# Patient Record
Sex: MALE | Race: BLACK OR AFRICAN AMERICAN | Employment: OTHER | URBAN - METROPOLITAN AREA
[De-identification: names, ages, dates, MRNs, and addresses within clinical notes are randomized per-mention and may not be internally consistent; named-entity substitution may affect disease eponyms.]

---

## 2017-03-07 ENCOUNTER — OFFICE VISIT (OUTPATIENT)
Dept: INTERNAL MEDICINE CLINIC | Age: 72
End: 2017-03-07

## 2017-03-07 VITALS
BODY MASS INDEX: 23.49 KG/M2 | HEART RATE: 105 BPM | DIASTOLIC BLOOD PRESSURE: 88 MMHG | SYSTOLIC BLOOD PRESSURE: 170 MMHG | HEIGHT: 65 IN | WEIGHT: 141 LBS | OXYGEN SATURATION: 98 % | TEMPERATURE: 97.5 F | RESPIRATION RATE: 16 BRPM

## 2017-03-07 DIAGNOSIS — Z23 ENCOUNTER FOR IMMUNIZATION: ICD-10-CM

## 2017-03-07 DIAGNOSIS — E11.21 CONTROLLED TYPE 2 DIABETES MELLITUS WITH DIABETIC NEPHROPATHY, WITH LONG-TERM CURRENT USE OF INSULIN (HCC): Primary | ICD-10-CM

## 2017-03-07 DIAGNOSIS — Z79.4 CONTROLLED TYPE 2 DIABETES MELLITUS WITH DIABETIC NEPHROPATHY, WITH LONG-TERM CURRENT USE OF INSULIN (HCC): Primary | ICD-10-CM

## 2017-03-07 DIAGNOSIS — I10 ESSENTIAL HYPERTENSION: ICD-10-CM

## 2017-03-07 LAB — HBA1C MFR BLD HPLC: 8.2 %

## 2017-03-07 RX ORDER — ATORVASTATIN CALCIUM 20 MG/1
20 TABLET, FILM COATED ORAL DAILY
Qty: 30 TAB | Refills: 3 | Status: SHIPPED | OUTPATIENT
Start: 2017-03-07 | End: 2017-04-26 | Stop reason: SDUPTHER

## 2017-03-07 RX ORDER — AMLODIPINE BESYLATE 5 MG/1
5 TABLET ORAL DAILY
Qty: 30 TAB | Refills: 3 | Status: SHIPPED | OUTPATIENT
Start: 2017-03-07 | End: 2017-05-01 | Stop reason: SDUPTHER

## 2017-03-07 NOTE — PATIENT INSTRUCTIONS
Follow a no concentrated sweets, low salt diet. Stay physically active. Continue your current medications. Start amlodipine 5 mg in the am to help control your blood pressure. Start atorvastatin 20 mg at bedtime to help reduce your risk of heart attack and stroke. Continue to monitor your blood sugars. Arrange an eye exam.  A shingles shot is recommended.

## 2017-03-07 NOTE — PROGRESS NOTES
Chief Complaint   Patient presents with    Diabetes     Reviewed record in preparation for visit and have obtained necessary documentation. Identified pt with two pt identifiers(name and ). Health Maintenance Due   Topic    COLONOSCOPY     ZOSTER VACCINE AGE 60>     MEDICARE YEARLY EXAM     EYE EXAM RETINAL OR DILATED Q1     GLAUCOMA SCREENING Q2Y          Chief Complaint   Patient presents with    Diabetes        Wt Readings from Last 3 Encounters:   17 141 lb (64 kg)   16 137 lb (62.1 kg)   16 140 lb 14.4 oz (63.9 kg)     Temp Readings from Last 3 Encounters:   17 97.5 °F (36.4 °C) (Oral)   16 97.5 °F (36.4 °C) (Oral)   16 98.2 °F (36.8 °C) (Oral)     BP Readings from Last 3 Encounters:   17 170/88   16 170/60   16 168/80     Pulse Readings from Last 3 Encounters:   17 (!) 105   16 (!) 104   16 98           Learning Assessment:  :     Learning Assessment 2014 11/15/2013   PRIMARY LEARNER Patient Patient   HIGHEST LEVEL OF EDUCATION - PRIMARY LEARNER  GRADUATED HIGH SCHOOL OR GED -   BARRIERS PRIMARY LEARNER NONE -   CO-LEARNER CAREGIVER No -   PRIMARY LANGUAGE ENGLISH ENGLISH    NEED No -   LEARNER PREFERENCE PRIMARY OTHER (COMMENT) PICTURES   LEARNING SPECIAL TOPICS no -   ANSWERED BY patient patient   RELATIONSHIP SELF SELF   ASSESSMENT COMMENT none -       Depression Screening:  :     PHQ 2 / 9, over the last two weeks 2016   Little interest or pleasure in doing things Not at all   Feeling down, depressed or hopeless Not at all   Total Score PHQ 2 0       Fall Risk Assessment:  :     Fall Risk Assessment, last 12 mths 2016   Able to walk? Yes   Fall in past 12 months? -   Fall with injury? Yes   Number of falls in past 12 months 1   Fall Risk Score 2       Abuse Screening:  :     Abuse Screening Questionnaire 2014   Do you ever feel afraid of your partner?  N N   Are you in a relationship with someone who physically or mentally threatens you? N N   Is it safe for you to go home? Y Y       Coordination of Care Questionnaire:  :     1) Have you been to an emergency room, urgent care clinic since your last visit? no   Hospitalized since your last visit? no             2) Have you seen or consulted any other health care providers outside of [address] [place] [address] since your last visit? no  (Include any pap smears or colon screenings in this section.)    3) Do you have an Advance Directive on file? no    4) Are you interested in receiving information on Advance Directives? NO      Patient is accompanied by son I have received verbal consent from Mercy Swanson to discuss any/all medical information while they are present in the room. Reviewed record  In preparation for visit and have obtained necessary documentation.

## 2017-03-07 NOTE — PROGRESS NOTES
Subjective:     Chief Complaint   Patient presents with    Diabetes     He  is a 70y.o. year old male who presents for evaluation. BS have been  over the past month. Has a cold presently. No CP or SOB. Historical Data    Past Medical History:   Diagnosis Date    Diabetes ([address]     Diabetes ([facility] [place] [address] 2002        No past surgical history on file. Outpatient Encounter Prescriptions as of 3/7/2017   Medication Sig Dispense Refill    losartan (COZAAR) 100 mg tablet TAKE 1 TABLET BY MOUTH EVERY DAY 30 Tab 5    metFORMIN (GLUCOPHAGE) 500 mg tablet Take 1 Tab by mouth two (2) times daily (with meals). Indications: TYPE 2 DIABETES MELLITUS 60 Tab 3    LANTUS SOLOSTAR 100 unit/mL (3 mL) pen INJECT 20 UNITS EVERY DAY 5 Each 3    Diabetic Supplies, Miscellan. misc Needles for Lantus pen 100 Each 10    glucose blood VI test strips (ACCU-CHEK SMARTVIEW TEST STRIP) strip Check blood sugar twice a day. Diagnosis code E11.9 100 Strip 11    Lancets misc Check blood sugar twice a day. Diagnosis code E11.9 200 Each 11    Blood-Glucose Meter monitoring kit As per Patients insurance for brand preference 1 Kit 0    latanoprost (XALATAN) 0.005 % ophthalmic solution       timolol (TIMOPTIC) 0.5 % ophthalmic solution       econazole nitrate (SPECTAZOLE) 1 % topical cream Apply to feet bilaterally 15 g 0    Lancets & Blood Glucose Strips Cmpk One touch Ultra test strips. Tid ac meals 3 Package 3    vit B Cmplx 3-FA-Vit C-Biotin (NEPHRO ALEN RX) 1- mg-mg-mcg tablet Take 1 Tab by mouth daily. 30 Tab 3    ROSA PEN NEEDLE 32 x 5/32 \" Ndle USE AS DIRECTED 100 Syringe 1    aspirin (ASPIRIN) 325 mg tablet Take 325 mg by mouth daily.  omega-3 fatty acids-vitamin e (FISH OIL) 1,000 mg Cap Take 1 Cap by mouth. No facility-administered encounter medications on file as of 3/7/2017.          No Known Allergies     Social History     Social History    Marital status:      Spouse name: N/A    Number of children: N/A    Years of education: N/A     Occupational History    Not on file. Social History Main Topics    Smoking status: Never Smoker    Smokeless tobacco: Never Used    Alcohol use No    Drug use: No    Sexual activity: No     Other Topics Concern    Not on file     Social History Narrative    ** Merged History Encounter **             Review of Systems  Pertinent items are noted in HPI. Objective:     Vitals:    03/07/17 0839   BP: 170/88   Pulse: (!) 105   Resp: 16   Temp: 97.5 °F (36.4 °C)   TempSrc: Oral   SpO2: 98%   Weight: 141 lb (64 kg)   Height: 5' 4.57\" (1.64 m)     Pleasant AAM in no acute distress. Neck:  Supple. Cardiac: RRR without murmurs, gallops or rubs. Lungs: Clear to auscultation. Abdomen: Benign. Diabetic foot exam:     Left: Reflexes 1+     Vibratory sensation normal    Proprioception normal   Sharp/dull discrimination normal    Filament test normal sensation with micro filament   Pulse DP: 1+ (weak)   Pulse PT: 1+ (weak)   Deformities: None  Right: Reflexes 1+   Vibratory sensation normal   Proprioception normal   Sharp/dull discrimination normal   Filament test normal sensation with micro filament   Pulse DP: 1+ (weak)   Pulse PT: 1+ (weak)   Deformities: None  Results for orders placed or performed in visit on 03/07/17   AMB POC HEMOGLOBIN A1C   Result Value Ref Range    Hemoglobin A1c (POC) 8.2 %     ASSESSMENT / PLAN:   1. Controlled type 2 diabetes mellitus with diabetic nephropathy, with long-term current use of insulin (HCC)  · No concentrated sweets diet. · Regular aerobic exercise. · Continue current medications / may require adjustment if A1c remains elevated  · On ARB  · Add statin  · Encouraged eye exam.  -  DIABETES FOOT EXAM  - AMB POC HEMOGLOBIN A1C  - atorvastatin (LIPITOR) 20 mg tablet; Take 1 Tab by mouth daily. Dispense: 30 Tab; Refill: 3    2. Essential hypertension  · Not well controlled. · Continue ARB  · Add CCB.   · Stress low salt diet.  - amLODIPine (NORVASC) 5 mg tablet; Take 1 Tab by mouth daily. Dispense: 30 Tab; Refill: 3    3. Encounter for immunization    - varicella zoster vacine live (ZOSTAVAX) 19,400 unit/0.65 mL susr injection; 1 Vial by SubCUTAneous route once for 1 dose. Dispense: 0.65 mL; Refill: 0    4. General  · Return for a wellness exam.      Patient Instructions   Follow a no concentrated sweets, low salt diet. Stay physically active. Continue your current medications. Start amlodipine 5 mg in the am to help control your blood pressure. Start atorvastatin 20 mg at bedtime to help reduce your risk of heart attack and stroke. Continue to monitor your blood sugars. Arrange an eye exam.  A shingles shot is recommended. Follow-up Disposition:  Return in about 6 weeks (around 4/18/2017) for F/U DM, HTN, wellness. Advised him to call back or return to office if symptoms worsen/change/persist.  Discussed expected course/resolution/complications of diagnosis in detail with patient. Medication risks/benefits/costs/interactions/alternatives discussed with patient. He was given an after visit summary which includes diagnoses, current medications, & vitals. He expressed understanding with the diagnosis and plan.

## 2017-03-07 NOTE — MR AVS SNAPSHOT
Visit Information Date & Time Provider Department Dept. Phone Encounter #  
 3/7/2017  8:15 AM Gonzales Cooney MD [place] [address] [facility] [phone] [id number] Follow-up Instructions Return in about 6 weeks (around 4/18/2017) for F/U DM, HTN, wellness. Upcoming Health Maintenance Date Due COLONOSCOPY 5/22/1963 ZOSTER VACCINE AGE 60> 5/22/2005 MEDICARE YEARLY EXAM 5/22/2010 EYE EXAM RETINAL OR DILATED Q1 5/19/2015 GLAUCOMA SCREENING Q2Y 5/19/2016 LIPID PANEL Q1 4/20/2017 HEMOGLOBIN A1C Q6M 5/29/2017 MICROALBUMIN Q1 5/31/2017 FOOT EXAM Q1 11/29/2017 DTaP/Tdap/Td series (2 - Td) 3/18/2026 Allergies as of 3/7/2017  Review Complete On: 3/7/2017 By: Gonzales Cooney MD  
 No Known Allergies Current Immunizations  Reviewed on 11/29/2016 Name Date Influenza High Dose Vaccine PF 11/29/2016 Influenza Vaccine 10/30/2014, 10/14/2013 Influenza Vaccine Whole 11/1/2012 Pneumococcal Vaccine (Unspecified Type) 11/28/2012, 5/29/2012  9:16 AM  
  
 Not reviewed this visit You Were Diagnosed With   
  
 Codes Comments Controlled type 2 diabetes mellitus with diabetic nephropathy, with long-term current use of insulin ([facility]    -  Primary ICD-10-CM: E11.21, Z79.4 ICD-9-CM: 250.40, 583.81, V58.67 Essential hypertension     ICD-10-CM: I10 
ICD-9-CM: 401.9 Encounter for immunization     ICD-10-CM: U40 ICD-9-CM: V03.89 Vitals BP Pulse Temp Resp Height(growth percentile) Weight(growth percentile) 170/88 (BP 1 Location: Right arm, BP Patient Position: Sitting) (!) 105 97.5 °F (36.4 °C) (Oral) 16 5' 4.57\" (1.64 m) 141 lb (64 kg) SpO2 BMI Smoking Status 98% 23.78 kg/m2 Never Smoker BMI and BSA Data Body Mass Index Body Surface Area 23.78 kg/m 2 1.71 m 2 Preferred Pharmacy Pharmacy Name Phone  [id number] [address] [place] [phone] Your Updated Medication List  
  
   
This list is accurate as of: 3/7/17  9:23 AM.  Always use your most recent med list. amLODIPine 5 mg tablet Commonly known as:  Chuck Chafe Take 1 Tab by mouth daily. aspirin 325 mg tablet Commonly known as:  ASPIRIN Take 325 mg by mouth daily. atorvastatin 20 mg tablet Commonly known as:  LIPITOR Take 1 Tab by mouth daily. Blood-Glucose Meter monitoring kit As per Patients insurance for brand preference Diabetic Supplies, [facility] 24. Misc Needles for Lantus pen  
  
 econazole nitrate 1 % topical cream  
Commonly known as:  Den Roland Apply to feet bilaterally FISH OIL 1,000 mg Cap Generic drug:  omega-3 fatty acids-vitamin e Take 1 Cap by mouth. glucose blood VI test strips strip Commonly known as:  [address] Check blood sugar twice a day. Diagnosis code E11.9 Lancets & Blood Glucose Strips Cmpk One touch Ultra test strips. Tid ac meals Lancets Misc Check blood sugar twice a day. Diagnosis code E11.9 LANTUS SOLOSTAR 100 unit/mL (3 mL) pen Generic drug:  insulin glargine INJECT 20 UNITS EVERY DAY  
  
 latanoprost 0.005 % ophthalmic solution Commonly known as:  XALATAN  
  
 losartan 100 mg tablet Commonly known as:  COZAAR  
TAKE 1 TABLET BY MOUTH EVERY DAY  
  
 metFORMIN 500 mg tablet Commonly known as:  GLUCOPHAGE Take 1 Tab by mouth two (2) times daily (with meals). Indications: TYPE 2 DIABETES MELLITUS Nichole Pen Needle 32 gauge x 5/32\" Ndle Generic drug:  Insulin Needles (Disposable) USE AS DIRECTED  
  
 timolol 0.5 % ophthalmic solution Commonly known as:  TIMOPTIC  
  
 varicella zoster vacine live 19,400 unit/0.65 mL Susr injection Commonly known as:  ZOSTAVAX  
1 Vial by SubCUTAneous route once for 1 dose. vit B Cmplx 3-FA-Vit C-Biotin 1- mg-mg-mcg tablet Commonly known as:  NEPHRO ALEN RX Take 1 Tab by mouth daily. Prescriptions Printed Refills  
 varicella zoster vacine live (ZOSTAVAX) 19,400 unit/0.65 mL susr injection 0 Si Vial by SubCUTAneous route once for 1 dose. Class: Print Route: SubCUTAneous Prescriptions Sent to Pharmacy Refills  
 amLODIPine (NORVASC) 5 mg tablet 3 Sig: Take 1 Tab by mouth daily. Class: Normal  
 Pharmacy: [facility]/pharm[facility] #[id number] Ph #: [phone] Route: Oral  
 atorvastatin (LIPITOR) 20 mg tablet 3 Sig: Take 1 Tab by mouth daily. Class: Normal  
 Pharmacy: [facility]/pharmacy #[id number] Ph #: [phone] Route: Oral  
  
We Performed the Following AMB POC HEMOGLOBIN A1C [87524 CPT(R)] HM DIABETES FOOT EXAM [HM7 Custom] Follow-up Instructions Return in about 6 weeks (around 2017) for F/U DM, HTN, wellness. Patient Instructions Follow a no concentrated sweets, low salt diet. Stay physically active. Continue your current medications. Start amlodipine 5 mg in the am to help control your blood pressure. Start atorvastatin 20 mg at bedtime to help reduce your risk of heart attack and stroke. Continue to monitor your blood sugars. Arrange an eye exam. 
A shingles shot is recommended. Introducing [facility] & HEALTH SERVICES! [employer] introduces [employer] patient portal. Now you can access parts of your medical record, email your doctor's office, and request medication refills online. 1. In your internet browser, go to [email or web address] 2. Click on the First Time User? Click Here link in the Sign In box. You will see the New Member Sign Up page. 3. Enter your [employer] Access Code exactly as it appears below. You will not need to use this code after youve completed the sign-up process.  If you do not sign up before the expiration date, you must request a new code. · [email or web address] Access Code: [email or web address] Expires: 6/5/2017  9:23 AM 
 
4. Enter the last four digits of your Social Security Number (xxxx) and Date of Birth (mm/dd/yyyy) as indicated and click Submit. You will be taken to the next sign-up page. 5. Create a [employer] ID. This will be your [employer] login ID and cannot be changed, so think of one that is secure and easy to remember. 6. Create a [employer] password. You can change your password at any time. 7. Enter your Password Reset Question and Answer. This can be used at a later time if you forget your password. 8. Enter your e-mail address. You will receive e-mail notification when new information is available in [address] 9. Click Sign Up. You can now view and download portions of your medical record. 10. Click the Download Summary menu link to download a portable copy of your medical information. If you have questions, please visit the Frequently Asked Questions section of the [email or web address] website. Remember, [employer] is NOT to be used for urgent needs. For medical emergencies, dial 911. Now available from your iPhone and Android! Please provide this summary of care documentation to your next provider. Your primary care clinician is listed as Amadou Mckoy. If you have any questions after today's visit, please call [phone].

## 2017-03-26 DIAGNOSIS — I10 ESSENTIAL HYPERTENSION WITH GOAL BLOOD PRESSURE LESS THAN 140/90: ICD-10-CM

## 2017-03-26 RX ORDER — LOSARTAN POTASSIUM 100 MG/1
TABLET ORAL
Qty: 30 TAB | Refills: 2 | Status: SHIPPED | OUTPATIENT
Start: 2017-03-26 | End: 2017-07-03 | Stop reason: SDUPTHER

## 2017-04-18 ENCOUNTER — OFFICE VISIT (OUTPATIENT)
Dept: INTERNAL MEDICINE CLINIC | Age: 72
End: 2017-04-18

## 2017-04-18 VITALS
OXYGEN SATURATION: 98 % | BODY MASS INDEX: 24.17 KG/M2 | RESPIRATION RATE: 18 BRPM | TEMPERATURE: 97.9 F | HEART RATE: 105 BPM | HEIGHT: 64 IN | SYSTOLIC BLOOD PRESSURE: 156 MMHG | DIASTOLIC BLOOD PRESSURE: 80 MMHG | WEIGHT: 141.6 LBS

## 2017-04-18 DIAGNOSIS — Z79.4 CONTROLLED TYPE 2 DIABETES MELLITUS WITH DIABETIC NEPHROPATHY, WITH LONG-TERM CURRENT USE OF INSULIN (HCC): Primary | ICD-10-CM

## 2017-04-18 DIAGNOSIS — I10 ESSENTIAL HYPERTENSION: ICD-10-CM

## 2017-04-18 DIAGNOSIS — Z13.39 SCREENING FOR ALCOHOLISM: ICD-10-CM

## 2017-04-18 DIAGNOSIS — Z00.00 ROUTINE GENERAL MEDICAL EXAMINATION AT A HEALTH CARE FACILITY: ICD-10-CM

## 2017-04-18 DIAGNOSIS — E11.21 CONTROLLED TYPE 2 DIABETES MELLITUS WITH DIABETIC NEPHROPATHY, WITH LONG-TERM CURRENT USE OF INSULIN (HCC): Primary | ICD-10-CM

## 2017-04-18 NOTE — MR AVS SNAPSHOT
Visit Information Date & Time Provider Department Dept. Phone Encounter #  
 4/18/2017  8:30 AM Ardean Lennox, MD [place] [address] [facility] [phone] [id number] Follow-up Instructions Return in about 4 months (around 8/18/2017) for F/U DM. Upcoming Health Maintenance Date Due  
 EYE EXAM RETINAL OR DILATED Q1 5/19/2015 GLAUCOMA SCREENING Q2Y 5/19/2016 LIPID PANEL Q1 4/20/2017 MICROALBUMIN Q1 5/31/2017 HEMOGLOBIN A1C Q6M 9/7/2017 FOOT EXAM Q1 3/7/2018 MEDICARE YEARLY EXAM 4/19/2018 COLONOSCOPY 2/16/2021 DTaP/Tdap/Td series (2 - Td) 3/18/2026 Allergies as of 4/18/2017  Review Complete On: 4/18/2017 By: Ardean Lennox, MD  
 No Known Allergies Current Immunizations  Reviewed on 11/29/2016 Name Date Influenza High Dose Vaccine PF 11/29/2016 Influenza Vaccine 10/30/2014, 10/14/2013 Influenza Vaccine Whole 11/1/2012 Pneumococcal Vaccine (Unspecified Type) 11/28/2012, 5/29/2012  9:16 AM  
  
 Not reviewed this visit You Were Diagnosed With   
  
 Codes Comments Controlled type 2 diabetes mellitus with diabetic nephropathy, with long-term current use of insulin ([facility]    -  Primary ICD-10-CM: E11.21, Z79.4 ICD-9-CM: 250.40, 583.81, V58.67 Routine general medical examination at a health care facility     ICD-10-CM: Z00.00 ICD-9-CM: V70.0 Screening for alcoholism     ICD-10-CM: Z13.89 ICD-9-CM: V79.1 Essential hypertension     ICD-10-CM: I10 
ICD-9-CM: 401.9 Vitals BP Pulse Temp Resp Height(growth percentile) Weight(growth percentile) 156/80 (BP 1 Location: Left arm, BP Patient Position: Sitting) (!) 105 97.9 °F (36.6 °C) (Oral) 18 5' 4\" (1.626 m) 141 lb 9.6 oz (64.2 kg) SpO2 BMI Smoking Status 98% 24.31 kg/m2 Never Smoker BMI and BSA Data Body Mass Index Body Surface Area  
 24.31 kg/m 2 1.7 m 2 Preferred Pharmacy Pharmacy Name Phone [id number] [place] , [address] [facility] [phone] Your Updated Medication List  
  
   
This list is accurate as of: 4/18/17  8:52 AM.  Always use your most recent med list. amLODIPine 5 mg tablet Commonly known as:  Franklin Andria Take 1 Tab by mouth daily. aspirin 325 mg tablet Commonly known as:  ASPIRIN Take 325 mg by mouth daily. atorvastatin 20 mg tablet Commonly known as:  LIPITOR Take 1 Tab by mouth daily. Blood-Glucose Meter monitoring kit As per Patients insurance for brand preference Diabetic Supplies, [facility] 24. Misc Needles for Lantus pen  
  
 glucose blood VI test strips strip Commonly known as:  [address] Check blood sugar twice a day. Diagnosis code E11.9 Lancets & Blood Glucose Strips Cmpk One touch Ultra test strips. Tid ac meals Lancets Misc Check blood sugar twice a day. Diagnosis code E11.9 LANTUS SOLOSTAR 100 unit/mL (3 mL) pen Generic drug:  insulin glargine INJECT 20 UNITS EVERY DAY  
  
 losartan 100 mg tablet Commonly known as:  COZAAR  
TAKE 1 TABLET BY MOUTH EVERY DAY  
  
 metFORMIN 500 mg tablet Commonly known as:  GLUCOPHAGE Take 1 Tab by mouth two (2) times daily (with meals). Indications: TYPE 2 DIABETES MELLITUS Nichole Pen Needle 32 gauge x 5/32\" Ndle Generic drug:  Insulin Needles (Disposable) USE AS DIRECTED  
  
 vit B Cmplx 3-FA-Vit C-Biotin 1- mg-mg-mcg tablet Commonly known as:  NEPHRO ALEN RX Take 1 Tab by mouth daily. We Performed the Following CBC WITH AUTOMATED DIFF [15155 CPT(R)] HEMOGLOBIN A1C WITH EAG [88557 CPT(R)] LIPID PANEL [71845 CPT(R)] METABOLIC PANEL, COMPREHENSIVE [62851 CPT(R)] PSA W/ REFLX FREE PSA [92967 CPT(R)] TSH REFLEX TO T4 [[id number] Custom] URINALYSIS W/ RFLX MICROSCOPIC [70692 CPT(R)] Follow-up Instructions Return in about 4 months (around 8/18/2017) for F/U DM. Patient Instructions Follow a no concentrated sweets diet. Avoid salt. Take your medications as directed. Have blood work done today. Get a complete eye exam yearly. Check your feet carefully morning and night. Monitor your blood sugars. Introducing [facility] & HEALTH SERVICES! Rob Orte[employer] introduces [employer] patient portal. Now you can access parts of your medical record, email your doctor's office, and request medication refills online. 1. In your internet browser, go to [email or web address] 2. Click on the First Time User? Click Here link in the Sign In box. You will see the New Member Sign Up page. 3. Enter your [employer] Access Code exactly as it appears below. You will not need to use this code after youve completed the sign-up process. If you do not sign up before the expiration date, you must request a new code. · [employer] Access Code: [email or web address] Expires: 6/5/2017 10:23 AM 
 
4. Enter the last four digits of your Social Security Number (xxxx) and Date of Birth (mm/dd/yyyy) as indicated and click Submit. You will be taken to the next sign-up page. 5. Create a [employer] ID. This will be your [employer] login ID and cannot be changed, so think of one that is secure and easy to remember. 6. Create a [employer] password. You can change your password at any time. 7. Enter your Password Reset Question and Answer. This can be used at a later time if you forget your password. 8. Enter your e-mail address. You will receive e-mail notification when new information is available in [address] 9. Click Sign Up. You can now view and download portions of your medical record. 10. Click the Download Summary menu link to download a portable copy of your medical information. If you have questions, please visit the Frequently Asked Questions section of the [employer] website.  Remember, [employer] is NOT to be used for urgent needs. For medical emergencies, dial 911. Now available from your iPhone and Android! Please provide this summary of care documentation to your next provider. Your primary care clinician is listed as Reyes Bull. If you have any questions after today's visit, please call [phone].

## 2017-04-18 NOTE — PROGRESS NOTES
Subjective:     Chief Complaint   Patient presents with    Complete Physical    Hypertension     He  is a 70y.o. year old male who presents for evaluation. He has been monitoring his blood sugars and they have been reasonable well controlled (). Some confusion about some of his medications. Historical Data    Past Medical History:   Diagnosis Date    Diabetes ([facility]     Diabetes ([facility] 2002        No past surgical history on file. Outpatient Encounter Prescriptions as of 4/18/2017   Medication Sig Dispense Refill    losartan (COZAAR) 100 mg tablet TAKE 1 TABLET BY MOUTH EVERY DAY 30 Tab 2    amLODIPine (NORVASC) 5 mg tablet Take 1 Tab by mouth daily. 30 Tab 3    atorvastatin (LIPITOR) 20 mg tablet Take 1 Tab by mouth daily. 30 Tab 3    metFORMIN (GLUCOPHAGE) 500 mg tablet Take 1 Tab by mouth two (2) times daily (with meals). Indications: TYPE 2 DIABETES MELLITUS 60 Tab 3    LANTUS SOLOSTAR 100 unit/mL (3 mL) pen INJECT 20 UNITS EVERY DAY 5 Each 3    Diabetic Supplies, Miscellan. misc Needles for Lantus pen 100 Each 10    glucose blood VI test strips (ACCU-CHEK SMARTVIEW TEST STRIP) strip Check blood sugar twice a day. Diagnosis code E11.9 100 Strip 11    Lancets misc Check blood sugar twice a day. Diagnosis code E11.9 200 Each 11    Blood-Glucose Meter monitoring kit As per Patients insurance for brand preference 1 Kit 0    Lancets & Blood Glucose Strips Cmpk One touch Ultra test strips. Tid ac meals 3 Package 3    vit B Cmplx 3-FA-Vit C-Biotin (NEPHRO ALEN RX) 1- mg-mg-mcg tablet Take 1 Tab by mouth daily. 30 Tab 3    ROSA PEN NEEDLE 32 x 5/32 \" Ndle USE AS DIRECTED 100 Syringe 1    aspirin (ASPIRIN) 325 mg tablet Take 325 mg by mouth daily.  omega-3 fatty acids-vitamin e (FISH OIL) 1,000 mg Cap Take 1 Cap by mouth.       latanoprost (XALATAN) 0.005 % ophthalmic solution       timolol (TIMOPTIC) 0.5 % ophthalmic solution       econazole nitrate (SPECTAZOLE) 1 % topical cream Apply to feet bilaterally 15 g 0     No facility-administered encounter medications on file as of 4/18/2017. No Known Allergies     Social History     Social History    Marital status:      Spouse name: N/A    Number of children: N/A    Years of education: N/A     Occupational History    Not on file. Social History Main Topics    Smoking status: Never Smoker    Smokeless tobacco: Never Used    Alcohol use No    Drug use: No    Sexual activity: No     Other Topics Concern    Not on file     Social History Narrative    ** Merged History Encounter **             Review of Systems  Pertinent items are noted in HPI. Objective:     Vitals:    04/18/17 0833   BP: 156/80   Pulse: (!) 105   Resp: 18   Temp: 97.9 °F (36.6 °C)   TempSrc: Oral   SpO2: 98%   Weight: 141 lb 9.6 oz (64.2 kg)   Height: 5' 4\" (1.626 m)     Skin:  No macules, papules, striae or bites. HEENT:   Head:  Normocephalic, atraumatic   Ears:  Canals clear. TM's intact   Eyes:  EOMI, PERRLA, left exotropia   Throat:  No erythema or exudates  Neck:  No masses. No thyromegaly. No adenopathy. Cardiac:  Regular rate and rhythm without murmurs, gallops or rubs. Lungs:  Clear to auscultation. No wheezes, rhonchi or rubs. Abdomen:  Soft and non tender. No HSM. Extremities:  Pulses 2+ and intact. Musculoskeletal: No joint effusions. Neurologic:   CN's II-XII:  Left exotropia, otherwise WNL   Motor:  Symmetric and full. Genitalia: Normal male. No hernias. Rectal: Normal sphincter tone. No prostatic hypertrophy. ASSESSMENT / PLAN:   1. Routine general medical examination at a health care facility    - CBC WITH AUTOMATED DIFF  - TSH REFLEX TO T4  - PSA W/ REFLX FREE PSA    2. Screening for alcoholism      3. Controlled type 2 diabetes mellitus with diabetic nephropathy, with long-term current use of insulin (HCC)  · No concentrated sweets diet. · Regular aerobic exercise.   · Continue Lantus and metformin. · Monitor A1c and microalbumiin  · Regular eye exams. · On ARB and statin  - LIPID PANEL  - HEMOGLOBIN A1C WITH EAG    4. Essential hypertension  · Low salt diet. · Resume all medications. - URINALYSIS W/ RFLX MICROSCOPIC  - METABOLIC PANEL, COMPREHENSIVE    Patient Instructions   Follow a no concentrated sweets diet. Avoid salt. Take your medications as directed. Have blood work done today. Get a complete eye exam yearly. Check your feet carefully morning and night. Monitor your blood sugars. Follow-up Disposition:  Return in about 4 months (around 8/18/2017) for F/U DM. Advised him to call back or return to office if symptoms worsen/change/persist.  Discussed expected course/resolution/complications of diagnosis in detail with patient. Medication risks/benefits/costs/interactions/alternatives discussed with patient. He was given an after visit summary which includes diagnoses, current medications, & vitals. He expressed understanding with the diagnosis and plan. This is a Subsequent Medicare Annual Wellness Visit providing Personalized Prevention Plan Services (PPPS) (Performed 12 months after initial AWV and PPPS )    I have reviewed the patient's medical history in detail and updated the computerized patient record. History     Past Medical History:   Diagnosis Date    Diabetes ([facility] [facility]     Diabetes ([facility] [address] 2002      No past surgical history on file. Current Outpatient Prescriptions   Medication Sig Dispense Refill    losartan (COZAAR) 100 mg tablet TAKE 1 TABLET BY MOUTH EVERY DAY 30 Tab 2    amLODIPine (NORVASC) 5 mg tablet Take 1 Tab by mouth daily. 30 Tab 3    atorvastatin (LIPITOR) 20 mg tablet Take 1 Tab by mouth daily. 30 Tab 3    metFORMIN (GLUCOPHAGE) 500 mg tablet Take 1 Tab by mouth two (2) times daily (with meals).  Indications: TYPE 2 DIABETES MELLITUS 60 Tab 3    LANTUS SOLOSTAR 100 unit/mL (3 mL) pen INJECT 20 UNITS EVERY DAY 5 Each 3    Diabetic Supplies, [facility]. misc Needles for Lantus pen 100 Each 10    glucose blood VI test strips (ACCU-CHEK SMARTVIEW TEST STRIP) strip Check blood sugar twice a day. Diagnosis code E11.9 100 Strip 11    Lancets misc Check blood sugar twice a day. Diagnosis code E11.9 200 Each 11    Blood-Glucose Meter monitoring kit As per Patients insurance for brand preference 1 Kit 0    Lancets & Blood Glucose Strips Cmpk One touch Ultra test strips. Tid ac meals 3 Package 3    vit B Cmplx 3-FA-Vit C-Biotin (NEPHRO ALEN RX) 1- mg-mg-mcg tablet Take 1 Tab by mouth daily. 30 Tab 3    ROSA PEN NEEDLE 32 x 5/32 \" Ndle USE AS DIRECTED 100 Syringe 1    aspirin (ASPIRIN) 325 mg tablet Take 325 mg by mouth daily.  omega-3 fatty acids-vitamin e (FISH OIL) 1,000 mg Cap Take 1 Cap by mouth.  latanoprost (XALATAN) 0.005 % ophthalmic solution       timolol (TIMOPTIC) 0.5 % ophthalmic solution       econazole nitrate (SPECTAZOLE) 1 % topical cream Apply to feet bilaterally 15 g 0     No Known Allergies  Family History   Problem Relation Age of Onset    Diabetes Mother       in her 76s     Social History   Substance Use Topics    Smoking status: Never Smoker    Smokeless tobacco: Never Used    Alcohol use No     Patient Active Problem List   Diagnosis Code    HTN (hypertension) I10    Cataracts, bilateral H26.9    Glaucoma, right eye H40.9    Screening for colon cancer Z12.11    DM (diabetes mellitus) type II controlled with [facility] manifestation ([facility] E11.29       Depression Risk Factor Screening:     PHQ 2 / 9, over the last two weeks 2016   Little interest or pleasure in doing things Not at all   Feeling down, depressed or hopeless Not at all   Total Score PHQ 2 0     Alcohol Risk Factor Screening: On any occasion during the past 3 months, have you had more than 4 drinks containing alcohol? No    Do you average more than 14 drinks per week?   No      Functional Ability and Level of Safety: Hearing Loss   none    Activities of Daily Living   Self-care. Requires assistance with: no ADLs    Fall Risk     Fall Risk Assessment, last 12 mths 4/18/2017   Able to walk? Yes   Fall in past 12 months? No   Fall with injury? -   Number of falls in past 12 months -   Fall Risk Score -     Abuse Screen   Patient is not abused    Review of Systems   Pertinent items are noted in HPI. Physical Examination     Evaluation of Cognitive Function:  Mood/affect:  neutral  Appearance: age appropriate and casually dressed  Family member/caregiver input: none    No exam performed today, see elsewhere. Patient Care Team:  Elidia Mac MD as PCP - General (Internal Medicine)    Advice/Referrals/Counseling   Education and counseling provided:  Are appropriate based on today's review and evaluation  End-of-Life planning (with patient's consent)      Assessment/Plan       ICD-10-CM ICD-9-CM    1. Routine general medical examination at a health care facility Z00.00 V70.0    2. Screening for alcoholism Z13.89 V79.1      Encounter Diagnoses   Name Primary?  Routine general medical examination at a health care facility     Screening for alcoholism    .

## 2017-04-18 NOTE — PROGRESS NOTES
Chief Complaint   Patient presents with    Complete Physical    Hypertension     1. Have you been to the ER, urgent care clinic since your last visit? No Hospitalized since your last visit? No    2. Have you seen or consulted any other health care providers outside of the [address] [place] [address] since your last visit? No    Include any pap smears or colon screening.  No

## 2017-04-18 NOTE — PATIENT INSTRUCTIONS
Follow a no concentrated sweets diet. Avoid salt. Take your medications as directed. Have blood work done today. Get a complete eye exam yearly. Check your feet carefully morning and night. Monitor your blood sugars.

## 2017-04-20 LAB
ALBUMIN SERPL-MCNC: 4.3 G/DL (ref 3.5–4.8)
ALBUMIN/GLOB SERPL: 1.3 {RATIO} (ref 1.2–2.2)
ALP SERPL-CCNC: 103 IU/L (ref 39–117)
ALT SERPL-CCNC: 22 IU/L (ref 0–44)
APPEARANCE UR: CLEAR
AST SERPL-CCNC: 17 IU/L (ref 0–40)
BACTERIA #/AREA URNS HPF: NORMAL /[HPF]
BASOPHILS # BLD AUTO: 0 X10E3/UL (ref 0–0.2)
BASOPHILS NFR BLD AUTO: 0 %
BILIRUB SERPL-MCNC: 0.7 MG/DL (ref 0–1.2)
BILIRUB UR QL STRIP: NEGATIVE
BUN SERPL-MCNC: 20 MG/DL (ref 8–27)
BUN/CREAT SERPL: 17 (ref 10–24)
CALCIUM SERPL-MCNC: 9.1 MG/DL (ref 8.6–10.2)
CASTS URNS QL MICRO: NORMAL /LPF
CHLORIDE SERPL-SCNC: 102 MMOL/L (ref 96–106)
CHOLEST SERPL-MCNC: 169 MG/DL (ref 100–199)
CO2 SERPL-SCNC: 19 MMOL/L (ref 18–29)
COLOR UR: YELLOW
CREAT SERPL-MCNC: 1.17 MG/DL (ref 0.76–1.27)
EOSINOPHIL # BLD AUTO: 0.3 X10E3/UL (ref 0–0.4)
EOSINOPHIL NFR BLD AUTO: 3 %
EPI CELLS #/AREA URNS HPF: NORMAL /HPF
ERYTHROCYTE [DISTWIDTH] IN BLOOD BY AUTOMATED COUNT: 15 % (ref 12.3–15.4)
EST. AVERAGE GLUCOSE BLD GHB EST-MCNC: 163 MG/DL
GLOBULIN SER CALC-MCNC: 3.4 G/DL (ref 1.5–4.5)
GLUCOSE SERPL-MCNC: 131 MG/DL (ref 65–99)
GLUCOSE UR QL: NEGATIVE
HBA1C MFR BLD: 7.3 % (ref 4.8–5.6)
HCT VFR BLD AUTO: 39 % (ref 37.5–51)
HDLC SERPL-MCNC: 74 MG/DL
HGB BLD-MCNC: 13.1 G/DL (ref 12.6–17.7)
HGB UR QL STRIP: NEGATIVE
IMM GRANULOCYTES # BLD: 0 X10E3/UL (ref 0–0.1)
IMM GRANULOCYTES NFR BLD: 0 %
INTERPRETATION, 910389: NORMAL
KETONES UR QL STRIP: NEGATIVE
LDLC SERPL CALC-MCNC: 84 MG/DL (ref 0–99)
LEUKOCYTE ESTERASE UR QL STRIP: NEGATIVE
LYMPHOCYTES # BLD AUTO: 4.1 X10E3/UL (ref 0.7–3.1)
LYMPHOCYTES NFR BLD AUTO: 44 %
Lab: NORMAL
MCH RBC QN AUTO: 28.5 PG (ref 26.6–33)
MCHC RBC AUTO-ENTMCNC: 33.6 G/DL (ref 31.5–35.7)
MCV RBC AUTO: 85 FL (ref 79–97)
MICRO URNS: ABNORMAL
MONOCYTES # BLD AUTO: 0.7 X10E3/UL (ref 0.1–0.9)
MONOCYTES NFR BLD AUTO: 7 %
MUCOUS THREADS URNS QL MICRO: PRESENT
NEUTROPHILS # BLD AUTO: 4.3 X10E3/UL (ref 1.4–7)
NEUTROPHILS NFR BLD AUTO: 46 %
NITRITE UR QL STRIP: NEGATIVE
PH UR STRIP: 6 [PH] (ref 5–7.5)
PLATELET # BLD AUTO: 175 X10E3/UL (ref 150–379)
POTASSIUM SERPL-SCNC: 3.9 MMOL/L (ref 3.5–5.2)
PROT SERPL-MCNC: 7.7 G/DL (ref 6–8.5)
PROT UR QL STRIP: ABNORMAL
PSA SERPL-MCNC: 1.3 NG/ML (ref 0–4)
RBC # BLD AUTO: 4.59 X10E6/UL (ref 4.14–5.8)
RBC #/AREA URNS HPF: NORMAL /HPF
REFLEX CRITERIA: NORMAL
SODIUM SERPL-SCNC: 143 MMOL/L (ref 134–144)
SP GR UR: 1.01 (ref 1–1.03)
TRIGL SERPL-MCNC: 57 MG/DL (ref 0–149)
TSH SERPL DL<=0.005 MIU/L-ACNC: 3.08 UIU/ML (ref 0.45–4.5)
UROBILINOGEN UR STRIP-MCNC: 1 MG/DL (ref 0.2–1)
VLDLC SERPL CALC-MCNC: 11 MG/DL (ref 5–40)
WBC # BLD AUTO: 9.3 X10E3/UL (ref 3.4–10.8)
WBC #/AREA URNS HPF: NORMAL /HPF

## 2017-04-26 DIAGNOSIS — E11.21 CONTROLLED TYPE 2 DIABETES MELLITUS WITH DIABETIC NEPHROPATHY, WITH LONG-TERM CURRENT USE OF INSULIN (HCC): ICD-10-CM

## 2017-04-26 DIAGNOSIS — Z79.4 CONTROLLED TYPE 2 DIABETES MELLITUS WITH DIABETIC NEPHROPATHY, WITH LONG-TERM CURRENT USE OF INSULIN (HCC): ICD-10-CM

## 2017-04-26 RX ORDER — ATORVASTATIN CALCIUM 20 MG/1
20 TABLET, FILM COATED ORAL DAILY
Qty: 90 TAB | Refills: 1 | Status: SHIPPED | OUTPATIENT
Start: 2017-04-26 | End: 2017-11-08 | Stop reason: SDUPTHER

## 2017-04-26 RX ORDER — INSULIN GLARGINE 100 [IU]/ML
INJECTION, SOLUTION SUBCUTANEOUS
Qty: 2 PEN | Refills: 10 | Status: SHIPPED | OUTPATIENT
Start: 2017-04-26 | End: 2018-03-01 | Stop reason: SDUPTHER

## 2017-04-26 NOTE — TELEPHONE ENCOUNTER
Patient's son called [facility] stating that his father does not have any insulin and needs the script sent as soon as possible. Please call son Shivam Baxter at [phone]

## 2017-04-26 NOTE — TELEPHONE ENCOUNTER
Last office visit 4/18/17  Upcomming appointment 8/15/17      I advised patient's son that I would have Dr Isac Frederick get this sent in as soon as possible and requested that he contact us sooner in the future prior to medication running out.  Understanding expressed

## 2017-05-02 DIAGNOSIS — E11.29 DM (DIABETES MELLITUS) TYPE II CONTROLLED WITH RENAL MANIFESTATION (HCC): ICD-10-CM

## 2017-05-02 RX ORDER — BLOOD SUGAR DIAGNOSTIC
STRIP MISCELLANEOUS
Qty: 100 STRIP | Refills: 10 | Status: SHIPPED | OUTPATIENT
Start: 2017-05-02 | End: 2018-04-16 | Stop reason: SDUPTHER

## 2017-08-15 ENCOUNTER — OFFICE VISIT (OUTPATIENT)
Dept: INTERNAL MEDICINE CLINIC | Age: 72
End: 2017-08-15

## 2017-08-15 VITALS
DIASTOLIC BLOOD PRESSURE: 60 MMHG | OXYGEN SATURATION: 98 % | SYSTOLIC BLOOD PRESSURE: 170 MMHG | HEIGHT: 64 IN | WEIGHT: 146.1 LBS | TEMPERATURE: 96.3 F | HEART RATE: 95 BPM | BODY MASS INDEX: 24.94 KG/M2 | RESPIRATION RATE: 16 BRPM

## 2017-08-15 DIAGNOSIS — I10 ESSENTIAL HYPERTENSION WITH GOAL BLOOD PRESSURE LESS THAN 140/90: ICD-10-CM

## 2017-08-15 DIAGNOSIS — Z79.4 CONTROLLED TYPE 2 DIABETES MELLITUS WITH DIABETIC NEPHROPATHY, WITH LONG-TERM CURRENT USE OF INSULIN (HCC): Primary | ICD-10-CM

## 2017-08-15 DIAGNOSIS — E11.21 CONTROLLED TYPE 2 DIABETES MELLITUS WITH DIABETIC NEPHROPATHY, WITH LONG-TERM CURRENT USE OF INSULIN (HCC): Primary | ICD-10-CM

## 2017-08-15 DIAGNOSIS — I10 ESSENTIAL HYPERTENSION: ICD-10-CM

## 2017-08-15 RX ORDER — LOSARTAN POTASSIUM 100 MG/1
TABLET ORAL
Qty: 30 TAB | Refills: 4 | Status: SHIPPED | OUTPATIENT
Start: 2017-08-15 | End: 2018-06-18 | Stop reason: SDUPTHER

## 2017-08-15 NOTE — PROGRESS NOTES
Chief Complaint   Patient presents with    Hypertension    Diabetes     Patient has been out of losartan for 1 month. Reviewed record in preparation for visit and have obtained necessary documentation. Identified pt with two pt identifiers(name and ). Health Maintenance Due   Topic    EYE EXAM RETINAL OR DILATED Q1     GLAUCOMA SCREENING Q2Y     MICROALBUMIN Q1          Chief Complaint   Patient presents with    Hypertension    Diabetes        Wt Readings from Last 3 Encounters:   08/15/17 146 lb 1.6 oz (66.3 kg)   17 141 lb 9.6 oz (64.2 kg)   17 141 lb (64 kg)     Temp Readings from Last 3 Encounters:   08/15/17 96.3 °F (35.7 °C) (Oral)   17 97.9 °F (36.6 °C) (Oral)   17 97.5 °F (36.4 °C) (Oral)     BP Readings from Last 3 Encounters:   08/15/17 170/60   17 156/80   17 170/88     Pulse Readings from Last 3 Encounters:   08/15/17 95   17 (!) 105   17 (!) 105           Learning Assessment:  :     Learning Assessment 2014 11/15/2013   PRIMARY LEARNER Patient Patient   HIGHEST LEVEL OF EDUCATION - PRIMARY LEARNER  GRADUATED HIGH SCHOOL OR GED -   BARRIERS PRIMARY LEARNER NONE -   CO-LEARNER CAREGIVER No -   PRIMARY LANGUAGE ENGLISH ENGLISH    NEED No -   LEARNER PREFERENCE PRIMARY OTHER (COMMENT) PICTURES   LEARNING SPECIAL TOPICS no -   ANSWERED BY patient patient   RELATIONSHIP SELF SELF   ASSESSMENT COMMENT none -       Depression Screening:  :     PHQ over the last two weeks 2016   Little interest or pleasure in doing things Not at all   Feeling down, depressed or hopeless Not at all   Total Score PHQ 2 0       Fall Risk Assessment:  :     Fall Risk Assessment, last 12 mths 2017   Able to walk? Yes   Fall in past 12 months?  No   Fall with injury? -   Number of falls in past 12 months -   Fall Risk Score -       Abuse Screening:  :     Abuse Screening Questionnaire 2014   Do you ever feel afraid of your partner? N N   Are you in a relationship with someone who physically or mentally threatens you? N N   Is it safe for you to go home? Y Y       Coordination of Care Questionnaire:  :     1) Have you been to an emergency room, urgent care clinic since your last visit? no   Hospitalized since your last visit? no             2) Have you seen or consulted any other health care providers outside of [address] [place] [address] since your last visit? no  (Include any pap smears or colon screenings in this section.)    3) Do you have an Advance Directive on file? no    4) Are you interested in receiving information on Advance Directives? NO      Patient is accompanied by son I have received verbal consent from Diego Bustillo to discuss any/all medical information while they are present in the room. Reviewed record  In preparation for visit and have obtained necessary documentation.

## 2017-08-15 NOTE — MR AVS SNAPSHOT
Visit Information Date & Time Provider Department Dept. Phone Encounter #  
 8/15/2017  8:00 AM Alberto Love MD [place] [id number] [facility] [phone] [id number] Follow-up Instructions Return in about 4 months (around 12/15/2017) for F/U DM. Upcoming Health Maintenance Date Due  
 EYE EXAM RETINAL OR DILATED Q1 5/19/2015 GLAUCOMA SCREENING Q2Y 5/19/2016 INFLUENZA AGE 9 TO ADULT 10/27/2017* HEMOGLOBIN A1C Q6M 10/18/2017 FOOT EXAM Q1 3/7/2018 LIPID PANEL Q1 4/18/2018 MEDICARE YEARLY EXAM 4/19/2018 MICROALBUMIN Q1 8/15/2018 COLONOSCOPY 2/16/2021 DTaP/Tdap/Td series (2 - Td) 3/18/2026 *Topic was postponed. The date shown is not the original due date. Allergies as of 8/15/2017  Review Complete On: 8/15/2017 By: Sam Linares LPN No Known Allergies Current Immunizations  Reviewed on 11/29/2016 Name Date Influenza High Dose Vaccine PF 11/29/2016 Influenza Vaccine 10/30/2014, 10/14/2013 Influenza Vaccine Whole 11/1/2012 ZZZ-RETIRED (DO NOT USE) Pneumococcal Vaccine (Unspecified Type) 11/28/2012, 5/29/2012  9:16 AM  
  
 Not reviewed this visit You Were Diagnosed With   
  
 Codes Comments Controlled type 2 diabetes mellitus with diabetic nephropathy, with long-term current use of insulin ([facility]    -  Primary ICD-10-CM: E11.21, Z79.4 ICD-9-CM: 250.40, 583.81, V58.67 Essential hypertension     ICD-10-CM: I10 
ICD-9-CM: 401.9 Essential hypertension with goal blood pressure less than 140/90     ICD-10-CM: I10 
ICD-9-CM: 401.9 Vitals BP Pulse Temp Resp Height(growth percentile) Weight(growth percentile) 170/60 (BP 1 Location: Right arm, BP Patient Position: Sitting) 95 96.3 °F (35.7 °C) (Oral) 16 5' 4\" (1.626 m) 146 lb 1.6 oz (66.3 kg) SpO2 BMI Smoking Status 98% 25.08 kg/m2 Never Smoker Vitals History BMI and BSA Data Body Mass Index Body Surface Area 25.08 kg/m 2 1.73 m 2 Preferred Pharmacy Pharmacy Name Phone [facility][id number]- [place], [address] [facility] [phone] Your Updated Medication List  
  
   
This list is accurate as of: 8/15/17  8:33 AM.  Always use your most recent med list.  
  
  
  
  
 ACCU-CHEK SMARTVIEW TEST STRIP strip Generic drug:  glucose blood VI test strips CHECK BLOOD SUGAR TWICE A DAY. DIAGNOSIS CODE E11.9  
  
 amLODIPine 5 mg tablet Commonly known as:  Noel Shana TAKE 1 TAB BY MOUTH DAILY. aspirin 325 mg tablet Commonly known as:  ASPIRIN Take 325 mg by mouth daily. atorvastatin 20 mg tablet Commonly known as:  LIPITOR Take 1 Tab by mouth daily. Blood-Glucose Meter monitoring kit As per Patients insurance for brand preference [facility], [facility]. Misc Needles for Lantus pen FISH OIL PO Take  by mouth. Lancets & Blood Glucose Strips Cmpk One touch Ultra test strips. Tid ac meals Lancets Misc Check blood sugar twice a day. Diagnosis code E11.9 LANTUS SOLOSTAR 100 unit/mL (3 mL) Inpn Generic drug:  insulin glargine INJECT 25 UNITS UNDER THE SKIN EVERY DAY AS DIRECTED  
  
 losartan 100 mg tablet Commonly known as:  COZAAR  
TAKE 1 TABLET BY MOUTH EVERY DAY  
  
 metFORMIN 500 mg tablet Commonly known as:  GLUCOPHAGE  
TAKE 1 TABLET BY MOUTH TWICE A DAY WITH MEALS Nichole Pen Needle 32 gauge x 5/32\" Ndle Generic drug:  Insulin Needles (Disposable) USE AS DIRECTED  
  
 vit B Cmplx 3-FA-Vit C-Biotin 1- mg-mg-mcg tablet Commonly known as:  NEPHRO ALEN RX Take 1 Tab by mouth daily. Prescriptions Sent to Pharmacy Refills  
 losartan (COZAAR) 100 mg tablet 4 Sig: TAKE 1 TABLET BY MOUTH EVERY DAY Class: Normal  
 Pharmacy: [facility]/pharmacy #[id number]- [place], [address] [place] [address] Ph #: [phone] Follow-up Instructions Return in about 4 months (around 12/15/2017) for F/U DM. To-Do List   
 08/15/2017 Lab:  HEMOGLOBIN A1C WITH EAG   
  
 08/15/2017 Lab:  MICROALBUMIN, UR, RAND W/ MICROALBUMIN/CREA RATIO Patient Instructions Follow a no concentrated sweets diet. Stay physically active. Take your medication each and every day. Monitor your blood sugars. Introducing [facility]! [address] introduces [employer] patient portal. Now you can access parts of your medical record, email your doctor's office, and request medication refills online. 1. In your internet browser, go to [email or web address] 2. Click on the First Time User? Click Here link in the Sign In box. You will see the New Member Sign Up page. 3. Enter your [employer] Access Code exactly as it appears below. You will not need to use this code after youve completed the sign-up process. If you do not sign up before the expiration date, you must request a new code. · [employer] Access Code: [email or web address] Expires: 11/13/2017  8:33 AM 
 
4. Enter the last four digits of your Social Security Number (xxxx) and Date of Birth (mm/dd/yyyy) as indicated and click Submit. You will be taken to the next sign-up page. 5. Create a [employer] ID. This will be your [employer] login ID and cannot be changed, so think of one that is secure and easy to remember. 6. Create a [employer] password. You can change your password at any time. 7. Enter your Password Reset Question and Answer. This can be used at a later time if you forget your password. 8. Enter your e-mail address. You will receive e-mail notification when new information is available in [address] 9. Click Sign Up. You can now view and download portions of your medical record. 10. Click the Download Summary menu link to download a portable copy of your medical information.  
 
If you have questions, please visit the Frequently Asked Questions section of the [employer]. Remember, [employer] is NOT to be used for urgent needs. For medical emergencies, dial 911. Now available from your iPhone and Android! Please provide this summary of care documentation to your next provider. Your primary care clinician is listed as Paramjit Camejo. If you have any questions after today's visit, please call [phone].

## 2017-08-15 NOTE — PROGRESS NOTES
Subjective:     Chief Complaint   Patient presents with    Hypertension    Diabetes     He  is a 67y.o. year old male who presents for evaluation. Out of BP medication for one month. BS . No CP / SOB. Good appetite and energy. Sleep pattern is irregular. Historical Data    Past Medical History:   Diagnosis Date    Diabetes ([address] 2002        No past surgical history on file. Outpatient Encounter Prescriptions as of 8/15/2017   Medication Sig Dispense Refill    losartan (COZAAR) 100 mg tablet TAKE 1 TABLET BY MOUTH EVERY DAY 30 Tab 4    metFORMIN (GLUCOPHAGE) 500 mg tablet TAKE 1 TABLET BY MOUTH TWICE A DAY WITH MEALS 60 Tab 5    atorvastatin (LIPITOR) 20 mg tablet Take 1 Tab by mouth daily. 90 Tab 1    vit B Cmplx 3-FA-Vit C-Biotin (NEPHRO ALEN RX) 1- mg-mg-mcg tablet Take 1 Tab by mouth daily. 30 Tab 3    aspirin (ASPIRIN) 325 mg tablet Take 325 mg by mouth daily.  ROSA PEN NEEDLE 32 gauge x 5/32\" ndle USE AS DIRECTED 100 Pen Needle 10    ACCU-CHEK SMARTVIEW TEST STRIP strip CHECK BLOOD SUGAR TWICE A DAY. DIAGNOSIS CODE E11.9 100 Strip 10    amLODIPine (NORVASC) 5 mg tablet TAKE 1 TAB BY MOUTH DAILY. 30 Tab 5    LANTUS SOLOSTAR 100 unit/mL (3 mL) pen INJECT 25 UNITS UNDER THE SKIN EVERY DAY AS DIRECTED 2 Pen 10    Diabetic Supplies, Miscellan. misc Needles for Lantus pen 100 Each 10    Lancets misc Check blood sugar twice a day. Diagnosis code E11.9 200 Each 11    Blood-Glucose Meter monitoring kit As per Patients insurance for brand preference 1 Kit 0    Lancets & Blood Glucose Strips Cmpk One touch Ultra test strips. Tid ac meals 3 Package 3     No facility-administered encounter medications on file as of 8/15/2017. Not on File     Social History     Social History    Marital status:      Spouse name: N/A    Number of children: N/A    Years of education: N/A     Occupational History    Not on file.      Social History Main Topics    Smoking status: Never Smoker    Smokeless tobacco: Never Used    Alcohol use No    Drug use: No    Sexual activity: No     Other Topics Concern    Not on file     Social History Narrative    ** Merged History Encounter **             Review of Systems  A comprehensive review of systems was negative except for that written in the HPI. Objective:     Vitals:    08/15/17 0814   BP: 170/60   Pulse: 95   Resp: 16   Temp: 96.3 °F (35.7 °C)   TempSrc: Oral   SpO2: 98%   Weight: 146 lb 1.6 oz (66.3 kg)   Height: 5' 4\" (1.626 m)     Pleasant AAM in no acute distress. Neck: Supplc. Cardiac: RRR without murmurs, gallops or rubs. Lungs: Clear to auscultation. Abdomen: Benign. Extremities: No edema. ASSESSMENT / PLAN:   1. Controlled type 2 diabetes mellitus with diabetic nephropathy, with long-term current use of insulin (HCC)  · Continue diabetic diet. · Continue Lantus and metformin. · On statin and ARB. · Regular eye exams.  - MICROALBUMIN, UR, RAND W/ MICROALBUMIN/CREA RATIO; Future  - HEMOGLOBIN A1C WITH EAG; Future  - AMB POC FUNDUS PHOTOGRAPHY WITH INTERP AND REPORT    2. Essential hypertension  · Low salt diet. · Continue current medications. 3. Essential hypertension with goal blood pressure less than 140/90    - losartan (COZAAR) 100 mg tablet; TAKE 1 TABLET BY MOUTH EVERY DAY  Dispense: 30 Tab; Refill: 4    Patient Instructions   Follow a no concentrated sweets diet. Stay physically active. Take your medication each and every day. Monitor your blood sugars. Follow-up Disposition: Not on File   Advised him to call back or return to office if symptoms worsen/change/persist.  Discussed expected course/resolution/complications of diagnosis in detail with patient. Medication risks/benefits/costs/interactions/alternatives discussed with patient. He was given an after visit summary which includes diagnoses, current medications, & vitals.   He expressed understanding with the diagnosis and plan.

## 2017-08-15 NOTE — PATIENT INSTRUCTIONS
Follow a no concentrated sweets diet. Stay physically active. Take your medication each and every day. Monitor your blood sugars.

## 2017-08-16 LAB
ALBUMIN/CREAT UR: 258.7 MG/G CREAT (ref 0–30)
CREAT UR-MCNC: 46.5 MG/DL
EST. AVERAGE GLUCOSE BLD GHB EST-MCNC: 163 MG/DL
HBA1C MFR BLD: 7.3 % (ref 4.8–5.6)
MICROALBUMIN UR-MCNC: 120.3 UG/ML

## 2017-11-08 DIAGNOSIS — E11.21 CONTROLLED TYPE 2 DIABETES MELLITUS WITH DIABETIC NEPHROPATHY, WITH LONG-TERM CURRENT USE OF INSULIN (HCC): ICD-10-CM

## 2017-11-08 DIAGNOSIS — Z79.4 CONTROLLED TYPE 2 DIABETES MELLITUS WITH DIABETIC NEPHROPATHY, WITH LONG-TERM CURRENT USE OF INSULIN (HCC): ICD-10-CM

## 2017-11-08 RX ORDER — ATORVASTATIN CALCIUM 20 MG/1
TABLET, FILM COATED ORAL
Qty: 90 TAB | Refills: 1 | Status: SHIPPED | OUTPATIENT
Start: 2017-11-08 | End: 2018-05-16 | Stop reason: SDUPTHER

## 2017-12-18 ENCOUNTER — OFFICE VISIT (OUTPATIENT)
Dept: INTERNAL MEDICINE CLINIC | Age: 72
End: 2017-12-18

## 2017-12-18 VITALS
WEIGHT: 144.3 LBS | RESPIRATION RATE: 18 BRPM | DIASTOLIC BLOOD PRESSURE: 90 MMHG | HEART RATE: 101 BPM | OXYGEN SATURATION: 98 % | HEIGHT: 64 IN | BODY MASS INDEX: 24.63 KG/M2 | SYSTOLIC BLOOD PRESSURE: 170 MMHG | TEMPERATURE: 96.9 F

## 2017-12-18 DIAGNOSIS — Z79.4 CONTROLLED TYPE 2 DIABETES MELLITUS WITH DIABETIC NEPHROPATHY, WITH LONG-TERM CURRENT USE OF INSULIN (HCC): Primary | ICD-10-CM

## 2017-12-18 DIAGNOSIS — Z23 ENCOUNTER FOR IMMUNIZATION: ICD-10-CM

## 2017-12-18 DIAGNOSIS — I10 ESSENTIAL HYPERTENSION: ICD-10-CM

## 2017-12-18 DIAGNOSIS — E11.21 CONTROLLED TYPE 2 DIABETES MELLITUS WITH DIABETIC NEPHROPATHY, WITH LONG-TERM CURRENT USE OF INSULIN (HCC): Primary | ICD-10-CM

## 2017-12-18 LAB — HBA1C MFR BLD HPLC: 7.4 %

## 2017-12-18 RX ORDER — ASPIRIN 81 MG/1
TABLET ORAL DAILY
COMMUNITY
End: 2018-08-20

## 2017-12-18 NOTE — PROGRESS NOTES
Subjective:     Chief Complaint   Patient presents with    Hypertension     He  is a 67y.o. year old male who presents for evaluation. BS are well controlled. Using some Boost for nutrition. Historical Data    Past Medical History:   Diagnosis Date    Diabetes ([address] 2002        No past surgical history on file. Outpatient Encounter Prescriptions as of 12/18/2017   Medication Sig Dispense Refill    losartan (COZAAR) 100 mg tablet TAKE 1 TABLET BY MOUTH EVERY DAY 30 Tab 4    ROSA PEN NEEDLE 32 gauge x 5/32\" ndle USE AS DIRECTED 100 Pen Needle 10    LANTUS SOLOSTAR 100 unit/mL (3 mL) pen INJECT 25 UNITS UNDER THE SKIN EVERY DAY AS DIRECTED 2 Pen 10    atorvastatin (LIPITOR) 20 mg tablet TAKE 1 TAB BY MOUTH DAILY. 90 Tab 1    DOCOSAHEXANOIC ACID/EPA (FISH OIL PO) Take  by mouth.  metFORMIN (GLUCOPHAGE) 500 mg tablet TAKE 1 TABLET BY MOUTH TWICE A DAY WITH MEALS 60 Tab 5    ACCU-CHEK SMARTVIEW TEST STRIP strip CHECK BLOOD SUGAR TWICE A DAY. DIAGNOSIS CODE E11.9 100 Strip 10    amLODIPine (NORVASC) 5 mg tablet TAKE 1 TAB BY MOUTH DAILY. 30 Tab 5    Diabetic Supplies, Miscellan. misc Needles for Lantus pen 100 Each 10    Lancets misc Check blood sugar twice a day. Diagnosis code E11.9 200 Each 11    Blood-Glucose Meter monitoring kit As per Patients insurance for brand preference 1 Kit 0    Lancets & Blood Glucose Strips Cmpk One touch Ultra test strips. Tid ac meals 3 Package 3    vit B Cmplx 3-FA-Vit C-Biotin (NEPHRO ALEN RX) 1- mg-mg-mcg tablet Take 1 Tab by mouth daily. 30 Tab 3    aspirin (ASPIRIN) 325 mg tablet Take 325 mg by mouth daily. No facility-administered encounter medications on file as of 12/18/2017. No Known Allergies     Social History     Social History    Marital status:      Spouse name: N/A    Number of children: N/A    Years of education: N/A     Occupational History    Not on file.      Social History Main Topics    Smoking status: Never Smoker    Smokeless tobacco: Never Used    Alcohol use No    Drug use: No    Sexual activity: No     Other Topics Concern    Not on file     Social History Narrative    ** Merged History Encounter **               Review of Systems  A comprehensive review of systems was negative except for that written in the HPI. Objective:     Vitals:    12/18/17 0850   BP: 170/90   Pulse: (!) 101   Resp: 18   Temp: 96.9 °F (36.1 °C)   TempSrc: Oral   SpO2: 98%   Weight: 144 lb 4.8 oz (65.5 kg)   Height: 5' 3.78\" (1.62 m)     Pleasant AAM in no acute distress. Cardiac: RRR without murmurs, gallops or rubs. Lungs: Clear to auscultation. Abdomen: Benign  A1c = 7.4%  Results for orders placed or performed in visit on 12/18/17   AMB POC HEMOGLOBIN A1C   Result Value Ref Range    Hemoglobin A1c (POC) 7.4 %       ASSESSMENT / PLAN:   1. Controlled type 2 diabetes mellitus with diabetic nephropathy, with long-term current use of insulin (HCC)  · Continue a no concentrated sweets diet. · Continue current medications (metformin / Lantus). · On statin and ARB. · Acceptable A1c level based on age and co-morbidities. - AMB POC HEMOGLOBIN A1C    2. Essential hypertension  · Low sodium diet. 3. Encounter for immunization    - Influenza virus vaccine ([address]) 72 years and older ([address]99)  - Administration fee () for Medicare insured patients    Patient Instructions   Follow a no concentrated sweets diet. Avoid salty things. Stay physically active. Continue your current medications. Get regular eye exams. Check your feet morning and night. Vaccine Information Statement    Influenza (Flu) Vaccine (Inactivated or Recombinant): What you need to know    Many Vaccine Information Statements are available in Portuguese and other languages. See www.immunize.org/vis  Hojas de Información Sobre Vacunas están disponibles en Español y en muchos otros idiomas. Visite www.immunize.org/vis    1.  Why get vaccinated? Influenza (flu) is a contagious disease that spreads around the [place] every year, usually between October and May. Flu is caused by influenza viruses, and is spread mainly by coughing, sneezing, and close contact. Anyone can get flu. Flu strikes suddenly and can last several days. Symptoms vary by age, but can include:   fever/chills   sore throat   muscle aches   fatigue   cough   headache    runny or stuffy nose    Flu can also lead to pneumonia and blood infections, and cause diarrhea and seizures in children. If you have a medical condition, such as heart or lung disease, flu can make it worse. Flu is more dangerous for some people. Infants and young children, people 72years of age and older, pregnant women, and people with certain health conditions or a weakened immune system are at greatest risk. Each year thousands of people in the [facility] die from flu, and many more are hospitalized. Flu vaccine can:   keep you from getting flu,   make flu less severe if you do get it, and   keep you from spreading flu to your family and other people. 2. Inactivated and recombinant flu vaccines    A dose of flu vaccine is recommended every flu season. Children 6 months through 6years of age may need two doses during the same flu season. Everyone else needs only one dose each flu season. Some inactivated flu vaccines contain a very small amount of a mercury-based preservative called thimerosal. Studies have not shown thimerosal in vaccines to be harmful, but flu vaccines that do not contain thimerosal are available. There is no live flu virus in flu shots. They cannot cause the flu. There are many flu viruses, and they are always changing. Each year a new flu vaccine is made to protect against three or four viruses that are likely to cause disease in the upcoming flu season.  But even when the vaccine doesnt exactly match these viruses, it may still provide some protection    Flu vaccine cannot prevent:   flu that is caused by a virus not covered by the vaccine, or   illnesses that look like flu but are not. It takes about 2 weeks for protection to develop after vaccination, and protection lasts through the flu season. 3. Some people should not get this vaccine    Tell the person who is giving you the vaccine:     If you have any severe, life-threatening allergies. If you ever had a life-threatening allergic reaction after a dose of flu vaccine, or have a severe allergy to any part of this vaccine, you may be advised not to get vaccinated. Most, but not all, types of flu vaccine contain a small amount of egg protein.  If you ever had Guillain-Barré Syndrome (also called GBS). Some people with a history of GBS should not get this vaccine. This should be discussed with your doctor.  If you are not feeling well. It is usually okay to get flu vaccine when you have a mild illness, but you might be asked to come back when you feel better. 4. Risks of a vaccine reaction    With any medicine, including vaccines, there is a chance of reactions. These are usually mild and go away on their own, but serious reactions are also possible. Most people who get a flu shot do not have any problems with it. Minor problems following a flu shot include:    soreness, redness, or swelling where the shot was given     hoarseness   sore, red or itchy eyes   cough   fever   aches   headache   itching   fatigue  If these problems occur, they usually begin soon after the shot and last 1 or 2 days. More serious problems following a flu shot can include the following:     There may be a small increased risk of Guillain-Barré Syndrome (GBS) after inactivated flu vaccine. This risk has been estimated at 1 or 2 additional cases per million people vaccinated.  This is much lower than the risk of severe complications from flu, which can be prevented by flu vaccine.  Young children who get the flu shot along with pneumococcal vaccine (PCV13) and/or DTaP vaccine at the same time might be slightly more likely to have a seizure caused by fever. Ask your doctor for more information. Tell your doctor if a child who is getting flu vaccine has ever had a seizure. Problems that could happen after any injected vaccine:      People sometimes faint after a medical procedure, including vaccination. Sitting or lying down for about 15 minutes can help prevent fainting, and injuries caused by a fall. Tell your doctor if you feel dizzy, or have vision changes or ringing in the ears.  Some people get severe pain in the shoulder and have difficulty moving the arm where a shot was given. This happens very rarely.  Any medication can cause a severe allergic reaction. Such reactions from a vaccine are very rare, estimated at about 1 in a million doses, and would happen within a few minutes to a few hours after the vaccination. As with any medicine, there is a very remote chance of a vaccine causing a serious injury or death. The safety of vaccines is always being monitored. For more information, visit: www.cdc.gov/vaccinesafety/    5. What if there is a serious reaction? What should I look for?  Look for anything that concerns you, such as signs of a severe allergic reaction, very high fever, or unusual behavior. Signs of a severe allergic reaction can include hives, swelling of the face and throat, difficulty breathing, a fast heartbeat, dizziness, and weakness  usually within a few minutes to a few hours after the vaccination. What should I do?  If you think it is a severe allergic reaction or other emergency that cant wait, call 9-1-1 and get the person to the nearest hospital. Otherwise, call your doctor.  Reactions should be reported to the Vaccine Adverse Event Reporting System (VAERS).  Your doctor should file this report, or you can do it yourself through  the VAERS web site at www.vaers. [facility].gov, or by calling 2-194.595.9693. [facility] does not give medical advice. 6. The National Vaccine Injury Compensation Program    The [facility] Vaccine Injury Compensation Program (VICP) is a federal program that was created to compensate people who may have been injured by certain vaccines. Persons who believe they may have been injured by a vaccine can learn about the program and about filing a claim by calling 6-997.223.9717 or visiting the [employer] website at www.[facility].gov/vaccinecompensation. There is a time limit to file a claim for compensation. 7. How can I learn more?  Ask your healthcare provider. He or she can give you the vaccine package insert or suggest other sources of information.  Call your local or state health department.  Contact the Centers for Disease Control and Prevention (CDC):  - Call 5-485.235.4701 (1-800-CDC-INFO) or  - Visit CDCs website at www.cdc.gov/flu    Vaccine Information Statement   Inactivated Influenza Vaccine   8/7/2015  [address] Ruth Sample [id number]    Department of Health and Human Services  Centers for Disease Control and Prevention    Office Use Only             Follow-up Disposition:  Return in about 4 months (around 4/18/2018) for F/U DM, F/U HTN. Advised him to call back or return to office if symptoms worsen/change/persist.  Discussed expected course/resolution/complications of diagnosis in detail with patient. Medication risks/benefits/costs/interactions/alternatives discussed with patient. He was given an after visit summary which includes diagnoses, current medications, & vitals. He expressed understanding with the diagnosis and plan.

## 2017-12-18 NOTE — MR AVS SNAPSHOT
Visit Information Date & Time Provider Department Dept. Phone Encounter #  
 12/18/2017  8:15 AM Mirlande Griffin MD [facility] [id number] Follow-up Instructions Return in about 4 months (around 4/18/2018) for F/U DM, F/U HTN. Upcoming Health Maintenance Date Due  
 GLAUCOMA SCREENING Q2Y 5/19/2016 HEMOGLOBIN A1C Q6M 2/15/2018 FOOT EXAM Q1 3/7/2018 LIPID PANEL Q1 4/18/2018 MEDICARE YEARLY EXAM 4/19/2018 MICROALBUMIN Q1 8/15/2018 EYE EXAM RETINAL OR DILATED Q1 8/22/2018 COLONOSCOPY 2/16/2021 DTaP/Tdap/Td series (2 - Td) 3/18/2026 Allergies as of 12/18/2017  Review Complete On: 12/18/2017 By: Mirlande Griffin MD  
 No Known Allergies Current Immunizations  Reviewed on 11/29/2016 Name Date Influenza High Dose Vaccine PF  Incomplete, 11/29/2016 Influenza Vaccine 10/30/2014, 10/14/2013 Influenza Vaccine Whole 11/1/2012 ZZZ-RETIRED (DO NOT USE) Pneumococcal Vaccine (Unspecified Type) 11/28/2012, 5/29/2012  9:16 AM  
  
 Not reviewed this visit You Were Diagnosed With   
  
 Codes Comments Controlled type 2 diabetes mellitus with diabetic nephropathy, with long-term current use of insulin [facility]    -  Primary ICD-10-CM: E11.21, Z79.4 ICD-9-CM: 250.40, 583.81, V58.67 Essential hypertension     ICD-10-CM: I10 
ICD-9-CM: 401.9 Encounter for immunization     ICD-10-CM: P17 ICD-9-CM: V03.89 Vitals BP Pulse Temp Resp Height(growth percentile) Weight(growth percentile) 170/90 (BP 1 Location: Left arm, BP Patient Position: Sitting) (!) 101 96.9 °F (36.1 °C) (Oral) 18 5' 3.78\" (1.62 m) 144 lb 4.8 oz (65.5 kg) SpO2 BMI Smoking Status 98% 24.94 kg/m2 Never Smoker BMI and BSA Data Body Mass Index Body Surface Area 24.94 kg/m 2 1.72 m 2 Preferred Pharmacy Pharmacy Name Phone  [id number] [address] [place] [phone] Your Updated Medication List  
  
   
This list is accurate as of: 12/18/17  9:02 AM.  Always use your most recent med list.  
  
  
  
  
 ACCU-CHEK SMARTVIEW TEST STRIP strip Generic drug:  glucose blood VI test strips CHECK BLOOD SUGAR TWICE A DAY. DIAGNOSIS CODE E11.9  
  
 amLODIPine 5 mg tablet Commonly known as:  F[place]man Del TAKE 1 TAB BY MOUTH DAILY. * aspirin delayed-release 81 mg tablet Take  by mouth daily. * aspirin 325 mg tablet Commonly known as:  ASPIRIN Take 325 mg by mouth daily. atorvastatin 20 mg tablet Commonly known as:  LIPITOR  
TAKE 1 TAB BY MOUTH DAILY. Blood-Glucose Meter monitoring kit As per Patients insurance for brand preference BOOST PO Take  by mouth. Diabetic Supplies, [facility] 24. Misc Needles for Lantus pen FISH OIL PO Take  by mouth. Lancets & Blood Glucose Strips Cmpk One touch Ultra test strips. Tid ac meals Lancets Misc Check blood sugar twice a day. Diagnosis code E11.9 LANTUS SOLOSTAR 100 unit/mL (3 mL) Inpn Generic drug:  insulin glargine INJECT 25 UNITS UNDER THE SKIN EVERY DAY AS DIRECTED  
  
 losartan 100 mg tablet Commonly known as:  COZAAR  
TAKE 1 TABLET BY MOUTH EVERY DAY  
  
 metFORMIN 500 mg tablet Commonly known as:  GLUCOPHAGE  
TAKE 1 TABLET BY MOUTH TWICE A DAY WITH MEALS Nichole Pen Needle 32 gauge x 5/32\" Ndle Generic drug:  Insulin Needles (Disposable) USE AS DIRECTED  
  
 vit B Cmplx 3-FA-Vit C-Biotin 1- mg-mg-mcg tablet Commonly known as:  NEPHRO ALEN RX Take 1 Tab by mouth daily. * Notice: This list has 2 medication(s) that are the same as other medications prescribed for you. Read the directions carefully, and ask your doctor or other care provider to review them with you. We Performed the Following ADMIN INFLUENZA VIRUS VAC [ HCP] AMB POC HEMOGLOBIN A1C [48621 CPT(R)] INFLUENZA VIRUS VACCINE, HIGH DOSE SEASONAL, PRESERVATIVE FREE [07225 CPT(R)] Follow-up Instructions Return in about 4 months (around 4/18/2018) for F/U DM, F/U HTN. Patient Instructions Follow a no concentrated sweets diet. Avoid salty things. Stay physically active. Continue your current medications. Get regular eye exams. Check your feet morning and night. Vaccine Information Statement Influenza (Flu) Vaccine (Inactivated or Recombinant): What you need to know Many Vaccine Information Statements are available in Kinyar[place] and other languages. See www.immunize.org/vis Hojas de Información Sobre Vacunas están disponibles en Español y en muchos otros idiomas. Visite www.immunize.org/vis 1. Why get vaccinated? Influenza (flu) is a contagious disease that spreads around the [facility] every year, usually between October and May. Flu is caused by influenza viruses, and is spread mainly by coughing, sneezing, and close contact. Anyone can get flu. Flu strikes suddenly and can last several days. Symptoms vary by age, but can include: 
 fever/chills  sore throat  muscle aches  fatigue  cough  headache  runny or stuffy nose Flu can also lead to pneumonia and blood infections, and cause diarrhea and seizures in children. If you have a medical condition, such as heart or lung disease, flu can make it worse. Flu is more dangerous for some people. Infants and young children, people 72years of age and older, pregnant women, and people with certain health conditions or a weakened immune system are at greatest risk. Each year thousands of people in the [facility] die from flu, and many more are hospitalized. Flu vaccine can: 
 keep you from getting flu, 
 make flu less severe if you do get it, and 
 keep you from spreading flu to your family and other people. 2. Inactivated and recombinant flu vaccines A dose of flu vaccine is recommended every flu season. Children 6 months through 6years of age may need two doses during the same flu season. Everyone else needs only one dose each flu season. Some inactivated flu vaccines contain a very small amount of a mercury-based preservative called thimerosal. Studies have not shown thimerosal in vaccines to be harmful, but flu vaccines that do not contain thimerosal are available. There is no live flu virus in flu shots. They cannot cause the flu. There are many flu viruses, and they are always changing. Each year a new flu vaccine is made to protect against three or four viruses that are likely to cause disease in the upcoming flu season. But even when the vaccine doesnt exactly match these viruses, it may still provide some protection Flu vaccine cannot prevent: 
 flu that is caused by a virus not covered by the vaccine, or 
 illnesses that look like flu but are not. It takes about 2 weeks for protection to develop after vaccination, and protection lasts through the flu season. 3. Some people should not get this vaccine Tell the person who is giving you the vaccine:  If you have any severe, life-threatening allergies. If you ever had a life-threatening allergic reaction after a dose of flu vaccine, or have a severe allergy to any part of this vaccine, you may be advised not to get vaccinated. Most, but not all, types of flu vaccine contain a small amount of egg protein.  If you ever had Guillain-Barré Syndrome (also called GBS). Some people with a history of GBS should not get this vaccine. This should be discussed with your doctor.  If you are not feeling well. It is usually okay to get flu vaccine when you have a mild illness, but you might be asked to come back when you feel better. 4. Risks of a vaccine reaction With any medicine, including vaccines, there is a chance of reactions. These are usually mild and go away on their own, but serious reactions are also possible. Most people who get a flu shot do not have any problems with it. Minor problems following a flu shot include:  
 soreness, redness, or swelling where the shot was given  hoarseness  sore, red or itchy eyes  cough  fever  aches  headache  itching  fatigue If these problems occur, they usually begin soon after the shot and last 1 or 2 days. More serious problems following a flu shot can include the following:  There may be a small increased risk of Guillain-Barré Syndrome (GBS) after inactivated flu vaccine. This risk has been estimated at 1 or 2 additional cases per million people vaccinated. This is much lower than the risk of severe complications from flu, which can be prevented by flu vaccine.  Young children who get the flu shot along with pneumococcal vaccine (PCV13) and/or DTaP vaccine at the same time might be slightly more likely to have a seizure caused by fever. Ask your doctor for more information. Tell your doctor if a child who is getting flu vaccine has ever had a seizure. Problems that could happen after any injected vaccine:  People sometimes faint after a medical procedure, including vaccination. Sitting or lying down for about 15 minutes can help prevent fainting, and injuries caused by a fall. Tell your doctor if you feel dizzy, or have vision changes or ringing in the ears.  Some people get severe pain in the shoulder and have difficulty moving the arm where a shot was given. This happens very rarely.  Any medication can cause a severe allergic reaction. Such reactions from a vaccine are very rare, estimated at about 1 in a million doses, and would happen within a few minutes to a few hours after the vaccination. As with any medicine, there is a very remote chance of a vaccine causing a serious injury or death. The safety of vaccines is always being monitored. For more information, visit: www.cdc.gov/vaccinesafety/ 
 
 
The [facility] Vaccine Injury Compensation Program (VICP) is a federal program that was created to compensate people who may have been injured by certain vaccines. Persons who believe they may have been injured by a vaccine can learn about the program and about filing a claim by calling 3-100.436.2608 or visiting the [employer] website at www.[facility].gov/vaccinecompensation. There is a time limit to file a claim for compensation. 7. How can I learn more?  Ask your healthcare provider. He or she can give you the vaccine package insert or suggest other sources of information.  Call your local or state health department.  Contact the Centers for Disease Control and Prevention (CDC): 
- Call 8-356.941.9343 (1-800-CDC-INFO) or 
- Visit CDCs website at www.cdc.gov/flu Vaccine Information Statement Inactivated Influenza Vaccine 8/7/2015 
42 [address] Yanetchayaeusebio Cushing 504ET-01 Department of Health and [employer] Centers for Disease Control and Prevention Office Use Only Introducing [facility]! [facility] introduces [employer] patient portal. Now you can access parts of your medical record, email your doctor's office, and request medication refills online. 1. In your internet browser, go to [email or web address] 2. Click on the First Time User? Click Here link in the Sign In box. You will see the New Member Sign Up page. 3. Enter your Ohana Access Code exactly as it appears below. You will not need to use this code after youve completed the sign-up process. If you do not sign up before the expiration date, you must request a new code. · Ohana Access Code: [email or web address] Expires: 3/18/2018  9:02 AM 
 
4. Enter the last four digits of your Social Security Number (xxxx) and Date of Birth (mm/dd/yyyy) as indicated and click Submit. You will be taken to the next sign-up page. 5. Create a Ohana ID. This will be your Ohana login ID and cannot be changed, so think of one that is secure and easy to remember. 6. Create a Ohana password. You can change your password at any time. 7. Enter your Password Reset Question and Answer. This can be used at a later time if you forget your password. 8. Enter your e-mail address. You will receive e-mail notification when new information is available in [address] 9. Click Sign Up. You can now view and download portions of your medical record. 10. Click the Download Summary menu link to download a portable copy of your medical information. If you have questions, please visit the Frequently Asked Questions section of the Ohana website. Remember, Ohana is NOT to be used for urgent needs. For medical emergencies, dial 911. Now available from your iPhone and Android! Please provide this summary of care documentation to your next provider. Your primary care clinician is listed as Brooks Bryant. If you have any questions after today's visit, please call [phone].

## 2017-12-18 NOTE — PATIENT INSTRUCTIONS
Follow a no concentrated sweets diet. Avoid salty things. Stay physically active. Continue your current medications. Get regular eye exams. Check your feet morning and night. Vaccine Information Statement    Influenza (Flu) Vaccine (Inactivated or Recombinant): What you need to know    Many Vaccine Information Statements are available in [place]yr[place] and other languages. See www.immunize.org/vis  Hojas de Información Sobre Vacunas están disponibles en Español y en muchos otros idiomas. Visite www.immunize.org/vis    1. Why get vaccinated? Influenza (flu) is a contagious disease that spreads around the [place] every year, usually between October and May. Flu is caused by influenza viruses, and is spread mainly by coughing, sneezing, and close contact. Anyone can get flu. Flu strikes suddenly and can last several days. Symptoms vary by age, but can include:   fever/chills   sore throat   muscle aches   fatigue   cough   headache    runny or stuffy nose    Flu can also lead to pneumonia and blood infections, and cause diarrhea and seizures in children. If you have a medical condition, such as heart or lung disease, flu can make it worse. Flu is more dangerous for some people. Infants and young children, people 72years of age and older, pregnant women, and people with certain health conditions or a weakened immune system are at greatest risk. Each year thousands of people in the [facility] die from flu, and many more are hospitalized. Flu vaccine can:   keep you from getting flu,   make flu less severe if you do get it, and   keep you from spreading flu to your family and other people. 2. Inactivated and recombinant flu vaccines    A dose of flu vaccine is recommended every flu season. Children 6 months through 6years of age may need two doses during the same flu season. Everyone else needs only one dose each flu season.        Some inactivated flu vaccines contain a very small amount of a mercury-based preservative called thimerosal. Studies have not shown thimerosal in vaccines to be harmful, but flu vaccines that do not contain thimerosal are available. There is no live flu virus in flu shots. They cannot cause the flu. There are many flu viruses, and they are always changing. Each year a new flu vaccine is made to protect against three or four viruses that are likely to cause disease in the upcoming flu season. But even when the vaccine doesnt exactly match these viruses, it may still provide some protection    Flu vaccine cannot prevent:   flu that is caused by a virus not covered by the vaccine, or   illnesses that look like flu but are not. It takes about 2 weeks for protection to develop after vaccination, and protection lasts through the flu season. 3. Some people should not get this vaccine    Tell the person who is giving you the vaccine:     If you have any severe, life-threatening allergies. If you ever had a life-threatening allergic reaction after a dose of flu vaccine, or have a severe allergy to any part of this vaccine, you may be advised not to get vaccinated. Most, but not all, types of flu vaccine contain a small amount of egg protein.  If you ever had Guillain-Barré Syndrome (also called GBS). Some people with a history of GBS should not get this vaccine. This should be discussed with your doctor.  If you are not feeling well. It is usually okay to get flu vaccine when you have a mild illness, but you might be asked to come back when you feel better. 4. Risks of a vaccine reaction    With any medicine, including vaccines, there is a chance of reactions. These are usually mild and go away on their own, but serious reactions are also possible. Most people who get a flu shot do not have any problems with it.      Minor problems following a flu shot include:    soreness, redness, or swelling where the shot was given     hoarseness   sore, red or itchy eyes   cough   fever   aches   headache   itching   fatigue  If these problems occur, they usually begin soon after the shot and last 1 or 2 days. More serious problems following a flu shot can include the following:     There may be a small increased risk of Guillain-Barré Syndrome (GBS) after inactivated flu vaccine. This risk has been estimated at 1 or 2 additional cases per million people vaccinated. This is much lower than the risk of severe complications from flu, which can be prevented by flu vaccine.  Young children who get the flu shot along with pneumococcal vaccine (PCV13) and/or DTaP vaccine at the same time might be slightly more likely to have a seizure caused by fever. Ask your doctor for more information. Tell your doctor if a child who is getting flu vaccine has ever had a seizure. Problems that could happen after any injected vaccine:      People sometimes faint after a medical procedure, including vaccination. Sitting or lying down for about 15 minutes can help prevent fainting, and injuries caused by a fall. Tell your doctor if you feel dizzy, or have vision changes or ringing in the ears.  Some people get severe pain in the shoulder and have difficulty moving the arm where a shot was given. This happens very rarely.  Any medication can cause a severe allergic reaction. Such reactions from a vaccine are very rare, estimated at about 1 in a million doses, and would happen within a few minutes to a few hours after the vaccination. As with any medicine, there is a very remote chance of a vaccine causing a serious injury or death. The safety of vaccines is always being monitored. For more information, visit: www.cdc.gov/vaccinesafety/    5. What if there is a serious reaction? What should I look for?      Look for anything that concerns you, such as signs of a severe allergic reaction, very high fever, or unusual behavior. Signs of a severe allergic reaction can include hives, swelling of the face and throat, difficulty breathing, a fast heartbeat, dizziness, and weakness  usually within a few minutes to a few hours after the vaccination. What should I do?  If you think it is a severe allergic reaction or other emergency that cant wait, call 9-1-1 and get the person to the nearest hospital. Otherwise, call your doctor.  Reactions should be reported to the Vaccine Adverse Event Reporting System (VAERS). Your doctor should file this report, or you can do it yourself through  the VAERS web site at www.vaers. [facility].gov, or by calling 6-693.964.8653. VAERS does not give medical advice. 6. The National Vaccine Injury Compensation Program    The [facility] Vaccine Injury Compensation Program (VICP) is a federal program that was created to compensate people who may have been injured by certain vaccines. Persons who believe they may have been injured by a vaccine can learn about the program and about filing a claim by calling 9-445.753.5789 or visiting the [employer] website at www.[facility].gov/vaccinecompensation. There is a time limit to file a claim for compensation. 7. How can I learn more?  Ask your healthcare provider. He or she can give you the vaccine package insert or suggest other sources of information.  Call your local or state health department.  Contact the Centers for Disease Control and Prevention (CDC):  - Call 3-579.750.2722 (1-800-CDC-INFO) or  - Visit CDCs website at www.cdc.gov/flu    Vaccine Information Statement   Inactivated Influenza Vaccine   8/7/2015  42 CONSUELO Lang [id number]-09    Department of Health and Human Services  Centers for Disease Control and Prevention    Office Use Only

## 2017-12-18 NOTE — PROGRESS NOTES
Chief Complaint   Patient presents with    Hypertension     Reviewed record in preparation for visit and have obtained necessary documentation. Identified pt with two pt identifiers(name and ). Health Maintenance Due   Topic    GLAUCOMA SCREENING Q2Y          Chief Complaint   Patient presents with    Hypertension        Wt Readings from Last 3 Encounters:   17 144 lb 4.8 oz (65.5 kg)   08/15/17 146 lb 1.6 oz (66.3 kg)   17 141 lb 9.6 oz (64.2 kg)     Temp Readings from Last 3 Encounters:   17 96.9 °F (36.1 °C) (Oral)   08/15/17 96.3 °F (35.7 °C) (Oral)   17 97.9 °F (36.6 °C) (Oral)     BP Readings from Last 3 Encounters:   17 170/90   08/15/17 170/60   17 156/80     Pulse Readings from Last 3 Encounters:   17 (!) 101   08/15/17 95   17 (!) 105           Learning Assessment:  :     Learning Assessment 2014 11/15/2013   PRIMARY LEARNER Patient Patient   HIGHEST LEVEL OF EDUCATION - PRIMARY LEARNER  GRADUATED HIGH SCHOOL OR GED -   BARRIERS PRIMARY LEARNER NONE -   CO-LEARNER CAREGIVER No -   PRIMARY LANGUAGE ENGLISH ENGLISH    NEED No -   LEARNER PREFERENCE PRIMARY OTHER (COMMENT) PICTURES   LEARNING SPECIAL TOPICS no -   ANSWERED BY patient patient   RELATIONSHIP SELF SELF   ASSESSMENT COMMENT none -       Depression Screening:  :     PHQ over the last two weeks 2016   Little interest or pleasure in doing things Not at all   Feeling down, depressed or hopeless Not at all   Total Score PHQ 2 0       Fall Risk Assessment:  :     Fall Risk Assessment, last 12 mths 2017   Able to walk? Yes   Fall in past 12 months? No   Fall with injury? -   Number of falls in past 12 months -   Fall Risk Score -       Abuse Screening:  :     Abuse Screening Questionnaire 2014   Do you ever feel afraid of your partner? N N   Are you in a relationship with someone who physically or mentally threatens you?  N N   Is it safe for you to go home? Y Y       Coordination of Care Questionnaire:  :     1) Have you been to an emergency room, urgent care clinic since your last visit? no   Hospitalized since your last visit? no             2) Have you seen or consulted any other health care providers outside of [address] [place] [address] since your last visit? no  (Include any pap smears or colon screenings in this section.)    3) Do you have an Advance Directive on file? no    4) Are you interested in receiving information on Advance Directives? NO      Patient is accompanied by son I have received verbal consent from Mo Covington to discuss any/all medical information while they are present in the room. Reviewed record  In preparation for visit and have obtained necessary documentation.

## 2018-04-16 ENCOUNTER — OFFICE VISIT (OUTPATIENT)
Dept: INTERNAL MEDICINE CLINIC | Age: 73
End: 2018-04-16

## 2018-04-16 VITALS
TEMPERATURE: 99.2 F | HEIGHT: 64 IN | BODY MASS INDEX: 22.74 KG/M2 | HEART RATE: 98 BPM | OXYGEN SATURATION: 98 % | RESPIRATION RATE: 16 BRPM | WEIGHT: 133.2 LBS | SYSTOLIC BLOOD PRESSURE: 166 MMHG | DIASTOLIC BLOOD PRESSURE: 80 MMHG

## 2018-04-16 DIAGNOSIS — E11.21 CONTROLLED TYPE 2 DIABETES MELLITUS WITH DIABETIC NEPHROPATHY, WITH LONG-TERM CURRENT USE OF INSULIN (HCC): Primary | ICD-10-CM

## 2018-04-16 DIAGNOSIS — Z79.4 CONTROLLED TYPE 2 DIABETES MELLITUS WITH DIABETIC NEPHROPATHY, WITH LONG-TERM CURRENT USE OF INSULIN (HCC): Primary | ICD-10-CM

## 2018-04-16 DIAGNOSIS — I10 ESSENTIAL HYPERTENSION: ICD-10-CM

## 2018-04-16 RX ORDER — AMLODIPINE BESYLATE 10 MG/1
10 TABLET ORAL DAILY
Qty: 90 TAB | Refills: 3 | Status: SHIPPED | OUTPATIENT
Start: 2018-04-16 | End: 2020-01-01

## 2018-04-16 NOTE — PATIENT INSTRUCTIONS
Follow a no concentrated sweets, low glycemic index diet. Continue to monitor your blood sugars. Continue your current medications. Get regular eye exams. Increase amlodipine to 10 mg once daily.

## 2018-04-16 NOTE — PROGRESS NOTES
Subjective:     Chief Complaint   Patient presents with    Diabetes    Hypertension     He  is a 67y.o. year old male who presents for evaluation. Here for follow up of DM  BS have been . Had one episode of hypoglycemia. BP not under ideal control. Historical Data    Past Medical History:   Diagnosis Date    Diabetes ([address] 2002        No past surgical history on file. Outpatient Encounter Prescriptions as of 4/16/2018   Medication Sig Dispense Refill    LANTUS SOLOSTAR U-100 INSULIN 100 unit/mL (3 mL) inpn INJECT 25 UNITS UNDER THE SKIN EVERY DAY AS DIRECTED 2 Adjustable Dose Pre-filled Pen Syringe 5    aspirin delayed-release 81 mg tablet Take  by mouth daily.  LACTOSE-REDUCED FOOD (BOOST PO) Take  by mouth.  atorvastatin (LIPITOR) 20 mg tablet TAKE 1 TAB BY MOUTH DAILY. 90 Tab 1    DOCOSAHEXANOIC ACID/EPA (FISH OIL PO) Take  by mouth.  losartan (COZAAR) 100 mg tablet TAKE 1 TABLET BY MOUTH EVERY DAY 30 Tab 4    metFORMIN (GLUCOPHAGE) 500 mg tablet TAKE 1 TABLET BY MOUTH TWICE A DAY WITH MEALS 60 Tab 5    ROSA PEN NEEDLE 32 gauge x 5/32\" ndle USE AS DIRECTED 100 Pen Needle 10    amLODIPine (NORVASC) 5 mg tablet TAKE 1 TAB BY MOUTH DAILY. 30 Tab 5    Diabetic Supplies, Miscellan. misc Needles for Lantus pen 100 Each 10    Lancets misc Check blood sugar twice a day. Diagnosis code E11.9 200 Each 11    Blood-Glucose Meter monitoring kit As per Patients insurance for brand preference 1 Kit 0    Lancets & Blood Glucose Strips Cmpk One touch Ultra test strips. Tid ac meals 3 Package 3    vit B Cmplx 3-FA-Vit C-Biotin (NEPHRO ALEN RX) 1- mg-mg-mcg tablet Take 1 Tab by mouth daily. 30 Tab 3    [DISCONTINUED] ACCU-CHEK SMARTVIEW TEST STRIP strip CHECK BLOOD SUGAR TWICE A DAY. DIAGNOSIS CODE E11.9 100 Strip 10    aspirin (ASPIRIN) 325 mg tablet Take 325 mg by mouth daily. No facility-administered encounter medications on file as of 4/16/2018.          No Known Allergies     Social History     Social History    Marital status:      Spouse name: N/A    Number of children: N/A    Years of education: N/A     Occupational History    Not on file. Social History Main Topics    Smoking status: Never Smoker    Smokeless tobacco: Never Used    Alcohol use No    Drug use: No    Sexual activity: No     Other Topics Concern    Not on file     Social History Narrative    ** Merged History Encounter **             Review of Systems  Pertinent items are noted in HPI. Objective:     Vitals:    04/16/18 0848   BP: 166/80   Pulse: 98   Resp: 16   Temp: 99.2 °F (37.3 °C)   TempSrc: Oral   SpO2: 98%   Weight: 133 lb 3.2 oz (60.4 kg)   Height: 5' 3.78\" (1.62 m)     Pleasant AAM in no acute distress. Cardiac: RRR without murmurs, gallops or rubs. Lungs: Clear to auscultation. Abdomen: Benign  Neuro: Intact. Diabetic foot exam:     Left: Reflexes 2+     Vibratory sensation normal    Proprioception diminished   Sharp/dull discrimination diminished    Filament test normal sensation with micro filament   Pulse DP: 2+ (normal)   Pulse PT: 2+ (normal)   Deformities: None  Right: Reflexes 2+   Vibratory sensation normal   Proprioception diminished   Sharp/dull discrimination diminished   Filament test normal sensation with micro filament   Pulse DP: 2+ (normal)   Pulse PT: 2+ (normal)   Deformities: Mild - Oncyhomycosis    ASSESSMENT / PLAN:   1. Controlled type 2 diabetes mellitus with diabetic nephropathy, with long-term current use of insulin ([facility])  · Follow a low glycemic index diet. · Continue home glucose monitoring. · On ARB and statin. · Get regular eye exams. · Continue metformin and Lantus.  -  DIABETES FOOT EXAM  - HEMOGLOBIN A1C WITH EAG  - METABOLIC PANEL, COMPREHENSIVE  - LIPID PANEL    2. Essential hypertension  · Inadequate control. · Increase amlodipine to 10 mg once daily. · Continue low sodium diet. · Continue losartan.   - METABOLIC PANEL, COMPREHENSIVE  - amLODIPine (NORVASC) 10 mg tablet; Take 1 Tab by mouth daily. Dispense: 90 Tab; Refill: 3    Patient Instructions   Follow a no concentrated sweets, low glycemic index diet. Continue to monitor your blood sugars. Continue your current medications. Get regular eye exams. Increase amlodipine to 10 mg once daily. Follow-up Disposition:  Return in about 4 months (around 8/16/2018) for F/U DM. Advised him to call back or return to office if symptoms worsen/change/persist.  Discussed expected course/resolution/complications of diagnosis in detail with patient. Medication risks/benefits/costs/interactions/alternatives discussed with patient. He was given an after visit summary which includes diagnoses, current medications, & vitals. He expressed understanding with the diagnosis and plan.

## 2018-04-16 NOTE — MR AVS SNAPSHOT
[address] [facility], [address] [facility] 
[phone] Patient: Elham Mendoza MRN: [id number] LVU:[id number] Visit Information Date & Time Provider Department Dept. Phone Encounter #  
 4/16/2018  9:15 AM MD Henri[facility] [phone] [id number] Follow-up Instructions Return in about 4 months (around 8/16/2018) for F/U DM. Your Appointments 4/16/2018  9:15 AM  
ROUTINE CARE with MD Henrique [facility] Appt Note: 4 mths for F/U DM, F/U HTN.  
 [address] , [address] [facility] [address]  
[address], [place] [address] [phone] Upcoming Health Maintenance Date Due  
 GLAUCOMA SCREENING Q2Y 5/19/2016 FOOT EXAM Q1 3/7/2018 LIPID PANEL Q1 4/18/2018 MEDICARE YEARLY EXAM 4/19/2018 HEMOGLOBIN A1C Q6M 6/18/2018 MICROALBUMIN Q1 8/15/2018 EYE EXAM RETINAL OR DILATED Q1 8/22/2018 COLONOSCOPY 2/16/2021 DTaP/Tdap/Td series (2 - Td) 3/18/2026 Allergies as of 4/16/2018  Review Complete On: 4/16/2018 By: Brianne Brooks MD  
 No Known Allergies Current Immunizations  Reviewed on 12/18/2017 Name Date Influenza High Dose Vaccine PF 12/18/2017, 11/29/2016 Influenza Vaccine 10/30/2014, 10/14/2013 Influenza Vaccine Whole 11/1/2012 ZZZ-RETIRED (DO NOT USE) Pneumococcal Vaccine (Unspecified Type) 11/28/2012, 5/29/2012  9:16 AM  
  
 Not reviewed this visit You Were Diagnosed With   
  
 Codes Comments Controlled type 2 diabetes mellitus with diabetic nephropathy, with long-term current use of insulin ([facility]    -  Primary ICD-10-CM: E11.21, Z79.4 ICD-9-CM: 250.40, 583.81, V58.67 Essential hypertension     ICD-10-CM: I10 
ICD-9-CM: 401.9 Vitals BP Pulse Temp Resp Height(growth percentile) Weight(growth percentile) 166/80 (BP 1 Location: Left arm, BP Patient Position: Sitting) 98 99.2 °F (37.3 °C) (Oral) 16 5' 3.78\" (1.62 m) 133 lb 3.2 oz (60.4 kg) SpO2 BMI Smoking Status 98% 23.02 kg/m2 Never Smoker Vitals History BMI and BSA Data Body Mass Index Body Surface Area 23.02 kg/m 2 1.65 m 2 Preferred Pharmacy Pharmacy Name Phone [facility]Y #[id number]- Asher Jaime, [address] [place], [place] [phone] Your Updated Medication List  
  
   
This list is accurate as of 4/16/18  9:12 AM.  Always use your most recent med list. amLODIPine 10 mg tablet Commonly known as:  Mio Matar Take 1 Tab by mouth daily. * aspirin delayed-release 81 mg tablet Take  by mouth daily. * aspirin 325 mg tablet Commonly known as:  ASPIRIN Take 325 mg by mouth daily. atorvastatin 20 mg tablet Commonly known as:  LIPITOR  
TAKE 1 TAB BY MOUTH DAILY. Blood-Glucose Meter monitoring kit As per Patients insurance for brand preference BOOST PO Take  by mouth. Diabetic Supplies, [facility] 24. Misc Needles for Lantus pen FISH OIL PO Take  by mouth. Lancets & Blood Glucose Strips [facility] One touch Ultra test strips. Tid ac meals Lancets Misc Check blood sugar twice a day. Diagnosis code E11.9 LANTUS SOLOSTAR U-100 INSULIN 100 unit/mL (3 mL) Inpn Generic drug:  insulin glargine INJECT 25 UNITS UNDER THE SKIN EVERY DAY AS DIRECTED  
  
 losartan 100 mg tablet Commonly known as:  COZAAR  
TAKE 1 TABLET BY MOUTH EVERY DAY  
  
 metFORMIN 500 mg tablet Commonly known as:  GLUCOPHAGE  
TAKE 1 TABLET BY MOUTH TWICE A DAY WITH MEALS Nichole Pen Needle 32 gauge x 5/32\" Ndle Generic drug:  Insulin Needles (Disposable) USE AS DIRECTED  
  
 vit B Cmplx 3-FA-Vit C-Biotin 1- mg-mg-mcg tablet Commonly known as:  NEPHRO ALEN RX Take 1 Tab by mouth daily. * Notice: This list has 2 medication(s) that are the same as other medications prescribed for you. Read the directions carefully, and ask your doctor or other care provider to review them with you. Prescriptions Sent to Pharmacy Refills  
 amLODIPine (NORVASC) 10 mg tablet 3 Sig: Take 1 Tab by mouth daily. Class: Normal  
 Pharmacy: [facility]/pharmacy #[id number] [address] #: [phone] Route: Oral  
  
We Performed the Following HEMOGLOBIN A1C WITH EAG [70896 CPT(R)]  DIABETES FOOT EXAM [7 Custom] METABOLIC PANEL, COMPREHENSIVE [14634 CPT(R)] Follow-up Instructions Return in about 4 months (around 8/16/2018) for F/U DM. Patient Instructions Follow a no concentrated sweets, low glycemic index diet. Continue to monitor your blood sugars. Continue your current medications. Get regular eye exams. Increase amlodipine to 10 mg once daily. Introducing [facility] SERVICES! Nikole Shields introduces [employer] patient portal. Now you can access parts of your medical record, email your doctor's office, and request medication refills online. 1. In your internet browser, go to [email or web address] 2. Click on the First Time User? Click Here link in the Sign In box. You will see the New Member Sign Up page. 3. Enter your Go[employer] Access Code exactly as it appears below. You will not need to use this code after youve completed the sign-up process. If you do not sign up before the expiration date, you must request a new code. · Go[employer] Access Code: [email or web address] Expires: 7/15/2018  9:12 AM 
 
4. Enter the last four digits of your Social Security Number (xxxx) and Date of Birth (mm/dd/yyyy) as indicated and click Submit. You will be taken to the next sign-up page. 5. Create a GoTable ID.  This will be your Go[employer] login ID and cannot be changed, so think of one that is secure and easy to remember. 6. Create a [employer] password. You can change your password at any time. 7. Enter your Password Reset Question and Answer. This can be used at a later time if you forget your password. 8. Enter your e-mail address. You will receive e-mail notification when new information is available in [address] 9. Click Sign Up. You can now view and download portions of your medical record. 10. Click the Download Summary menu link to download a portable copy of your medical information. If you have questions, please visit the Frequently Asked Questions section of the [employer] website. Remember, [employer] is NOT to be used for urgent needs. For medical emergencies, dial 911. Now available from your iPhone and Android! Please provide this summary of care documentation to your next provider. Your primary care clinician is listed as Alberto Love. If you have any questions after today's visit, please call [phone].

## 2018-04-16 NOTE — PROGRESS NOTES
Chief Complaint   Patient presents with    Diabetes    Hypertension     Reviewed record in preparation for visit and have obtained necessary documentation. Identified pt with two pt identifiers(name and ). Health Maintenance Due   Topic    GLAUCOMA SCREENING Q2Y     FOOT EXAM Q1     LIPID PANEL Q1          Chief Complaint   Patient presents with    Diabetes    Hypertension        Wt Readings from Last 3 Encounters:   18 133 lb 3.2 oz (60.4 kg)   17 144 lb 4.8 oz (65.5 kg)   08/15/17 146 lb 1.6 oz (66.3 kg)     Temp Readings from Last 3 Encounters:   18 99.2 °F (37.3 °C) (Oral)   17 96.9 °F (36.1 °C) (Oral)   08/15/17 96.3 °F (35.7 °C) (Oral)     BP Readings from Last 3 Encounters:   18 166/80   17 170/90   08/15/17 170/60     Pulse Readings from Last 3 Encounters:   18 98   17 (!) 101   08/15/17 95           Learning Assessment:  :     Learning Assessment 2014 11/15/2013   PRIMARY LEARNER Patient Patient   HIGHEST LEVEL OF EDUCATION - PRIMARY LEARNER  GRADUATED HIGH SCHOOL OR GED -   BARRIERS PRIMARY LEARNER NONE -   CO-LEARNER CAREGIVER No -   PRIMARY LANGUAGE ENGLISH ENGLISH    NEED No -   LEARNER PREFERENCE PRIMARY OTHER (COMMENT) PICTURES   LEARNING SPECIAL TOPICS no -   ANSWERED BY patient patient   RELATIONSHIP SELF SELF   ASSESSMENT COMMENT none -       Depression Screening:  :     PHQ over the last two weeks 2018   Little interest or pleasure in doing things Not at all   Feeling down, depressed or hopeless Not at all   Total Score PHQ 2 0       Fall Risk Assessment:  :     Fall Risk Assessment, last 12 mths 2017   Able to walk? Yes   Fall in past 12 months? No   Fall with injury? -   Number of falls in past 12 months -   Fall Risk Score -       Abuse Screening:  :     Abuse Screening Questionnaire 2014   Do you ever feel afraid of your partner?  N N   Are you in a relationship with someone who physically or mentally threatens you? N N   Is it safe for you to go home? Y Y       Coordination of Care Questionnaire:  :     1) Have you been to an emergency room, urgent care clinic since your last visit? no   Hospitalized since your last visit? no             2) Have you seen or consulted any other health care providers outside of [address] [place] [address] since your last visit? no  (Include any pap smears or colon screenings in this section.)    3) Do you have an Advance Directive on file? no    4) Are you interested in receiving information on Advance Directives? NO      Patient is accompanied by self I have received verbal consent from Mark Heck to discuss any/all medical information while they are present in the room. Reviewed record  In preparation for visit and have obtained necessary documentation.

## 2018-04-17 DIAGNOSIS — E11.21 CONTROLLED TYPE 2 DIABETES MELLITUS WITH DIABETIC NEPHROPATHY, WITH LONG-TERM CURRENT USE OF INSULIN (HCC): ICD-10-CM

## 2018-04-17 DIAGNOSIS — Z79.4 CONTROLLED TYPE 2 DIABETES MELLITUS WITH DIABETIC NEPHROPATHY, WITH LONG-TERM CURRENT USE OF INSULIN (HCC): ICD-10-CM

## 2018-04-17 LAB
ALBUMIN SERPL-MCNC: 4.4 G/DL (ref 3.5–4.8)
ALBUMIN/GLOB SERPL: 1.5 {RATIO} (ref 1.2–2.2)
ALP SERPL-CCNC: 81 IU/L (ref 39–117)
ALT SERPL-CCNC: 24 IU/L (ref 0–44)
AST SERPL-CCNC: 18 IU/L (ref 0–40)
BILIRUB SERPL-MCNC: 0.6 MG/DL (ref 0–1.2)
BUN SERPL-MCNC: 24 MG/DL (ref 8–27)
BUN/CREAT SERPL: 18 (ref 10–24)
CALCIUM SERPL-MCNC: 10 MG/DL (ref 8.6–10.2)
CHLORIDE SERPL-SCNC: 100 MMOL/L (ref 96–106)
CHOLEST SERPL-MCNC: 117 MG/DL (ref 100–199)
CO2 SERPL-SCNC: 21 MMOL/L (ref 18–29)
CREAT SERPL-MCNC: 1.33 MG/DL (ref 0.76–1.27)
EST. AVERAGE GLUCOSE BLD GHB EST-MCNC: 220 MG/DL
GFR SERPLBLD CREATININE-BSD FMLA CKD-EPI: 53 ML/MIN/1.73
GFR SERPLBLD CREATININE-BSD FMLA CKD-EPI: 61 ML/MIN/1.73
GLOBULIN SER CALC-MCNC: 3 G/DL (ref 1.5–4.5)
GLUCOSE SERPL-MCNC: 173 MG/DL (ref 65–99)
HBA1C MFR BLD: 9.3 % (ref 4.8–5.6)
HDLC SERPL-MCNC: 60 MG/DL
INTERPRETATION, 910389: NORMAL
INTERPRETATION: NORMAL
LDLC SERPL CALC-MCNC: 46 MG/DL (ref 0–99)
Lab: NORMAL
PDF IMAGE, 910387: NORMAL
POTASSIUM SERPL-SCNC: 4.4 MMOL/L (ref 3.5–5.2)
PROT SERPL-MCNC: 7.4 G/DL (ref 6–8.5)
SODIUM SERPL-SCNC: 141 MMOL/L (ref 134–144)
TRIGL SERPL-MCNC: 57 MG/DL (ref 0–149)
VLDLC SERPL CALC-MCNC: 11 MG/DL (ref 5–40)

## 2018-04-17 RX ORDER — METFORMIN HYDROCHLORIDE 500 MG/1
500 TABLET ORAL
Qty: 90 TAB | Refills: 5
Start: 2018-04-17 | End: 2020-01-01

## 2018-04-17 RX ORDER — INSULIN GLARGINE 100 [IU]/ML
INJECTION, SOLUTION SUBCUTANEOUS
Qty: 2 ADJUSTABLE DOSE PRE-FILLED PEN SYRINGE | Refills: 5
Start: 2018-04-17 | End: 2018-08-20

## 2018-05-16 DIAGNOSIS — E11.21 CONTROLLED TYPE 2 DIABETES MELLITUS WITH DIABETIC NEPHROPATHY, WITH LONG-TERM CURRENT USE OF INSULIN (HCC): ICD-10-CM

## 2018-05-16 DIAGNOSIS — E11.29 DM (DIABETES MELLITUS) TYPE II CONTROLLED WITH RENAL MANIFESTATION (HCC): ICD-10-CM

## 2018-05-16 DIAGNOSIS — Z79.4 CONTROLLED TYPE 2 DIABETES MELLITUS WITH DIABETIC NEPHROPATHY, WITH LONG-TERM CURRENT USE OF INSULIN (HCC): ICD-10-CM

## 2018-05-16 RX ORDER — BLOOD SUGAR DIAGNOSTIC
STRIP MISCELLANEOUS
Qty: 100 STRIP | Refills: 4 | Status: SHIPPED | OUTPATIENT
Start: 2018-05-16 | End: 2018-08-20

## 2018-05-16 RX ORDER — ATORVASTATIN CALCIUM 20 MG/1
TABLET, FILM COATED ORAL
Qty: 90 TAB | Refills: 1 | Status: SHIPPED | OUTPATIENT
Start: 2018-05-16 | End: 2018-11-18 | Stop reason: SDUPTHER

## 2018-06-17 DIAGNOSIS — I10 ESSENTIAL HYPERTENSION WITH GOAL BLOOD PRESSURE LESS THAN 140/90: ICD-10-CM

## 2018-06-18 RX ORDER — LOSARTAN POTASSIUM 100 MG/1
TABLET ORAL
Qty: 30 TAB | Refills: 4 | Status: SHIPPED | OUTPATIENT
Start: 2018-06-18 | End: 2018-06-21 | Stop reason: SDUPTHER

## 2018-06-21 DIAGNOSIS — I10 ESSENTIAL HYPERTENSION WITH GOAL BLOOD PRESSURE LESS THAN 140/90: ICD-10-CM

## 2018-06-21 RX ORDER — LOSARTAN POTASSIUM 100 MG/1
TABLET ORAL
Qty: 90 TAB | Refills: 2 | Status: SHIPPED | OUTPATIENT
Start: 2018-06-21 | End: 2020-01-01

## 2018-08-20 ENCOUNTER — APPOINTMENT (OUTPATIENT)
Dept: GENERAL RADIOLOGY | Age: 73
DRG: 637 | End: 2018-08-20
Attending: EMERGENCY MEDICINE
Payer: MEDICARE

## 2018-08-20 ENCOUNTER — HOSPITAL ENCOUNTER (INPATIENT)
Age: 73
LOS: 4 days | Discharge: HOME HEALTH | DRG: 637 | End: 2018-08-24
Attending: EMERGENCY MEDICINE | Admitting: HOSPITALIST
Payer: MEDICARE

## 2018-08-20 ENCOUNTER — OFFICE VISIT (OUTPATIENT)
Dept: INTERNAL MEDICINE CLINIC | Age: 73
End: 2018-08-20

## 2018-08-20 ENCOUNTER — APPOINTMENT (OUTPATIENT)
Dept: CT IMAGING | Age: 73
DRG: 637 | End: 2018-08-20
Attending: EMERGENCY MEDICINE
Payer: MEDICARE

## 2018-08-20 VITALS
OXYGEN SATURATION: 94 % | HEART RATE: 107 BPM | DIASTOLIC BLOOD PRESSURE: 60 MMHG | SYSTOLIC BLOOD PRESSURE: 110 MMHG | TEMPERATURE: 97 F

## 2018-08-20 DIAGNOSIS — Z79.4 UNCONTROLLED TYPE 2 DIABETES MELLITUS WITH HYPEROSMOLARITY WITHOUT COMA, WITH LONG-TERM CURRENT USE OF INSULIN (HCC): ICD-10-CM

## 2018-08-20 DIAGNOSIS — E11.00 UNCONTROLLED TYPE 2 DIABETES MELLITUS WITH HYPEROSMOLARITY WITHOUT COMA, WITH LONG-TERM CURRENT USE OF INSULIN (HCC): ICD-10-CM

## 2018-08-20 DIAGNOSIS — Z00.00 MEDICARE ANNUAL WELLNESS VISIT, SUBSEQUENT: Primary | ICD-10-CM

## 2018-08-20 DIAGNOSIS — G93.40 ACUTE ENCEPHALOPATHY: Primary | ICD-10-CM

## 2018-08-20 DIAGNOSIS — R73.9 HYPERGLYCEMIA: ICD-10-CM

## 2018-08-20 DIAGNOSIS — N17.9 ACUTE RENAL FAILURE, UNSPECIFIED ACUTE RENAL FAILURE TYPE (HCC): ICD-10-CM

## 2018-08-20 PROBLEM — E10.65 TYPE 1 DIABETES MELLITUS WITH HYPERGLYCEMIA (HCC): Status: ACTIVE | Noted: 2018-08-20

## 2018-08-20 LAB
ALBUMIN SERPL-MCNC: 3.6 G/DL (ref 3.5–5)
ALBUMIN/GLOB SERPL: 0.8 {RATIO} (ref 1.1–2.2)
ALP SERPL-CCNC: 150 U/L (ref 45–117)
ALT SERPL-CCNC: 23 U/L (ref 12–78)
ANION GAP SERPL CALC-SCNC: 11 MMOL/L (ref 5–15)
APPEARANCE UR: CLEAR
ARTERIAL PATENCY WRIST A: ABNORMAL
AST SERPL-CCNC: 19 U/L (ref 15–37)
BACTERIA URNS QL MICRO: NEGATIVE /HPF
BASE EXCESS BLDV CALC-SCNC: 5 MMOL/L
BASOPHILS # BLD: 0 K/UL (ref 0–0.1)
BASOPHILS NFR BLD: 0 % (ref 0–1)
BDY SITE: ABNORMAL
BILIRUB SERPL-MCNC: 0.9 MG/DL (ref 0.2–1)
BILIRUB UR QL: NEGATIVE
BUN SERPL-MCNC: 46 MG/DL (ref 6–20)
BUN/CREAT SERPL: 22 (ref 12–20)
CALCIUM SERPL-MCNC: 8.5 MG/DL (ref 8.5–10.1)
CHLORIDE SERPL-SCNC: 98 MMOL/L (ref 97–108)
CO2 SERPL-SCNC: 24 MMOL/L (ref 21–32)
COLOR UR: ABNORMAL
COMMENT, HOLDF: NORMAL
CREAT SERPL-MCNC: 2.08 MG/DL (ref 0.7–1.3)
DIFFERENTIAL METHOD BLD: ABNORMAL
EOSINOPHIL # BLD: 0.1 K/UL (ref 0–0.4)
EOSINOPHIL NFR BLD: 1 % (ref 0–7)
EPITH CASTS URNS QL MICRO: ABNORMAL /LPF
ERYTHROCYTE [DISTWIDTH] IN BLOOD BY AUTOMATED COUNT: 12.7 % (ref 11.5–14.5)
EST. AVERAGE GLUCOSE BLD GHB EST-MCNC: 280 MG/DL
GAS FLOW.O2 O2 DELIVERY SYS: ABNORMAL L/MIN
GLOBULIN SER CALC-MCNC: 4.3 G/DL (ref 2–4)
GLUCOSE BLD STRIP.AUTO-MCNC: 364 MG/DL (ref 65–100)
GLUCOSE BLD STRIP.AUTO-MCNC: 391 MG/DL (ref 65–100)
GLUCOSE BLD STRIP.AUTO-MCNC: 458 MG/DL (ref 65–100)
GLUCOSE BLD STRIP.AUTO-MCNC: >600 MG/DL (ref 65–100)
GLUCOSE BLD STRIP.AUTO-MCNC: >600 MG/DL (ref 65–100)
GLUCOSE POC: NORMAL MG/DL
GLUCOSE SERPL-MCNC: 615 MG/DL (ref 65–100)
GLUCOSE UR STRIP.AUTO-MCNC: >1000 MG/DL
HBA1C MFR BLD HPLC: 12.8 %
HBA1C MFR BLD: 11.4 % (ref 4.2–6.3)
HCO3 BLDV-SCNC: 29.9 MMOL/L (ref 23–28)
HCT VFR BLD AUTO: 38.8 % (ref 36.6–50.3)
HGB BLD-MCNC: 13.7 G/DL (ref 12.1–17)
HGB UR QL STRIP: NEGATIVE
IMM GRANULOCYTES # BLD: 0.1 K/UL (ref 0–0.04)
IMM GRANULOCYTES NFR BLD AUTO: 1 % (ref 0–0.5)
KETONES UR QL STRIP.AUTO: ABNORMAL MG/DL
LEUKOCYTE ESTERASE UR QL STRIP.AUTO: NEGATIVE
LYMPHOCYTES # BLD: 2.5 K/UL (ref 0.8–3.5)
LYMPHOCYTES NFR BLD: 25 % (ref 12–49)
MCH RBC QN AUTO: 29.1 PG (ref 26–34)
MCHC RBC AUTO-ENTMCNC: 35.3 G/DL (ref 30–36.5)
MCV RBC AUTO: 82.6 FL (ref 80–99)
MONOCYTES # BLD: 0.7 K/UL (ref 0–1)
MONOCYTES NFR BLD: 7 % (ref 5–13)
NEUTS SEG # BLD: 6.7 K/UL (ref 1.8–8)
NEUTS SEG NFR BLD: 66 % (ref 32–75)
NITRITE UR QL STRIP.AUTO: NEGATIVE
NRBC # BLD: 0 K/UL (ref 0–0.01)
NRBC BLD-RTO: 0 PER 100 WBC
PCO2 BLDV: 51.9 MMHG (ref 41–51)
PH BLDV: 7.37 [PH] (ref 7.32–7.42)
PH UR STRIP: 5.5 [PH] (ref 5–8)
PLATELET # BLD AUTO: 143 K/UL (ref 150–400)
PMV BLD AUTO: 11.1 FL (ref 8.9–12.9)
PO2 BLDV: 21 MMHG (ref 25–40)
POTASSIUM SERPL-SCNC: 4.2 MMOL/L (ref 3.5–5.1)
PROT SERPL-MCNC: 7.9 G/DL (ref 6.4–8.2)
PROT UR STRIP-MCNC: NEGATIVE MG/DL
RBC # BLD AUTO: 4.7 M/UL (ref 4.1–5.7)
RBC #/AREA URNS HPF: ABNORMAL /HPF (ref 0–5)
SAMPLES BEING HELD,HOLD: NORMAL
SAO2 % BLDV: 33 % (ref 65–88)
SERVICE CMNT-IMP: ABNORMAL
SODIUM SERPL-SCNC: 133 MMOL/L (ref 136–145)
SP GR UR REFRACTOMETRY: 1.02 (ref 1–1.03)
SPECIMEN TYPE: ABNORMAL
UR CULT HOLD, URHOLD: NORMAL
UROBILINOGEN UR QL STRIP.AUTO: 0.2 EU/DL (ref 0.2–1)
WBC # BLD AUTO: 10 K/UL (ref 4.1–11.1)
WBC URNS QL MICRO: ABNORMAL /HPF (ref 0–4)

## 2018-08-20 PROCEDURE — 94761 N-INVAS EAR/PLS OXIMETRY MLT: CPT

## 2018-08-20 PROCEDURE — 65660000000 HC RM CCU STEPDOWN

## 2018-08-20 PROCEDURE — 36415 COLL VENOUS BLD VENIPUNCTURE: CPT | Performed by: NURSE PRACTITIONER

## 2018-08-20 PROCEDURE — 74011250636 HC RX REV CODE- 250/636: Performed by: EMERGENCY MEDICINE

## 2018-08-20 PROCEDURE — 99285 EMERGENCY DEPT VISIT HI MDM: CPT

## 2018-08-20 PROCEDURE — 82962 GLUCOSE BLOOD TEST: CPT

## 2018-08-20 PROCEDURE — 74011250637 HC RX REV CODE- 250/637: Performed by: HOSPITALIST

## 2018-08-20 PROCEDURE — 85025 COMPLETE CBC W/AUTO DIFF WBC: CPT | Performed by: NURSE PRACTITIONER

## 2018-08-20 PROCEDURE — 70450 CT HEAD/BRAIN W/O DYE: CPT

## 2018-08-20 PROCEDURE — 71046 X-RAY EXAM CHEST 2 VIEWS: CPT

## 2018-08-20 PROCEDURE — 74011636637 HC RX REV CODE- 636/637: Performed by: HOSPITALIST

## 2018-08-20 PROCEDURE — 74011250636 HC RX REV CODE- 250/636: Performed by: HOSPITALIST

## 2018-08-20 PROCEDURE — 96361 HYDRATE IV INFUSION ADD-ON: CPT

## 2018-08-20 PROCEDURE — 82803 BLOOD GASES ANY COMBINATION: CPT

## 2018-08-20 PROCEDURE — 96374 THER/PROPH/DIAG INJ IV PUSH: CPT

## 2018-08-20 PROCEDURE — 74011636637 HC RX REV CODE- 636/637: Performed by: EMERGENCY MEDICINE

## 2018-08-20 PROCEDURE — 83036 HEMOGLOBIN GLYCOSYLATED A1C: CPT | Performed by: HOSPITALIST

## 2018-08-20 PROCEDURE — 80053 COMPREHEN METABOLIC PANEL: CPT | Performed by: NURSE PRACTITIONER

## 2018-08-20 PROCEDURE — 81001 URINALYSIS AUTO W/SCOPE: CPT | Performed by: NURSE PRACTITIONER

## 2018-08-20 RX ORDER — SODIUM CHLORIDE 0.9 % (FLUSH) 0.9 %
5-10 SYRINGE (ML) INJECTION AS NEEDED
Status: DISCONTINUED | OUTPATIENT
Start: 2018-08-20 | End: 2018-08-24 | Stop reason: HOSPADM

## 2018-08-20 RX ORDER — ASPIRIN 325 MG
325 TABLET ORAL DAILY
Status: DISCONTINUED | OUTPATIENT
Start: 2018-08-21 | End: 2018-08-24 | Stop reason: HOSPADM

## 2018-08-20 RX ORDER — DEXTROSE 50 % IN WATER (D50W) INTRAVENOUS SYRINGE
12.5-25 AS NEEDED
Status: DISCONTINUED | OUTPATIENT
Start: 2018-08-20 | End: 2018-08-24 | Stop reason: HOSPADM

## 2018-08-20 RX ORDER — HEPARIN SODIUM 5000 [USP'U]/ML
5000 INJECTION, SOLUTION INTRAVENOUS; SUBCUTANEOUS EVERY 8 HOURS
Status: DISCONTINUED | OUTPATIENT
Start: 2018-08-20 | End: 2018-08-21

## 2018-08-20 RX ORDER — INSULIN LISPRO 100 [IU]/ML
3 INJECTION, SOLUTION INTRAVENOUS; SUBCUTANEOUS
Status: DISCONTINUED | OUTPATIENT
Start: 2018-08-20 | End: 2018-08-22

## 2018-08-20 RX ORDER — INSULIN GLARGINE 100 [IU]/ML
30 INJECTION, SOLUTION SUBCUTANEOUS
Status: DISCONTINUED | OUTPATIENT
Start: 2018-08-20 | End: 2018-08-22

## 2018-08-20 RX ORDER — SODIUM CHLORIDE 9 MG/ML
150 INJECTION, SOLUTION INTRAVENOUS CONTINUOUS
Status: DISCONTINUED | OUTPATIENT
Start: 2018-08-20 | End: 2018-08-21

## 2018-08-20 RX ORDER — INSULIN GLARGINE 100 [IU]/ML
25 INJECTION, SOLUTION SUBCUTANEOUS DAILY
COMMUNITY
End: 2018-09-13 | Stop reason: SDUPTHER

## 2018-08-20 RX ORDER — ONDANSETRON 2 MG/ML
4 INJECTION INTRAMUSCULAR; INTRAVENOUS
Status: DISCONTINUED | OUTPATIENT
Start: 2018-08-20 | End: 2018-08-24 | Stop reason: HOSPADM

## 2018-08-20 RX ORDER — SODIUM CHLORIDE 0.9 % (FLUSH) 0.9 %
5-10 SYRINGE (ML) INJECTION EVERY 8 HOURS
Status: DISCONTINUED | OUTPATIENT
Start: 2018-08-20 | End: 2018-08-24 | Stop reason: HOSPADM

## 2018-08-20 RX ORDER — ATORVASTATIN CALCIUM 20 MG/1
20 TABLET, FILM COATED ORAL
Status: DISCONTINUED | OUTPATIENT
Start: 2018-08-20 | End: 2018-08-24 | Stop reason: HOSPADM

## 2018-08-20 RX ORDER — DEXTROSE 50 % IN WATER (D50W) INTRAVENOUS SYRINGE
12.5-25 AS NEEDED
Status: DISCONTINUED | OUTPATIENT
Start: 2018-08-20 | End: 2018-08-20

## 2018-08-20 RX ORDER — AMLODIPINE BESYLATE 5 MG/1
10 TABLET ORAL DAILY
Status: DISCONTINUED | OUTPATIENT
Start: 2018-08-21 | End: 2018-08-24 | Stop reason: HOSPADM

## 2018-08-20 RX ORDER — MAGNESIUM SULFATE 100 %
4 CRYSTALS MISCELLANEOUS AS NEEDED
Status: DISCONTINUED | OUTPATIENT
Start: 2018-08-20 | End: 2018-08-20

## 2018-08-20 RX ORDER — ACETAMINOPHEN 325 MG/1
650 TABLET ORAL
Status: DISCONTINUED | OUTPATIENT
Start: 2018-08-20 | End: 2018-08-24 | Stop reason: HOSPADM

## 2018-08-20 RX ORDER — LOSARTAN POTASSIUM 50 MG/1
100 TABLET ORAL DAILY
Status: DISCONTINUED | OUTPATIENT
Start: 2018-08-21 | End: 2018-08-24 | Stop reason: HOSPADM

## 2018-08-20 RX ORDER — MAGNESIUM SULFATE 100 %
4 CRYSTALS MISCELLANEOUS AS NEEDED
Status: DISCONTINUED | OUTPATIENT
Start: 2018-08-20 | End: 2018-08-24 | Stop reason: HOSPADM

## 2018-08-20 RX ORDER — INSULIN LISPRO 100 [IU]/ML
INJECTION, SOLUTION INTRAVENOUS; SUBCUTANEOUS
Status: DISCONTINUED | OUTPATIENT
Start: 2018-08-20 | End: 2018-08-24 | Stop reason: HOSPADM

## 2018-08-20 RX ADMIN — SODIUM CHLORIDE 150 ML/HR: 900 INJECTION, SOLUTION INTRAVENOUS at 18:54

## 2018-08-20 RX ADMIN — SODIUM CHLORIDE 150 ML/HR: 900 INJECTION, SOLUTION INTRAVENOUS at 16:22

## 2018-08-20 RX ADMIN — SODIUM CHLORIDE 1000 ML: 900 INJECTION, SOLUTION INTRAVENOUS at 13:08

## 2018-08-20 RX ADMIN — Medication 10 ML: at 22:45

## 2018-08-20 RX ADMIN — INSULIN LISPRO 5 UNITS: 100 INJECTION, SOLUTION INTRAVENOUS; SUBCUTANEOUS at 22:44

## 2018-08-20 RX ADMIN — HEPARIN SODIUM 5000 UNITS: 5000 INJECTION, SOLUTION INTRAVENOUS; SUBCUTANEOUS at 22:45

## 2018-08-20 RX ADMIN — HUMAN INSULIN 10 UNITS: 100 INJECTION, SOLUTION SUBCUTANEOUS at 13:08

## 2018-08-20 RX ADMIN — ATORVASTATIN CALCIUM 20 MG: 20 TABLET, FILM COATED ORAL at 22:44

## 2018-08-20 RX ADMIN — INSULIN GLARGINE 30 UNITS: 100 INJECTION, SOLUTION SUBCUTANEOUS at 22:45

## 2018-08-20 NOTE — MR AVS SNAPSHOT
[address] [facility], [address] [place] [address] 
[phone] Patient: Mega Gutierrez MRN: [id number] QSQ:[id number] Visit Information Date & Time Provider Department Dept. Phone Encounter #  
 8/20/2018  9:45 AM Buddy Tripp MD [facility] [phone] Follow-up Instructions Return in about 6 weeks (around 10/1/2018) for Regular follow up. Upcoming Health Maintenance Date Due MICROALBUMIN Q1 8/15/2018 GLAUCOMA SCREENING Q2Y 10/31/2018* EYE EXAM RETINAL OR DILATED Q1 10/31/2018* Influenza Age 5 to Adult 10/31/2018* HEMOGLOBIN A1C Q6M 2/20/2019 FOOT EXAM Q1 4/16/2019 LIPID PANEL Q1 4/16/2019 MEDICARE YEARLY EXAM 8/21/2019 COLONOSCOPY 2/16/2021 DTaP/Tdap/Td series (2 - Td) 3/18/2026 *Topic was postponed. The date shown is not the original due date. Allergies as of 8/20/2018  Review Complete On: 4/16/2018 By: Buddy Tripp MD  
 No Known Allergies Current Immunizations  Reviewed on 12/18/2017 Name Date Influenza High Dose Vaccine PF 12/18/2017, 11/29/2016 Influenza Vaccine 10/30/2014, 10/14/2013 Influenza Vaccine Whole 11/1/2012 ZZZ-RETIRED (DO NOT USE) Pneumococcal Vaccine (Unspecified Type) 11/28/2012, 5/29/2012  9:16 AM  
  
 Not reviewed this visit You Were Diagnosed With   
  
 Codes Comments Medicare annual wellness visit, subsequent    -  Primary ICD-10-CM: Z00.00 ICD-9-CM: V70.0 Uncontrolled type 2 diabetes mellitus with hyperosmolarity without coma, with long-term current use of insulin (HCC)     ICD-10-CM: E11.00, Z79.4 ICD-9-CM: 250.22, V58.67 Vitals BP Pulse Temp SpO2 Smoking Status 110/60 (BP 1 Location: Right arm, BP Patient Position: Sitting) (!) 107 97 °F (36.1 °C) (Oral) 94% Never Smoker Vitals History Preferred Pharmacy Pharmacy Name Phone [id number]- Angelica Lara, [address] [facility] [phone] Your Updated Medication List  
  
   
This list is accurate as of 8/20/18 10:26 AM.  Always use your most recent med list.  
  
  
  
  
 ACCU-CHEK SMARTVIEW TEST STRIP strip Generic drug:  glucose blood VI test strips CHECK BLOOD SUGAR TWICE A DAY. DIAGNOSIS CODE E11.9  
  
 amLODIPine 10 mg tablet Commonly known as:  Germain Nguyen Take 1 Tab by mouth daily. * aspirin delayed-release 81 mg tablet Take  by mouth daily. * aspirin 325 mg tablet Commonly known as:  ASPIRIN Take 325 mg by mouth daily. atorvastatin 20 mg tablet Commonly known as:  LIPITOR  
TAKE 1 TAB BY MOUTH DAILY. Blood-Glucose Meter monitoring kit As per Patients insurance for brand preference BOOST PO Take  by mouth. Diabetic Supplies, [facility]. Misc Needles for Lantus pen FISH OIL PO Take  by mouth. insulin glargine 100 unit/mL (3 mL) Inpn Commonly known as:  LANTUS SOLOSTAR U-100 INSULIN Inject 30 units under the skin every day. Lancets & Blood Glucose Strips Cmpk One touch Ultra test strips. Tid ac meals Lancets Misc Check blood sugar twice a day. Diagnosis code E11.9  
  
 losartan 100 mg tablet Commonly known as:  COZAAR  
TAKE 1 TABLET BY MOUTH EVERY DAY  
  
 metFORMIN 500 mg tablet Commonly known as:  GLUCOPHAGE Take 1 Tab by mouth three (3) times daily (with meals). Nichole Pen Needle 32 gauge x 5/32\" Ndle Generic drug:  Insulin Needles (Disposable) USE AS DIRECTED  
  
 vit B Cmplx 3-FA-Vit C-Biotin 1- mg-mg-mcg tablet Commonly known as:  NEPHRO ALEN RX Take 1 Tab by mouth daily. * Notice: This list has 2 medication(s) that are the same as other medications prescribed for you. Read the directions carefully, and ask your doctor or other care provider to review them with you. We Performed the Following AMB POC GLUCOSE BLOOD, BY GLUCOSE MONITORING DEVICE [20843 CPT(R)] AMB POC HEMOGLOBIN A1C [00872 CPT(R)] Follow-up Instructions Return in about 6 weeks (around 10/1/2018) for Regular follow up. Patient Instructions Your blood sugar is out of control. Go to the Emergency room for acute management of your diabetes. Arrange for ongoing help with patient's medication management. Medicare Wellness Visit, Male The best way to live healthy is to have a lifestyle where you eat a well-balanced diet, exercise regularly, limit alcohol use, and quit all forms of tobacco/nicotine, if applicable. Regular preventive services are another way to keep healthy. Preventive services (vaccines, screening tests, monitoring & exams) can help personalize your care plan, which helps you manage your own care. Screening tests can find health problems at the earliest stages, when they are easiest to treat. [place]  follows the current, evidence-based guidelines published by the [facility] Josee ([facility]) when recommending preventive services for our patients. Because we follow these guidelines, sometimes recommendations change over time as research supports it. (For example, a prostate screening blood test is no longer routinely recommended for men with no symptoms.) Of course, you and your provider may decide to screen more often for some diseases, based on your risk and co-morbidities (chronic disease you are already diagnosed with). Preventive services for you include: - Medicare offers their members a free annual wellness visit, which is time for you and your primary care provider to discuss and plan for your preventive service needs. Take advantage of this benefit every year! 
 
-All people over age 72 should receive the recommended pneumonia vaccines.  Current USPSTF guidelines recommend a series of two vaccines for the best pneumonia protection.  
 
-All adults should have a yearly flu vaccine and a tetanus vaccine every 10 years. All adults age 61 years should receive a shingles vaccine once in their lifetime.   
 
-All adults age 38-68 years who are overweight should have a diabetes screening test once every three years.  
 
-Other screening tests & preventive services for persons with diabetes include: an eye exam to screen for diabetic retinopathy, a kidney function test, a foot exam, and stricter control over your cholesterol.  
 
-Cardiovascular screening for adults with routine risk involves an electrocardiogram (ECG) at intervals determined by the provider.  
 
-Colorectal cancer screenings should be done for adults age 54-65 years with normal risk. There are a number of acceptable methods of screening for this type of cancer. Each test has its own benefits and drawbacks. Discuss with your provider what is most appropriate for you during your annual wellness visit. The different tests include: colonoscopy (considered the best screening method), a fecal occult blood test, a fecal DNA test, and sigmoidoscopy. 
 
-All adults born between [facility] should be screened once for Hepatitis C. 
 
-An Abdominal Aortic Aneurysm (AAA) Screening is recommended for men age 73-68 who has ever smoked in their lifetime. Here is a list of your current Health Maintenance items (your personalized list of preventive services) with a due date: 
Health Maintenance Due Topic Date Due  Glaucoma Screening   05/19/2016 [address] [place] [address] Annual Well Visit  04/19/2018  Flu Vaccine  08/01/2018  Albumin Urine Test  08/15/2018 [address] [place] [address] Eye Exam  08/22/2018 Medicare Wellness Visit, Male The best way to live healthy is to have a lifestyle where you eat a well-balanced diet, exercise regularly, limit alcohol use, and quit all forms of tobacco/nicotine, if applicable. Regular preventive services are another way to keep healthy.  Preventive services (vaccines, screening tests, monitoring & exams) can help personalize your care plan, which helps you manage your own care. Screening tests can find health problems at the earliest stages, when they are easiest to treat. [address] [facility] follows the current, evidence-based guidelines published by the [facility] Gerson Tripp ([facility]) when recommending preventive services for our patients. Because we follow these guidelines, sometimes recommendations change over time as research supports it. (For example, a prostate screening blood test is no longer routinely recommended for men with no symptoms.) Of course, you and your provider may decide to screen more often for some diseases, based on your risk and co-morbidities (chronic disease you are already diagnosed with). Preventive services for you include: - Medicare offers their members a free annual wellness visit, which is time for you and your primary care provider to discuss and plan for your preventive service needs. Take advantage of this benefit every year! 
 
-All people over age 72 should receive the recommended pneumonia vaccines. Current USPSTF guidelines recommend a series of two vaccines for the best pneumonia protection.  
 
-All adults should have a yearly flu vaccine and a tetanus vaccine every 10 years. All adults age 61 years should receive a shingles vaccine once in their lifetime.   
 
-All adults age 38-68 years who are overweight should have a diabetes screening test once every three years.  
 
-Other screening tests & preventive services for persons with diabetes include: an eye exam to screen for diabetic retinopathy, a kidney function test, a foot exam, and stricter control over your cholesterol.  
 
-Cardiovascular screening for adults with routine risk involves an electrocardiogram (ECG) at intervals determined by the provider. -Colorectal cancer screenings should be done for adults age 54-65 years with normal risk. There are a number of acceptable methods of screening for this type of cancer. Each test has its own benefits and drawbacks. Discuss with your provider what is most appropriate for you during your annual wellness visit. The different tests include: colonoscopy (considered the best screening method), a fecal occult blood test, a fecal DNA test, and sigmoidoscopy. 
 
-All adults born between [facility] should be screened once for Hepatitis C. 
 
-An Abdominal Aortic Aneurysm (AAA) Screening is recommended for men age 73-68 who has ever smoked in their lifetime. Here is a list of your current Health Maintenance items (your personalized list of preventive services) with a due date: 
Health Maintenance Due Topic Date Due  
 Albumin Urine Test  08/15/2018 Introducing [facility] & HEALTH SERVICES! [facility] introduces FreshPlanet patient portal. Now you can access parts of your medical record, email your doctor's office, and request medication refills online. 1. In your internet browser, go to [email or web address] 2. Click on the First Time User? Click Here link in the Sign In box. You will see the New Member Sign Up page. 3. Enter your FreshPlanet Access Code exactly as it appears below. You will not need to use this code after youve completed the sign-up process. If you do not sign up before the expiration date, you must request a new code. · FreshPlanet Access Code: [email or web address] Expires: 11/18/2018 10:24 AM 
 
4. Enter the last four digits of your Social Security Number (xxxx) and Date of Birth (mm/dd/yyyy) as indicated and click Submit. You will be taken to the next sign-up page. 5. Create a FreshPlanet ID. This will be your FreshPlanet login ID and cannot be changed, so think of one that is secure and easy to remember. 6. Create a [employer] password. You can change your password at any time. 7. Enter your Password Reset Question and Answer. This can be used at a later time if you forget your password. 8. Enter your e-mail address. You will receive e-mail notification when new information is available in [address] 9. Click Sign Up. You can now view and download portions of your medical record. 10. Click the Download Summary menu link to download a portable copy of your medical information. If you have questions, please visit the Frequently Asked Questions section of the Zipmark website. Remember, Zipmark is NOT to be used for urgent needs. For medical emergencies, dial 911. Now available from your iPhone and Android! Please provide this summary of care documentation to your next provider. Your primary care clinician is listed as Mirlande Griffin. If you have any questions after today's visit, please call [phone].

## 2018-08-20 NOTE — ED TRIAGE NOTES
Pt brought in by son who stated he went to the clinic and his blood sugar was high and stated he has been out of his insulin, unsure when he took it last , stated the pt is \"delusional\", denies pain, denies n/v

## 2018-08-20 NOTE — ACP (ADVANCE CARE PLANNING)
Advance Care Planning (ACP) Provider Note - Comprehensive     Date of ACP Conversation: 08/20/18     Persons included in Conversation:  Nicky Diop MD    Patient Active Problem List   Diagnosis Code    HTN (hypertension) I10    Cataracts, bilateral H26.9    Glaucoma, right eye H40.9    Screening for colon cancer Z12.11    DM (diabetes mellitus) type II controlled with renal manifestation ([facility] E11.29    Type 1 diabetes mellitus with hyperglycemia ([facility] E10.65     These active diagnoses are of sufficient risk that focused discussion on advance care planning is indicated in order to allow the patient to thoughtfully consider his personal goals of care and, if situations arise that prevent the ability to personally give input, to ensure appropriate representation of her; personal desires through documentation or informed surrogate decision makers. Discussions : I reviewed her acute problems including uncontrolled diabetes and history of non complaint to his medication and his desires for ongoing aggressive care, including potential intubation and mechanical ventilation , also discussed who would speak on his behalf should he be unable to do so and discussed what conversations he has had with his family so they understand his desires if such a situation occurred now or in the future. He said he wants to be a full code.  He said he doesn't have advance directive but his son Rasheeda Boss is his decision maker         Length of ACP Conversation in minutes:  16 minutes    Time spent  Time spent face to face in education and discussion directly related to [address] 16 minutes     Irma Combs MD  8/20/2018

## 2018-08-20 NOTE — ED NOTES
Bedside shift change report given to Radha  (oncoming nurse) by Ari Duran (offgoing nurse). Report included the following information SBAR, ED Summary and MAR.

## 2018-08-20 NOTE — PROGRESS NOTES
Chief Complaint   Patient presents with    Extremity Weakness     Reviewed record in preparation for visit and have obtained necessary documentation. Identified pt with two pt identifiers(name and ). Health Maintenance Due   Topic    GLAUCOMA SCREENING Q2Y     MEDICARE YEARLY EXAM     Influenza Age 5 to Adult     MICROALBUMIN Q1     EYE EXAM RETINAL OR DILATED Q1          Chief Complaint   Patient presents with    Extremity Weakness        Wt Readings from Last 3 Encounters:   18 133 lb 3.2 oz (60.4 kg)   17 144 lb 4.8 oz (65.5 kg)   08/15/17 146 lb 1.6 oz (66.3 kg)     Temp Readings from Last 3 Encounters:   18 97 °F (36.1 °C) (Oral)   18 99.2 °F (37.3 °C) (Oral)   17 96.9 °F (36.1 °C) (Oral)     BP Readings from Last 3 Encounters:   18 110/60   18 166/80   17 170/90     Pulse Readings from Last 3 Encounters:   18 (!) 107   18 98   17 (!) 101           Learning Assessment:  :     Learning Assessment 2014 11/15/2013   PRIMARY LEARNER Patient Patient   HIGHEST LEVEL OF EDUCATION - PRIMARY LEARNER  GRADUATED HIGH SCHOOL OR GED -   BARRIERS PRIMARY LEARNER NONE -   CO-LEARNER CAREGIVER No -   PRIMARY LANGUAGE ENGLISH ENGLISH    NEED No -   LEARNER PREFERENCE PRIMARY OTHER (COMMENT) PICTURES   LEARNING SPECIAL TOPICS no -   ANSWERED BY patient patient   RELATIONSHIP SELF SELF   ASSESSMENT COMMENT none -       Depression Screening:  :     PHQ over the last two weeks 2018   Little interest or pleasure in doing things Not at all   Feeling down, depressed, irritable, or hopeless Not at all   Total Score PHQ 2 0       Fall Risk Assessment:  :     Fall Risk Assessment, last 12 mths 2017   Able to walk? Yes   Fall in past 12 months?  No   Fall with injury? -   Number of falls in past 12 months -   Fall Risk Score -       Abuse Screening:  :     Abuse Screening Questionnaire 2014   Do you ever feel afraid of your partner? N N   Are you in a relationship with someone who physically or mentally threatens you? N N   Is it safe for you to go home? Y Y       Coordination of Care Questionnaire:  :     1) Have you been to an emergency room, urgent care clinic since your last visit? no   Hospitalized since your last visit? no             2) Have you seen or consulted any other health care providers outside of [address] [place] [address] since your last visit? no  (Include any pap smears or colon screenings in this section.)    3) Do you have an Advance Directive on file? no    4) Are you interested in receiving information on Advance Directives? NO      Patient is accompanied by son I have received verbal consent from Deborah Chacon to discuss any/all medical information while they are present in the room. Reviewed record  In preparation for visit and have obtained necessary documentation.

## 2018-08-20 NOTE — ED PROVIDER NOTES
HPI Comments: 68 y.o. male with past medical history significant for diabetes who presents from home via private vehicle, accompanied by his son and two grandsons, with chief complaint of AMS. Per son, patient has been increasingly confused and \"not himself\" over the last 2 weeks. Patient has been \"tired\" and \"laying in bed all day\", with marked confusion and disorientation. Son reports a loss of appetite, usually only 1 meal/day. Patient is currently on insulin and states that he has not missed a dose - however, his son is not sure patient has kept up with insulin. Son states the pt's sugars have been high recently, however, the pt has a history of similar symptoms with previous hypoglycemic episodes. Patient had f/u with PCP last week but arrived late to his appointment and had to reschedule. Currently in ED, pt is only oriented to self, unable to state the year, president, or location correctly. Son is unsure of any urinary or bowel changes. Pt currently denies any pain. Son specifically denies fever. There are no other acute medical concerns at this time. Full history, physical exam, and ROS unable to be obtained due to:  AMS. Social hx: Never tobacco smoker; Denies EtOH use; Denies illicit drug use  PCP: Lilian De La Rosa MD    Note written by Jaden Moffett, as dictated by Abigail Winston MD 12:16 PM    The history is provided by the patient and a relative (Son). The history is limited by the condition of the patient. No  was used. Past Medical History:   Diagnosis Date    Diabetes ([facility]        History reviewed. No pertinent surgical history. Family History:   Problem Relation Age of Onset    Diabetes Mother       in her 76s       Social History     Social History    Marital status:      Spouse name: N/A    Number of children: N/A    Years of education: N/A     Occupational History    Not on file.      Social History Main Topics    Smoking status: Never Smoker    Smokeless tobacco: Never Used    Alcohol use No    Drug use: No    Sexual activity: No     Other Topics Concern    Not on file     Social History Narrative    ** Merged History Encounter **              ALLERGIES: Review of patient's allergies indicates no known allergies. Review of Systems   Unable to perform ROS: Mental status change       Vitals:    08/20/18 1129   BP: 112/73   Pulse: (!) 104   Resp: 20   Temp: 98.6 °F (37 °C)   SpO2: 97%   Height: 5' 5\" (1.651 m)            Physical Exam   Vital signs reviewed. Nursing notes reviewed. Const:  No acute distress, well developed, well nourished  Head:  Atraumatic, normocephalic  Eyes:  PERRL, conjunctiva normal, no scleral icterus  Neck:  Supple, trachea midline  Cardiovascular:  RRR, no murmurs, no gallops, no rubs  Resp:  No resp distress, no increased work of breathing, no wheezes, no rhonchi, no rales,  Abd:  Soft, non-tender, non-distended, no rebound, no guarding, no CVA tenderness  :  Deferred  MSK:  No pedal edema, normal ROM  Neuro:  Alert and oriented x1, no cranial nerve defect. Equal strength upper and lower extremities. Skin:  Warm, dry, intact  Psych: normal mood and affect, behavior is normal, judgement and thought content is normal    Note written by Jaden Burnette, as dictated by Dilia Tripathi MD 12:16 PM    MDM  Number of Diagnoses or Management Options  Acute encephalopathy:   Acute renal failure, unspecified acute renal failure type [facility]): Hyperglycemia:      Amount and/or Complexity of Data Reviewed  Clinical lab tests: ordered and reviewed  Tests in the radiology section of CPT®: ordered and reviewed  Review and summarize past medical records: yes    Patient Progress  Patient progress: stable    ED Course     Pt. Presents to the ER with complaints of confusion. Pt. Found to have hyperglycemia and renal failure. Pt's HR has improved with IVF and insulin. Pt.  To be evaluated for admission by the hospitalist.      Procedures

## 2018-08-20 NOTE — ROUTINE PROCESS
TRANSFER - OUT REPORT:    Verbal report given to Inge(name) on Dayoung AMINA Tanner  being transferred to 3N(unit) for routine progression of care       Report consisted of patients Situation, Background, Assessment and   Recommendations(SBAR). Information from the following report(s) ED Summary and Recent Results was reviewed with the receiving nurse. Lines:   Peripheral IV 08/20/18 Right Antecubital (Active)        Opportunity for questions and clarification was provided.       Patient transported with:   [employer]

## 2018-08-20 NOTE — H&P
[address] [facility]  HISTORY AND PHYSICAL      Irving Jamison  MR#: [id number]  : 1945  ACCOUNT #: [de-identified]   ADMIT DATE: 2018    PRIMARY CARE PHYSICIAN:  Millie Moya MD    SOURCE OF INFORMATION:  The patient and from his medical records. CHIEF COMPLAINT:  The patient referred for high blood sugar from the clinic. HISTORY OF PRESENT ILLNESS:  This is a 68-year-old -American male with past medical history significant for diabetes mellitus type 2, hypertension and noncompliant, who is referred from his clinic after he was found with high blood sugar. Patient is a poor historian, but he was able to give a limited history. His son left the hospital and additional information was obtained from the ER staff and from his medical record. The patient has been confused and laying in bed most of the time for the last couple of weeks. He has confusion and disorientation with poor appetite. He ate one meal a day. The patient stated that he takes his diabetic medication regularly. He said he took his medication this morning. He denied any nausea, vomiting, cough, chills, fever, abdominal pain, urinary complaint or abnormal bowel movement. No left-sided chest pain, shortness of breath, palpitation or diaphoresis. No lower extremity swelling. When he was seen in ER, his vital signs were blood pressure was 112/73, pulse 104, temperature 98.6, saturation of oxygen 97% on room air, and his blood sugar was more than 600. He received one liter IV bolus normal saline, 10 units of regular insulin, and his blood sugar dropped to 391 and referred to hospitalist service for further evaluation and admission. REVIEW OF SYSTEMS:  Pertinent positive findings mentioned in HPI. All systems reviewed and not any other positive finding. PAST MEDICAL HISTORY:  Diabetes mellitus type 2, hypertension and noncompliant. PRIOR TO ADMISSION MEDICATIONS:  Include:  1.   Lantus insulin 25 units daily. 2.  Losartan 100 mg p.o. daily. 3.  Lipitor 20 mg p.o. daily. 4.  Norvasc 10 mg p.o. daily. 5.  Metformin 500 mg p.o. 3 times daily. 6.  Aspirin 325 mg p.o. daily. ALLERGIES:  NO KNOWN DRUG ALLERGIES. FAMILY HISTORY:  Mother with history of diabetes,  in her 76s. SOCIAL HISTORY:  Lives with his son. No tobacco or alcohol abuse. Ambulates independently. CODE STATUS:  FULL CODE. PHYSICAL EXAMINATION:  VITAL SIGNS:  Blood pressure 145/87, pulse 91, temperature 98.6, respiration rate 13, saturation of oxygen 97% on room air. GENERAL APPEARANCE:  The patient is alert, cooperative, not in cardiorespiratory distress. HEENT:  Pink conjunctivae, anicteric sclerae, moist tongue and buccal mucosa. LUNGS:  Clear to auscultation bilaterally. CHEST WALL:  No tenderness or deformity. CARDIOVASCULAR SYSTEM:  Regular rate and rhythm, S1 and S2 normal.  No murmur, no gallop. ABDOMEN:  Soft, nontender. Bowel sounds normal.  No mass or organomegaly. EXTREMITIES:  No cyanosis or edema. SKIN:  Dry, no lesions. CENTRAL NERVOUS SYSTEM:  Conscious and alert, oriented to place and person. Motor 5/5. Sensation intact. Cranial nerves II-XII grossly intact. LABORATORY DATA:  White blood cell count 10, hemoglobin 13.7, platelet count 433. Fingerstick glucose initially was more than 600, received insulin and last one 391. Urinalysis unremarkable. Chemistry:  Sodium 133, potassium 4.2, CO2 of 24, anion gap 11, glucose 615, , creatinine 2.08, BUN/creatinine ratio 22 , calcium 8.5, total bilirubin 0.9, total protein 7.9, albumin 3.6, ALT 23, AST 19, alkaline phosphatase 150. CT scan of the head:  No acute intracranial hemorrhage, mass or infarct. Chest x-ray:  Normal chest.    ASSESSMENT:  1.  Diabetes mellitus with hyperglycemia. 2.  Hypertension. 3.  Acute kidney injury on chronic kidney disease stage III. 4.  Acute metabolic encephalopathy. 5.  Thrombocytopenia.   6. Hyponatremia. 7.  Glaucoma. PLAN:  1. Diabetes mellitus with hyperglycemia. Admit patient to telemetry floor. Blood sugar is improving, likely noncompliant to medication. will give him his Lantus insulin 26 units at bedtime, sliding scale and monitor fingerstick glucose, check hemoglobin A1c.  will hold his home metformin. will involve diabetic treatment center to evaluate his home diabetic management. 2.  Hypertension. Blood pressure not at goal.  will resume his losartan 100 mg p.o. daily and Norvasc. Monitor blood pressure and will give him IV hydralazine p.r.n.  3.  Acute kidney injury on chronic kidney disease stage III. Will given normal saline at 150 mL per hour. Monitor renal function tests. Avoid nephrotoxin and monitor intake and output. 4.  Hyponatremia, likely due to hyperglycemia. Continue normal saline, insulin and repeat BMP in a.m.  5.  Thrombocytopenia, unclear etiology. No need for transfusion. No evidence of bleeding. Repeat platelet count tomorrow. 6.  Acute metabolic encephalopathy, likely due to hyperglycemia. Urinalysis normal.  No evidence of infection. CT scan of the head without contrast:  No acute finding. Per report, the patient has been confused for the last 2 weeks. No focal neurologic deficit. Continue neuro check, PT, OT.  7.  History of glaucoma. Continue home medication. DVT prophylaxis:  Heparin.       MD JEREMY Peterson/RANDOLPH  D: 08/20/2018 16:21     T: 08/20/2018 18:06  JOB #: [id number]

## 2018-08-20 NOTE — PATIENT INSTRUCTIONS
Your blood sugar is out of control. Go to the Emergency room for acute management of your diabetes. Arrange for ongoing help with patient's medication management. Medicare Wellness Visit, Male    The best way to live healthy is to have a lifestyle where you eat a well-balanced diet, exercise regularly, limit alcohol use, and quit all forms of tobacco/nicotine, if applicable. Regular preventive services are another way to keep healthy. Preventive services (vaccines, screening tests, monitoring & exams) can help personalize your care plan, which helps you manage your own care. Screening tests can find health problems at the earliest stages, when they are easiest to treat. [address] [facility] follows the current, evidence-based guidelines published by the [facility] States [place] ([facility]) when recommending preventive services for our patients. Because we follow these guidelines, sometimes recommendations change over time as research supports it. (For example, a prostate screening blood test is no longer routinely recommended for men with no symptoms.)    Of course, you and your provider may decide to screen more often for some diseases, based on your risk and co-morbidities (chronic disease you are already diagnosed with). Preventive services for you include:    - Medicare offers their members a free annual wellness visit, which is time for you and your primary care provider to discuss and plan for your preventive service needs. Take advantage of this benefit every year!    -All people over age 72 should receive the recommended pneumonia vaccines. Current USPSTF guidelines recommend a series of two vaccines for the best pneumonia protection.     -All adults should have a yearly flu vaccine and a tetanus vaccine every 10 years.  All adults age 61 years should receive a shingles vaccine once in their lifetime.      -All adults age 38-68 years who are overweight should have a diabetes screening test once every three years.     -Other screening tests & preventive services for persons with diabetes include: an eye exam to screen for diabetic retinopathy, a kidney function test, a foot exam, and stricter control over your cholesterol.     -Cardiovascular screening for adults with routine risk involves an electrocardiogram (ECG) at intervals determined by the provider.     -Colorectal cancer screenings should be done for adults age 54-65 years with normal risk. There are a number of acceptable methods of screening for this type of cancer. Each test has its own benefits and drawbacks. Discuss with your provider what is most appropriate for you during your annual wellness visit. The different tests include: colonoscopy (considered the best screening method), a fecal occult blood test, a fecal DNA test, and sigmoidoscopy.    -All adults born between [facility] should be screened once for Hepatitis C.    -An Abdominal Aortic Aneurysm (AAA) Screening is recommended for men age 73-68 who has ever smoked in their lifetime. Here is a list of your current Health Maintenance items (your personalized list of preventive services) with a due date:  Health Maintenance Due   Topic Date Due    Glaucoma Screening   05/19/2016    Annual Well Visit  04/19/2018    Flu Vaccine  08/01/2018    Albumin Urine Test  08/15/2018    Eye Exam  08/22/2018       Medicare Wellness Visit, Male    The best way to live healthy is to have a lifestyle where you eat a well-balanced diet, exercise regularly, limit alcohol use, and quit all forms of tobacco/nicotine, if applicable. Regular preventive services are another way to keep healthy. Preventive services (vaccines, screening tests, monitoring & exams) can help personalize your care plan, which helps you manage your own care. Screening tests can find health problems at the earliest stages, when they are easiest to treat.      [facility] follows the current, evidence-based guidelines published by the [facility] States [place] ([facility]) when recommending preventive services for our patients. Because we follow these guidelines, sometimes recommendations change over time as research supports it. (For example, a prostate screening blood test is no longer routinely recommended for men with no symptoms.)    Of course, you and your provider may decide to screen more often for some diseases, based on your risk and co-morbidities (chronic disease you are already diagnosed with). Preventive services for you include:    - Medicare offers their members a free annual wellness visit, which is time for you and your primary care provider to discuss and plan for your preventive service needs. Take advantage of this benefit every year!    -All people over age 72 should receive the recommended pneumonia vaccines. Current USPSTF guidelines recommend a series of two vaccines for the best pneumonia protection.     -All adults should have a yearly flu vaccine and a tetanus vaccine every 10 years. All adults age 61 years should receive a shingles vaccine once in their lifetime.      -All adults age 38-68 years who are overweight should have a diabetes screening test once every three years.     -Other screening tests & preventive services for persons with diabetes include: an eye exam to screen for diabetic retinopathy, a kidney function test, a foot exam, and stricter control over your cholesterol.     -Cardiovascular screening for adults with routine risk involves an electrocardiogram (ECG) at intervals determined by the provider.     -Colorectal cancer screenings should be done for adults age 54-65 years with normal risk. There are a number of acceptable methods of screening for this type of cancer. Each test has its own benefits and drawbacks. Discuss with your provider what is most appropriate for you during your annual wellness visit.  The different tests include: colonoscopy (considered the best screening method), a fecal occult blood test, a fecal DNA test, and sigmoidoscopy.    -All adults born between [facility] should be screened once for Hepatitis C.    -An Abdominal Aortic Aneurysm (AAA) Screening is recommended for men age 73-68 who has ever smoked in their lifetime.      Here is a list of your current Health Maintenance items (your personalized list of preventive services) with a due date:  Health Maintenance Due   Topic Date Due    Albumin Urine Test  08/15/2018

## 2018-08-20 NOTE — PROGRESS NOTES
TRANSFER - IN REPORT:    Verbal report received from Michaelle(name) on Richelle Huitron  being received from ER(unit) for routine progression of care      Report consisted of patients Situation, Background, Assessment and   Recommendations(SBAR). Information from the following report(s) SBAR, Kardex, [facility] and Recent Results was reviewed with the receiving nurse. Opportunity for questions and clarification was provided. Assessment completed upon patients arrival to unit and care assumed. Bedside shift change report given to DANNA Hi (oncoming nurse) by Sugey Prater (offgoing nurse). Report included the following information SBAR, Kardex, MAR and Recent Results.

## 2018-08-20 NOTE — ED NOTES
Patient able to stand up on the side of the bed to provide urine sample; HR in the 120s upon movement, currently at 106 resting in the bed.

## 2018-08-20 NOTE — IP AVS SNAPSHOT
[address] [facility] 
[phone] Patient: Rich Messer MRN: [id number] BYJ:[birth year] A check nate indicates which time of day the medication should be taken. My Medications START taking these medications Instructions Each Dose to Equal  
 Morning Noon Evening Bedtime  
 insulin lispro 100 unit/mL injection Commonly known as:  HUMALOG Your last dose was: Your next dose is:    
   
   
 4 Units by SubCUTAneous route Before breakfast, lunch, and dinner. Can be substituted with the PEN  Indications: type 1 diabetes mellitus 4 Units CONTINUE taking these medications Instructions Each Dose to Equal  
 Morning Noon Evening Bedtime  
 amLODIPine 10 mg tablet Commonly known as:  [place]ica Blade Your last dose was: Your next dose is: Take 1 Tab by mouth daily. 10 mg  
    
   
   
   
  
 aspirin 325 mg tablet Commonly known as:  ASPIRIN Your last dose was: Your next dose is: Take 325 mg by mouth daily. 325 mg  
    
   
   
   
  
 atorvastatin 20 mg tablet Commonly known as:  LIPITOR Your last dose was: Your next dose is: TAKE 1 TAB BY MOUTH DAILY. BOOST PO Your last dose was: Your next dose is: Take  by mouth. FISH OIL PO Your last dose was: Your next dose is: Take  by mouth. LANTUS SOLOSTAR U-100 INSULIN 100 unit/mL (3 mL) Inpn Generic drug:  insulin glargine Your last dose was: Your next dose is:    
   
   
 25 Units by SubCUTAneous route daily. 25 Units  
    
   
   
   
  
 losartan 100 mg tablet Commonly known as:  COZAAR Your last dose was: Your next dose is:  TAKE 1 TABLET BY MOUTH EVERY DAY  
     
   
   
   
  
 metFORMIN 500 mg tablet Commonly known as:  GLUCOPHAGE Your last dose was: Your next dose is: Take 1 Tab by mouth three (3) times daily (with meals). 500 mg  
    
   
   
   
  
 vit B Cmplx 3-FA-Vit C-Biotin 1- mg-mg-mcg tablet Commonly known as:  NEPHRO ALEN RX Your last dose was: Your next dose is: Take 1 Tab by mouth daily. 1 Tab Where to Get Your Medications Information on where to get these meds will be given to you by the nurse or doctor. ! Ask your nurse or doctor about these medications  
  insulin lispro 100 unit/mL injection

## 2018-08-20 NOTE — PROGRESS NOTES
Admission Medication Reconciliation:    Information obtained from:  Patient, patient's son, [facility] on [address]    Comments/Recommendations: All medications/allergies have been reviewed and updated; last medication administration times reviewed and recorded. DP appears to have some AMS and had difficulty remembering his current medications. His son was able to remember some of his medication, but was not all inclusive. Son reports that his father takes  daily and was able to attest to most of the OTC products on the PTA list. [facility] called [facility] on [address] to verify current medications he fills there. DP appears to be confused about his metformin, when asked about it he reported that he takes only once daily, but [facility] says [facility] has not [facility] Rx with [facility] since 10/2017. Changes made to Prior to Admission (PTA) Medication List:   ?   Medications Added:   - None   ? Medications Changed:   - Insulin glargine 30 units every day to 25 units every day    ? Medications Removed:   - ASA 81 mg       Significant PMH/Disease States:   Past Medical History:   Diagnosis Date    Diabetes [facility]) 2002       Chief Complaint for this Admission:    Chief Complaint   Patient presents with    High Blood Sugar       Allergies:  Review of patient's allergies indicates no known allergies. Prior to Admission Medications:   Prior to Admission Medications   Prescriptions Last Dose Informant Patient Reported? Taking? DOCOSAHEXANOIC ACID/EPA (FISH OIL PO) 8/19/2018 at Unknown time  Yes Yes   Sig: Take  by mouth. LACTOSE-REDUCED FOOD (BOOST PO) 8/19/2018 at Unknown time  Yes Yes   Sig: Take  by mouth. amLODIPine (NORVASC) 10 mg tablet 8/19/2018 at Unknown time  No Yes   Sig: Take 1 Tab by mouth daily. aspirin (ASPIRIN) 325 mg tablet 8/19/2018 at Unknown time  Yes Yes   Sig: Take 325 mg by mouth daily. atorvastatin (LIPITOR) 20 mg tablet 8/19/2018 at Unknown time  No Yes   Sig: TAKE 1 TAB BY MOUTH DAILY.    insulin glargine (LANTUS SOLOSTAR U-100 INSULIN) 100 unit/mL (3 mL) inpn 2018 at Unknown time  Yes Yes   Si Units by SubCUTAneous route daily. losartan (COZAAR) 100 mg tablet 2018 at Unknown time  No Yes   Sig: TAKE 1 TABLET BY MOUTH EVERY DAY   metFORMIN (GLUCOPHAGE) 500 mg tablet Unknown at Unknown time  No No   Sig: Take 1 Tab by mouth three (3) times daily (with meals). vit B Cmplx 3-FA-Vit C-Biotin (NEPHRO ALEN RX) 1- mg-mg-mcg tablet Unknown at Unknown time  Yes No   Sig: Take 1 Tab by mouth daily. Facility-Administered Medications: None     Thank you for allowing pharmacy to participate in the coordination of this patient's care. If you have any other questions, please contact the medication reconciliation pharmacist at x [phone].      Sign-off:Owen Snow, PharmD Candidate [id number]

## 2018-08-20 NOTE — ED NOTES
11:31 AM  I have evaluated the patient as the Provider in Triage. I have reviewed His vital signs and the triage nurse assessment. I have talked with the patient and any available family and advised that I am the provider in triage and have ordered the appropriate study to initiate their work up based on the clinical presentation during my assessment. I have advised that the patient will be accommodated in the Main ED as soon as possible. I have also requested to contact the triage nurse or myself immediately if the patient experiences any changes in their condition during this brief waiting period. Patient reports blood glucose over 400, has not taken an insulin \"for a while\". Son reports confusion that started this morning.  Patient denies pain   Kira Cullen NP

## 2018-08-20 NOTE — PROGRESS NOTES
Subjective:     Chief Complaint   Patient presents with    Extremity Weakness     He  is a 68y.o. year old male who presents for evaluation. Weak and confused this am.  Sitting a lot. Feels weak all over. Unclear how much supervision that he has during the day and whether he is properly taking his medication. Historical Data    Past Medical History:   Diagnosis Date    Diabetes ([address] 2002        No past surgical history on file. Outpatient Encounter Prescriptions as of 8/20/2018   Medication Sig Dispense Refill    losartan (COZAAR) 100 mg tablet TAKE 1 TABLET BY MOUTH EVERY DAY 90 Tab 2    atorvastatin (LIPITOR) 20 mg tablet TAKE 1 TAB BY MOUTH DAILY. 90 Tab 1    insulin glargine (LANTUS SOLOSTAR U-100 INSULIN) 100 unit/mL (3 mL) inpn Inject 30 units under the skin every day. 2 Adjustable Dose Pre-filled Pen Syringe 5    metFORMIN (GLUCOPHAGE) 500 mg tablet Take 1 Tab by mouth three (3) times daily (with meals). 90 Tab 5    amLODIPine (NORVASC) 10 mg tablet Take 1 Tab by mouth daily. 90 Tab 3    ROSA PEN NEEDLE 32 gauge x 5/32\" ndle USE AS DIRECTED 100 Pen Needle 10    Diabetic Supplies, Miscellan. misc Needles for Lantus pen 100 Each 10    Lancets misc Check blood sugar twice a day. Diagnosis code E11.9 200 Each 11    Blood-Glucose Meter monitoring kit As per Patients insurance for brand preference 1 Kit 0    Lancets & Blood Glucose Strips [facility] One [facility] Ultra test strips. Tid ac meals 3 Package 3    ACCU-CHEK SMARTVIEW TEST STRIP strip CHECK BLOOD SUGAR TWICE A DAY. DIAGNOSIS CODE E11.9 100 Strip 4    aspirin delayed-release 81 mg tablet Take  by mouth daily.  LACTOSE-REDUCED FOOD (BOOST PO) Take  by mouth.  DOCOSAHEXANOIC ACID/EPA (FISH OIL PO) Take  by mouth.  vit B Cmplx 3-FA-Vit C-Biotin (NEPHRO ALEN RX) 1- mg-mg-mcg tablet Take 1 Tab by mouth daily. 30 Tab 3    aspirin (ASPIRIN) 325 mg tablet Take 325 mg by mouth daily.          No facility-administered encounter medications on file as of 8/20/2018. No Known Allergies     Social History     Social History    Marital status:      Spouse name: N/A    Number of children: N/A    Years of education: N/A     Occupational History    Not on file. Social History Main Topics    Smoking status: Never Smoker    Smokeless tobacco: Never Used    Alcohol use No    Drug use: No    Sexual activity: No     Other Topics Concern    Not on file     Social History Narrative    ** Merged History Encounter **             Review of Systems  Pertinent items are noted in HPI. Objective:     Vitals:    08/20/18 0951   BP: 110/60   Pulse: (!) 107   Temp: 97 °F (36.1 °C)   TempSrc: Oral   SpO2: 94%     Pleasant, disoriented AAM.  Cardiac: RRR without murmurs, gallops or rubs. Lungs: Clear to auscultation. Neuro: Patient is disoriented (Year is 1, month is June, etc)  BS is >350  Results for orders placed or performed in visit on 08/20/18   AMB POC HEMOGLOBIN A1C   Result Value Ref Range    Hemoglobin A1c (POC) 12.8 %   AMB POC GLUCOSE BLOOD, BY GLUCOSE MONITORING DEVICE   Result Value Ref Range    Glucose POC H mg/dL       ASSESSMENT / PLAN:   1. Medicare annual wellness visit, subsequent  · Review completed. 2. Uncontrolled type 2 diabetes mellitus with hyperosmolarity without coma, with long-term current use of insulin ([facility]  · Patient is markedly hyperglycemic and needs hospital level care to bring BS back into compliance. · Will need more OP assistance to help with medication management, etc.  · Discussed in detail with son. - AMB POC HEMOGLOBIN A1C  - AMB POC GLUCOSE BLOOD, BY GLUCOSE MONITORING DEVICE    Patient Instructions     Your blood sugar is out of control. Go to the Emergency room for acute management of your diabetes. Arrange for ongoing help with patient's medication management.       Medicare Wellness Visit, Male    The best way to live healthy is to have a lifestyle where you eat a well-balanced diet, exercise regularly, limit alcohol use, and quit all forms of tobacco/nicotine, if applicable. Regular preventive services are another way to keep healthy. Preventive services (vaccines, screening tests, monitoring & exams) can help personalize your care plan, which helps you manage your own care. Screening tests can find health problems at the earliest stages, when they are easiest to treat. [facility] follows the current, evidence-based guidelines published by the [facility] States [place] ([facility]) when recommending preventive services for our patients. Because we follow these guidelines, sometimes recommendations change over time as research supports it. (For example, a prostate screening blood test is no longer routinely recommended for men with no symptoms.)    Of course, you and your provider may decide to screen more often for some diseases, based on your risk and co-morbidities (chronic disease you are already diagnosed with). Preventive services for you include:    - Medicare offers their members a free annual wellness visit, which is time for you and your primary care provider to discuss and plan for your preventive service needs. Take advantage of this benefit every year!    -All people over age 72 should receive the recommended pneumonia vaccines. Current USPSTF guidelines recommend a series of two vaccines for the best pneumonia protection.     -All adults should have a yearly flu vaccine and a tetanus vaccine every 10 years. All adults age 61 years should receive a shingles vaccine once in their lifetime.      -All adults age 38-68 years who are overweight should have a diabetes screening test once every three years.     -Other screening tests & preventive services for persons with diabetes include: an eye exam to screen for diabetic retinopathy, a kidney function test, a foot exam, and stricter control over your cholesterol. -Cardiovascular screening for adults with routine risk involves an electrocardiogram (ECG) at intervals determined by the provider.     -Colorectal cancer screenings should be done for adults age 54-65 years with normal risk. There are a number of acceptable methods of screening for this type of cancer. Each test has its own benefits and drawbacks. Discuss with your provider what is most appropriate for you during your annual wellness visit. The different tests include: colonoscopy (considered the best screening method), a fecal occult blood test, a fecal DNA test, and sigmoidoscopy.    -All adults born between [facility] should be screened once for Hepatitis C.    -An Abdominal Aortic Aneurysm (AAA) Screening is recommended for men age 73-68 who has ever smoked in their lifetime. Here is a list of your current Health Maintenance items (your personalized list of preventive services) with a due date:  Health Maintenance Due   Topic Date Due    Glaucoma Screening   05/19/2016    Annual Well Visit  04/19/2018    Flu Vaccine  08/01/2018    Albumin Urine Test  08/15/2018    Eye Exam  08/22/2018       Medicare Wellness Visit, Male    The best way to live healthy is to have a lifestyle where you eat a well-balanced diet, exercise regularly, limit alcohol use, and quit all forms of tobacco/nicotine, if applicable. Regular preventive services are another way to keep healthy. Preventive services (vaccines, screening tests, monitoring & exams) can help personalize your care plan, which helps you manage your own care. Screening tests can find health problems at the earliest stages, when they are easiest to treat. [facility] follows the current, evidence-based guidelines published by the [facility] States [place] Tripp (USPSTF) when recommending preventive services for our patients.  Because we follow these guidelines, sometimes recommendations change over time as research supports it. (For example, a prostate screening blood test is no longer routinely recommended for men with no symptoms.)    Of course, you and your provider may decide to screen more often for some diseases, based on your risk and co-morbidities (chronic disease you are already diagnosed with). Preventive services for you include:    - Medicare offers their members a free annual wellness visit, which is time for you and your primary care provider to discuss and plan for your preventive service needs. Take advantage of this benefit every year!    -All people over age 72 should receive the recommended pneumonia vaccines. Current USPSTF guidelines recommend a series of two vaccines for the best pneumonia protection.     -All adults should have a yearly flu vaccine and a tetanus vaccine every 10 years. All adults age 61 years should receive a shingles vaccine once in their lifetime.      -All adults age 38-68 years who are overweight should have a diabetes screening test once every three years.     -Other screening tests & preventive services for persons with diabetes include: an eye exam to screen for diabetic retinopathy, a kidney function test, a foot exam, and stricter control over your cholesterol.     -Cardiovascular screening for adults with routine risk involves an electrocardiogram (ECG) at intervals determined by the provider.     -Colorectal cancer screenings should be done for adults age 54-65 years with normal risk. There are a number of acceptable methods of screening for this type of cancer. Each test has its own benefits and drawbacks. Discuss with your provider what is most appropriate for you during your annual wellness visit.  The different tests include: colonoscopy (considered the best screening method), a fecal occult blood test, a fecal DNA test, and sigmoidoscopy.    -All adults born between [facility] should be screened once for Hepatitis C.    -An Abdominal Aortic Aneurysm (AAA) Screening is recommended for men age 73-68 who has ever smoked in their lifetime. Here is a list of your current Health Maintenance items (your personalized list of preventive services) with a due date:  Health Maintenance Due   Topic Date Due    Albumin Urine Test  08/15/2018            Follow-up Disposition: Not on File   Advised him to call back or return to office if symptoms worsen/change/persist.  Discussed expected course/resolution/complications of diagnosis in detail with patient. Medication risks/benefits/costs/interactions/alternatives discussed with patient. He was given an after visit summary which includes diagnoses, current medications, & vitals. He expressed understanding with the diagnosis and plan. This is the Subsequent Medicare Annual Wellness Exam, performed 12 months or more after the Initial AWV or the last Subsequent AWV    I have reviewed the patient's medical history in detail and updated the computerized patient record. History     Past Medical History:   Diagnosis Date    Diabetes ([facility] [address] 2002      No past surgical history on file. Current Outpatient Prescriptions   Medication Sig Dispense Refill    losartan (COZAAR) 100 mg tablet TAKE 1 TABLET BY MOUTH EVERY DAY 90 Tab 2    atorvastatin (LIPITOR) 20 mg tablet TAKE 1 TAB BY MOUTH DAILY. 90 Tab 1    insulin glargine (LANTUS SOLOSTAR U-100 INSULIN) 100 unit/mL (3 mL) inpn Inject 30 units under the skin every day. 2 Adjustable Dose Pre-filled Pen Syringe 5    metFORMIN (GLUCOPHAGE) 500 mg tablet Take 1 Tab by mouth three (3) times daily (with meals). 90 Tab 5    amLODIPine (NORVASC) 10 mg tablet Take 1 Tab by mouth daily. 90 Tab 3    aspirin delayed-release 81 mg tablet Take  by mouth daily.  LACTOSE-REDUCED FOOD (BOOST PO) Take  by mouth.  DOCOSAHEXANOIC ACID/EPA (FISH OIL PO) Take  by mouth.  ROSA PEN NEEDLE 32 gauge x 5/32\" ndle USE AS DIRECTED 100 Pen Needle 10    [facility] Needles for Lantus pen 100 Each 10    Lancets misc Check blood sugar twice a day. Diagnosis code E11.9 200 Each 11    Blood-Glucose Meter monitoring kit As per Patients insurance for brand preference 1 Kit 0    Lancets & Blood Glucose Strips [employer] One touch Ultra test strips. Tid ac meals 3 Package 3    vit B Cmplx 3-FA-Vit C-Biotin (NEPHRO ALEN RX) 1- mg-mg-mcg tablet Take 1 Tab by mouth daily. 30 Tab 3    aspirin (ASPIRIN) 325 mg tablet Take 325 mg by mouth daily.  ACCU-CHEK SMARTVIEW TEST STRIP strip CHECK BLOOD SUGAR TWICE A DAY. DIAGNOSIS CODE E11.9 100 Strip 4     No Known Allergies  Family History   Problem Relation Age of Onset    Diabetes Mother       in her 76s     Social History   Substance Use Topics    Smoking status: Never Smoker    Smokeless tobacco: Never Used    Alcohol use No     Patient Active Problem List   Diagnosis Code    HTN (hypertension) I10    Cataracts, bilateral H26.9    Glaucoma, right eye H40.9    Screening for colon cancer Z12.11    DM (diabetes mellitus) type II controlled with renal manifestation ([facility] E11.29       Depression Risk Factor Screening:     PHQ over the last two weeks 2018   Little interest or pleasure in doing things Not at all   Feeling down, depressed, irritable, or hopeless Not at all   Total Score PHQ 2 0     Alcohol Risk Factor Screening: You do not drink alcohol or very rarely. Functional Ability and Level of Safety:   Hearing Loss  Hearing is good. Activities of Daily Living  The home contains: no safety equipment. Patient does total self care    Fall Risk  Fall Risk Assessment, last 12 mths 2017   Able to walk? Yes   Fall in past 12 months?  No   Fall with injury? -   Number of falls in past 12 months -   Fall Risk Score -       Abuse Screen  Patient is not abused    Cognitive Screening   Evaluation of Cognitive Function:  Has your family/caregiver stated any concerns about your memory: yes  Abnormal    Patient Care Team   Patient Care Team:  Brian Smith MD as PCP - General (Internal Medicine)    Assessment/Plan   Education and counseling provided:  Are appropriate based on today's review and evaluation  End-of-Life planning (with patient's consent)    Diagnoses and all orders for this visit:    1. Medicare annual wellness visit, subsequent    2.  Uncontrolled type 2 diabetes mellitus with hyperosmolarity without coma, with long-term current use of insulin (HCC)  -     AMB POC HEMOGLOBIN A1C  -     AMB POC GLUCOSE BLOOD, BY GLUCOSE MONITORING DEVICE        Health Maintenance Due   Topic Date Due    MICROALBUMIN Q1  08/15/2018

## 2018-08-20 NOTE — IP AVS SNAPSHOT
[address] [facility] [address] 
[phone] Patient: Yamilex Robbins MRN: [id number] NMW:[id number] About your hospitalization You were admitted on:  August 20, 2018 You last received care in the:  [facility] 3N TELEMETRY You were discharged on:  August 24, 2018 Why you were hospitalized Your primary diagnosis was:  Type 1 Diabetes Mellitus With Hyperglycemia (Hcc) Follow-up Information Follow up With Details Comments Contact Info [facility].  Please call agency @ [phone] if you haven't heard by 10am after discharge  [address], [address] [facility] [address] Christopher Angel MD In 1 week pl readjust insulin  [address] [facility] [address] [facility] [address] 
[phone] Discharge Orders None A check nate indicates which time of day the medication should be taken. My Medications START taking these medications Instructions Each Dose to Equal  
 Morning Noon Evening Bedtime  
 insulin lispro 100 unit/mL injection Commonly known as:  HUMALOG Your last dose was: Your next dose is:    
   
   
 4 Units by SubCUTAneous route Before breakfast, lunch, and dinner. Can be substituted with the PEN  Indications: type 1 diabetes mellitus 4 Units CONTINUE taking these medications Instructions Each Dose to Equal  
 Morning Noon Evening Bedtime  
 amLODIPine 10 mg tablet Commonly known as:  [place] Your last dose was: Your next dose is: Take 1 Tab by mouth daily. 10 mg  
    
   
   
   
  
 aspirin 325 mg tablet Commonly known as:  ASPIRIN Your last dose was: Your next dose is: Take 325 mg by mouth daily. 325 mg  
    
   
   
   
  
 atorvastatin 20 mg tablet Commonly known as:  LIPITOR Your last dose was: Your next dose is: TAKE 1 TAB BY MOUTH DAILY. BOOST PO Your last dose was: Your next dose is: Take  by mouth. FISH OIL PO Your last dose was: Your next dose is: Take  by mouth. LANTUS SOLOSTAR U-100 INSULIN 100 unit/mL (3 mL) Inpn Generic drug:  insulin glargine Your last dose was: Your next dose is:    
   
   
 25 Units by SubCUTAneous route daily. 25 Units  
    
   
   
   
  
 losartan 100 mg tablet Commonly known as:  COZAAR Your last dose was: Your next dose is: TAKE 1 TABLET BY MOUTH EVERY DAY  
     
   
   
   
  
 metFORMIN 500 mg tablet Commonly known as:  GLUCOPHAGE Your last dose was: Your next dose is: Take 1 Tab by mouth three (3) times daily (with meals). 500 mg  
    
   
   
   
  
 vit B Cmplx 3-FA-Vit C-Biotin 1- mg-mg-mcg tablet Commonly known as:  NEPHRO ALEN RX Your last dose was: Your next dose is: Take 1 Tab by mouth daily. 1 Tab Where to Get Your Medications Information on where to get these meds will be given to you by the nurse or doctor. ! Ask your nurse or doctor about these medications  
  insulin lispro 100 unit/mL injection Discharge Instructions Discharge Instructions PATIENT ID: Francis Diego MRN: [id number] YOB: 1945 DATE OF ADMISSION: 8/20/2018 11:31 AM   
DATE OF DISCHARGE: 8/24/2018 PRIMARY CARE PROVIDER: Mel Goodman MD  
 
ATTENDING PHYSICIAN: Maikel Mahoney MD 
DISCHARGING PROVIDER: Maikel Mahoney MD   
To contact this individual call [phone] and ask the  to page. If unavailable ask to be transferred the Adult Hospitalist Department. DISCHARGE DIAGNOSES [facility] 
 
CONSULTATIONS: None PROCEDURES/SURGERIES: * No surgery found * PENDING TEST RESULTS:  
At the time of discharge the following test results are still pending: FOLLOW UP APPOINTMENTS:  
Follow-up Information Follow up With Details Comments Contact Gurpreet ELIZONDO [facility])   [address] 
[phone] Slime Zurita MD In 1 week pl readjust insulin  [address] [facility] [address] [facility] [address] [place] [address] 
[phone] ADDITIONAL CARE RECOMMENDATIONS:  
 
DIET: Diabetic Diet ACTIVITY: Activity as tolerated WOUND CARE:  
 
EQUIPMENT needed:  
 
 
  
x SNF/Inpatient Rehab/LTAC Independent/assisted living Hospice Other: CDMP Checked:  
Yes x PROBLEM LIST Updated: 
Yes x Signed:  
Joelle Triana MD 
8/24/2018 
9:10 AM 
 
  
  
  
Introducing [facility]! Ema Jeffries introduces [employer] patient portal. Now you can access parts of your medical record, email your doctor's office, and request medication refills online. 1. In your internet browser, go to [email or web address] 2. Click on the First Time User? Click Here link in the Sign In box.  You will see the New Member Sign Up page. 3. Enter your Nexmo Access Code exactly as it appears below. You will not need to use this code after youve completed the sign-up process. If you do not sign up before the expiration date, you must request a new code. · Nexmo Access Code: [email or web address] Expires: 11/18/2018 10:24 AM 
 
4. Enter the last four digits of your Social Security Number (xxxx) and Date of Birth (mm/dd/yyyy) as indicated and click Submit. You will be taken to the next sign-up page. 5. Create a "[employer]"t ID. This will be your Nexmo login ID and cannot be changed, so think of one that is secure and easy to remember. 6. Create a "[employer]"t password. You can change your password at any time. 7. Enter your Password Reset Question and Answer. This can be used at a later time if you forget your password. 8. Enter your e-mail address. You will receive e-mail notification when new information is available in [facility] [address] [facility] [address]. 9. Click Sign Up. You can now view and download portions of your medical record. 10. Click the Download Summary menu link to download a portable copy of your medical information. If you have questions, please visit the Frequently Asked Questions section of the Nexmo website. Remember, Nexmo is NOT to be used for urgent needs. For medical emergencies, dial 911. Now available from your iPhone and Android! Introducing Gigi Fabian As a Romayne Duster patient, I wanted to make you aware of our electronic visit tool called Gigi Fabian. Romayne Quocvickie 24/7 allows you to connect within minutes with a medical provider 24 hours a day, seven days a week via a mobile device or tablet or logging into a secure website from your computer. You can access Gigi Fabian from anywhere in the [place].  
 
A virtual visit might be right for you when you have a simple condition and feel like you just dont want to get out of bed, or cant get away from work for an appointment, when your regular [employer] provider is not available (evenings, weekends or holidays), or when youre out of town and need minor care. Electronic visits cost only $49 and if the [employer] 24/7 provider determines a prescription is needed to treat your condition, one can be electronically transmitted to a nearby pharmacy*. Please take a moment to enroll today if you have not already done so. The enrollment process is free and takes just a few minutes. To enroll, please download the [employer] 24/7 sheldon to your tablet or phone, or visit [email or web address] to enroll on your computer. And, as an [address] [place] [address] patient with a [employer] account, the results of your visits will be scanned into your electronic medical record and your primary care provider will be able to view the scanned results. We urge you to continue to see your regular [employer] provider for your ongoing medical care. And while your primary care provider may not be the one available when you seek a "[employer]galdinofin virtual visit, the peace of mind you get from getting a real diagnosis real time can be priceless. For more information on [employer], view our Frequently Asked Questions (FAQs) at [email or web address]. Sincerely, 
 
Andrew Rich MD 
Chief Medical Officer [employer] *:  certain medications cannot be prescribed via [employer] Providers Seen During Your Hospitalization Provider Specialty Primary office phone Cody Mejia MD Emergency Medicine [phone] Mauricio Grant MD Internal Medicine [phone] Kwaku Zaragoza MD Internal Medicine [phone] Tobi Alva MD Internal Medicine [phone] Your Primary Care Physician (PCP) Primary Care Physician Office Phone Office Fax Marie Neves [phone] You are allergic to the following No active allergies Recent Documentation Height Weight BMI Smoking Status 1.651 m 59.5 kg 21.83 kg/m2 Never Smoker Emergency Contacts Name Discharge Info Relation Home Work Mobile Ace Tanner DISCHARGE CAREGIVER [3] Son [22] [phone] Asia Alvarez  Daughter [21] [phone] Annamary Fruit (Daughter In [place])  Other Relative [6] [phone] [de-identified]  Son [22] [phone] Patient Belongings The following personal items are in your possession at time of discharge: 
  Dental Appliances: None  Visual Aid: None      Home Medications: None   Jewelry: None  Clothing: Undergarments, Socks, Pants, Shirt    Other Valuables: None Please provide this summary of care documentation to your next provider. Signatures-by signing, you are acknowledging that this After Visit Summary has been reviewed with you and you have received a copy. Patient Signature:  ____________________________________________________________ Date:  ____________________________________________________________  
  
Kate Sleight Provider Signature:  ____________________________________________________________ Date:  ____________________________________________________________

## 2018-08-21 ENCOUNTER — PATIENT OUTREACH (OUTPATIENT)
Dept: INTERNAL MEDICINE CLINIC | Age: 73
End: 2018-08-21

## 2018-08-21 LAB
ALBUMIN SERPL-MCNC: 3 G/DL (ref 3.5–5)
ALBUMIN/GLOB SERPL: 0.9 {RATIO} (ref 1.1–2.2)
ALP SERPL-CCNC: 107 U/L (ref 45–117)
ALT SERPL-CCNC: 22 U/L (ref 12–78)
ANION GAP SERPL CALC-SCNC: 10 MMOL/L (ref 5–15)
AST SERPL-CCNC: 20 U/L (ref 15–37)
BASOPHILS # BLD: 0 K/UL (ref 0–0.1)
BASOPHILS NFR BLD: 0 % (ref 0–1)
BILIRUB SERPL-MCNC: 0.6 MG/DL (ref 0.2–1)
BUN SERPL-MCNC: 31 MG/DL (ref 6–20)
BUN/CREAT SERPL: 27 (ref 12–20)
CALCIUM SERPL-MCNC: 8 MG/DL (ref 8.5–10.1)
CHLORIDE SERPL-SCNC: 113 MMOL/L (ref 97–108)
CHOLEST SERPL-MCNC: 116 MG/DL
CO2 SERPL-SCNC: 22 MMOL/L (ref 21–32)
CREAT SERPL-MCNC: 1.16 MG/DL (ref 0.7–1.3)
DIFFERENTIAL METHOD BLD: ABNORMAL
EOSINOPHIL # BLD: 0.2 K/UL (ref 0–0.4)
EOSINOPHIL NFR BLD: 2 % (ref 0–7)
ERYTHROCYTE [DISTWIDTH] IN BLOOD BY AUTOMATED COUNT: 12.5 % (ref 11.5–14.5)
GLOBULIN SER CALC-MCNC: 3.5 G/DL (ref 2–4)
GLUCOSE BLD STRIP.AUTO-MCNC: 104 MG/DL (ref 65–100)
GLUCOSE BLD STRIP.AUTO-MCNC: 120 MG/DL (ref 65–100)
GLUCOSE BLD STRIP.AUTO-MCNC: 195 MG/DL (ref 65–100)
GLUCOSE BLD STRIP.AUTO-MCNC: 223 MG/DL (ref 65–100)
GLUCOSE SERPL-MCNC: 177 MG/DL (ref 65–100)
HCT VFR BLD AUTO: 32.6 % (ref 36.6–50.3)
HDLC SERPL-MCNC: 47 MG/DL
HDLC SERPL: 2.5 {RATIO} (ref 0–5)
HGB BLD-MCNC: 11.5 G/DL (ref 12.1–17)
IMM GRANULOCYTES # BLD: 0 K/UL (ref 0–0.04)
IMM GRANULOCYTES NFR BLD AUTO: 0 % (ref 0–0.5)
LDLC SERPL CALC-MCNC: 56.4 MG/DL (ref 0–100)
LIPID PROFILE,FLP: NORMAL
LYMPHOCYTES # BLD: 3.6 K/UL (ref 0.8–3.5)
LYMPHOCYTES NFR BLD: 44 % (ref 12–49)
MCH RBC QN AUTO: 29.1 PG (ref 26–34)
MCHC RBC AUTO-ENTMCNC: 35.3 G/DL (ref 30–36.5)
MCV RBC AUTO: 82.5 FL (ref 80–99)
MONOCYTES # BLD: 0.7 K/UL (ref 0–1)
MONOCYTES NFR BLD: 9 % (ref 5–13)
NEUTS SEG # BLD: 3.6 K/UL (ref 1.8–8)
NEUTS SEG NFR BLD: 45 % (ref 32–75)
NRBC # BLD: 0 K/UL (ref 0–0.01)
NRBC BLD-RTO: 0 PER 100 WBC
PLATELET # BLD AUTO: 116 K/UL (ref 150–400)
PMV BLD AUTO: 11.3 FL (ref 8.9–12.9)
POTASSIUM SERPL-SCNC: 3.8 MMOL/L (ref 3.5–5.1)
PROT SERPL-MCNC: 6.5 G/DL (ref 6.4–8.2)
RBC # BLD AUTO: 3.95 M/UL (ref 4.1–5.7)
SERVICE CMNT-IMP: ABNORMAL
SODIUM SERPL-SCNC: 145 MMOL/L (ref 136–145)
TRIGL SERPL-MCNC: 63 MG/DL (ref ?–150)
VLDLC SERPL CALC-MCNC: 12.6 MG/DL
WBC # BLD AUTO: 8.1 K/UL (ref 4.1–11.1)

## 2018-08-21 PROCEDURE — 97165 OT EVAL LOW COMPLEX 30 MIN: CPT

## 2018-08-21 PROCEDURE — G8987 SELF CARE CURRENT STATUS: HCPCS

## 2018-08-21 PROCEDURE — 36415 COLL VENOUS BLD VENIPUNCTURE: CPT | Performed by: HOSPITALIST

## 2018-08-21 PROCEDURE — 74011250637 HC RX REV CODE- 250/637: Performed by: INTERNAL MEDICINE

## 2018-08-21 PROCEDURE — 74011250637 HC RX REV CODE- 250/637: Performed by: HOSPITALIST

## 2018-08-21 PROCEDURE — 65660000000 HC RM CCU STEPDOWN

## 2018-08-21 PROCEDURE — 97530 THERAPEUTIC ACTIVITIES: CPT

## 2018-08-21 PROCEDURE — G8988 SELF CARE GOAL STATUS: HCPCS

## 2018-08-21 PROCEDURE — 97116 GAIT TRAINING THERAPY: CPT

## 2018-08-21 PROCEDURE — 74011250636 HC RX REV CODE- 250/636: Performed by: HOSPITALIST

## 2018-08-21 PROCEDURE — 82962 GLUCOSE BLOOD TEST: CPT

## 2018-08-21 PROCEDURE — 94760 N-INVAS EAR/PLS OXIMETRY 1: CPT

## 2018-08-21 PROCEDURE — 74011636637 HC RX REV CODE- 636/637: Performed by: HOSPITALIST

## 2018-08-21 PROCEDURE — 97162 PT EVAL MOD COMPLEX 30 MIN: CPT

## 2018-08-21 PROCEDURE — 80061 LIPID PANEL: CPT | Performed by: INTERNAL MEDICINE

## 2018-08-21 PROCEDURE — 85025 COMPLETE CBC W/AUTO DIFF WBC: CPT | Performed by: HOSPITALIST

## 2018-08-21 PROCEDURE — 80053 COMPREHEN METABOLIC PANEL: CPT | Performed by: HOSPITALIST

## 2018-08-21 RX ORDER — CLONIDINE HYDROCHLORIDE 0.1 MG/1
0.1 TABLET ORAL
Status: COMPLETED | OUTPATIENT
Start: 2018-08-21 | End: 2018-08-21

## 2018-08-21 RX ORDER — HYDRALAZINE HYDROCHLORIDE 20 MG/ML
10 INJECTION INTRAMUSCULAR; INTRAVENOUS
Status: DISCONTINUED | OUTPATIENT
Start: 2018-08-21 | End: 2018-08-24 | Stop reason: HOSPADM

## 2018-08-21 RX ADMIN — ATORVASTATIN CALCIUM 20 MG: 20 TABLET, FILM COATED ORAL at 21:26

## 2018-08-21 RX ADMIN — INSULIN LISPRO 3 UNITS: 100 INJECTION, SOLUTION INTRAVENOUS; SUBCUTANEOUS at 18:01

## 2018-08-21 RX ADMIN — LOSARTAN POTASSIUM 100 MG: 50 TABLET ORAL at 08:24

## 2018-08-21 RX ADMIN — SODIUM CHLORIDE 150 ML/HR: 900 INJECTION, SOLUTION INTRAVENOUS at 07:34

## 2018-08-21 RX ADMIN — INSULIN LISPRO 3 UNITS: 100 INJECTION, SOLUTION INTRAVENOUS; SUBCUTANEOUS at 18:00

## 2018-08-21 RX ADMIN — HEPARIN SODIUM 5000 UNITS: 5000 INJECTION, SOLUTION INTRAVENOUS; SUBCUTANEOUS at 06:32

## 2018-08-21 RX ADMIN — AMLODIPINE BESYLATE 10 MG: 5 TABLET ORAL at 08:24

## 2018-08-21 RX ADMIN — SODIUM CHLORIDE 150 ML/HR: 900 INJECTION, SOLUTION INTRAVENOUS at 00:19

## 2018-08-21 RX ADMIN — Medication 10 ML: at 06:33

## 2018-08-21 RX ADMIN — ASPIRIN 325 MG: 325 TABLET ORAL at 08:24

## 2018-08-21 RX ADMIN — INSULIN LISPRO 3 UNITS: 100 INJECTION, SOLUTION INTRAVENOUS; SUBCUTANEOUS at 06:37

## 2018-08-21 RX ADMIN — INSULIN GLARGINE 30 UNITS: 100 INJECTION, SOLUTION SUBCUTANEOUS at 21:26

## 2018-08-21 RX ADMIN — CLONIDINE HYDROCHLORIDE 0.1 MG: 0.1 TABLET ORAL at 01:00

## 2018-08-21 RX ADMIN — INSULIN LISPRO 3 UNITS: 100 INJECTION, SOLUTION INTRAVENOUS; SUBCUTANEOUS at 12:48

## 2018-08-21 RX ADMIN — Medication 10 ML: at 21:25

## 2018-08-21 NOTE — PROGRESS NOTES
8/21/18; 09:00 -   CM participated in IDRs on patient. Per bedside nurse, patient is alert only to self. Patient was admitted for DMI with hyperglycemia. Per chart review, patient lives with his son. CM attempted to call patient's local son Myrna Europe: [phone]) and left  requesting a return call. CM to follow for disposition planning. CRM: Gregorio Lopez, MPH; Z: [phone]    16:00 -   CM received call from patient's step-daughter Ish Anderson. Per step-daughter, the patient has had a history of disorientation for multiple days. Step-daughter is a RN and is aware of hospital stay requirements for SNF placement possibility. CM discussed that per PT and OT, current recommendations are for a SNF placement. Step-daughter clarified that the patient has been living with his son Severo Spaniel. Beata Gold will plan to conference with her brothers regarding placement potential, and CM is to meet with family tomorrow, 8/22/18, to discuss placement potential further.   CRM: Gregorio Lopez, MPH; Z: [phone]

## 2018-08-21 NOTE — ACP (ADVANCE CARE PLANNING)
Visited pt in response to an In-Basket request to assist with an Advance Medical Directive (AMD). Pt is not a candidate to complete an AMD at this time. He was confused as to where he lives, where he is now and the current date among other things. Chaplains will follow as needed.   Chaplain Jaqueline, MDiv, MS, [facility]  [phone] ([phone])

## 2018-08-21 NOTE — PROGRESS NOTES
Late entry: 8/20/18  Met with patient and his son, Harsh Love, with patient's permission during office visit. Harsh Love reports that patient has become weak and stays in bed more. He does not believe his father has been taking his medications as prescribed. Son works during the day and the patient is in the home alone. Family is requesting community resources for patient. Patient has [place]. Discussed with patient and son who agreed for this writer to send case management referral to [address].   Provided phone number to [facility]. Son would like additional information on private aids. Will provide information. 8/21/18  Faxed case management referral to [address] program.  Confirmation received. Of note - patient is currently admitted with hypoglycemia, acute encephalopathy and JOSH. Called and LVM for Carlitos Roth with my contact information. Will advise of the above.

## 2018-08-21 NOTE — PROGRESS NOTES
0030:; Bedside shift change report given to Nena (oncoming nurse) by Miroslava Ramos (offgoing nurse). Report included the following information SBAR, Intake/Output and Recent Results.

## 2018-08-21 NOTE — DIABETES MGMT
DTC Consult Note    Recommendations/ Comments: Blood sugars in target with current regimen, however, unsure if patient will be able to do meal time insulin at home. May want to consider oral agent along with Lantus for discharge. Attempted to speak with patient, but patient appeared confused and nurse reported that he was only oriented to self. Left educational material in room and called patient's son to express that patient will need assistance with diabetes management when discharged. Son stated that he thought that patient had been taking insulin, but that his work schedule prevented him from supervising patient all day. Informed son of DTC contact information and educational material that is in room. Current hospital DM medication: Lantus 30 units, Lispro 3 units tid ac and for correction, normal sensitivity    Consult received for:  [x]             Assessment of home management    Chart reviewed and initial evaluation complete on Era Tanner. Patient is a 68 y.o. male with a history of diabetes on oral agent (monotherapy): metformin (generic)  insulin injections: Lantus : ? 25 units - unsure it patietn was taking at home. Provided patient with the following: [x]             Survival skills education materials               [x]             Outpatient DTC contact number    Discussed with patient and/or family need for follow up appointment for diabetes management after discharge. A1c:   Lab Results   Component Value Date/Time    Hemoglobin A1c 11.4 (H) 08/20/2018 11:55 AM       Recent Glucose Results: Lab Results   Component Value Date/Time     (H) 08/21/2018 03:59 AM    GLUCPOC 104 (H) 08/21/2018 10:56 AM    GLUCPOC 120 (H) 08/21/2018 06:31 AM    GLUCPOC 364 (H) 08/20/2018 10:19 PM        Lab Results   Component Value Date/Time    Creatinine 1.16 08/21/2018 03:59 AM     Estimated Creatinine Clearance: 49.3 mL/min (based on Cr of 1.16).     Active Orders   Diet    DIET DIABETIC CONSISTENT CARB Regular        PO intake: Patient Vitals for the past 72 hrs:   % Diet Eaten   08/21/18 1300 100 %   08/21/18 0923 100 %       Will continue to follow as needed. Thank you.   Federico Sanches, MS, RN, CDE

## 2018-08-21 NOTE — PROGRESS NOTES
Hospitalist Progress Note  Thee Cleary MD  Answering service: [phone] OR [phone] from in house phone      Date of Service:  2018  NAME:  Andrew Anders  :  1945  MRN:  [id number]      Admission Summary:   67 yo man with Dm2, HTN, nonadherence was sent by his PCP's office on 18 with high blood sugar and confusion. The patient has been confused and lying in bed most of the time for the last couple of weeks. He has poor appetite, eating only one meal a day but reportedly taking his diabetic medication regularly. He was admitted with hyperosmolar hyperglycemic syndrome (HHS). Interval history / Subjective:   Denies complaints; d/w nurse, BP elevated, still on IVF and home BP meds only resumed today     Assessment & Plan:     DM2 with HHS (POA) - resolved with home Lantus and IVF  - stop IVF  - A1c 11.4  - check FLP    Acute encephalopathy (POA) - likely due to metabolic etiology  - CT head no acute  - seems resolved to baseline  - PT/OT evals    Pseudohyponatremia (POA) - Na corrected for hyperglycemia is WNL    JOSH (POA) - no evidence of CKD  - likely due to prerenal azotemia  - resolved with IVF    HTN uncontrolled (POA) - likely due to BP meds on hold and aggressive IVF  - home meds resumed today and will stop IVF  - keep on tele for now    Thrombocytopenia (POA) - unclear etiology  - likely worsened today by IVF  - no evidence of bleeding  - stop SC heparin    Code status: full  DVT prophylaxis: change heparin to SCDs    Care Plan discussed with: Patient/Family, Nurse and   Disposition: Likely home tomorrow if medically stable     Hospital Problems  Date Reviewed: 2018          Codes Class Noted POA    * (Principal)Type [facility] mellitus [facility] ICD-10-CM: E10.65  ICD-9-CM: 250.01  2018 Unknown            Review of Systems:   Pertinent items are noted in HPI.      Vital Signs:    Last 24hrs VS reviewed since prior progress note. Most recent are:  Visit Vitals    BP (!) 179/96 (BP Patient Position: Supine)    Pulse 87    Temp 97.7 °F (36.5 °C)    Resp 16    Ht 5' 5\" (1.651 m)    Wt 61.9 kg (136 lb 8 oz)    SpO2 97%    BMI 22.71 kg/m2       Intake/Output Summary (Last 24 hours) at 08/21/18 9872  Last data filed at 08/21/18 4941   Gross per 24 hour   Intake              480 ml   Output                0 ml   Net              480 ml      Physical Examination:     Constitutional:  awake, no acute distress, cooperative, pleasant    ENT:  oral mucosa moist, oropharynx benign    Resp:  CTA bilaterally, no wheezing/rhonchi/rales   CV:  regular rhythm, normal rate, no m/r/g appreciated, no edema    GI:  +BS, soft, non distended, non tender     Musculoskeletal:  ELDRIDGE    Neurologic:  AAOx2 to person and place, NFD       Data Review:    Review and/or order of clinical lab test  Review and/or order of tests in the radiology section of CPT  Review and/or order of tests in the medicine section of CPT    Labs:     Recent Labs      08/21/18   0359  08/20/18   1155   WBC  8.1  10.0   HGB  11.5*  13.7   HCT  32.6*  38.8   PLT  116*  143*     Recent Labs      08/21/18   0359  08/20/18   1155   NA  145  133*   K  3.8  4.2   CL  113*  98   CO2  22  24   BUN  31*  46*   CREA  1.16  2.08*   GLU  177*  615*   CA  8.0*  8.5     Recent Labs      08/21/18   0359  08/20/18   1155   SGOT  20  19   ALT  22  23   AP  107  150*   TBILI  0.6  0.9   TP  6.5  7.9   ALB  3.0*  3.6   GLOB  3.5  4.3*     No results for input(s): INR, PTP, APTT in the last 72 hours. No lab exists for component: INREXT   No results for input(s): FE, TIBC, PSAT, FERR in the last 72 hours. No results found for: FOL, RBCF   No results for input(s): PH, PCO2, PO2 in the last 72 hours. No results for input(s): CPK, CKNDX, TROIQ in the last 72 hours.     No lab exists for component: CPKMB  Lab Results   Component Value Date/Time    Cholesterol, total 117 04/16/2018 09:26 AM    HDL Cholesterol 60 04/16/2018 09:26 AM    LDL, calculated 46 04/16/2018 09:26 AM    Triglyceride 57 04/16/2018 09:26 AM    CHOL/HDL Ratio 2.0 05/26/2012 05:10 AM     Lab Results   Component Value Date/Time    Glucose (POC) 120 (H) 08/21/2018 06:31 AM    Glucose (POC) 364 (H) 08/20/2018 10:19 PM    Glucose (POC) 391 (H) 08/20/2018 03:55 PM    Glucose (POC) 458 (H) 08/20/2018 02:25 PM    Glucose (POC) >600 (HH) 08/20/2018 11:45 AM     Lab Results   Component Value Date/Time    Color YELLOW/STRAW 08/20/2018 12:30 PM    Appearance CLEAR 08/20/2018 12:30 PM    Specific gravity 1.021 08/20/2018 12:30 PM    pH (UA) 5.5 08/20/2018 12:30 PM    Protein NEGATIVE  08/20/2018 12:30 PM    Glucose >1000 (A) 08/20/2018 12:30 PM    Ketone TRACE (A) 08/20/2018 12:30 PM    Bilirubin NEGATIVE  08/20/2018 12:30 PM    Urobilinogen 0.2 08/20/2018 12:30 PM    Nitrites NEGATIVE  08/20/2018 12:30 PM    Leukocyte Esterase NEGATIVE  08/20/2018 12:30 PM    Epithelial cells FEW 08/20/2018 12:30 PM    Bacteria NEGATIVE  08/20/2018 12:30 PM    WBC 0-4 08/20/2018 12:30 PM    RBC 0-5 08/20/2018 12:30 PM     Medications Reviewed:     Current Facility-Administered Medications   Medication Dose Route Frequency    0.9% sodium chloride infusion  150 mL/hr IntraVENous CONTINUOUS    amLODIPine (NORVASC) tablet 10 mg  10 mg Oral DAILY    aspirin (ASPIRIN) tablet 325 mg  325 mg Oral DAILY    atorvastatin (LIPITOR) tablet 20 mg  20 mg Oral QHS    insulin glargine (LANTUS) injection 30 Units  30 Units SubCUTAneous QHS    losartan (COZAAR) tablet 100 mg  100 mg Oral DAILY    insulin lispro (HUMALOG) injection 3 Units  3 Units SubCUTAneous TIDAC    sodium chloride (NS) flush 5-10 mL  5-10 mL IntraVENous Q8H    sodium chloride (NS) flush 5-10 mL  5-10 mL IntraVENous PRN    acetaminophen (TYLENOL) tablet 650 mg  650 mg Oral Q4H PRN    ondansetron (ZOFRAN) injection 4 mg  4 mg IntraVENous Q4H PRN    heparin (porcine) injection 5,000 Units  5,000 Units SubCUTAneous Q8H    insulin lispro (HUMALOG) injection   SubCUTAneous AC&HS    glucose chewable tablet 16 g  4 Tab Oral PRN    dextrose (D50W) injection syrg 12.5-25 g  12.5-25 g IntraVENous PRN    glucagon (GLUCAGEN) injection 1 mg  1 mg IntraMUSCular PRN     ______________________________________________________________________  EXPECTED LENGTH OF STAY: - - -  ACTUAL LENGTH OF STAY:          1                 Jt Le MD

## 2018-08-21 NOTE — PROGRESS NOTES
Problem: Self Care Deficits Care Plan (Adult)  Goal: *Acute Goals and Plan of Care (Insert Text)  Occupational Therapy Goals  Initiated 8/21/2018  1. Patient will perform grooming with modified independence standing at sink within 7 day(s). 2.  Patient will perform lower body dressing with modified independence within 7 day(s). 3.  Patient will perform toileting with supervision/set-up within 7 day(s). 4.  Patient will perform toilet transfers with modified independence within 7 day(s). Occupational Therapy EVALUATION  Patient: Mercedes Walker (22 y.o. male)  Date: 8/21/2018  Primary Diagnosis: Type 1 diabetes mellitus with hyperglycemia ([facility]        Precautions: fall        ASSESSMENT :  Based on the objective data described below, the patient presents with decreased standing balance, ROM, strength, and confusion impacting ADLs following admission to hospital. Patient is currently a poor historian but states PTA he was living alone and completed all ADLs, cooking, cleaning himself. He states he does not use any AD/DME. Patient received in bed, agreeable to therapy. Required initial bed level toileting to for soiled brief, rolling with CGA/supervision and total A for toileting today. Patient came to sitting EOB with CGA and demonstrated min A for donning/doffing socks in sitting. Completed sit to stand transfer with CGA and noted R lean at times with decreased balance and CGA. Patient completing functional transfers with CGA and verbal cues for safety today. Anticipate patient will benefit from short rehab stay to increase safety and functional independence with self care tasks. Recommend with nursing patient to complete as able in order to maintain strength, endurance and independence: ADLs with supervision/setup, OOB to chair 3x/day and mobilizing to the bathroom for toileting with 1 assist. Thank you for your assistance.        Patient will benefit from skilled intervention to address the above impairments. Patients rehabilitation potential is considered to be Good  Factors which may influence rehabilitation potential include:   [x]             None noted  []             Mental ability/status  []             Medical condition  []             Home/family situation and support systems  []             Safety awareness  []             Pain tolerance/management  []             Other:      PLAN :  Recommendations and Planned Interventions:  [x]               Self Care Training                  [x]        Therapeutic Activities  [x]               Functional Mobility Training    []        Cognitive Retraining  []               Therapeutic Exercises           []        Endurance Activities  []               Balance Training                   []        Neuromuscular Re-Education  []               Visual/Perceptual Training     [x]   Home Safety Training  [x]               Patient Education                 []        Family Training/Education  []               Other (comment):    Frequency/Duration: Patient will be followed by occupational therapy 5 times a week to address goals. Discharge Recommendations: SNF  Further Equipment Recommendations for Discharge: none      SUBJECTIVE:   Patient stated I ran a few miles this morning with my buddies.     OBJECTIVE DATA SUMMARY:   HISTORY:   Past Medical History:   Diagnosis Date    Diabetes ([facility] 2002   History reviewed. No pertinent surgical history. Prior Level of Function/Environment/Context: Patient poor historian at this time but states he lives alone in a one level home and was independent with ADLs. He states he does not use any AD or DME.      Expanded or extensive additional review of patient history:     Home Situation  Home Environment: Private residence  One/Two Story Residence: One story  Living Alone: No  Support Systems: Family member(s)  Patient Expects to be Discharged to[de-identified] Private residence  Current DME Used/Available at Home: None  Tub or Shower Type: Shower (per patient, poor historian)        EXAMINATION OF PERFORMANCE DEFICITS:  Cognitive/Behavioral Status:  Neurologic State: Alert  Orientation Level: Disoriented to place; Disoriented to situation;Disoriented to time;Oriented to person  Cognition: Follows commands; Impaired decision making; Impulsive             Skin: intact    Edema: none noted    Hearing: Auditory  Auditory Impairment: None    Vision/Perceptual:                                     Range of Motion:    AROM: Generally decreased, functional  PROM: Within functional limits                      Strength:    Strength: Generally decreased, functional                Coordination:  Coordination: Generally decreased, functional       Gross Motor Skills-Upper: Left Intact; Right Intact    Tone & Sensation:    Tone: Normal                         Balance:  Sitting: Intact; Without support  Standing: Impaired  Standing - Static: Fair  Standing - Dynamic : Fair    Functional Mobility and Transfers for ADLs:  Bed Mobility:  Rolling: Contact guard assistance  Supine to Sit: Supervision    Transfers:  Sit to Stand: Contact guard assistance  Stand to Sit: Contact guard assistance  Bed to Chair: Contact guard assistance  Toilet Transfer : Contact guard assistance  Shower Transfer: Minimum assistance    ADL Assessment:       Oral Facial Hygiene/Grooming: Contact guard assistance    Bathing: Minimum assistance (infer)    Upper Body Dressing: Supervision    Lower Body Dressing: Minimum assistance    Toileting: Maximum assistance (rolling in bed, brief)                ADL Intervention and task modifications:                                          Therapeutic Exercise:    Functional Measure:  Barthel Index:    Bathin  Bladder: 5  Bowels: 10  Groomin  Dressin  Feeding: 10  Mobility: 10  Stairs: 0  Toilet Use: 5  Transfer (Bed to Chair and Back): 10  Total: 60       Barthel and G-code impairment scale:  Percentage of impairment CH  0% CI  1-19% CJ  20-39% CK  40-59% CL  60-79% CM  80-99% CN  100%   Barthel Score 0-100 100 99-80 79-60 59-40 20-39 1-19   0   Barthel Score 0-20 20 17-19 13-16 9-12 5-8 1-4 0      The Barthel ADL Index: Guidelines  1. The index should be used as a record of what a patient does, not as a record of what a patient could do. 2. The main aim is to establish degree of independence from any help, physical or verbal, however minor and for whatever reason. 3. The need for supervision renders the patient not independent. 4. A patient's performance should be established using the best available evidence. Asking the patient, friends/relatives and nurses are the usual sources, but direct observation and common sense are also important. However direct testing is not needed. 5. Usually the patient's performance over the preceding 24-48 hours is important, but occasionally longer periods will be relevant. 6. Middle categories imply that the patient supplies over 50 per cent of the effort. 7. Use of aids to be independent is allowed. Rick Gonzalez., Barthel, D.W. (0332). Functional evaluation: the Barthel Index. [facility] (14)2. Nate Ocampo elisha JULIET OliverF, Sari Sanchez., Ponce Cullen., [place], [address] [place] [address] (1999). Measuring the change indisability after inpatient rehabilitation; comparison of the responsiveness of the Barthel Index and Functional [place] Measure. Journal of Neurology, Neurosurgery, and Psychiatry, 66(4), 413-236. Teresa Jean, [place].ROBERTH, CARISSA Segundo, & Pricilla Fernandez MGWYN. (2004.) Assessment of post-stroke quality of life in cost-effectiveness studies: The usefulness of the Barthel Index and the EuroQoL-5D. Quality of Life Research, 13, 670-35         G codes: In compliance with CMSs Claims Based Outcome Reporting, the following G-code set was chosen for this patient based on their primary functional limitation being treated:     The outcome measure chosen to determine the severity of the functional limitation was the Barthel Index with a score of 60/100 which was correlated with the impairment scale. ? Self Care:     - CURRENT STATUS: CJ - 20%-39% impaired, limited or restricted    - GOAL STATUS: CI - 1%-19% impaired, limited or restricted    - D/C STATUS:  ---------------To be determined---------------     Occupational Therapy Evaluation Charge Determination   History Examination Decision-Making   LOW Complexity : Brief history review  LOW Complexity : 1-3 performance deficits relating to physical, cognitive , or psychosocial skils that result in activity limitations and / or participation restrictions  LOW Complexity : No comorbidities that affect functional and no verbal or physical assistance needed to complete eval tasks       Based on the above components, the patient evaluation is determined to be of the following complexity level: LOW   Pain:  Pain Scale 1: Numeric (0 - 10)  Pain Intensity 1: 0              Activity Tolerance:   VSS  Please refer to the flowsheet for vital signs taken during this treatment. After treatment:   [x] Patient left in no apparent distress sitting up in chair  [] Patient left in no apparent distress in bed  [x] Call bell left within reach  [x] Nursing notified  [] Caregiver present  [x] Bed alarm activated    COMMUNICATION/EDUCATION:   The patients plan of care was discussed with: Physical Therapist and Registered Nurse. [x] Home safety education was provided and the patient/caregiver indicated understanding. [] Patient/family have participated as able in goal setting and plan of care. [] Patient/family agree to work toward stated goals and plan of care. [] Patient understands intent and goals of therapy, but is neutral about his/her participation. [] Patient is unable to participate in goal setting and plan of care. This patients plan of care is appropriate for delegation to [facility].     Thank you for this referral.  Benedict Plasencia  Time Calculation: 83 mins

## 2018-08-21 NOTE — PROGRESS NOTES
Spiritual Care Assessment/Progress Note  [facility]      NAME: Marie Suarez      MRN: [id number]  AGE: 68 y.o.  SEX: male  Caodaism Affiliation: Yarsanism   Language: English     8/21/2018     Total Time (in minutes): 15     Spiritual Assessment begun in [facility] [address] [facility] through conversation with:         [x]Patient        [] Family    [] Friend(s)        Reason for Consult: Advance medical directive consult     Spiritual beliefs: (Please include comment if needed)     [x] Identifies with a vidal tradition:         [] Supported by a vidal community:            [] Claims no spiritual orientation:           [] Seeking spiritual identity:                [] Adheres to an individual form of spirituality:           [] Not able to assess:                           Identified resources for coping:      [] Prayer                               [] Music                  [] Guided Imagery     [x] Family/friends                 [] Pet visits     [] Devotional reading                         [] Unknown     [] Other:                                              Interventions offered during this visit: (See comments for more details)    Patient Interventions: Affirmation of emotions/emotional suffering, Affirmation of vidal, Iconic (affirming the presence of God/Higher Power)           Plan of Care:     [] Support spiritual and/or cultural needs    [] Support AMD and/or advance care planning process      [] Support grieving process   [] Coordinate Rites and/or Rituals    [] Coordination with community clergy   [] No spiritual needs identified at this time   [] Detailed Plan of Care below (See Comments)  [] Make referral to Music Therapy  [] Make referral to Pet Therapy     [] Make referral to Addiction services  [] Make referral to [facility]  [] Make referral to Spiritual Care Partner  [] No future visits requested        [x] Follow up visits as needed     Visited pt in response to an In-Basket request to assist with an Advance Medical Directive (AMD). Pt is not a candidate to complete an AMD at this time. He was confused as to where he lives, where he is now and the current date among other things. Chaplains will follow as needed.   Chaplain rAaujo MDiv, MS, [address] [employer]  [address] [place] [address] ([phone])

## 2018-08-21 NOTE — PROGRESS NOTES
Problem: Mobility Impaired (Adult and Pediatric)  Goal: *Acute Goals and Plan of Care (Insert Text)  Physical Therapy Goals  Initiated 8/21/2018  1. Patient will move from supine to sit and sit to supine , scoot up and down and roll side to side in bed with independence within 7 day(s). 2.  Patient will transfer from bed to chair and chair to bed with supervision/set-up using the least restrictive device within 7 day(s). 3.  Patient will perform sit to stand with supervision/set-up within 7 day(s). 4.  Patient will ambulate with supervision/set-up for 250 feet with the least restrictive device within 7 day(s). physical Therapy EVALUATION  Patient: Francis Diego (52 y.o. male)  Date: 8/21/2018  Primary Diagnosis: Type 1 diabetes mellitus with hyperglycemia ([facility] [address]        Precautions: Fall       ASSESSMENT :  Based on the objective data described below, the patient presents with functional mobility independence and tolerance deficits relative to baseline due to impaired balance, gait dysfunction, impaired cognition and safety awareness, and decreased activity endurance s/p admission for uncontrolled diabetes. Patient poor historian, oriented to self only and that he was in a hospital but endorsed \"walking 25 miles this morning with my friends\" when asked about how well he walks. Patient overall requiring CGA for safe mobility but MIN A upon initial standing due to to posterior lean and MIN A intermittently during gait due to LOB (2-3 occasions). Patient at a HIGH risk for falls based on current presentation and would benefit from RW trial as well as PT in acute setting to work to improve safety with functional mobility. Unsure of home situation and PLOF but patient would benefit from SNF rehab at d/c to work to improve safety with gait and endurance as he would not be safe to d/c home without 24/7 assistance at this time.     Patient will benefit from skilled intervention to address the above impairments. Patients rehabilitation potential is considered to be Good  Factors which may influence rehabilitation potential include:   []         None noted  [x]         Mental ability/status  [x]         Medical condition  [x]         Home/family situation and support systems  [x]         Safety awareness  []         Pain tolerance/management  []         Other:      PLAN :  Recommendations and Planned Interventions:  [x]           Bed Mobility Training             [x]    Neuromuscular Re-Education  [x]           Transfer Training                   []    Orthotic/Prosthetic Training  [x]           Gait Training                         []    Modalities  [x]           Therapeutic Exercises           []    Edema Management/Control  [x]           Therapeutic Activities            [x]    Patient and Family Training/Education  []           Other (comment):    Frequency/Duration: Patient will be followed by physical therapy  5 times a week to address goals. Discharge Recommendations: Skilled Nursing Facility  Further Equipment Recommendations for Discharge: TBD     SUBJECTIVE:   Patient stated I walked 25 miles this morning.     OBJECTIVE DATA SUMMARY:   HISTORY:    Past Medical History:   Diagnosis Date    Diabetes ([facility] [address] 2002   History reviewed. No pertinent surgical history.   Prior Level of Function/Home Situation: Unsure, patient unable to provide reliable hx  Personal factors and/or comorbidities impacting plan of care: mental status    Home Situation  Home Environment: Private residence  One/Two Story Residence: One story  Living Alone: No  Support Systems: Family member(s)  Patient Expects to be Discharged to[de-identified] Private residence  Current DME Used/Available at Home: None  Tub or Shower Type: Shower (per patient, poor historian)    EXAMINATION/PRESENTATION/DECISION MAKING:   Critical Behavior:  Neurologic State: Alert  Orientation Level: Disoriented to place, Disoriented to situation, Disoriented to time, Oriented to person  Cognition: Follows commands, Impaired decision making, Impulsive     Hearing: Auditory  Auditory Impairment: None  Skin:  See nursing notes  Edema: none  Range Of Motion:  AROM: Generally decreased, functional           PROM: Within functional limits           Strength:    Strength: Generally decreased, functional                    Tone & Sensation:   Tone: Normal                              Coordination:  Coordination: Generally decreased, functional  Vision:      Functional Mobility:  Bed Mobility:  Rolling: Contact guard assistance  Supine to Sit: Supervision        Transfers:  Sit to Stand: Contact guard assistance  Stand to Sit: Contact guard assistance        Bed to Chair: Contact guard assistance              Balance:   Sitting: Intact; Without support  Standing: Impaired  Standing - Static: Fair  Standing - Dynamic : Fair  Ambulation/Gait Training:  Distance (ft): 150 Feet (ft)  Assistive Device: Gait belt  Ambulation - Level of Assistance: Contact guard assistance;Minimal assistance        Gait Abnormalities: Trunk sway increased;Decreased step clearance; Path deviations        Base of Support: Center of gravity altered     Speed/Kika: Fluctuations  Step Length: Right shortened;Left shortened                     Stairs:              Functional Measure:  Tinetti test:    Sitting Balance: 1  Arises: 2  Attempts to Rise: 2  Immediate Standing Balance: 0  Standing Balance: 0  Nudged: 0  Eyes Closed: 0  Turn 360 Degrees - Continuous/Discontinuous: 0  Turn 360 Degrees - Steady/Unsteady: 0  Sitting Down: 1  Balance Score: 6  Indication of Gait: 1  R Step Length/Height: 1  L Step Length/Height: 1  R Foot Clearance: 1  L Foot Clearance: 1  Step Symmetry: 0  Step Continuity: 0  Path: 1  Trunk: 1  Walking Time: 0  Gait Score: 7  Total Score: 13       Tinetti Test and G-code impairment scale:  Percentage of Impairment CH    0%   CI    1-19% CJ    20-39% CK    40-59% CL    60-79% CM    80-99% CN 100%   Tinetti  Score 0-28 28 23-27 17-22 12-16 6-11 1-5 0       Tinetti Tool Score Risk of Falls  <19 = High Fall Risk  19-24 = Moderate Fall Risk  25-28 = Low Fall Risk  Tinetti ME. Performance-Oriented Assessment of Mobility Problems in Elderly Patients. [address] 66; [id number]. (Scoring Description: PT Bulletin Feb. 10, 1993)    Older adults: Jefry Mayorga et al, 2009; n = 1000 [facility] elderly evaluated with ABC, MAYELA, ADL, and IADL)  · Mean MAYELA score for males aged 69-68 years = 26.21(3.40)  · Mean MAYELA score for females age 69-68 years = 25.16(4.30)  · Mean MAYELA score for males over 80 years = 23.29(6.02)  · Mean MAYELA score for females over 80 years = 17.20(8.32)         G codes: In compliance with CMSs Claims Based Outcome Reporting, the following G-code set was chosen for this patient based on their primary functional limitation being treated: The outcome measure chosen to determine the severity of the functional limitation was the Tinetti with a score of 13/28 which was correlated with the impairment scale.     ? Mobility - Walking and Moving Around:     - CURRENT STATUS: CL - 60%-79% impaired, limited or restricted    - GOAL STATUS: CJ - 20%-39% impaired, limited or restricted    - D/C STATUS:  ---------------To be determined---------------      Physical Therapy Evaluation Charge Determination   History Examination Presentation Decision-Making   HIGH Complexity :3+ comorbidities / personal factors will impact the outcome/ POC  MEDIUM Complexity : 3 Standardized tests and measures addressing body structure, function, activity limitation and / or participation in recreation  MEDIUM Complexity : Evolving with changing characteristics  Other outcome measures Tinetti 13/28  MEDIUM      Based on the above components, the patient evaluation is determined to be of the following complexity level: MEDIUM    Pain:  Pain Scale 1: Numeric (0 - 10)  Pain Intensity 1: 0              Activity Tolerance:   Fair - 150' with HHA and CGA to MIN A    Please refer to the flowsheet for vital signs taken during this treatment. After treatment:   [x]         Patient left in no apparent distress sitting up in chair  []         Patient left in no apparent distress in bed  [x]         Call bell left within reach  [x]         Nursing notified  []         Caregiver present  [x]         Chair alarm activated    COMMUNICATION/EDUCATION:   The patients plan of care was discussed with: Registered Nurse. [x]         Fall prevention education was provided and the patient/caregiver indicated understanding. []         Patient/family have participated as able in goal setting and plan of care. [x]         Patient/family agree to work toward stated goals and plan of care. []         Patient understands intent and goals of therapy, but is neutral about his/her participation. []         Patient is unable to participate in goal setting and plan of care.     Thank you for this referral.  Christi Flores, PT   Time Calculation: 23 mins

## 2018-08-22 LAB
ALBUMIN SERPL-MCNC: 3.4 G/DL (ref 3.5–5)
ALBUMIN/GLOB SERPL: 0.9 {RATIO} (ref 1.1–2.2)
ALP SERPL-CCNC: 108 U/L (ref 45–117)
ALT SERPL-CCNC: 35 U/L (ref 12–78)
ANION GAP SERPL CALC-SCNC: 11 MMOL/L (ref 5–15)
AST SERPL-CCNC: 39 U/L (ref 15–37)
BASOPHILS # BLD: 0 K/UL (ref 0–0.1)
BASOPHILS NFR BLD: 0 % (ref 0–1)
BILIRUB SERPL-MCNC: 0.6 MG/DL (ref 0.2–1)
BUN SERPL-MCNC: 23 MG/DL (ref 6–20)
BUN/CREAT SERPL: 19 (ref 12–20)
CALCIUM SERPL-MCNC: 8.6 MG/DL (ref 8.5–10.1)
CHLORIDE SERPL-SCNC: 104 MMOL/L (ref 97–108)
CO2 SERPL-SCNC: 24 MMOL/L (ref 21–32)
CREAT SERPL-MCNC: 1.23 MG/DL (ref 0.7–1.3)
DIFFERENTIAL METHOD BLD: ABNORMAL
EOSINOPHIL # BLD: 0.2 K/UL (ref 0–0.4)
EOSINOPHIL NFR BLD: 2 % (ref 0–7)
ERYTHROCYTE [DISTWIDTH] IN BLOOD BY AUTOMATED COUNT: 12.4 % (ref 11.5–14.5)
GLOBULIN SER CALC-MCNC: 3.8 G/DL (ref 2–4)
GLUCOSE BLD STRIP.AUTO-MCNC: 167 MG/DL (ref 65–100)
GLUCOSE BLD STRIP.AUTO-MCNC: 245 MG/DL (ref 65–100)
GLUCOSE BLD STRIP.AUTO-MCNC: 245 MG/DL (ref 65–100)
GLUCOSE BLD STRIP.AUTO-MCNC: 88 MG/DL (ref 65–100)
GLUCOSE SERPL-MCNC: 262 MG/DL (ref 65–100)
HCT VFR BLD AUTO: 34.5 % (ref 36.6–50.3)
HGB BLD-MCNC: 12.4 G/DL (ref 12.1–17)
IMM GRANULOCYTES # BLD: 0 K/UL (ref 0–0.04)
IMM GRANULOCYTES NFR BLD AUTO: 0 % (ref 0–0.5)
LYMPHOCYTES # BLD: 3.5 K/UL (ref 0.8–3.5)
LYMPHOCYTES NFR BLD: 44 % (ref 12–49)
MAGNESIUM SERPL-MCNC: 1.5 MG/DL (ref 1.6–2.4)
MCH RBC QN AUTO: 29.2 PG (ref 26–34)
MCHC RBC AUTO-ENTMCNC: 35.9 G/DL (ref 30–36.5)
MCV RBC AUTO: 81.4 FL (ref 80–99)
MONOCYTES # BLD: 0.7 K/UL (ref 0–1)
MONOCYTES NFR BLD: 8 % (ref 5–13)
NEUTS SEG # BLD: 3.6 K/UL (ref 1.8–8)
NEUTS SEG NFR BLD: 45 % (ref 32–75)
NRBC # BLD: 0 K/UL (ref 0–0.01)
NRBC BLD-RTO: 0 PER 100 WBC
PLATELET # BLD AUTO: 138 K/UL (ref 150–400)
PMV BLD AUTO: 11.3 FL (ref 8.9–12.9)
POTASSIUM SERPL-SCNC: 3.6 MMOL/L (ref 3.5–5.1)
PROT SERPL-MCNC: 7.2 G/DL (ref 6.4–8.2)
RBC # BLD AUTO: 4.24 M/UL (ref 4.1–5.7)
SERVICE CMNT-IMP: ABNORMAL
SERVICE CMNT-IMP: NORMAL
SODIUM SERPL-SCNC: 139 MMOL/L (ref 136–145)
WBC # BLD AUTO: 8.1 K/UL (ref 4.1–11.1)

## 2018-08-22 PROCEDURE — 74011250636 HC RX REV CODE- 250/636: Performed by: INTERNAL MEDICINE

## 2018-08-22 PROCEDURE — 80053 COMPREHEN METABOLIC PANEL: CPT | Performed by: INTERNAL MEDICINE

## 2018-08-22 PROCEDURE — 74011636637 HC RX REV CODE- 636/637: Performed by: HOSPITALIST

## 2018-08-22 PROCEDURE — 36415 COLL VENOUS BLD VENIPUNCTURE: CPT | Performed by: INTERNAL MEDICINE

## 2018-08-22 PROCEDURE — 97530 THERAPEUTIC ACTIVITIES: CPT

## 2018-08-22 PROCEDURE — 74011250637 HC RX REV CODE- 250/637: Performed by: INTERNAL MEDICINE

## 2018-08-22 PROCEDURE — 65660000000 HC RM CCU STEPDOWN

## 2018-08-22 PROCEDURE — 82962 GLUCOSE BLOOD TEST: CPT

## 2018-08-22 PROCEDURE — 74011250637 HC RX REV CODE- 250/637: Performed by: HOSPITALIST

## 2018-08-22 PROCEDURE — 97116 GAIT TRAINING THERAPY: CPT

## 2018-08-22 PROCEDURE — 94760 N-INVAS EAR/PLS OXIMETRY 1: CPT

## 2018-08-22 PROCEDURE — 83735 ASSAY OF MAGNESIUM: CPT | Performed by: INTERNAL MEDICINE

## 2018-08-22 PROCEDURE — 74011636637 HC RX REV CODE- 636/637: Performed by: INTERNAL MEDICINE

## 2018-08-22 PROCEDURE — 85025 COMPLETE CBC W/AUTO DIFF WBC: CPT | Performed by: INTERNAL MEDICINE

## 2018-08-22 RX ORDER — INSULIN LISPRO 100 [IU]/ML
8 INJECTION, SOLUTION INTRAVENOUS; SUBCUTANEOUS
Status: DISCONTINUED | OUTPATIENT
Start: 2018-08-22 | End: 2018-08-22

## 2018-08-22 RX ORDER — INSULIN GLARGINE 100 [IU]/ML
28 INJECTION, SOLUTION SUBCUTANEOUS
Status: DISCONTINUED | OUTPATIENT
Start: 2018-08-22 | End: 2018-08-23

## 2018-08-22 RX ORDER — INSULIN LISPRO 100 [IU]/ML
4 INJECTION, SOLUTION INTRAVENOUS; SUBCUTANEOUS
Status: DISCONTINUED | OUTPATIENT
Start: 2018-08-22 | End: 2018-08-24 | Stop reason: HOSPADM

## 2018-08-22 RX ORDER — MAGNESIUM SULFATE HEPTAHYDRATE 40 MG/ML
2 INJECTION, SOLUTION INTRAVENOUS ONCE
Status: COMPLETED | OUTPATIENT
Start: 2018-08-22 | End: 2018-08-22

## 2018-08-22 RX ADMIN — Medication 10 ML: at 21:26

## 2018-08-22 RX ADMIN — Medication 10 ML: at 14:00

## 2018-08-22 RX ADMIN — ATORVASTATIN CALCIUM 20 MG: 20 TABLET, FILM COATED ORAL at 21:26

## 2018-08-22 RX ADMIN — INSULIN LISPRO 3 UNITS: 100 INJECTION, SOLUTION INTRAVENOUS; SUBCUTANEOUS at 07:06

## 2018-08-22 RX ADMIN — INSULIN LISPRO 3 UNITS: 100 INJECTION, SOLUTION INTRAVENOUS; SUBCUTANEOUS at 11:49

## 2018-08-22 RX ADMIN — INSULIN LISPRO 8 UNITS: 100 INJECTION, SOLUTION INTRAVENOUS; SUBCUTANEOUS at 11:49

## 2018-08-22 RX ADMIN — Medication 10 ML: at 07:07

## 2018-08-22 RX ADMIN — AMLODIPINE BESYLATE 10 MG: 5 TABLET ORAL at 09:08

## 2018-08-22 RX ADMIN — INSULIN GLARGINE 28 UNITS: 100 INJECTION, SOLUTION SUBCUTANEOUS at 21:30

## 2018-08-22 RX ADMIN — LOSARTAN POTASSIUM 100 MG: 50 TABLET ORAL at 09:08

## 2018-08-22 RX ADMIN — MAGNESIUM SULFATE HEPTAHYDRATE 2 G: 40 INJECTION, SOLUTION INTRAVENOUS at 10:27

## 2018-08-22 RX ADMIN — ASPIRIN 325 MG: 325 TABLET ORAL at 09:08

## 2018-08-22 NOTE — DIABETES MGMT
DTC Progress Note    Recommendations/ Comments: Chart reviewed due to hyperglycemia. Noted meal time Humalog increased from 3 units ac tid to 8 units ac tid. If appropriate, please consider increasing Lantus to 35 units. DTC to continue to follow. Current hospital DM medication: Lantus 30 units hs, correction scale Humalog, normal sensitivity and Humalog 8 units ac tid. Chart reviewed on [facility]. Patient is a 68 y.o. male with known diabetes on Lantus 25 units and Metformin 500 mg TID at home. A1c:   Lab Results   Component Value Date/Time    Hemoglobin A1c 11.4 (H) 08/20/2018 11:55 AM    Hemoglobin A1c 9.3 (H) 04/16/2018 09:26 AM       Recent Glucose Results: Lab Results   Component Value Date/Time     (H) 08/22/2018 04:00 AM    GLUCPOC 245 (H) 08/22/2018 10:56 AM    GLUCPOC 245 (H) 08/22/2018 06:34 AM    GLUCPOC 195 (H) 08/21/2018 09:25 PM        Lab Results   Component Value Date/Time    Creatinine 1.23 08/22/2018 04:00 AM     Estimated Creatinine Clearance: 45.1 mL/min (based on Cr of 1.23). Active Orders   Diet    DIET DIABETIC CONSISTENT CARB Regular; 2 GM NA (House Low NA)        PO intake: Patient Vitals for the past 72 hrs:   % Diet Eaten   08/21/18 1300 100 %   08/21/18 0923 100 %       Will continue to follow as needed.     Thank you  Tila Lugo RD, CDE

## 2018-08-22 NOTE — PROGRESS NOTES
18; 11:15 -   CM received call from patient's step-daughter Francesca Cranker: [phone]) to discuss discharge planning. Per Zulma Andrade, the patient's son Sky Sanchez) received a call stating that the patient may be discharged today and a SNF placement needs to be determined. CM confirmed disposition with attending physician: patient will not discharge today, 18, due to continued elevated BP. CM will plan to meet with patient's children today, 18, after they are off of work. CM to provide family with SNF list.  CRM: Alexa Hawkins, MPH; Z: [phone]    15:30 -   CM received phone call from patient's step-daughter Francesca Cranker) with update that patient's children are unable to come to the hospital today, 18, due to a  and patient's son Sky Sanchez) having to work late. 2 of the 3 children will plan to meet with CM tomorrow, 18, at 09:00 at patient's bedside to discuss placement options.   CRM: Alexa Hawkins, MPH; Z: [phone]

## 2018-08-22 NOTE — PROGRESS NOTES
Hospitalist Progress Note  Casa Eastman MD  Answering service: [phone] from in house phone      Date of Service:  2018  NAME:  Rodolfo Looney  :  1945  MRN:  [id number]      Admission Summary:   69 yo man with Dm2, HTN, nonadherence was sent by his PCP's office on 18 with high blood sugar and confusion. The patient has been confused and lying in bed most of the time for the last couple of weeks. He has poor appetite, eating only one meal a day but reportedly taking his diabetic medication regularly. He was admitted with hyperosmolar hyperglycemic syndrome (HHS). Interval history / Subjective:   Denies complaints; d/w nurse, BP still elevated     Assessment & Plan:     DM2 with HHS (POA) - resolved with home Lantus and IVF  - stop IVF  - A1c 11.4  - LDL 56, TG 63    Acute encephalopathy (POA) - likely due to metabolic etiology  - CT head no acute  - seems resolved to baseline  - PT/OT evals: SNF recommended    Pseudohyponatremia (POA) - Na corrected for hyperglycemia is WNL    JOSH (POA) - no evidence of CKD  - likely due to prerenal azotemia  - resolved with IVF  - resumed home ARB    HTN uncontrolled (POA) - likely due to BP meds on hold on admission due to OJSH and aggressive IVF  - home meds resumed  and will stop IVF  - [facility] for transfer to [facility]    Thrombocytopenia (POA) - unclear etiology  - improved today  - no evidence of bleeding  - stop SC heparin    Code status: full  DVT prophylaxis: changed heparin to Jaan[address] 7 discussed with: Patient/Family, Nurse and . Spoke to irais Younger by phone   Disposition: Likely to [facility] Problems  Date Reviewed: 2018          Codes Class Noted POA    * (Principal)Type 1 diabetes mellitus with hyperglycemia [facility]) ICD-10-CM: E10.65  ICD-9-CM: 250.01  2018 Unknown            Review of Systems:   Pertinent items are noted in HPI. Vital Signs:    Last 24hrs VS reviewed since prior progress note. Most recent are:  Visit Vitals    BP (!) 164/94 (BP 1 Location: Left arm, BP Patient Position: At rest;Supine)    Pulse 94    Temp 98.6 °F (37 °C)    Resp 16    Ht 5' 5\" (1.651 m)    Wt 59.6 kg (131 lb 6.4 oz)    SpO2 99%    BMI 21.87 kg/m2       Intake/Output Summary (Last 24 hours) at 08/22/18 1276  Last data filed at 08/21/18 1300   Gross per 24 hour   Intake              240 ml   Output                0 ml   Net              240 ml      Physical Examination:     Constitutional:  awake, no acute distress, cooperative, pleasant    ENT:  oral mucosa moist, oropharynx benign    Resp:  CTA bilaterally, no wheezing/rhonchi/rales   CV:  regular rhythm, normal rate, no m/r/g appreciated, no edema    GI:  +BS, soft, non distended, non tender     Musculoskeletal:  ELDRIDGE    Neurologic:  AAOx2 to person and place, NFD       Data Review:    Review and/or order of clinical lab test  Review and/or order of tests in the radiology section of CPT  Review and/or order of tests in the medicine section of CPT    Labs:     Recent Labs      08/22/18   0400  08/21/18   0359   WBC  8.1  8.1   HGB  12.4  11.5*   HCT  34.5*  32.6*   PLT  138*  116*     Recent Labs      08/22/18   0400  08/21/18   0359  08/20/18   1155   NA  139  145  133*   K  3.6  3.8  4.2   CL  104  113*  98   CO2  24  22  24   BUN  23*  31*  46*   CREA  1.23  1.16  2.08*   GLU  262*  177*  615*   CA  8.6  8.0*  8.5   MG  1.5*   --    --      Recent Labs      08/22/18   0400  08/21/18   0359  08/20/18   1155   SGOT  39*  20  19   ALT  35  22  23   AP  108  107  150*   TBILI  0.6  0.6  0.9   TP  7.2  6.5  7.9   ALB  3.4*  3.0*  3.6   GLOB  3.8  3.5  4.3*     No results for input(s): INR, PTP, APTT in the last 72 hours. No lab exists for component: INREXT, INREXT   No results for input(s): FE, TIBC, PSAT, FERR in the last 72 hours.    No results found for: FOL, RBCF   No results for input(s): PH, PCO2, PO2 in the last 72 hours. No results for input(s): CPK, CKNDX, TROIQ in the last 72 hours.     No lab exists for component: CPKMB  Lab Results   Component Value Date/Time    Cholesterol, total 116 08/21/2018 03:59 AM    HDL Cholesterol 47 08/21/2018 03:59 AM    LDL, calculated 56.4 08/21/2018 03:59 AM    Triglyceride 63 08/21/2018 03:59 AM    CHOL/HDL Ratio 2.5 08/21/2018 03:59 AM     Lab Results   Component Value Date/Time    Glucose (POC) 245 (H) 08/22/2018 06:34 AM    Glucose (POC) 195 (H) 08/21/2018 09:25 PM    Glucose (POC) 223 (H) 08/21/2018 04:10 PM    Glucose (POC) 104 (H) 08/21/2018 10:56 AM    Glucose (POC) 120 (H) 08/21/2018 06:31 AM     Lab Results   Component Value Date/Time    Color YELLOW/STRAW 08/20/2018 12:30 PM    Appearance CLEAR 08/20/2018 12:30 PM    Specific gravity 1.021 08/20/2018 12:30 PM    pH (UA) 5.5 08/20/2018 12:30 PM    Protein NEGATIVE  08/20/2018 12:30 PM    Glucose >1000 (A) 08/20/2018 12:30 PM    Ketone TRACE (A) 08/20/2018 12:30 PM    Bilirubin NEGATIVE  08/20/2018 12:30 PM    Urobilinogen 0.2 08/20/2018 12:30 PM    Nitrites NEGATIVE  08/20/2018 12:30 PM    Leukocyte Esterase NEGATIVE  08/20/2018 12:30 PM    Epithelial cells FEW 08/20/2018 12:30 PM    Bacteria NEGATIVE  08/20/2018 12:30 PM    WBC 0-4 08/20/2018 12:30 PM    RBC 0-5 08/20/2018 12:30 PM     Medications Reviewed:     Current Facility-Administered Medications   Medication Dose Route Frequency    magnesium sulfate 2 g/50 ml IVPB (premix or compounded)  2 g IntraVENous ONCE    hydrALAZINE (APRESOLINE) 20 mg/mL injection 10 mg  10 mg IntraVENous Q6H PRN    amLODIPine (NORVASC) tablet 10 mg  10 mg Oral DAILY    aspirin (ASPIRIN) tablet 325 mg  325 mg Oral DAILY    atorvastatin (LIPITOR) tablet 20 mg  20 mg Oral QHS    insulin glargine (LANTUS) injection 30 Units  30 Units SubCUTAneous QHS    losartan (COZAAR) tablet 100 mg  100 mg Oral DAILY    insulin lispro (HUMALOG) injection 3 Units  3 Units SubCUTAneous TIDAC    sodium chloride (NS) flush 5-10 mL  5-10 mL IntraVENous Q8H    sodium chloride (NS) flush 5-10 mL  5-10 mL IntraVENous PRN    acetaminophen (TYLENOL) tablet 650 mg  650 mg Oral Q4H PRN    ondansetron (ZOFRAN) injection 4 mg  4 mg IntraVENous Q4H PRN    insulin lispro (HUMALOG) injection   SubCUTAneous AC&HS    glucose chewable tablet 16 g  4 Tab Oral PRN    dextrose (D50W) injection syrg 12.5-25 g  12.5-25 g IntraVENous PRN    glucagon (GLUCAGEN) injection 1 mg  1 mg IntraMUSCular PRN     ______________________________________________________________________  EXPECTED LENGTH OF STAY: 3d 19h  ACTUAL LENGTH OF STAY:          2                 Alejo Pennington MD

## 2018-08-22 NOTE — PROGRESS NOTES
8/22/2018    Chart reviewed;pt cleared for therapy. Pt currently sitting EOB eating lunch. Will follow up at later time as able and appropriate.     Elfreda Nissen

## 2018-08-22 NOTE — PROGRESS NOTES
Problem: Mobility Impaired (Adult and Pediatric)  Goal: *Acute Goals and Plan of Care (Insert Text)  Physical Therapy Goals  Initiated 8/21/2018  1. Patient will move from supine to sit and sit to supine , scoot up and down and roll side to side in bed with independence within 7 day(s). 2.  Patient will transfer from bed to chair and chair to bed with supervision/set-up using the least restrictive device within 7 day(s). 3.  Patient will perform sit to stand with supervision/set-up within 7 day(s). 4.  Patient will ambulate with supervision/set-up for 250 feet with the least restrictive device within 7 day(s). physical Therapy TREATMENT  Patient: Rich Messer (18 y.o. male)  Date: 8/22/2018  Diagnosis: Type 1 diabetes mellitus with hyperglycemia ([facility] [address] Type 1 diabetes mellitus with hyperglycemia ([facility] [address]       Precautions:    Chart, physical therapy assessment, plan of care and goals were reviewed. ASSESSMENT:  Pt received supine in bed, asleep but easily awakens. Pt agreeable to PT. Pt demonstrated SBA-CGA for bed mobility and transfers. Pt amb x 25FT before having to return to room for brief and gown change as pt beginning to slide off and w/urine dripping onto floor. Pt completed voiding in toilet, able to stand w/ CGA. Returned to chair for donning of new brief and new gown. Completed additional gait x150 FT w/ CGA-Min A (w/o AD) for moderate path deviations/unsteady gait. Pt returned to chair w/ all items in reach. Bed alarm activated. Pt lives w/ son, but presumed that son works during day. CM reports step-daughter is a RN. Would recommend Rehab at d/c to increase balance and strength, as well as safety for mobility prior to returning home.     VS:   O2: % RA  BP: (RUE) 123/69  HR 95   (LUE) 118/70        Progression toward goals:  []    Improving appropriately and progressing toward goals  []    Improving slowly and progressing toward goals  []    Not making progress toward goals and plan of care will be adjusted     PLAN:  Patient continues to benefit from skilled intervention to address the above impairments. Continue treatment per established plan of care. Discharge Recommendations:  Rehab  Further Equipment Recommendations for Discharge:  TBD     SUBJECTIVE:   Patient stated I feel better. I feel like a million bucks.     OBJECTIVE DATA SUMMARY:   Critical Behavior:  Neurologic State: Alert  Orientation Level: Oriented X4  Cognition: Follows commands     Functional Mobility Training:  Bed Mobility:  Rolling: Contact guard assistance  Supine to Sit: Supervision  Sit to Supine:  (NT- pt returned to chair at bedside)           Transfers:  Sit to Stand: Stand-by assistance  Stand to Sit: Contact guard assistance                             Balance:  Sitting: Intact  Standing: Impaired  Standing - Static: Good  Standing - Dynamic : Fair  Ambulation/Gait Training:  Distance (ft): 150 Feet (ft)  Assistive Device: Gait belt  Ambulation - Level of Assistance: Contact guard assistance        Gait Abnormalities: Altered arm swing;Decreased step clearance; Path deviations;Trunk sway increased              Speed/Kika: Pace decreased (<100 feet/min)  Step Length: Left shortened;Right shortened                              Pain:  Pain Scale 1: Numeric (0 - 10)  Pain Intensity 1: 0              Activity Tolerance:   VSS (see above assessment)     Please refer to the flowsheet for vital signs taken during this treatment.   After treatment:   [x]    Patient left in no apparent distress sitting up in chair  []    Patient left in no apparent distress in bed  [x]    Call bell left within reach  [x]    Nursing notified  []    Caregiver present  [x]    Bed alarm activated    COMMUNICATION/COLLABORATION:   The patients plan of care was discussed with: Registered Nurse    Marina Rangel PTA   Time Calculation: 35 mins

## 2018-08-22 NOTE — PROGRESS NOTES
Problem: Diabetes Self-Management  Goal: *Prevention, detection, treatment of acute complications  List symptoms of hyper- and hypoglycemia; describe how to treat low blood sugar and actions for lowering  high blood glucose level. Outcome: Not Progressing Towards Goal  Patient needs more education related to disease management for self care after hospitalization; patient notably periodically confused. Problem: Pressure Injury - Risk of  Goal: *Prevention of pressure injury  Document Doni Scale and appropriate interventions in the flowsheet.    Outcome: Progressing Towards Goal  Pressure Injury Interventions:  Sensory Interventions: Minimize linen layers, Monitor skin under medical devices, Maintain/enhance activity level, Keep linens dry and wrinkle-free    Moisture Interventions: Absorbent underpads, Apply protective barrier, creams and emollients, Offer toileting Q_hr    Activity Interventions: Increase time out of bed, PT/OT evaluation    Mobility Interventions: HOB 30 degrees or less, PT/OT evaluation    Nutrition Interventions: Document food/fluid/supplement intake

## 2018-08-22 NOTE — PROGRESS NOTES
Bedside shift change report given to Gaston Flores RN (oncoming nurse) by Mundo Decker RN (offgoing nurse). Report included the following information SBAR.

## 2018-08-22 NOTE — PROGRESS NOTES
Problem: Pressure Injury - Risk of  Goal: *Prevention of pressure injury  Document Doni Scale and appropriate interventions in the flowsheet. Outcome: Progressing Towards Goal  Pressure Injury Interventions:  Sensory Interventions: Minimize linen layers    Moisture Interventions: Absorbent underpads, Offer toileting Q_hr    Activity Interventions: PT/OT evaluation, Increase time out of bed    Mobility Interventions: PT/OT evaluation    Nutrition Interventions: Document food/fluid/supplement intake                    Problem: Falls - Risk of  Goal: *Absence of Falls  Document Krishna Fall Risk and appropriate interventions in the flowsheet.    Outcome: Progressing Towards Goal  Fall Risk Interventions:  Mobility Interventions: Utilize walker, cane, or other assistive device, PT Consult for mobility concerns, Bed/chair exit alarm    Mentation Interventions: Bed/chair exit alarm, Door open when patient unattended, Gait belt with transfers/ambulation, Toileting rounds    Medication Interventions: Bed/chair exit alarm, Patient to call before getting OOB    Elimination Interventions: Patient to call for help with toileting needs, Toileting schedule/hourly rounds, Call light in reach, Bed/chair exit alarm

## 2018-08-22 NOTE — PROGRESS NOTES
1930: Report received from [facility]. Patient in bed watching television. 2126: Patient requesting to sit in chair for a short time, chair alarm placed in chair at the bedside. Changed depends after patient soiling with urine. Urgency seems to be a complication with patient although he does know the urge. Medications given per MAR. No signs of distress noted and no further requests made. 4619: Patient in bed resting with eyes closed. Alison Wheatley RN drawing labs on patient. Yenny MCKEON in patient's room for vital signs.

## 2018-08-23 LAB
ANION GAP SERPL CALC-SCNC: 11 MMOL/L (ref 5–15)
BUN SERPL-MCNC: 17 MG/DL (ref 6–20)
BUN/CREAT SERPL: 15 (ref 12–20)
CALCIUM SERPL-MCNC: 8.6 MG/DL (ref 8.5–10.1)
CHLORIDE SERPL-SCNC: 111 MMOL/L (ref 97–108)
CO2 SERPL-SCNC: 21 MMOL/L (ref 21–32)
CREAT SERPL-MCNC: 1.17 MG/DL (ref 0.7–1.3)
ERYTHROCYTE [DISTWIDTH] IN BLOOD BY AUTOMATED COUNT: 12.5 % (ref 11.5–14.5)
GLUCOSE BLD STRIP.AUTO-MCNC: 155 MG/DL (ref 65–100)
GLUCOSE BLD STRIP.AUTO-MCNC: 158 MG/DL (ref 65–100)
GLUCOSE BLD STRIP.AUTO-MCNC: 256 MG/DL (ref 65–100)
GLUCOSE BLD STRIP.AUTO-MCNC: 78 MG/DL (ref 65–100)
GLUCOSE SERPL-MCNC: 97 MG/DL (ref 65–100)
HCT VFR BLD AUTO: 35.1 % (ref 36.6–50.3)
HGB BLD-MCNC: 12.4 G/DL (ref 12.1–17)
MAGNESIUM SERPL-MCNC: 2 MG/DL (ref 1.6–2.4)
MCH RBC QN AUTO: 28.7 PG (ref 26–34)
MCHC RBC AUTO-ENTMCNC: 35.3 G/DL (ref 30–36.5)
MCV RBC AUTO: 81.3 FL (ref 80–99)
NRBC # BLD: 0 K/UL (ref 0–0.01)
NRBC BLD-RTO: 0 PER 100 WBC
PLATELET # BLD AUTO: 135 K/UL (ref 150–400)
PMV BLD AUTO: 11.4 FL (ref 8.9–12.9)
POTASSIUM SERPL-SCNC: 3.7 MMOL/L (ref 3.5–5.1)
RBC # BLD AUTO: 4.32 M/UL (ref 4.1–5.7)
SERVICE CMNT-IMP: ABNORMAL
SERVICE CMNT-IMP: NORMAL
SODIUM SERPL-SCNC: 143 MMOL/L (ref 136–145)
WBC # BLD AUTO: 7.5 K/UL (ref 4.1–11.1)

## 2018-08-23 PROCEDURE — 82962 GLUCOSE BLOOD TEST: CPT

## 2018-08-23 PROCEDURE — 97116 GAIT TRAINING THERAPY: CPT

## 2018-08-23 PROCEDURE — 83735 ASSAY OF MAGNESIUM: CPT | Performed by: INTERNAL MEDICINE

## 2018-08-23 PROCEDURE — 97110 THERAPEUTIC EXERCISES: CPT

## 2018-08-23 PROCEDURE — 74011250637 HC RX REV CODE- 250/637: Performed by: INTERNAL MEDICINE

## 2018-08-23 PROCEDURE — 85027 COMPLETE CBC AUTOMATED: CPT | Performed by: INTERNAL MEDICINE

## 2018-08-23 PROCEDURE — 65210000002 HC RM PRIVATE GYN

## 2018-08-23 PROCEDURE — 74011636637 HC RX REV CODE- 636/637: Performed by: HOSPITALIST

## 2018-08-23 PROCEDURE — 97535 SELF CARE MNGMENT TRAINING: CPT

## 2018-08-23 PROCEDURE — 36415 COLL VENOUS BLD VENIPUNCTURE: CPT | Performed by: INTERNAL MEDICINE

## 2018-08-23 PROCEDURE — 80048 BASIC METABOLIC PNL TOTAL CA: CPT | Performed by: INTERNAL MEDICINE

## 2018-08-23 PROCEDURE — 74011636637 HC RX REV CODE- 636/637: Performed by: INTERNAL MEDICINE

## 2018-08-23 PROCEDURE — 74011250637 HC RX REV CODE- 250/637: Performed by: HOSPITALIST

## 2018-08-23 RX ORDER — INSULIN GLARGINE 100 [IU]/ML
25 INJECTION, SOLUTION SUBCUTANEOUS
Status: DISCONTINUED | OUTPATIENT
Start: 2018-08-23 | End: 2018-08-24 | Stop reason: HOSPADM

## 2018-08-23 RX ADMIN — Medication 10 ML: at 21:37

## 2018-08-23 RX ADMIN — AMLODIPINE BESYLATE 10 MG: 5 TABLET ORAL at 08:50

## 2018-08-23 RX ADMIN — INSULIN LISPRO 4 UNITS: 100 INJECTION, SOLUTION INTRAVENOUS; SUBCUTANEOUS at 12:32

## 2018-08-23 RX ADMIN — ASPIRIN 325 MG: 325 TABLET ORAL at 08:50

## 2018-08-23 RX ADMIN — INSULIN LISPRO 4 UNITS: 100 INJECTION, SOLUTION INTRAVENOUS; SUBCUTANEOUS at 17:17

## 2018-08-23 RX ADMIN — ATORVASTATIN CALCIUM 20 MG: 20 TABLET, FILM COATED ORAL at 21:36

## 2018-08-23 RX ADMIN — INSULIN LISPRO 5 UNITS: 100 INJECTION, SOLUTION INTRAVENOUS; SUBCUTANEOUS at 12:33

## 2018-08-23 RX ADMIN — INSULIN GLARGINE 25 UNITS: 100 INJECTION, SOLUTION SUBCUTANEOUS at 21:36

## 2018-08-23 RX ADMIN — LOSARTAN POTASSIUM 100 MG: 50 TABLET ORAL at 08:50

## 2018-08-23 NOTE — PROGRESS NOTES
1930: Report received from [facility].   2126: Medications given per [facility]

## 2018-08-23 NOTE — PROGRESS NOTES
Problem: Falls - Risk of  Goal: *Absence of Falls  Document Krishna Fall Risk and appropriate interventions in the flowsheet.    Outcome: Progressing Towards Goal  Fall Risk Interventions:  Mobility Interventions: Utilize walker, cane, or other assistive device    Mentation Interventions: Increase mobility, More frequent rounding    Medication Interventions: Patient to call before getting OOB    Elimination Interventions: Patient to call for help with toileting needs

## 2018-08-23 NOTE — PROGRESS NOTES
Problem: Self Care Deficits Care Plan (Adult)  Goal: *Acute Goals and Plan of Care (Insert Text)  Occupational Therapy Goals  Initiated 8/21/2018  1. Patient will perform grooming with modified independence standing at sink within 7 day(s). 2.  Patient will perform lower body dressing with modified independence within 7 day(s). 3.  Patient will perform toileting with supervision/set-up within 7 day(s). 4.  Patient will perform toilet transfers with modified independence within 7 day(s). Occupational Therapy TREATMENT  Patient: Susan Agustin (67 y.o. male)  Date: 8/23/2018  Diagnosis: Type 1 diabetes mellitus with hyperglycemia ([facility] [address] Type 1 diabetes mellitus with hyperglycemia ([facility] [address]       Precautions:    Chart, occupational therapy assessment, plan of care, and goals were reviewed. ASSESSMENT:  Patient received seated in chair with step-daughter and son present, agreeable to ADL participation. Patient completing full body bathing, dressing, toileting, and grooming standing at sink ~5 minutes with overall supervision-SBA. Patient continues to present with unsteady gait with ambulation (no AD use), attempting to reach out for walls; impulsive at times d/t unfamiliarity with environment; STM loss therefore requiring increased supervision for all ADLs and functional transfers. Patient will benefit from SNF rehab at discharge to maximize safety and independence. Progression toward goals:  [x]       Improving appropriately and progressing toward goals  []       Improving slowly and progressing toward goals  []       Not making progress toward goals and plan of care will be adjusted     PLAN:  Patient continues to benefit from skilled intervention to address the above impairments. Continue treatment per established plan of care.   Discharge Recommendations:  SNF Rehab  Further Equipment Recommendations for Discharge:  Anticipate no needs     SUBJECTIVE:   Patient stated Whatever you think is best!    OBJECTIVE DATA SUMMARY:   Cognitive/Behavioral Status:  Neurologic State: Alert;Confused  Orientation Level: Oriented to person;Oriented to place;Oriented to situation  Cognition: Appropriate for age attention/concentration; Follows commands;Memory loss (STM loss)  Perception: Appears intact  Perseveration: No perseveration noted  Safety/Judgement: Fall prevention    Functional Mobility and Transfers for ADLs:  Bed Mobility:  Supine to Sit:  (Received in chair)    Transfers:  Sit to Stand: Stand-by assistance    Balance:  Sitting: Intact  Standing: Impaired  Standing - Static: Good  Standing - Dynamic : Fair    ADL Intervention:  Grooming  Brushing Teeth: Supervision/set-up (Standing at sink)    Upper Body Bathing  Bathing Assistance: Supervision/set-up  Position Performed: Seated in chair    Lower Body Bathing  Bathing Assistance: Supervision/set-up  Perineal  : Supervision/set-up  Position Performed: Seated in chair  Lower Body : Stand-by assistance  Position Performed: Seated in chair;Standing    Upper Body [address]: Supervision/ set-up    Lower Body Dressing Assistance  Protective Undergarmet: Moderate assistance (Able to assist with tearing off but total a to don, unfamili)  Socks: Independent  Leg Crossed Method Used: Yes  Position Performed: Seated in chair    Toileting  Bladder Hygiene: Independent  Clothing Management: Stand-by assistance    Cognitive Retraining  Safety/Judgement: Fall prevention    Pain:  Pain Scale 1: Numeric (0 - 10)  Pain Intensity 1: 0    Activity Tolerance:   Good. Please refer to the flowsheet for vital signs taken during this treatment.   After treatment:   [x] Patient left in no apparent distress sitting up in chair  [] Patient left in no apparent distress in bed  [x] Call bell left within reach  [x] Nursing notified  [] Caregiver present  [x] Chair alarm activated    COMMUNICATION/COLLABORATION:   The patients plan of care was discussed with: Registered Nurse    Ami Gabriel, OT  Time Calculation: 29 mins

## 2018-08-23 NOTE — DIABETES MGMT
DTC Progress Note    Recommendations/ Comments: Chart reviewed due to hypoglycemia. If appropriate, please consider decreasing Lantus to pt home dose of 25 units. Pt hypoglycemic this am. Pt may also likely require prandial insulin if daytime BG elevated. Current hospital DM medication: Lantus 30 units hs, correction scale Humalog, normal sensitivity and Humalog 4 units ac tid. Chart reviewed on [facility]. Patient is a 68 y.o. male with known diabetes on Lantus 25 units and Metformin 500 mg TID at home. A1c:   Lab Results   Component Value Date/Time    Hemoglobin A1c 11.4 (H) 08/20/2018 11:55 AM    Hemoglobin A1c 9.3 (H) 04/16/2018 09:26 AM       Recent Glucose Results:   Lab Results   Component Value Date/Time    GLU 97 08/23/2018 03:50 AM    GLUCPOC 256 (H) 08/23/2018 11:13 AM    GLUCPOC 78 08/23/2018 07:10 AM    GLUCPOC 167 (H) 08/22/2018 09:23 PM        Lab Results   Component Value Date/Time    Creatinine 1.17 08/23/2018 03:50 AM     Estimated Creatinine Clearance: 45.5 mL/min (based on Cr of 1.17). Active Orders   Diet    DIET DIABETIC CONSISTENT CARB Regular; 2 GM NA (House Low NA)        PO intake:   Patient Vitals for the past 72 hrs:   % Diet Eaten   08/21/18 1300 100 %   08/21/18 0923 100 %       Will continue to follow as needed.     Thank you    Nate Watts RD, [address]

## 2018-08-23 NOTE — PROGRESS NOTES
Problem: Discharge Planning  Goal: *Discharge to safe environment  Outcome: Progressing Towards Goal  See CM Notes.  CRM: Mitch Washington, MPH; Z: [phone]

## 2018-08-23 NOTE — PROGRESS NOTES
Reason for Admission:   \"I can't remember the reason I came\"; Type 1 diabetes mellitus with hyperglycemia               RRAT Score:     22             Resources/supports as identified by patient/family:   Patient lives with son, daughter in law, and 2 grandchildren; step-daughter is a RN and lives nearby                [facility] and [facility] facing patient (as identified by patient/family and CM): Finances/Medication cost?    None identified; has [facility]  And uses [facility] at [employer] (patient couldn't recall pharmacy preference)              Transportation? Doesn't drive; son does transport              Support system or lack thereof? Extensive family support and involvement                     Living arrangements? With son and son's family in a 2 story home, 2nd floor bedroom, 1 exterior step and 12 interior step (patient became confused and initially thought he still lives in a single story apartment; children clarified living environment)           Self-care/ADLs/Cognition? Primarily independent          Current Advanced Directive/Advance Care Plan:  FULL CODE; not on file                          Plan for utilizing home health:    TBD - SNF placement is first upon hospital discharge                      Likelihood of readmission: HIGH; DM1 requires ongoing education, as well increased dementia                  Transition of Care Plan:        Patient demonstrated memory issues during assessment; patient's children at bedside assisted with information review. Patient was initially hesitant about SNF placement due to wanting to be in his own home environment. CM explained purpose of SNF is to rebuild strength and stamina and is not permanent. Patient and children in agreement to placement. Children selected: [facility] as [employer] CM submitted referrals to all 3 facilities via [employer] and [employer].   Patient's son is to transport patient to accepting facility upon discharge. Attending physician aware of planning and is on board with afternoon discharge for today, 8/23/18. Family requested to have additional insulin education for the patient prior to discharge through DTC. CM notified bedside nurse of request, and nursing is to page DTC with request.           Care Management Interventions  PCP Verified by CM: Yes (last saw Dr. Madison Rashid about a month ago)  Palliative Care Criteria Met (RRAT>21 & CHF Dx)?: No  Mode of Transport at Discharge:  Other (see comment) (Son, Patrick Fish, to transport patient to SNF)  Transition of Care Consult (CM Consult): SNF, Discharge Planning (Family selected: [facility] and [facility] [address] as SNF selections)  MyChart Signup: No  Discharge Durable Medical Equipment: No (has no DME)  Health Maintenance Reviewed: Yes (CM met with patient with son and step-daughter at bedside; patient showed several areas of confusion)  Physical Therapy Consult: Yes  Occupational Therapy Consult: Yes  Speech Therapy Consult: No  Current Support Network: Relative's Home, Family Lives [place] (Lives with sonPatrick, daughter in law, and 2 dependent children)  Confirm Follow Up Transport: Family  Plan discussed with Pt/Family/Caregiver: Yes (Son and step-daughter at bedside)  Freedom of Choice Offered: Yes (Selected [facility], and [address] as SNF selections)  [address] Provided?: No  Discharge Location  Discharge Placement: Skilled nursing facility ([facility] is first choice)    CRM: José Suárez, MPH; Z: [phone]

## 2018-08-23 NOTE — PROGRESS NOTES
Hospitalist Progress Note  Anel Obrien MD  Answering service: [phone] OR [phone] from in house phone      Date of Service:  2018  NAME:  Susan Agustin  :  1945  MRN:  [id number]      Admission Summary:   69 yo man with Dm2, HTN, nonadherence was sent by his PCP's office on 18 with high blood sugar and confusion. The patient has been confused and lying in bed most of the time for the last couple of weeks. He has poor appetite, eating only one meal a day but reportedly taking his diabetic medication regularly. He was admitted with hyperosmolar hyperglycemic syndrome (HHS). Interval history / Subjective:   Doing OK awaiting family deciding SNF   D/c tele     Assessment & Plan:     DM2 with HHS (POA) - resolved with home Lantus and IVF  - stop IVF  - A1c 11.4  - LDL 56, TG 63    Acute encephalopathy (POA) - likely due to metabolic etiology  - CT head no acute  - seems resolved to baseline  - PT/OT evals: SNF recommended    Pseudohyponatremia (POA) - Na corrected for hyperglycemia is WNL    JOSH (POA) - no evidence of CKD  - likely due to prerenal azotemia  - resolved with IVF  - resumed home ARB    HTN uncontrolled (POA) - likely due to BP meds on hold on admission due to JOSH and aggressive IVF  - home meds resumed  and will stop IVF  - stable for transfer to medical    Thrombocytopenia (POA) - unclear etiology  - improved today  - no evidence of bleeding  - stop SC heparin    Code status: full  DVT prophylaxis: changed heparin to [address] discussed with: Patient/Family, Nurse and . Spoke to irais Abdi by phone   Disposition: Likely to [facility] Problems  Date Reviewed: 2018          Codes Class Noted POA    * (Principal)Type 1 diabetes mellitus with hyperglycemia [facility]) ICD-10-CM: E10.65  ICD-9-CM: 250.01  2018 Unknown            Review of Systems:   Pertinent items are noted in HPI. Vital Signs:    Last 24hrs VS reviewed since prior progress note. Most recent are:  Visit Vitals    /69 (BP 1 Location: Left arm, BP Patient Position: At rest)    Pulse (!) 101    Temp 98.5 °F (36.9 °C)    Resp 14    Ht 5' 5\" (1.651 m)    Wt 57.2 kg (126 lb 2 oz)    SpO2 99%    BMI 20.99 kg/m2       Intake/Output Summary (Last 24 hours) at 08/23/18 6352  Last data filed at 08/22/18 1503   Gross per 24 hour   Intake                0 ml   Output              300 ml   Net             -300 ml      Physical Examination:     Constitutional:  awake, no acute distress, cooperative, pleasant    ENT:  oral mucosa moist, oropharynx benign    Resp:  CTA bilaterally, no wheezing/rhonchi/rales   CV:  regular rhythm, normal rate, no m/r/g appreciated, no edema    GI:  +BS, soft, non distended, non tender     Musculoskeletal:  ELDRIDGE    Neurologic:  AAOx2 to person and place, NFD       Data Review:    Review and/or order of clinical lab test  Review and/or order of tests in the radiology section of CPT  Review and/or order of tests in the medicine section of CPT    Labs:     Recent Labs      08/23/18   0350  08/22/18   0400   WBC  7.5  8.1   HGB  12.4  12.4   HCT  35.1*  34.5*   PLT  135*  138*     Recent Labs      08/23/18   0350  08/22/18   0400  08/21/18   0359   NA  143  139  145   K  3.7  3.6  3.8   CL  111*  104  113*   CO2  21  24  22   BUN  17  23*  31*   CREA  1.17  1.23  1.16   GLU  97  262*  177*   CA  8.6  8.6  8.0*   MG  2.0  1.5*   --      Recent Labs      08/22/18   0400  08/21/18   0359  08/20/18   1155   SGOT  39*  20  19   ALT  35  22  23   AP  108  107  150*   TBILI  0.6  0.6  0.9   TP  7.2  6.5  7.9   ALB  3.4*  3.0*  3.6   GLOB  3.8  3.5  4.3*     No results for input(s): INR, PTP, APTT in the last 72 hours. No lab exists for component: INREXT, INREXT   No results for input(s): FE, TIBC, PSAT, FERR in the last 72 hours.    No results found for: FOL, RBCF   No results for input(s): PH, PCO2, PO2 in the last 72 hours. No results for input(s): CPK, CKNDX, TROIQ in the last 72 hours.     No lab exists for component: CPKMB  Lab Results   Component Value Date/Time    Cholesterol, total 116 08/21/2018 03:59 AM    HDL Cholesterol 47 08/21/2018 03:59 AM    LDL, calculated 56.4 08/21/2018 03:59 AM    Triglyceride 63 08/21/2018 03:59 AM    CHOL/HDL Ratio 2.5 08/21/2018 03:59 AM     Lab Results   Component Value Date/Time    Glucose (POC) 78 08/23/2018 07:10 AM    Glucose (POC) 167 (H) 08/22/2018 09:23 PM    Glucose (POC) 88 08/22/2018 04:50 PM    Glucose (POC) 245 (H) 08/22/2018 10:56 AM    Glucose (POC) 245 (H) 08/22/2018 06:34 AM     Lab Results   Component Value Date/Time    Color YELLOW/STRAW 08/20/2018 12:30 PM    Appearance CLEAR 08/20/2018 12:30 PM    Specific gravity 1.021 08/20/2018 12:30 PM    pH (UA) 5.5 08/20/2018 12:30 PM    Protein NEGATIVE  08/20/2018 12:30 PM    Glucose >1000 (A) 08/20/2018 12:30 PM    Ketone TRACE (A) 08/20/2018 12:30 PM    Bilirubin NEGATIVE  08/20/2018 12:30 PM    Urobilinogen 0.2 08/20/2018 12:30 PM    Nitrites NEGATIVE  08/20/2018 12:30 PM    Leukocyte Esterase NEGATIVE  08/20/2018 12:30 PM    Epithelial cells FEW 08/20/2018 12:30 PM    Bacteria NEGATIVE  08/20/2018 12:30 PM    WBC 0-4 08/20/2018 12:30 PM    RBC 0-5 08/20/2018 12:30 PM     Medications Reviewed:     Current Facility-Administered Medications   Medication Dose Route Frequency    insulin glargine (LANTUS) injection 28 Units  28 Units SubCUTAneous QHS    insulin lispro (HUMALOG) injection 4 Units  4 Units SubCUTAneous TIDAC    hydrALAZINE (APRESOLINE) 20 mg/mL injection 10 mg  10 mg IntraVENous Q6H PRN    amLODIPine (NORVASC) tablet 10 mg  10 mg Oral DAILY    aspirin (ASPIRIN) tablet 325 mg  325 mg Oral DAILY    atorvastatin (LIPITOR) tablet 20 mg  20 mg Oral QHS    losartan (COZAAR) tablet 100 mg  100 mg Oral DAILY    sodium chloride (NS) flush 5-10 mL  5-10 mL IntraVENous Q8H    sodium chloride (NS) flush 5-10 mL  5-10 mL IntraVENous PRN    acetaminophen (TYLENOL) tablet 650 mg  650 mg Oral Q4H PRN    ondansetron (ZOFRAN) injection 4 mg  4 mg IntraVENous Q4H PRN    insulin lispro (HUMALOG) injection   SubCUTAneous AC&HS    glucose chewable tablet 16 g  4 Tab Oral PRN    dextrose (D50W) injection syrg 12.5-25 g  12.5-25 g IntraVENous PRN    glucagon (GLUCAGEN) injection 1 mg  1 mg IntraMUSCular PRN     ______________________________________________________________________  EXPECTED LENGTH OF STAY: 3d 19h  ACTUAL LENGTH OF STAY:          3                 Aki Carrington MD

## 2018-08-23 NOTE — PROGRESS NOTES
8/23/18; 16:20 -   CM received call from [facility] regarding patient's pre-auth for placement at [facility]. The authorization will require a peer to peer review. CM notified attending physician of likely pending phone call from [facility]. CRM: Amaury Gray, MPH; Z: [phone]      17:15 -   CM has not heard status update regarding patient's pre-auth for placement at [facility]. It is unlikely that patient will discharge today, 8/23/18, as was anticipated. CM has printed patient's transfer packet materials: face sheet, H&P, SBAR, and Kardex. Packet on patient's hard chart. CM notified [facility] Vita Beckwith [phone]) of pending peer to peer review. CM also notified patient's son Nirmal Walters: [phone]) of pre-auth review and delayed discharge.     CRM: Amaury Gray MPH; Z: [phone]

## 2018-08-23 NOTE — PROGRESS NOTES
Problem: Mobility Impaired (Adult and Pediatric)  Goal: *Acute Goals and Plan of Care (Insert Text)  Physical Therapy Goals  Initiated 8/21/2018  1. Patient will move from supine to sit and sit to supine , scoot up and down and roll side to side in bed with independence within 7 day(s). 2.  Patient will transfer from bed to chair and chair to bed with supervision/set-up using the least restrictive device within 7 day(s). 3.  Patient will perform sit to stand with supervision/set-up within 7 day(s). 4.  Patient will ambulate with supervision/set-up for 250 feet with the least restrictive device within 7 day(s). physical Therapy TREATMENT  Patient: Rikki Chaney (83 y.o. male)  Date: 8/23/2018  Diagnosis: Type 1 diabetes mellitus with hyperglycemia ([facility] [address] Type 1 diabetes mellitus with hyperglycemia ([facility] [address]       Precautions:    Chart, physical therapy assessment, plan of care and goals were reviewed. ASSESSMENT:  Pt received sitting in chair at bedside,agreeable to PT. Pt completed gait x150FT w/ CGA-Min a for stability d/t balance deficits during gait (path deviations, minor LOB w/turning corners and head turns Brandyn). Pt completed standing BLE exercises along w/ SLS (B) holding onto wall rail. Pt attempted SLS w/o use of rail however note immediate LOB/swaying (Min A). Upon return to room, pt w/ LOB and mis-step. Pt reports knees did not buckle but reports \"legs feeling wobbly\". Pt returned to chair as was received w/ chair alarm in place and activated. Continue to recommend Rehab (SNF) upon d/c to address above deficits and to continue to increase strength for independence. Progression toward goals:  [x]    Improving appropriately and progressing toward goals  []    Improving slowly and progressing toward goals  []    Not making progress toward goals and plan of care will be adjusted     PLAN:  Patient continues to benefit from skilled intervention to address the above impairments.   Continue treatment per established plan of care. Discharge Recommendations:  Skilled Nursing Facility  Further Equipment Recommendations for Discharge:  TBD at rehab      SUBJECTIVE:   Patient stated [facility] are we going to do? Tolu Long    OBJECTIVE DATA SUMMARY:   Critical Behavior:  Neurologic State: Alert, Confused  Orientation Level: Oriented to person, Oriented to place, Oriented to situation  Cognition: Appropriate for age attention/concentration, Follows commands, Memory loss (STM loss)  Safety/Judgement: Fall prevention  Functional Mobility Training:  Bed Mobility:     Supine to Sit:  (pt received OOb in chair)  Sit to Supine:  (NT- returned to chair at bedside)           Transfers:  Sit to Stand: Stand-by assistance  Stand to Sit: Stand-by assistance                             Balance:  Sitting: Intact  Standing: Impaired  Standing - Static: Good  Standing - Dynamic : Fair  Ambulation/Gait Training:  Distance (ft): 150 Feet (ft)  Assistive Device: Gait belt  Ambulation - Level of Assistance: Contact guard assistance;Minimal assistance        Gait Abnormalities: Altered arm swing;Decreased step clearance;Early heel rise;Path deviations;Trunk sway increased              Speed/Kika: Pace decreased (<100 feet/min)  Step Length: Left shortened;Right shortened                               Neuro Re-Education:  SLS (Brandyn, Min A for support w/ use of wall rail)  Therapeutic Exercises:   Standing (CGA-Min A, holding onto wall rail):   Mini squats, heel raises, hip ABD/ADD    Pain:  Pain Scale 1: Numeric (0 - 10)  Pain Intensity 1: 0              Activity Tolerance:   HRmax 109 during activity. Please refer to the flowsheet for vital signs taken during this treatment.   After treatment:   [x]    Patient left in no apparent distress sitting up in chair  []    Patient left in no apparent distress in bed  [x]    Call bell left within reach  [x]    Nursing notified  []    Caregiver present  [x]    Bed alarm activated    COMMUNICATION/COLLABORATION:   The patients plan of care was discussed with: Registered Nurse    Gerri Rangel PTA   Time Calculation: 24 mins

## 2018-08-24 VITALS
TEMPERATURE: 97 F | BODY MASS INDEX: 21.86 KG/M2 | DIASTOLIC BLOOD PRESSURE: 74 MMHG | RESPIRATION RATE: 16 BRPM | OXYGEN SATURATION: 100 % | HEIGHT: 65 IN | WEIGHT: 131.2 LBS | SYSTOLIC BLOOD PRESSURE: 129 MMHG | HEART RATE: 96 BPM

## 2018-08-24 PROBLEM — E10.65 TYPE 1 DIABETES MELLITUS WITH HYPERGLYCEMIA (HCC): Status: RESOLVED | Noted: 2018-08-20 | Resolved: 2018-08-24

## 2018-08-24 LAB
GLUCOSE BLD STRIP.AUTO-MCNC: 272 MG/DL (ref 65–100)
GLUCOSE BLD STRIP.AUTO-MCNC: 61 MG/DL (ref 65–100)
GLUCOSE BLD STRIP.AUTO-MCNC: 94 MG/DL (ref 65–100)
SERVICE CMNT-IMP: ABNORMAL
SERVICE CMNT-IMP: ABNORMAL
SERVICE CMNT-IMP: NORMAL

## 2018-08-24 PROCEDURE — 97116 GAIT TRAINING THERAPY: CPT

## 2018-08-24 PROCEDURE — 74011250637 HC RX REV CODE- 250/637: Performed by: INTERNAL MEDICINE

## 2018-08-24 PROCEDURE — 74011250637 HC RX REV CODE- 250/637: Performed by: HOSPITALIST

## 2018-08-24 PROCEDURE — 74011636637 HC RX REV CODE- 636/637: Performed by: INTERNAL MEDICINE

## 2018-08-24 PROCEDURE — 82962 GLUCOSE BLOOD TEST: CPT

## 2018-08-24 PROCEDURE — 74011636637 HC RX REV CODE- 636/637: Performed by: HOSPITALIST

## 2018-08-24 PROCEDURE — 94760 N-INVAS EAR/PLS OXIMETRY 1: CPT

## 2018-08-24 PROCEDURE — 74011000250 HC RX REV CODE- 250: Performed by: HOSPITALIST

## 2018-08-24 RX ORDER — INSULIN LISPRO 100 [IU]/ML
4 INJECTION, SOLUTION INTRAVENOUS; SUBCUTANEOUS
Qty: 1 VIAL | Refills: 0 | Status: SHIPPED | OUTPATIENT
Start: 2018-08-24 | End: 2018-08-27 | Stop reason: ALTCHOICE

## 2018-08-24 RX ADMIN — ASPIRIN 325 MG: 325 TABLET ORAL at 08:41

## 2018-08-24 RX ADMIN — AMLODIPINE BESYLATE 10 MG: 5 TABLET ORAL at 08:41

## 2018-08-24 RX ADMIN — INSULIN LISPRO 4 UNITS: 100 INJECTION, SOLUTION INTRAVENOUS; SUBCUTANEOUS at 12:25

## 2018-08-24 RX ADMIN — LOSARTAN POTASSIUM 100 MG: 50 TABLET ORAL at 08:41

## 2018-08-24 RX ADMIN — Medication 10 ML: at 06:00

## 2018-08-24 RX ADMIN — INSULIN LISPRO 5 UNITS: 100 INJECTION, SOLUTION INTRAVENOUS; SUBCUTANEOUS at 12:25

## 2018-08-24 RX ADMIN — DEXTROSE MONOHYDRATE 12.5 G: 25 INJECTION, SOLUTION INTRAVENOUS at 06:50

## 2018-08-24 NOTE — PROGRESS NOTES
12 - 1/2 amp d50 given. Pt BS 61 this morning. 0710 - BS 91 on recheck. Pt asymptomatic and pleasant.

## 2018-08-24 NOTE — PROGRESS NOTES
Patient discharged with all belongings. All discharge instructions reviewed with the patient and his son Shade Scale). Patient and son demonstrated understanding. Patient's new prescription called to [facility] pharmacy on [address]. Son to  the prescription. All follow up appointments discussed.  Patient's son knows to call the home health agency when he leaves the hospital.

## 2018-08-24 NOTE — PROGRESS NOTES
Problem: Mobility Impaired (Adult and Pediatric)  Goal: *Acute Goals and Plan of Care (Insert Text)  Physical Therapy Goals  Initiated 8/21/2018  1. Patient will move from supine to sit and sit to supine , scoot up and down and roll side to side in bed with independence within 7 day(s). 2.  Patient will transfer from bed to chair and chair to bed with supervision/set-up using the least restrictive device within 7 day(s). 3.  Patient will perform sit to stand with supervision/set-up within 7 day(s). 4.  Patient will ambulate with supervision/set-up for 250 feet with the least restrictive device within 7 day(s). physical Therapy TREATMENT  Patient: Isidro Grady (1 y.o. male)  Date: 8/24/2018  Diagnosis: Type 1 diabetes mellitus with hyperglycemia ([facility] Type 1 diabetes mellitus with hyperglycemia ([facility]       Precautions:    Chart, physical therapy assessment, plan of care and goals were reviewed. ASSESSMENT:  Patient received in bed and son present. Moving well with supine to sit and sit to stand. Ambulated with contact guard to supervision with steady gait today, increased pace. Assessed on steps as well and managed a full flight with supervision with step over step pattern. Overall appears to have much improved balance and general mobility. Feel he will manage well at home with family but could benefit from home health PT assessment for home safety. No equipment needed. Progression toward goals:  [x]    Improving appropriately and progressing toward goals  []    Improving slowly and progressing toward goals  []    Not making progress toward goals and plan of care will be adjusted     PLAN:  Patient continues to benefit from skilled intervention to address the above impairments. Continue treatment per established plan of care.   Discharge Recommendations:  Home Health  Further Equipment Recommendations for Discharge:  none     SUBJECTIVE:   Patient stated I feel like a million masoud!.    OBJECTIVE DATA SUMMARY:   Critical Behavior:  Neurologic State: Alert  Orientation Level: Disoriented to time  Cognition: Appropriate for age attention/concentration  Safety/Judgement: Fall prevention  Functional Mobility Training:  Bed Mobility:     Supine to Sit: Modified independent              Transfers:  Sit to Stand: Supervision  Stand to Sit: Supervision                             Balance:  Sitting: Intact  Standing: Impaired; Without support  Standing - Static: Good  Standing - Dynamic : Good  Ambulation/Gait Training:  Distance (ft): 200 Feet (ft)  Assistive Device: Gait belt  Ambulation - Level of Assistance: Supervision                       Speed/Kika: Accelerated            Stairs:  Number of Stairs Trained: 12  Stairs - Level of Assistance: Supervision   Rail Use: Right   Pain:  Pain Scale 1: Numeric (0 - 10)  Pain Intensity 1: 0        After treatment:   [x]    Patient left in no apparent distress sitting up in chair  []    Patient left in no apparent distress in bed  [x]    Call bell left within reach  []    Nursing notified  [x]    Caregiver present  [x]    Bed alarm activated    COMMUNICATION/COLLABORATION:   The patients plan of care was discussed with: Registered Nurse and     Deyanira Salguero, PT   Time Calculation: 21 mins

## 2018-08-24 NOTE — PROGRESS NOTES
[address] August 24, 2018       RE: Azar Lay    To Whom It May Concern,    This is to certify that Lynne Rm father of Mr Azar Lay is admitted to [facility] since 8/20/18 till 8/25/18. Please feel free to contact my office if you have any questions or concerns. Thank you for your assistance in this matter.       Sincerely,  Joshua Gutierrez MD

## 2018-08-24 NOTE — PROGRESS NOTES
Hospitalist Progress Note  David Neff MD  Answering service: [phone] OR [phone] from in house phone      Date of Service:  2018  NAME:  Kemal Lyons  :  1945  MRN:  [id number]      Admission Summary:   67 yo man with Dm2, HTN, nonadherence was sent by his PCP's office on 18 with high blood sugar and confusion. The patient has been confused and lying in bed most of the time for the last couple of weeks. He has poor appetite, eating only one meal a day but reportedly taking his diabetic medication regularly. He was admitted with hyperosmolar hyperglycemic syndrome (HHS). Interval history / Subjective:   Doing OK awaiting family deciding SNF and peer to peer call     Assessment & Plan:     DM2 with HHS (POA) - resolved with home Lantus and IVF  - A1c 11.4  - LDL 56, TG 63    Acute encephalopathy (POA) - likely due to metabolic etiology  - CT head no acute  - seems resolved to baseline  - PT/OT evals: SNF recommended    Pseudohyponatremia (POA) - Na corrected for hyperglycemia is WNL    JOSH (POA) - no evidence of CKD  - likely due to prerenal azotemia  - resolved with IVF  - resumed home ARB    HTN uncontrolled (POA) - likely due to BP meds on hold on admission due to JOSH and aggressive IVF  - home meds resumed  and will stop IVF  - stable for transfer to medical    Thrombocytopenia (POA) - unclear etiology  - improved today  - no evidence of bleeding  - stop SC heparin    Code status: full  DVT prophylaxis: changed heparin to J[address] 7 discussed with: Patient/Family, Nurse and . Spoke to irais Aiken by phone   Disposition: Likely to [facility] Problems  Date Reviewed: 2018          Codes Class Noted POA    * (Principal)Type 1 diabetes mellitus with hyperglycemia [facility]) ICD-10-CM: E10.65  ICD-9-CM: 250.01  2018 Unknown            Review of Systems:   Pertinent items are noted in HPI. Vital Signs:    Last 24hrs VS reviewed since prior progress note. Most recent are:  Visit Vitals    BP (!) 147/97 (BP 1 Location: Left arm, BP Patient Position: At rest)    Pulse 96    Temp 97.2 °F (36.2 °C)    Resp 16    Ht 5' 5\" (1.651 m)    Wt 59.5 kg (131 lb 3.2 oz)    SpO2 99%    BMI 21.83 kg/m2       Intake/Output Summary (Last 24 hours) at 08/24/18 0902  Last data filed at 08/24/18 0703   Gross per 24 hour   Intake                0 ml   Output             1600 ml   Net            -1600 ml      Physical Examination:     Constitutional:  awake, no acute distress, cooperative, pleasant    ENT:  oral mucosa moist, oropharynx benign    Resp:  CTA bilaterally, no wheezing/rhonchi/rales   CV:  regular rhythm, normal rate, no m/r/g appreciated, no edema    GI:  +BS, soft, non distended, non tender     Musculoskeletal:  ELDRIDGE    Neurologic:  AAOx2 to person and place, NFD       Data Review:    Review and/or order of clinical lab test  Review and/or order of tests in the radiology section of CPT  Review and/or order of tests in the medicine section of CPT    Labs:     Recent Labs      08/23/18   0350  08/22/18   0400   WBC  7.5  8.1   HGB  12.4  12.4   HCT  35.1*  34.5*   PLT  135*  138*     Recent Labs      08/23/18   0350  08/22/18   0400   NA  143  139   K  3.7  3.6   CL  111*  104   CO2  21  24   BUN  17  23*   CREA  1.17  1.23   GLU  97  262*   CA  8.6  8.6   MG  2.0  1.5*     Recent Labs      08/22/18   0400   SGOT  39*   ALT  35   AP  108   TBILI  0.6   TP  7.2   ALB  3.4*   GLOB  3.8     No results for input(s): INR, PTP, APTT in the last 72 hours. No lab exists for component: INREXT, INREXT   No results for input(s): FE, TIBC, PSAT, FERR in the last 72 hours. No results found for: FOL, RBCF   No results for input(s): PH, PCO2, PO2 in the last 72 hours. No results for input(s): CPK, CKNDX, TROIQ in the last 72 hours.     No lab exists for component: CPKMB  Lab Results   Component Value Date/Time Cholesterol, total 116 08/21/2018 03:59 AM    HDL Cholesterol 47 08/21/2018 03:59 AM    LDL, calculated 56.4 08/21/2018 03:59 AM    Triglyceride 63 08/21/2018 03:59 AM    CHOL/HDL Ratio 2.5 08/21/2018 03:59 AM     Lab Results   Component Value Date/Time    Glucose (POC) 94 08/24/2018 07:11 AM    Glucose (POC) 61 (L) 08/24/2018 06:44 AM    Glucose (POC) 155 (H) 08/23/2018 09:33 PM    Glucose (POC) 158 (H) 08/23/2018 04:19 PM    Glucose (POC) 256 (H) 08/23/2018 11:13 AM     Lab Results   Component Value Date/Time    Color YELLOW/STRAW 08/20/2018 12:30 PM    Appearance CLEAR 08/20/2018 12:30 PM    Specific gravity 1.021 08/20/2018 12:30 PM    pH (UA) 5.5 08/20/2018 12:30 PM    Protein NEGATIVE  08/20/2018 12:30 PM    Glucose >1000 (A) 08/20/2018 12:30 PM    Ketone TRACE (A) 08/20/2018 12:30 PM    Bilirubin NEGATIVE  08/20/2018 12:30 PM    Urobilinogen 0.2 08/20/2018 12:30 PM    Nitrites NEGATIVE  08/20/2018 12:30 PM    Leukocyte Esterase NEGATIVE  08/20/2018 12:30 PM    Epithelial cells FEW 08/20/2018 12:30 PM    Bacteria NEGATIVE  08/20/2018 12:30 PM    WBC 0-4 08/20/2018 12:30 PM    RBC 0-5 08/20/2018 12:30 PM     Medications Reviewed:     Current Facility-Administered Medications   Medication Dose Route Frequency    insulin glargine (LANTUS) injection 25 Units  25 Units SubCUTAneous QHS    insulin lispro (HUMALOG) injection 4 Units  4 Units SubCUTAneous TIDAC    hydrALAZINE (APRESOLINE) 20 mg/mL injection 10 mg  10 mg IntraVENous Q6H PRN    amLODIPine (NORVASC) tablet 10 mg  10 mg Oral DAILY    aspirin (ASPIRIN) tablet 325 mg  325 mg Oral DAILY    atorvastatin (LIPITOR) tablet 20 mg  20 mg Oral QHS    losartan (COZAAR) tablet 100 mg  100 mg Oral DAILY    sodium chloride (NS) flush 5-10 mL  5-10 mL IntraVENous Q8H    sodium chloride (NS) flush 5-10 mL  5-10 mL IntraVENous PRN    acetaminophen (TYLENOL) tablet 650 mg  650 mg Oral Q4H PRN    ondansetron (ZOFRAN) injection 4 mg  4 mg IntraVENous Q4H PRN  insulin lispro (HUMALOG) injection   SubCUTAneous AC&HS    glucose chewable tablet 16 g  4 Tab Oral PRN    dextrose (D50W) injection syrg 12.5-25 g  12.5-25 g IntraVENous PRN    glucagon (GLUCAGEN) injection 1 mg  1 mg IntraMUSCular PRN     ______________________________________________________________________  EXPECTED LENGTH OF STAY: 3d 19h  ACTUAL LENGTH OF STAY:          4                 Emery Gonzales MD

## 2018-08-24 NOTE — PROGRESS NOTES
Addendum 12:04PM   PT re-evaluated and has been cleared patient for [facility] PT at this time. Patient and his son, Sandy Ramirez are agreeable to new recommendations. [facility] accepted and called [facility] to confirm services. Added to AVS. No further case management needs identified. CM will be available in case needs arise. CATHERINE Hoskins,CRM     Addendum 11:25AM   Met with patient and his son, Sandy Ramirez at bedside to discuss transitions of care. Please note, patient's step daughter, Benjie Marsh was present via phone. Sandy Ramirez expressed concerns about patient discharging home today. Per Sandy Ramirez,  patient lives in a  2 level home and his bedroom on 2nd floor with 3 exterior steps and 13 interior steps to access bedroom. Called rehab dept to request physical therapy session prior to discharge. Notified RN and care manager supervisor of patient's disposition. Addendum 10:04AM  Spoke with care manager supervisor, Yvette to discuss plan. Yvette to f/u with [facility] regarding [id number] review. Addendum 9:58AM   Met with patient at bedside to discuss transitions of care. Patient is willing to discharge home with [facility] and family assistance and does not have preference to [facility] agency. Called patient's son, Sandy Ramirez to discuss transitions of care and is agreeable to plan. Sent referral to [place] [address] and [facility] via [employer]; awaiting response. Patient's son, Sandy Ramirez will transport @ 11:00AM. CM will continue to follow. CATHERINE Hoskins, CRM       Spoke with attending physician to discuss transitions of care. Patient expected to discharge today. Attending did not receive call for [id number] review yesterday. Called Emilie to inquire about bed placement. Patient accepted pending insurance auth. Requested [facility] orders (Plan B) in case Gabriella Pouch is not obtained today. Notifed care manager supervisor; awaiting call back. Patient to transfer to [address] [place] [address]. Provided SBAR to care manager on unit.    CATHERINE Hoskins,CRM

## 2018-08-24 NOTE — PROGRESS NOTES
Problem: Pressure Injury - Risk of  Goal: *Prevention of pressure injury  Document Doni Scale and appropriate interventions in the flowsheet. Outcome: Progressing Towards Goal  Pressure Injury Interventions:  Sensory Interventions: Assess changes in LOC    Moisture Interventions: Apply protective barrier, creams and emollients    Activity Interventions: Increase time out of bed    Mobility Interventions: HOB 30 degrees or less    Nutrition Interventions: Document food/fluid/supplement intake    Friction and Shear Interventions: Minimize layers               Problem: Falls - Risk of  Goal: *Absence of Falls  Document Krishna Fall Risk and appropriate interventions in the flowsheet.    Outcome: Progressing Towards Goal  Fall Risk Interventions:  Mobility Interventions: PT Consult for mobility concerns    Mentation Interventions: Reorient patient    Medication Interventions: Teach patient to arise slowly    Elimination Interventions: Call light in reach

## 2018-08-24 NOTE — DISCHARGE INSTRUCTIONS
Discharge Instructions       PATIENT ID: Carlos Rojas  MRN: [id number]   YOB: 1945    DATE OF ADMISSION: 8/20/2018 11:31 AM    DATE OF DISCHARGE: 8/24/2018    PRIMARY CARE PROVIDER: Ferrell Soulier, MD     ATTENDING PHYSICIAN: Rajesh Mills MD  DISCHARGING PROVIDER: Rajesh Mills MD    To contact this individual call [phone] and ask the  to page. If unavailable ask to be transferred the Adult Hospitalist Department. DISCHARGE DIAGNOSES HHS    CONSULTATIONS: None    PROCEDURES/SURGERIES: * No surgery found *    PENDING TEST RESULTS:   At the time of discharge the following test results are still pending:     FOLLOW UP APPOINTMENTS:   Follow-up Information     Follow up With Details Comments Marcela [facility] [address] [address]  [phone]      Ferrell Soulier, MD In 1 week pl readjust insulin  [facility]  [facility]  [phone]             ADDITIONAL CARE RECOMMENDATIONS:     DIET: Diabetic Diet    ACTIVITY: Activity as tolerated    WOUND CARE:     EQUIPMENT needed:       DISCHARGE MEDICATIONS:   See Medication Reconciliation Form    · It is important that you take the medication exactly as they are prescribed. · Keep your medication in the bottles provided by the pharmacist and keep a list of the medication names, dosages, and times to be taken in your wallet. · Do not take other medications without consulting your doctor. NOTIFY YOUR PHYSICIAN FOR ANY OF THE FOLLOWING:   Fever over 101 degrees for 24 hours. Chest pain, shortness of breath, fever, chills, nausea, vomiting, diarrhea, change in mentation, falling, weakness, bleeding. Severe pain or pain not relieved by medications. Or, any other signs or symptoms that you may have questions about.       DISPOSITION:    Home With:  x OT x PT x HH  RN      x SNF/Inpatient Rehab/LTAC    Independent/assisted living    Hospice Other:     CDMP Checked:   Yes x     PROBLEM LIST Updated:  Yes x       Signed:   Ami Goodrich MD  8/24/2018  9:10 AM

## 2018-08-27 ENCOUNTER — OFFICE VISIT (OUTPATIENT)
Dept: INTERNAL MEDICINE CLINIC | Age: 73
End: 2018-08-27

## 2018-08-27 VITALS
TEMPERATURE: 97.6 F | WEIGHT: 140 LBS | SYSTOLIC BLOOD PRESSURE: 134 MMHG | BODY MASS INDEX: 23.32 KG/M2 | RESPIRATION RATE: 14 BRPM | OXYGEN SATURATION: 97 % | HEART RATE: 103 BPM | HEIGHT: 65 IN | DIASTOLIC BLOOD PRESSURE: 74 MMHG

## 2018-08-27 DIAGNOSIS — I10 ESSENTIAL HYPERTENSION: ICD-10-CM

## 2018-08-27 DIAGNOSIS — Z09 HOSPITAL DISCHARGE FOLLOW-UP: Primary | ICD-10-CM

## 2018-08-27 DIAGNOSIS — E11.65 POORLY CONTROLLED DIABETES MELLITUS (HCC): ICD-10-CM

## 2018-08-27 RX ORDER — INSULIN LISPRO 100 [IU]/ML
4 INJECTION, SOLUTION INTRAVENOUS; SUBCUTANEOUS
COMMUNITY
End: 2020-01-01

## 2018-08-27 NOTE — DISCHARGE SUMMARY
Discharge Summary       PATIENT ID: Seble Costa  MRN: [id number]   YOB: 1945    DATE OF ADMISSION: 8/20/2018 11:31 AM    DATE OF DISCHARGE: 8/24/18   PRIMARY CARE PROVIDER: Lilian De La Rosa MD     ATTENDING PHYSICIAN: Obdulia Glilespie  DISCHARGING PROVIDER: Raegan Verde MD    To contact this individual call [phone] and ask the  to page. If unavailable ask to be transferred the Adult Hospitalist Department. CONSULTATIONS: None    PROCEDURES/SURGERIES: * No surgery found *    ADMITTING DIAGNOSES & HOSPITAL COURSE:   69 yo man with Dm2, HTN, nonadherence was sent by his PCP's office on 8/20/18 with high blood sugar and confusion. The patient has been confused and lying in bed most of the time for the last couple of weeks. He has poor appetite, eating only one meal a day but reportedly taking his diabetic medication regularly. He was admitted with hyperosmolar hyperglycemic syndrome (HHS).       Interval history / Subjective:   Doing OK awaiting family deciding SNF and peer to peer call      Assessment & Plan:      DM2 with HHS (POA)   - resolved with home Lantus and IVF  - A1c 11.4  - LDL 56, TG 63     Acute encephalopathy (POA) - likely due to metabolic etiology  - CT head no acute  - seems resolved to baseline  - PT/OT evals: SNF recommended but insurance denied     Pseudohyponatremia (POA) - Na corrected for hyperglycemia is WNL     JOSH (POA) - no evidence of CKD  - likely due to prerenal azotemia  - resolved with IVF  - resumed home ARB     HTN uncontrolled (POA) - likely due to BP meds on hold on admission     Thrombocytopenia (POA) - unclear etiology  - improved today  - no evidence of bleeding  - stop SC heparin           PENDING TEST RESULTS:   At the time of discharge the following test results are still pending:     FOLLOW UP APPOINTMENTS:    Follow-up Information     Follow up With Details Comments Contact [facility].  Please call agency @ [phone] if you haven't heard by 10am after discharge  [address] [facility], [address]    Rosario Messina MD In 1 week pl readjust insulin  [facility]  [address]  [phone]             ADDITIONAL CARE RECOMMENDATIONS:     DIET: Regular Diet, Cardiac Diet and Diabetic Diet    ACTIVITY: Activity as tolerated    WOUND CARE:     EQUIPMENT needed:       DISCHARGE MEDICATIONS:  Discharge Medication List as of 8/24/2018 12:59 PM      START taking these medications    Details   insulin lispro (HUMALOG) 100 unit/mL injection 4 Units by SubCUTAneous route Before breakfast, lunch, and dinner. Can be substituted with the PEN  Indications: type 1 diabetes mellitus, Print, Disp-1 Vial, R-0         CONTINUE these medications which have NOT CHANGED    Details   insulin glargine (LANTUS SOLOSTAR U-100 INSULIN) 100 unit/mL (3 mL) inpn 25 Units by SubCUTAneous route daily. , Historical Med      losartan (COZAAR) 100 mg tablet TAKE 1 TABLET BY MOUTH EVERY DAY, Normal, Disp-90 Tab, R-2      atorvastatin (LIPITOR) 20 mg tablet TAKE 1 TAB BY MOUTH DAILY., Normal, Disp-90 Tab, R-1      metFORMIN (GLUCOPHAGE) 500 mg tablet Take 1 Tab by mouth three (3) times daily (with meals). , No Print, Disp-90 Tab, R-5      amLODIPine (NORVASC) 10 mg tablet Take 1 Tab by mouth daily. , NormalPATIENT'S INS REQUIRES 90D RX. PLEASE SEND NEW RX FOR 90DAYS AND WE WILL FILL WHEN DUE. THANKSDisp-90 Tab, R-3      LACTOSE-REDUCED FOOD (BOOST PO) Take  by mouth., Historical Med      DOCOSAHEXANOIC ACID/EPA (FISH OIL PO) Take  by mouth., Historical Med      vit B Cmplx 3-FA-Vit C-Biotin (NEPHRO ALEN RX) 1- mg-mg-mcg tablet Take 1 Tab by mouth daily. OTC, 1 Tab, Disp-30 Tab, R-3      aspirin (ASPIRIN) 325 mg tablet Take 325 mg by mouth daily.   Historical Med, 325 mg               NOTIFY YOUR PHYSICIAN FOR ANY OF THE FOLLOWING:   Fever over 101 degrees for 24 hours.   Chest pain, shortness of breath, fever, chills, nausea, vomiting, diarrhea, change in mentation, falling, weakness, bleeding. Severe pain or pain not relieved by medications. Or, any other signs or symptoms that you may have questions about.     DISPOSITION:   x Home With:  x OT x PT x HH  RN       Long term SNF/Inpatient Rehab    Independent/assisted living    Hospice    Other:       PATIENT CONDITION AT DISCHARGE:     Functional status   x Poor     Deconditioned     Independent      Cognition   x  Lucid     Forgetful     Dementia      Catheters/lines (plus indication)    Green     PICC     PEG    x None      Code status   x  Full code     DNR      PHYSICAL EXAMINATION AT DISCHARGE:   Refer to Progress Note      CHRONIC MEDICAL DIAGNOSES:  Problem List as of 8/24/2018  Date Reviewed: 8/24/2018          Codes Class Noted - Resolved    DM (diabetes mellitus) type II controlled with renal manifestation ([facility] ICD-10-CM: E11.29  ICD-9-CM: 250.40  4/20/2016 - Present        Screening for colon cancer ICD-10-CM: Z12.11  ICD-9-CM: V76.51  6/18/2014 - Present        Cataracts, bilateral ICD-10-CM: H26.9  ICD-9-CM: 366.9  11/15/2013 - Present    Overview Signed 6/18/2014 11:37 AM by Artur Castro 4/2014             Glaucoma, right eye ICD-10-CM: H40.9  ICD-9-CM: 365.9  11/15/2013 - Present        HTN (hypertension) ICD-10-CM: I10  ICD-9-CM: 401.9  7/2/2012 - Present        * (Principal)RESOLVED: Type 1 diabetes mellitus with hyperglycemia ([facility] ICD-10-CM: E10.65  ICD-9-CM: 250.01  8/20/2018 - 8/24/2018        RESOLVED: Right shoulder pain ICD-10-CM: M25.511  ICD-9-CM: 719.41  11/28/2012 - 4/20/2016        RESOLVED: HTN (hypertension) ICD-10-CM: I10  ICD-9-CM: 401.9  7/2/2012 - 11/28/2012        RESOLVED: Diabetes mellitus type 2 in nonobese ([facility] ICD-10-CM: E11.9  ICD-9-CM: 250.00  6/11/2012 - 4/20/2016    Overview Signed 6/11/2012 11:23 AM by Siva Bruce NP     2002 dx RESOLVED: Hyperglycemia without ketosis ICD-10-CM: R73.9  ICD-9-CM: 790.29  5/26/2012 - 5/29/2012        RESOLVED: Seizure ([address] ICD-10-CM: R56.9  ICD-9-CM: 780.39  5/26/2012 - 5/29/2012              Greater than 30  minutes were spent with the patient on counseling and coordination of care    Signed:   Kelly Fuentes MD  8/27/2018  3:47 PM

## 2018-08-27 NOTE — PATIENT INSTRUCTIONS
Crystal Light powder in water for drinks    If you eat ice cream, pay attention to the serving size recommendation on the container

## 2018-08-27 NOTE — PROGRESS NOTES
Patient's identity verified with two patient identifiers (name and date of birth). Reviewed record in preparation for visit and have obtained necessary documentation. 1. Have you been to the ER, urgent care clinic since your last visit? Hospitalized since your last visit? No  2. Have you seen or consulted any other health care providers outside of the [address] [place] [address] [place] since your last visit? Include any pap smears or colon screening. No    Chief Complaint   Patient presents with   [facility] Follow Up     [facility] admit 8/20-8/24 for DM, HTN. Good appetite, nml sleeping & BM. Concern for medications causing short term memory, would like to discuss if from meds vs DM. Checks BS qhs, 190 last night. Not fasting. Health Maintenance Due   Topic Date Due    MICROALBUMIN Q1  08/15/2018       Wt Readings from Last 3 Encounters:   08/27/18 140 lb (63.5 kg)   08/24/18 131 lb 3.2 oz (59.5 kg)   04/16/18 133 lb 3.2 oz (60.4 kg)     Temp Readings from Last 3 Encounters:   08/27/18 97.6 °F (36.4 °C) (Oral)   08/24/18 97 °F (36.1 °C)   08/20/18 97 °F (36.1 °C) (Oral)     BP Readings from Last 3 Encounters:   08/27/18 134/74   08/24/18 129/74   08/20/18 110/60     Pulse Readings from Last 3 Encounters:   08/27/18 (!) 103   08/24/18 96   08/20/18 (!) 107     Patient is accompanied by son I have received verbal consent from Killian Vogt to discuss any/all medical information while they are present in the room.

## 2018-08-27 NOTE — MR AVS SNAPSHOT
[address] [facility], [address] [place] [address] 
[phone] Patient: Claudia Cho MRN: [id number]:[id number] Visit Information Date & Time Provider Department Dept. Phone Encounter #  
 8/27/2018  2:00 PM Oren Barker NP [facility] [id number] Follow-up Instructions Return in about 4 weeks (around 9/24/2018) for Dr. Diogo Hampton for hospital follow up and Diabetes. Upcoming Health Maintenance Date Due MICROALBUMIN Q1 8/15/2018 GLAUCOMA SCREENING Q2Y 10/31/2018* EYE EXAM RETINAL OR DILATED Q1 10/31/2018* Influenza Age 5 to Adult 10/31/2018* HEMOGLOBIN A1C Q6M 2/20/2019 FOOT EXAM Q1 4/16/2019 MEDICARE YEARLY EXAM 8/21/2019 LIPID PANEL Q1 8/21/2019 COLONOSCOPY 2/16/2021 DTaP/Tdap/Td series (2 - Td) 3/18/2026 *Topic was postponed. The date shown is not the original due date. Allergies as of 8/27/2018  Review Complete On: 8/27/2018 By: Oren Barker NP No Known Allergies Current Immunizations  Reviewed on 8/20/2018 Name Date Influenza High Dose Vaccine PF 12/18/2017, 11/29/2016 Influenza Vaccine 10/30/2014, 10/14/2013 Influenza Vaccine Whole 11/1/2012 ZZZ-RETIRED (DO NOT USE) Pneumococcal Vaccine (Unspecified Type) 11/28/2012, 5/29/2012  9:16 AM  
  
 Not reviewed this visit You Were Diagnosed With   
  
 Codes Comments Hospital discharge follow-up    -  Primary ICD-10-CM: [facility] ICD-9-CM: V67.59 Poorly controlled diabetes mellitus ([facility] [address]     ICD-10-CM: E11.65 ICD-9-CM: 250.00 Essential hypertension     ICD-10-CM: I10 
ICD-9-CM: 401.9 Vitals BP Pulse Temp Resp Height(growth percentile) Weight(growth percentile) 134/74 (BP 1 Location: Left arm, BP Patient Position: Sitting) (!) 103 97.6 °F (36.4 °C) (Oral) 14 5' 5\" (1.651 m) 140 lb (63.5 kg) SpO2 BMI Smoking Status 97% 23.3 kg/m2 Never Smoker Vitals History BMI and BSA Data Body Mass Index Body Surface Area  
 23.3 kg/m 2 1.71 m 2 Preferred Pharmacy Pharmacy Name Phone [id number] [id number], [address] [place] [address] [phone] Your Updated Medication List  
  
   
This list is accurate as of 8/27/18  2:51 PM.  Always use your most recent med list. amLODIPine 10 mg tablet Commonly known as:  Kathleen Gables Take 1 Tab by mouth daily. aspirin 325 mg tablet Commonly known as:  ASPIRIN Take 325 mg by mouth daily. atorvastatin 20 mg tablet Commonly known as:  LIPITOR  
TAKE 1 TAB BY MOUTH DAILY. BOOST PO Take  by mouth. FISH OIL PO Take  by mouth. HumaLOG U-100 Insulin 100 unit/mL injection Generic drug:  insulin lispro 4 Units Before breakfast, lunch, and dinner. LANTUS SOLOSTAR U-100 INSULIN 100 unit/mL (3 mL) Inpn Generic drug:  insulin glargine 25 Units by SubCUTAneous route daily. losartan 100 mg tablet Commonly known as:  COZAAR  
TAKE 1 TABLET BY MOUTH EVERY DAY  
  
 metFORMIN 500 mg tablet Commonly known as:  GLUCOPHAGE Take 1 Tab by mouth three (3) times daily (with meals). vit B Cmplx 3-FA-Vit C-Biotin 1- mg-mg-mcg tablet Commonly known as:  NEPHRO ALEN RX Take 1 Tab by mouth daily. We Performed the Following METABOLIC PANEL, COMPREHENSIVE [96778 CPT(R)] Follow-up Instructions Return in about 4 weeks (around 9/24/2018) for Dr. Adelaide Mendoza for hospital follow up and Diabetes. Patient Instructions Crystal Light powder in water for drinks If you eat ice cream, pay attention to the serving size recommendation on the container Introducing [facility]! Yu Haque introduces [employer] patient portal. Now you can access parts of your medical record, email your doctor's office, and request medication refills online.    
 
1. In your internet browser, go to [email or web address] 2. Click on the First Time User? Click Here link in the Sign In box. You will see the New Member Sign Up page. 3. Enter your [employer] Access Code exactly as it appears below. You will not need to use this code after youve completed the sign-up process. If you do not sign up before the expiration date, you must request a new code. · [employer] Access Code: [email or web address] Expires: 11/18/2018 10:24 AM 
 
4. Enter the last four digits of your Social Security Number (xxxx) and Date of Birth (mm/dd/yyyy) as indicated and click Submit. You will be taken to the next sign-up page. 5. Create a [employer] ID. This will be your [employer] login ID and cannot be changed, so think of one that is secure and easy to remember. 6. Create a [employer] password. You can change your password at any time. 7. Enter your Password Reset Question and Answer. This can be used at a later time if you forget your password. 8. Enter your e-mail address. You will receive e-mail notification when new information is available in [address] 9. Click Sign Up. You can now view and download portions of your medical record. 10. Click the Download Summary menu link to download a portable copy of your medical information. If you have questions, please visit the Frequently Asked Questions section of the [employer] website. Remember, [employer] is NOT to be used for urgent needs. For medical emergencies, dial 911. Now available from your iPhone and Android! Please provide this summary of care documentation to your next provider. Your primary care clinician is listed as Wendie El. If you have any questions after today's visit, please call [phone].

## 2018-08-27 NOTE — PROGRESS NOTES
69 yo male here for hospital f/u. Was hospitalized at [facility] from 8/20-8/24 for poorly controlled Diabetes. He was sent there by Dr. Fidencio Sanz at the time of his office visit here on 8/20. He was on oral agents and Lantus 25 units only at time of admission, but was discharged on Humalog Lispro TID ac. HOWEVER, insurance didn't cover that one, so Regular Insulin is waiting at the pharmacy. He was on 30 units of Lantus at the hospital, but it appears the dose at discharge was to go back to the 25 units. [facility] came yesterday to evaluate him, and they will be following him at home. They suggested Home PT, too. Currently, patient lives with his son whose work hours vary, but he is there at night. Patient reports no falls at home. Patient reports his appetite is \"hungry all the time\". He has never used tobacco and doesn't drink alcohol. PE: WNWD BM is alert; he is accompanied by his son   BP - 134/74   Heart - RRR   Lungs - clear     Imp: Hospital Discharge f/u   DM Type 2 - poorly controlled    Plan: He will  Humalog (in place of Lispro) and take 4 units before each meal   Continue Lantus 25 units daily   CMP today   Discussed diet - [employer] Nurse will also help him with this   See Dr. Fidencio Sanz in 4 weeks  _______________________  Expected course of current diagnosed problem(s) as well as expected progression and possible complications, and desired follow up with provider are discussed with patient. Patient is encouraged to be back in touch with any questions or concerns. Patient expresses understanding of plan of care. Patient is given AVS which includes diagnoses, current medications, vitals.

## 2018-08-28 ENCOUNTER — TELEPHONE (OUTPATIENT)
Dept: INTERNAL MEDICINE CLINIC | Age: 73
End: 2018-08-28

## 2018-08-28 LAB
ALBUMIN SERPL-MCNC: 4.1 G/DL (ref 3.5–4.8)
ALBUMIN/GLOB SERPL: 1.3 {RATIO} (ref 1.2–2.2)
ALP SERPL-CCNC: 105 IU/L (ref 39–117)
ALT SERPL-CCNC: 30 IU/L (ref 0–44)
AST SERPL-CCNC: 27 IU/L (ref 0–40)
BILIRUB SERPL-MCNC: 0.5 MG/DL (ref 0–1.2)
BUN SERPL-MCNC: 21 MG/DL (ref 8–27)
BUN/CREAT SERPL: 21 (ref 10–24)
CALCIUM SERPL-MCNC: 9.8 MG/DL (ref 8.6–10.2)
CHLORIDE SERPL-SCNC: 98 MMOL/L (ref 96–106)
CO2 SERPL-SCNC: 19 MMOL/L (ref 20–29)
CREAT SERPL-MCNC: 1.02 MG/DL (ref 0.76–1.27)
GLOBULIN SER CALC-MCNC: 3.1 G/DL (ref 1.5–4.5)
GLUCOSE SERPL-MCNC: 413 MG/DL (ref 65–99)
POTASSIUM SERPL-SCNC: 4.8 MMOL/L (ref 3.5–5.2)
PROT SERPL-MCNC: 7.2 G/DL (ref 6–8.5)
SODIUM SERPL-SCNC: 137 MMOL/L (ref 134–144)

## 2018-08-28 NOTE — TELEPHONE ENCOUNTER
Received a call from Merry Hill with [address] [place] [address]. When she checked patient's blood sugar around 3 pm today it was 425. Patient is feeling fine. He woke up around noon, took 4 units of humalog and had breakfast. He took 25 units of lantus around 2 pm and metformin around 3 pm. Blood sugar at 4:30pm was 501. Patient is still feeling fine    Spoke with Dr Blanche Vázquez about the situation. She would like patient to take 8 units of humalog before lunch, push water and limit carb intake if possible.  Home health advised

## 2018-08-28 NOTE — TELEPHONE ENCOUNTER
Talked with patient's son about lab results - glucose was 413, lytes and renal fx - good. Plan: He is now getting 4 units of regular Insulin before each meal and 25 units of Lantus once/day   Discussed checking sugars fasting and 2 hrs after meals   Avoidance of manmade sweets.

## 2018-09-04 ENCOUNTER — TELEPHONE (OUTPATIENT)
Dept: INTERNAL MEDICINE CLINIC | Age: 73
End: 2018-09-04

## 2018-09-04 NOTE — TELEPHONE ENCOUNTER
Received call from Lanny Rosario [facility] nurse seeing patient. Two patient identifiers verified. States patient complains of feeling weak/jittery since last night. BS last night: 64.  BS this AM: 50, before food/insulin. Audrey Lopez states patient's son administered insulin (Humalog 4 units) at 0830, weakness/jitteriness increased per pt, also started having difficulty walking. Son administered lemonade PO, BS rechecked: 60. Son complains he has not had any education regarding insulin and blood sugar readings, unsure what to do. Consulted with Kody, ERICA, advised patient needs protein. Advised to skip insulin dose if blood sugar is below 60. Advised needs to recheck periodically throughout today, and encouraged to call us with questions/concerns regarding insulin doses or blood sugar levels. Audrey Lopez states son gave patient peanut butter earlier, but symptoms haven't changed. Reassured continue with protein snacks, symptoms will resolve in a short time, won't be immediate (patient has been feeling weak for some time, prior to admission 8/20/18). Patient does NOT have an endocrinologist, \"We only see Dr. Cristobal Pittman". Office number provided for Tutu THOMSON & Ajay, advised to call & schedule a visit. Advised referral can be placed. Patient's son & Audrey Lopez verbalize understanding and agree.

## 2018-09-05 DIAGNOSIS — E10.9 BRITTLE DIABETES MELLITUS (HCC): Primary | ICD-10-CM

## 2018-09-05 NOTE — PROGRESS NOTES
Patient's son is advised to call for an appointment with Endocrinologist due to patient's \"brittle\" Diabetes.

## 2018-09-11 NOTE — TELEPHONE ENCOUNTER
Referral placed by ERICA Gates, no appointment scheduled in [facility] as indicated by patient's chart. Patient scheduled for visit w/ Dr. Tory Schreiber 9/24/18 and needs education/referral/further encouragement.

## 2018-09-13 RX ORDER — INSULIN GLARGINE 100 [IU]/ML
INJECTION, SOLUTION SUBCUTANEOUS
Qty: 6 ADJUSTABLE DOSE PRE-FILLED PEN SYRINGE | Refills: 5 | Status: SHIPPED | OUTPATIENT
Start: 2018-09-13 | End: 2019-02-05 | Stop reason: SDUPTHER

## 2018-10-02 ENCOUNTER — TELEPHONE (OUTPATIENT)
Dept: INTERNAL MEDICINE CLINIC | Age: 73
End: 2018-10-02

## 2018-10-02 NOTE — TELEPHONE ENCOUNTER
Skyla with [facility], states the patient's blood sugar is 330. She is currently with the patient and would like to speak with a nurse, please call.

## 2018-10-02 NOTE — TELEPHONE ENCOUNTER
I called Esteban Moreau, [facility] Nurse, as requested. Patient had low blood sugars yesterday. This AM, reading was 335 before breakfast, he received the 4 unit pre-meal Insulin, and later it was 315. At 12:30 today, reading was 330. Patient is eating a high carb diet w/ little protein. He has not followed through with scheduling an appointment with an Endocrinologist, and missed his 9/24 appointment with Dr. Reinhold Dancer in our office. Esteban Moreau will contact patient's son to be sure the 4 unit pre-meal Insulin was given before lunch today. A 2 hr pp sugar will be checked. He is to be reminded again to schedule an appointment with Endocrinology, and to rescheduled appointment with Dr. Reinhold Dancer.

## 2018-11-18 DIAGNOSIS — Z79.4 CONTROLLED TYPE 2 DIABETES MELLITUS WITH DIABETIC NEPHROPATHY, WITH LONG-TERM CURRENT USE OF INSULIN (HCC): ICD-10-CM

## 2018-11-18 DIAGNOSIS — E11.21 CONTROLLED TYPE 2 DIABETES MELLITUS WITH DIABETIC NEPHROPATHY, WITH LONG-TERM CURRENT USE OF INSULIN (HCC): ICD-10-CM

## 2018-11-19 RX ORDER — ATORVASTATIN CALCIUM 20 MG/1
TABLET, FILM COATED ORAL
Qty: 90 TAB | Refills: 1 | Status: SHIPPED | OUTPATIENT
Start: 2018-11-19 | End: 2020-01-01

## 2018-12-04 ENCOUNTER — OFFICE VISIT (OUTPATIENT)
Dept: ENDOCRINOLOGY | Age: 73
End: 2018-12-04

## 2018-12-04 VITALS
WEIGHT: 140.6 LBS | DIASTOLIC BLOOD PRESSURE: 88 MMHG | HEIGHT: 65 IN | BODY MASS INDEX: 23.43 KG/M2 | HEART RATE: 100 BPM | SYSTOLIC BLOOD PRESSURE: 139 MMHG

## 2018-12-04 DIAGNOSIS — Z79.4 TYPE 2 DIABETES MELLITUS WITH DIABETIC NEPHROPATHY, WITH LONG-TERM CURRENT USE OF INSULIN (HCC): Primary | ICD-10-CM

## 2018-12-04 DIAGNOSIS — E11.21 TYPE 2 DIABETES MELLITUS WITH DIABETIC NEPHROPATHY, WITH LONG-TERM CURRENT USE OF INSULIN (HCC): Primary | ICD-10-CM

## 2018-12-04 RX ORDER — VITAMIN E 268 MG
400 CAPSULE ORAL DAILY
COMMUNITY
End: 2020-01-01

## 2018-12-04 NOTE — PROGRESS NOTES
Chief Complaint Patient presents with  Diabetes  
  pcp and pharmacy verified. Records reviewed History of Present Illness: [place] is a 68 y.o. male who I was asked to see in consult by Dr. Arely Butler, for evaluation of diabetes. Current regimen is Lantus 24 units every morning (son does not give if his blood sugar is under 90) and Humalog 4 units with each meal. Checks blood sugars 4-5 times per day. His son notes his BGs are very erratic and can run in the  's range. His son denies BGs less than 79. Most recent Hgb A1c was 11.4% in August 2018, when he was admitted for [facility]. A typical day is as follows: 
- breakfast: Yesterday for breakfast he had eggs, sausage and two slices of bread and boost shake. 
- lunch: He will have a sandwich and herbal tea in the afternoon. - dinner: Last night he had steak and eggs and water. Exercise consists of very little. \"I am retired so I don't do much\". No history of vascular disease. Pt has hx of Nephropathy (Urine MA/Cr 260 in August 2017). Last eye exam was \"a year and a half ago\". Past Medical History:  
Diagnosis Date  Diabetes ([address] 2002  High cholesterol  Hypertension History reviewed. No pertinent surgical history. Current Outpatient Medications Medication Sig  
 vitamin E (AQUA GEMS) 400 unit capsule Take  by mouth daily.  atorvastatin (LIPITOR) 20 mg tablet TAKE 1 TAB BY MOUTH DAILY.  LANTUS SOLOSTAR U-100 INSULIN 100 unit/mL (3 mL) inpn INJECT 25 UNITS UNDER THE SKIN EVERY DAY AS DIRECTED (Patient taking differently: INJECT 24 UNITS UNDER THE SKIN EVERY DAY AS DIRECTED)  insulin lispro (HUMALOG U-100 INSULIN) 100 unit/mL injection 4 Units Before breakfast, lunch, and dinner.  losartan (COZAAR) 100 mg tablet TAKE 1 TABLET BY MOUTH EVERY DAY  metFORMIN (GLUCOPHAGE) 500 mg tablet Take 1 Tab by mouth three (3) times daily (with meals).  amLODIPine (NORVASC) 10 mg tablet Take 1 Tab by mouth daily.  LACTOSE-REDUCED FOOD (BOOST PO) Take  by mouth.  DOCOSAHEXANOIC ACID/EPA (FISH OIL PO) Take  by mouth.  vit B Cmplx 3-FA-Vit C-Biotin (NEPHRO ALEN RX) 1- mg-mg-mcg tablet Take 1 Tab by mouth daily.  aspirin (ASPIRIN) 325 mg tablet Take 325 mg by mouth daily. No current facility-administered medications for this visit. No Known Allergies Family History Problem Relation Age of Onset  No Known Problems Mother  No Known Problems Father  No Known Problems Sister  No Known Problems Brother  No Known Problems Brother  No Known Problems Brother  No Known Problems Brother  No Known Problems Brother  No Known Problems Brother  No Known Problems Maternal Grandmother  No Known Problems Maternal Grandfather  No Known Problems Paternal Grandmother  No Known Problems Paternal Grandfather  Diabetes Neg Hx   
 Heart Disease Neg Hx Social History Socioeconomic History  Marital status:  Spouse name: Not on file  Number of children: Not on file  Years of education: Not on file  Highest education level: Not on file Social Needs  Financial resource strain: Not on file  Food insecurity - worry: Not on file  Food insecurity - inability: Not on file  Transportation needs - medical: Not on file  Transportation needs - non-medical: Not on file Occupational History  Not on file Tobacco Use  Smoking status: Never Smoker  Smokeless tobacco: Never Used Substance and Sexual Activity  Alcohol use: No  
  Alcohol/week: 0.0 oz  Drug use: No  
 Sexual activity: No  
Other Topics Concern  Not on file Social History Narrative ** Merged History Encounter ** Review of Systems: 
- Constitutional Symptoms: no fevers, chills, weight loss - Eyes: no blurry vision or double vision - Cardiovascular: no chest pain or palpitations - Respiratory: no cough or shortness of breath 
- Gastrointestinal: no dysphagia or abdominal pain - Musculoskeletal: no joint pains or weakness - Integumentary: no rashes - Neurological: no numbness, tingling, or headaches - Psychiatric: no depression or anxiety - Endocrine: no heat or cold intolerance, no polyuria or polydipsia Physical Examination: 
Blood pressure 139/88, pulse 100, height 5' 5\" (1.651 m), weight 140 lb 9.6 oz (63.8 kg). - General: pleasant, no distress, good eye contact 
- HEENT: no exopthalmos, no periorbital edema, no scleral/conjunctival injection, EOMI, no lid lag or stare 
- Neck: supple, no thyromegaly, masses, lymph nodes, or carotid bruits, no supraclavicular or dorsocervical fat pads - Cardiovascular: regular, normal rate, normal S1 and S2, no murmurs/rubs/gallops, 2+ dorsalis pedis pulses bilaterally - Respiratory: clear to auscultation bilaterally - Gastrointestinal: soft, nontender, nondistended, no masses, no hepatosplenomegaly - Musculoskeletal: no proximal muscle weakness in upper or lower extremities - Integumentary: no acanthosis nigricans, no abdominal striae, no rashes, no edema, right small to is black from recent injury. No ulcers. Right great toe dystrophic from fungus. 
- Psychiatric: normal mood and affect Diabetic foot exam:  
 
Left Foot: 
 Visual Exam: callous - present Pulse DP: 1+ (weak) Filament test: reduced sensation Vibratory sensation: normal 
   
Right Foot: 
 Visual Exam: callous - present Pulse DP: 1+ (weak) Filament test: reduced sensation Vibratory sensation: absent Data Reviewed:  
Component Latest Ref Rng & Units 8/27/2018 8/21/2018 8/20/2018  
 
      3:00 PM  3:59 AM 11:55 AM  
Glucose 65 - 99 mg/dL 413 (H) BUN 
    8 - 27 mg/dL 21 Creatinine 
    0.76 - 1.27 mg/dL 1.02    
GFR est non-AA 
    >59 mL/min/1.73 73 GFR est AA 
 >59 mL/min/1.73 84 BUN/Creatinine ratio 10 - 24 21 Sodium 134 - 144 mmol/L 137 Potassium 3.5 - 5.2 mmol/L 4.8 Chloride 96 - 106 mmol/L 98    
CO2 
    20 - 29 mmol/L 19 (L) Calcium 8.6 - 10.2 mg/dL 9.8 Protein, total 
    6.0 - 8.5 g/dL 7.2 Albumin 3.5 - 4.8 g/dL 4.1 GLOBULIN, TOTAL 
    1.5 - 4.5 g/dL 3.1 A-G Ratio 1.2 - 2.2 1.3 Bilirubin, total 
    0.0 - 1.2 mg/dL 0.5 Alk. phosphatase 39 - 117 IU/L 105 AST 
    0 - 40 IU/L 27 ALT (SGPT) 0 - 44 IU/L 30 Cholesterol, total 
    <200 MG/DL  116 Triglyceride 
    <150 MG/DL  63 HDL Cholesterol MG/DL  47 LDL, calculated 0 - 100 MG/DL  56.4 VLDL, calculated MG/DL  12.6 CHOL/HDL Ratio 0 - 5.0    2.5 Hemoglobin A1c, (calculated) 4.2 - 6.3 %   11.4 (H) Est. average glucose 
    mg/dL   280 Assessment/Plan: 1. Type 2 diabetes mellitus with diabetic nephropathy, with long-term current use of insulin ([address] 1) His son notes that he has been holding the Lantus if pt's BGs are 90 or less because of concerns for hypoglycemia. Pt instructed to give pt Lantus 24 units every morning and not hold it and continue the Humalog 4 units with each meal. 
Pt to check his BGs AC/HS and mail his BG logs to me in 2 weeks. With his hx of neuropathy and calluses, he would benefit from DM shoes and inserts. Pt instructed to apply Vicks to his right great toe BID to treat the fungus. Pt instructed to use lotion at bedtime to help with the dry feet and calluses. Pt given contact information for local podiatrists and ophthalmologists. Pt voices understanding and agreement with the plan. Pain noted and pt was recommended to call his PCP for further evaluation and treatment, as needed RTC 3 months We spent 60 minutes of face to face time together and > 50% of the time was spent in counseling on diabetes management and determining what changes to make to his insulin regimen. Patient Instructions 1) Take Lantus 24 units every morning. Do not hold or stop the Lantus 2) Take Humalog 4 units with each meal. 
3) Check your blood sugars before each meal and at bedtime. Keep a log of your sugars and mail them to me in 2 weeks. 4) Get Vicks VaporRub and apply to your right great toe nail twice per day to kill the fungus in that toe. 5) Every night before you go to bed apply lotion to your feet and put socks on your feet. Follow-up Disposition: 
Return in about 3 months (around 3/4/2019). Copy sent to: 
Dr. Tate Client

## 2018-12-04 NOTE — LETTER
12/4/2018 9:56 AM 
 
Patient:  Cathy Rashid YOB: 1945 Date of Visit: 12/4/2018 Dear Tom Rogers MD 
[address] [place] [address] [address] [facility] [address] VIA In Basket 
 : Thank you for referring Mr. Katy Diaz to me for evaluation/treatment. Below are the relevant portions of my assessment and plan of care. If you have questions, please do not hesitate to call me. I look forward to following Mr. Daniel Gregorio along with you. Sincerely, Martin Mcleod MD

## 2018-12-04 NOTE — PATIENT INSTRUCTIONS
1) Take Lantus 24 units every morning. Do not hold or stop the Lantus 2) Take Humalog 4 units with each meal. 
3) Check your blood sugars before each meal and at bedtime. Keep a log of your sugars and mail them to me in 2 weeks. 4) Get Vicks VaporRub and apply to your right great toe nail twice per day to kill the fungus in that toe. 5) Every night before you go to bed apply lotion to your feet and put socks on your feet.

## 2018-12-05 LAB
ALBUMIN/CREAT UR: 251.7 MG/G CREAT (ref 0–30)
CREAT UR-MCNC: 93.3 MG/DL
EST. AVERAGE GLUCOSE BLD GHB EST-MCNC: 220 MG/DL
HBA1C MFR BLD: 9.3 % (ref 4.8–5.6)
MICROALBUMIN UR-MCNC: 234.8 UG/ML

## 2018-12-05 NOTE — PROGRESS NOTES
Spoke with patient's son about his father's lab. The A1C was 9.3% and he does have evidence of urine albuminuria.

## 2019-02-02 ENCOUNTER — APPOINTMENT (OUTPATIENT)
Dept: CT IMAGING | Age: 74
End: 2019-02-02
Attending: EMERGENCY MEDICINE
Payer: MEDICARE

## 2019-02-02 ENCOUNTER — HOSPITAL ENCOUNTER (EMERGENCY)
Age: 74
Discharge: HOME | End: 2019-02-02
Attending: EMERGENCY MEDICINE
Payer: MEDICARE

## 2019-02-02 ENCOUNTER — APPOINTMENT (OUTPATIENT)
Dept: GENERAL RADIOLOGY | Age: 74
End: 2019-02-02
Attending: EMERGENCY MEDICINE
Payer: MEDICARE

## 2019-02-02 VITALS
OXYGEN SATURATION: 99 % | TEMPERATURE: 98.2 F | WEIGHT: 138.89 LBS | HEART RATE: 101 BPM | DIASTOLIC BLOOD PRESSURE: 96 MMHG | SYSTOLIC BLOOD PRESSURE: 158 MMHG | HEIGHT: 65 IN | RESPIRATION RATE: 14 BRPM | BODY MASS INDEX: 23.14 KG/M2

## 2019-02-02 DIAGNOSIS — E16.2 HYPOGLYCEMIA: ICD-10-CM

## 2019-02-02 DIAGNOSIS — R41.82 ALTERED MENTAL STATUS, UNSPECIFIED ALTERED MENTAL STATUS TYPE: Primary | ICD-10-CM

## 2019-02-02 LAB
ALBUMIN SERPL-MCNC: 3.6 G/DL (ref 3.5–5)
ALBUMIN/GLOB SERPL: 0.9 {RATIO} (ref 1.1–2.2)
ALP SERPL-CCNC: 86 U/L (ref 45–117)
ALT SERPL-CCNC: 25 U/L (ref 12–78)
ANION GAP SERPL CALC-SCNC: 10 MMOL/L (ref 5–15)
APPEARANCE UR: CLEAR
APTT PPP: 23.9 SEC (ref 22.1–32)
AST SERPL-CCNC: 17 U/L (ref 15–37)
BACTERIA URNS QL MICRO: NEGATIVE /HPF
BASOPHILS # BLD: 0 K/UL (ref 0–0.1)
BASOPHILS NFR BLD: 0 % (ref 0–1)
BILIRUB SERPL-MCNC: 1 MG/DL (ref 0.2–1)
BILIRUB UR QL: NEGATIVE
BUN SERPL-MCNC: 23 MG/DL (ref 6–20)
BUN/CREAT SERPL: 11 (ref 12–20)
CALCIUM SERPL-MCNC: 9.3 MG/DL (ref 8.5–10.1)
CHLORIDE SERPL-SCNC: 104 MMOL/L (ref 97–108)
CO2 SERPL-SCNC: 26 MMOL/L (ref 21–32)
COLOR UR: ABNORMAL
COMMENT, HOLDF: NORMAL
CREAT SERPL-MCNC: 2.03 MG/DL (ref 0.7–1.3)
DIFFERENTIAL METHOD BLD: ABNORMAL
EOSINOPHIL # BLD: 0.1 K/UL (ref 0–0.4)
EOSINOPHIL NFR BLD: 1 % (ref 0–7)
EPITH CASTS URNS QL MICRO: ABNORMAL /LPF
ERYTHROCYTE [DISTWIDTH] IN BLOOD BY AUTOMATED COUNT: 13 % (ref 11.5–14.5)
GLOBULIN SER CALC-MCNC: 4.2 G/DL (ref 2–4)
GLUCOSE BLD STRIP.AUTO-MCNC: 223 MG/DL (ref 65–100)
GLUCOSE SERPL-MCNC: 240 MG/DL (ref 65–100)
GLUCOSE UR STRIP.AUTO-MCNC: 250 MG/DL
HCT VFR BLD AUTO: 38.5 % (ref 36.6–50.3)
HGB BLD-MCNC: 13.3 G/DL (ref 12.1–17)
HGB UR QL STRIP: NEGATIVE
HYALINE CASTS URNS QL MICRO: ABNORMAL /LPF (ref 0–5)
IMM GRANULOCYTES # BLD AUTO: 0.1 K/UL (ref 0–0.04)
IMM GRANULOCYTES NFR BLD AUTO: 1 % (ref 0–0.5)
INR PPP: 1.1 (ref 0.9–1.1)
KETONES UR QL STRIP.AUTO: NEGATIVE MG/DL
LEUKOCYTE ESTERASE UR QL STRIP.AUTO: NEGATIVE
LYMPHOCYTES # BLD: 3.2 K/UL (ref 0.8–3.5)
LYMPHOCYTES NFR BLD: 28 % (ref 12–49)
MCH RBC QN AUTO: 28 PG (ref 26–34)
MCHC RBC AUTO-ENTMCNC: 34.5 G/DL (ref 30–36.5)
MCV RBC AUTO: 81.1 FL (ref 80–99)
MONOCYTES # BLD: 0.7 K/UL (ref 0–1)
MONOCYTES NFR BLD: 6 % (ref 5–13)
NEUTS SEG # BLD: 7.3 K/UL (ref 1.8–8)
NEUTS SEG NFR BLD: 64 % (ref 32–75)
NITRITE UR QL STRIP.AUTO: NEGATIVE
NRBC # BLD: 0 K/UL (ref 0–0.01)
NRBC BLD-RTO: 0 PER 100 WBC
PH UR STRIP: 5.5 [PH] (ref 5–8)
PLATELET # BLD AUTO: 170 K/UL (ref 150–400)
PMV BLD AUTO: 11 FL (ref 8.9–12.9)
POTASSIUM SERPL-SCNC: 4 MMOL/L (ref 3.5–5.1)
PROT SERPL-MCNC: 7.8 G/DL (ref 6.4–8.2)
PROT UR STRIP-MCNC: ABNORMAL MG/DL
PROTHROMBIN TIME: 10.9 SEC (ref 9–11.1)
RBC # BLD AUTO: 4.75 M/UL (ref 4.1–5.7)
RBC #/AREA URNS HPF: ABNORMAL /HPF (ref 0–5)
SAMPLES BEING HELD,HOLD: NORMAL
SERVICE CMNT-IMP: ABNORMAL
SODIUM SERPL-SCNC: 140 MMOL/L (ref 136–145)
SP GR UR REFRACTOMETRY: 1.01 (ref 1–1.03)
THERAPEUTIC RANGE,PTTT: NORMAL SECS (ref 58–77)
TROPONIN I SERPL-MCNC: <0.05 NG/ML
UA: UC IF INDICATED,UAUC: ABNORMAL
UROBILINOGEN UR QL STRIP.AUTO: 0.2 EU/DL (ref 0.2–1)
WBC # BLD AUTO: 11.3 K/UL (ref 4.1–11.1)
WBC URNS QL MICRO: ABNORMAL /HPF (ref 0–4)

## 2019-02-02 PROCEDURE — 80053 COMPREHEN METABOLIC PANEL: CPT

## 2019-02-02 PROCEDURE — 99283 EMERGENCY DEPT VISIT LOW MDM: CPT

## 2019-02-02 PROCEDURE — 81001 URINALYSIS AUTO W/SCOPE: CPT

## 2019-02-02 PROCEDURE — 82962 GLUCOSE BLOOD TEST: CPT

## 2019-02-02 PROCEDURE — 36415 COLL VENOUS BLD VENIPUNCTURE: CPT

## 2019-02-02 PROCEDURE — 85025 COMPLETE CBC W/AUTO DIFF WBC: CPT

## 2019-02-02 PROCEDURE — 93005 ELECTROCARDIOGRAM TRACING: CPT

## 2019-02-02 PROCEDURE — 85730 THROMBOPLASTIN TIME PARTIAL: CPT

## 2019-02-02 PROCEDURE — 70450 CT HEAD/BRAIN W/O DYE: CPT

## 2019-02-02 PROCEDURE — 85610 PROTHROMBIN TIME: CPT

## 2019-02-02 PROCEDURE — 71045 X-RAY EXAM CHEST 1 VIEW: CPT

## 2019-02-02 PROCEDURE — 84484 ASSAY OF TROPONIN QUANT: CPT

## 2019-02-02 NOTE — LETTER
[address] 
[address] [facility] [address] [id number] 
[phone] Work/School Note Date: 2/2/2019 To Whom It May concern: 
 
Danny Whatley was seen and treated today in the emergency room by the following provider(s): 
Attending Provider: Malik Hassan MD.   
 
Danny Whatley {Return to school/sport/work:[id number]} Sincerely, Diane Leiva

## 2019-02-02 NOTE — ED PROVIDER NOTES
68 y.o. male with past medical history significant for diabetes, HTN, and hypercholesteremia who presents from home via personal vehicle with chief complaint of an altered mental status. Pt presents to the ED and as per the son, experienced an episode of an altered mental status today, 2/2/19, around 1300 that lasted about 20 minutes. The pt's son went to go check on his father after work today and the son stated that when he arrived, the pt reported feeling dizzy and disoriented and he was not responding to his name and mumbling during every response towards his son. Pt states that he felt hypoglycemic prior to AMS OS, noting that he felt weak and near syncope. Pt's son states that he took the pt's BS upon arrival on scene, and reports that it was at 164 @ 1310. Pt's son also reports that the pt was limping during OS, that his since subsided since arrival to the ED. Pt denies any diaphoresis, abdominal pain, or weakness that is focused in one arm/leg. There are no other acutee medical concerns at this time. Surgical hx - unknown Social hx - Tobacco use: non-smoker, Alcohol Use: non-drinker e 
 
PCP: Jules Castorena MD 
 
Note written by Jaden Matthews, as dictated by Yoni Cordero MD 4:12 PM. The history is provided by a relative and the patient. No  was used. Past Medical History:  
Diagnosis Date  Diabetes ([facility] [address] 2002  High cholesterol  Hypertension History reviewed. No pertinent surgical history. Family History:  
Problem Relation Age of Onset  No Known Problems Mother  No Known Problems Father  No Known Problems Sister  No Known Problems Brother  No Known Problems Brother  No Known Problems Brother  No Known Problems Brother  No Known Problems Brother  No Known Problems Brother  No Known Problems Maternal Grandmother  No Known Problems Maternal Grandfather  No Known Problems Paternal Grandmother  No Known Problems Paternal Grandfather  Diabetes Neg Hx   
 Heart Disease Neg Hx Social History Socioeconomic History  Marital status:  Spouse name: Not on file  Number of children: Not on file  Years of education: Not on file  Highest education level: Not on file Social Needs  Financial resource strain: Not on file  Food insecurity - worry: Not on file  Food insecurity - inability: Not on file  Transportation needs - medical: Not on file  Transportation needs - non-medical: Not on file Occupational History  Not on file Tobacco Use  Smoking status: Never Smoker  Smokeless tobacco: Never Used Substance and Sexual Activity  Alcohol use: No  
  Alcohol/week: 0.0 oz  Drug use: No  
 Sexual activity: No  
Other Topics Concern  Not on file Social History Narrative ** Merged History Encounter ** ALLERGIES: Patient has no known allergies. Review of Systems Constitutional: Positive for activity change. Negative for diaphoresis. Gastrointestinal: Negative for abdominal pain. Musculoskeletal: Positive for gait problem. Neurological: Positive for dizziness and weakness. All other systems reviewed and are negative. Vitals:  
 02/02/19 1513 02/02/19 1517 02/02/19 1548 BP:   (!) 149/95 Pulse: (!) 102 95 (!) 105 Resp:   18 Temp:   98.4 °F (36.9 °C) SpO2: 96% 98% 98% Weight:   63 kg (138 lb 14.2 oz) Height:   5' 5\" (1.651 m) Physical Exam  
Constitutional:  
Pt is elderly. HENT:  
Head: Normocephalic and atraumatic. Nose: Nose normal.  
Eyes: Conjunctivae and EOM are normal. Pupils are equal, round, and reactive to light. Neck: Normal range of motion. Neck supple. Cardiovascular: Normal heart sounds and intact distal pulses. Pulmonary/Chest: Effort normal and breath sounds normal.  
Abdominal: Soft.  Bowel sounds are normal.  
 Musculoskeletal: Normal range of motion. Neurological: He has normal strength and normal reflexes. Pt is A&Ox4. No focal neural deficit. No aphasia. No pronator drift. Skin: Skin is warm and dry. Psychiatric: He has a normal mood and affect. His behavior is normal.  
Nursing note and vitals reviewed. Note written by Jaden Anthony, as dictated by Prakash Linares MD 4:12 PM 
 
MDM Procedures ED EKG interpretation: 
Rhythm: sinus tachycardia w/ short NH; Rate (approx.): 103;  
 
Note written by Jaden Anthony, as dictated by Prakash Linares MD 3:45 PM 
 
PROGRESS NOTE: 
6:41 PM 
All of pt's symptoms have subsided since ED arrival. Pt has not eaten all morning. Pt's symptoms were probably caused by hypoglycemia, but we are currently unable to prove that as his work ups returned negative. Pt was offered admission, but declined. 6:44 PM 
Patient's results have been reviewed with them. Patient and/or family have verbally conveyed their understanding and agreement of the patient's signs, symptoms, diagnosis, treatment and prognosis and additionally agree to follow up as recommended or return to the Emergency Room should their condition change prior to follow-up. Discharge instructions have also been provided to the patient with some educational information regarding their diagnosis as well a list of reasons why they would want to return to the ER prior to their follow-up appointment should their condition change.

## 2019-02-02 NOTE — ED TRIAGE NOTES
Son reports \"At 1pm today, my father ate a bagel and tea. After, he started acting unaware of surroundings and was having difficulty walking and was mumbling words\".

## 2019-02-02 NOTE — DISCHARGE INSTRUCTIONS
Patient Education     Altered Mental Status: Care Instructions  Your Care Instructions  Altered mental status is a change in how well your brain is working. As a result, you may be confused, be less alert than usual, or act in odd ways. This may include seeing or hearing things that aren't really there (hallucinations). A mental status change has many possible causes. For example, it may be the result of an infection, an imbalance of chemicals in the body, or a chronic disease such as diabetes or COPD. It can also be caused by things such as a head injury, taking certain medicines, or using alcohol or drugs. The doctor may do tests to look for the cause. These tests may include urine tests, blood tests, and imaging tests such as a CT scan. Sometimes a clear cause isn't found. But tests can help the doctor rule out a serious cause of your symptoms. A change in mental status can be scary. But mental status will often return to normal when the cause is treated. So it is important to get any follow-up testing or treatment the doctor has suggested. The doctor has checked you carefully, but problems can develop later. If you notice any problems or new symptoms, get medical treatment right away. Follow-up care is a key part of your treatment and safety. Be sure to make and go to all appointments, and call your doctor if you are having problems. It's also a good idea to know your test results and keep a list of the medicines you take. How can you care for yourself at home? · Be safe with medicines. Take your medicines exactly as prescribed. Call your doctor if you think you are having a problem with your medicine. · Have another adult stay with you until you are better. This can help keep you safe. Ask that person to watch for signs that your mental status is getting worse. When should you call for help? Call 911 anytime you think you may need emergency care.  For example, call if:  · You passed out (lost consciousness). Call your doctor now or seek immediate medical care if:  · Your mental status is getting worse. · You have new symptoms, such as a fever, chills, or shortness of breath. · You do not feel safe. Watch closely for changes in your health, and be sure to contact your doctor if:  · You do not get better as expected. Where can you learn more? Go to [employer]  Enter J452 in the search box to learn more about \"Altered Mental Status: Care Instructions. \"   © 5167-6619 [employer]. Care instructions adapted under license by Nicole [employer] (which disclaims liability or warranty for this information). This care instruction is for use with your licensed healthcare professional. If you have questions about a medical condition or this instruction, always ask your healthcare professional. [address] any warranty or liability for your use of this information.   Content Version: 74.2.461636; Current as of: November 20, 2015

## 2019-02-03 LAB
ATRIAL RATE: 103 BPM
CALCULATED P AXIS, ECG09: 62 DEGREES
CALCULATED R AXIS, ECG10: 52 DEGREES
CALCULATED T AXIS, ECG11: 77 DEGREES
DIAGNOSIS, 93000: NORMAL
P-R INTERVAL, ECG05: 104 MS
Q-T INTERVAL, ECG07: 316 MS
QRS DURATION, ECG06: 60 MS
QTC CALCULATION (BEZET), ECG08: 413 MS
VENTRICULAR RATE, ECG03: 103 BPM

## 2019-02-05 RX ORDER — INSULIN GLARGINE 100 [IU]/ML
INJECTION, SOLUTION SUBCUTANEOUS
Qty: 8 ADJUSTABLE DOSE PRE-FILLED PEN SYRINGE | Refills: 1 | Status: SHIPPED | OUTPATIENT
Start: 2019-02-05 | End: 2020-01-01

## 2019-02-05 NOTE — TELEPHONE ENCOUNTER
Requested Prescriptions     Pending Prescriptions Disp Refills    insulin glargine (LANTUS SOLOSTAR U-100 INSULIN) 100 unit/mL (3 mL) inpn 6 Adjustable Dose Pre-filled Pen Syringe 5     Patient also needs needles for the lantus pen.  04/16/2018  No upcoming (he is aware that he will need to find a new doctor)  [facility] on file  Patient is out of needles

## 2019-02-24 ENCOUNTER — APPOINTMENT (OUTPATIENT)
Dept: GENERAL RADIOLOGY | Age: 74
End: 2019-02-24
Attending: EMERGENCY MEDICINE
Payer: MEDICARE

## 2019-02-24 ENCOUNTER — HOSPITAL ENCOUNTER (EMERGENCY)
Age: 74
Discharge: HOME | End: 2019-02-24
Attending: EMERGENCY MEDICINE
Payer: MEDICARE

## 2019-02-24 VITALS
HEIGHT: 65 IN | BODY MASS INDEX: 23.11 KG/M2 | TEMPERATURE: 98.8 F | RESPIRATION RATE: 16 BRPM | DIASTOLIC BLOOD PRESSURE: 99 MMHG | OXYGEN SATURATION: 98 % | SYSTOLIC BLOOD PRESSURE: 166 MMHG | HEART RATE: 93 BPM

## 2019-02-24 DIAGNOSIS — R42 EPISODIC LIGHTHEADEDNESS: Primary | ICD-10-CM

## 2019-02-24 LAB
ANION GAP BLD CALC-SCNC: 17 MMOL/L (ref 10–20)
BASOPHILS # BLD: 0 K/UL (ref 0–0.1)
BASOPHILS NFR BLD: 0 % (ref 0–1)
BUN BLD-MCNC: 19 MG/DL (ref 9–20)
CA-I BLD-MCNC: 1.04 MMOL/L (ref 1.12–1.32)
CHLORIDE BLD-SCNC: 105 MMOL/L (ref 98–107)
CO2 BLD-SCNC: 22 MMOL/L (ref 21–32)
COMMENT, HOLDF: NORMAL
CREAT BLD-MCNC: 1.3 MG/DL (ref 0.6–1.3)
DIFFERENTIAL METHOD BLD: ABNORMAL
EOSINOPHIL # BLD: 0.2 K/UL (ref 0–0.4)
EOSINOPHIL NFR BLD: 2 % (ref 0–7)
ERYTHROCYTE [DISTWIDTH] IN BLOOD BY AUTOMATED COUNT: 12.9 % (ref 11.5–14.5)
GLUCOSE BLD STRIP.AUTO-MCNC: 235 MG/DL (ref 65–100)
GLUCOSE BLD-MCNC: 167 MG/DL (ref 65–100)
HCT VFR BLD AUTO: 37.5 % (ref 36.6–50.3)
HCT VFR BLD CALC: 40 % (ref 36.6–50.3)
HGB BLD-MCNC: 13 G/DL (ref 12.1–17)
IMM GRANULOCYTES # BLD AUTO: 0.1 K/UL (ref 0–0.04)
IMM GRANULOCYTES NFR BLD AUTO: 1 % (ref 0–0.5)
LYMPHOCYTES # BLD: 3.5 K/UL (ref 0.8–3.5)
LYMPHOCYTES NFR BLD: 41 % (ref 12–49)
MAGNESIUM SERPL-MCNC: 1.9 MG/DL (ref 1.6–2.4)
MCH RBC QN AUTO: 28.3 PG (ref 26–34)
MCHC RBC AUTO-ENTMCNC: 34.7 G/DL (ref 30–36.5)
MCV RBC AUTO: 81.7 FL (ref 80–99)
MONOCYTES # BLD: 0.6 K/UL (ref 0–1)
MONOCYTES NFR BLD: 7 % (ref 5–13)
NEUTS SEG # BLD: 4.1 K/UL (ref 1.8–8)
NEUTS SEG NFR BLD: 49 % (ref 32–75)
NRBC # BLD: 0 K/UL (ref 0–0.01)
NRBC BLD-RTO: 0 PER 100 WBC
PLATELET # BLD AUTO: 205 K/UL (ref 150–400)
PMV BLD AUTO: 10.4 FL (ref 8.9–12.9)
POTASSIUM BLD-SCNC: 4.6 MMOL/L (ref 3.5–5.1)
RBC # BLD AUTO: 4.59 M/UL (ref 4.1–5.7)
SAMPLES BEING HELD,HOLD: NORMAL
SERVICE CMNT-IMP: ABNORMAL
SERVICE CMNT-IMP: ABNORMAL
SODIUM BLD-SCNC: 138 MMOL/L (ref 136–145)
WBC # BLD AUTO: 8.4 K/UL (ref 4.1–11.1)

## 2019-02-24 PROCEDURE — 71101 X-RAY EXAM UNILAT RIBS/CHEST: CPT

## 2019-02-24 PROCEDURE — 74011250636 HC RX REV CODE- 250/636: Performed by: EMERGENCY MEDICINE

## 2019-02-24 PROCEDURE — 96360 HYDRATION IV INFUSION INIT: CPT

## 2019-02-24 PROCEDURE — 99285 EMERGENCY DEPT VISIT HI MDM: CPT

## 2019-02-24 PROCEDURE — 80048 BASIC METABOLIC PNL TOTAL CA: CPT

## 2019-02-24 PROCEDURE — 80047 BASIC METABLC PNL IONIZED CA: CPT

## 2019-02-24 PROCEDURE — 85025 COMPLETE CBC W/AUTO DIFF WBC: CPT

## 2019-02-24 PROCEDURE — 36415 COLL VENOUS BLD VENIPUNCTURE: CPT

## 2019-02-24 PROCEDURE — 82962 GLUCOSE BLOOD TEST: CPT

## 2019-02-24 PROCEDURE — 83735 ASSAY OF MAGNESIUM: CPT

## 2019-02-24 PROCEDURE — 93005 ELECTROCARDIOGRAM TRACING: CPT

## 2019-02-24 RX ADMIN — SODIUM CHLORIDE 1000 ML: 900 INJECTION, SOLUTION INTRAVENOUS at 20:49

## 2019-02-25 LAB
ANION GAP SERPL CALC-SCNC: 6 MMOL/L (ref 5–15)
ATRIAL RATE: 95 BPM
BUN SERPL-MCNC: 19 MG/DL (ref 6–20)
BUN/CREAT SERPL: 12 (ref 12–20)
CALCIUM SERPL-MCNC: 9.4 MG/DL (ref 8.5–10.1)
CALCULATED P AXIS, ECG09: 26 DEGREES
CALCULATED R AXIS, ECG10: 27 DEGREES
CALCULATED T AXIS, ECG11: 55 DEGREES
CHLORIDE SERPL-SCNC: 102 MMOL/L (ref 97–108)
CO2 SERPL-SCNC: 28 MMOL/L (ref 21–32)
CREAT SERPL-MCNC: 1.6 MG/DL (ref 0.7–1.3)
DIAGNOSIS, 93000: NORMAL
GLUCOSE SERPL-MCNC: 214 MG/DL (ref 65–100)
P-R INTERVAL, ECG05: 116 MS
POTASSIUM SERPL-SCNC: 4.4 MMOL/L (ref 3.5–5.1)
Q-T INTERVAL, ECG07: 324 MS
QRS DURATION, ECG06: 62 MS
QTC CALCULATION (BEZET), ECG08: 407 MS
SODIUM SERPL-SCNC: 136 MMOL/L (ref 136–145)
VENTRICULAR RATE, ECG03: 95 BPM

## 2019-02-25 NOTE — ED PROVIDER NOTES
68 y.o. male with past medical history significant for diabetes, HTN, and high cholesterol who presents from home via personal vehicle with chief complaint of abdominal pain. Patient reports having pain on the left side of his abdomen on his ribs starting 2 days ago (Friday) after having a falling incident. Patient notes pain worsens upon palpation. Per relative: the patient recently had a sore throat and a slight change in voice in the previous week. The patient also has diabetes and takes insulin for this. Patient affirms abdominal pain and denies a fever. There are no other acute medical concerns at this time. Social hx: Never smoker PCP: Janes Jason MD 
 
 
 
 
The history is provided by the patient and a relative. No  was used. Past Medical History:  
Diagnosis Date  Diabetes ([address] 2002  High cholesterol  Hypertension History reviewed. No pertinent surgical history. Family History:  
Problem Relation Age of Onset  No Known Problems Mother  No Known Problems Father  No Known Problems Sister  No Known Problems Brother  No Known Problems Brother  No Known Problems Brother  No Known Problems Brother  No Known Problems Brother  No Known Problems Brother  No Known Problems Maternal Grandmother  No Known Problems Maternal Grandfather  No Known Problems Paternal Grandmother  No Known Problems Paternal Grandfather  Diabetes Neg Hx   
 Heart Disease Neg Hx Social History Socioeconomic History  Marital status:  Spouse name: Not on file  Number of children: Not on file  Years of education: Not on file  Highest education level: Not on file Social Needs  Financial resource strain: Not on file  Food insecurity - worry: Not on file  Food insecurity - inability: Not on file  Transportation needs - medical: Not on file  Transportation needs - non-medical: Not on file Occupational History  Not on file Tobacco Use  Smoking status: Never Smoker  Smokeless tobacco: Never Used Substance and Sexual Activity  Alcohol use: No  
  Alcohol/week: 0.0 oz  Drug use: No  
 Sexual activity: No  
Other Topics Concern  Not on file Social History Narrative ** Merged History Encounter ** ALLERGIES: Patient has no known allergies. Review of Systems Constitutional: Negative for chills and fever. Respiratory: Negative for shortness of breath. Cardiovascular: Negative for chest pain. Gastrointestinal: Positive for abdominal pain ( left sided rib pain). Negative for constipation, diarrhea and vomiting. Neurological: Negative for dizziness and light-headedness. All other systems reviewed and are negative. Vitals:  
 02/24/19 1942 02/24/19 2003 02/24/19 2007 BP:  133/74 Pulse: (!) 102 (!) 103 Resp:  16 Temp:  98 °F (36.7 °C) SpO2: 97% 98% Height:   5' 5\" (1.651 m) Physical Exam  
Constitutional: He appears well-developed and well-nourished. No distress. HENT:  
Head: Normocephalic and atraumatic. Eyes: Conjunctivae are normal. Pupils are equal, round, and reactive to light. Neck: Normal range of motion. Cardiovascular: Normal rate and regular rhythm. Pulmonary/Chest: Effort normal and breath sounds normal.  
Abdominal: Soft. He exhibits no distension. There is no tenderness. Left sided tenderness on the rib cage. Musculoskeletal: Normal range of motion. Neurological: He is alert. Alert and oriented to [facility]. Skin: Skin is dry. Capillary refill takes less than 2 seconds. Nursing note and vitals reviewed. Note written by hima Garcia, as dictated by Shree Rose MD 8:24 PM 
 
MDM Number of Diagnoses or Management Options Episodic lightheadedness: Diagnosis management comments: The patient is resting comfortably and feels better, is alert, talkative, interactive and in no distress. The repeat examination is unremarkable and benign. The patient is neurologically intact, has a normal mental status and is ambulatory in the ED. The history, exam, diagnostic testing (if any) and the patient's current condition do not suggest arrhythmia, STEMI, seizure, meningitis, stroke, sepsis, subarachnoid hemorrhage, intracranial bleeding, encephalitis or other significant pathology that would warrant further testing, continued ED treatment, admission, neurological consultation, or other specialist evaluation at this point. The vital signs have been stable. The patient's condition is stable and appropriate for discharge. The patient will pursue further outpatient evaluation with the primary care physician or other designated or consulting physician as indicated in the discharge instructions. Procedures ED EKG interpretation: 1 Rhythm: normal sinus rhythm; and regular Rate (approx.): 95; Axis: normal; ST/T wave: normal; No ectopy. Note written by hima Lee, as dictated by Lionel Bhakta MD 10:35 PM 
 
 
ED EKG interpretation: 2 Rhythm: normal sinus rhythm with occasional PVC's; and regular . Rate (approx.): 95; Axis: normal; ST/T wave: normal; No ectopy. Note written by hima Lee, as dictated by Lionel Bhakta MD 8:35 PM 
 
  
The patient's results have been reviewed with them and/or available family. Patient and/or family verbally conveyed their understanding and agreement of the patient's signs, symptoms, diagnosis, treatment and prognosis and additionally agree to follow up as recommended in the discharge instructions or to return to the Emergency Room should their condition change prior to their follow-up appointment.  The patient/family verbally agrees with the care-plan and verbally conveys that all of their questions have been answered. The discharge instructions have also been provided to the patient and/or family with some educational information regarding the patient's diagnosis as well a list of reasons why the patient would want to return to the ER prior to their follow-up appointment, should their condition change.

## 2019-02-25 NOTE — DISCHARGE INSTRUCTIONS
Patient Education        Lightheadedness or Faintness: Care Instructions  Your Care Instructions  Lightheadedness is a feeling that you are about to faint or \"pass out. \" You do not feel as if you or your surroundings are moving. It is different from vertigo, which is the feeling that you or things around you are spinning or tilting. Lightheadedness usually goes away or gets better when you lie down. If lightheadedness gets worse, it can lead to a fainting spell. It is common to feel lightheaded from time to time. Lightheadedness usually is not caused by a serious problem. It often is caused by a short-lasting drop in blood pressure and blood flow to your head that occurs when you get up too quickly from a seated or lying position. Follow-up care is a key part of your treatment and safety. Be sure to make and go to all appointments, and call your doctor if you are having problems. It's also a good idea to know your test results and keep a list of the medicines you take. How can you care for yourself at home? · Lie down for 1 or 2 minutes when you feel lightheaded. After lying down, sit up slowly and remain sitting for 1 to 2 minutes before slowly standing up. · Avoid movements, positions, or activities that have made you lightheaded in the past.  · Get plenty of rest, especially if you have a cold or flu, which can cause lightheadedness. · Make sure you drink plenty of fluids, especially if you have a fever or have been sweating. · Do not drive or put yourself and others in danger while you feel lightheaded. When should you call for help? Call 911 anytime you think you may need emergency care. For example, call if:    · You have symptoms of a stroke. These may include:  ? Sudden numbness, tingling, weakness, or loss of movement in your face, arm, or leg, especially on only one side of your body. ? Sudden vision changes. ? Sudden trouble speaking.   ? Sudden confusion or trouble understanding simple statements. ? Sudden problems with walking or balance. ? A sudden, severe headache that is different from past headaches.     · You have symptoms of a heart attack. These may include:  ? Chest pain or pressure, or a strange feeling in the chest.  ? Sweating. ? Shortness of breath. ? Nausea or vomiting. ? Pain, pressure, or a strange feeling in the back, neck, jaw, or upper belly or in one or both shoulders or arms. ? Lightheadedness or sudden weakness. ? A fast or irregular heartbeat. After you call 911, the  may tell you to chew 1 adult-strength or 2 to 4 low-dose aspirin. Wait for an ambulance. Do not try to drive yourself.    Watch closely for changes in your health, and be sure to contact your doctor if:    · Your lightheadedness gets worse or does not get better with home care. Where can you learn more? Go to http://santi-stanford.info/. Enter O885 in the search box to learn more about \"Lightheadedness or Faintness: Care Instructions. \"  Current as of: September 23, 2018  Content Version: 11.9  © 2295-8300 [employer]. Care instructions adapted under license by [employer] (which disclaims liability or warranty for this information). If you have questions about a medical condition or this instruction, always ask your healthcare professional. [address] any warranty or liability for your use of this information.

## 2019-02-25 NOTE — ED NOTES
2000: Patient escorted to core ER room. Son states that patient had a high blood sugar earlier (exact number unknown) and that he became confused during episode of high BG. Patient c/o left sided rib pain that started after a fall several days ago. 2100: Patient resting in bed, call light in reach. Awaiting CT results. 2200: Patient resting in bed, VSS.  
 
2300: Patient resting in bed, call light in reach. Removed BP cuff in anticipation for DC. Other VS obtained. 2330: I have reviewed discharge instructions with the caregiver. The caregiver verbalized understanding. Patient wheeled out to waiting room to wait for ride home by son.

## 2020-01-01 ENCOUNTER — PATIENT OUTREACH (OUTPATIENT)
Dept: INTERNAL MEDICINE CLINIC | Age: 75
End: 2020-01-01

## 2020-01-01 ENCOUNTER — HOSPITAL ENCOUNTER (INPATIENT)
Age: 75
LOS: 9 days | Discharge: NURSING, REHAB OR LONG-TERM CARE | DRG: 682 | End: 2020-01-30
Attending: EMERGENCY MEDICINE | Admitting: INTERNAL MEDICINE
Payer: MEDICARE

## 2020-01-01 ENCOUNTER — PATIENT OUTREACH (OUTPATIENT)
Dept: FAMILY MEDICINE CLINIC | Age: 75
End: 2020-01-01

## 2020-01-01 ENCOUNTER — APPOINTMENT (OUTPATIENT)
Dept: GENERAL RADIOLOGY | Age: 75
DRG: 871 | End: 2020-01-01
Attending: NURSE PRACTITIONER
Payer: MEDICARE

## 2020-01-01 ENCOUNTER — APPOINTMENT (OUTPATIENT)
Dept: GENERAL RADIOLOGY | Age: 75
DRG: 871 | End: 2020-01-01
Attending: STUDENT IN AN ORGANIZED HEALTH CARE EDUCATION/TRAINING PROGRAM
Payer: MEDICARE

## 2020-01-01 ENCOUNTER — APPOINTMENT (OUTPATIENT)
Dept: CT IMAGING | Age: 75
DRG: 884 | End: 2020-01-01
Attending: STUDENT IN AN ORGANIZED HEALTH CARE EDUCATION/TRAINING PROGRAM
Payer: MEDICARE

## 2020-01-01 ENCOUNTER — HOSPITAL ENCOUNTER (EMERGENCY)
Age: 75
Discharge: NURSING, REHAB OR LONG-TERM CARE | End: 2020-02-09
Attending: STUDENT IN AN ORGANIZED HEALTH CARE EDUCATION/TRAINING PROGRAM | Admitting: STUDENT IN AN ORGANIZED HEALTH CARE EDUCATION/TRAINING PROGRAM
Payer: MEDICARE

## 2020-01-01 ENCOUNTER — APPOINTMENT (OUTPATIENT)
Dept: GENERAL RADIOLOGY | Age: 75
DRG: 884 | End: 2020-01-01
Attending: STUDENT IN AN ORGANIZED HEALTH CARE EDUCATION/TRAINING PROGRAM
Payer: MEDICARE

## 2020-01-01 ENCOUNTER — HOSPITAL ENCOUNTER (INPATIENT)
Age: 75
LOS: 1 days | DRG: 951 | End: 2020-03-13
Attending: STUDENT IN AN ORGANIZED HEALTH CARE EDUCATION/TRAINING PROGRAM | Admitting: STUDENT IN AN ORGANIZED HEALTH CARE EDUCATION/TRAINING PROGRAM
Payer: MEDICARE

## 2020-01-01 ENCOUNTER — APPOINTMENT (OUTPATIENT)
Dept: CT IMAGING | Age: 75
End: 2020-01-01
Attending: STUDENT IN AN ORGANIZED HEALTH CARE EDUCATION/TRAINING PROGRAM
Payer: MEDICARE

## 2020-01-01 ENCOUNTER — HOSPITAL ENCOUNTER (INPATIENT)
Age: 75
LOS: 28 days | Discharge: HOSPICE | DRG: 871 | End: 2020-03-12
Attending: STUDENT IN AN ORGANIZED HEALTH CARE EDUCATION/TRAINING PROGRAM | Admitting: EMERGENCY MEDICINE
Payer: MEDICARE

## 2020-01-01 ENCOUNTER — APPOINTMENT (OUTPATIENT)
Dept: GENERAL RADIOLOGY | Age: 75
DRG: 871 | End: 2020-01-01
Attending: EMERGENCY MEDICINE
Payer: MEDICARE

## 2020-01-01 ENCOUNTER — APPOINTMENT (OUTPATIENT)
Dept: MRI IMAGING | Age: 75
DRG: 884 | End: 2020-01-01
Attending: PSYCHIATRY & NEUROLOGY
Payer: MEDICARE

## 2020-01-01 ENCOUNTER — APPOINTMENT (OUTPATIENT)
Dept: GENERAL RADIOLOGY | Age: 75
DRG: 871 | End: 2020-01-01
Attending: INTERNAL MEDICINE
Payer: MEDICARE

## 2020-01-01 ENCOUNTER — APPOINTMENT (OUTPATIENT)
Dept: ULTRASOUND IMAGING | Age: 75
DRG: 682 | End: 2020-01-01
Attending: INTERNAL MEDICINE
Payer: MEDICARE

## 2020-01-01 ENCOUNTER — APPOINTMENT (OUTPATIENT)
Dept: ULTRASOUND IMAGING | Age: 75
DRG: 871 | End: 2020-01-01
Attending: EMERGENCY MEDICINE
Payer: MEDICARE

## 2020-01-01 ENCOUNTER — APPOINTMENT (OUTPATIENT)
Dept: GENERAL RADIOLOGY | Age: 75
DRG: 682 | End: 2020-01-01
Attending: EMERGENCY MEDICINE
Payer: MEDICARE

## 2020-01-01 ENCOUNTER — APPOINTMENT (OUTPATIENT)
Dept: ULTRASOUND IMAGING | Age: 75
DRG: 871 | End: 2020-01-01
Attending: INTERNAL MEDICINE
Payer: MEDICARE

## 2020-01-01 ENCOUNTER — HOSPICE ADMISSION (OUTPATIENT)
Dept: HOSPICE | Facility: HOSPICE | Age: 75
End: 2020-01-01

## 2020-01-01 ENCOUNTER — APPOINTMENT (OUTPATIENT)
Dept: GENERAL RADIOLOGY | Age: 75
DRG: 871 | End: 2020-01-01
Payer: MEDICARE

## 2020-01-01 ENCOUNTER — HOSPITAL ENCOUNTER (INPATIENT)
Age: 75
LOS: 7 days | Discharge: NURSING, REHAB OR LONG-TERM CARE | DRG: 884 | End: 2020-01-08
Attending: STUDENT IN AN ORGANIZED HEALTH CARE EDUCATION/TRAINING PROGRAM | Admitting: INTERNAL MEDICINE
Payer: MEDICARE

## 2020-01-01 ENCOUNTER — APPOINTMENT (OUTPATIENT)
Dept: NON INVASIVE DIAGNOSTICS | Age: 75
DRG: 884 | End: 2020-01-01
Attending: INTERNAL MEDICINE
Payer: MEDICARE

## 2020-01-01 ENCOUNTER — APPOINTMENT (OUTPATIENT)
Dept: CT IMAGING | Age: 75
DRG: 682 | End: 2020-01-01
Attending: INTERNAL MEDICINE
Payer: MEDICARE

## 2020-01-01 ENCOUNTER — APPOINTMENT (OUTPATIENT)
Dept: CT IMAGING | Age: 75
DRG: 871 | End: 2020-01-01
Attending: STUDENT IN AN ORGANIZED HEALTH CARE EDUCATION/TRAINING PROGRAM
Payer: MEDICARE

## 2020-01-01 VITALS
DIASTOLIC BLOOD PRESSURE: 63 MMHG | BODY MASS INDEX: 18.68 KG/M2 | SYSTOLIC BLOOD PRESSURE: 123 MMHG | WEIGHT: 112.1 LBS | RESPIRATION RATE: 28 BRPM | TEMPERATURE: 101.1 F | HEIGHT: 65 IN | HEART RATE: 110 BPM | OXYGEN SATURATION: 97 %

## 2020-01-01 VITALS
WEIGHT: 92.9 LBS | BODY MASS INDEX: 15.48 KG/M2 | HEIGHT: 65 IN | RESPIRATION RATE: 19 BRPM | DIASTOLIC BLOOD PRESSURE: 84 MMHG | SYSTOLIC BLOOD PRESSURE: 132 MMHG | TEMPERATURE: 98.2 F | HEART RATE: 111 BPM | OXYGEN SATURATION: 97 %

## 2020-01-01 VITALS
SYSTOLIC BLOOD PRESSURE: 120 MMHG | OXYGEN SATURATION: 100 % | DIASTOLIC BLOOD PRESSURE: 80 MMHG | HEART RATE: 101 BPM | RESPIRATION RATE: 18 BRPM | TEMPERATURE: 98.5 F

## 2020-01-01 VITALS
BODY MASS INDEX: 22.77 KG/M2 | HEIGHT: 65 IN | WEIGHT: 136.69 LBS | TEMPERATURE: 97.5 F | OXYGEN SATURATION: 99 % | HEART RATE: 87 BPM | SYSTOLIC BLOOD PRESSURE: 159 MMHG | RESPIRATION RATE: 18 BRPM | DIASTOLIC BLOOD PRESSURE: 91 MMHG

## 2020-01-01 VITALS
RESPIRATION RATE: 18 BRPM | HEART RATE: 99 BPM | TEMPERATURE: 97.6 F | WEIGHT: 118.48 LBS | SYSTOLIC BLOOD PRESSURE: 127 MMHG | HEIGHT: 65 IN | OXYGEN SATURATION: 99 % | BODY MASS INDEX: 19.74 KG/M2 | DIASTOLIC BLOOD PRESSURE: 75 MMHG

## 2020-01-01 DIAGNOSIS — R41.89 DECREASED RESPONSIVENESS: ICD-10-CM

## 2020-01-01 DIAGNOSIS — N30.00 ACUTE CYSTITIS WITHOUT HEMATURIA: ICD-10-CM

## 2020-01-01 DIAGNOSIS — Z71.89 GOALS OF CARE, COUNSELING/DISCUSSION: ICD-10-CM

## 2020-01-01 DIAGNOSIS — Z51.5 END OF LIFE CARE: ICD-10-CM

## 2020-01-01 DIAGNOSIS — R53.81 DEBILITY: ICD-10-CM

## 2020-01-01 DIAGNOSIS — R41.89 PERSISTENT COGNITIVE IMPAIRMENT: ICD-10-CM

## 2020-01-01 DIAGNOSIS — N17.9 ACUTE KIDNEY INJURY (HCC): ICD-10-CM

## 2020-01-01 DIAGNOSIS — E87.5 ACUTE HYPERKALEMIA: ICD-10-CM

## 2020-01-01 DIAGNOSIS — R41.82 ALTERED MENTAL STATUS, UNSPECIFIED ALTERED MENTAL STATUS TYPE: ICD-10-CM

## 2020-01-01 DIAGNOSIS — F10.21 HISTORY OF ALCOHOLISM (HCC): ICD-10-CM

## 2020-01-01 DIAGNOSIS — E13.11 DIABETIC KETOACIDOSIS WITH COMA ASSOCIATED WITH OTHER SPECIFIED DIABETES MELLITUS (HCC): Primary | ICD-10-CM

## 2020-01-01 DIAGNOSIS — E87.0 HYPERNATREMIA: ICD-10-CM

## 2020-01-01 DIAGNOSIS — E11.21 TYPE 2 DIABETES MELLITUS WITH DIABETIC NEPHROPATHY, WITH LONG-TERM CURRENT USE OF INSULIN (HCC): ICD-10-CM

## 2020-01-01 DIAGNOSIS — A41.9 SEPTIC SHOCK (HCC): ICD-10-CM

## 2020-01-01 DIAGNOSIS — N17.9 ACUTE RENAL FAILURE, UNSPECIFIED ACUTE RENAL FAILURE TYPE (HCC): ICD-10-CM

## 2020-01-01 DIAGNOSIS — R68.89 COPIOUS ORAL SECRETIONS: ICD-10-CM

## 2020-01-01 DIAGNOSIS — R63.30 FEEDING DIFFICULTIES: ICD-10-CM

## 2020-01-01 DIAGNOSIS — Z79.4 TYPE 2 DIABETES MELLITUS WITH DIABETIC NEPHROPATHY, WITH LONG-TERM CURRENT USE OF INSULIN (HCC): ICD-10-CM

## 2020-01-01 DIAGNOSIS — R13.12 OROPHARYNGEAL DYSPHAGIA: ICD-10-CM

## 2020-01-01 DIAGNOSIS — R65.21 SEPTIC SHOCK (HCC): ICD-10-CM

## 2020-01-01 DIAGNOSIS — Z71.89 DNR (DO NOT RESUSCITATE) DISCUSSION: ICD-10-CM

## 2020-01-01 DIAGNOSIS — E86.0 DEHYDRATION: Primary | ICD-10-CM

## 2020-01-01 DIAGNOSIS — R54 FRAILTY: ICD-10-CM
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
## 2020-01-01 DIAGNOSIS — E10.9 DIABETES MELLITUS, LABILE (HCC): ICD-10-CM

## 2020-01-01 DIAGNOSIS — I63.312 CEREBROVASCULAR ACCIDENT (CVA) DUE TO THROMBOSIS OF LEFT MIDDLE CEREBRAL ARTERY (HCC): Primary | ICD-10-CM

## 2020-01-01 PROBLEM — I63.9 CVA (CEREBRAL VASCULAR ACCIDENT) (HCC): Status: ACTIVE | Noted: 2020-01-01

## 2020-01-01 LAB
ABO + RH BLD: NORMAL
ACETONE,ACETX: NEGATIVE MG/L
ADMINISTERED INITIALS, ADMINIT: NORMAL
ALBUMIN SERPL-MCNC: 1.7 G/DL (ref 3.5–5)
ALBUMIN SERPL-MCNC: 1.8 G/DL (ref 3.5–5)
ALBUMIN SERPL-MCNC: 1.8 G/DL (ref 3.5–5)
ALBUMIN SERPL-MCNC: 2.3 G/DL (ref 3.5–5)
ALBUMIN SERPL-MCNC: 3.4 G/DL (ref 3.5–5)
ALBUMIN SERPL-MCNC: 3.5 G/DL (ref 3.5–5)
ALBUMIN SERPL-MCNC: 3.9 G/DL (ref 3.5–5)
ALBUMIN/GLOB SERPL: 0.4 {RATIO} (ref 1.1–2.2)
ALBUMIN/GLOB SERPL: 0.4 {RATIO} (ref 1.1–2.2)
ALBUMIN/GLOB SERPL: 0.5 {RATIO} (ref 1.1–2.2)
ALBUMIN/GLOB SERPL: 0.5 {RATIO} (ref 1.1–2.2)
ALBUMIN/GLOB SERPL: 0.7 {RATIO} (ref 1.1–2.2)
ALBUMIN/GLOB SERPL: 0.9 {RATIO} (ref 1.1–2.2)
ALBUMIN/GLOB SERPL: 0.9 {RATIO} (ref 1.1–2.2)
ALP SERPL-CCNC: 121 U/L (ref 45–117)
ALP SERPL-CCNC: 187 U/L (ref 45–117)
ALP SERPL-CCNC: 604 U/L (ref 45–117)
ALP SERPL-CCNC: 653 U/L (ref 45–117)
ALP SERPL-CCNC: 709 U/L (ref 45–117)
ALP SERPL-CCNC: 93 U/L (ref 45–117)
ALP SERPL-CCNC: 97 U/L (ref 45–117)
ALT SERPL-CCNC: 112 U/L (ref 12–78)
ALT SERPL-CCNC: 137 U/L (ref 12–78)
ALT SERPL-CCNC: 158 U/L (ref 12–78)
ALT SERPL-CCNC: 22 U/L (ref 12–78)
ALT SERPL-CCNC: 28 U/L (ref 12–78)
ALT SERPL-CCNC: 28 U/L (ref 12–78)
ALT SERPL-CCNC: 53 U/L (ref 12–78)
AMMONIA PLAS-SCNC: 20 UMOL/L
AMPHET UR QL SCN: NEGATIVE
ANION GAP SERPL CALC-SCNC: 1 MMOL/L (ref 5–15)
ANION GAP SERPL CALC-SCNC: 10 MMOL/L (ref 5–15)
ANION GAP SERPL CALC-SCNC: 10 MMOL/L (ref 5–15)
ANION GAP SERPL CALC-SCNC: 11 MMOL/L (ref 5–15)
ANION GAP SERPL CALC-SCNC: 12 MMOL/L (ref 5–15)
ANION GAP SERPL CALC-SCNC: 12 MMOL/L (ref 5–15)
ANION GAP SERPL CALC-SCNC: 15 MMOL/L (ref 5–15)
ANION GAP SERPL CALC-SCNC: 3 MMOL/L (ref 5–15)
ANION GAP SERPL CALC-SCNC: 4 MMOL/L (ref 5–15)
ANION GAP SERPL CALC-SCNC: 5 MMOL/L (ref 5–15)
ANION GAP SERPL CALC-SCNC: 6 MMOL/L (ref 5–15)
ANION GAP SERPL CALC-SCNC: 7 MMOL/L (ref 5–15)
ANION GAP SERPL CALC-SCNC: 8 MMOL/L (ref 5–15)
ANION GAP SERPL CALC-SCNC: 9 MMOL/L (ref 5–15)
ANION GAP SERPL CALC-SCNC: ABNORMAL MMOL/L (ref 5–15)
APPEARANCE UR: ABNORMAL
APPEARANCE UR: CLEAR
APPEARANCE UR: CLEAR
ARTERIAL PATENCY WRIST A: ABNORMAL
ARTERIAL PATENCY WRIST A: YES
AST SERPL-CCNC: 100 U/L (ref 15–37)
AST SERPL-CCNC: 13 U/L (ref 15–37)
AST SERPL-CCNC: 21 U/L (ref 15–37)
AST SERPL-CCNC: 35 U/L (ref 15–37)
AST SERPL-CCNC: 58 U/L (ref 15–37)
AST SERPL-CCNC: 77 U/L (ref 15–37)
AST SERPL-CCNC: 83 U/L (ref 15–37)
ATRIAL RATE: 101 BPM
ATRIAL RATE: 107 BPM
B-OH-BUTYR SERPL-SCNC: 2.36 MMOL/L
BACTERIA SPEC CULT: ABNORMAL
BACTERIA SPEC CULT: NORMAL
BACTERIA URNS QL MICRO: ABNORMAL /HPF
BACTERIA URNS QL MICRO: NEGATIVE /HPF
BACTERIA URNS QL MICRO: NEGATIVE /HPF
BARBITURATES UR QL SCN: NEGATIVE
BASE DEFICIT BLD-SCNC: 1 MMOL/L
BASE DEFICIT BLD-SCNC: 10 MMOL/L
BASE DEFICIT BLD-SCNC: 12 MMOL/L
BASE DEFICIT BLD-SCNC: 15 MMOL/L
BASE DEFICIT BLD-SCNC: 2 MMOL/L
BASE DEFICIT BLD-SCNC: 2 MMOL/L
BASE DEFICIT BLD-SCNC: 21 MMOL/L
BASE DEFICIT BLD-SCNC: 25 MMOL/L
BASE EXCESS BLD CALC-SCNC: 0 MMOL/L
BASE EXCESS BLD CALC-SCNC: 1 MMOL/L
BASE EXCESS BLD CALC-SCNC: 3 MMOL/L
BASOPHILS # BLD: 0 K/UL (ref 0–0.1)
BASOPHILS # BLD: 0.1 K/UL (ref 0–0.1)
BASOPHILS NFR BLD: 0 % (ref 0–1)
BASOPHILS NFR BLD: 1 % (ref 0–1)
BDY SITE: ABNORMAL
BENZODIAZ UR QL: NEGATIVE
BILIRUB SERPL-MCNC: 0.3 MG/DL (ref 0.2–1)
BILIRUB SERPL-MCNC: 0.4 MG/DL (ref 0.2–1)
BILIRUB SERPL-MCNC: 0.4 MG/DL (ref 0.2–1)
BILIRUB SERPL-MCNC: 0.5 MG/DL (ref 0.2–1)
BILIRUB SERPL-MCNC: 0.6 MG/DL (ref 0.2–1)
BILIRUB SERPL-MCNC: 0.9 MG/DL (ref 0.2–1)
BILIRUB SERPL-MCNC: 1 MG/DL (ref 0.2–1)
BILIRUB UR QL: NEGATIVE
BLD PROD TYP BPU: NORMAL
BLOOD GROUP ANTIBODIES SERPL: NORMAL
BNP SERPL-MCNC: 374 PG/ML
BPU ID: NORMAL
BUN SERPL-MCNC: 100 MG/DL (ref 6–20)
BUN SERPL-MCNC: 103 MG/DL (ref 6–20)
BUN SERPL-MCNC: 108 MG/DL (ref 6–20)
BUN SERPL-MCNC: 116 MG/DL (ref 6–20)
BUN SERPL-MCNC: 122 MG/DL (ref 6–20)
BUN SERPL-MCNC: 126 MG/DL (ref 6–20)
BUN SERPL-MCNC: 14 MG/DL (ref 6–20)
BUN SERPL-MCNC: 16 MG/DL (ref 6–20)
BUN SERPL-MCNC: 18 MG/DL (ref 6–20)
BUN SERPL-MCNC: 21 MG/DL (ref 6–20)
BUN SERPL-MCNC: 21 MG/DL (ref 6–20)
BUN SERPL-MCNC: 22 MG/DL (ref 6–20)
BUN SERPL-MCNC: 22 MG/DL (ref 6–20)
BUN SERPL-MCNC: 23 MG/DL (ref 6–20)
BUN SERPL-MCNC: 24 MG/DL (ref 6–20)
BUN SERPL-MCNC: 25 MG/DL (ref 6–20)
BUN SERPL-MCNC: 26 MG/DL (ref 6–20)
BUN SERPL-MCNC: 26 MG/DL (ref 6–20)
BUN SERPL-MCNC: 28 MG/DL (ref 6–20)
BUN SERPL-MCNC: 30 MG/DL (ref 6–20)
BUN SERPL-MCNC: 31 MG/DL (ref 6–20)
BUN SERPL-MCNC: 33 MG/DL (ref 6–20)
BUN SERPL-MCNC: 38 MG/DL (ref 6–20)
BUN SERPL-MCNC: 44 MG/DL (ref 6–20)
BUN SERPL-MCNC: 45 MG/DL (ref 6–20)
BUN SERPL-MCNC: 46 MG/DL (ref 6–20)
BUN SERPL-MCNC: 46 MG/DL (ref 6–20)
BUN SERPL-MCNC: 48 MG/DL (ref 6–20)
BUN SERPL-MCNC: 48 MG/DL (ref 6–20)
BUN SERPL-MCNC: 49 MG/DL (ref 6–20)
BUN SERPL-MCNC: 49 MG/DL (ref 6–20)
BUN SERPL-MCNC: 51 MG/DL (ref 6–20)
BUN SERPL-MCNC: 57 MG/DL (ref 6–20)
BUN SERPL-MCNC: 57 MG/DL (ref 6–20)
BUN SERPL-MCNC: 61 MG/DL (ref 6–20)
BUN SERPL-MCNC: 61 MG/DL (ref 6–20)
BUN SERPL-MCNC: 66 MG/DL (ref 6–20)
BUN SERPL-MCNC: 67 MG/DL (ref 6–20)
BUN SERPL-MCNC: 68 MG/DL (ref 6–20)
BUN SERPL-MCNC: 73 MG/DL (ref 6–20)
BUN SERPL-MCNC: 76 MG/DL (ref 6–20)
BUN SERPL-MCNC: 77 MG/DL (ref 6–20)
BUN SERPL-MCNC: 80 MG/DL (ref 6–20)
BUN SERPL-MCNC: 82 MG/DL (ref 6–20)
BUN SERPL-MCNC: 88 MG/DL (ref 6–20)
BUN SERPL-MCNC: 90 MG/DL (ref 6–20)
BUN SERPL-MCNC: 93 MG/DL (ref 6–20)
BUN SERPL-MCNC: 99 MG/DL (ref 6–20)
BUN SERPL-MCNC: ABNORMAL MG/DL (ref 6–20)
BUN/CREAT SERPL: 15 (ref 12–20)
BUN/CREAT SERPL: 16 (ref 12–20)
BUN/CREAT SERPL: 17 (ref 12–20)
BUN/CREAT SERPL: 17 (ref 12–20)
BUN/CREAT SERPL: 18 (ref 12–20)
BUN/CREAT SERPL: 18 (ref 12–20)
BUN/CREAT SERPL: 19 (ref 12–20)
BUN/CREAT SERPL: 21 (ref 12–20)
BUN/CREAT SERPL: 21 (ref 12–20)
BUN/CREAT SERPL: 24 (ref 12–20)
BUN/CREAT SERPL: 24 (ref 12–20)
BUN/CREAT SERPL: 28 (ref 12–20)
BUN/CREAT SERPL: 29 (ref 12–20)
BUN/CREAT SERPL: 30 (ref 12–20)
BUN/CREAT SERPL: 31 (ref 12–20)
BUN/CREAT SERPL: 32 (ref 12–20)
BUN/CREAT SERPL: 33 (ref 12–20)
BUN/CREAT SERPL: 34 (ref 12–20)
BUN/CREAT SERPL: 35 (ref 12–20)
BUN/CREAT SERPL: 35 (ref 12–20)
BUN/CREAT SERPL: 36 (ref 12–20)
BUN/CREAT SERPL: 36 (ref 12–20)
BUN/CREAT SERPL: ABNORMAL (ref 12–20)
CA-I BLD-SCNC: 0.99 MMOL/L (ref 1.12–1.32)
CA-I BLD-SCNC: 1.01 MMOL/L (ref 1.12–1.32)
CA-I BLD-SCNC: 1.01 MMOL/L (ref 1.12–1.32)
CA-I BLD-SCNC: 1.04 MMOL/L (ref 1.12–1.32)
CA-I BLD-SCNC: 1.05 MMOL/L (ref 1.12–1.32)
CA-I BLD-SCNC: 1.07 MMOL/L (ref 1.12–1.32)
CA-I BLD-SCNC: 1.08 MMOL/L (ref 1.12–1.32)
CA-I BLD-SCNC: 1.09 MMOL/L (ref 1.12–1.32)
CA-I BLD-SCNC: 1.09 MMOL/L (ref 1.12–1.32)
CA-I BLD-SCNC: 1.1 MMOL/L (ref 1.12–1.32)
CA-I BLD-SCNC: 1.14 MMOL/L (ref 1.12–1.32)
CA-I BLD-SCNC: 1.21 MMOL/L (ref 1.12–1.32)
CA-I BLD-SCNC: 1.27 MMOL/L (ref 1.12–1.32)
CALCIUM SERPL-MCNC: 10.1 MG/DL (ref 8.5–10.1)
CALCIUM SERPL-MCNC: 6.2 MG/DL (ref 8.5–10.1)
CALCIUM SERPL-MCNC: 6.5 MG/DL (ref 8.5–10.1)
CALCIUM SERPL-MCNC: 6.5 MG/DL (ref 8.5–10.1)
CALCIUM SERPL-MCNC: 6.6 MG/DL (ref 8.5–10.1)
CALCIUM SERPL-MCNC: 6.6 MG/DL (ref 8.5–10.1)
CALCIUM SERPL-MCNC: 6.8 MG/DL (ref 8.5–10.1)
CALCIUM SERPL-MCNC: 6.9 MG/DL (ref 8.5–10.1)
CALCIUM SERPL-MCNC: 6.9 MG/DL (ref 8.5–10.1)
CALCIUM SERPL-MCNC: 7 MG/DL (ref 8.5–10.1)
CALCIUM SERPL-MCNC: 7 MG/DL (ref 8.5–10.1)
CALCIUM SERPL-MCNC: 7.1 MG/DL (ref 8.5–10.1)
CALCIUM SERPL-MCNC: 7.2 MG/DL (ref 8.5–10.1)
CALCIUM SERPL-MCNC: 7.3 MG/DL (ref 8.5–10.1)
CALCIUM SERPL-MCNC: 7.3 MG/DL (ref 8.5–10.1)
CALCIUM SERPL-MCNC: 7.4 MG/DL (ref 8.5–10.1)
CALCIUM SERPL-MCNC: 7.5 MG/DL (ref 8.5–10.1)
CALCIUM SERPL-MCNC: 7.7 MG/DL (ref 8.5–10.1)
CALCIUM SERPL-MCNC: 7.8 MG/DL (ref 8.5–10.1)
CALCIUM SERPL-MCNC: 7.9 MG/DL (ref 8.5–10.1)
CALCIUM SERPL-MCNC: 8 MG/DL (ref 8.5–10.1)
CALCIUM SERPL-MCNC: 8.1 MG/DL (ref 8.5–10.1)
CALCIUM SERPL-MCNC: 8.1 MG/DL (ref 8.5–10.1)
CALCIUM SERPL-MCNC: 8.2 MG/DL (ref 8.5–10.1)
CALCIUM SERPL-MCNC: 8.2 MG/DL (ref 8.5–10.1)
CALCIUM SERPL-MCNC: 8.4 MG/DL (ref 8.5–10.1)
CALCIUM SERPL-MCNC: 8.5 MG/DL (ref 8.5–10.1)
CALCIUM SERPL-MCNC: 8.6 MG/DL (ref 8.5–10.1)
CALCIUM SERPL-MCNC: 8.7 MG/DL (ref 8.5–10.1)
CALCIUM SERPL-MCNC: 9 MG/DL (ref 8.5–10.1)
CALCIUM SERPL-MCNC: 9.3 MG/DL (ref 8.5–10.1)
CALCIUM SERPL-MCNC: 9.4 MG/DL (ref 8.5–10.1)
CALCIUM SERPL-MCNC: 9.5 MG/DL (ref 8.5–10.1)
CALCULATED P AXIS, ECG09: 48 DEGREES
CALCULATED P AXIS, ECG09: 64 DEGREES
CALCULATED R AXIS, ECG10: 40 DEGREES
CALCULATED R AXIS, ECG10: 63 DEGREES
CALCULATED T AXIS, ECG11: 53 DEGREES
CALCULATED T AXIS, ECG11: 71 DEGREES
CANNABINOIDS UR QL SCN: NEGATIVE
CC UR VC: ABNORMAL
CHAIN OF CUSTODY,CHC: NO
CHLORIDE SERPL-SCNC: 103 MMOL/L (ref 97–108)
CHLORIDE SERPL-SCNC: 104 MMOL/L (ref 97–108)
CHLORIDE SERPL-SCNC: 105 MMOL/L (ref 97–108)
CHLORIDE SERPL-SCNC: 105 MMOL/L (ref 97–108)
CHLORIDE SERPL-SCNC: 106 MMOL/L (ref 97–108)
CHLORIDE SERPL-SCNC: 107 MMOL/L (ref 97–108)
CHLORIDE SERPL-SCNC: 108 MMOL/L (ref 97–108)
CHLORIDE SERPL-SCNC: 109 MMOL/L (ref 97–108)
CHLORIDE SERPL-SCNC: 109 MMOL/L (ref 97–108)
CHLORIDE SERPL-SCNC: 111 MMOL/L (ref 97–108)
CHLORIDE SERPL-SCNC: 111 MMOL/L (ref 97–108)
CHLORIDE SERPL-SCNC: 113 MMOL/L (ref 97–108)
CHLORIDE SERPL-SCNC: 114 MMOL/L (ref 97–108)
CHLORIDE SERPL-SCNC: 115 MMOL/L (ref 97–108)
CHLORIDE SERPL-SCNC: 116 MMOL/L (ref 97–108)
CHLORIDE SERPL-SCNC: 116 MMOL/L (ref 97–108)
CHLORIDE SERPL-SCNC: 117 MMOL/L (ref 97–108)
CHLORIDE SERPL-SCNC: 117 MMOL/L (ref 97–108)
CHLORIDE SERPL-SCNC: 118 MMOL/L (ref 97–108)
CHLORIDE SERPL-SCNC: 118 MMOL/L (ref 97–108)
CHLORIDE SERPL-SCNC: 119 MMOL/L (ref 97–108)
CHLORIDE SERPL-SCNC: 120 MMOL/L (ref 97–108)
CHLORIDE SERPL-SCNC: 121 MMOL/L (ref 97–108)
CHLORIDE SERPL-SCNC: 122 MMOL/L (ref 97–108)
CHLORIDE SERPL-SCNC: 123 MMOL/L (ref 97–108)
CHLORIDE SERPL-SCNC: 125 MMOL/L (ref 97–108)
CHLORIDE SERPL-SCNC: 128 MMOL/L (ref 97–108)
CHLORIDE SERPL-SCNC: 129 MMOL/L (ref 97–108)
CHLORIDE SERPL-SCNC: 130 MMOL/L (ref 97–108)
CHLORIDE SERPL-SCNC: 131 MMOL/L (ref 97–108)
CHLORIDE SERPL-SCNC: 132 MMOL/L (ref 97–108)
CHLORIDE SERPL-SCNC: 134 MMOL/L (ref 97–108)
CHLORIDE SERPL-SCNC: 136 MMOL/L (ref 97–108)
CHLORIDE SERPL-SCNC: 136 MMOL/L (ref 97–108)
CHLORIDE SERPL-SCNC: 139 MMOL/L (ref 97–108)
CHLORIDE SERPL-SCNC: 140 MMOL/L (ref 97–108)
CHLORIDE SERPL-SCNC: 141 MMOL/L (ref 97–108)
CHLORIDE SERPL-SCNC: 143 MMOL/L (ref 97–108)
CHLORIDE SERPL-SCNC: 145 MMOL/L (ref 97–108)
CHLORIDE SERPL-SCNC: 147 MMOL/L (ref 97–108)
CHLORIDE SERPL-SCNC: 148 MMOL/L (ref 97–108)
CHOLEST SERPL-MCNC: 190 MG/DL
CK SERPL-CCNC: 1654 U/L (ref 39–308)
CO2 SERPL-SCNC: 12 MMOL/L (ref 21–32)
CO2 SERPL-SCNC: 12 MMOL/L (ref 21–32)
CO2 SERPL-SCNC: 13 MMOL/L (ref 21–32)
CO2 SERPL-SCNC: 13 MMOL/L (ref 21–32)
CO2 SERPL-SCNC: 14 MMOL/L (ref 21–32)
CO2 SERPL-SCNC: 14 MMOL/L (ref 21–32)
CO2 SERPL-SCNC: 15 MMOL/L (ref 21–32)
CO2 SERPL-SCNC: 16 MMOL/L (ref 21–32)
CO2 SERPL-SCNC: 17 MMOL/L (ref 21–32)
CO2 SERPL-SCNC: 18 MMOL/L (ref 21–32)
CO2 SERPL-SCNC: 18 MMOL/L (ref 21–32)
CO2 SERPL-SCNC: 19 MMOL/L (ref 21–32)
CO2 SERPL-SCNC: 20 MMOL/L (ref 21–32)
CO2 SERPL-SCNC: 22 MMOL/L (ref 21–32)
CO2 SERPL-SCNC: 23 MMOL/L (ref 21–32)
CO2 SERPL-SCNC: 23 MMOL/L (ref 21–32)
CO2 SERPL-SCNC: 24 MMOL/L (ref 21–32)
CO2 SERPL-SCNC: 25 MMOL/L (ref 21–32)
CO2 SERPL-SCNC: 26 MMOL/L (ref 21–32)
CO2 SERPL-SCNC: 27 MMOL/L (ref 21–32)
CO2 SERPL-SCNC: 27 MMOL/L (ref 21–32)
CO2 SERPL-SCNC: 28 MMOL/L (ref 21–32)
CO2 SERPL-SCNC: <5 MMOL/L (ref 21–32)
COCAINE UR QL SCN: NEGATIVE
COLOR UR: ABNORMAL
COMMENT, HOLDF: NORMAL
CREAT SERPL-MCNC: 0.83 MG/DL (ref 0.7–1.3)
CREAT SERPL-MCNC: 0.84 MG/DL (ref 0.7–1.3)
CREAT SERPL-MCNC: 0.87 MG/DL (ref 0.7–1.3)
CREAT SERPL-MCNC: 0.88 MG/DL (ref 0.7–1.3)
CREAT SERPL-MCNC: 0.91 MG/DL (ref 0.7–1.3)
CREAT SERPL-MCNC: 0.98 MG/DL (ref 0.7–1.3)
CREAT SERPL-MCNC: 0.99 MG/DL (ref 0.7–1.3)
CREAT SERPL-MCNC: 1.03 MG/DL (ref 0.7–1.3)
CREAT SERPL-MCNC: 1.07 MG/DL (ref 0.7–1.3)
CREAT SERPL-MCNC: 1.07 MG/DL (ref 0.7–1.3)
CREAT SERPL-MCNC: 1.28 MG/DL (ref 0.7–1.3)
CREAT SERPL-MCNC: 1.28 MG/DL (ref 0.7–1.3)
CREAT SERPL-MCNC: 1.32 MG/DL (ref 0.7–1.3)
CREAT SERPL-MCNC: 1.36 MG/DL (ref 0.7–1.3)
CREAT SERPL-MCNC: 1.37 MG/DL (ref 0.7–1.3)
CREAT SERPL-MCNC: 1.38 MG/DL (ref 0.7–1.3)
CREAT SERPL-MCNC: 1.38 MG/DL (ref 0.7–1.3)
CREAT SERPL-MCNC: 1.43 MG/DL (ref 0.7–1.3)
CREAT SERPL-MCNC: 1.47 MG/DL (ref 0.7–1.3)
CREAT SERPL-MCNC: 1.49 MG/DL (ref 0.7–1.3)
CREAT SERPL-MCNC: 1.5 MG/DL (ref 0.7–1.3)
CREAT SERPL-MCNC: 1.56 MG/DL (ref 0.7–1.3)
CREAT SERPL-MCNC: 1.6 MG/DL (ref 0.7–1.3)
CREAT SERPL-MCNC: 1.6 MG/DL (ref 0.7–1.3)
CREAT SERPL-MCNC: 1.72 MG/DL (ref 0.7–1.3)
CREAT SERPL-MCNC: 1.84 MG/DL (ref 0.7–1.3)
CREAT SERPL-MCNC: 1.87 MG/DL (ref 0.7–1.3)
CREAT SERPL-MCNC: 1.91 MG/DL (ref 0.7–1.3)
CREAT SERPL-MCNC: 1.96 MG/DL (ref 0.7–1.3)
CREAT SERPL-MCNC: 2.03 MG/DL (ref 0.7–1.3)
CREAT SERPL-MCNC: 2.11 MG/DL (ref 0.7–1.3)
CREAT SERPL-MCNC: 2.12 MG/DL (ref 0.7–1.3)
CREAT SERPL-MCNC: 2.18 MG/DL (ref 0.7–1.3)
CREAT SERPL-MCNC: 2.25 MG/DL (ref 0.7–1.3)
CREAT SERPL-MCNC: 2.31 MG/DL (ref 0.7–1.3)
CREAT SERPL-MCNC: 2.41 MG/DL (ref 0.7–1.3)
CREAT SERPL-MCNC: 2.51 MG/DL (ref 0.7–1.3)
CREAT SERPL-MCNC: 2.56 MG/DL (ref 0.7–1.3)
CREAT SERPL-MCNC: 2.67 MG/DL (ref 0.7–1.3)
CREAT SERPL-MCNC: 2.95 MG/DL (ref 0.7–1.3)
CREAT SERPL-MCNC: 3.01 MG/DL (ref 0.7–1.3)
CREAT SERPL-MCNC: 3.1 MG/DL (ref 0.7–1.3)
CREAT SERPL-MCNC: 3.2 MG/DL (ref 0.7–1.3)
CREAT SERPL-MCNC: 3.41 MG/DL (ref 0.7–1.3)
CREAT SERPL-MCNC: 3.72 MG/DL (ref 0.7–1.3)
CREAT SERPL-MCNC: 4.11 MG/DL (ref 0.7–1.3)
CREAT SERPL-MCNC: 4.47 MG/DL (ref 0.7–1.3)
CREAT SERPL-MCNC: 5.94 MG/DL (ref 0.7–1.3)
CREAT UR-MCNC: 20.6 MG/DL
CROSSMATCH RESULT,%XM: NORMAL
D50 ADMINISTERED, D50ADM: 0 ML
D50 ADMINISTERED, D50ADM: 9 ML
D50 ORDER, D50ORD: 0 ML
D50 ORDER, D50ORD: 9 ML
DIAGNOSIS, 93000: NORMAL
DIAGNOSIS, 93000: NORMAL
DIFFERENTIAL METHOD BLD: ABNORMAL
DIFFERENTIAL METHOD BLD: NORMAL
DRUG SCRN COMMENT,DRGCM: NORMAL
ECHO LV E' SEPTAL VELOCITY: 5.46 CM/S
ECHO LV INTERNAL DIMENSION DIASTOLIC: 3.7 CM (ref 4.2–5.9)
ECHO LV INTERNAL DIMENSION SYSTOLIC: 2.7 CM
ECHO LV IVSD: 0.99 CM (ref 0.6–1)
ECHO LV MASS 2D: 87.1 G (ref 88–224)
ECHO LV POSTERIOR WALL DIASTOLIC: 0.6 CM (ref 0.6–1)
ECHO MV E VELOCITY: 73.29 CM/S
ECHO MV E/E' SEPTAL: 13.42
ECHO TV REGURGITANT MAX VELOCITY: 275.05 CM/S
ECHO TV REGURGITANT PEAK GRADIENT: 30.3 MMHG
EOSINOPHIL # BLD: 0 K/UL (ref 0–0.4)
EOSINOPHIL # BLD: 0.1 K/UL (ref 0–0.4)
EOSINOPHIL # BLD: 0.2 K/UL (ref 0–0.4)
EOSINOPHIL # BLD: 0.3 K/UL (ref 0–0.4)
EOSINOPHIL # BLD: 0.4 K/UL (ref 0–0.4)
EOSINOPHIL NFR BLD: 0 % (ref 0–7)
EOSINOPHIL NFR BLD: 1 % (ref 0–7)
EOSINOPHIL NFR BLD: 2 % (ref 0–7)
EOSINOPHIL NFR BLD: 3 % (ref 0–7)
EPITH CASTS URNS QL MICRO: ABNORMAL /LPF
ERYTHROCYTE [DISTWIDTH] IN BLOOD BY AUTOMATED COUNT: 12.6 % (ref 11.5–14.5)
ERYTHROCYTE [DISTWIDTH] IN BLOOD BY AUTOMATED COUNT: 12.7 % (ref 11.5–14.5)
ERYTHROCYTE [DISTWIDTH] IN BLOOD BY AUTOMATED COUNT: 12.9 % (ref 11.5–14.5)
ERYTHROCYTE [DISTWIDTH] IN BLOOD BY AUTOMATED COUNT: 13.8 % (ref 11.5–14.5)
ERYTHROCYTE [DISTWIDTH] IN BLOOD BY AUTOMATED COUNT: 14.8 % (ref 11.5–14.5)
ERYTHROCYTE [DISTWIDTH] IN BLOOD BY AUTOMATED COUNT: 15.3 % (ref 11.5–14.5)
ERYTHROCYTE [DISTWIDTH] IN BLOOD BY AUTOMATED COUNT: 15.5 % (ref 11.5–14.5)
ERYTHROCYTE [DISTWIDTH] IN BLOOD BY AUTOMATED COUNT: 15.5 % (ref 11.5–14.5)
ERYTHROCYTE [DISTWIDTH] IN BLOOD BY AUTOMATED COUNT: 15.6 % (ref 11.5–14.5)
ERYTHROCYTE [DISTWIDTH] IN BLOOD BY AUTOMATED COUNT: 15.7 % (ref 11.5–14.5)
ERYTHROCYTE [DISTWIDTH] IN BLOOD BY AUTOMATED COUNT: 15.8 % (ref 11.5–14.5)
ERYTHROCYTE [DISTWIDTH] IN BLOOD BY AUTOMATED COUNT: 15.8 % (ref 11.5–14.5)
ERYTHROCYTE [DISTWIDTH] IN BLOOD BY AUTOMATED COUNT: 15.9 % (ref 11.5–14.5)
ERYTHROCYTE [DISTWIDTH] IN BLOOD BY AUTOMATED COUNT: 16 % (ref 11.5–14.5)
ERYTHROCYTE [DISTWIDTH] IN BLOOD BY AUTOMATED COUNT: 16.2 % (ref 11.5–14.5)
ERYTHROCYTE [DISTWIDTH] IN BLOOD BY AUTOMATED COUNT: 16.2 % (ref 11.5–14.5)
ERYTHROCYTE [DISTWIDTH] IN BLOOD BY AUTOMATED COUNT: 16.3 % (ref 11.5–14.5)
ERYTHROCYTE [DISTWIDTH] IN BLOOD BY AUTOMATED COUNT: 16.4 % (ref 11.5–14.5)
ERYTHROCYTE [DISTWIDTH] IN BLOOD BY AUTOMATED COUNT: 17.8 % (ref 11.5–14.5)
EST. AVERAGE GLUCOSE BLD GHB EST-MCNC: 258 MG/DL
ETHANOL,ETHX: NEGATIVE MG/L
GAS FLOW.O2 O2 DELIVERY SYS: ABNORMAL L/MIN
GAS FLOW.O2 SETTING OXYMISER: 10 BPM
GAS FLOW.O2 SETTING OXYMISER: 12 BPM
GAS FLOW.O2 SETTING OXYMISER: 14 BPM
GAS FLOW.O2 SETTING OXYMISER: 35 BPM
GAS FLOW.O2 SETTING OXYMISER: 4 L/M
GAS FLOW.O2 SETTING OXYMISER: 45 L/M
GLOBULIN SER CALC-MCNC: 3.8 G/DL (ref 2–4)
GLOBULIN SER CALC-MCNC: 3.8 G/DL (ref 2–4)
GLOBULIN SER CALC-MCNC: 3.9 G/DL (ref 2–4)
GLOBULIN SER CALC-MCNC: 4.3 G/DL (ref 2–4)
GLOBULIN SER CALC-MCNC: 4.6 G/DL (ref 2–4)
GLOBULIN SER CALC-MCNC: 5.1 G/DL (ref 2–4)
GLOBULIN SER CALC-MCNC: 6.3 G/DL (ref 2–4)
GLSCOM COMMENTS: NORMAL
GLUCOSE BLD STRIP.AUTO-MCNC: 100 MG/DL (ref 65–100)
GLUCOSE BLD STRIP.AUTO-MCNC: 101 MG/DL (ref 65–100)
GLUCOSE BLD STRIP.AUTO-MCNC: 103 MG/DL (ref 65–100)
GLUCOSE BLD STRIP.AUTO-MCNC: 105 MG/DL (ref 65–100)
GLUCOSE BLD STRIP.AUTO-MCNC: 105 MG/DL (ref 65–100)
GLUCOSE BLD STRIP.AUTO-MCNC: 108 MG/DL (ref 65–100)
GLUCOSE BLD STRIP.AUTO-MCNC: 111 MG/DL (ref 65–100)
GLUCOSE BLD STRIP.AUTO-MCNC: 112 MG/DL (ref 65–100)
GLUCOSE BLD STRIP.AUTO-MCNC: 115 MG/DL (ref 65–100)
GLUCOSE BLD STRIP.AUTO-MCNC: 115 MG/DL (ref 65–100)
GLUCOSE BLD STRIP.AUTO-MCNC: 116 MG/DL (ref 65–100)
GLUCOSE BLD STRIP.AUTO-MCNC: 117 MG/DL (ref 65–100)
GLUCOSE BLD STRIP.AUTO-MCNC: 117 MG/DL (ref 65–100)
GLUCOSE BLD STRIP.AUTO-MCNC: 120 MG/DL (ref 65–100)
GLUCOSE BLD STRIP.AUTO-MCNC: 120 MG/DL (ref 65–100)
GLUCOSE BLD STRIP.AUTO-MCNC: 123 MG/DL (ref 65–100)
GLUCOSE BLD STRIP.AUTO-MCNC: 125 MG/DL (ref 65–100)
GLUCOSE BLD STRIP.AUTO-MCNC: 126 MG/DL (ref 65–100)
GLUCOSE BLD STRIP.AUTO-MCNC: 127 MG/DL (ref 65–100)
GLUCOSE BLD STRIP.AUTO-MCNC: 128 MG/DL (ref 65–100)
GLUCOSE BLD STRIP.AUTO-MCNC: 129 MG/DL (ref 65–100)
GLUCOSE BLD STRIP.AUTO-MCNC: 130 MG/DL (ref 65–100)
GLUCOSE BLD STRIP.AUTO-MCNC: 131 MG/DL (ref 65–100)
GLUCOSE BLD STRIP.AUTO-MCNC: 132 MG/DL (ref 65–100)
GLUCOSE BLD STRIP.AUTO-MCNC: 136 MG/DL (ref 65–100)
GLUCOSE BLD STRIP.AUTO-MCNC: 137 MG/DL (ref 65–100)
GLUCOSE BLD STRIP.AUTO-MCNC: 137 MG/DL (ref 65–100)
GLUCOSE BLD STRIP.AUTO-MCNC: 140 MG/DL (ref 65–100)
GLUCOSE BLD STRIP.AUTO-MCNC: 141 MG/DL (ref 65–100)
GLUCOSE BLD STRIP.AUTO-MCNC: 141 MG/DL (ref 65–100)
GLUCOSE BLD STRIP.AUTO-MCNC: 144 MG/DL (ref 65–100)
GLUCOSE BLD STRIP.AUTO-MCNC: 144 MG/DL (ref 65–100)
GLUCOSE BLD STRIP.AUTO-MCNC: 147 MG/DL (ref 65–100)
GLUCOSE BLD STRIP.AUTO-MCNC: 148 MG/DL (ref 65–100)
GLUCOSE BLD STRIP.AUTO-MCNC: 150 MG/DL (ref 65–100)
GLUCOSE BLD STRIP.AUTO-MCNC: 151 MG/DL (ref 65–100)
GLUCOSE BLD STRIP.AUTO-MCNC: 152 MG/DL (ref 65–100)
GLUCOSE BLD STRIP.AUTO-MCNC: 153 MG/DL (ref 65–100)
GLUCOSE BLD STRIP.AUTO-MCNC: 154 MG/DL (ref 65–100)
GLUCOSE BLD STRIP.AUTO-MCNC: 155 MG/DL (ref 65–100)
GLUCOSE BLD STRIP.AUTO-MCNC: 155 MG/DL (ref 65–100)
GLUCOSE BLD STRIP.AUTO-MCNC: 157 MG/DL (ref 65–100)
GLUCOSE BLD STRIP.AUTO-MCNC: 157 MG/DL (ref 65–100)
GLUCOSE BLD STRIP.AUTO-MCNC: 158 MG/DL (ref 65–100)
GLUCOSE BLD STRIP.AUTO-MCNC: 159 MG/DL (ref 65–100)
GLUCOSE BLD STRIP.AUTO-MCNC: 160 MG/DL (ref 65–100)
GLUCOSE BLD STRIP.AUTO-MCNC: 161 MG/DL (ref 65–100)
GLUCOSE BLD STRIP.AUTO-MCNC: 163 MG/DL (ref 65–100)
GLUCOSE BLD STRIP.AUTO-MCNC: 163 MG/DL (ref 65–100)
GLUCOSE BLD STRIP.AUTO-MCNC: 166 MG/DL (ref 65–100)
GLUCOSE BLD STRIP.AUTO-MCNC: 169 MG/DL (ref 65–100)
GLUCOSE BLD STRIP.AUTO-MCNC: 170 MG/DL (ref 65–100)
GLUCOSE BLD STRIP.AUTO-MCNC: 173 MG/DL (ref 65–100)
GLUCOSE BLD STRIP.AUTO-MCNC: 174 MG/DL (ref 65–100)
GLUCOSE BLD STRIP.AUTO-MCNC: 174 MG/DL (ref 65–100)
GLUCOSE BLD STRIP.AUTO-MCNC: 176 MG/DL (ref 65–100)
GLUCOSE BLD STRIP.AUTO-MCNC: 176 MG/DL (ref 65–100)
GLUCOSE BLD STRIP.AUTO-MCNC: 177 MG/DL (ref 65–100)
GLUCOSE BLD STRIP.AUTO-MCNC: 178 MG/DL (ref 65–100)
GLUCOSE BLD STRIP.AUTO-MCNC: 178 MG/DL (ref 65–100)
GLUCOSE BLD STRIP.AUTO-MCNC: 180 MG/DL (ref 65–100)
GLUCOSE BLD STRIP.AUTO-MCNC: 181 MG/DL (ref 65–100)
GLUCOSE BLD STRIP.AUTO-MCNC: 182 MG/DL (ref 65–100)
GLUCOSE BLD STRIP.AUTO-MCNC: 182 MG/DL (ref 65–100)
GLUCOSE BLD STRIP.AUTO-MCNC: 185 MG/DL (ref 65–100)
GLUCOSE BLD STRIP.AUTO-MCNC: 186 MG/DL (ref 65–100)
GLUCOSE BLD STRIP.AUTO-MCNC: 190 MG/DL (ref 65–100)
GLUCOSE BLD STRIP.AUTO-MCNC: 190 MG/DL (ref 65–100)
GLUCOSE BLD STRIP.AUTO-MCNC: 191 MG/DL (ref 65–100)
GLUCOSE BLD STRIP.AUTO-MCNC: 193 MG/DL (ref 65–100)
GLUCOSE BLD STRIP.AUTO-MCNC: 193 MG/DL (ref 65–100)
GLUCOSE BLD STRIP.AUTO-MCNC: 196 MG/DL (ref 65–100)
GLUCOSE BLD STRIP.AUTO-MCNC: 196 MG/DL (ref 65–100)
GLUCOSE BLD STRIP.AUTO-MCNC: 198 MG/DL (ref 65–100)
GLUCOSE BLD STRIP.AUTO-MCNC: 199 MG/DL (ref 65–100)
GLUCOSE BLD STRIP.AUTO-MCNC: 199 MG/DL (ref 65–100)
GLUCOSE BLD STRIP.AUTO-MCNC: 201 MG/DL (ref 65–100)
GLUCOSE BLD STRIP.AUTO-MCNC: 202 MG/DL (ref 65–100)
GLUCOSE BLD STRIP.AUTO-MCNC: 203 MG/DL (ref 65–100)
GLUCOSE BLD STRIP.AUTO-MCNC: 205 MG/DL (ref 65–100)
GLUCOSE BLD STRIP.AUTO-MCNC: 206 MG/DL (ref 65–100)
GLUCOSE BLD STRIP.AUTO-MCNC: 208 MG/DL (ref 65–100)
GLUCOSE BLD STRIP.AUTO-MCNC: 208 MG/DL (ref 65–100)
GLUCOSE BLD STRIP.AUTO-MCNC: 210 MG/DL (ref 65–100)
GLUCOSE BLD STRIP.AUTO-MCNC: 211 MG/DL (ref 65–100)
GLUCOSE BLD STRIP.AUTO-MCNC: 212 MG/DL (ref 65–100)
GLUCOSE BLD STRIP.AUTO-MCNC: 212 MG/DL (ref 65–100)
GLUCOSE BLD STRIP.AUTO-MCNC: 213 MG/DL (ref 65–100)
GLUCOSE BLD STRIP.AUTO-MCNC: 213 MG/DL (ref 65–100)
GLUCOSE BLD STRIP.AUTO-MCNC: 214 MG/DL (ref 65–100)
GLUCOSE BLD STRIP.AUTO-MCNC: 216 MG/DL (ref 65–100)
GLUCOSE BLD STRIP.AUTO-MCNC: 216 MG/DL (ref 65–100)
GLUCOSE BLD STRIP.AUTO-MCNC: 217 MG/DL (ref 65–100)
GLUCOSE BLD STRIP.AUTO-MCNC: 218 MG/DL (ref 65–100)
GLUCOSE BLD STRIP.AUTO-MCNC: 219 MG/DL (ref 65–100)
GLUCOSE BLD STRIP.AUTO-MCNC: 220 MG/DL (ref 65–100)
GLUCOSE BLD STRIP.AUTO-MCNC: 221 MG/DL (ref 65–100)
GLUCOSE BLD STRIP.AUTO-MCNC: 222 MG/DL (ref 65–100)
GLUCOSE BLD STRIP.AUTO-MCNC: 224 MG/DL (ref 65–100)
GLUCOSE BLD STRIP.AUTO-MCNC: 226 MG/DL (ref 65–100)
GLUCOSE BLD STRIP.AUTO-MCNC: 227 MG/DL (ref 65–100)
GLUCOSE BLD STRIP.AUTO-MCNC: 233 MG/DL (ref 65–100)
GLUCOSE BLD STRIP.AUTO-MCNC: 233 MG/DL (ref 65–100)
GLUCOSE BLD STRIP.AUTO-MCNC: 234 MG/DL (ref 65–100)
GLUCOSE BLD STRIP.AUTO-MCNC: 235 MG/DL (ref 65–100)
GLUCOSE BLD STRIP.AUTO-MCNC: 238 MG/DL (ref 65–100)
GLUCOSE BLD STRIP.AUTO-MCNC: 238 MG/DL (ref 65–100)
GLUCOSE BLD STRIP.AUTO-MCNC: 241 MG/DL (ref 65–100)
GLUCOSE BLD STRIP.AUTO-MCNC: 243 MG/DL (ref 65–100)
GLUCOSE BLD STRIP.AUTO-MCNC: 244 MG/DL (ref 65–100)
GLUCOSE BLD STRIP.AUTO-MCNC: 245 MG/DL (ref 65–100)
GLUCOSE BLD STRIP.AUTO-MCNC: 247 MG/DL (ref 65–100)
GLUCOSE BLD STRIP.AUTO-MCNC: 248 MG/DL (ref 65–100)
GLUCOSE BLD STRIP.AUTO-MCNC: 248 MG/DL (ref 65–100)
GLUCOSE BLD STRIP.AUTO-MCNC: 249 MG/DL (ref 65–100)
GLUCOSE BLD STRIP.AUTO-MCNC: 252 MG/DL (ref 65–100)
GLUCOSE BLD STRIP.AUTO-MCNC: 252 MG/DL (ref 65–100)
GLUCOSE BLD STRIP.AUTO-MCNC: 254 MG/DL (ref 65–100)
GLUCOSE BLD STRIP.AUTO-MCNC: 257 MG/DL (ref 65–100)
GLUCOSE BLD STRIP.AUTO-MCNC: 259 MG/DL (ref 65–100)
GLUCOSE BLD STRIP.AUTO-MCNC: 262 MG/DL (ref 65–100)
GLUCOSE BLD STRIP.AUTO-MCNC: 265 MG/DL (ref 65–100)
GLUCOSE BLD STRIP.AUTO-MCNC: 265 MG/DL (ref 65–100)
GLUCOSE BLD STRIP.AUTO-MCNC: 266 MG/DL (ref 65–100)
GLUCOSE BLD STRIP.AUTO-MCNC: 267 MG/DL (ref 65–100)
GLUCOSE BLD STRIP.AUTO-MCNC: 267 MG/DL (ref 65–100)
GLUCOSE BLD STRIP.AUTO-MCNC: 268 MG/DL (ref 65–100)
GLUCOSE BLD STRIP.AUTO-MCNC: 272 MG/DL (ref 65–100)
GLUCOSE BLD STRIP.AUTO-MCNC: 273 MG/DL (ref 65–100)
GLUCOSE BLD STRIP.AUTO-MCNC: 275 MG/DL (ref 65–100)
GLUCOSE BLD STRIP.AUTO-MCNC: 276 MG/DL (ref 65–100)
GLUCOSE BLD STRIP.AUTO-MCNC: 276 MG/DL (ref 65–100)
GLUCOSE BLD STRIP.AUTO-MCNC: 277 MG/DL (ref 65–100)
GLUCOSE BLD STRIP.AUTO-MCNC: 277 MG/DL (ref 65–100)
GLUCOSE BLD STRIP.AUTO-MCNC: 280 MG/DL (ref 65–100)
GLUCOSE BLD STRIP.AUTO-MCNC: 281 MG/DL (ref 65–100)
GLUCOSE BLD STRIP.AUTO-MCNC: 284 MG/DL (ref 65–100)
GLUCOSE BLD STRIP.AUTO-MCNC: 284 MG/DL (ref 65–100)
GLUCOSE BLD STRIP.AUTO-MCNC: 287 MG/DL (ref 65–100)
GLUCOSE BLD STRIP.AUTO-MCNC: 288 MG/DL (ref 65–100)
GLUCOSE BLD STRIP.AUTO-MCNC: 288 MG/DL (ref 65–100)
GLUCOSE BLD STRIP.AUTO-MCNC: 289 MG/DL (ref 65–100)
GLUCOSE BLD STRIP.AUTO-MCNC: 291 MG/DL (ref 65–100)
GLUCOSE BLD STRIP.AUTO-MCNC: 295 MG/DL (ref 65–100)
GLUCOSE BLD STRIP.AUTO-MCNC: 301 MG/DL (ref 65–100)
GLUCOSE BLD STRIP.AUTO-MCNC: 306 MG/DL (ref 65–100)
GLUCOSE BLD STRIP.AUTO-MCNC: 306 MG/DL (ref 65–100)
GLUCOSE BLD STRIP.AUTO-MCNC: 307 MG/DL (ref 65–100)
GLUCOSE BLD STRIP.AUTO-MCNC: 312 MG/DL (ref 65–100)
GLUCOSE BLD STRIP.AUTO-MCNC: 327 MG/DL (ref 65–100)
GLUCOSE BLD STRIP.AUTO-MCNC: 329 MG/DL (ref 65–100)
GLUCOSE BLD STRIP.AUTO-MCNC: 331 MG/DL (ref 65–100)
GLUCOSE BLD STRIP.AUTO-MCNC: 336 MG/DL (ref 65–100)
GLUCOSE BLD STRIP.AUTO-MCNC: 342 MG/DL (ref 65–100)
GLUCOSE BLD STRIP.AUTO-MCNC: 345 MG/DL (ref 65–100)
GLUCOSE BLD STRIP.AUTO-MCNC: 349 MG/DL (ref 65–100)
GLUCOSE BLD STRIP.AUTO-MCNC: 370 MG/DL (ref 65–100)
GLUCOSE BLD STRIP.AUTO-MCNC: 375 MG/DL (ref 65–100)
GLUCOSE BLD STRIP.AUTO-MCNC: 386 MG/DL (ref 65–100)
GLUCOSE BLD STRIP.AUTO-MCNC: 389 MG/DL (ref 65–100)
GLUCOSE BLD STRIP.AUTO-MCNC: 397 MG/DL (ref 65–100)
GLUCOSE BLD STRIP.AUTO-MCNC: 40 MG/DL (ref 65–100)
GLUCOSE BLD STRIP.AUTO-MCNC: 42 MG/DL (ref 65–100)
GLUCOSE BLD STRIP.AUTO-MCNC: 425 MG/DL (ref 65–100)
GLUCOSE BLD STRIP.AUTO-MCNC: 469 MG/DL (ref 65–100)
GLUCOSE BLD STRIP.AUTO-MCNC: 475 MG/DL (ref 65–100)
GLUCOSE BLD STRIP.AUTO-MCNC: 483 MG/DL (ref 65–100)
GLUCOSE BLD STRIP.AUTO-MCNC: 521 MG/DL (ref 65–100)
GLUCOSE BLD STRIP.AUTO-MCNC: 53 MG/DL (ref 65–100)
GLUCOSE BLD STRIP.AUTO-MCNC: 58 MG/DL (ref 65–100)
GLUCOSE BLD STRIP.AUTO-MCNC: 66 MG/DL (ref 65–100)
GLUCOSE BLD STRIP.AUTO-MCNC: 77 MG/DL (ref 65–100)
GLUCOSE BLD STRIP.AUTO-MCNC: 81 MG/DL (ref 65–100)
GLUCOSE BLD STRIP.AUTO-MCNC: 81 MG/DL (ref 65–100)
GLUCOSE BLD STRIP.AUTO-MCNC: 83 MG/DL (ref 65–100)
GLUCOSE BLD STRIP.AUTO-MCNC: 85 MG/DL (ref 65–100)
GLUCOSE BLD STRIP.AUTO-MCNC: 88 MG/DL (ref 65–100)
GLUCOSE BLD STRIP.AUTO-MCNC: 88 MG/DL (ref 65–100)
GLUCOSE BLD STRIP.AUTO-MCNC: 89 MG/DL (ref 65–100)
GLUCOSE BLD STRIP.AUTO-MCNC: 90 MG/DL (ref 65–100)
GLUCOSE BLD STRIP.AUTO-MCNC: 93 MG/DL (ref 65–100)
GLUCOSE BLD STRIP.AUTO-MCNC: 95 MG/DL (ref 65–100)
GLUCOSE BLD STRIP.AUTO-MCNC: 97 MG/DL (ref 65–100)
GLUCOSE BLD STRIP.AUTO-MCNC: 97 MG/DL (ref 65–100)
GLUCOSE BLD STRIP.AUTO-MCNC: 98 MG/DL (ref 65–100)
GLUCOSE BLD STRIP.AUTO-MCNC: 99 MG/DL (ref 65–100)
GLUCOSE BLD STRIP.AUTO-MCNC: 99 MG/DL (ref 65–100)
GLUCOSE BLD STRIP.AUTO-MCNC: >600 MG/DL (ref 65–100)
GLUCOSE SERPL-MCNC: 1016 MG/DL (ref 65–100)
GLUCOSE SERPL-MCNC: 106 MG/DL (ref 65–100)
GLUCOSE SERPL-MCNC: 106 MG/DL (ref 65–100)
GLUCOSE SERPL-MCNC: 109 MG/DL (ref 65–100)
GLUCOSE SERPL-MCNC: 112 MG/DL (ref 65–100)
GLUCOSE SERPL-MCNC: 121 MG/DL (ref 65–100)
GLUCOSE SERPL-MCNC: 127 MG/DL (ref 65–100)
GLUCOSE SERPL-MCNC: 134 MG/DL (ref 65–100)
GLUCOSE SERPL-MCNC: 137 MG/DL (ref 65–100)
GLUCOSE SERPL-MCNC: 138 MG/DL (ref 65–100)
GLUCOSE SERPL-MCNC: 145 MG/DL (ref 65–100)
GLUCOSE SERPL-MCNC: 158 MG/DL (ref 65–100)
GLUCOSE SERPL-MCNC: 160 MG/DL (ref 65–100)
GLUCOSE SERPL-MCNC: 160 MG/DL (ref 65–100)
GLUCOSE SERPL-MCNC: 161 MG/DL (ref 65–100)
GLUCOSE SERPL-MCNC: 161 MG/DL (ref 65–100)
GLUCOSE SERPL-MCNC: 168 MG/DL (ref 65–100)
GLUCOSE SERPL-MCNC: 170 MG/DL (ref 65–100)
GLUCOSE SERPL-MCNC: 173 MG/DL (ref 65–100)
GLUCOSE SERPL-MCNC: 174 MG/DL (ref 65–100)
GLUCOSE SERPL-MCNC: 175 MG/DL (ref 65–100)
GLUCOSE SERPL-MCNC: 177 MG/DL (ref 65–100)
GLUCOSE SERPL-MCNC: 183 MG/DL (ref 65–100)
GLUCOSE SERPL-MCNC: 187 MG/DL (ref 65–100)
GLUCOSE SERPL-MCNC: 188 MG/DL (ref 65–100)
GLUCOSE SERPL-MCNC: 192 MG/DL (ref 65–100)
GLUCOSE SERPL-MCNC: 194 MG/DL (ref 65–100)
GLUCOSE SERPL-MCNC: 195 MG/DL (ref 65–100)
GLUCOSE SERPL-MCNC: 202 MG/DL (ref 65–100)
GLUCOSE SERPL-MCNC: 203 MG/DL (ref 65–100)
GLUCOSE SERPL-MCNC: 213 MG/DL (ref 65–100)
GLUCOSE SERPL-MCNC: 213 MG/DL (ref 65–100)
GLUCOSE SERPL-MCNC: 221 MG/DL (ref 65–100)
GLUCOSE SERPL-MCNC: 224 MG/DL (ref 65–100)
GLUCOSE SERPL-MCNC: 231 MG/DL (ref 65–100)
GLUCOSE SERPL-MCNC: 235 MG/DL (ref 65–100)
GLUCOSE SERPL-MCNC: 239 MG/DL (ref 65–100)
GLUCOSE SERPL-MCNC: 245 MG/DL (ref 65–100)
GLUCOSE SERPL-MCNC: 247 MG/DL (ref 65–100)
GLUCOSE SERPL-MCNC: 252 MG/DL (ref 65–100)
GLUCOSE SERPL-MCNC: 259 MG/DL (ref 65–100)
GLUCOSE SERPL-MCNC: 264 MG/DL (ref 65–100)
GLUCOSE SERPL-MCNC: 272 MG/DL (ref 65–100)
GLUCOSE SERPL-MCNC: 278 MG/DL (ref 65–100)
GLUCOSE SERPL-MCNC: 280 MG/DL (ref 65–100)
GLUCOSE SERPL-MCNC: 324 MG/DL (ref 65–100)
GLUCOSE SERPL-MCNC: 410 MG/DL (ref 65–100)
GLUCOSE SERPL-MCNC: 450 MG/DL (ref 65–100)
GLUCOSE SERPL-MCNC: 559 MG/DL (ref 65–100)
GLUCOSE SERPL-MCNC: 63 MG/DL (ref 65–100)
GLUCOSE SERPL-MCNC: 99 MG/DL (ref 65–100)
GLUCOSE UR STRIP.AUTO-MCNC: 250 MG/DL
GLUCOSE UR STRIP.AUTO-MCNC: >1000 MG/DL
GLUCOSE UR STRIP.AUTO-MCNC: NEGATIVE MG/DL
GLUCOSE, GLC: 105 MG/DL
GLUCOSE, GLC: 111 MG/DL
GLUCOSE, GLC: 115 MG/DL
GLUCOSE, GLC: 115 MG/DL
GLUCOSE, GLC: 120 MG/DL
GLUCOSE, GLC: 128 MG/DL
GLUCOSE, GLC: 131 MG/DL
GLUCOSE, GLC: 132 MG/DL
GLUCOSE, GLC: 140 MG/DL
GLUCOSE, GLC: 141 MG/DL
GLUCOSE, GLC: 141 MG/DL
GLUCOSE, GLC: 147 MG/DL
GLUCOSE, GLC: 150 MG/DL
GLUCOSE, GLC: 153 MG/DL
GLUCOSE, GLC: 155 MG/DL
GLUCOSE, GLC: 157 MG/DL
GLUCOSE, GLC: 158 MG/DL
GLUCOSE, GLC: 159 MG/DL
GLUCOSE, GLC: 160 MG/DL
GLUCOSE, GLC: 161 MG/DL
GLUCOSE, GLC: 163 MG/DL
GLUCOSE, GLC: 163 MG/DL
GLUCOSE, GLC: 166 MG/DL
GLUCOSE, GLC: 169 MG/DL
GLUCOSE, GLC: 169 MG/DL
GLUCOSE, GLC: 170 MG/DL
GLUCOSE, GLC: 177 MG/DL
GLUCOSE, GLC: 177 MG/DL
GLUCOSE, GLC: 178 MG/DL
GLUCOSE, GLC: 180 MG/DL
GLUCOSE, GLC: 180 MG/DL
GLUCOSE, GLC: 182 MG/DL
GLUCOSE, GLC: 190 MG/DL
GLUCOSE, GLC: 199 MG/DL
GLUCOSE, GLC: 201 MG/DL
GLUCOSE, GLC: 203 MG/DL
GLUCOSE, GLC: 212 MG/DL
GLUCOSE, GLC: 218 MG/DL
GLUCOSE, GLC: 226 MG/DL
GLUCOSE, GLC: 233 MG/DL
GLUCOSE, GLC: 235 MG/DL
GLUCOSE, GLC: 252 MG/DL
GLUCOSE, GLC: 265 MG/DL
GLUCOSE, GLC: 265 MG/DL
GLUCOSE, GLC: 267 MG/DL
GLUCOSE, GLC: 272 MG/DL
GLUCOSE, GLC: 273 MG/DL
GLUCOSE, GLC: 276 MG/DL
GLUCOSE, GLC: 277 MG/DL
GLUCOSE, GLC: 284 MG/DL
GLUCOSE, GLC: 312 MG/DL
GLUCOSE, GLC: 329 MG/DL
GLUCOSE, GLC: 375 MG/DL
GLUCOSE, GLC: 475 MG/DL
GLUCOSE, GLC: 521 MG/DL
GLUCOSE, GLC: 601 MG/DL
GLUCOSE, GLC: 77 MG/DL
GLUCOSE, GLC: 99 MG/DL
GLUCOSE, GLC: 99 MG/DL
GRAM STN SPEC: ABNORMAL
HBA1C MFR BLD: 10.6 % (ref 4–5.6)
HCO3 BLD-SCNC: 11 MMOL/L (ref 22–26)
HCO3 BLD-SCNC: 12.1 MMOL/L (ref 22–26)
HCO3 BLD-SCNC: 12.6 MMOL/L (ref 22–26)
HCO3 BLD-SCNC: 13.6 MMOL/L (ref 22–26)
HCO3 BLD-SCNC: 14.3 MMOL/L (ref 22–26)
HCO3 BLD-SCNC: 21.1 MMOL/L (ref 22–26)
HCO3 BLD-SCNC: 21.2 MMOL/L (ref 22–26)
HCO3 BLD-SCNC: 21.3 MMOL/L (ref 22–26)
HCO3 BLD-SCNC: 22.5 MMOL/L (ref 22–26)
HCO3 BLD-SCNC: 23.3 MMOL/L (ref 22–26)
HCO3 BLD-SCNC: 25.6 MMOL/L (ref 22–26)
HCO3 BLD-SCNC: 6.7 MMOL/L (ref 22–26)
HCO3 BLD-SCNC: 8.7 MMOL/L (ref 22–26)
HCT VFR BLD AUTO: 20.2 % (ref 36.6–50.3)
HCT VFR BLD AUTO: 22.3 % (ref 36.6–50.3)
HCT VFR BLD AUTO: 22.8 % (ref 36.6–50.3)
HCT VFR BLD AUTO: 23.4 % (ref 36.6–50.3)
HCT VFR BLD AUTO: 23.4 % (ref 36.6–50.3)
HCT VFR BLD AUTO: 23.5 % (ref 36.6–50.3)
HCT VFR BLD AUTO: 24 % (ref 36.6–50.3)
HCT VFR BLD AUTO: 24.3 % (ref 36.6–50.3)
HCT VFR BLD AUTO: 24.8 % (ref 36.6–50.3)
HCT VFR BLD AUTO: 25.6 % (ref 36.6–50.3)
HCT VFR BLD AUTO: 25.8 % (ref 36.6–50.3)
HCT VFR BLD AUTO: 25.9 % (ref 36.6–50.3)
HCT VFR BLD AUTO: 26.7 % (ref 36.6–50.3)
HCT VFR BLD AUTO: 26.7 % (ref 36.6–50.3)
HCT VFR BLD AUTO: 31.4 % (ref 36.6–50.3)
HCT VFR BLD AUTO: 33.1 % (ref 36.6–50.3)
HCT VFR BLD AUTO: 35.2 % (ref 36.6–50.3)
HCT VFR BLD AUTO: 35.4 % (ref 36.6–50.3)
HCT VFR BLD AUTO: 38.6 % (ref 36.6–50.3)
HCT VFR BLD AUTO: 41.1 % (ref 36.6–50.3)
HCT VFR BLD AUTO: 43.8 % (ref 36.6–50.3)
HDLC SERPL-MCNC: 64 MG/DL
HDLC SERPL: 3 {RATIO} (ref 0–5)
HEMOCCULT STL QL: NEGATIVE
HGB BLD-MCNC: 10.4 G/DL (ref 12.1–17)
HGB BLD-MCNC: 11.2 G/DL (ref 12.1–17)
HGB BLD-MCNC: 11.8 G/DL (ref 12.1–17)
HGB BLD-MCNC: 12 G/DL (ref 12.1–17)
HGB BLD-MCNC: 12.1 G/DL (ref 12.1–17)
HGB BLD-MCNC: 13.2 G/DL (ref 12.1–17)
HGB BLD-MCNC: 14.4 G/DL (ref 12.1–17)
HGB BLD-MCNC: 6.6 G/DL (ref 12.1–17)
HGB BLD-MCNC: 7.3 G/DL (ref 12.1–17)
HGB BLD-MCNC: 7.6 G/DL (ref 12.1–17)
HGB BLD-MCNC: 7.6 G/DL (ref 12.1–17)
HGB BLD-MCNC: 7.9 G/DL (ref 12.1–17)
HGB BLD-MCNC: 7.9 G/DL (ref 12.1–17)
HGB BLD-MCNC: 8 G/DL (ref 12.1–17)
HGB BLD-MCNC: 8.1 G/DL (ref 12.1–17)
HGB BLD-MCNC: 8.2 G/DL (ref 12.1–17)
HGB BLD-MCNC: 8.3 G/DL (ref 12.1–17)
HGB BLD-MCNC: 8.6 G/DL (ref 12.1–17)
HGB BLD-MCNC: 8.7 G/DL (ref 12.1–17)
HGB BLD-MCNC: 8.8 G/DL (ref 12.1–17)
HGB BLD-MCNC: 8.8 G/DL (ref 12.1–17)
HGB UR QL STRIP: ABNORMAL
HIGH TARGET, HITG: 250 MG/DL
HYALINE CASTS URNS QL MICRO: ABNORMAL /LPF (ref 0–5)
HYALINE CASTS URNS QL MICRO: ABNORMAL /LPF (ref 0–5)
IMM GRANULOCYTES # BLD AUTO: 0 K/UL
IMM GRANULOCYTES # BLD AUTO: 0 K/UL (ref 0–0.04)
IMM GRANULOCYTES # BLD AUTO: 0.1 K/UL (ref 0–0.04)
IMM GRANULOCYTES # BLD AUTO: 0.2 K/UL (ref 0–0.04)
IMM GRANULOCYTES NFR BLD AUTO: 0 %
IMM GRANULOCYTES NFR BLD AUTO: 0 % (ref 0–0.5)
IMM GRANULOCYTES NFR BLD AUTO: 1 % (ref 0–0.5)
IMM GRANULOCYTES NFR BLD AUTO: 2 % (ref 0–0.5)
INR PPP: 1.1 (ref 0.9–1.1)
INR PPP: 1.2 (ref 0.9–1.1)
INSPIRATION.DURATION SETTING TIME VENT: 1.1 SEC
INSPIRATION.DURATION SETTING TIME VENT: 1.43 SEC
INSULIN ADMINSTERED, INSADM: 0 UNITS/HOUR
INSULIN ADMINSTERED, INSADM: 0.4 UNITS/HOUR
INSULIN ADMINSTERED, INSADM: 0.4 UNITS/HOUR
INSULIN ADMINSTERED, INSADM: 0.6 UNITS/HOUR
INSULIN ADMINSTERED, INSADM: 0.6 UNITS/HOUR
INSULIN ADMINSTERED, INSADM: 0.7 UNITS/HOUR
INSULIN ADMINSTERED, INSADM: 0.7 UNITS/HOUR
INSULIN ADMINSTERED, INSADM: 0.8 UNITS/HOUR
INSULIN ADMINSTERED, INSADM: 0.9 UNITS/HOUR
INSULIN ADMINSTERED, INSADM: 0.9 UNITS/HOUR
INSULIN ADMINSTERED, INSADM: 1 UNITS/HOUR
INSULIN ADMINSTERED, INSADM: 1 UNITS/HOUR
INSULIN ADMINSTERED, INSADM: 1.2 UNITS/HOUR
INSULIN ADMINSTERED, INSADM: 1.4 UNITS/HOUR
INSULIN ADMINSTERED, INSADM: 1.8 UNITS/HOUR
INSULIN ADMINSTERED, INSADM: 1.8 UNITS/HOUR
INSULIN ADMINSTERED, INSADM: 10 UNITS/HOUR
INSULIN ADMINSTERED, INSADM: 10.8 UNITS/HOUR
INSULIN ADMINSTERED, INSADM: 16.2 UNITS/HOUR
INSULIN ADMINSTERED, INSADM: 2.1 UNITS/HOUR
INSULIN ADMINSTERED, INSADM: 2.3 UNITS/HOUR
INSULIN ADMINSTERED, INSADM: 2.5 UNITS/HOUR
INSULIN ADMINSTERED, INSADM: 2.7 UNITS/HOUR
INSULIN ADMINSTERED, INSADM: 2.8 UNITS/HOUR
INSULIN ADMINSTERED, INSADM: 2.8 UNITS/HOUR
INSULIN ADMINSTERED, INSADM: 2.9 UNITS/HOUR
INSULIN ADMINSTERED, INSADM: 2.9 UNITS/HOUR
INSULIN ADMINSTERED, INSADM: 21.6 UNITS/HOUR
INSULIN ADMINSTERED, INSADM: 24.2 UNITS/HOUR
INSULIN ADMINSTERED, INSADM: 25.2 UNITS/HOUR
INSULIN ADMINSTERED, INSADM: 27.1 UNITS/HOUR
INSULIN ADMINSTERED, INSADM: 3 UNITS/HOUR
INSULIN ADMINSTERED, INSADM: 3.1 UNITS/HOUR
INSULIN ADMINSTERED, INSADM: 3.3 UNITS/HOUR
INSULIN ADMINSTERED, INSADM: 3.4 UNITS/HOUR
INSULIN ADMINSTERED, INSADM: 3.6 UNITS/HOUR
INSULIN ADMINSTERED, INSADM: 3.6 UNITS/HOUR
INSULIN ADMINSTERED, INSADM: 3.7 UNITS/HOUR
INSULIN ADMINSTERED, INSADM: 3.9 UNITS/HOUR
INSULIN ADMINSTERED, INSADM: 32.3 UNITS/HOUR
INSULIN ADMINSTERED, INSADM: 32.5 UNITS/HOUR
INSULIN ADMINSTERED, INSADM: 33.2 UNITS/HOUR
INSULIN ADMINSTERED, INSADM: 4.3 UNITS/HOUR
INSULIN ADMINSTERED, INSADM: 4.4 UNITS/HOUR
INSULIN ADMINSTERED, INSADM: 4.6 UNITS/HOUR
INSULIN ADMINSTERED, INSADM: 5.1 UNITS/HOUR
INSULIN ADMINSTERED, INSADM: 5.4 UNITS/HOUR
INSULIN ADMINSTERED, INSADM: 5.5 UNITS/HOUR
INSULIN ADMINSTERED, INSADM: 5.8 UNITS/HOUR
INSULIN ADMINSTERED, INSADM: 6 UNITS/HOUR
INSULIN ADMINSTERED, INSADM: 6.3 UNITS/HOUR
INSULIN ADMINSTERED, INSADM: 6.6 UNITS/HOUR
INSULIN ADMINSTERED, INSADM: 6.9 UNITS/HOUR
INSULIN ADMINSTERED, INSADM: 7 UNITS/HOUR
INSULIN ADMINSTERED, INSADM: 7 UNITS/HOUR
INSULIN ADMINSTERED, INSADM: 7.6 UNITS/HOUR
INSULIN ADMINSTERED, INSADM: 7.8 UNITS/HOUR
INSULIN ADMINSTERED, INSADM: 8.1 UNITS/HOUR
INSULIN ADMINSTERED, INSADM: 9.4 UNITS/HOUR
INSULIN ORDER, INSORD: 0 UNITS/HOUR
INSULIN ORDER, INSORD: 0.4 UNITS/HOUR
INSULIN ORDER, INSORD: 0.4 UNITS/HOUR
INSULIN ORDER, INSORD: 0.6 UNITS/HOUR
INSULIN ORDER, INSORD: 0.6 UNITS/HOUR
INSULIN ORDER, INSORD: 0.7 UNITS/HOUR
INSULIN ORDER, INSORD: 0.7 UNITS/HOUR
INSULIN ORDER, INSORD: 0.8 UNITS/HOUR
INSULIN ORDER, INSORD: 0.9 UNITS/HOUR
INSULIN ORDER, INSORD: 0.9 UNITS/HOUR
INSULIN ORDER, INSORD: 1 UNITS/HOUR
INSULIN ORDER, INSORD: 1 UNITS/HOUR
INSULIN ORDER, INSORD: 1.2 UNITS/HOUR
INSULIN ORDER, INSORD: 1.4 UNITS/HOUR
INSULIN ORDER, INSORD: 1.8 UNITS/HOUR
INSULIN ORDER, INSORD: 1.8 UNITS/HOUR
INSULIN ORDER, INSORD: 10 UNITS/HOUR
INSULIN ORDER, INSORD: 10.8 UNITS/HOUR
INSULIN ORDER, INSORD: 16.2 UNITS/HOUR
INSULIN ORDER, INSORD: 2.1 UNITS/HOUR
INSULIN ORDER, INSORD: 2.3 UNITS/HOUR
INSULIN ORDER, INSORD: 2.5 UNITS/HOUR
INSULIN ORDER, INSORD: 2.7 UNITS/HOUR
INSULIN ORDER, INSORD: 2.8 UNITS/HOUR
INSULIN ORDER, INSORD: 2.8 UNITS/HOUR
INSULIN ORDER, INSORD: 2.9 UNITS/HOUR
INSULIN ORDER, INSORD: 2.9 UNITS/HOUR
INSULIN ORDER, INSORD: 21.6 UNITS/HOUR
INSULIN ORDER, INSORD: 24.2 UNITS/HOUR
INSULIN ORDER, INSORD: 25.2 UNITS/HOUR
INSULIN ORDER, INSORD: 27.1 UNITS/HOUR
INSULIN ORDER, INSORD: 3 UNITS/HOUR
INSULIN ORDER, INSORD: 3.1 UNITS/HOUR
INSULIN ORDER, INSORD: 3.3 UNITS/HOUR
INSULIN ORDER, INSORD: 3.4 UNITS/HOUR
INSULIN ORDER, INSORD: 3.6 UNITS/HOUR
INSULIN ORDER, INSORD: 3.6 UNITS/HOUR
INSULIN ORDER, INSORD: 3.7 UNITS/HOUR
INSULIN ORDER, INSORD: 3.9 UNITS/HOUR
INSULIN ORDER, INSORD: 32.3 UNITS/HOUR
INSULIN ORDER, INSORD: 32.5 UNITS/HOUR
INSULIN ORDER, INSORD: 33.2 UNITS/HOUR
INSULIN ORDER, INSORD: 4.3 UNITS/HOUR
INSULIN ORDER, INSORD: 4.4 UNITS/HOUR
INSULIN ORDER, INSORD: 4.6 UNITS/HOUR
INSULIN ORDER, INSORD: 5.1 UNITS/HOUR
INSULIN ORDER, INSORD: 5.4 UNITS/HOUR
INSULIN ORDER, INSORD: 5.5 UNITS/HOUR
INSULIN ORDER, INSORD: 5.8 UNITS/HOUR
INSULIN ORDER, INSORD: 6 UNITS/HOUR
INSULIN ORDER, INSORD: 6.3 UNITS/HOUR
INSULIN ORDER, INSORD: 6.6 UNITS/HOUR
INSULIN ORDER, INSORD: 6.9 UNITS/HOUR
INSULIN ORDER, INSORD: 7 UNITS/HOUR
INSULIN ORDER, INSORD: 7 UNITS/HOUR
INSULIN ORDER, INSORD: 7.6 UNITS/HOUR
INSULIN ORDER, INSORD: 7.8 UNITS/HOUR
INSULIN ORDER, INSORD: 8.1 UNITS/HOUR
INSULIN ORDER, INSORD: 9.4 UNITS/HOUR
ISOPROPANOL,ISOPX: NEGATIVE MG/L
KETONES UR QL STRIP.AUTO: NEGATIVE MG/DL
LACTATE SERPL-SCNC: 11.1 MMOL/L (ref 0.4–2)
LACTATE SERPL-SCNC: 12.5 MMOL/L (ref 0.4–2)
LACTATE SERPL-SCNC: 2 MMOL/L (ref 0.4–2)
LACTATE SERPL-SCNC: 2.2 MMOL/L (ref 0.4–2)
LACTATE SERPL-SCNC: 2.3 MMOL/L (ref 0.4–2)
LACTATE SERPL-SCNC: 2.4 MMOL/L (ref 0.4–2)
LACTATE SERPL-SCNC: 3.1 MMOL/L (ref 0.4–2)
LACTATE SERPL-SCNC: 3.7 MMOL/L (ref 0.4–2)
LACTATE SERPL-SCNC: 4.8 MMOL/L (ref 0.4–2)
LACTATE SERPL-SCNC: 7.5 MMOL/L (ref 0.4–2)
LDLC SERPL CALC-MCNC: 115.4 MG/DL (ref 0–100)
LEUKOCYTE ESTERASE UR QL STRIP.AUTO: ABNORMAL
LEUKOCYTE ESTERASE UR QL STRIP.AUTO: NEGATIVE
LEUKOCYTE ESTERASE UR QL STRIP.AUTO: NEGATIVE
LIPASE SERPL-CCNC: 193 U/L (ref 73–393)
LIPASE SERPL-CCNC: 94 U/L (ref 73–393)
LIPID PROFILE,FLP: ABNORMAL
LOW TARGET, LOT: 150 MG/DL
LVFS 2D: 26.98 %
LYMPHOCYTES # BLD: 0.4 K/UL (ref 0.8–3.5)
LYMPHOCYTES # BLD: 1.2 K/UL (ref 0.8–3.5)
LYMPHOCYTES # BLD: 1.4 K/UL (ref 0.8–3.5)
LYMPHOCYTES # BLD: 1.7 K/UL (ref 0.8–3.5)
LYMPHOCYTES # BLD: 1.8 K/UL (ref 0.8–3.5)
LYMPHOCYTES # BLD: 2 K/UL (ref 0.8–3.5)
LYMPHOCYTES # BLD: 2.2 K/UL (ref 0.8–3.5)
LYMPHOCYTES # BLD: 2.3 K/UL (ref 0.8–3.5)
LYMPHOCYTES # BLD: 2.4 K/UL (ref 0.8–3.5)
LYMPHOCYTES # BLD: 2.5 K/UL (ref 0.8–3.5)
LYMPHOCYTES # BLD: 2.6 K/UL (ref 0.8–3.5)
LYMPHOCYTES # BLD: 2.7 K/UL (ref 0.8–3.5)
LYMPHOCYTES # BLD: 3.4 K/UL (ref 0.8–3.5)
LYMPHOCYTES # BLD: 3.8 K/UL (ref 0.8–3.5)
LYMPHOCYTES # BLD: 4.2 K/UL (ref 0.8–3.5)
LYMPHOCYTES # BLD: 5.1 K/UL (ref 0.8–3.5)
LYMPHOCYTES # BLD: 5.1 K/UL (ref 0.8–3.5)
LYMPHOCYTES NFR BLD: 11 % (ref 12–49)
LYMPHOCYTES NFR BLD: 12 % (ref 12–49)
LYMPHOCYTES NFR BLD: 16 % (ref 12–49)
LYMPHOCYTES NFR BLD: 16 % (ref 12–49)
LYMPHOCYTES NFR BLD: 19 % (ref 12–49)
LYMPHOCYTES NFR BLD: 19 % (ref 12–49)
LYMPHOCYTES NFR BLD: 20 % (ref 12–49)
LYMPHOCYTES NFR BLD: 21 % (ref 12–49)
LYMPHOCYTES NFR BLD: 22 % (ref 12–49)
LYMPHOCYTES NFR BLD: 25 % (ref 12–49)
LYMPHOCYTES NFR BLD: 30 % (ref 12–49)
LYMPHOCYTES NFR BLD: 31 % (ref 12–49)
LYMPHOCYTES NFR BLD: 37 % (ref 12–49)
LYMPHOCYTES NFR BLD: 39 % (ref 12–49)
LYMPHOCYTES NFR BLD: 49 % (ref 12–49)
LYMPHOCYTES NFR BLD: 5 % (ref 12–49)
LYMPHOCYTES NFR BLD: 8 % (ref 12–49)
MAGNESIUM SERPL-MCNC: 1.5 MG/DL (ref 1.6–2.4)
MAGNESIUM SERPL-MCNC: 1.5 MG/DL (ref 1.6–2.4)
MAGNESIUM SERPL-MCNC: 1.6 MG/DL (ref 1.6–2.4)
MAGNESIUM SERPL-MCNC: 1.7 MG/DL (ref 1.6–2.4)
MAGNESIUM SERPL-MCNC: 1.7 MG/DL (ref 1.6–2.4)
MAGNESIUM SERPL-MCNC: 1.8 MG/DL (ref 1.6–2.4)
MAGNESIUM SERPL-MCNC: 1.8 MG/DL (ref 1.6–2.4)
MAGNESIUM SERPL-MCNC: 1.9 MG/DL (ref 1.6–2.4)
MAGNESIUM SERPL-MCNC: 2 MG/DL (ref 1.6–2.4)
MAGNESIUM SERPL-MCNC: 2 MG/DL (ref 1.6–2.4)
MAGNESIUM SERPL-MCNC: 2.1 MG/DL (ref 1.6–2.4)
MAGNESIUM SERPL-MCNC: 2.2 MG/DL (ref 1.6–2.4)
MAGNESIUM SERPL-MCNC: 3.3 MG/DL (ref 1.6–2.4)
MCH RBC QN AUTO: 27.2 PG (ref 26–34)
MCH RBC QN AUTO: 27.2 PG (ref 26–34)
MCH RBC QN AUTO: 27.6 PG (ref 26–34)
MCH RBC QN AUTO: 27.7 PG (ref 26–34)
MCH RBC QN AUTO: 27.8 PG (ref 26–34)
MCH RBC QN AUTO: 27.8 PG (ref 26–34)
MCH RBC QN AUTO: 27.9 PG (ref 26–34)
MCH RBC QN AUTO: 28 PG (ref 26–34)
MCH RBC QN AUTO: 28.1 PG (ref 26–34)
MCH RBC QN AUTO: 28.1 PG (ref 26–34)
MCH RBC QN AUTO: 28.2 PG (ref 26–34)
MCH RBC QN AUTO: 28.4 PG (ref 26–34)
MCH RBC QN AUTO: 28.4 PG (ref 26–34)
MCH RBC QN AUTO: 28.5 PG (ref 26–34)
MCH RBC QN AUTO: 28.6 PG (ref 26–34)
MCHC RBC AUTO-ENTMCNC: 28.7 G/DL (ref 30–36.5)
MCHC RBC AUTO-ENTMCNC: 31.6 G/DL (ref 30–36.5)
MCHC RBC AUTO-ENTMCNC: 32.5 G/DL (ref 30–36.5)
MCHC RBC AUTO-ENTMCNC: 32.7 G/DL (ref 30–36.5)
MCHC RBC AUTO-ENTMCNC: 32.7 G/DL (ref 30–36.5)
MCHC RBC AUTO-ENTMCNC: 32.9 G/DL (ref 30–36.5)
MCHC RBC AUTO-ENTMCNC: 32.9 G/DL (ref 30–36.5)
MCHC RBC AUTO-ENTMCNC: 33 G/DL (ref 30–36.5)
MCHC RBC AUTO-ENTMCNC: 33 G/DL (ref 30–36.5)
MCHC RBC AUTO-ENTMCNC: 33.1 G/DL (ref 30–36.5)
MCHC RBC AUTO-ENTMCNC: 33.1 G/DL (ref 30–36.5)
MCHC RBC AUTO-ENTMCNC: 33.2 G/DL (ref 30–36.5)
MCHC RBC AUTO-ENTMCNC: 33.3 G/DL (ref 30–36.5)
MCHC RBC AUTO-ENTMCNC: 33.6 G/DL (ref 30–36.5)
MCHC RBC AUTO-ENTMCNC: 33.7 G/DL (ref 30–36.5)
MCHC RBC AUTO-ENTMCNC: 33.8 G/DL (ref 30–36.5)
MCHC RBC AUTO-ENTMCNC: 34.1 G/DL (ref 30–36.5)
MCHC RBC AUTO-ENTMCNC: 34.2 G/DL (ref 30–36.5)
MCV RBC AUTO: 80.9 FL (ref 80–99)
MCV RBC AUTO: 81.1 FL (ref 80–99)
MCV RBC AUTO: 81.4 FL (ref 80–99)
MCV RBC AUTO: 82.5 FL (ref 80–99)
MCV RBC AUTO: 83 FL (ref 80–99)
MCV RBC AUTO: 83.2 FL (ref 80–99)
MCV RBC AUTO: 83.5 FL (ref 80–99)
MCV RBC AUTO: 83.6 FL (ref 80–99)
MCV RBC AUTO: 83.7 FL (ref 80–99)
MCV RBC AUTO: 83.8 FL (ref 80–99)
MCV RBC AUTO: 83.8 FL (ref 80–99)
MCV RBC AUTO: 83.9 FL (ref 80–99)
MCV RBC AUTO: 84 FL (ref 80–99)
MCV RBC AUTO: 84.5 FL (ref 80–99)
MCV RBC AUTO: 85.1 FL (ref 80–99)
MCV RBC AUTO: 86 FL (ref 80–99)
MCV RBC AUTO: 86.9 FL (ref 80–99)
MCV RBC AUTO: 90.1 FL (ref 80–99)
MCV RBC AUTO: 96.5 FL (ref 80–99)
METAMYELOCYTES NFR BLD MANUAL: 1 %
METAMYELOCYTES NFR BLD MANUAL: 2 %
METHADONE UR QL: NEGATIVE
METHANOL,METHX: NEGATIVE MG/L
MINUTES UNTIL NEXT BG, NBG: 120 MIN
MINUTES UNTIL NEXT BG, NBG: 15 MIN
MINUTES UNTIL NEXT BG, NBG: 60 MIN
MONOCYTES # BLD: 0.1 K/UL (ref 0–1)
MONOCYTES # BLD: 0.1 K/UL (ref 0–1)
MONOCYTES # BLD: 0.3 K/UL (ref 0–1)
MONOCYTES # BLD: 0.4 K/UL (ref 0–1)
MONOCYTES # BLD: 0.4 K/UL (ref 0–1)
MONOCYTES # BLD: 0.5 K/UL (ref 0–1)
MONOCYTES # BLD: 0.6 K/UL (ref 0–1)
MONOCYTES # BLD: 0.7 K/UL (ref 0–1)
MONOCYTES # BLD: 0.7 K/UL (ref 0–1)
MONOCYTES # BLD: 0.8 K/UL (ref 0–1)
MONOCYTES # BLD: 0.9 K/UL (ref 0–1)
MONOCYTES # BLD: 0.9 K/UL (ref 0–1)
MONOCYTES # BLD: 1.1 K/UL (ref 0–1)
MONOCYTES NFR BLD: 1 % (ref 5–13)
MONOCYTES NFR BLD: 1 % (ref 5–13)
MONOCYTES NFR BLD: 2 % (ref 5–13)
MONOCYTES NFR BLD: 3 % (ref 5–13)
MONOCYTES NFR BLD: 3 % (ref 5–13)
MONOCYTES NFR BLD: 4 % (ref 5–13)
MONOCYTES NFR BLD: 4 % (ref 5–13)
MONOCYTES NFR BLD: 5 % (ref 5–13)
MONOCYTES NFR BLD: 6 % (ref 5–13)
MONOCYTES NFR BLD: 7 % (ref 5–13)
MONOCYTES NFR BLD: 7 % (ref 5–13)
MONOCYTES NFR BLD: 8 % (ref 5–13)
MONOCYTES NFR BLD: 9 % (ref 5–13)
MULTIPLIER, MUL: 0.01
MULTIPLIER, MUL: 0.02
MULTIPLIER, MUL: 0.03
MULTIPLIER, MUL: 0.04
MULTIPLIER, MUL: 0.05
MULTIPLIER, MUL: 0.06
MULTIPLIER, MUL: 0.07
MULTIPLIER, MUL: 0.07
MULTIPLIER, MUL: 0.08
MULTIPLIER, MUL: 0.08
MULTIPLIER, MUL: 0.09
MYELOCYTES NFR BLD MANUAL: 1 %
MYELOCYTES NFR BLD MANUAL: 1 %
MYELOCYTES NFR BLD MANUAL: 2 %
NEUTS BAND NFR BLD MANUAL: 1 % (ref 0–6)
NEUTS BAND NFR BLD MANUAL: 1 % (ref 0–6)
NEUTS BAND NFR BLD MANUAL: 10 % (ref 0–6)
NEUTS BAND NFR BLD MANUAL: 2 % (ref 0–6)
NEUTS BAND NFR BLD MANUAL: 3 % (ref 0–6)
NEUTS BAND NFR BLD MANUAL: 8 % (ref 0–6)
NEUTS BAND NFR BLD MANUAL: 9 % (ref 0–6)
NEUTS BAND NFR BLD MANUAL: 9 % (ref 0–6)
NEUTS SEG # BLD: 10.3 K/UL (ref 1.8–8)
NEUTS SEG # BLD: 13.3 K/UL (ref 1.8–8)
NEUTS SEG # BLD: 13.9 K/UL (ref 1.8–8)
NEUTS SEG # BLD: 20.4 K/UL (ref 1.8–8)
NEUTS SEG # BLD: 4 K/UL (ref 1.8–8)
NEUTS SEG # BLD: 4.3 K/UL (ref 1.8–8)
NEUTS SEG # BLD: 5.5 K/UL (ref 1.8–8)
NEUTS SEG # BLD: 5.6 K/UL (ref 1.8–8)
NEUTS SEG # BLD: 6.2 K/UL (ref 1.8–8)
NEUTS SEG # BLD: 7.3 K/UL (ref 1.8–8)
NEUTS SEG # BLD: 7.8 K/UL (ref 1.8–8)
NEUTS SEG # BLD: 8.2 K/UL (ref 1.8–8)
NEUTS SEG # BLD: 8.3 K/UL (ref 1.8–8)
NEUTS SEG # BLD: 8.4 K/UL (ref 1.8–8)
NEUTS SEG # BLD: 9.1 K/UL (ref 1.8–8)
NEUTS SEG # BLD: 9.1 K/UL (ref 1.8–8)
NEUTS SEG # BLD: 9.6 K/UL (ref 1.8–8)
NEUTS SEG NFR BLD: 38 % (ref 32–75)
NEUTS SEG NFR BLD: 49 % (ref 32–75)
NEUTS SEG NFR BLD: 55 % (ref 32–75)
NEUTS SEG NFR BLD: 59 % (ref 32–75)
NEUTS SEG NFR BLD: 60 % (ref 32–75)
NEUTS SEG NFR BLD: 63 % (ref 32–75)
NEUTS SEG NFR BLD: 66 % (ref 32–75)
NEUTS SEG NFR BLD: 68 % (ref 32–75)
NEUTS SEG NFR BLD: 69 % (ref 32–75)
NEUTS SEG NFR BLD: 71 % (ref 32–75)
NEUTS SEG NFR BLD: 72 % (ref 32–75)
NEUTS SEG NFR BLD: 74 % (ref 32–75)
NEUTS SEG NFR BLD: 75 % (ref 32–75)
NEUTS SEG NFR BLD: 79 % (ref 32–75)
NEUTS SEG NFR BLD: 80 % (ref 32–75)
NEUTS SEG NFR BLD: 82 % (ref 32–75)
NEUTS SEG NFR BLD: 93 % (ref 32–75)
NITRITE UR QL STRIP.AUTO: NEGATIVE
NITRITE UR QL STRIP.AUTO: NEGATIVE
NITRITE UR QL STRIP.AUTO: POSITIVE
NRBC # BLD: 0 K/UL (ref 0–0.01)
NRBC # BLD: 0.02 K/UL (ref 0–0.01)
NRBC # BLD: 0.02 K/UL (ref 0–0.01)
NRBC # BLD: 0.04 K/UL (ref 0–0.01)
NRBC # BLD: 0.05 K/UL (ref 0–0.01)
NRBC # BLD: 0.08 K/UL (ref 0–0.01)
NRBC # BLD: 0.1 K/UL (ref 0–0.01)
NRBC BLD-RTO: 0 PER 100 WBC
NRBC BLD-RTO: 0.2 PER 100 WBC
NRBC BLD-RTO: 0.2 PER 100 WBC
NRBC BLD-RTO: 0.3 PER 100 WBC
NRBC BLD-RTO: 0.4 PER 100 WBC
NRBC BLD-RTO: 0.4 PER 100 WBC
NRBC BLD-RTO: 0.5 PER 100 WBC
NRBC BLD-RTO: 0.9 PER 100 WBC
O2/TOTAL GAS SETTING VFR VENT: 0.4 %
O2/TOTAL GAS SETTING VFR VENT: 0.4 %
O2/TOTAL GAS SETTING VFR VENT: 0.5 %
O2/TOTAL GAS SETTING VFR VENT: 0.5 %
O2/TOTAL GAS SETTING VFR VENT: 0.6 %
O2/TOTAL GAS SETTING VFR VENT: 0.6 %
O2/TOTAL GAS SETTING VFR VENT: 100 %
O2/TOTAL GAS SETTING VFR VENT: 40 %
O2/TOTAL GAS SETTING VFR VENT: 60 %
O2/TOTAL GAS SETTING VFR VENT: 60 %
O2/TOTAL GAS SETTING VFR VENT: 70 %
O2/TOTAL GAS SETTING VFR VENT: 94 %
OPIATES UR QL: NEGATIVE
ORDER INITIALS, ORDINIT: NORMAL
OSMOLALITY SERPL: 415 MOSM/KG H2O
OSMOLALITY UR: 485 MOSM/KG H2O
P-R INTERVAL, ECG05: 102 MS
P-R INTERVAL, ECG05: 140 MS
PCO2 BLD: 18.8 MMHG (ref 35–45)
PCO2 BLD: 20.1 MMHG (ref 35–45)
PCO2 BLD: 20.7 MMHG (ref 35–45)
PCO2 BLD: 21.4 MMHG (ref 35–45)
PCO2 BLD: 23.9 MMHG (ref 35–45)
PCO2 BLD: 24.1 MMHG (ref 35–45)
PCO2 BLD: 24.8 MMHG (ref 35–45)
PCO2 BLD: 25.8 MMHG (ref 35–45)
PCO2 BLD: 27.9 MMHG (ref 35–45)
PCO2 BLD: 28.1 MMHG (ref 35–45)
PCO2 BLD: 28.5 MMHG (ref 35–45)
PCO2 BLD: 29.3 MMHG (ref 35–45)
PCO2 BLD: 30.2 MMHG (ref 35–45)
PCP UR QL: NEGATIVE
PEEP RESPIRATORY: 5 CMH2O
PEEP RESPIRATORY: 6 CMH2O
PEEP RESPIRATORY: 7 CMH2O
PEEP RESPIRATORY: 8 CMH2O
PH BLD: 6.98 [PH] (ref 7.35–7.45)
PH BLD: 7.07 [PH] (ref 7.35–7.45)
PH BLD: 7.34 [PH] (ref 7.35–7.45)
PH BLD: 7.36 [PH] (ref 7.35–7.45)
PH BLD: 7.39 [PH] (ref 7.35–7.45)
PH BLD: 7.42 [PH] (ref 7.35–7.45)
PH BLD: 7.43 [PH] (ref 7.35–7.45)
PH BLD: 7.49 [PH] (ref 7.35–7.45)
PH BLD: 7.51 [PH] (ref 7.35–7.45)
PH BLD: 7.52 [PH] (ref 7.35–7.45)
PH BLD: 7.55 [PH] (ref 7.35–7.45)
PH BLD: 7.55 [PH] (ref 7.35–7.45)
PH BLD: 7.58 [PH] (ref 7.35–7.45)
PH UR STRIP: 5 [PH] (ref 5–8)
PH UR STRIP: 6.5 [PH] (ref 5–8)
PHOSPHATE SERPL-MCNC: 1.8 MG/DL (ref 2.6–4.7)
PHOSPHATE SERPL-MCNC: 1.9 MG/DL (ref 2.6–4.7)
PHOSPHATE SERPL-MCNC: 2.1 MG/DL (ref 2.6–4.7)
PHOSPHATE SERPL-MCNC: 2.1 MG/DL (ref 2.6–4.7)
PHOSPHATE SERPL-MCNC: 2.2 MG/DL (ref 2.6–4.7)
PHOSPHATE SERPL-MCNC: 2.3 MG/DL (ref 2.6–4.7)
PHOSPHATE SERPL-MCNC: 2.4 MG/DL (ref 2.6–4.7)
PHOSPHATE SERPL-MCNC: 2.4 MG/DL (ref 2.6–4.7)
PHOSPHATE SERPL-MCNC: 2.5 MG/DL (ref 2.6–4.7)
PHOSPHATE SERPL-MCNC: 2.5 MG/DL (ref 2.6–4.7)
PHOSPHATE SERPL-MCNC: 2.7 MG/DL (ref 2.6–4.7)
PHOSPHATE SERPL-MCNC: 2.8 MG/DL (ref 2.6–4.7)
PHOSPHATE SERPL-MCNC: 3 MG/DL (ref 2.6–4.7)
PHOSPHATE SERPL-MCNC: 3.2 MG/DL (ref 2.6–4.7)
PHOSPHATE SERPL-MCNC: 3.5 MG/DL (ref 2.6–4.7)
PHOSPHATE SERPL-MCNC: 3.7 MG/DL (ref 2.6–4.7)
PHOSPHATE SERPL-MCNC: 5.8 MG/DL (ref 2.6–4.7)
PIP ISTAT,IPIP: 14
PIP ISTAT,IPIP: 15
PIP ISTAT,IPIP: 15
PIP ISTAT,IPIP: 16
PIP ISTAT,IPIP: 21
PIP ISTAT,IPIP: 21
PIP ISTAT,IPIP: 22
PIP ISTAT,IPIP: 24
PIP ISTAT,IPIP: 26
PLATELET # BLD AUTO: 101 K/UL (ref 150–400)
PLATELET # BLD AUTO: 129 K/UL (ref 150–400)
PLATELET # BLD AUTO: 135 K/UL (ref 150–400)
PLATELET # BLD AUTO: 141 K/UL (ref 150–400)
PLATELET # BLD AUTO: 155 K/UL (ref 150–400)
PLATELET # BLD AUTO: 164 K/UL (ref 150–400)
PLATELET # BLD AUTO: 172 K/UL (ref 150–400)
PLATELET # BLD AUTO: 177 K/UL (ref 150–400)
PLATELET # BLD AUTO: 179 K/UL (ref 150–400)
PLATELET # BLD AUTO: 181 K/UL (ref 150–400)
PLATELET # BLD AUTO: 182 K/UL (ref 150–400)
PLATELET # BLD AUTO: 219 K/UL (ref 150–400)
PLATELET # BLD AUTO: 229 K/UL (ref 150–400)
PLATELET # BLD AUTO: 38 K/UL (ref 150–400)
PLATELET # BLD AUTO: 38 K/UL (ref 150–400)
PLATELET # BLD AUTO: 41 K/UL (ref 150–400)
PLATELET # BLD AUTO: 45 K/UL (ref 150–400)
PLATELET # BLD AUTO: 45 K/UL (ref 150–400)
PLATELET # BLD AUTO: 55 K/UL (ref 150–400)
PLATELET # BLD AUTO: 77 K/UL (ref 150–400)
PLATELET # BLD AUTO: 78 K/UL (ref 150–400)
PLATELET COMMENTS,PCOM: ABNORMAL
PMV BLD AUTO: 10.1 FL (ref 8.9–12.9)
PMV BLD AUTO: 10.4 FL (ref 8.9–12.9)
PMV BLD AUTO: 10.5 FL (ref 8.9–12.9)
PMV BLD AUTO: 10.6 FL (ref 8.9–12.9)
PMV BLD AUTO: 10.7 FL (ref 8.9–12.9)
PMV BLD AUTO: 10.9 FL (ref 8.9–12.9)
PMV BLD AUTO: 11.1 FL (ref 8.9–12.9)
PMV BLD AUTO: 11.1 FL (ref 8.9–12.9)
PMV BLD AUTO: 11.8 FL (ref 8.9–12.9)
PMV BLD AUTO: 12 FL (ref 8.9–12.9)
PMV BLD AUTO: 12.1 FL (ref 8.9–12.9)
PMV BLD AUTO: 12.2 FL (ref 8.9–12.9)
PMV BLD AUTO: 12.3 FL (ref 8.9–12.9)
PMV BLD AUTO: 12.4 FL (ref 8.9–12.9)
PMV BLD AUTO: 13.3 FL (ref 8.9–12.9)
PMV BLD AUTO: ABNORMAL FL (ref 8.9–12.9)
PO2 BLD: 141 MMHG (ref 80–100)
PO2 BLD: 163 MMHG (ref 80–100)
PO2 BLD: 168 MMHG (ref 80–100)
PO2 BLD: 191 MMHG (ref 80–100)
PO2 BLD: 224 MMHG (ref 80–100)
PO2 BLD: 38 MMHG (ref 80–100)
PO2 BLD: 420 MMHG (ref 80–100)
PO2 BLD: 51 MMHG (ref 80–100)
PO2 BLD: 68 MMHG (ref 80–100)
PO2 BLD: 82 MMHG (ref 80–100)
PO2 BLD: 89 MMHG (ref 80–100)
PO2 BLD: 94 MMHG (ref 80–100)
PO2 BLD: 96 MMHG (ref 80–100)
POTASSIUM SERPL-SCNC: 3 MMOL/L (ref 3.5–5.1)
POTASSIUM SERPL-SCNC: 3 MMOL/L (ref 3.5–5.1)
POTASSIUM SERPL-SCNC: 3.2 MMOL/L (ref 3.5–5.1)
POTASSIUM SERPL-SCNC: 3.3 MMOL/L (ref 3.5–5.1)
POTASSIUM SERPL-SCNC: 3.4 MMOL/L (ref 3.5–5.1)
POTASSIUM SERPL-SCNC: 3.5 MMOL/L (ref 3.5–5.1)
POTASSIUM SERPL-SCNC: 3.6 MMOL/L (ref 3.5–5.1)
POTASSIUM SERPL-SCNC: 3.7 MMOL/L (ref 3.5–5.1)
POTASSIUM SERPL-SCNC: 3.8 MMOL/L (ref 3.5–5.1)
POTASSIUM SERPL-SCNC: 3.9 MMOL/L (ref 3.5–5.1)
POTASSIUM SERPL-SCNC: 4 MMOL/L (ref 3.5–5.1)
POTASSIUM SERPL-SCNC: 4.1 MMOL/L (ref 3.5–5.1)
POTASSIUM SERPL-SCNC: 4.2 MMOL/L (ref 3.5–5.1)
POTASSIUM SERPL-SCNC: 4.3 MMOL/L (ref 3.5–5.1)
POTASSIUM SERPL-SCNC: 4.4 MMOL/L (ref 3.5–5.1)
POTASSIUM SERPL-SCNC: 4.5 MMOL/L (ref 3.5–5.1)
POTASSIUM SERPL-SCNC: 6 MMOL/L (ref 3.5–5.1)
POTASSIUM SERPL-SCNC: 6.6 MMOL/L (ref 3.5–5.1)
POTASSIUM UR-SCNC: 34 MMOL/L
PRESSURE CONTROL, IPC: YES
PRESSURE CONTROL, IPC: YES
PRESSURE SUPPORT SETTING VENT: 5 CMH2O
PROCALCITONIN SERPL-MCNC: 5.64 NG/ML
PROCALCITONIN SERPL-MCNC: 6.05 NG/ML
PROT SERPL-MCNC: 5.6 G/DL (ref 6.4–8.2)
PROT SERPL-MCNC: 5.6 G/DL (ref 6.4–8.2)
PROT SERPL-MCNC: 6.3 G/DL (ref 6.4–8.2)
PROT SERPL-MCNC: 7.4 G/DL (ref 6.4–8.2)
PROT SERPL-MCNC: 8.2 G/DL (ref 6.4–8.2)
PROT SERPL-MCNC: 8.5 G/DL (ref 6.4–8.2)
PROT SERPL-MCNC: 8.6 G/DL (ref 6.4–8.2)
PROT UR STRIP-MCNC: 100 MG/DL
PROT UR STRIP-MCNC: 100 MG/DL
PROT UR STRIP-MCNC: NEGATIVE MG/DL
PROTHROMBIN TIME: 10.9 SEC (ref 9–11.1)
PROTHROMBIN TIME: 12.2 SEC (ref 9–11.1)
Q-T INTERVAL, ECG07: 310 MS
Q-T INTERVAL, ECG07: 330 MS
QRS DURATION, ECG06: 56 MS
QRS DURATION, ECG06: 60 MS
QTC CALCULATION (BEZET), ECG08: 413 MS
QTC CALCULATION (BEZET), ECG08: 427 MS
RBC # BLD AUTO: 2.35 M/UL (ref 4.1–5.7)
RBC # BLD AUTO: 2.68 M/UL (ref 4.1–5.7)
RBC # BLD AUTO: 2.74 M/UL (ref 4.1–5.7)
RBC # BLD AUTO: 2.79 M/UL (ref 4.1–5.7)
RBC # BLD AUTO: 2.81 M/UL (ref 4.1–5.7)
RBC # BLD AUTO: 2.82 M/UL (ref 4.1–5.7)
RBC # BLD AUTO: 2.82 M/UL (ref 4.1–5.7)
RBC # BLD AUTO: 2.84 M/UL (ref 4.1–5.7)
RBC # BLD AUTO: 2.92 M/UL (ref 4.1–5.7)
RBC # BLD AUTO: 2.96 M/UL (ref 4.1–5.7)
RBC # BLD AUTO: 3.08 M/UL (ref 4.1–5.7)
RBC # BLD AUTO: 3.1 M/UL (ref 4.1–5.7)
RBC # BLD AUTO: 3.16 M/UL (ref 4.1–5.7)
RBC # BLD AUTO: 3.18 M/UL (ref 4.1–5.7)
RBC # BLD AUTO: 3.75 M/UL (ref 4.1–5.7)
RBC # BLD AUTO: 4.01 M/UL (ref 4.1–5.7)
RBC # BLD AUTO: 4.26 M/UL (ref 4.1–5.7)
RBC # BLD AUTO: 4.35 M/UL (ref 4.1–5.7)
RBC # BLD AUTO: 4.35 M/UL (ref 4.1–5.7)
RBC # BLD AUTO: 4.76 M/UL (ref 4.1–5.7)
RBC # BLD AUTO: 5.04 M/UL (ref 4.1–5.7)
RBC #/AREA URNS HPF: ABNORMAL /HPF (ref 0–5)
RBC MORPH BLD: ABNORMAL
REPORT STATUS,RSTSX: NORMAL
SAMPLES BEING HELD,HOLD: NORMAL
SAO2 % BLD: 100 % (ref 92–97)
SAO2 % BLD: 70 % (ref 92–97)
SAO2 % BLD: 74 % (ref 92–97)
SAO2 % BLD: 81 % (ref 92–97)
SAO2 % BLD: 96 % (ref 92–97)
SAO2 % BLD: 98 % (ref 92–97)
SAO2 % BLD: 98 % (ref 92–97)
SAO2 % BLD: 99 % (ref 92–97)
SAO2 % BLD: 99 % (ref 92–97)
SERVICE CMNT-IMP: ABNORMAL
SERVICE CMNT-IMP: NORMAL
SODIUM SERPL-SCNC: 134 MMOL/L (ref 136–145)
SODIUM SERPL-SCNC: 135 MMOL/L (ref 136–145)
SODIUM SERPL-SCNC: 136 MMOL/L (ref 136–145)
SODIUM SERPL-SCNC: 136 MMOL/L (ref 136–145)
SODIUM SERPL-SCNC: 137 MMOL/L (ref 136–145)
SODIUM SERPL-SCNC: 137 MMOL/L (ref 136–145)
SODIUM SERPL-SCNC: 138 MMOL/L (ref 136–145)
SODIUM SERPL-SCNC: 139 MMOL/L (ref 136–145)
SODIUM SERPL-SCNC: 140 MMOL/L (ref 136–145)
SODIUM SERPL-SCNC: 141 MMOL/L (ref 136–145)
SODIUM SERPL-SCNC: 143 MMOL/L (ref 136–145)
SODIUM SERPL-SCNC: 143 MMOL/L (ref 136–145)
SODIUM SERPL-SCNC: 147 MMOL/L (ref 136–145)
SODIUM SERPL-SCNC: 148 MMOL/L (ref 136–145)
SODIUM SERPL-SCNC: 149 MMOL/L (ref 136–145)
SODIUM SERPL-SCNC: 150 MMOL/L (ref 136–145)
SODIUM SERPL-SCNC: 151 MMOL/L (ref 136–145)
SODIUM SERPL-SCNC: 151 MMOL/L (ref 136–145)
SODIUM SERPL-SCNC: 153 MMOL/L (ref 136–145)
SODIUM SERPL-SCNC: 155 MMOL/L (ref 136–145)
SODIUM SERPL-SCNC: 156 MMOL/L (ref 136–145)
SODIUM SERPL-SCNC: 157 MMOL/L (ref 136–145)
SODIUM SERPL-SCNC: 158 MMOL/L (ref 136–145)
SODIUM SERPL-SCNC: 159 MMOL/L (ref 136–145)
SODIUM SERPL-SCNC: 160 MMOL/L (ref 136–145)
SODIUM SERPL-SCNC: 161 MMOL/L (ref 136–145)
SODIUM SERPL-SCNC: 162 MMOL/L (ref 136–145)
SODIUM SERPL-SCNC: 163 MMOL/L (ref 136–145)
SODIUM SERPL-SCNC: 168 MMOL/L (ref 136–145)
SODIUM UR-SCNC: 64 MMOL/L
SODIUM UR-SCNC: 87 MMOL/L
SP GR UR REFRACTOMETRY: 1.01 (ref 1–1.03)
SP GR UR REFRACTOMETRY: 1.02 (ref 1–1.03)
SP GR UR REFRACTOMETRY: 1.02 (ref 1–1.03)
SPECIMEN EXP DATE BLD: NORMAL
SPECIMEN SOURCE: NORMAL
SPECIMEN TYPE: ABNORMAL
STATUS OF UNIT,%ST: NORMAL
TOTAL RESP. RATE, ITRR: 13
TOTAL RESP. RATE, ITRR: 17
TOTAL RESP. RATE, ITRR: 19
TOTAL RESP. RATE, ITRR: 20
TOTAL RESP. RATE, ITRR: 22
TOTAL RESP. RATE, ITRR: 27
TOTAL RESP. RATE, ITRR: 31
TRIGL SERPL-MCNC: 53 MG/DL (ref ?–150)
TROPONIN I SERPL-MCNC: <0.05 NG/ML
TSH SERPL DL<=0.05 MIU/L-ACNC: 0.16 UIU/ML (ref 0.36–3.74)
TSH SERPL DL<=0.05 MIU/L-ACNC: 0.78 UIU/ML (ref 0.36–3.74)
UNIT DIVISION, %UDIV: 0
UR CULT HOLD, URHOLD: NORMAL
URATE SERPL-MCNC: 13.3 MG/DL (ref 3.5–7.2)
UROBILINOGEN UR QL STRIP.AUTO: 0.2 EU/DL (ref 0.2–1)
UROBILINOGEN UR QL STRIP.AUTO: 0.2 EU/DL (ref 0.2–1)
UROBILINOGEN UR QL STRIP.AUTO: 1 EU/DL (ref 0.2–1)
VANCOMYCIN SERPL-MCNC: 13.7 UG/ML
VANCOMYCIN SERPL-MCNC: 15.5 UG/ML
VENTILATION MODE VENT: ABNORMAL
VENTRICULAR RATE, ECG03: 101 BPM
VENTRICULAR RATE, ECG03: 107 BPM
VIT B1 BLD-SCNC: 100.1 NMOL/L (ref 66.5–200)
VIT B12 SERPL-MCNC: 1663 PG/ML (ref 193–986)
VLDLC SERPL CALC-MCNC: 10.6 MG/DL
VOLUME CONTROL IVLC: YES
VT SETTING VENT: 302 ML
VT SETTING VENT: 400 ML
VT SETTING VENT: 580 ML
VT SETTING VENT: 723 ML
WBC # BLD AUTO: 10.1 K/UL (ref 4.1–11.1)
WBC # BLD AUTO: 10.3 K/UL (ref 4.1–11.1)
WBC # BLD AUTO: 10.4 K/UL (ref 4.1–11.1)
WBC # BLD AUTO: 10.6 K/UL (ref 4.1–11.1)
WBC # BLD AUTO: 11.4 K/UL (ref 4.1–11.1)
WBC # BLD AUTO: 11.8 K/UL (ref 4.1–11.1)
WBC # BLD AUTO: 12.1 K/UL (ref 4.1–11.1)
WBC # BLD AUTO: 12.3 K/UL (ref 4.1–11.1)
WBC # BLD AUTO: 12.3 K/UL (ref 4.1–11.1)
WBC # BLD AUTO: 12.7 K/UL (ref 4.1–11.1)
WBC # BLD AUTO: 12.7 K/UL (ref 4.1–11.1)
WBC # BLD AUTO: 13.6 K/UL (ref 4.1–11.1)
WBC # BLD AUTO: 14.6 K/UL (ref 4.1–11.1)
WBC # BLD AUTO: 14.8 K/UL (ref 4.1–11.1)
WBC # BLD AUTO: 16.8 K/UL (ref 4.1–11.1)
WBC # BLD AUTO: 26.8 K/UL (ref 4.1–11.1)
WBC # BLD AUTO: 7.4 K/UL (ref 4.1–11.1)
WBC # BLD AUTO: 8.7 K/UL (ref 4.1–11.1)
WBC # BLD AUTO: 8.9 K/UL (ref 4.1–11.1)
WBC # BLD AUTO: 9 K/UL (ref 4.1–11.1)
WBC # BLD AUTO: 9.4 K/UL (ref 4.1–11.1)
WBC MORPH BLD: ABNORMAL
WBC URNS QL MICRO: >100 /HPF (ref 0–4)
WBC URNS QL MICRO: ABNORMAL /HPF (ref 0–4)
WBC URNS QL MICRO: ABNORMAL /HPF (ref 0–4)

## 2020-01-01 PROCEDURE — 74011250637 HC RX REV CODE- 250/637: Performed by: INTERNAL MEDICINE

## 2020-01-01 PROCEDURE — 74011636637 HC RX REV CODE- 636/637: Performed by: HOSPITALIST

## 2020-01-01 PROCEDURE — 96365 THER/PROPH/DIAG IV INF INIT: CPT

## 2020-01-01 PROCEDURE — 65270000032 HC RM SEMIPRIVATE

## 2020-01-01 PROCEDURE — 97530 THERAPEUTIC ACTIVITIES: CPT | Performed by: PHYSICAL THERAPIST

## 2020-01-01 PROCEDURE — 74011636637 HC RX REV CODE- 636/637: Performed by: INTERNAL MEDICINE

## 2020-01-01 PROCEDURE — 51702 INSERT TEMP BLADDER CATH: CPT

## 2020-01-01 PROCEDURE — 36415 COLL VENOUS BLD VENIPUNCTURE: CPT

## 2020-01-01 PROCEDURE — 81001 URINALYSIS AUTO W/SCOPE: CPT

## 2020-01-01 PROCEDURE — 94002 VENT MGMT INPAT INIT DAY: CPT

## 2020-01-01 PROCEDURE — 82962 GLUCOSE BLOOD TEST: CPT

## 2020-01-01 PROCEDURE — 83735 ASSAY OF MAGNESIUM: CPT

## 2020-01-01 PROCEDURE — 80048 BASIC METABOLIC PNL TOTAL CA: CPT

## 2020-01-01 PROCEDURE — 80202 ASSAY OF VANCOMYCIN: CPT

## 2020-01-01 PROCEDURE — 97116 GAIT TRAINING THERAPY: CPT

## 2020-01-01 PROCEDURE — 65660000000 HC RM CCU STEPDOWN

## 2020-01-01 PROCEDURE — 74011250636 HC RX REV CODE- 250/636: Performed by: INTERNAL MEDICINE

## 2020-01-01 PROCEDURE — 85025 COMPLETE CBC W/AUTO DIFF WBC: CPT

## 2020-01-01 PROCEDURE — 80053 COMPREHEN METABOLIC PANEL: CPT

## 2020-01-01 PROCEDURE — 75810000455 HC PLCMT CENT VENOUS CATH LVL 2 5182

## 2020-01-01 PROCEDURE — 70450 CT HEAD/BRAIN W/O DYE: CPT

## 2020-01-01 PROCEDURE — 74011250637 HC RX REV CODE- 250/637: Performed by: PSYCHIATRY & NEUROLOGY

## 2020-01-01 PROCEDURE — 74011250637 HC RX REV CODE- 250/637: Performed by: NURSE PRACTITIONER

## 2020-01-01 PROCEDURE — 97535 SELF CARE MNGMENT TRAINING: CPT

## 2020-01-01 PROCEDURE — 87186 SC STD MICRODIL/AGAR DIL: CPT

## 2020-01-01 PROCEDURE — 74011250637 HC RX REV CODE- 250/637: Performed by: STUDENT IN AN ORGANIZED HEALTH CARE EDUCATION/TRAINING PROGRAM

## 2020-01-01 PROCEDURE — 84100 ASSAY OF PHOSPHORUS: CPT

## 2020-01-01 PROCEDURE — 74011250636 HC RX REV CODE- 250/636: Performed by: NURSE PRACTITIONER

## 2020-01-01 PROCEDURE — 71045 X-RAY EXAM CHEST 1 VIEW: CPT

## 2020-01-01 PROCEDURE — 83605 ASSAY OF LACTIC ACID: CPT

## 2020-01-01 PROCEDURE — 97530 THERAPEUTIC ACTIVITIES: CPT

## 2020-01-01 PROCEDURE — 84300 ASSAY OF URINE SODIUM: CPT

## 2020-01-01 PROCEDURE — 65270000029 HC RM PRIVATE

## 2020-01-01 PROCEDURE — 74011636637 HC RX REV CODE- 636/637: Performed by: STUDENT IN AN ORGANIZED HEALTH CARE EDUCATION/TRAINING PROGRAM

## 2020-01-01 PROCEDURE — 74011000258 HC RX REV CODE- 258: Performed by: NURSE PRACTITIONER

## 2020-01-01 PROCEDURE — 74011000258 HC RX REV CODE- 258: Performed by: INTERNAL MEDICINE

## 2020-01-01 PROCEDURE — 80061 LIPID PANEL: CPT

## 2020-01-01 PROCEDURE — 84484 ASSAY OF TROPONIN QUANT: CPT

## 2020-01-01 PROCEDURE — 96361 HYDRATE IV INFUSION ADD-ON: CPT

## 2020-01-01 PROCEDURE — 74018 RADEX ABDOMEN 1 VIEW: CPT

## 2020-01-01 PROCEDURE — 36600 WITHDRAWAL OF ARTERIAL BLOOD: CPT

## 2020-01-01 PROCEDURE — 65660000001 HC RM ICU INTERMED STEPDOWN

## 2020-01-01 PROCEDURE — 74011636637 HC RX REV CODE- 636/637: Performed by: EMERGENCY MEDICINE

## 2020-01-01 PROCEDURE — 77030008771 HC TU NG SALEM SUMP -A

## 2020-01-01 PROCEDURE — 77030031197 HC NDL INFUS INTOSSE TELE -C

## 2020-01-01 PROCEDURE — 80320 DRUG SCREEN QUANTALCOHOLS: CPT

## 2020-01-01 PROCEDURE — 74011000250 HC RX REV CODE- 250: Performed by: INTERNAL MEDICINE

## 2020-01-01 PROCEDURE — 80307 DRUG TEST PRSMV CHEM ANLYZR: CPT

## 2020-01-01 PROCEDURE — 92526 ORAL FUNCTION THERAPY: CPT | Performed by: SPEECH-LANGUAGE PATHOLOGIST

## 2020-01-01 PROCEDURE — 74011250637 HC RX REV CODE- 250/637: Performed by: EMERGENCY MEDICINE

## 2020-01-01 PROCEDURE — 77030005513 HC CATH URETH FOL11 MDII -B

## 2020-01-01 PROCEDURE — 74011250636 HC RX REV CODE- 250/636: Performed by: HOSPITALIST

## 2020-01-01 PROCEDURE — 74011636637 HC RX REV CODE- 636/637: Performed by: NURSE PRACTITIONER

## 2020-01-01 PROCEDURE — 74011250636 HC RX REV CODE- 250/636: Performed by: EMERGENCY MEDICINE

## 2020-01-01 PROCEDURE — 87040 BLOOD CULTURE FOR BACTERIA: CPT

## 2020-01-01 PROCEDURE — 77030018798 HC PMP KT ENTRL FED COVD -A

## 2020-01-01 PROCEDURE — 93005 ELECTROCARDIOGRAM TRACING: CPT

## 2020-01-01 PROCEDURE — 77030020291 HC FLEXSEAL FMS BMS -C

## 2020-01-01 PROCEDURE — 0042T CT CODE NEURO PERF W CBF: CPT

## 2020-01-01 PROCEDURE — C1751 CATH, INF, PER/CENT/MIDLINE: HCPCS

## 2020-01-01 PROCEDURE — 74011000258 HC RX REV CODE- 258: Performed by: STUDENT IN AN ORGANIZED HEALTH CARE EDUCATION/TRAINING PROGRAM

## 2020-01-01 PROCEDURE — P9045 ALBUMIN (HUMAN), 5%, 250 ML: HCPCS | Performed by: SURGERY

## 2020-01-01 PROCEDURE — 96376 TX/PRO/DX INJ SAME DRUG ADON: CPT

## 2020-01-01 PROCEDURE — 65610000006 HC RM INTENSIVE CARE

## 2020-01-01 PROCEDURE — 31500 INSERT EMERGENCY AIRWAY: CPT

## 2020-01-01 PROCEDURE — 96374 THER/PROPH/DIAG INJ IV PUSH: CPT

## 2020-01-01 PROCEDURE — 95816 EEG AWAKE AND DROWSY: CPT | Performed by: PSYCHIATRY & NEUROLOGY

## 2020-01-01 PROCEDURE — 83036 HEMOGLOBIN GLYCOSYLATED A1C: CPT

## 2020-01-01 PROCEDURE — 36592 COLLECT BLOOD FROM PICC: CPT

## 2020-01-01 PROCEDURE — 74011250637 HC RX REV CODE- 250/637: Performed by: HOSPITALIST

## 2020-01-01 PROCEDURE — 94760 N-INVAS EAR/PLS OXIMETRY 1: CPT

## 2020-01-01 PROCEDURE — 84133 ASSAY OF URINE POTASSIUM: CPT

## 2020-01-01 PROCEDURE — 36430 TRANSFUSION BLD/BLD COMPNT: CPT

## 2020-01-01 PROCEDURE — 74011250636 HC RX REV CODE- 250/636

## 2020-01-01 PROCEDURE — P9016 RBC LEUKOCYTES REDUCED: HCPCS

## 2020-01-01 PROCEDURE — 97116 GAIT TRAINING THERAPY: CPT | Performed by: PHYSICAL THERAPIST

## 2020-01-01 PROCEDURE — 74011000250 HC RX REV CODE- 250: Performed by: NURSE PRACTITIONER

## 2020-01-01 PROCEDURE — 85027 COMPLETE CBC AUTOMATED: CPT

## 2020-01-01 PROCEDURE — 74011000258 HC RX REV CODE- 258: Performed by: EMERGENCY MEDICINE

## 2020-01-01 PROCEDURE — 83690 ASSAY OF LIPASE: CPT

## 2020-01-01 PROCEDURE — 84145 PROCALCITONIN (PCT): CPT

## 2020-01-01 PROCEDURE — 74011000258 HC RX REV CODE- 258: Performed by: HOSPITALIST

## 2020-01-01 PROCEDURE — 74011636637 HC RX REV CODE- 636/637: Performed by: FAMILY MEDICINE

## 2020-01-01 PROCEDURE — 82570 ASSAY OF URINE CREATININE: CPT

## 2020-01-01 PROCEDURE — 74011000250 HC RX REV CODE- 250: Performed by: STUDENT IN AN ORGANIZED HEALTH CARE EDUCATION/TRAINING PROGRAM

## 2020-01-01 PROCEDURE — 92610 EVALUATE SWALLOWING FUNCTION: CPT

## 2020-01-01 PROCEDURE — 82803 BLOOD GASES ANY COMBINATION: CPT

## 2020-01-01 PROCEDURE — 36591 DRAW BLOOD OFF VENOUS DEVICE: CPT

## 2020-01-01 PROCEDURE — 77030041247 HC PROTECTOR HEEL HEELMEDIX MDII -B

## 2020-01-01 PROCEDURE — 94762 N-INVAS EAR/PLS OXIMTRY CONT: CPT

## 2020-01-01 PROCEDURE — 83935 ASSAY OF URINE OSMOLALITY: CPT

## 2020-01-01 PROCEDURE — 85610 PROTHROMBIN TIME: CPT

## 2020-01-01 PROCEDURE — 97165 OT EVAL LOW COMPLEX 30 MIN: CPT

## 2020-01-01 PROCEDURE — 82607 VITAMIN B-12: CPT

## 2020-01-01 PROCEDURE — 70551 MRI BRAIN STEM W/O DYE: CPT

## 2020-01-01 PROCEDURE — 5A1945Z RESPIRATORY VENTILATION, 24-96 CONSECUTIVE HOURS: ICD-10-PCS | Performed by: STUDENT IN AN ORGANIZED HEALTH CARE EDUCATION/TRAINING PROGRAM

## 2020-01-01 PROCEDURE — 99285 EMERGENCY DEPT VISIT HI MDM: CPT

## 2020-01-01 PROCEDURE — 94003 VENT MGMT INPAT SUBQ DAY: CPT

## 2020-01-01 PROCEDURE — 77030018836 HC SOL IRR NACL ICUM -A

## 2020-01-01 PROCEDURE — 02HV33Z INSERTION OF INFUSION DEVICE INTO SUPERIOR VENA CAVA, PERCUTANEOUS APPROACH: ICD-10-PCS | Performed by: EMERGENCY MEDICINE

## 2020-01-01 PROCEDURE — P9047 ALBUMIN (HUMAN), 25%, 50ML: HCPCS | Performed by: NURSE PRACTITIONER

## 2020-01-01 PROCEDURE — 96368 THER/DIAG CONCURRENT INF: CPT

## 2020-01-01 PROCEDURE — 74011250636 HC RX REV CODE- 250/636: Performed by: STUDENT IN AN ORGANIZED HEALTH CARE EDUCATION/TRAINING PROGRAM

## 2020-01-01 PROCEDURE — 99218 HC RM OBSERVATION: CPT

## 2020-01-01 PROCEDURE — 99291 CRITICAL CARE FIRST HOUR: CPT

## 2020-01-01 PROCEDURE — 86923 COMPATIBILITY TEST ELECTRIC: CPT

## 2020-01-01 PROCEDURE — 82272 OCCULT BLD FECES 1-3 TESTS: CPT

## 2020-01-01 PROCEDURE — 97110 THERAPEUTIC EXERCISES: CPT

## 2020-01-01 PROCEDURE — 76770 US EXAM ABDO BACK WALL COMP: CPT

## 2020-01-01 PROCEDURE — 72125 CT NECK SPINE W/O DYE: CPT

## 2020-01-01 PROCEDURE — 87077 CULTURE AEROBIC IDENTIFY: CPT

## 2020-01-01 PROCEDURE — 93306 TTE W/DOPPLER COMPLETE: CPT

## 2020-01-01 PROCEDURE — 82010 KETONE BODYS QUAN: CPT

## 2020-01-01 PROCEDURE — 77030040831 HC BAG URINE DRNG MDII -A

## 2020-01-01 PROCEDURE — 03HY32Z INSERTION OF MONITORING DEVICE INTO UPPER ARTERY, PERCUTANEOUS APPROACH: ICD-10-PCS | Performed by: EMERGENCY MEDICINE

## 2020-01-01 PROCEDURE — 74011000250 HC RX REV CODE- 250

## 2020-01-01 PROCEDURE — P9045 ALBUMIN (HUMAN), 5%, 250 ML: HCPCS | Performed by: NURSE PRACTITIONER

## 2020-01-01 PROCEDURE — 87070 CULTURE OTHR SPECIMN AEROBIC: CPT

## 2020-01-01 PROCEDURE — 96360 HYDRATION IV INFUSION INIT: CPT

## 2020-01-01 PROCEDURE — 97161 PT EVAL LOW COMPLEX 20 MIN: CPT

## 2020-01-01 PROCEDURE — 84443 ASSAY THYROID STIM HORMONE: CPT

## 2020-01-01 PROCEDURE — 84425 ASSAY OF VITAMIN B-1: CPT

## 2020-01-01 PROCEDURE — 74011636320 HC RX REV CODE- 636/320: Performed by: STUDENT IN AN ORGANIZED HEALTH CARE EDUCATION/TRAINING PROGRAM

## 2020-01-01 PROCEDURE — 82550 ASSAY OF CK (CPK): CPT

## 2020-01-01 PROCEDURE — 5A09357 ASSISTANCE WITH RESPIRATORY VENTILATION, LESS THAN 24 CONSECUTIVE HOURS, CONTINUOUS POSITIVE AIRWAY PRESSURE: ICD-10-PCS | Performed by: EMERGENCY MEDICINE

## 2020-01-01 PROCEDURE — 30233N1 TRANSFUSION OF NONAUTOLOGOUS RED BLOOD CELLS INTO PERIPHERAL VEIN, PERCUTANEOUS APPROACH: ICD-10-PCS | Performed by: EMERGENCY MEDICINE

## 2020-01-01 PROCEDURE — 74011000250 HC RX REV CODE- 250: Performed by: EMERGENCY MEDICINE

## 2020-01-01 PROCEDURE — 86850 RBC ANTIBODY SCREEN: CPT

## 2020-01-01 PROCEDURE — 0BH17EZ INSERTION OF ENDOTRACHEAL AIRWAY INTO TRACHEA, VIA NATURAL OR ARTIFICIAL OPENING: ICD-10-PCS | Performed by: STUDENT IN AN ORGANIZED HEALTH CARE EDUCATION/TRAINING PROGRAM

## 2020-01-01 PROCEDURE — 87086 URINE CULTURE/COLONY COUNT: CPT

## 2020-01-01 PROCEDURE — 82140 ASSAY OF AMMONIA: CPT

## 2020-01-01 PROCEDURE — C1758 CATHETER, URETERAL: HCPCS

## 2020-01-01 PROCEDURE — 77030040392 HC DRSG OPTIFOAM MDII -A

## 2020-01-01 PROCEDURE — 92526 ORAL FUNCTION THERAPY: CPT

## 2020-01-01 PROCEDURE — 77030040922 HC BLNKT HYPOTHRM STRY -A

## 2020-01-01 PROCEDURE — 84550 ASSAY OF BLOOD/URIC ACID: CPT

## 2020-01-01 PROCEDURE — 83880 ASSAY OF NATRIURETIC PEPTIDE: CPT

## 2020-01-01 PROCEDURE — 83986 ASSAY PH BODY FLUID NOS: CPT

## 2020-01-01 PROCEDURE — 83930 ASSAY OF BLOOD OSMOLALITY: CPT

## 2020-01-01 PROCEDURE — 74011250636 HC RX REV CODE- 250/636: Performed by: SURGERY

## 2020-01-01 PROCEDURE — 76700 US EXAM ABDOM COMPLETE: CPT

## 2020-01-01 PROCEDURE — 70496 CT ANGIOGRAPHY HEAD: CPT

## 2020-01-01 RX ORDER — DEXTROSE MONOHYDRATE 50 MG/ML
125 INJECTION, SOLUTION INTRAVENOUS CONTINUOUS
Status: DISCONTINUED | OUTPATIENT
Start: 2020-01-01 | End: 2020-01-01

## 2020-01-01 RX ORDER — ALBUTEROL SULFATE 0.83 MG/ML
2.5 SOLUTION RESPIRATORY (INHALATION)
Status: DISCONTINUED | OUTPATIENT
Start: 2020-01-01 | End: 2020-03-14 | Stop reason: HOSPADM

## 2020-01-01 RX ORDER — GUAIFENESIN 100 MG/5ML
81 LIQUID (ML) ORAL DAILY
Status: DISCONTINUED | OUTPATIENT
Start: 2020-01-01 | End: 2020-01-01

## 2020-01-01 RX ORDER — INSULIN LISPRO 100 [IU]/ML
4 INJECTION, SOLUTION INTRAVENOUS; SUBCUTANEOUS
Status: DISCONTINUED | OUTPATIENT
Start: 2020-01-01 | End: 2020-01-01 | Stop reason: HOSPADM

## 2020-01-01 RX ORDER — BACITRACIN 500 UNIT/G
1 PACKET (EA) TOPICAL AS NEEDED
Status: DISCONTINUED | OUTPATIENT
Start: 2020-01-01 | End: 2020-01-01 | Stop reason: HOSPADM

## 2020-01-01 RX ORDER — FENTANYL CITRATE 50 UG/ML
25-50 INJECTION, SOLUTION INTRAMUSCULAR; INTRAVENOUS
Status: ACTIVE | OUTPATIENT
Start: 2020-01-01 | End: 2020-01-01

## 2020-01-01 RX ORDER — HYDROMORPHONE HYDROCHLORIDE 1 MG/ML
1 INJECTION, SOLUTION INTRAMUSCULAR; INTRAVENOUS; SUBCUTANEOUS
Status: DISCONTINUED | OUTPATIENT
Start: 2020-01-01 | End: 2020-01-01

## 2020-01-01 RX ORDER — INSULIN LISPRO 100 [IU]/ML
8 INJECTION, SOLUTION INTRAVENOUS; SUBCUTANEOUS ONCE
Status: COMPLETED | OUTPATIENT
Start: 2020-01-01 | End: 2020-01-01

## 2020-01-01 RX ORDER — GUAIFENESIN 100 MG/5ML
81 LIQUID (ML) ORAL
Status: DISCONTINUED | OUTPATIENT
Start: 2020-01-01 | End: 2020-01-01

## 2020-01-01 RX ORDER — INSULIN LISPRO 100 [IU]/ML
INJECTION, SOLUTION INTRAVENOUS; SUBCUTANEOUS
Status: DISCONTINUED | OUTPATIENT
Start: 2020-01-01 | End: 2020-01-01 | Stop reason: HOSPADM

## 2020-01-01 RX ORDER — ALBUMIN HUMAN 250 G/1000ML
12.5 SOLUTION INTRAVENOUS ONCE
Status: COMPLETED | OUTPATIENT
Start: 2020-01-01 | End: 2020-01-01

## 2020-01-01 RX ORDER — INSULIN GLARGINE 100 [IU]/ML
30 INJECTION, SOLUTION SUBCUTANEOUS DAILY
Status: DISCONTINUED | OUTPATIENT
Start: 2020-01-01 | End: 2020-01-01

## 2020-01-01 RX ORDER — INSULIN GLARGINE 100 [IU]/ML
22 INJECTION, SOLUTION SUBCUTANEOUS
Status: DISCONTINUED | OUTPATIENT
Start: 2020-01-01 | End: 2020-01-01

## 2020-01-01 RX ORDER — POTASSIUM CHLORIDE 29.8 MG/ML
20 INJECTION INTRAVENOUS
Status: COMPLETED | OUTPATIENT
Start: 2020-01-01 | End: 2020-01-01

## 2020-01-01 RX ORDER — FENTANYL CITRATE 50 UG/ML
50 INJECTION, SOLUTION INTRAMUSCULAR; INTRAVENOUS ONCE
Status: COMPLETED | OUTPATIENT
Start: 2020-01-01 | End: 2020-01-01

## 2020-01-01 RX ORDER — MAGNESIUM SULFATE 100 %
4 CRYSTALS MISCELLANEOUS AS NEEDED
Status: DISCONTINUED | OUTPATIENT
Start: 2020-01-01 | End: 2020-01-01 | Stop reason: HOSPADM

## 2020-01-01 RX ORDER — CALCIUM GLUCONATE 94 MG/ML
1 INJECTION, SOLUTION INTRAVENOUS ONCE
Status: DISCONTINUED | OUTPATIENT
Start: 2020-01-01 | End: 2020-01-01 | Stop reason: SDUPTHER

## 2020-01-01 RX ORDER — DEXTROSE MONOHYDRATE 50 MG/ML
150 INJECTION, SOLUTION INTRAVENOUS CONTINUOUS
Status: DISCONTINUED | OUTPATIENT
Start: 2020-01-01 | End: 2020-01-01

## 2020-01-01 RX ORDER — SODIUM CHLORIDE 0.9 % (FLUSH) 0.9 %
5-40 SYRINGE (ML) INJECTION EVERY 8 HOURS
Status: DISCONTINUED | OUTPATIENT
Start: 2020-01-01 | End: 2020-01-01 | Stop reason: HOSPADM

## 2020-01-01 RX ORDER — SCOLOPAMINE TRANSDERMAL SYSTEM 1 MG/1
1 PATCH, EXTENDED RELEASE TRANSDERMAL
Status: DISCONTINUED | OUTPATIENT
Start: 2020-01-01 | End: 2020-03-14 | Stop reason: HOSPADM

## 2020-01-01 RX ORDER — ACETAMINOPHEN 325 MG/1
650 TABLET ORAL
Status: DISCONTINUED | OUTPATIENT
Start: 2020-01-01 | End: 2020-01-01

## 2020-01-01 RX ORDER — INSULIN GLARGINE 100 [IU]/ML
20 INJECTION, SOLUTION SUBCUTANEOUS DAILY
Status: DISCONTINUED | OUTPATIENT
Start: 2020-01-01 | End: 2020-01-01

## 2020-01-01 RX ORDER — HALOPERIDOL 2 MG/ML
2 SOLUTION ORAL
Status: DISCONTINUED | OUTPATIENT
Start: 2020-01-01 | End: 2020-03-14 | Stop reason: HOSPADM

## 2020-01-01 RX ORDER — FENTANYL CITRATE 50 UG/ML
100 INJECTION, SOLUTION INTRAMUSCULAR; INTRAVENOUS ONCE
Status: COMPLETED | OUTPATIENT
Start: 2020-01-01 | End: 2020-01-01

## 2020-01-01 RX ORDER — DEXTROSE MONOHYDRATE 100 MG/ML
0-250 INJECTION, SOLUTION INTRAVENOUS AS NEEDED
Status: DISCONTINUED | OUTPATIENT
Start: 2020-01-01 | End: 2020-01-01 | Stop reason: HOSPADM

## 2020-01-01 RX ORDER — MIDODRINE HYDROCHLORIDE 10 MG/1
10 TABLET ORAL 3 TIMES DAILY
COMMUNITY
End: 2020-01-01

## 2020-01-01 RX ORDER — ATORVASTATIN CALCIUM 40 MG/1
40 TABLET, FILM COATED ORAL DAILY
Status: DISCONTINUED | OUTPATIENT
Start: 2020-01-01 | End: 2020-01-01 | Stop reason: HOSPADM

## 2020-01-01 RX ORDER — HALOPERIDOL 5 MG/ML
2 INJECTION INTRAMUSCULAR
Status: DISCONTINUED | OUTPATIENT
Start: 2020-01-01 | End: 2020-03-14 | Stop reason: HOSPADM

## 2020-01-01 RX ORDER — DEXTROSE MONOHYDRATE 50 MG/ML
100 INJECTION, SOLUTION INTRAVENOUS CONTINUOUS
Status: DISCONTINUED | OUTPATIENT
Start: 2020-01-01 | End: 2020-01-01

## 2020-01-01 RX ORDER — MAGNESIUM SULFATE 1 G/100ML
1 INJECTION INTRAVENOUS ONCE
Status: COMPLETED | OUTPATIENT
Start: 2020-01-01 | End: 2020-01-01

## 2020-01-01 RX ORDER — ACETAMINOPHEN 325 MG/1
650 TABLET ORAL
Status: DISCONTINUED | OUTPATIENT
Start: 2020-01-01 | End: 2020-01-01 | Stop reason: HOSPADM

## 2020-01-01 RX ORDER — MAGNESIUM SULFATE 100 %
4 CRYSTALS MISCELLANEOUS AS NEEDED
Status: DISCONTINUED | OUTPATIENT
Start: 2020-01-01 | End: 2020-01-01

## 2020-01-01 RX ORDER — INSULIN LISPRO 100 [IU]/ML
INJECTION, SOLUTION INTRAVENOUS; SUBCUTANEOUS EVERY 6 HOURS
Status: DISCONTINUED | OUTPATIENT
Start: 2020-01-01 | End: 2020-01-01

## 2020-01-01 RX ORDER — DEXTROSE 50 % IN WATER (D50W) INTRAVENOUS SYRINGE
Status: COMPLETED
Start: 2020-01-01 | End: 2020-01-01

## 2020-01-01 RX ORDER — ACETAMINOPHEN 650 MG/1
650 SUPPOSITORY RECTAL
Status: DISCONTINUED | OUTPATIENT
Start: 2020-01-01 | End: 2020-03-14 | Stop reason: HOSPADM

## 2020-01-01 RX ORDER — MIDAZOLAM HYDROCHLORIDE 1 MG/ML
INJECTION, SOLUTION INTRAMUSCULAR; INTRAVENOUS
Status: DISCONTINUED
Start: 2020-01-01 | End: 2020-01-01 | Stop reason: WASHOUT

## 2020-01-01 RX ORDER — DEXTROSE MONOHYDRATE 100 MG/ML
0-250 INJECTION, SOLUTION INTRAVENOUS AS NEEDED
Status: DISCONTINUED | OUTPATIENT
Start: 2020-01-01 | End: 2020-01-01

## 2020-01-01 RX ORDER — DEXTROSE MONOHYDRATE 50 MG/ML
50 INJECTION, SOLUTION INTRAVENOUS CONTINUOUS
Status: DISCONTINUED | OUTPATIENT
Start: 2020-01-01 | End: 2020-01-01

## 2020-01-01 RX ORDER — PROPOFOL 10 MG/ML
0-50 VIAL (ML) INTRAVENOUS
Status: DISCONTINUED | OUTPATIENT
Start: 2020-01-01 | End: 2020-01-01

## 2020-01-01 RX ORDER — INSULIN GLARGINE 100 [IU]/ML
12 INJECTION, SOLUTION SUBCUTANEOUS DAILY
Status: DISCONTINUED | OUTPATIENT
Start: 2020-01-01 | End: 2020-01-01

## 2020-01-01 RX ORDER — INSULIN GLARGINE 100 [IU]/ML
7 INJECTION, SOLUTION SUBCUTANEOUS
Qty: 1 VIAL | Refills: 0 | Status: SHIPPED
Start: 2020-01-01 | End: 2020-01-01

## 2020-01-01 RX ORDER — INSULIN GLARGINE 100 [IU]/ML
6 INJECTION, SOLUTION SUBCUTANEOUS DAILY
Status: DISCONTINUED | OUTPATIENT
Start: 2020-01-01 | End: 2020-01-01

## 2020-01-01 RX ORDER — INSULIN LISPRO 100 [IU]/ML
INJECTION, SOLUTION INTRAVENOUS; SUBCUTANEOUS
Status: DISCONTINUED | OUTPATIENT
Start: 2020-01-01 | End: 2020-01-01

## 2020-01-01 RX ORDER — ALBUMIN HUMAN 50 G/1000ML
50 SOLUTION INTRAVENOUS ONCE
Status: COMPLETED | OUTPATIENT
Start: 2020-01-01 | End: 2020-01-01

## 2020-01-01 RX ORDER — BALSAM PERU/CASTOR OIL
OINTMENT (GRAM) TOPICAL EVERY 8 HOURS
Status: DISCONTINUED | OUTPATIENT
Start: 2020-01-01 | End: 2020-01-01 | Stop reason: HOSPADM

## 2020-01-01 RX ORDER — BALSAM PERU/CASTOR OIL
OINTMENT (GRAM) TOPICAL 2 TIMES DAILY
Qty: 1 TUBE | Refills: 0 | Status: SHIPPED
Start: 2020-01-01 | End: 2020-03-14

## 2020-01-01 RX ORDER — HYDROCORTISONE SODIUM SUCCINATE 100 MG/2ML
50 INJECTION, POWDER, FOR SOLUTION INTRAMUSCULAR; INTRAVENOUS EVERY 8 HOURS
Status: DISCONTINUED | OUTPATIENT
Start: 2020-01-01 | End: 2020-01-01

## 2020-01-01 RX ORDER — DEXTROSE MONOHYDRATE 100 MG/ML
0-250 INJECTION, SOLUTION INTRAVENOUS AS NEEDED
Status: DISCONTINUED | OUTPATIENT
Start: 2020-01-01 | End: 2020-01-01 | Stop reason: SDUPTHER

## 2020-01-01 RX ORDER — ASPIRIN 325 MG
325 TABLET ORAL ONCE
Status: COMPLETED | OUTPATIENT
Start: 2020-01-01 | End: 2020-01-01

## 2020-01-01 RX ORDER — DEXTROSE MONOHYDRATE 50 MG/ML
75 INJECTION, SOLUTION INTRAVENOUS CONTINUOUS
Status: DISCONTINUED | OUTPATIENT
Start: 2020-01-01 | End: 2020-01-01

## 2020-01-01 RX ORDER — BALSAM PERU/CASTOR OIL
OINTMENT (GRAM) TOPICAL 2 TIMES DAILY
Status: DISCONTINUED | OUTPATIENT
Start: 2020-01-01 | End: 2020-01-01 | Stop reason: HOSPADM

## 2020-01-01 RX ORDER — INSULIN GLARGINE 100 [IU]/ML
10 INJECTION, SOLUTION SUBCUTANEOUS DAILY
Status: DISCONTINUED | OUTPATIENT
Start: 2020-01-01 | End: 2020-01-01

## 2020-01-01 RX ORDER — LOSARTAN POTASSIUM 50 MG/1
50 TABLET ORAL DAILY
Status: DISCONTINUED | OUTPATIENT
Start: 2020-01-01 | End: 2020-01-01 | Stop reason: HOSPADM

## 2020-01-01 RX ORDER — DIPHENHYDRAMINE HYDROCHLORIDE 50 MG/ML
12.5 INJECTION, SOLUTION INTRAMUSCULAR; INTRAVENOUS
Status: DISCONTINUED | OUTPATIENT
Start: 2020-01-01 | End: 2020-03-14 | Stop reason: HOSPADM

## 2020-01-01 RX ORDER — INSULIN GLARGINE 100 [IU]/ML
15 INJECTION, SOLUTION SUBCUTANEOUS
Status: DISCONTINUED | OUTPATIENT
Start: 2020-01-01 | End: 2020-01-01

## 2020-01-01 RX ORDER — AMLODIPINE BESYLATE 5 MG/1
5 TABLET ORAL DAILY
Status: DISCONTINUED | OUTPATIENT
Start: 2020-01-01 | End: 2020-01-01

## 2020-01-01 RX ORDER — ACETAMINOPHEN 650 MG/1
650 SUPPOSITORY RECTAL
Status: DISCONTINUED | OUTPATIENT
Start: 2020-01-01 | End: 2020-01-01 | Stop reason: HOSPADM

## 2020-01-01 RX ORDER — MORPHINE SULFATE 2 MG/ML
2 INJECTION, SOLUTION INTRAMUSCULAR; INTRAVENOUS
Qty: 1 SYRINGE | Refills: 0 | Status: SHIPPED
Start: 2020-01-01 | End: 2020-03-14

## 2020-01-01 RX ORDER — GLYCOPYRROLATE 0.2 MG/ML
0.2 INJECTION INTRAMUSCULAR; INTRAVENOUS
Status: DISCONTINUED | OUTPATIENT
Start: 2020-01-01 | End: 2020-03-14 | Stop reason: HOSPADM

## 2020-01-01 RX ORDER — ONDANSETRON 2 MG/ML
4 INJECTION INTRAMUSCULAR; INTRAVENOUS
Status: DISCONTINUED | OUTPATIENT
Start: 2020-01-01 | End: 2020-03-14 | Stop reason: HOSPADM

## 2020-01-01 RX ORDER — SCOLOPAMINE TRANSDERMAL SYSTEM 1 MG/1
1 PATCH, EXTENDED RELEASE TRANSDERMAL
Status: DISCONTINUED | OUTPATIENT
Start: 2020-01-01 | End: 2020-01-01

## 2020-01-01 RX ORDER — INSULIN LISPRO 100 [IU]/ML
4 INJECTION, SOLUTION INTRAVENOUS; SUBCUTANEOUS ONCE
Status: COMPLETED | OUTPATIENT
Start: 2020-01-01 | End: 2020-01-01

## 2020-01-01 RX ORDER — INSULIN GLARGINE 100 [IU]/ML
15 INJECTION, SOLUTION SUBCUTANEOUS DAILY
Status: DISCONTINUED | OUTPATIENT
Start: 2020-01-01 | End: 2020-01-01

## 2020-01-01 RX ORDER — DEXTROSE MONOHYDRATE AND SODIUM CHLORIDE 5; .45 G/100ML; G/100ML
150 INJECTION, SOLUTION INTRAVENOUS CONTINUOUS
Status: DISCONTINUED | OUTPATIENT
Start: 2020-01-01 | End: 2020-01-01

## 2020-01-01 RX ORDER — HYDROCORTISONE SODIUM SUCCINATE 100 MG/2ML
100 INJECTION, POWDER, FOR SOLUTION INTRAMUSCULAR; INTRAVENOUS EVERY 8 HOURS
Status: DISCONTINUED | OUTPATIENT
Start: 2020-01-01 | End: 2020-01-01

## 2020-01-01 RX ORDER — INSULIN LISPRO 100 [IU]/ML
6 INJECTION, SOLUTION INTRAVENOUS; SUBCUTANEOUS
COMMUNITY
End: 2020-01-01

## 2020-01-01 RX ORDER — SODIUM CHLORIDE, SODIUM LACTATE, POTASSIUM CHLORIDE, CALCIUM CHLORIDE 600; 310; 30; 20 MG/100ML; MG/100ML; MG/100ML; MG/100ML
75 INJECTION, SOLUTION INTRAVENOUS CONTINUOUS
Status: DISCONTINUED | OUTPATIENT
Start: 2020-01-01 | End: 2020-01-01

## 2020-01-01 RX ORDER — AMLODIPINE BESYLATE 5 MG/1
5 TABLET ORAL DAILY
Qty: 30 TAB | Refills: 0 | Status: SHIPPED
Start: 2020-01-01 | End: 2020-01-01

## 2020-01-01 RX ORDER — SODIUM BICARBONATE 84 MG/ML
50 INJECTION, SOLUTION INTRAVENOUS
Status: COMPLETED | OUTPATIENT
Start: 2020-01-01 | End: 2020-01-01

## 2020-01-01 RX ORDER — PHENYLEPHRINE HCL IN 0.9% NACL 0.4MG/10ML
200 SYRINGE (ML) INTRAVENOUS ONCE
Status: COMPLETED | OUTPATIENT
Start: 2020-01-01 | End: 2020-01-01

## 2020-01-01 RX ORDER — HEPARIN SODIUM 5000 [USP'U]/ML
5000 INJECTION, SOLUTION INTRAVENOUS; SUBCUTANEOUS EVERY 8 HOURS
Status: DISCONTINUED | OUTPATIENT
Start: 2020-01-01 | End: 2020-01-01 | Stop reason: HOSPADM

## 2020-01-01 RX ORDER — MAGNESIUM SULFATE 100 %
4 CRYSTALS MISCELLANEOUS AS NEEDED
Status: DISCONTINUED | OUTPATIENT
Start: 2020-01-01 | End: 2020-01-01 | Stop reason: SDUPTHER

## 2020-01-01 RX ORDER — SODIUM CHLORIDE 0.9 % (FLUSH) 0.9 %
5-40 SYRINGE (ML) INJECTION EVERY 8 HOURS
Status: DISCONTINUED | OUTPATIENT
Start: 2020-01-01 | End: 2020-03-14 | Stop reason: HOSPADM

## 2020-01-01 RX ORDER — SODIUM CHLORIDE 0.9 % (FLUSH) 0.9 %
5-40 SYRINGE (ML) INJECTION AS NEEDED
Status: DISCONTINUED | OUTPATIENT
Start: 2020-01-01 | End: 2020-01-01 | Stop reason: HOSPADM

## 2020-01-01 RX ORDER — POTASSIUM CHLORIDE 29.8 MG/ML
20 INJECTION INTRAVENOUS ONCE
Status: COMPLETED | OUTPATIENT
Start: 2020-01-01 | End: 2020-01-01

## 2020-01-01 RX ORDER — MIDAZOLAM IN 0.9 % SOD.CHLORID 1 MG/ML
0-10 PLASTIC BAG, INJECTION (ML) INTRAVENOUS
Status: DISCONTINUED | OUTPATIENT
Start: 2020-01-01 | End: 2020-01-01

## 2020-01-01 RX ORDER — MIDAZOLAM HYDROCHLORIDE 5 MG/ML
5 INJECTION, SOLUTION INTRAMUSCULAR; INTRAVENOUS ONCE
Status: DISCONTINUED | OUTPATIENT
Start: 2020-01-01 | End: 2020-01-01

## 2020-01-01 RX ORDER — SODIUM CHLORIDE 0.9 % (FLUSH) 0.9 %
10 SYRINGE (ML) INJECTION
Status: COMPLETED | OUTPATIENT
Start: 2020-01-01 | End: 2020-01-01

## 2020-01-01 RX ORDER — DEXTROSE MONOHYDRATE AND SODIUM CHLORIDE 5; .45 G/100ML; G/100ML
100 INJECTION, SOLUTION INTRAVENOUS CONTINUOUS
Status: DISCONTINUED | OUTPATIENT
Start: 2020-01-01 | End: 2020-01-01

## 2020-01-01 RX ORDER — DEXTROSE MONOHYDRATE AND SODIUM CHLORIDE 5; .9 G/100ML; G/100ML
100 INJECTION, SOLUTION INTRAVENOUS CONTINUOUS
Status: DISCONTINUED | OUTPATIENT
Start: 2020-01-01 | End: 2020-01-01

## 2020-01-01 RX ORDER — SODIUM CHLORIDE 9 MG/ML
1000 INJECTION, SOLUTION INTRAVENOUS ONCE
Status: COMPLETED | OUTPATIENT
Start: 2020-01-01 | End: 2020-01-01

## 2020-01-01 RX ORDER — ACETAMINOPHEN 325 MG/1
650 TABLET ORAL
Status: DISCONTINUED | OUTPATIENT
Start: 2020-01-01 | End: 2020-03-14 | Stop reason: HOSPADM

## 2020-01-01 RX ORDER — POTASSIUM CHLORIDE 14.9 MG/ML
10 INJECTION INTRAVENOUS
Status: COMPLETED | OUTPATIENT
Start: 2020-01-01 | End: 2020-01-01

## 2020-01-01 RX ORDER — MIDAZOLAM HYDROCHLORIDE 1 MG/ML
1 INJECTION, SOLUTION INTRAMUSCULAR; INTRAVENOUS ONCE
Status: COMPLETED | OUTPATIENT
Start: 2020-01-01 | End: 2020-01-01

## 2020-01-01 RX ORDER — DEXTROSE, SODIUM CHLORIDE, AND POTASSIUM CHLORIDE 5; .45; .15 G/100ML; G/100ML; G/100ML
100 INJECTION INTRAVENOUS CONTINUOUS
Status: DISCONTINUED | OUTPATIENT
Start: 2020-01-01 | End: 2020-01-01

## 2020-01-01 RX ORDER — SODIUM,POTASSIUM PHOSPHATES 280-250MG
2 POWDER IN PACKET (EA) ORAL ONCE
Status: COMPLETED | OUTPATIENT
Start: 2020-01-01 | End: 2020-01-01

## 2020-01-01 RX ORDER — ATORVASTATIN CALCIUM 20 MG/1
20 TABLET, FILM COATED ORAL DAILY
Status: DISCONTINUED | OUTPATIENT
Start: 2020-01-01 | End: 2020-01-01

## 2020-01-01 RX ORDER — CHLORHEXIDINE GLUCONATE 0.12 MG/ML
15 RINSE ORAL EVERY 12 HOURS
Status: DISCONTINUED | OUTPATIENT
Start: 2020-01-01 | End: 2020-01-01

## 2020-01-01 RX ORDER — AMLODIPINE BESYLATE 5 MG/1
5 TABLET ORAL DAILY
Status: DISCONTINUED | OUTPATIENT
Start: 2020-01-01 | End: 2020-01-01 | Stop reason: HOSPADM

## 2020-01-01 RX ORDER — INSULIN LISPRO 100 [IU]/ML
INJECTION, SOLUTION INTRAVENOUS; SUBCUTANEOUS
COMMUNITY
End: 2020-01-01

## 2020-01-01 RX ORDER — SODIUM CHLORIDE 0.9 % (FLUSH) 0.9 %
10 SYRINGE (ML) INJECTION AS NEEDED
Status: DISCONTINUED | OUTPATIENT
Start: 2020-01-01 | End: 2020-01-01 | Stop reason: HOSPADM

## 2020-01-01 RX ORDER — SODIUM CHLORIDE 450 MG/100ML
200 INJECTION, SOLUTION INTRAVENOUS ONCE
Status: DISCONTINUED | OUTPATIENT
Start: 2020-01-01 | End: 2020-01-01

## 2020-01-01 RX ORDER — POTASSIUM CHLORIDE 14.9 MG/ML
20 INJECTION INTRAVENOUS
Status: DISCONTINUED | OUTPATIENT
Start: 2020-01-01 | End: 2020-01-01 | Stop reason: SDUPTHER

## 2020-01-01 RX ORDER — SODIUM BICARBONATE IN D5W 150/1000ML
PLASTIC BAG, INJECTION (ML) INTRAVENOUS CONTINUOUS
Status: DISCONTINUED | OUTPATIENT
Start: 2020-01-01 | End: 2020-01-01

## 2020-01-01 RX ORDER — HALOPERIDOL 2 MG/ML
2 SOLUTION ORAL
Status: DISCONTINUED | OUTPATIENT
Start: 2020-01-01 | End: 2020-01-01 | Stop reason: HOSPADM

## 2020-01-01 RX ORDER — ATORVASTATIN CALCIUM 20 MG/1
40 TABLET, FILM COATED ORAL
COMMUNITY
End: 2020-01-01

## 2020-01-01 RX ORDER — ATORVASTATIN CALCIUM 20 MG/1
40 TABLET, FILM COATED ORAL DAILY
Status: DISCONTINUED | OUTPATIENT
Start: 2020-01-01 | End: 2020-01-01 | Stop reason: SDUPTHER

## 2020-01-01 RX ORDER — FENTANYL CITRATE 50 UG/ML
INJECTION, SOLUTION INTRAMUSCULAR; INTRAVENOUS
Status: COMPLETED
Start: 2020-01-01 | End: 2020-01-01

## 2020-01-01 RX ORDER — DEXTROSE 50 % IN WATER (D50W) INTRAVENOUS SYRINGE
50
Status: COMPLETED | OUTPATIENT
Start: 2020-01-01 | End: 2020-01-01

## 2020-01-01 RX ORDER — HALOPERIDOL 5 MG/ML
2 INJECTION INTRAMUSCULAR
Status: DISCONTINUED | OUTPATIENT
Start: 2020-01-01 | End: 2020-01-01 | Stop reason: HOSPADM

## 2020-01-01 RX ORDER — SODIUM CHLORIDE 9 MG/ML
250 INJECTION, SOLUTION INTRAVENOUS AS NEEDED
Status: DISCONTINUED | OUTPATIENT
Start: 2020-01-01 | End: 2020-01-01 | Stop reason: HOSPADM

## 2020-01-01 RX ORDER — AMLODIPINE BESYLATE 5 MG/1
10 TABLET ORAL DAILY
Status: DISCONTINUED | OUTPATIENT
Start: 2020-01-01 | End: 2020-01-01 | Stop reason: HOSPADM

## 2020-01-01 RX ORDER — KETAMINE HYDROCHLORIDE 50 MG/ML
2 INJECTION, SOLUTION INTRAMUSCULAR; INTRAVENOUS ONCE
Status: COMPLETED | OUTPATIENT
Start: 2020-01-01 | End: 2020-01-01

## 2020-01-01 RX ORDER — LOSARTAN POTASSIUM 100 MG/1
50 TABLET ORAL DAILY
COMMUNITY
End: 2020-01-01

## 2020-01-01 RX ORDER — GLIPIZIDE 5 MG/1
2.5 TABLET ORAL
COMMUNITY
End: 2020-01-01

## 2020-01-01 RX ORDER — LORAZEPAM 2 MG/ML
1 INJECTION INTRAMUSCULAR
Status: DISCONTINUED | OUTPATIENT
Start: 2020-01-01 | End: 2020-03-14 | Stop reason: HOSPADM

## 2020-01-01 RX ORDER — HYDRALAZINE HYDROCHLORIDE 20 MG/ML
10 INJECTION INTRAMUSCULAR; INTRAVENOUS
Status: DISCONTINUED | OUTPATIENT
Start: 2020-01-01 | End: 2020-01-01

## 2020-01-01 RX ORDER — ONDANSETRON 2 MG/ML
4 INJECTION INTRAMUSCULAR; INTRAVENOUS
Status: DISCONTINUED | OUTPATIENT
Start: 2020-01-01 | End: 2020-01-01 | Stop reason: HOSPADM

## 2020-01-01 RX ORDER — SODIUM CHLORIDE 9 MG/ML
100 INJECTION, SOLUTION INTRAVENOUS CONTINUOUS
Status: DISPENSED | OUTPATIENT
Start: 2020-01-01 | End: 2020-01-01

## 2020-01-01 RX ORDER — HYDRALAZINE HYDROCHLORIDE 20 MG/ML
10-20 INJECTION INTRAMUSCULAR; INTRAVENOUS
Status: DISCONTINUED | OUTPATIENT
Start: 2020-01-01 | End: 2020-01-01

## 2020-01-01 RX ORDER — MORPHINE SULFATE 2 MG/ML
2 INJECTION, SOLUTION INTRAMUSCULAR; INTRAVENOUS
Status: DISCONTINUED | OUTPATIENT
Start: 2020-01-01 | End: 2020-01-01 | Stop reason: HOSPADM

## 2020-01-01 RX ORDER — FACIAL-BODY WIPES
10 EACH TOPICAL DAILY PRN
Status: DISCONTINUED | OUTPATIENT
Start: 2020-01-01 | End: 2020-01-01 | Stop reason: HOSPADM

## 2020-01-01 RX ORDER — INSULIN LISPRO 100 [IU]/ML
INJECTION, SOLUTION INTRAVENOUS; SUBCUTANEOUS EVERY 4 HOURS
Status: DISCONTINUED | OUTPATIENT
Start: 2020-01-01 | End: 2020-01-01

## 2020-01-01 RX ORDER — FACIAL-BODY WIPES
10 EACH TOPICAL DAILY PRN
Status: DISCONTINUED | OUTPATIENT
Start: 2020-01-01 | End: 2020-03-14 | Stop reason: HOSPADM

## 2020-01-01 RX ORDER — HALOPERIDOL 5 MG/ML
4 INJECTION INTRAMUSCULAR ONCE
Status: COMPLETED | OUTPATIENT
Start: 2020-01-01 | End: 2020-01-01

## 2020-01-01 RX ORDER — NOREPINEPHRINE BITARTRATE/D5W 8 MG/250ML
.5-3 PLASTIC BAG, INJECTION (ML) INTRAVENOUS
Status: DISCONTINUED | OUTPATIENT
Start: 2020-01-01 | End: 2020-01-01

## 2020-01-01 RX ORDER — INSULIN GLARGINE 100 [IU]/ML
7 INJECTION, SOLUTION SUBCUTANEOUS
Status: DISCONTINUED | OUTPATIENT
Start: 2020-01-01 | End: 2020-01-01 | Stop reason: HOSPADM

## 2020-01-01 RX ORDER — CHLORHEXIDINE GLUCONATE 1.2 MG/ML
15 RINSE ORAL EVERY 12 HOURS
Status: DISCONTINUED | OUTPATIENT
Start: 2020-01-01 | End: 2020-01-01

## 2020-01-01 RX ORDER — VANCOMYCIN HYDROCHLORIDE
1250 ONCE
Status: COMPLETED | OUTPATIENT
Start: 2020-01-01 | End: 2020-01-01

## 2020-01-01 RX ORDER — INSULIN GLARGINE 100 [IU]/ML
24 INJECTION, SOLUTION SUBCUTANEOUS
COMMUNITY
End: 2020-01-01

## 2020-01-01 RX ORDER — FUROSEMIDE 10 MG/ML
40 INJECTION INTRAMUSCULAR; INTRAVENOUS ONCE
Status: COMPLETED | OUTPATIENT
Start: 2020-01-01 | End: 2020-01-01

## 2020-01-01 RX ORDER — INSULIN GLARGINE 100 [IU]/ML
24 INJECTION, SOLUTION SUBCUTANEOUS DAILY
Status: DISCONTINUED | OUTPATIENT
Start: 2020-01-01 | End: 2020-01-01

## 2020-01-01 RX ORDER — HYDROCORTISONE SODIUM SUCCINATE 100 MG/2ML
50 INJECTION, POWDER, FOR SOLUTION INTRAMUSCULAR; INTRAVENOUS EVERY 12 HOURS
Status: DISCONTINUED | OUTPATIENT
Start: 2020-01-01 | End: 2020-01-01

## 2020-01-01 RX ORDER — POLYETHYLENE GLYCOL 3350 17 G/17G
17 POWDER, FOR SOLUTION ORAL 2 TIMES DAILY
Status: DISCONTINUED | OUTPATIENT
Start: 2020-01-01 | End: 2020-01-01 | Stop reason: HOSPADM

## 2020-01-01 RX ORDER — GLYCOPYRROLATE 0.2 MG/ML
0.2 INJECTION INTRAMUSCULAR; INTRAVENOUS
Qty: 1 VIAL | Refills: 0 | Status: SHIPPED
Start: 2020-01-01 | End: 2020-03-14

## 2020-01-01 RX ORDER — AMOXICILLIN 250 MG
2 CAPSULE ORAL
Status: DISCONTINUED | OUTPATIENT
Start: 2020-01-01 | End: 2020-03-14 | Stop reason: HOSPADM

## 2020-01-01 RX ORDER — INSULIN GLARGINE 100 [IU]/ML
10 INJECTION, SOLUTION SUBCUTANEOUS DAILY
Status: DISCONTINUED | OUTPATIENT
Start: 2020-01-01 | End: 2020-01-01 | Stop reason: HOSPADM

## 2020-01-01 RX ORDER — INSULIN LISPRO 100 [IU]/ML
INJECTION, SOLUTION INTRAVENOUS; SUBCUTANEOUS EVERY 6 HOURS
Status: DISCONTINUED | OUTPATIENT
Start: 2020-01-01 | End: 2020-01-01 | Stop reason: HOSPADM

## 2020-01-01 RX ORDER — MAGNESIUM SULFATE HEPTAHYDRATE 40 MG/ML
2 INJECTION, SOLUTION INTRAVENOUS ONCE
Status: COMPLETED | OUTPATIENT
Start: 2020-01-01 | End: 2020-01-01

## 2020-01-01 RX ORDER — MIDAZOLAM HYDROCHLORIDE 1 MG/ML
2 INJECTION, SOLUTION INTRAMUSCULAR; INTRAVENOUS
Status: COMPLETED | OUTPATIENT
Start: 2020-01-01 | End: 2020-01-01

## 2020-01-01 RX ORDER — INSULIN GLARGINE 100 [IU]/ML
14 INJECTION, SOLUTION SUBCUTANEOUS DAILY
Status: DISCONTINUED | OUTPATIENT
Start: 2020-01-01 | End: 2020-01-01

## 2020-01-01 RX ORDER — MIDAZOLAM HYDROCHLORIDE 5 MG/ML
5 INJECTION INTRAMUSCULAR; INTRAVENOUS ONCE
Status: DISCONTINUED | OUTPATIENT
Start: 2020-01-01 | End: 2020-01-01

## 2020-01-01 RX ORDER — MORPHINE SULFATE 2 MG/ML
2 INJECTION, SOLUTION INTRAMUSCULAR; INTRAVENOUS
Status: DISCONTINUED | OUTPATIENT
Start: 2020-01-01 | End: 2020-03-14 | Stop reason: HOSPADM

## 2020-01-01 RX ORDER — ASPIRIN 325 MG
325 TABLET ORAL DAILY
Status: DISCONTINUED | OUTPATIENT
Start: 2020-01-01 | End: 2020-01-01 | Stop reason: HOSPADM

## 2020-01-01 RX ORDER — ALBUMIN HUMAN 50 G/1000ML
12.5 SOLUTION INTRAVENOUS ONCE
Status: COMPLETED | OUTPATIENT
Start: 2020-01-01 | End: 2020-01-01

## 2020-01-01 RX ORDER — GLYCOPYRROLATE 0.2 MG/ML
0.2 INJECTION INTRAMUSCULAR; INTRAVENOUS
Status: DISCONTINUED | OUTPATIENT
Start: 2020-01-01 | End: 2020-01-01 | Stop reason: HOSPADM

## 2020-01-01 RX ORDER — SODIUM CHLORIDE 0.9 % (FLUSH) 0.9 %
5-40 SYRINGE (ML) INJECTION AS NEEDED
Status: DISCONTINUED | OUTPATIENT
Start: 2020-01-01 | End: 2020-03-14 | Stop reason: HOSPADM

## 2020-01-01 RX ADMIN — Medication 10 ML: at 13:37

## 2020-01-01 RX ADMIN — SODIUM CHLORIDE 32.3 UNITS/HR: 9 INJECTION, SOLUTION INTRAVENOUS at 19:03

## 2020-01-01 RX ADMIN — INSULIN LISPRO 2 UNITS: 100 INJECTION, SOLUTION INTRAVENOUS; SUBCUTANEOUS at 18:12

## 2020-01-01 RX ADMIN — CASTOR OIL AND BALSAM, PERU: 788; 87 OINTMENT TOPICAL at 17:18

## 2020-01-01 RX ADMIN — INSULIN GLARGINE 10 UNITS: 100 INJECTION, SOLUTION SUBCUTANEOUS at 09:47

## 2020-01-01 RX ADMIN — Medication 10 ML: at 13:18

## 2020-01-01 RX ADMIN — COLLAGENASE SANTYL: 250 OINTMENT TOPICAL at 08:58

## 2020-01-01 RX ADMIN — CASTOR OIL AND BALSAM, PERU: 788; 87 OINTMENT TOPICAL at 08:05

## 2020-01-01 RX ADMIN — PIPERACILLIN AND TAZOBACTAM 3.38 G: 3; .375 INJECTION, POWDER, LYOPHILIZED, FOR SOLUTION INTRAVENOUS at 00:10

## 2020-01-01 RX ADMIN — HEPARIN SODIUM 5000 UNITS: 5000 INJECTION INTRAVENOUS; SUBCUTANEOUS at 23:03

## 2020-01-01 RX ADMIN — DEXTROSE MONOHYDRATE 150 ML/HR: 5 INJECTION, SOLUTION INTRAVENOUS at 09:06

## 2020-01-01 RX ADMIN — Medication 40 ML: at 14:02

## 2020-01-01 RX ADMIN — FAMOTIDINE 20 MG: 10 INJECTION, SOLUTION INTRAVENOUS at 08:09

## 2020-01-01 RX ADMIN — Medication 10 ML: at 23:57

## 2020-01-01 RX ADMIN — Medication 10 ML: at 14:44

## 2020-01-01 RX ADMIN — CASTOR OIL AND BALSAM, PERU: 788; 87 OINTMENT TOPICAL at 07:00

## 2020-01-01 RX ADMIN — CALCIUM GLUCONATE 2 G: 98 INJECTION, SOLUTION INTRAVENOUS at 07:29

## 2020-01-01 RX ADMIN — POTASSIUM CHLORIDE 20 MEQ: 400 INJECTION, SOLUTION INTRAVENOUS at 05:52

## 2020-01-01 RX ADMIN — COLLAGENASE SANTYL: 250 OINTMENT TOPICAL at 08:05

## 2020-01-01 RX ADMIN — INSULIN GLARGINE 14 UNITS: 100 INJECTION, SOLUTION SUBCUTANEOUS at 10:29

## 2020-01-01 RX ADMIN — CASTOR OIL AND BALSAM, PERU: 788; 87 OINTMENT TOPICAL at 06:47

## 2020-01-01 RX ADMIN — CASTOR OIL AND BALSAM, PERU: 788; 87 OINTMENT TOPICAL at 10:23

## 2020-01-01 RX ADMIN — CASTOR OIL AND BALSAM, PERU: 788; 87 OINTMENT TOPICAL at 00:12

## 2020-01-01 RX ADMIN — MORPHINE SULFATE 2 MG: 2 INJECTION, SOLUTION INTRAMUSCULAR; INTRAVENOUS at 12:44

## 2020-01-01 RX ADMIN — INSULIN LISPRO 2 UNITS: 100 INJECTION, SOLUTION INTRAVENOUS; SUBCUTANEOUS at 05:53

## 2020-01-01 RX ADMIN — PROPOFOL 50 MCG/KG/MIN: 10 INJECTION, EMULSION INTRAVENOUS at 12:56

## 2020-01-01 RX ADMIN — PIPERACILLIN AND TAZOBACTAM 3.38 G: 3; .375 INJECTION, POWDER, LYOPHILIZED, FOR SOLUTION INTRAVENOUS at 21:29

## 2020-01-01 RX ADMIN — FAMOTIDINE 20 MG: 10 INJECTION, SOLUTION INTRAVENOUS at 21:08

## 2020-01-01 RX ADMIN — DEXTROSE MONOHYDRATE 150 ML/HR: 5 INJECTION, SOLUTION INTRAVENOUS at 16:34

## 2020-01-01 RX ADMIN — HEPARIN SODIUM 5000 UNITS: 5000 INJECTION INTRAVENOUS; SUBCUTANEOUS at 05:02

## 2020-01-01 RX ADMIN — Medication 40 ML: at 06:59

## 2020-01-01 RX ADMIN — Medication 10 ML: at 21:58

## 2020-01-01 RX ADMIN — CASTOR OIL AND BALSAM, PERU: 788; 87 OINTMENT TOPICAL at 11:00

## 2020-01-01 RX ADMIN — POLYETHYLENE GLYCOL 3350 17 G: 17 POWDER, FOR SOLUTION ORAL at 18:10

## 2020-01-01 RX ADMIN — Medication 10 ML: at 15:09

## 2020-01-01 RX ADMIN — Medication 40 ML: at 04:12

## 2020-01-01 RX ADMIN — POTASSIUM & SODIUM PHOSPHATES POWDER PACK 280-160-250 MG 2 PACKET: 280-160-250 PACK at 10:27

## 2020-01-01 RX ADMIN — INSULIN LISPRO 2 UNITS: 100 INJECTION, SOLUTION INTRAVENOUS; SUBCUTANEOUS at 12:04

## 2020-01-01 RX ADMIN — SODIUM CHLORIDE 750 MG: 900 INJECTION, SOLUTION INTRAVENOUS at 16:03

## 2020-01-01 RX ADMIN — POTASSIUM CHLORIDE 20 MEQ: 400 INJECTION, SOLUTION INTRAVENOUS at 01:00

## 2020-01-01 RX ADMIN — CASTOR OIL AND BALSAM, PERU: 788; 87 OINTMENT TOPICAL at 18:29

## 2020-01-01 RX ADMIN — INSULIN LISPRO 2 UNITS: 100 INJECTION, SOLUTION INTRAVENOUS; SUBCUTANEOUS at 08:33

## 2020-01-01 RX ADMIN — FENTANYL CITRATE 50 MCG: 50 INJECTION INTRAMUSCULAR; INTRAVENOUS at 09:21

## 2020-01-01 RX ADMIN — DEXTROSE MONOHYDRATE 100 ML/HR: 5 INJECTION, SOLUTION INTRAVENOUS at 08:27

## 2020-01-01 RX ADMIN — COLLAGENASE SANTYL: 250 OINTMENT TOPICAL at 09:11

## 2020-01-01 RX ADMIN — INSULIN LISPRO 3 UNITS: 100 INJECTION, SOLUTION INTRAVENOUS; SUBCUTANEOUS at 16:36

## 2020-01-01 RX ADMIN — CASTOR OIL AND BALSAM, PERU: 788; 87 OINTMENT TOPICAL at 05:26

## 2020-01-01 RX ADMIN — CASTOR OIL AND BALSAM, PERU: 788; 87 OINTMENT TOPICAL at 13:40

## 2020-01-01 RX ADMIN — POTASSIUM CHLORIDE 20 MEQ: 400 INJECTION, SOLUTION INTRAVENOUS at 00:17

## 2020-01-01 RX ADMIN — AMLODIPINE BESYLATE 10 MG: 5 TABLET ORAL at 09:44

## 2020-01-01 RX ADMIN — INSULIN LISPRO 1 UNITS: 100 INJECTION, SOLUTION INTRAVENOUS; SUBCUTANEOUS at 22:16

## 2020-01-01 RX ADMIN — LOSARTAN POTASSIUM 50 MG: 50 TABLET, FILM COATED ORAL at 09:45

## 2020-01-01 RX ADMIN — CASTOR OIL AND BALSAM, PERU: 788; 87 OINTMENT TOPICAL at 09:22

## 2020-01-01 RX ADMIN — PROPOFOL 50 MCG/KG/MIN: 10 INJECTION, EMULSION INTRAVENOUS at 21:41

## 2020-01-01 RX ADMIN — INSULIN GLARGINE 20 UNITS: 100 INJECTION, SOLUTION SUBCUTANEOUS at 08:57

## 2020-01-01 RX ADMIN — INSULIN LISPRO 2 UNITS: 100 INJECTION, SOLUTION INTRAVENOUS; SUBCUTANEOUS at 17:46

## 2020-01-01 RX ADMIN — CASTOR OIL AND BALSAM, PERU: 788; 87 OINTMENT TOPICAL at 15:53

## 2020-01-01 RX ADMIN — POTASSIUM BICARBONATE 20 MEQ: 782 TABLET, EFFERVESCENT ORAL at 08:01

## 2020-01-01 RX ADMIN — SODIUM CHLORIDE 10 ML: 9 INJECTION INTRAMUSCULAR; INTRAVENOUS; SUBCUTANEOUS at 15:38

## 2020-01-01 RX ADMIN — ASPIRIN 325 MG ORAL TABLET 325 MG: 325 PILL ORAL at 12:16

## 2020-01-01 RX ADMIN — PIPERACILLIN AND TAZOBACTAM 3.38 G: 3; .375 INJECTION, POWDER, LYOPHILIZED, FOR SOLUTION INTRAVENOUS at 21:34

## 2020-01-01 RX ADMIN — CASTOR OIL AND BALSAM, PERU: 788; 87 OINTMENT TOPICAL at 09:05

## 2020-01-01 RX ADMIN — INSULIN LISPRO 2 UNITS: 100 INJECTION, SOLUTION INTRAVENOUS; SUBCUTANEOUS at 17:56

## 2020-01-01 RX ADMIN — CASTOR OIL AND BALSAM, PERU: 788; 87 OINTMENT TOPICAL at 06:00

## 2020-01-01 RX ADMIN — INSULIN LISPRO 1 UNITS: 100 INJECTION, SOLUTION INTRAVENOUS; SUBCUTANEOUS at 00:49

## 2020-01-01 RX ADMIN — SODIUM CHLORIDE, SODIUM LACTATE, POTASSIUM CHLORIDE, AND CALCIUM CHLORIDE 75 ML/HR: 600; 310; 30; 20 INJECTION, SOLUTION INTRAVENOUS at 18:23

## 2020-01-01 RX ADMIN — FAMOTIDINE 20 MG: 10 INJECTION, SOLUTION INTRAVENOUS at 21:30

## 2020-01-01 RX ADMIN — SODIUM CHLORIDE 10 ML: 9 INJECTION INTRAMUSCULAR; INTRAVENOUS; SUBCUTANEOUS at 06:00

## 2020-01-01 RX ADMIN — ACETAMINOPHEN 650 MG: 650 SOLUTION ORAL at 16:06

## 2020-01-01 RX ADMIN — Medication 10 ML: at 05:23

## 2020-01-01 RX ADMIN — MORPHINE SULFATE 2 MG: 2 INJECTION, SOLUTION INTRAMUSCULAR; INTRAVENOUS at 18:31

## 2020-01-01 RX ADMIN — PIPERACILLIN AND TAZOBACTAM 3.38 G: 3; .375 INJECTION, POWDER, LYOPHILIZED, FOR SOLUTION INTRAVENOUS at 12:13

## 2020-01-01 RX ADMIN — Medication 10 ML: at 07:21

## 2020-01-01 RX ADMIN — INSULIN LISPRO 5 UNITS: 100 INJECTION, SOLUTION INTRAVENOUS; SUBCUTANEOUS at 09:46

## 2020-01-01 RX ADMIN — SODIUM CHLORIDE 10.8 UNITS/HR: 9 INJECTION, SOLUTION INTRAVENOUS at 12:52

## 2020-01-01 RX ADMIN — CASTOR OIL AND BALSAM, PERU: 788; 87 OINTMENT TOPICAL at 08:49

## 2020-01-01 RX ADMIN — CASTOR OIL AND BALSAM, PERU: 788; 87 OINTMENT TOPICAL at 19:15

## 2020-01-01 RX ADMIN — HYDROCORTISONE SODIUM SUCCINATE 100 MG: 100 INJECTION, POWDER, FOR SOLUTION INTRAMUSCULAR; INTRAVENOUS at 04:45

## 2020-01-01 RX ADMIN — POLYETHYLENE GLYCOL 3350 17 G: 17 POWDER, FOR SOLUTION ORAL at 09:45

## 2020-01-01 RX ADMIN — CASTOR OIL AND BALSAM, PERU: 788; 87 OINTMENT TOPICAL at 17:26

## 2020-01-01 RX ADMIN — Medication 10 ML: at 21:00

## 2020-01-01 RX ADMIN — ATORVASTATIN CALCIUM 40 MG: 40 TABLET, FILM COATED ORAL at 09:45

## 2020-01-01 RX ADMIN — SODIUM CHLORIDE, SODIUM LACTATE, POTASSIUM CHLORIDE, AND CALCIUM CHLORIDE 75 ML/HR: 600; 310; 30; 20 INJECTION, SOLUTION INTRAVENOUS at 02:19

## 2020-01-01 RX ADMIN — SODIUM CHLORIDE, SODIUM LACTATE, POTASSIUM CHLORIDE, AND CALCIUM CHLORIDE 75 ML/HR: 600; 310; 30; 20 INJECTION, SOLUTION INTRAVENOUS at 01:55

## 2020-01-01 RX ADMIN — HYDROCORTISONE SODIUM SUCCINATE 100 MG: 100 INJECTION, POWDER, FOR SOLUTION INTRAMUSCULAR; INTRAVENOUS at 02:57

## 2020-01-01 RX ADMIN — INSULIN LISPRO 4 UNITS: 100 INJECTION, SOLUTION INTRAVENOUS; SUBCUTANEOUS at 12:45

## 2020-01-01 RX ADMIN — CASTOR OIL AND BALSAM, PERU: 788; 87 OINTMENT TOPICAL at 14:31

## 2020-01-01 RX ADMIN — POTASSIUM CHLORIDE 20 MEQ: 400 INJECTION, SOLUTION INTRAVENOUS at 06:58

## 2020-01-01 RX ADMIN — INSULIN LISPRO 4 UNITS: 100 INJECTION, SOLUTION INTRAVENOUS; SUBCUTANEOUS at 19:36

## 2020-01-01 RX ADMIN — POTASSIUM CHLORIDE 20 MEQ: 400 INJECTION, SOLUTION INTRAVENOUS at 07:21

## 2020-01-01 RX ADMIN — DEXTROSE MONOHYDRATE 150 ML/HR: 5 INJECTION, SOLUTION INTRAVENOUS at 19:41

## 2020-01-01 RX ADMIN — INSULIN GLARGINE 15 UNITS: 100 INJECTION, SOLUTION SUBCUTANEOUS at 00:19

## 2020-01-01 RX ADMIN — CASTOR OIL AND BALSAM, PERU: 788; 87 OINTMENT TOPICAL at 10:00

## 2020-01-01 RX ADMIN — CASTOR OIL AND BALSAM, PERU: 788; 87 OINTMENT TOPICAL at 17:46

## 2020-01-01 RX ADMIN — Medication 20 ML: at 12:45

## 2020-01-01 RX ADMIN — SODIUM CHLORIDE 10 ML: 9 INJECTION INTRAMUSCULAR; INTRAVENOUS; SUBCUTANEOUS at 05:24

## 2020-01-01 RX ADMIN — INSULIN LISPRO 5 UNITS: 100 INJECTION, SOLUTION INTRAVENOUS; SUBCUTANEOUS at 13:41

## 2020-01-01 RX ADMIN — COLLAGENASE SANTYL: 250 OINTMENT TOPICAL at 09:23

## 2020-01-01 RX ADMIN — Medication 10 ML: at 05:49

## 2020-01-01 RX ADMIN — MORPHINE SULFATE 2 MG: 2 INJECTION, SOLUTION INTRAMUSCULAR; INTRAVENOUS at 11:31

## 2020-01-01 RX ADMIN — AMLODIPINE BESYLATE 5 MG: 5 TABLET ORAL at 09:45

## 2020-01-01 RX ADMIN — INSULIN LISPRO 4 UNITS: 100 INJECTION, SOLUTION INTRAVENOUS; SUBCUTANEOUS at 07:41

## 2020-01-01 RX ADMIN — Medication 40 ML: at 06:07

## 2020-01-01 RX ADMIN — CASTOR OIL AND BALSAM, PERU: 788; 87 OINTMENT TOPICAL at 21:24

## 2020-01-01 RX ADMIN — INSULIN LISPRO 5 UNITS: 100 INJECTION, SOLUTION INTRAVENOUS; SUBCUTANEOUS at 20:00

## 2020-01-01 RX ADMIN — INSULIN LISPRO 4 UNITS: 100 INJECTION, SOLUTION INTRAVENOUS; SUBCUTANEOUS at 07:45

## 2020-01-01 RX ADMIN — DEXTROSE MONOHYDRATE 9 ML: 500 INJECTION PARENTERAL at 07:36

## 2020-01-01 RX ADMIN — CEFTRIAXONE 2 G: 2 INJECTION, POWDER, FOR SOLUTION INTRAMUSCULAR; INTRAVENOUS at 15:02

## 2020-01-01 RX ADMIN — Medication 10 ML: at 21:02

## 2020-01-01 RX ADMIN — CALCIUM GLUCONATE 1 G: 94 INJECTION, SOLUTION INTRAVENOUS at 03:45

## 2020-01-01 RX ADMIN — COLLAGENASE SANTYL: 250 OINTMENT TOPICAL at 09:07

## 2020-01-01 RX ADMIN — INSULIN LISPRO 2 UNITS: 100 INJECTION, SOLUTION INTRAVENOUS; SUBCUTANEOUS at 00:14

## 2020-01-01 RX ADMIN — CASTOR OIL AND BALSAM, PERU: 788; 87 OINTMENT TOPICAL at 18:08

## 2020-01-01 RX ADMIN — INSULIN LISPRO 3 UNITS: 100 INJECTION, SOLUTION INTRAVENOUS; SUBCUTANEOUS at 07:21

## 2020-01-01 RX ADMIN — POLYETHYLENE GLYCOL 3350 17 G: 17 POWDER, FOR SOLUTION ORAL at 19:49

## 2020-01-01 RX ADMIN — AMLODIPINE BESYLATE 5 MG: 5 TABLET ORAL at 10:29

## 2020-01-01 RX ADMIN — Medication 10 ML: at 17:51

## 2020-01-01 RX ADMIN — SODIUM BICARBONATE 150 MEQ/1,000 ML IN DEXTROSE 5 % INTRAVENOUS: SOLUTION at 01:11

## 2020-01-01 RX ADMIN — Medication 40 ML: at 14:55

## 2020-01-01 RX ADMIN — SODIUM CHLORIDE 1000 ML: 900 INJECTION, SOLUTION INTRAVENOUS at 02:55

## 2020-01-01 RX ADMIN — DEXTROSE MONOHYDRATE 150 ML/HR: 5 INJECTION, SOLUTION INTRAVENOUS at 09:00

## 2020-01-01 RX ADMIN — CEFTRIAXONE 2 G: 2 INJECTION, POWDER, FOR SOLUTION INTRAMUSCULAR; INTRAVENOUS at 16:35

## 2020-01-01 RX ADMIN — Medication 10 ML: at 05:12

## 2020-01-01 RX ADMIN — INSULIN LISPRO 4 UNITS: 100 INJECTION, SOLUTION INTRAVENOUS; SUBCUTANEOUS at 17:32

## 2020-01-01 RX ADMIN — HYDROCORTISONE SODIUM SUCCINATE 100 MG: 100 INJECTION, POWDER, FOR SOLUTION INTRAMUSCULAR; INTRAVENOUS at 18:33

## 2020-01-01 RX ADMIN — Medication 7 MCG/MIN: at 14:23

## 2020-01-01 RX ADMIN — PROPOFOL 20 MCG/KG/MIN: 10 INJECTION, EMULSION INTRAVENOUS at 23:22

## 2020-01-01 RX ADMIN — INSULIN LISPRO 3 UNITS: 100 INJECTION, SOLUTION INTRAVENOUS; SUBCUTANEOUS at 06:12

## 2020-01-01 RX ADMIN — SODIUM CHLORIDE 3.6 UNITS/HR: 9 INJECTION, SOLUTION INTRAVENOUS at 11:21

## 2020-01-01 RX ADMIN — SODIUM CHLORIDE 10 ML: 9 INJECTION INTRAMUSCULAR; INTRAVENOUS; SUBCUTANEOUS at 22:16

## 2020-01-01 RX ADMIN — COLLAGENASE SANTYL: 250 OINTMENT TOPICAL at 10:29

## 2020-01-01 RX ADMIN — INSULIN LISPRO 3 UNITS: 100 INJECTION, SOLUTION INTRAVENOUS; SUBCUTANEOUS at 06:05

## 2020-01-01 RX ADMIN — ALBUMIN (HUMAN) 50 G: 12.5 INJECTION, SOLUTION INTRAVENOUS at 00:03

## 2020-01-01 RX ADMIN — CEFTRIAXONE 2 G: 2 INJECTION, POWDER, FOR SOLUTION INTRAMUSCULAR; INTRAVENOUS at 18:27

## 2020-01-01 RX ADMIN — HEPARIN SODIUM 5000 UNITS: 5000 INJECTION INTRAVENOUS; SUBCUTANEOUS at 21:15

## 2020-01-01 RX ADMIN — Medication 10 ML: at 21:35

## 2020-01-01 RX ADMIN — PROPOFOL 15 MCG/KG/MIN: 10 INJECTION, EMULSION INTRAVENOUS at 17:47

## 2020-01-01 RX ADMIN — INSULIN LISPRO 2 UNITS: 100 INJECTION, SOLUTION INTRAVENOUS; SUBCUTANEOUS at 12:08

## 2020-01-01 RX ADMIN — INSULIN LISPRO 3 UNITS: 100 INJECTION, SOLUTION INTRAVENOUS; SUBCUTANEOUS at 18:14

## 2020-01-01 RX ADMIN — HYDRALAZINE HYDROCHLORIDE 10 MG: 20 INJECTION INTRAMUSCULAR; INTRAVENOUS at 02:47

## 2020-01-01 RX ADMIN — POTASSIUM CHLORIDE 20 MEQ: 400 INJECTION, SOLUTION INTRAVENOUS at 07:39

## 2020-01-01 RX ADMIN — CASTOR OIL AND BALSAM, PERU: 788; 87 OINTMENT TOPICAL at 19:22

## 2020-01-01 RX ADMIN — COLLAGENASE SANTYL: 250 OINTMENT TOPICAL at 10:00

## 2020-01-01 RX ADMIN — INSULIN LISPRO 4 UNITS: 100 INJECTION, SOLUTION INTRAVENOUS; SUBCUTANEOUS at 18:10

## 2020-01-01 RX ADMIN — ALBUMIN (HUMAN) 12.5 G: 0.25 INJECTION, SOLUTION INTRAVENOUS at 08:52

## 2020-01-01 RX ADMIN — Medication 10 ML: at 14:33

## 2020-01-01 RX ADMIN — SODIUM CHLORIDE 10 ML: 9 INJECTION INTRAMUSCULAR; INTRAVENOUS; SUBCUTANEOUS at 07:00

## 2020-01-01 RX ADMIN — PROPOFOL 20 MCG/KG/MIN: 10 INJECTION, EMULSION INTRAVENOUS at 08:19

## 2020-01-01 RX ADMIN — INSULIN LISPRO 3 UNITS: 100 INJECTION, SOLUTION INTRAVENOUS; SUBCUTANEOUS at 04:43

## 2020-01-01 RX ADMIN — CASTOR OIL AND BALSAM, PERU: 788; 87 OINTMENT TOPICAL at 09:34

## 2020-01-01 RX ADMIN — SODIUM CHLORIDE, SODIUM LACTATE, POTASSIUM CHLORIDE, AND CALCIUM CHLORIDE 75 ML/HR: 600; 310; 30; 20 INJECTION, SOLUTION INTRAVENOUS at 22:51

## 2020-01-01 RX ADMIN — SODIUM BICARBONATE 50 MEQ: 84 INJECTION, SOLUTION INTRAVENOUS at 13:48

## 2020-01-01 RX ADMIN — FAMOTIDINE 20 MG: 10 INJECTION, SOLUTION INTRAVENOUS at 09:06

## 2020-01-01 RX ADMIN — CASTOR OIL AND BALSAM, PERU: 788; 87 OINTMENT TOPICAL at 18:16

## 2020-01-01 RX ADMIN — SODIUM CHLORIDE, SODIUM LACTATE, POTASSIUM CHLORIDE, AND CALCIUM CHLORIDE 75 ML/HR: 600; 310; 30; 20 INJECTION, SOLUTION INTRAVENOUS at 05:49

## 2020-01-01 RX ADMIN — ALBUMIN (HUMAN) 12.5 G: 0.25 INJECTION, SOLUTION INTRAVENOUS at 07:01

## 2020-01-01 RX ADMIN — ATORVASTATIN CALCIUM 20 MG: 20 TABLET, FILM COATED ORAL at 08:58

## 2020-01-01 RX ADMIN — PIPERACILLIN AND TAZOBACTAM 3.38 G: 3; .375 INJECTION, POWDER, LYOPHILIZED, FOR SOLUTION INTRAVENOUS at 06:13

## 2020-01-01 RX ADMIN — INSULIN LISPRO 2 UNITS: 100 INJECTION, SOLUTION INTRAVENOUS; SUBCUTANEOUS at 06:28

## 2020-01-01 RX ADMIN — PIPERACILLIN SODIUM AND TAZOBACTAM SODIUM 3.38 G: 3; .375 INJECTION, POWDER, LYOPHILIZED, FOR SOLUTION INTRAVENOUS at 11:16

## 2020-01-01 RX ADMIN — DEXTROSE MONOHYDRATE 100 ML/HR: 5 INJECTION, SOLUTION INTRAVENOUS at 02:58

## 2020-01-01 RX ADMIN — INSULIN LISPRO 2 UNITS: 100 INJECTION, SOLUTION INTRAVENOUS; SUBCUTANEOUS at 02:00

## 2020-01-01 RX ADMIN — LOSARTAN POTASSIUM 50 MG: 50 TABLET, FILM COATED ORAL at 12:16

## 2020-01-01 RX ADMIN — CALCIUM CHLORIDE 2 G: 100 INJECTION INTRAVENOUS; INTRAVENTRICULAR at 06:56

## 2020-01-01 RX ADMIN — Medication 10 ML: at 06:54

## 2020-01-01 RX ADMIN — DEXTROSE MONOHYDRATE 150 ML/HR: 5 INJECTION, SOLUTION INTRAVENOUS at 00:14

## 2020-01-01 RX ADMIN — INSULIN LISPRO 2 UNITS: 100 INJECTION, SOLUTION INTRAVENOUS; SUBCUTANEOUS at 23:03

## 2020-01-01 RX ADMIN — Medication 40 ML: at 07:35

## 2020-01-01 RX ADMIN — Medication 10 ML: at 05:11

## 2020-01-01 RX ADMIN — HEPARIN SODIUM 5000 UNITS: 5000 INJECTION INTRAVENOUS; SUBCUTANEOUS at 04:43

## 2020-01-01 RX ADMIN — Medication 10 ML: at 10:48

## 2020-01-01 RX ADMIN — VASOPRESSIN 0.03 UNITS/MIN: 20 INJECTION INTRAVENOUS at 02:54

## 2020-01-01 RX ADMIN — Medication 10 ML: at 13:23

## 2020-01-01 RX ADMIN — SODIUM CHLORIDE 1000 ML: 900 INJECTION, SOLUTION INTRAVENOUS at 16:50

## 2020-01-01 RX ADMIN — SODIUM CHLORIDE, SODIUM LACTATE, POTASSIUM CHLORIDE, AND CALCIUM CHLORIDE 75 ML/HR: 600; 310; 30; 20 INJECTION, SOLUTION INTRAVENOUS at 07:14

## 2020-01-01 RX ADMIN — INSULIN LISPRO 2 UNITS: 100 INJECTION, SOLUTION INTRAVENOUS; SUBCUTANEOUS at 23:08

## 2020-01-01 RX ADMIN — INSULIN LISPRO 3 UNITS: 100 INJECTION, SOLUTION INTRAVENOUS; SUBCUTANEOUS at 15:50

## 2020-01-01 RX ADMIN — FAMOTIDINE 20 MG: 10 INJECTION, SOLUTION INTRAVENOUS at 20:25

## 2020-01-01 RX ADMIN — POTASSIUM CHLORIDE 20 MEQ: 400 INJECTION, SOLUTION INTRAVENOUS at 04:53

## 2020-01-01 RX ADMIN — DEXTROSE MONOHYDRATE 150 ML/HR: 5 INJECTION, SOLUTION INTRAVENOUS at 06:53

## 2020-01-01 RX ADMIN — POTASSIUM CHLORIDE 20 MEQ: 400 INJECTION, SOLUTION INTRAVENOUS at 08:30

## 2020-01-01 RX ADMIN — DEXTROSE MONOHYDRATE 25 G: 25 INJECTION, SOLUTION INTRAVENOUS at 03:30

## 2020-01-01 RX ADMIN — ATORVASTATIN CALCIUM 40 MG: 40 TABLET, FILM COATED ORAL at 12:16

## 2020-01-01 RX ADMIN — Medication 40 ML: at 05:43

## 2020-01-01 RX ADMIN — INSULIN LISPRO 2 UNITS: 100 INJECTION, SOLUTION INTRAVENOUS; SUBCUTANEOUS at 11:57

## 2020-01-01 RX ADMIN — INSULIN LISPRO 2 UNITS: 100 INJECTION, SOLUTION INTRAVENOUS; SUBCUTANEOUS at 00:27

## 2020-01-01 RX ADMIN — INSULIN LISPRO 7 UNITS: 100 INJECTION, SOLUTION INTRAVENOUS; SUBCUTANEOUS at 09:09

## 2020-01-01 RX ADMIN — CASTOR OIL AND BALSAM, PERU: 788; 87 OINTMENT TOPICAL at 17:13

## 2020-01-01 RX ADMIN — Medication 10 ML: at 07:38

## 2020-01-01 RX ADMIN — INSULIN GLARGINE 12 UNITS: 100 INJECTION, SOLUTION SUBCUTANEOUS at 10:00

## 2020-01-01 RX ADMIN — PROPOFOL 20 MCG/KG/MIN: 10 INJECTION, EMULSION INTRAVENOUS at 10:27

## 2020-01-01 RX ADMIN — INSULIN GLARGINE 6 UNITS: 100 INJECTION, SOLUTION SUBCUTANEOUS at 08:58

## 2020-01-01 RX ADMIN — INSULIN GLARGINE 20 UNITS: 100 INJECTION, SOLUTION SUBCUTANEOUS at 09:12

## 2020-01-01 RX ADMIN — FAMOTIDINE 20 MG: 10 INJECTION, SOLUTION INTRAVENOUS at 08:00

## 2020-01-01 RX ADMIN — SODIUM CHLORIDE 750 MG: 900 INJECTION, SOLUTION INTRAVENOUS at 15:50

## 2020-01-01 RX ADMIN — SODIUM CHLORIDE 10 ML: 9 INJECTION INTRAMUSCULAR; INTRAVENOUS; SUBCUTANEOUS at 21:25

## 2020-01-01 RX ADMIN — INSULIN GLARGINE 7 UNITS: 100 INJECTION, SOLUTION SUBCUTANEOUS at 23:03

## 2020-01-01 RX ADMIN — Medication 10 ML: at 13:44

## 2020-01-01 RX ADMIN — INSULIN GLARGINE 12 UNITS: 100 INJECTION, SOLUTION SUBCUTANEOUS at 09:17

## 2020-01-01 RX ADMIN — SODIUM CHLORIDE 10 ML: 9 INJECTION INTRAMUSCULAR; INTRAVENOUS; SUBCUTANEOUS at 22:00

## 2020-01-01 RX ADMIN — INSULIN GLARGINE 24 UNITS: 100 INJECTION, SOLUTION SUBCUTANEOUS at 09:17

## 2020-01-01 RX ADMIN — Medication 40 ML: at 22:16

## 2020-01-01 RX ADMIN — COLLAGENASE SANTYL: 250 OINTMENT TOPICAL at 09:44

## 2020-01-01 RX ADMIN — Medication 10 ML: at 21:50

## 2020-01-01 RX ADMIN — IOPAMIDOL 120 ML: 755 INJECTION, SOLUTION INTRAVENOUS at 17:51

## 2020-01-01 RX ADMIN — ACETAMINOPHEN 650 MG: 325 TABLET ORAL at 21:54

## 2020-01-01 RX ADMIN — INSULIN LISPRO 2 UNITS: 100 INJECTION, SOLUTION INTRAVENOUS; SUBCUTANEOUS at 05:23

## 2020-01-01 RX ADMIN — Medication 10 ML: at 22:16

## 2020-01-01 RX ADMIN — Medication 10 ML: at 21:55

## 2020-01-01 RX ADMIN — INSULIN LISPRO 4 UNITS: 100 INJECTION, SOLUTION INTRAVENOUS; SUBCUTANEOUS at 12:16

## 2020-01-01 RX ADMIN — FAMOTIDINE 20 MG: 10 INJECTION, SOLUTION INTRAVENOUS at 08:58

## 2020-01-01 RX ADMIN — ATORVASTATIN CALCIUM 40 MG: 40 TABLET, FILM COATED ORAL at 08:34

## 2020-01-01 RX ADMIN — DEXTROSE MONOHYDRATE AND SODIUM CHLORIDE 100 ML/HR: 5; .45 INJECTION, SOLUTION INTRAVENOUS at 05:13

## 2020-01-01 RX ADMIN — CASTOR OIL AND BALSAM, PERU: 788; 87 OINTMENT TOPICAL at 10:31

## 2020-01-01 RX ADMIN — INSULIN LISPRO 4 UNITS: 100 INJECTION, SOLUTION INTRAVENOUS; SUBCUTANEOUS at 17:57

## 2020-01-01 RX ADMIN — DEXTROSE MONOHYDRATE 250 ML: 10 INJECTION, SOLUTION INTRAVENOUS at 17:09

## 2020-01-01 RX ADMIN — SODIUM CHLORIDE, SODIUM LACTATE, POTASSIUM CHLORIDE, AND CALCIUM CHLORIDE 75 ML/HR: 600; 310; 30; 20 INJECTION, SOLUTION INTRAVENOUS at 03:34

## 2020-01-01 RX ADMIN — POLYETHYLENE GLYCOL 3350 17 G: 17 POWDER, FOR SOLUTION ORAL at 08:55

## 2020-01-01 RX ADMIN — FAMOTIDINE 20 MG: 10 INJECTION, SOLUTION INTRAVENOUS at 20:57

## 2020-01-01 RX ADMIN — AMLODIPINE BESYLATE 5 MG: 5 TABLET ORAL at 08:13

## 2020-01-01 RX ADMIN — Medication 10 ML: at 22:51

## 2020-01-01 RX ADMIN — Medication 10 ML: at 06:28

## 2020-01-01 RX ADMIN — AMLODIPINE BESYLATE 10 MG: 5 TABLET ORAL at 08:55

## 2020-01-01 RX ADMIN — Medication 10 ML: at 07:39

## 2020-01-01 RX ADMIN — Medication 10 ML: at 17:43

## 2020-01-01 RX ADMIN — Medication 40 ML: at 21:01

## 2020-01-01 RX ADMIN — INSULIN LISPRO 3 UNITS: 100 INJECTION, SOLUTION INTRAVENOUS; SUBCUTANEOUS at 13:47

## 2020-01-01 RX ADMIN — PIPERACILLIN AND TAZOBACTAM 3.38 G: 3; .375 INJECTION, POWDER, LYOPHILIZED, FOR SOLUTION INTRAVENOUS at 00:20

## 2020-01-01 RX ADMIN — Medication 10 ML: at 13:41

## 2020-01-01 RX ADMIN — CASTOR OIL AND BALSAM, PERU: 788; 87 OINTMENT TOPICAL at 21:54

## 2020-01-01 RX ADMIN — Medication 10 ML: at 22:18

## 2020-01-01 RX ADMIN — COLLAGENASE SANTYL: 250 OINTMENT TOPICAL at 09:06

## 2020-01-01 RX ADMIN — INSULIN LISPRO 2 UNITS: 100 INJECTION, SOLUTION INTRAVENOUS; SUBCUTANEOUS at 00:05

## 2020-01-01 RX ADMIN — AMLODIPINE BESYLATE 5 MG: 5 TABLET ORAL at 08:58

## 2020-01-01 RX ADMIN — INSULIN LISPRO 3 UNITS: 100 INJECTION, SOLUTION INTRAVENOUS; SUBCUTANEOUS at 13:20

## 2020-01-01 RX ADMIN — INSULIN GLARGINE 24 UNITS: 100 INJECTION, SOLUTION SUBCUTANEOUS at 08:57

## 2020-01-01 RX ADMIN — SODIUM CHLORIDE 32.5 UNITS/HR: 9 INJECTION, SOLUTION INTRAVENOUS at 17:58

## 2020-01-01 RX ADMIN — VASOPRESSIN 0.03 UNITS/MIN: 20 INJECTION INTRAVENOUS at 19:19

## 2020-01-01 RX ADMIN — Medication 40 ML: at 08:43

## 2020-01-01 RX ADMIN — HEPARIN SODIUM 5000 UNITS: 5000 INJECTION INTRAVENOUS; SUBCUTANEOUS at 22:04

## 2020-01-01 RX ADMIN — SODIUM CHLORIDE 3 UNITS/HR: 9 INJECTION, SOLUTION INTRAVENOUS at 09:16

## 2020-01-01 RX ADMIN — INSULIN GLARGINE 7 UNITS: 100 INJECTION, SOLUTION SUBCUTANEOUS at 22:19

## 2020-01-01 RX ADMIN — HEPARIN SODIUM 5000 UNITS: 5000 INJECTION INTRAVENOUS; SUBCUTANEOUS at 11:58

## 2020-01-01 RX ADMIN — Medication 10 ML: at 01:27

## 2020-01-01 RX ADMIN — AMLODIPINE BESYLATE 5 MG: 5 TABLET ORAL at 09:06

## 2020-01-01 RX ADMIN — INSULIN LISPRO 5 UNITS: 100 INJECTION, SOLUTION INTRAVENOUS; SUBCUTANEOUS at 00:22

## 2020-01-01 RX ADMIN — INSULIN LISPRO 2 UNITS: 100 INJECTION, SOLUTION INTRAVENOUS; SUBCUTANEOUS at 07:42

## 2020-01-01 RX ADMIN — CEFTRIAXONE 2 G: 2 INJECTION, POWDER, FOR SOLUTION INTRAMUSCULAR; INTRAVENOUS at 14:30

## 2020-01-01 RX ADMIN — PROPOFOL 50 MCG/KG/MIN: 10 INJECTION, EMULSION INTRAVENOUS at 16:34

## 2020-01-01 RX ADMIN — MIDAZOLAM 1 MG: 1 INJECTION INTRAMUSCULAR; INTRAVENOUS at 10:48

## 2020-01-01 RX ADMIN — PROPOFOL 20 MCG/KG/MIN: 10 INJECTION, EMULSION INTRAVENOUS at 20:52

## 2020-01-01 RX ADMIN — HYDROCORTISONE SODIUM SUCCINATE 100 MG: 100 INJECTION, POWDER, FOR SOLUTION INTRAMUSCULAR; INTRAVENOUS at 03:01

## 2020-01-01 RX ADMIN — AMLODIPINE BESYLATE 5 MG: 5 TABLET ORAL at 11:20

## 2020-01-01 RX ADMIN — CASTOR OIL AND BALSAM, PERU: 788; 87 OINTMENT TOPICAL at 16:00

## 2020-01-01 RX ADMIN — POTASSIUM CHLORIDE 20 MEQ: 400 INJECTION, SOLUTION INTRAVENOUS at 08:49

## 2020-01-01 RX ADMIN — ACETAMINOPHEN 650 MG: 325 TABLET ORAL at 22:25

## 2020-01-01 RX ADMIN — INSULIN GLARGINE 15 UNITS: 100 INJECTION, SOLUTION SUBCUTANEOUS at 12:48

## 2020-01-01 RX ADMIN — HYDROCORTISONE SODIUM SUCCINATE 50 MG: 100 INJECTION, POWDER, FOR SOLUTION INTRAMUSCULAR; INTRAVENOUS at 08:58

## 2020-01-01 RX ADMIN — CASTOR OIL AND BALSAM, PERU: 788; 87 OINTMENT TOPICAL at 14:16

## 2020-01-01 RX ADMIN — DEXTROSE MONOHYDRATE AND SODIUM CHLORIDE 150 ML/HR: 5; .45 INJECTION, SOLUTION INTRAVENOUS at 18:50

## 2020-01-01 RX ADMIN — Medication 10 ML: at 19:17

## 2020-01-01 RX ADMIN — CASTOR OIL AND BALSAM, PERU: 788; 87 OINTMENT TOPICAL at 14:00

## 2020-01-01 RX ADMIN — INSULIN LISPRO 4 UNITS: 100 INJECTION, SOLUTION INTRAVENOUS; SUBCUTANEOUS at 17:20

## 2020-01-01 RX ADMIN — DEXTROSE MONOHYDRATE 75 ML/HR: 5 INJECTION, SOLUTION INTRAVENOUS at 03:43

## 2020-01-01 RX ADMIN — SODIUM CHLORIDE 750 MG: 900 INJECTION, SOLUTION INTRAVENOUS at 16:09

## 2020-01-01 RX ADMIN — SODIUM CHLORIDE 10 ML: 9 INJECTION INTRAMUSCULAR; INTRAVENOUS; SUBCUTANEOUS at 14:04

## 2020-01-01 RX ADMIN — SODIUM CHLORIDE 100 ML: 900 INJECTION, SOLUTION INTRAVENOUS at 17:51

## 2020-01-01 RX ADMIN — AMLODIPINE BESYLATE 5 MG: 5 TABLET ORAL at 08:46

## 2020-01-01 RX ADMIN — INSULIN LISPRO 4 UNITS: 100 INJECTION, SOLUTION INTRAVENOUS; SUBCUTANEOUS at 08:24

## 2020-01-01 RX ADMIN — Medication 10 ML: at 21:31

## 2020-01-01 RX ADMIN — INSULIN LISPRO 4 UNITS: 100 INJECTION, SOLUTION INTRAVENOUS; SUBCUTANEOUS at 13:11

## 2020-01-01 RX ADMIN — PIPERACILLIN AND TAZOBACTAM 3.38 G: 3; .375 INJECTION, POWDER, LYOPHILIZED, FOR SOLUTION INTRAVENOUS at 05:12

## 2020-01-01 RX ADMIN — Medication 20 ML: at 11:32

## 2020-01-01 RX ADMIN — SODIUM CHLORIDE, SODIUM LACTATE, POTASSIUM CHLORIDE, AND CALCIUM CHLORIDE 75 ML/HR: 600; 310; 30; 20 INJECTION, SOLUTION INTRAVENOUS at 22:56

## 2020-01-01 RX ADMIN — SODIUM CHLORIDE 10 ML: 9 INJECTION INTRAMUSCULAR; INTRAVENOUS; SUBCUTANEOUS at 07:04

## 2020-01-01 RX ADMIN — CASTOR OIL AND BALSAM, PERU: 788; 87 OINTMENT TOPICAL at 18:00

## 2020-01-01 RX ADMIN — POTASSIUM CHLORIDE 20 MEQ: 400 INJECTION, SOLUTION INTRAVENOUS at 07:53

## 2020-01-01 RX ADMIN — CASTOR OIL AND BALSAM, PERU: 788; 87 OINTMENT TOPICAL at 09:09

## 2020-01-01 RX ADMIN — DEXTROSE MONOHYDRATE 125 ML: 10 INJECTION, SOLUTION INTRAVENOUS at 16:49

## 2020-01-01 RX ADMIN — CASTOR OIL AND BALSAM, PERU: 788; 87 OINTMENT TOPICAL at 09:10

## 2020-01-01 RX ADMIN — INSULIN LISPRO 3 UNITS: 100 INJECTION, SOLUTION INTRAVENOUS; SUBCUTANEOUS at 18:28

## 2020-01-01 RX ADMIN — AMLODIPINE BESYLATE 5 MG: 5 TABLET ORAL at 08:34

## 2020-01-01 RX ADMIN — Medication 10 ML: at 23:06

## 2020-01-01 RX ADMIN — HALOPERIDOL LACTATE 4 MG: 5 INJECTION INTRAMUSCULAR at 22:01

## 2020-01-01 RX ADMIN — VASOPRESSIN 0.03 UNITS/MIN: 20 INJECTION INTRAVENOUS at 20:51

## 2020-01-01 RX ADMIN — CHLORHEXIDINE GLUCONATE 15 ML: 0.12 RINSE ORAL at 21:08

## 2020-01-01 RX ADMIN — FAMOTIDINE 20 MG: 10 INJECTION, SOLUTION INTRAVENOUS at 08:20

## 2020-01-01 RX ADMIN — INSULIN LISPRO 3 UNITS: 100 INJECTION, SOLUTION INTRAVENOUS; SUBCUTANEOUS at 14:50

## 2020-01-01 RX ADMIN — DEXTROSE MONOHYDRATE 150 ML/HR: 5 INJECTION, SOLUTION INTRAVENOUS at 08:57

## 2020-01-01 RX ADMIN — Medication 10 ML: at 06:06

## 2020-01-01 RX ADMIN — CHLORHEXIDINE GLUCONATE 15 ML: 0.12 RINSE ORAL at 21:12

## 2020-01-01 RX ADMIN — POTASSIUM CHLORIDE 20 MEQ: 400 INJECTION, SOLUTION INTRAVENOUS at 03:05

## 2020-01-01 RX ADMIN — HEPARIN SODIUM 5000 UNITS: 5000 INJECTION INTRAVENOUS; SUBCUTANEOUS at 04:06

## 2020-01-01 RX ADMIN — SODIUM CHLORIDE 10 ML: 9 INJECTION INTRAMUSCULAR; INTRAVENOUS; SUBCUTANEOUS at 15:54

## 2020-01-01 RX ADMIN — COLLAGENASE SANTYL: 250 OINTMENT TOPICAL at 08:08

## 2020-01-01 RX ADMIN — CEFTRIAXONE 2 G: 2 INJECTION, POWDER, FOR SOLUTION INTRAMUSCULAR; INTRAVENOUS at 16:43

## 2020-01-01 RX ADMIN — DEXTROSE MONOHYDRATE 125 ML/HR: 5 INJECTION, SOLUTION INTRAVENOUS at 18:21

## 2020-01-01 RX ADMIN — INSULIN GLARGINE 20 UNITS: 100 INJECTION, SOLUTION SUBCUTANEOUS at 10:33

## 2020-01-01 RX ADMIN — COLLAGENASE SANTYL: 250 OINTMENT TOPICAL at 11:21

## 2020-01-01 RX ADMIN — INSULIN GLARGINE 10 UNITS: 100 INJECTION, SOLUTION SUBCUTANEOUS at 09:33

## 2020-01-01 RX ADMIN — Medication 10 ML: at 06:00

## 2020-01-01 RX ADMIN — CHLORHEXIDINE GLUCONATE 15 ML: 0.12 RINSE ORAL at 08:20

## 2020-01-01 RX ADMIN — INSULIN LISPRO 3 UNITS: 100 INJECTION, SOLUTION INTRAVENOUS; SUBCUTANEOUS at 17:32

## 2020-01-01 RX ADMIN — MAGNESIUM SULFATE HEPTAHYDRATE 1 G: 1 INJECTION, SOLUTION INTRAVENOUS at 05:05

## 2020-01-01 RX ADMIN — Medication 10 ML: at 11:00

## 2020-01-01 RX ADMIN — HYDROCORTISONE SODIUM SUCCINATE 100 MG: 100 INJECTION, POWDER, FOR SOLUTION INTRAMUSCULAR; INTRAVENOUS at 11:24

## 2020-01-01 RX ADMIN — Medication 200 MCG: at 13:49

## 2020-01-01 RX ADMIN — DEXTROSE MONOHYDRATE AND SODIUM CHLORIDE 150 ML/HR: 5; .45 INJECTION, SOLUTION INTRAVENOUS at 12:08

## 2020-01-01 RX ADMIN — SODIUM CHLORIDE 1000 ML: 900 INJECTION, SOLUTION INTRAVENOUS at 13:29

## 2020-01-01 RX ADMIN — Medication 10 ML: at 14:11

## 2020-01-01 RX ADMIN — HEPARIN SODIUM 5000 UNITS: 5000 INJECTION INTRAVENOUS; SUBCUTANEOUS at 18:34

## 2020-01-01 RX ADMIN — Medication 10 ML: at 20:47

## 2020-01-01 RX ADMIN — INSULIN LISPRO 4 UNITS: 100 INJECTION, SOLUTION INTRAVENOUS; SUBCUTANEOUS at 18:13

## 2020-01-01 RX ADMIN — INSULIN LISPRO 8 UNITS: 100 INJECTION, SOLUTION INTRAVENOUS; SUBCUTANEOUS at 13:11

## 2020-01-01 RX ADMIN — MORPHINE SULFATE 2 MG: 2 INJECTION, SOLUTION INTRAMUSCULAR; INTRAVENOUS at 21:08

## 2020-01-01 RX ADMIN — CASTOR OIL AND BALSAM, PERU: 788; 87 OINTMENT TOPICAL at 18:52

## 2020-01-01 RX ADMIN — CEFTRIAXONE 2 G: 2 INJECTION, POWDER, FOR SOLUTION INTRAMUSCULAR; INTRAVENOUS at 16:00

## 2020-01-01 RX ADMIN — POTASSIUM BICARBONATE 20 MEQ: 782 TABLET, EFFERVESCENT ORAL at 16:28

## 2020-01-01 RX ADMIN — Medication 30 MCG/MIN: at 19:24

## 2020-01-01 RX ADMIN — COLLAGENASE SANTYL: 250 OINTMENT TOPICAL at 09:10

## 2020-01-01 RX ADMIN — HEPARIN SODIUM 5000 UNITS: 5000 INJECTION INTRAVENOUS; SUBCUTANEOUS at 06:32

## 2020-01-01 RX ADMIN — SODIUM CHLORIDE 7.6 UNITS/HR: 9 INJECTION, SOLUTION INTRAVENOUS at 18:27

## 2020-01-01 RX ADMIN — CASTOR OIL AND BALSAM, PERU: 788; 87 OINTMENT TOPICAL at 15:42

## 2020-01-01 RX ADMIN — Medication 30 MCG/MIN: at 21:20

## 2020-01-01 RX ADMIN — CEFTRIAXONE 2 G: 2 INJECTION, POWDER, FOR SOLUTION INTRAMUSCULAR; INTRAVENOUS at 16:27

## 2020-01-01 RX ADMIN — HYDROMORPHONE HYDROCHLORIDE 1 MG: 1 INJECTION, SOLUTION INTRAMUSCULAR; INTRAVENOUS; SUBCUTANEOUS at 15:09

## 2020-01-01 RX ADMIN — INSULIN LISPRO 4 UNITS: 100 INJECTION, SOLUTION INTRAVENOUS; SUBCUTANEOUS at 12:11

## 2020-01-01 RX ADMIN — INSULIN LISPRO 3 UNITS: 100 INJECTION, SOLUTION INTRAVENOUS; SUBCUTANEOUS at 12:09

## 2020-01-01 RX ADMIN — ATORVASTATIN CALCIUM 40 MG: 40 TABLET, FILM COATED ORAL at 08:24

## 2020-01-01 RX ADMIN — MAGNESIUM SULFATE HEPTAHYDRATE 1 G: 1 INJECTION, SOLUTION INTRAVENOUS at 06:54

## 2020-01-01 RX ADMIN — Medication 10 ML: at 21:20

## 2020-01-01 RX ADMIN — POTASSIUM CHLORIDE 20 MEQ: 400 INJECTION, SOLUTION INTRAVENOUS at 06:31

## 2020-01-01 RX ADMIN — ALBUMIN (HUMAN) 12.5 G: 12.5 INJECTION, SOLUTION INTRAVENOUS at 23:36

## 2020-01-01 RX ADMIN — INSULIN GLARGINE 14 UNITS: 100 INJECTION, SOLUTION SUBCUTANEOUS at 08:13

## 2020-01-01 RX ADMIN — POTASSIUM CHLORIDE, DEXTROSE MONOHYDRATE AND SODIUM CHLORIDE 100 ML/HR: 150; 5; 450 INJECTION, SOLUTION INTRAVENOUS at 00:09

## 2020-01-01 RX ADMIN — SODIUM CHLORIDE 1000 ML: 900 INJECTION, SOLUTION INTRAVENOUS at 15:29

## 2020-01-01 RX ADMIN — INSULIN GLARGINE 10 UNITS: 100 INJECTION, SOLUTION SUBCUTANEOUS at 11:15

## 2020-01-01 RX ADMIN — HYDROCORTISONE SODIUM SUCCINATE 50 MG: 100 INJECTION, POWDER, FOR SOLUTION INTRAMUSCULAR; INTRAVENOUS at 04:34

## 2020-01-01 RX ADMIN — SODIUM CHLORIDE 100 ML/HR: 900 INJECTION, SOLUTION INTRAVENOUS at 23:36

## 2020-01-01 RX ADMIN — Medication 40 ML: at 23:23

## 2020-01-01 RX ADMIN — CASTOR OIL AND BALSAM, PERU: 788; 87 OINTMENT TOPICAL at 06:02

## 2020-01-01 RX ADMIN — HEPARIN SODIUM 5000 UNITS: 5000 INJECTION INTRAVENOUS; SUBCUTANEOUS at 12:15

## 2020-01-01 RX ADMIN — POTASSIUM CHLORIDE 10 MEQ: 200 INJECTION, SOLUTION INTRAVENOUS at 18:15

## 2020-01-01 RX ADMIN — Medication 10 ML: at 13:45

## 2020-01-01 RX ADMIN — CASTOR OIL AND BALSAM, PERU: 788; 87 OINTMENT TOPICAL at 15:38

## 2020-01-01 RX ADMIN — DEXTROSE MONOHYDRATE 75 ML/HR: 5 INJECTION, SOLUTION INTRAVENOUS at 11:26

## 2020-01-01 RX ADMIN — INSULIN LISPRO 8 UNITS: 100 INJECTION, SOLUTION INTRAVENOUS; SUBCUTANEOUS at 07:03

## 2020-01-01 RX ADMIN — DEXTROSE MONOHYDRATE 50 ML/HR: 5 INJECTION, SOLUTION INTRAVENOUS at 14:30

## 2020-01-01 RX ADMIN — INSULIN LISPRO 2 UNITS: 100 INJECTION, SOLUTION INTRAVENOUS; SUBCUTANEOUS at 14:30

## 2020-01-01 RX ADMIN — INSULIN LISPRO 2 UNITS: 100 INJECTION, SOLUTION INTRAVENOUS; SUBCUTANEOUS at 07:06

## 2020-01-01 RX ADMIN — CASTOR OIL AND BALSAM, PERU: 788; 87 OINTMENT TOPICAL at 10:21

## 2020-01-01 RX ADMIN — POTASSIUM CHLORIDE 10 MEQ: 200 INJECTION, SOLUTION INTRAVENOUS at 17:21

## 2020-01-01 RX ADMIN — DEXTROSE MONOHYDRATE 150 ML/HR: 5 INJECTION, SOLUTION INTRAVENOUS at 17:25

## 2020-01-01 RX ADMIN — INSULIN LISPRO 3 UNITS: 100 INJECTION, SOLUTION INTRAVENOUS; SUBCUTANEOUS at 12:46

## 2020-01-01 RX ADMIN — INSULIN LISPRO 2 UNITS: 100 INJECTION, SOLUTION INTRAVENOUS; SUBCUTANEOUS at 08:24

## 2020-01-01 RX ADMIN — Medication 30 MCG/MIN: at 13:54

## 2020-01-01 RX ADMIN — AMLODIPINE BESYLATE 5 MG: 5 TABLET ORAL at 09:31

## 2020-01-01 RX ADMIN — COLLAGENASE SANTYL: 250 OINTMENT TOPICAL at 09:13

## 2020-01-01 RX ADMIN — Medication 200 MCG: at 13:38

## 2020-01-01 RX ADMIN — ATORVASTATIN CALCIUM 20 MG: 20 TABLET, FILM COATED ORAL at 09:06

## 2020-01-01 RX ADMIN — INSULIN GLARGINE 12 UNITS: 100 INJECTION, SOLUTION SUBCUTANEOUS at 09:12

## 2020-01-01 RX ADMIN — Medication 10 ML: at 15:02

## 2020-01-01 RX ADMIN — SODIUM CHLORIDE 25.2 UNITS/HR: 9 INJECTION, SOLUTION INTRAVENOUS at 21:25

## 2020-01-01 RX ADMIN — HEPARIN SODIUM 5000 UNITS: 5000 INJECTION INTRAVENOUS; SUBCUTANEOUS at 12:45

## 2020-01-01 RX ADMIN — PIPERACILLIN AND TAZOBACTAM 3.38 G: 3; .375 INJECTION, POWDER, LYOPHILIZED, FOR SOLUTION INTRAVENOUS at 23:28

## 2020-01-01 RX ADMIN — Medication 10 ML: at 22:00

## 2020-01-01 RX ADMIN — INSULIN LISPRO 4 UNITS: 100 INJECTION, SOLUTION INTRAVENOUS; SUBCUTANEOUS at 17:19

## 2020-01-01 RX ADMIN — MIDAZOLAM 2 MG: 1 INJECTION INTRAMUSCULAR; INTRAVENOUS at 14:02

## 2020-01-01 RX ADMIN — Medication 10 ML: at 12:05

## 2020-01-01 RX ADMIN — Medication 10 ML: at 06:15

## 2020-01-01 RX ADMIN — Medication 9 MCG/MIN: at 16:33

## 2020-01-01 RX ADMIN — INSULIN LISPRO 4 UNITS: 100 INJECTION, SOLUTION INTRAVENOUS; SUBCUTANEOUS at 22:03

## 2020-01-01 RX ADMIN — CASTOR OIL AND BALSAM, PERU: 788; 87 OINTMENT TOPICAL at 14:04

## 2020-01-01 RX ADMIN — INSULIN LISPRO 3 UNITS: 100 INJECTION, SOLUTION INTRAVENOUS; SUBCUTANEOUS at 17:54

## 2020-01-01 RX ADMIN — POTASSIUM PHOSPHATE, MONOBASIC AND POTASSIUM PHOSPHATE, DIBASIC: 224; 236 INJECTION, SOLUTION, CONCENTRATE INTRAVENOUS at 17:05

## 2020-01-01 RX ADMIN — Medication 10 ML: at 14:07

## 2020-01-01 RX ADMIN — INSULIN LISPRO 4 UNITS: 100 INJECTION, SOLUTION INTRAVENOUS; SUBCUTANEOUS at 17:18

## 2020-01-01 RX ADMIN — SODIUM BICARBONATE 150 MEQ/1,000 ML IN DEXTROSE 5 % INTRAVENOUS: SOLUTION at 14:52

## 2020-01-01 RX ADMIN — INSULIN LISPRO 3 UNITS: 100 INJECTION, SOLUTION INTRAVENOUS; SUBCUTANEOUS at 12:08

## 2020-01-01 RX ADMIN — CASTOR OIL AND BALSAM, PERU: 788; 87 OINTMENT TOPICAL at 22:38

## 2020-01-01 RX ADMIN — INSULIN LISPRO 3 UNITS: 100 INJECTION, SOLUTION INTRAVENOUS; SUBCUTANEOUS at 12:17

## 2020-01-01 RX ADMIN — COLLAGENASE SANTYL: 250 OINTMENT TOPICAL at 09:05

## 2020-01-01 RX ADMIN — INSULIN LISPRO 4 UNITS: 100 INJECTION, SOLUTION INTRAVENOUS; SUBCUTANEOUS at 08:13

## 2020-01-01 RX ADMIN — INSULIN LISPRO 2 UNITS: 100 INJECTION, SOLUTION INTRAVENOUS; SUBCUTANEOUS at 04:05

## 2020-01-01 RX ADMIN — HYDRALAZINE HYDROCHLORIDE 10 MG: 20 INJECTION INTRAMUSCULAR; INTRAVENOUS at 13:05

## 2020-01-01 RX ADMIN — CASTOR OIL AND BALSAM, PERU: 788; 87 OINTMENT TOPICAL at 18:56

## 2020-01-01 RX ADMIN — CASTOR OIL AND BALSAM, PERU: 788; 87 OINTMENT TOPICAL at 09:18

## 2020-01-01 RX ADMIN — AMLODIPINE BESYLATE 5 MG: 5 TABLET ORAL at 08:05

## 2020-01-01 RX ADMIN — SODIUM CHLORIDE 10 ML: 9 INJECTION INTRAMUSCULAR; INTRAVENOUS; SUBCUTANEOUS at 22:52

## 2020-01-01 RX ADMIN — AMLODIPINE BESYLATE 5 MG: 5 TABLET ORAL at 09:34

## 2020-01-01 RX ADMIN — SODIUM CHLORIDE 10 ML: 9 INJECTION INTRAMUSCULAR; INTRAVENOUS; SUBCUTANEOUS at 14:00

## 2020-01-01 RX ADMIN — PIPERACILLIN AND TAZOBACTAM 3.38 G: 3; .375 INJECTION, POWDER, LYOPHILIZED, FOR SOLUTION INTRAVENOUS at 11:27

## 2020-01-01 RX ADMIN — HEPARIN SODIUM 5000 UNITS: 5000 INJECTION INTRAVENOUS; SUBCUTANEOUS at 21:37

## 2020-01-01 RX ADMIN — CEFTRIAXONE 2 G: 2 INJECTION, POWDER, FOR SOLUTION INTRAMUSCULAR; INTRAVENOUS at 16:28

## 2020-01-01 RX ADMIN — DEXTROSE MONOHYDRATE 75 ML/HR: 5 INJECTION, SOLUTION INTRAVENOUS at 11:17

## 2020-01-01 RX ADMIN — INSULIN GLARGINE 24 UNITS: 100 INJECTION, SOLUTION SUBCUTANEOUS at 09:10

## 2020-01-01 RX ADMIN — CASTOR OIL AND BALSAM, PERU: 788; 87 OINTMENT TOPICAL at 18:13

## 2020-01-01 RX ADMIN — HYDROCORTISONE SODIUM SUCCINATE 100 MG: 100 INJECTION, POWDER, FOR SOLUTION INTRAMUSCULAR; INTRAVENOUS at 18:52

## 2020-01-01 RX ADMIN — Medication 10 ML: at 16:08

## 2020-01-01 RX ADMIN — HYDROCORTISONE SODIUM SUCCINATE 50 MG: 100 INJECTION, POWDER, FOR SOLUTION INTRAMUSCULAR; INTRAVENOUS at 21:30

## 2020-01-01 RX ADMIN — SODIUM CHLORIDE, SODIUM LACTATE, POTASSIUM CHLORIDE, AND CALCIUM CHLORIDE 75 ML/HR: 600; 310; 30; 20 INJECTION, SOLUTION INTRAVENOUS at 14:32

## 2020-01-01 RX ADMIN — INSULIN LISPRO 2 UNITS: 100 INJECTION, SOLUTION INTRAVENOUS; SUBCUTANEOUS at 00:18

## 2020-01-01 RX ADMIN — POTASSIUM CHLORIDE 20 MEQ: 400 INJECTION, SOLUTION INTRAVENOUS at 05:26

## 2020-01-01 RX ADMIN — AMLODIPINE BESYLATE 5 MG: 5 TABLET ORAL at 10:28

## 2020-01-01 RX ADMIN — SODIUM CHLORIDE 1000 ML: 900 INJECTION, SOLUTION INTRAVENOUS at 17:51

## 2020-01-01 RX ADMIN — Medication 17 MCG/MIN: at 02:50

## 2020-01-01 RX ADMIN — MORPHINE SULFATE 2 MG: 2 INJECTION, SOLUTION INTRAMUSCULAR; INTRAVENOUS at 18:08

## 2020-01-01 RX ADMIN — INSULIN LISPRO 4 UNITS: 100 INJECTION, SOLUTION INTRAVENOUS; SUBCUTANEOUS at 08:30

## 2020-01-01 RX ADMIN — INSULIN LISPRO 3 UNITS: 100 INJECTION, SOLUTION INTRAVENOUS; SUBCUTANEOUS at 15:39

## 2020-01-01 RX ADMIN — INSULIN LISPRO 2 UNITS: 100 INJECTION, SOLUTION INTRAVENOUS; SUBCUTANEOUS at 12:43

## 2020-01-01 RX ADMIN — POTASSIUM CHLORIDE 20 MEQ: 400 INJECTION, SOLUTION INTRAVENOUS at 04:34

## 2020-01-01 RX ADMIN — PIPERACILLIN AND TAZOBACTAM 3.38 G: 3; .375 INJECTION, POWDER, LYOPHILIZED, FOR SOLUTION INTRAVENOUS at 00:13

## 2020-01-01 RX ADMIN — SODIUM CHLORIDE, SODIUM LACTATE, POTASSIUM CHLORIDE, AND CALCIUM CHLORIDE 75 ML/HR: 600; 310; 30; 20 INJECTION, SOLUTION INTRAVENOUS at 19:23

## 2020-01-01 RX ADMIN — Medication 10 ML: at 06:13

## 2020-01-01 RX ADMIN — KETAMINE HYDROCHLORIDE 107.5 MG: 50 INJECTION INTRAMUSCULAR; INTRAVENOUS at 13:50

## 2020-01-01 RX ADMIN — HYDROCORTISONE SODIUM SUCCINATE 100 MG: 100 INJECTION, POWDER, FOR SOLUTION INTRAMUSCULAR; INTRAVENOUS at 12:13

## 2020-01-01 RX ADMIN — AMLODIPINE BESYLATE 5 MG: 5 TABLET ORAL at 12:01

## 2020-01-01 RX ADMIN — Medication 10 ML: at 14:31

## 2020-01-01 RX ADMIN — Medication 10 ML: at 06:17

## 2020-01-01 RX ADMIN — Medication 10 ML: at 21:36

## 2020-01-01 RX ADMIN — INSULIN LISPRO 5 UNITS: 100 INJECTION, SOLUTION INTRAVENOUS; SUBCUTANEOUS at 11:24

## 2020-01-01 RX ADMIN — INSULIN GLARGINE 10 UNITS: 100 INJECTION, SOLUTION SUBCUTANEOUS at 10:06

## 2020-01-01 RX ADMIN — HYDROCORTISONE SODIUM SUCCINATE 100 MG: 100 INJECTION, POWDER, FOR SOLUTION INTRAMUSCULAR; INTRAVENOUS at 20:20

## 2020-01-01 RX ADMIN — INSULIN LISPRO 2 UNITS: 100 INJECTION, SOLUTION INTRAVENOUS; SUBCUTANEOUS at 18:01

## 2020-01-01 RX ADMIN — INSULIN LISPRO 3 UNITS: 100 INJECTION, SOLUTION INTRAVENOUS; SUBCUTANEOUS at 17:29

## 2020-01-01 RX ADMIN — INSULIN LISPRO 2 UNITS: 100 INJECTION, SOLUTION INTRAVENOUS; SUBCUTANEOUS at 18:56

## 2020-01-01 RX ADMIN — DEXTROSE MONOHYDRATE 50 ML/HR: 5 INJECTION, SOLUTION INTRAVENOUS at 16:22

## 2020-01-01 RX ADMIN — INSULIN LISPRO 3 UNITS: 100 INJECTION, SOLUTION INTRAVENOUS; SUBCUTANEOUS at 08:14

## 2020-01-01 RX ADMIN — INSULIN LISPRO 2 UNITS: 100 INJECTION, SOLUTION INTRAVENOUS; SUBCUTANEOUS at 06:46

## 2020-01-01 RX ADMIN — SODIUM CHLORIDE 5.4 UNITS/HR: 9 INJECTION, SOLUTION INTRAVENOUS at 19:35

## 2020-01-01 RX ADMIN — INSULIN LISPRO 3 UNITS: 100 INJECTION, SOLUTION INTRAVENOUS; SUBCUTANEOUS at 06:14

## 2020-01-01 RX ADMIN — FAMOTIDINE 20 MG: 10 INJECTION, SOLUTION INTRAVENOUS at 09:44

## 2020-01-01 RX ADMIN — CASTOR OIL AND BALSAM, PERU: 788; 87 OINTMENT TOPICAL at 09:45

## 2020-01-01 RX ADMIN — ASPIRIN 325 MG ORAL TABLET 325 MG: 325 PILL ORAL at 21:32

## 2020-01-01 RX ADMIN — CHLORHEXIDINE GLUCONATE 15 ML: 0.12 RINSE ORAL at 08:09

## 2020-01-01 RX ADMIN — MORPHINE SULFATE 2 MG: 2 INJECTION, SOLUTION INTRAMUSCULAR; INTRAVENOUS at 17:43

## 2020-01-01 RX ADMIN — INSULIN GLARGINE 7 UNITS: 100 INJECTION, SOLUTION SUBCUTANEOUS at 13:11

## 2020-01-01 RX ADMIN — CASTOR OIL AND BALSAM, PERU: 788; 87 OINTMENT TOPICAL at 12:27

## 2020-01-01 RX ADMIN — Medication 10 ML: at 12:27

## 2020-01-01 RX ADMIN — DEXTROSE MONOHYDRATE 150 ML/HR: 5 INJECTION, SOLUTION INTRAVENOUS at 02:34

## 2020-01-01 RX ADMIN — ACETAMINOPHEN 650 MG: 325 TABLET ORAL at 05:12

## 2020-01-01 RX ADMIN — Medication 10 ML: at 20:33

## 2020-01-01 RX ADMIN — CASTOR OIL AND BALSAM, PERU: 788; 87 OINTMENT TOPICAL at 17:48

## 2020-01-01 RX ADMIN — Medication 10 ML: at 15:07

## 2020-01-01 RX ADMIN — SODIUM CHLORIDE 10 ML: 9 INJECTION INTRAMUSCULAR; INTRAVENOUS; SUBCUTANEOUS at 14:35

## 2020-01-01 RX ADMIN — Medication 10 ML: at 15:42

## 2020-01-01 RX ADMIN — CASTOR OIL AND BALSAM, PERU: 788; 87 OINTMENT TOPICAL at 07:04

## 2020-01-01 RX ADMIN — Medication 10 ML: at 21:29

## 2020-01-01 RX ADMIN — CHLORHEXIDINE GLUCONATE 15 ML: 0.12 RINSE ORAL at 20:56

## 2020-01-01 RX ADMIN — HEPARIN SODIUM 5000 UNITS: 5000 INJECTION INTRAVENOUS; SUBCUTANEOUS at 12:30

## 2020-01-01 RX ADMIN — ASPIRIN 325 MG ORAL TABLET 325 MG: 325 PILL ORAL at 09:45

## 2020-01-01 RX ADMIN — SODIUM CHLORIDE 10 ML: 9 INJECTION INTRAMUSCULAR; INTRAVENOUS; SUBCUTANEOUS at 15:00

## 2020-01-01 RX ADMIN — COLLAGENASE SANTYL: 250 OINTMENT TOPICAL at 10:23

## 2020-01-01 RX ADMIN — Medication 10 ML: at 14:16

## 2020-01-01 RX ADMIN — PIPERACILLIN AND TAZOBACTAM 3.38 G: 3; .375 INJECTION, POWDER, LYOPHILIZED, FOR SOLUTION INTRAVENOUS at 15:10

## 2020-01-01 RX ADMIN — VANCOMYCIN HYDROCHLORIDE 1250 MG: 10 INJECTION, POWDER, LYOPHILIZED, FOR SOLUTION INTRAVENOUS at 12:27

## 2020-01-01 RX ADMIN — CEFTRIAXONE 2 G: 2 INJECTION, POWDER, FOR SOLUTION INTRAMUSCULAR; INTRAVENOUS at 16:05

## 2020-01-01 RX ADMIN — CHLORHEXIDINE GLUCONATE 15 ML: 1.2 RINSE ORAL at 10:49

## 2020-01-01 RX ADMIN — SODIUM CHLORIDE 1000 ML: 900 INJECTION, SOLUTION INTRAVENOUS at 16:48

## 2020-01-01 RX ADMIN — SODIUM CHLORIDE 7.8 UNITS/HR: 9 INJECTION, SOLUTION INTRAVENOUS at 16:34

## 2020-01-01 RX ADMIN — INSULIN GLARGINE 10 UNITS: 100 INJECTION, SOLUTION SUBCUTANEOUS at 08:34

## 2020-01-01 RX ADMIN — INSULIN LISPRO 2 UNITS: 100 INJECTION, SOLUTION INTRAVENOUS; SUBCUTANEOUS at 17:27

## 2020-01-01 RX ADMIN — INSULIN LISPRO 7 UNITS: 100 INJECTION, SOLUTION INTRAVENOUS; SUBCUTANEOUS at 01:14

## 2020-01-01 RX ADMIN — INSULIN LISPRO 3 UNITS: 100 INJECTION, SOLUTION INTRAVENOUS; SUBCUTANEOUS at 23:58

## 2020-01-01 RX ADMIN — POLYETHYLENE GLYCOL 3350 17 G: 17 POWDER, FOR SOLUTION ORAL at 17:18

## 2020-01-01 RX ADMIN — AMLODIPINE BESYLATE 10 MG: 5 TABLET ORAL at 12:15

## 2020-01-01 RX ADMIN — SODIUM CHLORIDE, SODIUM LACTATE, POTASSIUM CHLORIDE, AND CALCIUM CHLORIDE 75 ML/HR: 600; 310; 30; 20 INJECTION, SOLUTION INTRAVENOUS at 02:37

## 2020-01-01 RX ADMIN — POTASSIUM CHLORIDE 20 MEQ: 400 INJECTION, SOLUTION INTRAVENOUS at 14:08

## 2020-01-01 RX ADMIN — CASTOR OIL AND BALSAM, PERU: 788; 87 OINTMENT TOPICAL at 14:12

## 2020-01-01 RX ADMIN — INSULIN LISPRO 4 UNITS: 100 INJECTION, SOLUTION INTRAVENOUS; SUBCUTANEOUS at 17:17

## 2020-01-01 RX ADMIN — AMLODIPINE BESYLATE 5 MG: 5 TABLET ORAL at 09:09

## 2020-01-01 RX ADMIN — AMLODIPINE BESYLATE 5 MG: 5 TABLET ORAL at 09:17

## 2020-01-01 RX ADMIN — INSULIN LISPRO 2 UNITS: 100 INJECTION, SOLUTION INTRAVENOUS; SUBCUTANEOUS at 11:28

## 2020-01-01 RX ADMIN — Medication 10 ML: at 06:05

## 2020-01-01 RX ADMIN — POLYETHYLENE GLYCOL 3350 17 G: 17 POWDER, FOR SOLUTION ORAL at 17:32

## 2020-01-01 RX ADMIN — INSULIN LISPRO 3 UNITS: 100 INJECTION, SOLUTION INTRAVENOUS; SUBCUTANEOUS at 17:12

## 2020-01-01 RX ADMIN — ASPIRIN 325 MG ORAL TABLET 325 MG: 325 PILL ORAL at 08:34

## 2020-01-01 RX ADMIN — CASTOR OIL AND BALSAM, PERU: 788; 87 OINTMENT TOPICAL at 11:20

## 2020-01-01 RX ADMIN — ASPIRIN 325 MG ORAL TABLET 325 MG: 325 PILL ORAL at 08:24

## 2020-01-01 RX ADMIN — PIPERACILLIN AND TAZOBACTAM 3.38 G: 3; .375 INJECTION, POWDER, LYOPHILIZED, FOR SOLUTION INTRAVENOUS at 12:23

## 2020-01-01 RX ADMIN — MORPHINE SULFATE 2 MG: 2 INJECTION, SOLUTION INTRAMUSCULAR; INTRAVENOUS at 15:56

## 2020-01-01 RX ADMIN — Medication 40 ML: at 21:17

## 2020-01-01 RX ADMIN — SODIUM CHLORIDE 10 ML: 9 INJECTION INTRAMUSCULAR; INTRAVENOUS; SUBCUTANEOUS at 13:48

## 2020-01-01 RX ADMIN — CEFTRIAXONE 2 G: 2 INJECTION, POWDER, FOR SOLUTION INTRAMUSCULAR; INTRAVENOUS at 16:10

## 2020-01-01 RX ADMIN — HEPARIN SODIUM 5000 UNITS: 5000 INJECTION INTRAVENOUS; SUBCUTANEOUS at 10:35

## 2020-01-01 RX ADMIN — INSULIN LISPRO 2 UNITS: 100 INJECTION, SOLUTION INTRAVENOUS; SUBCUTANEOUS at 00:12

## 2020-01-01 RX ADMIN — FAMOTIDINE 20 MG: 10 INJECTION, SOLUTION INTRAVENOUS at 20:52

## 2020-01-01 RX ADMIN — SODIUM CHLORIDE, SODIUM LACTATE, POTASSIUM CHLORIDE, AND CALCIUM CHLORIDE 75 ML/HR: 600; 310; 30; 20 INJECTION, SOLUTION INTRAVENOUS at 14:22

## 2020-01-01 RX ADMIN — ACETAMINOPHEN 650 MG: 650 SUPPOSITORY RECTAL at 08:40

## 2020-01-01 RX ADMIN — HYDROCORTISONE SODIUM SUCCINATE 100 MG: 100 INJECTION, POWDER, FOR SOLUTION INTRAMUSCULAR; INTRAVENOUS at 10:09

## 2020-01-01 RX ADMIN — CALCIUM GLUCONATE 2 G: 98 INJECTION, SOLUTION INTRAVENOUS at 17:12

## 2020-01-01 RX ADMIN — MORPHINE SULFATE 2 MG: 2 INJECTION, SOLUTION INTRAMUSCULAR; INTRAVENOUS at 08:40

## 2020-01-01 RX ADMIN — ACETAMINOPHEN 650 MG: 650 SUPPOSITORY RECTAL at 21:03

## 2020-01-01 RX ADMIN — Medication 10 ML: at 14:53

## 2020-01-01 RX ADMIN — CASTOR OIL AND BALSAM, PERU: 788; 87 OINTMENT TOPICAL at 23:00

## 2020-01-01 RX ADMIN — AMLODIPINE BESYLATE 5 MG: 5 TABLET ORAL at 10:05

## 2020-01-01 RX ADMIN — INSULIN LISPRO 4 UNITS: 100 INJECTION, SOLUTION INTRAVENOUS; SUBCUTANEOUS at 11:57

## 2020-01-01 RX ADMIN — ASPIRIN 325 MG ORAL TABLET 325 MG: 325 PILL ORAL at 08:55

## 2020-01-01 RX ADMIN — HYDROCORTISONE SODIUM SUCCINATE 50 MG: 100 INJECTION, POWDER, FOR SOLUTION INTRAMUSCULAR; INTRAVENOUS at 18:17

## 2020-01-01 RX ADMIN — CASTOR OIL AND BALSAM, PERU: 788; 87 OINTMENT TOPICAL at 09:13

## 2020-01-01 RX ADMIN — HEPARIN SODIUM 5000 UNITS: 5000 INJECTION INTRAVENOUS; SUBCUTANEOUS at 21:12

## 2020-01-01 RX ADMIN — CALCIUM GLUCONATE 1 G: 98 INJECTION, SOLUTION INTRAVENOUS at 07:22

## 2020-01-01 RX ADMIN — INSULIN LISPRO 3 UNITS: 100 INJECTION, SOLUTION INTRAVENOUS; SUBCUTANEOUS at 17:27

## 2020-01-01 RX ADMIN — ATORVASTATIN CALCIUM 20 MG: 20 TABLET, FILM COATED ORAL at 12:01

## 2020-01-01 RX ADMIN — POTASSIUM CHLORIDE 10 MEQ: 200 INJECTION, SOLUTION INTRAVENOUS at 19:00

## 2020-01-01 RX ADMIN — INSULIN LISPRO 2 UNITS: 100 INJECTION, SOLUTION INTRAVENOUS; SUBCUTANEOUS at 11:42

## 2020-01-01 RX ADMIN — SODIUM CHLORIDE 10 ML: 9 INJECTION INTRAMUSCULAR; INTRAVENOUS; SUBCUTANEOUS at 14:32

## 2020-01-01 RX ADMIN — CALCIUM GLUCONATE 1 G: 98 INJECTION, SOLUTION INTRAVENOUS at 17:00

## 2020-01-01 RX ADMIN — INSULIN LISPRO 5 UNITS: 100 INJECTION, SOLUTION INTRAVENOUS; SUBCUTANEOUS at 00:32

## 2020-01-01 RX ADMIN — INSULIN LISPRO 3 UNITS: 100 INJECTION, SOLUTION INTRAVENOUS; SUBCUTANEOUS at 01:27

## 2020-01-01 RX ADMIN — FUROSEMIDE 40 MG: 10 INJECTION, SOLUTION INTRAMUSCULAR; INTRAVENOUS at 13:29

## 2020-01-01 RX ADMIN — Medication 10 ML: at 21:54

## 2020-01-01 RX ADMIN — INSULIN LISPRO 2 UNITS: 100 INJECTION, SOLUTION INTRAVENOUS; SUBCUTANEOUS at 23:59

## 2020-01-01 RX ADMIN — SODIUM CHLORIDE 1000 ML: 900 INJECTION, SOLUTION INTRAVENOUS at 12:57

## 2020-01-01 RX ADMIN — POTASSIUM CHLORIDE 20 MEQ: 400 INJECTION, SOLUTION INTRAVENOUS at 08:16

## 2020-01-01 RX ADMIN — SODIUM CHLORIDE 10 ML: 9 INJECTION INTRAMUSCULAR; INTRAVENOUS; SUBCUTANEOUS at 00:23

## 2020-01-01 RX ADMIN — HEPARIN SODIUM 5000 UNITS: 5000 INJECTION INTRAVENOUS; SUBCUTANEOUS at 13:11

## 2020-01-01 RX ADMIN — INSULIN LISPRO 2 UNITS: 100 INJECTION, SOLUTION INTRAVENOUS; SUBCUTANEOUS at 12:24

## 2020-01-01 RX ADMIN — WATER: 1000 INJECTION, SOLUTION INTRAVENOUS at 12:57

## 2020-01-01 RX ADMIN — FAMOTIDINE 20 MG: 10 INJECTION, SOLUTION INTRAVENOUS at 21:10

## 2020-01-01 RX ADMIN — SODIUM CHLORIDE 10 ML: 9 INJECTION INTRAMUSCULAR; INTRAVENOUS; SUBCUTANEOUS at 14:12

## 2020-01-01 RX ADMIN — CEFTRIAXONE 2 G: 2 INJECTION, POWDER, FOR SOLUTION INTRAMUSCULAR; INTRAVENOUS at 14:32

## 2020-01-01 RX ADMIN — FENTANYL CITRATE 50 MCG: 50 INJECTION, SOLUTION INTRAMUSCULAR; INTRAVENOUS at 09:21

## 2020-01-01 RX ADMIN — CASTOR OIL AND BALSAM, PERU: 788; 87 OINTMENT TOPICAL at 17:32

## 2020-01-01 RX ADMIN — ATORVASTATIN CALCIUM 40 MG: 40 TABLET, FILM COATED ORAL at 10:33

## 2020-01-01 RX ADMIN — AMLODIPINE BESYLATE 5 MG: 5 TABLET ORAL at 09:44

## 2020-01-01 RX ADMIN — ACETAMINOPHEN 650 MG: 650 SUPPOSITORY RECTAL at 14:20

## 2020-01-01 RX ADMIN — ASPIRIN 81 MG 81 MG: 81 TABLET ORAL at 10:33

## 2020-01-01 RX ADMIN — CASTOR OIL AND BALSAM, PERU: 788; 87 OINTMENT TOPICAL at 17:54

## 2020-01-01 RX ADMIN — CASTOR OIL AND BALSAM, PERU: 788; 87 OINTMENT TOPICAL at 22:52

## 2020-01-01 RX ADMIN — Medication 20 ML: at 14:00

## 2020-01-01 RX ADMIN — INSULIN LISPRO 2 UNITS: 100 INJECTION, SOLUTION INTRAVENOUS; SUBCUTANEOUS at 17:01

## 2020-01-01 RX ADMIN — CASTOR OIL AND BALSAM, PERU: 788; 87 OINTMENT TOPICAL at 22:00

## 2020-01-01 RX ADMIN — COLLAGENASE SANTYL: 250 OINTMENT TOPICAL at 09:35

## 2020-01-01 RX ADMIN — INSULIN LISPRO 2 UNITS: 100 INJECTION, SOLUTION INTRAVENOUS; SUBCUTANEOUS at 23:14

## 2020-01-01 RX ADMIN — CASTOR OIL AND BALSAM, PERU: 788; 87 OINTMENT TOPICAL at 10:29

## 2020-01-01 RX ADMIN — ATORVASTATIN CALCIUM 20 MG: 20 TABLET, FILM COATED ORAL at 09:44

## 2020-01-01 RX ADMIN — HUMAN INSULIN 10 UNITS: 100 INJECTION, SOLUTION SUBCUTANEOUS at 03:33

## 2020-01-01 RX ADMIN — ATORVASTATIN CALCIUM 40 MG: 40 TABLET, FILM COATED ORAL at 08:55

## 2020-01-01 RX ADMIN — Medication 10 ML: at 14:34

## 2020-01-01 RX ADMIN — INSULIN GLARGINE 14 UNITS: 100 INJECTION, SOLUTION SUBCUTANEOUS at 09:45

## 2020-01-01 RX ADMIN — CASTOR OIL AND BALSAM, PERU: 788; 87 OINTMENT TOPICAL at 19:08

## 2020-01-01 RX ADMIN — FENTANYL CITRATE 100 MCG: 50 INJECTION, SOLUTION INTRAMUSCULAR; INTRAVENOUS at 13:53

## 2020-01-01 RX ADMIN — MAGNESIUM SULFATE HEPTAHYDRATE 2 G: 40 INJECTION, SOLUTION INTRAVENOUS at 17:21

## 2020-01-01 RX ADMIN — CEFTRIAXONE 2 G: 2 INJECTION, POWDER, FOR SOLUTION INTRAMUSCULAR; INTRAVENOUS at 14:11

## 2020-01-01 RX ADMIN — INSULIN GLARGINE 10 UNITS: 100 INJECTION, SOLUTION SUBCUTANEOUS at 09:01

## 2020-01-01 RX ADMIN — CASTOR OIL AND BALSAM, PERU: 788; 87 OINTMENT TOPICAL at 14:34

## 2020-01-01 RX ADMIN — Medication 10 ML: at 16:02

## 2020-01-01 RX ADMIN — SODIUM BICARBONATE 50 MEQ: 84 INJECTION, SOLUTION INTRAVENOUS at 03:33

## 2020-01-01 RX ADMIN — ACETAMINOPHEN 650 MG: 325 TABLET ORAL at 02:47

## 2020-01-01 RX ADMIN — Medication 10 ML: at 21:28

## 2020-01-01 RX ADMIN — INSULIN LISPRO 5 UNITS: 100 INJECTION, SOLUTION INTRAVENOUS; SUBCUTANEOUS at 11:39

## 2020-01-01 RX ADMIN — HEPARIN SODIUM 5000 UNITS: 5000 INJECTION INTRAVENOUS; SUBCUTANEOUS at 03:30

## 2020-01-01 RX ADMIN — INSULIN LISPRO 2 UNITS: 100 INJECTION, SOLUTION INTRAVENOUS; SUBCUTANEOUS at 18:31

## 2020-01-01 RX ADMIN — Medication 10 ML: at 22:44

## 2020-01-01 RX ADMIN — LOSARTAN POTASSIUM 50 MG: 50 TABLET, FILM COATED ORAL at 08:55

## 2020-01-01 RX ADMIN — CASTOR OIL AND BALSAM, PERU: 788; 87 OINTMENT TOPICAL at 17:43

## 2020-01-01 RX ADMIN — Medication 10 ML: at 06:10

## 2020-01-01 RX ADMIN — PIPERACILLIN AND TAZOBACTAM 3.38 G: 3; .375 INJECTION, POWDER, LYOPHILIZED, FOR SOLUTION INTRAVENOUS at 11:30

## 2020-01-01 RX ADMIN — Medication 10 ML: at 03:58

## 2020-01-01 RX ADMIN — INSULIN LISPRO 2 UNITS: 100 INJECTION, SOLUTION INTRAVENOUS; SUBCUTANEOUS at 08:57

## 2020-01-01 RX ADMIN — Medication 10 ML: at 13:08

## 2020-01-01 RX ADMIN — INSULIN LISPRO 2 UNITS: 100 INJECTION, SOLUTION INTRAVENOUS; SUBCUTANEOUS at 00:46

## 2020-01-01 RX ADMIN — POTASSIUM CHLORIDE 20 MEQ: 400 INJECTION, SOLUTION INTRAVENOUS at 04:10

## 2020-01-01 RX ADMIN — COLLAGENASE SANTYL: 250 OINTMENT TOPICAL at 09:46

## 2020-01-01 RX ADMIN — POTASSIUM CHLORIDE 10 MEQ: 200 INJECTION, SOLUTION INTRAVENOUS at 19:19

## 2020-01-01 RX ADMIN — Medication 10 ML: at 21:13

## 2020-01-01 RX ADMIN — CHLORHEXIDINE GLUCONATE 15 ML: 0.12 RINSE ORAL at 09:13

## 2020-01-01 RX ADMIN — PIPERACILLIN AND TAZOBACTAM 3.38 G: 3; .375 INJECTION, POWDER, LYOPHILIZED, FOR SOLUTION INTRAVENOUS at 13:35

## 2020-01-01 RX ADMIN — AMLODIPINE BESYLATE 5 MG: 5 TABLET ORAL at 09:58

## 2020-01-01 RX ADMIN — CEFTRIAXONE 2 G: 2 INJECTION, POWDER, FOR SOLUTION INTRAMUSCULAR; INTRAVENOUS at 14:22

## 2020-01-01 RX ADMIN — INSULIN LISPRO 2 UNITS: 100 INJECTION, SOLUTION INTRAVENOUS; SUBCUTANEOUS at 12:55

## 2020-01-01 RX ADMIN — SODIUM CHLORIDE 1000 ML: 9 INJECTION, SOLUTION INTRAVENOUS at 11:53

## 2020-01-01 RX ADMIN — DEXTROSE MONOHYDRATE 250 ML: 10 INJECTION, SOLUTION INTRAVENOUS at 23:40

## 2020-01-01 NOTE — ED PROVIDER NOTES
Patient is a 59-year-old male with a history of diabetes, hypertension, hyperlipidemia who was brought in by EMS from home with complaints of confusion and speech difficulty. History limited due to patient's change in mental status, most the history provided by paramedic. He reports that patient was last seen normal by his son, who he lives with, at 1:30 PM today. Son reportedly went out to run an errand, and when he returned approximately 30 minutes ago, found his father not acting right with confusion and problems speaking. No reported trauma or falls. EMS reports that the son said this was similar to other presentations when patient's blood sugars been high. Sugar 223 on arrival here. Patient able to say his name, per EMS. They do not report any obvious focal weakness or facial droop. Again, rest of history limited given patient's apparent change in mental status. Past Medical History:   Diagnosis Date    Diabetes ([facility] [address] 2002    High cholesterol     Hypertension        No past surgical history on file.       Family History:   Problem Relation Age of Onset    No Known Problems Mother     No Known Problems Father     No Known Problems Sister     No Known Problems Brother     No Known Problems Brother     No Known Problems Brother     No Known Problems Brother     No Known Problems Brother     No Known Problems Brother     No Known Problems Maternal Grandmother     No Known Problems Maternal Grandfather     No Known Problems Paternal Grandmother     No Known Problems Paternal Grandfather     Diabetes Neg Hx     Heart Disease Neg Hx        Social History     Socioeconomic History    Marital status:      Spouse name: Not on file    Number of children: Not on file    Years of education: Not on file    Highest education level: Not on file   Occupational History    Not on file   Social Needs    Financial resource strain: Not on file    Food insecurity:     Worry: Not on file     Inability: Not on file    Transportation needs:     Medical: Not on file     Non-medical: Not on file   Tobacco Use    Smoking status: Never Smoker    Smokeless tobacco: Never Used   Substance and Sexual Activity    Alcohol use: No     Alcohol/week: 0.0 standard drinks    Drug use: No     Types: Prescription, OTC    Sexual activity: Never   Lifestyle    Physical activity:     Days per week: Not on file     Minutes per session: Not on file    Stress: Not on file   Relationships    Social connections:     Talks on phone: Not on file     Gets together: Not on file     Attends [employer] witness service: Not on file     Active member of club or organization: Not on file     Attends meetings of clubs or organizations: Not on file     Relationship status: Not on file    Intimate partner violence:     Fear of current or ex partner: Not on file     Emotionally abused: Not on file     Physically abused: Not on file     Forced sexual activity: Not on file   Other Topics Concern    Not on file   Social History Narrative    ** Merged History Encounter **              ALLERGIES: Patient has no known allergies. Review of Systems   Unable to perform ROS: Mental status change       Vitals:    01/01/20 1538   BP: 130/85            Physical Exam  Vitals signs and nursing note reviewed. Constitutional:       General: He is not in acute distress. Appearance: He is well-developed. Comments: Disheveled. HENT:      Head: Normocephalic and atraumatic. Eyes:      Extraocular Movements:      Right eye: Normal extraocular motion. Left eye: Normal extraocular motion. Conjunctiva/sclera: Conjunctivae normal.      Pupils: Pupils are equal, round, and reactive to light. Neck:      Musculoskeletal: Normal range of motion and neck supple. Cardiovascular:      Rate and Rhythm: Regular rhythm. Tachycardia present. Heart sounds: Normal heart sounds.    Pulmonary:      Effort: Pulmonary effort is normal. No respiratory distress. Breath sounds: Normal breath sounds. Abdominal:      Palpations: Abdomen is soft. Tenderness: There is no tenderness. There is no guarding. Musculoskeletal: Normal range of motion. Skin:     General: Skin is warm and dry. Neurological:      Mental Status: He is alert. He is disoriented and confused. Cranial Nerves: Dysarthria present. Motor: No weakness. MDM       Procedures    3:51 PM  Spoke with Dr. Lionel Vega with teleneurology. Will see on the robot. 4:13 PM  I spoke with Dr. Lionel Vega again. Reviewed CT results. Dr. Lionel Vega does not see any evidence on physical exam of focal findings concerning for acute CVA. His symptoms seem to be improving, per Dr. Lionel Vega he recommends obtaining a CTA and if this is negative, patient can be worked up for other source of confusion such as metabolic causes. He recommends IV fluids. Corroborate history with son. If he returns to baseline, can be discharged home. EKG interpretation: 16:40  Rhythm: sinus tachycardia; and regular . Rate (approx.): 107; Axis: normal; Intervals: normal ; ST/T wave: normal; EKG documented and interpreted by Olive Sheth MD, ED MD.    7:01 PM  Discussed CTA and perfusion results with Dr. Terry Arroyo, neuro interventionalists on-call, via perfect serve. He has reviewed the images. No intervention planned. Hospitalist Jose Luis Garcia for Admission  7:03 PM    ED Room Number: ER14/14  Patient Name and age:  Max Ka 76 y.o.  male  Working Diagnosis:   1. Cerebrovascular accident (CVA) due to thrombosis of left middle cerebral artery ([facility] [address]      Readmission: no  Isolation Requirements:  no  Recommended Level of Care:  telemetry  Code Status:  Full Code  Department:[facility] Adult ED - 21   Other:  No intervention, not tPA candidate. Total critical care time spend exclusive of procedures:  39 minutes.

## 2020-01-01 NOTE — ED TRIAGE NOTES
Patient present from home with complaints of altered mental status. Patient alert to self only and per family this is not his baseline.   LKW was about 1330      BS en route was 223    Code S level one called by RN, Patient headed to CT on stretcher

## 2020-01-02 NOTE — PROGRESS NOTES
[facility] [facility] Adult  Hospitalist Group                                                                                          Hospitalist Progress Note  Juliet Anders MD  Answering service: [phone] OR [phone] from in house phone        Date of Service:  2020  NAME:  Bobbi Stein  :  1945  MRN:  [id number]      Admission Summary:   Bobbi Stein is a 76 y.o. male with past medical history significant for diabetes mellitus type 2, hypertension and hyperlipidemia was brought to the emergency room via EMS for evaluation of speech difficulties and increased confusion. History is provided mainly by his son over the phone. He states that today around 1:30pm he noticed that he father was sitting on a chair leaning to one side. He was found to be confused and son had a difficult time understanding his speech. He tried to move his father out of the chair but patient was overall weak. As per son report patient has had similar episodes in the past related to high sugar for he checked patients blood sugar and it was in the 300s. He gave the patient 24 units of Lantus but his status did not improve for which he called EMS. for which  Last time patient was seen normal was around 12pm.    On arrival to ER patient was verbal with no focal deficit. Tele neurology evaluated patient who did not recommend Tpa as no focal deficit. CTA of the head showed occlusion at the M3. ER physician discussed case with interventional radiology Dr. Celso Malloy who reviewed images and as per report no intervention is planned. Patient denies any symptoms at this time. Patient's son states that at baseline his father has episode of confusion where he does not know where he is at and is not good with dates but overall is very functional.  No history of CVAs in the past.  Patient denies any headache, lightheadedness, double vision, sensory disturbance or weakness at this time.     Interval history / Subjective:      Patient seen in the ER. Denies any weakness, tingling or numbness. Still appears confused. Could not tell me time, place or persons. Oriented to self. Stated that his wife passed away recently and lives by himself. Son is involved in his care. No other concerns. Assessment & Plan:     # Acute left CVA Posterior M3-MCA division  -CTA report showed Left M3 Branch occlusion with associated perfusion defect. As per report no IR intervention is indicated based on the location of the lesion. Conservative management. - neurology on board. - Echo, MRI brain awaiting.  - PT/OT and speech consult  - Check TSH, A1c, lipid panel  - Aspirin 325mg daily as previous home dose  - Continue Atorvastatin to 40mg daily  - Permissive hypertension in the first 24 hours. Will treat if bp >200/100.     # Encephalopathy- Confusion likely secondary to CVA. - Per son, pt has some mild cognitive impairment at baseline.   - Monitor closely.     # Hypertension- uncontrolled blood pressure however due to acute stroke permissive hypertension indicated at this time. - Labetalol 10mg IV q6 hours for SPB >200. - Will resume home medications from tomorrow as BP allows     # DM type 2: Placed on sliding scale insulin ac and hs  - Continue with lantus 24 units at bedtime (took lantus prior to arrival to the ER today)  - Hold metformin as patient received contrast for CTA today. - check A1c     # CKD stage II- likely from diabetic nephropathy. Cr at baseline.  Will monitor for now.     DVT prophy: heparin sq  Code: full code  Surrogate decision making: Son, Radha Granados.     FUNCTIONAL STATUS PRIOR TO HOSPITALIZATION: independent prior to CVA     Care Plan discussed with: Patient/Family and Nurse  Disposition: SNF/LTC and TBD     Hospital Problems  Date Reviewed: 12/4/2018          Codes Class Noted POA    CVA (cerebral vascular accident) [facility]) ICD-10-CM: I63.9  ICD-9-CM: 434.91  1/1/2020 Unknown                Review of Systems:   A comprehensive review of systems was negative except for that written in the HPI. Vital Signs:    Last 24hrs VS reviewed since prior progress note. Most recent are:  Visit Vitals  /87   Pulse 90   Temp 98.4 °F (36.9 °C)   Resp 11   Wt 59.6 kg (131 lb 6.3 oz)   SpO2 96%   BMI 21.87 kg/m²         Intake/Output Summary (Last 24 hours) at 1/2/2020 1608  Last data filed at 1/2/2020 1330  Gross per 24 hour   Intake 0 ml   Output    Net 0 ml        Physical Examination:     GEN APPEARANCE: Patient resting in bed in NAD  HEENT: Conjunctiva Clear  CVS: RRR, S1, S2; No M/G/R  LUNGS: CTAB; No Wheezes; No Rhonchi: No rales  ABD: Soft; No TTP; + Normoactive BS  EXT: WWP, no edema   Skin exam: No gross lesions noted on exposed skin surfaces  MENTAL STATUS: Patient oriented to person only. He knows he lives in [place] but is now oriented to place or time. Neuro: Cranial nerve exam: EOMI, CN V sensory/motor intact, no facial asymmetry noted, patient able to hearl, uvula midline, able to move tongue left/right. No gross motor or sensory deficits. No slurred speech. Data Review:    Review and/or order of clinical lab test  Review and/or order of tests in the radiology section of CPT  Review and/or order of tests in the medicine section of CPT    Xr Chest Port    Result Date: 1/1/2020  IMPRESSION: No evidence of acute cardiopulmonary process. Cta Code Neuro Head And Neck W Cont    Result Date: 1/2/2020  IMPRESSION: 1. Questionable effusion of distal left and 3 branch. Otherwise negative. Ct Code Neuro Head Wo Contrast    Result Date: 1/1/2020  IMPRESSION: No acute infarct, intracranial hemorrhage, or intracranial mass. Unchanged periventricular white matter disease, compatible with chronic small vessel ischemia. Old lacunar infarcts in the basal ganglia. Ct Code Neuro Perf W Cbf    Result Date: 1/2/2020  IMPRESSION: 1. Questionable effusion of distal left and 3 branch. Otherwise negative.       Labs:     Recent Labs 01/02/20  0406 01/01/20  1618   WBC 10.1 9.0   HGB 12.0* 13.2   HCT 35.2* 38.6    172     Recent Labs     01/02/20  0406 01/01/20  1618    137   K 3.7 4.0    103   CO2 27 28   BUN 22* 22*   CREA 1.36* 1.49*   GLU 63* 231*   CA 9.0 10.1     Recent Labs     01/01/20  1618   SGOT 13*   ALT 22   AP 97   TBILI 1.0   TP 8.2   ALB 3.9   GLOB 4.3*     Recent Labs     01/01/20  1618   INR 1.1   PTP 10.9      No results for input(s): FE, TIBC, PSAT, FERR in the last 72 hours. No results found for: FOL, RBCF   No results for input(s): PH, PCO2, PO2 in the last 72 hours.   Recent Labs     01/01/20  1618   TROIQ <0.05     Lab Results   Component Value Date/Time    Cholesterol, total 190 01/02/2020 04:06 AM    HDL Cholesterol 64 01/02/2020 04:06 AM    LDL, calculated 115.4 (H) 01/02/2020 04:06 AM    Triglyceride 53 01/02/2020 04:06 AM    CHOL/HDL Ratio 3.0 01/02/2020 04:06 AM     Lab Results   Component Value Date/Time    Glucose (POC) 167 (H) 02/24/2019 10:27 PM    Glucose (POC) 235 (H) 02/24/2019 07:54 PM    Glucose (POC) 223 (H) 02/02/2019 03:40 PM    Glucose (POC) 272 (H) 08/24/2018 11:21 AM    Glucose (POC) 94 08/24/2018 07:11 AM    Glucose (POC) 61 (L) 08/24/2018 06:44 AM     Lab Results   Component Value Date/Time    Color YELLOW/STRAW 01/01/2020 04:42 PM    Appearance CLEAR 01/01/2020 04:42 PM    Specific gravity 1.014 01/01/2020 04:42 PM    pH (UA) 6.5 01/01/2020 04:42 PM    Protein 100 (A) 01/01/2020 04:42 PM    Glucose 250 (A) 01/01/2020 04:42 PM    Ketone NEGATIVE  01/01/2020 04:42 PM    Bilirubin NEGATIVE  01/01/2020 04:42 PM    Urobilinogen 1.0 01/01/2020 04:42 PM    Nitrites NEGATIVE  01/01/2020 04:42 PM    Leukocyte Esterase NEGATIVE  01/01/2020 04:42 PM    Epithelial cells FEW 01/01/2020 04:42 PM    Bacteria NEGATIVE  01/01/2020 04:42 PM    WBC 0-4 01/01/2020 04:42 PM    RBC 0-5 01/01/2020 04:42 PM         Medications Reviewed:     Current Facility-Administered Medications   Medication Dose Route Frequency    [START ON 1/3/2020] amLODIPine (NORVASC) tablet 10 mg  10 mg Oral DAILY    insulin lispro (HUMALOG) injection 4 Units  4 Units SubCUTAneous TIDAC    atorvastatin (LIPITOR) tablet 40 mg  40 mg Oral DAILY    [START ON 1/3/2020] aspirin tablet 325 mg  325 mg Oral DAILY    acetaminophen (TYLENOL) tablet 650 mg  650 mg Oral Q4H PRN    Or    acetaminophen (TYLENOL) solution 650 mg  650 mg Per NG tube Q4H PRN    Or    acetaminophen (TYLENOL) suppository 650 mg  650 mg Rectal Q4H PRN    heparin (porcine) injection 5,000 Units  5,000 Units SubCUTAneous Q8H    glucose chewable tablet 16 g  4 Tab Oral PRN    glucagon (GLUCAGEN) injection 1 mg  1 mg IntraMUSCular PRN    dextrose 10% infusion 0-250 mL  0-250 mL IntraVENous PRN     Current Outpatient Medications   Medication Sig    insulin glargine (LANTUS SOLOSTAR U-100 INSULIN) 100 unit/mL (3 mL) inpn INJECT 25 UNITS UNDER THE SKIN EVERY DAY AS DIRECTED    vitamin E (AQUA GEMS) 400 unit capsule Take  by mouth daily.  atorvastatin (LIPITOR) 20 mg tablet TAKE 1 TAB BY MOUTH DAILY.  insulin lispro (HUMALOG U-100 INSULIN) 100 unit/mL injection 4 Units Before breakfast, lunch, and dinner.  losartan (COZAAR) 100 mg tablet TAKE 1 TABLET BY MOUTH EVERY DAY    metFORMIN (GLUCOPHAGE) 500 mg tablet Take 1 Tab by mouth three (3) times daily (with meals).  amLODIPine (NORVASC) 10 mg tablet Take 1 Tab by mouth daily.  DOCOSAHEXANOIC ACID/EPA (FISH OIL PO) Take  by mouth.  vit B Cmplx 3-FA-Vit C-Biotin (NEPHRO ALEN RX) 1- mg-mg-mcg tablet Take 1 Tab by mouth daily.  aspirin (ASPIRIN) 325 mg tablet Take 325 mg by mouth daily.        ______________________________________________________________________  EXPECTED LENGTH OF STAY: - - -  ACTUAL LENGTH OF STAY:          1                 Citlali Greenwood MD

## 2020-01-02 NOTE — PROGRESS NOTES
SPEECH PATHOLOGY BEDSIDE SWALLOW EVALUATION/DISCHARGE  Patient: Ingrid Asa (72 y.o. male)  Date: 1/2/2020  Primary Diagnosis: CVA (cerebral vascular accident) [facility]) [I63.9]       Precautions: n/a      ASSESSMENT :  Based on the objective data described below, the patient presents with functional oropharyngeal phases of swallow at bedside. No difficulties nor overt s/s of aspiration appreciated. Recommend pt initiate regular diet/thin liquids. No speech/language deficits. Skilled acute therapy provided by a speech-language pathologist is not indicated at this time. PLAN :  Recommendations:  --regular diet/thin liquids  --general aspiration precautions    Discharge Recommendations: None     SUBJECTIVE:   Patient stated, \"I don't eat onions, garlic, and I don't smoke cigarettes. OBJECTIVE:     Past Medical History:   Diagnosis Date    Diabetes ([facility] [address] 2002    High cholesterol     Hypertension    No past surgical history on file. PDiet prior to admission: Regular diet/thin liquids  Current Diet:  Regular diet/thin liquids  Cognitive and Communication Status:  Neurologic State: Alert  Orientation Level: Oriented to person, Oriented to place  Cognition: Memory loss  Perception: Appears intact  Perseveration: Perseverates during conversation(\"I don't eat onions, garlic, or smoke cigarettes. \")  Safety/Judgement: Awareness of environment  Oral Assessment:  Oral Assessment  Labial: No impairment  Dentition: Natural;Poor  Oral Hygiene: oral mucosa moist and clear of secretions  Lingual: No impairment  Velum: No impairment  Mandible: No impairment  P.O. Trials:  Patient Position: upright in bed  Vocal quality prior to P.O.: No impairment  Consistency Presented: Solid; Thin liquid  How Presented: Self-fed/presented;Cup/sip;Straw;Successive swallows     Bolus Acceptance: No impairment  Bolus Formation/Control: No impairment     Propulsion: No impairment  Oral Residue: None  Initiation of Swallow: No impairment  Laryngeal Elevation: Functional  Aspiration Signs/Symptoms: None  Pharyngeal Phase Characteristics: No impairment, issues, or problems              Oral Phase Severity: No impairment  Pharyngeal Phase Severity : No impairment  NOMS:   The NOMS functional outcome measure was used to quantify this patient's level of swallowing impairment. Based on the NOMS, the patient was determined to be at level 7 for swallow function     NOMS Swallowing Levels:  Level 1 (CN): NPO  Level 2 (CM): NPO but takes consistency in therapy  Level 3 (CL): Takes less than 50% of nutrition p.o. and continues with nonoral feedings; and/or safe with mod cues; and/or max diet restriction  Level 4 (CK): Safe swallow but needs mod cues; and/or mod diet restriction; and/or still requires some nonoral feeding/supplements  Level 5 (CJ): Safe swallow with min diet restriction; and/or needs min cues  Level 6 (CI): Independent with p.o.; rare cues; usually self cues; may need to avoid some foods or needs extra time  Level 7 ([address] [place] [address]): Independent for all p.o.  JAN. (2003). National Outcomes Measurement System (NOMS): Adult Speech-Language Pathology User's Guide. Pain:  Pain Scale 1: Numeric (0 - 10)  Pain Intensity 1: 0     After treatment:   Nursing notified    COMMUNICATION/EDUCATION:     The patient's plan of care including recommendations, planned interventions, and recommended diet changes were discussed with: Registered Nurse.     Thank you for this referral.  Issac Boxer, SLP  Time Calculation: 15 mins

## 2020-01-02 NOTE — PROGRESS NOTES
Problem: Self Care Deficits Care Plan (Adult)  Goal: *Acute Goals and Plan of Care (Insert Text)  Description    FUNCTIONAL STATUS PRIOR TO ADMISSION: Patient was independent and active without use of DME.     HOME SUPPORT: The patient lived with girlfriend but did not require assist.    Occupational Therapy Goals  Initiated 1/2/2020  1. Patient will perform grooming sitting EOB with supervision/set-up within 7 day(s). 2.  Patient will perform bathing with supervision/set-up within 7 day(s). 3.  Patient will perform lower body dressing with supervision/set-up within 7 day(s). 4.  Patient will perform toilet transfers with supervision/set-up within 7 day(s). 5.  Patient will perform all aspects of toileting with supervision/set-up within 7 day(s). 6.  Patient will participate in upper extremity therapeutic exercise/activities with independence for 5 minutes within 7 day(s). 7.  Patient will utilize energy conservation techniques during functional activities with verbal cues within 7 day(s). Outcome: Not Met    OCCUPATIONAL THERAPY EVALUATION  Patient: Kory Mcelroy (64 y.o. male)  Date: 1/2/2020  Primary Diagnosis: CVA (cerebral vascular accident) [facility]) [I63.9]        Precautions:  Fall    ASSESSMENT  Based on the objective data described below, the patient presents with limited ADL performance s/p admission for CVA. Imaging positive for L MCA occlusion, patient not a candidate for tPA. Patient ADLs limited by impaired balance, generalized weakness, decreased functional activity tolerance, impaired insight into deficits, impaired sequencing and command following. At baseline, patient reports being IND with ADLs and living with his girlfriend. Today, patient able to scoot to [facility] with min A to adjust position. Per PT, patient able to come to sitting on EOB with supervision and stand with min A. Patient required total A to doff socks long sitting on bed 2* impaired sequencing and coordination. Patient unable to track L/R when asked - eyes remained in midline gaze. Noted impaired coordination with finger to nose testing with BUE, more pronounced with RUE - patient unable to complete formal Fugl Jenkins assessment 2* impaired command following. Patient appears to be functioning well below reported baseline and would benefit from SNF level rehab. Will continue to follow. Current Level of Function Impacting Discharge (ADLs/self-care): min-max A for ADLs    Functional Outcome Measure: The patient scored 35/100 on the Barthel Index outcome measure which is indicative of moderate/severe deficits in ADLs. Other factors to consider for discharge: was IND with ADLs PTA. Patient will benefit from skilled therapy intervention to address the above noted impairments. PLAN :  Recommendations and Planned Interventions: self care training, functional mobility training, therapeutic exercise, balance training, visual/perceptual training, therapeutic activities, endurance activities, neuromuscular re-education, patient education, home safety training, and family training/education    Frequency/Duration: Patient will be followed by occupational therapy 5 times a week to address goals. Recommendation for discharge: (in order for the patient to meet his/her long term goals)  Therapy up to 5 days/week in SNF setting    This discharge recommendation:  Has not yet been discussed the attending provider and/or case management    IF patient discharges home will need the following DME: to be determined (TBD)       SUBJECTIVE:   Patient stated I thought I wan in [facility].     OBJECTIVE DATA SUMMARY:   HISTORY:   Past Medical History:   Diagnosis Date    Diabetes ([address] 2002    High cholesterol     Hypertension    No past surgical history on file.     Expanded or extensive additional review of patient history:     Home Situation  Home Environment: Apartment  # Steps to Enter: 14  One/Two Story Residence: [facility] story  Living Alone: No  Support Systems: Spouse/Significant Other/Partner  Current DME Used/Available at Home: Cane, straight, Grab bars  Tub or Shower Type: Tub/Shower combination    Hand dominance: Right    EXAMINATION OF PERFORMANCE DEFICITS:  Cognitive/Behavioral Status:  Neurologic State: Alert  Orientation Level: Oriented to person;Disoriented to place; Disoriented to situation;Disoriented to time  Cognition: Impaired decision making;Decreased attention/concentration; Follows commands;Poor safety awareness  Perception: Appears intact  Perseveration: No perseveration noted  Safety/Judgement: Decreased awareness of environment;Decreased awareness of need for assistance;Decreased awareness of need for safety;Decreased insight into deficits; Fall prevention    Skin: Appears grossly intact    Edema: none noted in BUEs    Hearing:       Vision/Perceptual:    Tracking: Requires cues, head turns, or add eye shifts to track    Visual Fields: Difficulty detecting stimulus  in right lateral quadrant; Difficulty detecting stimulus in right lower quadrant; Difficulty detecting stimulus in right upper quadrant  Diplopia: No    Acuity: Impaired near vision; Impaired far vision       Range of Motion:  In BUEs  AROM: Generally decreased, functional    Strength: In BUEs  Strength: Generally decreased, functional     Coordination:  Coordination: Generally decreased, functional  Fine Motor Skills-Upper: Left Impaired;Right Impaired    Gross Motor Skills-Upper: Left Intact; Right Intact    Tone & Sensation:  In BUEs  Tone: Normal  Sensation: (unable to accurately formally assess)     Balance:  Sitting: Intact; Without support  Standing: Impaired; Without support  Standing - Static: Fair  Standing - Dynamic : Poor    Functional Mobility and Transfers for ADLs:  Bed Mobility:  Rolling: Supervision  Supine to Sit: Supervision  Sit to Supine: Supervision  Scooting: Minimum assistance(to HOB)    Transfers:  Sit to Stand: Minimum assistance;Assist x1  Stand to Sit: Minimum assistance;Assist x1    ADL Assessment:  Feeding: Minimum assistance(Infer per obs of func reach, BUE ROM, coordination)    Oral Facial Hygiene/Grooming: Minimum assistance(Infer per obs of func reach, BUE ROM, coordination)    Bathing: Moderate assistance(Infer per obs of func reach, balance, strength, coordination)    Upper Body Dressing: Minimum assistance(Infer per obs of func reach, BUE ROM, coordination)    Lower Body Dressing: Maximum assistance(Infer per obs of func reach, BUE ROM, coordination)    Toileting: Maximum assistance(Infer per obs of func reach, BUE ROM, coordination)    ADL Intervention and task modifications:    Lower Body Dressing Assistance  Socks: Total assistance (dependent)  Leg Crossed Method Used: No  Position Performed: Long sitting on bed    Cognitive Retraining  Safety/Judgement: Decreased awareness of environment;Decreased awareness of need for assistance;Decreased awareness of need for safety;Decreased insight into deficits; Fall prevention    Functional Measure:  Barthel Index:    Bathin  Bladder: 5  Bowels: 10  Groomin  Dressin  Feedin  Mobility: 0  Stairs: 0  Toilet Use: 5  Transfer (Bed to Chair and Back): 5  Total: 35/100        The Barthel ADL Index: Guidelines  1. The index should be used as a record of what a patient does, not as a record of what a patient could do. 2. The main aim is to establish degree of independence from any help, physical or verbal, however minor and for whatever reason. 3. The need for supervision renders the patient not independent. 4. A patient's performance should be established using the best available evidence. Asking the patient, friends/relatives and nurses are the usual sources, but direct observation and common sense are also important. However direct testing is not needed.   5. Usually the patient's performance over the preceding 24-48 hours is important, but occasionally longer periods will be relevant. 6. Middle categories imply that the patient supplies over 50 per cent of the effort. 7. Use of aids to be independent is allowed. Gregory Howard., Barthel, D.W. (1499). Functional evaluation: the Barthel Index. [address] [facility] (14)2. ASHLEY Downs, Kalen Sagastume., Markus ChiuThorMarty, [facility] (). Measuring the change indisability after inpatient rehabilitation; comparison of the responsiveness of the Barthel Index and Functional Warren Measure. Journal of Neurology, Neurosurgery, and Psychiatry, 66(4), 082-045. Suzan Andrews, NThorJ.A, CARISSA Tenorio, & Christo Muse M.A. (2004.) Assessment of post-stroke quality of life in cost-effectiveness studies: The usefulness of the Barthel Index and the EuroQoL-5D. Quality of Life Research, 15, 061-48       Occupational Therapy Evaluation Charge Determination   History Examination Decision-Making   LOW Complexity : Brief history review  MEDIUM Complexity : 3-5 performance deficits relating to physical, cognitive , or psychosocial skils that result in activity limitations and / or participation restrictions MEDIUM Complexity : Patient may present with comorbidities that affect occupational performnce. Miniml to moderate modification of tasks or assistance (eg, physical or verbal ) with assesment(s) is necessary to enable patient to complete evaluation       Based on the above components, the patient evaluation is determined to be of the following complexity level: MEDIUM  Pain Rating:  none    Activity Tolerance:   Fair  Please refer to the flowsheet for vital signs taken during this treatment. After treatment patient left in no apparent distress:    Supine in bed, Call bell within reach, and Side rails x 3    COMMUNICATION/EDUCATION:   The patients plan of care was discussed with: Physical Therapist and Registered Nurse. Home safety education was provided and the patient/caregiver indicated understanding.  and Patient/family have participated as able in goal setting and plan of care. This patients plan of care is appropriate for delegation to AU[facility].     Thank you for this referral.  Huy Kiran OT  Time Calculation: 10 mins

## 2020-01-02 NOTE — PROGRESS NOTES
RN attempted to call family to complete MRi screening sheet. Phone number for patient's son is not correct number.

## 2020-01-02 NOTE — ED NOTES
Bedside and Verbal shift change report given to Ana Maria CLAY (oncoming nurse) by Charan Harry (offgoing nurse). Report included the following information SBAR, Kardex, ED Summary, [facility] and Recent Results.

## 2020-01-02 NOTE — H&P
[address] [facility]  History and Physical    Primary Care Provider: Iris Alamo MD    Subjective:     David Burns is a 76 y.o. male with past medical history significant for diabetes mellitus type 2, hypertension and hyperlipidemia was brought to the emergency room via EMS for evaluation of speech difficulties and increased confusion. History is provided mainly by his son over the phone. He states that today around 1:30pm he noticed that he father was sitting on a chair leaning to one side. He was found to be confused and son had a difficult time understanding his speech. He tried to move his father out of the chair but patient was overall weak. As per son report patient has had similar episodes in the past related to high sugar for he checked patients blood sugar and it was in the 300s. He gave the patient 24 units of Lantus but his status did not improve for which he called EMS. for which  Last time patient was seen normal was around 12pm.    On arrival to ER patient was verbal with no focal deficit. Tele neurology evaluated patient who did not recommend Tpa as no focal deficit. CTA of the head showed occlusion at the M3. ER physician discussed case with interventional radiology Dr. Nani Carranza who reviewed images and as per report no intervention is planned. Patient denies any symptoms at this time. Patient's son states that at baseline his father has episode of confusion where he does not know where he is at and is not good with dates but overall is very functional.  No history of CVAs in the past.  Patient denies any headache, lightheadedness, double vision, sensory disturbance or weakness at this time. Review of Systems:    General: HPI, no fever, no changes of weight or sleep  HEENT: no headache, no vision changes, no nose discharge, no hearing changes   RES: no wheezing, no cough, no sob  CVS: no cp, no palpitation.   Muscular: no joint swelling, no muscle pain, no leg swelling  Skin: no rash, no itching   GI: no vomiting, no diarrhea  : no dysuria, no hematuria  Hemo: no gum bleeding, no petechial   Neuro: Generalized weakness, slurred speech. No lightheadedness or dizziness. Endo: no polydipsia   Psych: denied depression     Past Medical History:   Diagnosis Date    Diabetes ([facility] [address] 2002    High cholesterol     Hypertension       Past Surgical History: None      Prior to Admission medications    Medication Sig Start Date End Date Taking? Authorizing Provider   insulin glargine (LANTUS SOLOSTAR U-100 INSULIN) 100 unit/mL (3 mL) inpn INJECT 25 UNITS UNDER THE SKIN EVERY DAY AS DIRECTED 2/5/19   Maurizio Soto NP   vitamin E (AQUA GEMS) 400 unit capsule Take  by mouth daily. Provider, Historical   atorvastatin (LIPITOR) 20 mg tablet TAKE 1 TAB BY MOUTH DAILY. 11/19/18   Enedina Landa MD   insulin lispro (HUMALOG U-100 INSULIN) 100 unit/mL injection 4 Units Before breakfast, lunch, and dinner. Provider, Historical   losartan (COZAAR) 100 mg tablet TAKE 1 TABLET BY MOUTH EVERY DAY 6/21/18   Enedina Landa MD   metFORMIN (GLUCOPHAGE) 500 mg tablet Take 1 Tab by mouth three (3) times daily (with meals). 4/17/18   Enedina Landa MD   amLODIPine (NORVASC) 10 mg tablet Take 1 Tab by mouth daily. 4/16/18   Enedina Landa MD   LACTOSE-REDUCED FOOD (BOOST PO) Take  by mouth. Provider, Historical   DOCOSAHEXANOIC ACID/EPA (FISH OIL PO) Take  by mouth. Provider, Historical   vit B Cmplx 3-FA-Vit C-Biotin (NEPHRO ALEN RX) 1- mg-mg-mcg tablet Take 1 Tab by mouth daily. 2/20/13   Jovany Figueroa MD   aspirin (ASPIRIN) 325 mg tablet Take 325 mg by mouth daily.       Provider, Historical     No Known Allergies   Family History   Problem Relation Age of Onset    No Known Problems Mother     No Known Problems Father     No Known Problems Sister     No Known Problems Brother     No Known Problems Brother     No Known Problems Brother     No Known Problems Brother     No Known Problems Brother     No Known Problems Brother     No Known Problems Maternal Grandmother     No Known Problems Maternal Grandfather     No Known Problems Paternal Grandmother     No Known Problems Paternal Grandfather     Diabetes Neg Hx     Heart Disease Neg Hx         SOCIAL HISTORY:  Patient resides at home  Patient ambulates independently  Smoking history: none  Alcohol history: None        Objective:       Physical Exam:   Patient Vitals for the past 12 hrs:   Temp Pulse Resp BP SpO2   01/01/20 2030  (!) 107  (!) 164/108 98 %   01/01/20 2000 98.5 °F (36.9 °C) 100 14 (!) 166/94 96 %   01/01/20 1930    (!) 191/96 100 %   01/01/20 1900 98.6 °F (37 °C) 99 15 192/80 98 %   01/01/20 1800  (!) 107 19 (!) 133/110    01/01/20 1700  (!) 102 14 (!) 176/106    01/01/20 1630  (!) 105 19 (!) 174/100    01/01/20 1627    (!) 174/100    01/01/20 1538 98.2 °F (36.8 °C) (!) 102 14 130/85 99 %     GEN APPEARANCE: Patient resting in bed in NAD  HEENT: Conjunctiva Clear  CVS: RRR, S1, S2; No M/G/R  LUNGS: CTAB; No Wheezes; No Rhonchi: No rales  ABD: Soft; No TTP; + Normoactive BS  EXT: WWP, no edema   Skin exam: No gross lesions noted on exposed skin surfaces  MENTAL STATUS: Patient oriented to person only. He knows he lives in [place] but is now oriented to place or time. Neuro: Cranial nerve exam: EOMI, CN V sensory/motor intact, no facial asymmetry noted, patient able to hearl, uvula midline, able to move tongue left/right. No gross motor or sensory deficits. No slurred speech.     Data Review:   Recent Results (from the past 24 hour(s))   CBC WITH AUTOMATED DIFF    Collection Time: 01/01/20  4:18 PM   Result Value Ref Range    WBC 9.0 4.1 - 11.1 K/uL    RBC 4.76 4.10 - 5.70 M/uL    HGB 13.2 12.1 - 17.0 g/dL    HCT 38.6 36.6 - 50.3 %    MCV 81.1 80.0 - 99.0 FL    MCH 27.7 26.0 - 34.0 PG    MCHC 34.2 30.0 - 36.5 g/dL    RDW 12.6 11.5 - 14.5 % PLATELET 330 703 - 540 K/uL    MPV 10.6 8.9 - 12.9 FL    NRBC 0.0 0  WBC    ABSOLUTE NRBC 0.00 0.00 - 0.01 K/uL    NEUTROPHILS 63 32 - 75 %    LYMPHOCYTES 30 12 - 49 %    MONOCYTES 6 5 - 13 %    EOSINOPHILS 1 0 - 7 %    BASOPHILS 0 0 - 1 %    IMMATURE GRANULOCYTES 0 0.0 - 0.5 %    ABS. NEUTROPHILS 5.6 1.8 - 8.0 K/UL    ABS. LYMPHOCYTES 2.7 0.8 - 3.5 K/UL    ABS. MONOCYTES 0.5 0.0 - 1.0 K/UL    ABS. EOSINOPHILS 0.1 0.0 - 0.4 K/UL    ABS. BASOPHILS 0.0 0.0 - 0.1 K/UL    ABS. IMM. GRANS. 0.0 0.00 - 0.04 K/UL    DF AUTOMATED     METABOLIC PANEL, COMPREHENSIVE    Collection Time: 01/01/20  4:18 PM   Result Value Ref Range    Sodium 137 136 - 145 mmol/L    Potassium 4.0 3.5 - 5.1 mmol/L    Chloride 103 97 - 108 mmol/L    CO2 28 21 - 32 mmol/L    Anion gap 6 5 - 15 mmol/L    Glucose 231 (H) 65 - 100 mg/dL    BUN 22 (H) 6 - 20 MG/DL    Creatinine 1.49 (H) 0.70 - 1.30 MG/DL    BUN/Creatinine ratio 15 12 - 20      GFR est AA 56 (L) >60 ml/min/1.73m2    GFR est non-AA 46 (L) >60 ml/min/1.73m2    Calcium 10.1 8.5 - 10.1 MG/DL    Bilirubin, total 1.0 0.2 - 1.0 MG/DL    ALT (SGPT) 22 12 - 78 U/L    AST (SGOT) 13 (L) 15 - 37 U/L    Alk. phosphatase 97 45 - 117 U/L    Protein, total 8.2 6.4 - 8.2 g/dL    Albumin 3.9 3.5 - 5.0 g/dL    Globulin 4.3 (H) 2.0 - 4.0 g/dL    A-G Ratio 0.9 (L) 1.1 - 2.2     PROTHROMBIN TIME + INR    Collection Time: 01/01/20  4:18 PM   Result Value Ref Range    INR 1.1 0.9 - 1.1      Prothrombin time 10.9 9.0 - 11.1 sec   TROPONIN I    Collection Time: 01/01/20  4:18 PM   Result Value Ref Range    Troponin-I, Qt. <0.05 <0.05 ng/mL   SAMPLES BEING HELD    Collection Time: 01/01/20  4:19 PM   Result Value Ref Range    SAMPLES BEING HELD 1RED     COMMENT        Add-on orders for these samples will be processed based on acceptable specimen integrity and analyte stability, which may vary by analyte.    EKG, 12 LEAD, INITIAL    Collection Time: 01/01/20  4:40 PM   Result Value Ref Range    Ventricular Rate 107 BPM    Atrial Rate 107 BPM    P-R Interval 140 ms    QRS Duration 60 ms    Q-T Interval 310 ms    QTC Calculation (Bezet) 413 ms    Calculated P Axis 64 degrees    Calculated R Axis 40 degrees    Calculated T Axis 53 degrees    Diagnosis       Sinus tachycardia  When compared with ECG of 24-FEB-2019 20:35,  No significant change was found     URINALYSIS W/MICROSCOPIC    Collection Time: 01/01/20  4:42 PM   Result Value Ref Range    Color YELLOW/STRAW      Appearance CLEAR CLEAR      Specific gravity 1.014 1.003 - 1.030      pH (UA) 6.5 5.0 - 8.0      Protein 100 (A) NEG mg/dL    Glucose 250 (A) NEG mg/dL    Ketone NEGATIVE  NEG mg/dL    Bilirubin NEGATIVE  NEG      Blood TRACE (A) NEG      Urobilinogen 1.0 0.2 - 1.0 EU/dL    Nitrites NEGATIVE  NEG      Leukocyte Esterase NEGATIVE  NEG      WBC 0-4 0 - 4 /hpf    RBC 0-5 0 - 5 /hpf    Epithelial cells FEW FEW /lpf    Bacteria NEGATIVE  NEG /hpf   URINE CULTURE HOLD SAMPLE    Collection Time: 01/01/20  4:42 PM   Result Value Ref Range    Urine culture hold        URINE ON HOLD IN MICROBIOLOGY DEPT FOR 3 DAYS. IF UNPRESERVED URINE IS SUBMITTED, IT CANNOT BE USED FOR ADDITIONAL TESTING AFTER 24 HRS, RECOLLECTION WILL BE REQUIRED. Xr Chest Port    Result Date: 1/1/2020  IMPRESSION: No evidence of acute cardiopulmonary process. Ct Code Neuro Head Wo Contrast    Result Date: 1/1/2020  IMPRESSION: No acute infarct, intracranial hemorrhage, or intracranial mass. Unchanged periventricular white matter disease, compatible with chronic small vessel ischemia. Old lacunar infarcts in the basal ganglia. Assessment:     Active Problems:    CVA (cerebral vascular accident) ([address] (1/1/2020)        Plan:     1. Acute CVA Posterior MCA division- Preliminary CTA report showed Left M3 Branch occlusion with associated perfusion defect.  ER physician Dr. Lexie Merida discussed case with Dr. Luis Herrera Interventional radiologist. As per report no intervention is indicated based on the location of the lesion. Conservative management. - neurology consult in am.  - Echo order.   - PT/OT and speech consult  - Check TSH, A1c, lipid panel  - Aspirin 81mg daily  - Increase Atorvastatin to 40mg daily  - Permissive hypertension in the first 24 hours. Will treat if bp >200/100.    2. Encephalopathy- Confusion likely secondary to CVA. Son states that at baseline patient does have some mild cognitive impairment. Slowly improving now. Will continue to monitor. 3. Hypertension- uncontrolled blood pressure however due to acute stroke permissive hypertension indicated at this time. - Labetalol 10mg IV q6 hours for SPB >200.    4. DM type 2: Placed on sliding scale insulin ac and hs  - Continue with lantus 24 units at bedtime (took lantus prior to arrival to the ER today)  - Hold metformin as patient received contrast for CTA today. - check A1c    5. CKD stage II- likely from diabetic nephropathy. Cr at baseline. Will monitor for now. DVT prophy: heparin sq  Code: full code  Surrogate decision making: Son, Domonique Zaragoza. FUNCTIONAL STATUS PRIOR TO HOSPITALIZATION: independent prior to CVA    Admit to inpatient status as anticipated LOS is more than 2 days     I contacted patient's son Domonique Zaragoza over the phone. Karthikeyan Maki was explained about the risk of admission including and not a complete list including risk of falls,fractures,blood clots,allergic reactions,infections. Son also understands and agrees to the treatment plan including medications and side effect profiles and also understand the risk with radiation while undergoing imaging studies. The patient and the Gabriela Mary (after permission given by the patient to discuss) understand this and agree with the admission plan.       Signed By: Tino Hanna MD     January 1, 2020

## 2020-01-02 NOTE — ED NOTES
200: Entered pt's room to find pt trying to get out of bed unassisted. Pt d/c his IV/IV fluids, BP cuff and P.ox. Assisted pt back to bed. Placed second IV, 20g, in R FA, reconnected NS fluids and reoriented pt to stay in bed and call for assistance. Pt voiced understanding. Will Continue to monitor patient.

## 2020-01-02 NOTE — PROGRESS NOTES
SLP Contact Note    SLP evaluation complete. Rec regular diet/thin liquids. No speech/language deficits appreciated. Full note to follow.       Thank you,  TORRIE PearceEd, [id number] [facility]  Speech-Language Pathologist

## 2020-01-02 NOTE — ED NOTES
Pt crawled out of bed and is found standing in room. Pt states \"Im going to the bathroom\" Pt assisted with urinal but produced no urine. Pt is noted to be unsteady on feet evidenced by pt reaching for something to hold to. It is noted that patient urinated in bed and sheets are wet. Cleaned pt, changed linen on bed, and pt given new gown and warm blankets. Pt given call bell and educated how to use it. Instructed pt to use call bell for needs and not to get out of bed without assistance. Pt voiced understanding. Side rails are up on stretcher. CB is within reach. Will continue to monitor.

## 2020-01-02 NOTE — PROGRESS NOTES
Problem: Mobility Impaired (Adult and Pediatric)  Goal: *Acute Goals and Plan of Care (Insert Text)  Description  FUNCTIONAL STATUS PRIOR TO ADMISSION: Per chart review, patient was independent without use of DME.    HOME SUPPORT PRIOR TO ADMISSION: Per chart review, the patient lived with his son. Level of assistance required or provided is unknown at this time d/t patient presented w/ AMS, no caregiver present. Patient states he lives with his wife (chart indicates wife is ). Physical Therapy Goals  Initiated 2020  1. Patient will move from supine to sit and sit to supine , scoot up and down and roll side to side in bed with independence within 7 day(s). 2.  Patient will transfer from bed to chair and chair to bed with modified independence using the least restrictive device within 7 day(s). 3.  Patient will perform sit to stand with modified independence within 7 day(s). 4.  Patient will ambulate with modified independence for 150 feet with the least restrictive device within 7 day(s). 5.  Patient will ascend/descend 4 stairs with handrail(s) with minimal assistance/contact guard assist within 7 day(s). Outcome: Not Met     PHYSICAL THERAPY EVALUATION  Patient: Ran Schmidt (50 y.o. male)  Date: 2020  Primary Diagnosis: CVA (cerebral vascular accident) [facility]) [I63.9]        Precautions: fall         ASSESSMENT  Based on the objective data described below, the patient presents with balance impairment, weakness, confusion, poor historian, poor safety awareness, poor insight into deficits, and has difficulty following commands s/p admission due to speech difficulty and confusion. CT shows Left M3 branch occlusion (posterior MCA division) with associated perfusion defect. MRI pending. On assessment, patient demonstrates the ability to get out of bed and stand though not safe to perform on his own and at risk for falls.   He is ambulatory with minimum hand held assist and minimum assistance when gait training with the rolling walker. Uncertain of patient's baseline due to AMS and no caregiver in the ED with patient. Will likely require SNF at discharge. Addendum 1700 - Patient's step daughter, Marina Contreras, arrived. Patient lives with his son who works during the day (patient is home alone during that time). Patient's functional mobility and mentation have been declining the past few months. Patient is ambulatory inside the home with no device though relies on furniture and walls for support. He is sedentary and relies on family to bring meals upstairs to him, check his blood sugars, and for bathing. Patient is incontinent of urine and lately his bowels. Marina Contreras is questioning how much longer patient's son will be able to care for him in his home. Current Level of Function Impacting Discharge (mobility/balance): Minimum assistance to walk and for transfers due to unsteadiness and fall risk    Functional Outcome Measure: The patient scored 16/56 on the Baires outcome measure which is indicative of high fall risk. Other factors to consider for discharge: Unknown PLOF or level of support available in the home     Patient may benefit from skilled therapy intervention to address the above noted impairments. PLAN :  Recommendations and Planned Interventions: bed mobility training, transfer training, gait training, neuromuscular re-education, patient and family training/education and therapeutic activities      Frequency/Duration: Patient will be followed by physical therapy:  5 times a week to address goals.     Recommendation for discharge: (in order for the patient to meet his/her long term goals)  Therapy up to 5 days/week in SNF setting    This discharge recommendation:  Has not yet been discussed the attending provider and/or case management    IF patient discharges home will need the following DME: to be determined (TBD) based on progress and discharge dispo SUBJECTIVE:   Patient stated I just got back from [facility].     OBJECTIVE DATA SUMMARY:   HISTORY:    Past Medical History:   Diagnosis Date    Diabetes ([address] 2002    High cholesterol     Hypertension    No past surgical history on file. EXAMINATION/PRESENTATION/DECISION MAKING:   Critical Behavior:  Neurologic State: Alert  Orientation Level: Oriented to person, Oriented to place [facility]), Disoriented to time, Disoriented to situation  Cognition: Memory loss      Hearing:  Intact    Edema: none  Range Of Motion:  AROM: Generally decreased, functional                        Strength:    Strength: Generally decreased, functional (no notable diff b/t rt/ lt)                    Tone & Sensation:   Tone: Normal              Sensation: Intact(BLE)               Coordination:  Coordination: Generally decreased, functional  Vision:   Tracking: Able to track stimulus in all quadrants w/o difficulty  Diplopia: No  Functional Mobility:  Bed Mobility:  Rolling: Supervision  Supine to Sit: Supervision  Sit to Supine: Supervision  Scooting: Supervision  Transfers:  Sit to Stand: Minimum assistance;Assist x1 for balance support, cues for safe technique  Stand to Sit: Minimum assistance;Assist x1 for balance support, cues for safe technique     Balance:   Sitting: Intact; Without support  Standing: Impaired; Without support  Standing - Static: Fair  Standing - Dynamic : Poor  Ambulation/Gait Training:  Distance (ft): 50 Feet (ft)(x1 w/ HHA; x2 w/ RW)  Assistive Device: Other (comment); Walker, rolling and Hand held assist  Ambulation - Level of Assistance: Minimal assistance;Assist x1     Gait Description (WDL): Exceptions to WDL  Gait Abnormalities: Decreased step clearance; Path deviations; Other(unsteady)        Base of Support: Narrowed     Speed/Kika: Slow  Step Length: Right shortened;Left shortened (increased strides when using the walker as compared with HHA)        Interventions: Manual cues; Safety awareness training; Tactile cues; Verbal cues (hand placement on rubber , to maintain body position inside the walker frame, to push the walker vs picking it up)        Functional Measure:  Baires Balance Test:    Sitting to Standin  Standing Unsupported: 3  Sitting with Back Unsupported: 4  Standing to Sittin  Transfers: 1  Standing Unsupported with Eyes Closed: 3  Standing Unsupported with Feet Together: 1  Reach Forward with Outstretched Arm: 1   Object: 0  Turn to Look Over Shoulders: 2  Turn 360 Degrees: 0  Alternate Foot on Step/Stool: 0  Standing Unsupported One Foot in Front: 0  Stand on One Le  Total: 16/56         56=Maximum possible score;   0-20=High fall risk  21-40=Moderate fall risk   41-56=Low fall risk        Physical Therapy Evaluation Charge Determination   History Examination Presentation Decision-Making   LOW Complexity : Zero comorbidities / personal factors that will impact the outcome / POC LOW Complexity : 1-2 Standardized tests and measures addressing body structure, function, activity limitation and / or participation in recreation  MEDIUM Complexity : Evolving with changing characteristics  LOW Complexity : FOTO score of       Based on the above components, the patient evaluation is determined to be of the following complexity level: LOW     Pain Rating:  Patient denies pain    Activity Tolerance:   Fair  Please refer to the flowsheet for vital signs taken during this treatment. After treatment patient left in no apparent distress:   Supine in bed, Call bell within reach, Side rails x 2 w/ rail pads in place, head of bed elevated, door open for line of sight supervision    COMMUNICATION/EDUCATION:   The patients plan of care was discussed with: Speech Therapist and Registered Nurse.     Fall prevention education was provided and the patient/caregiver indicated understanding though has been found by nursing attempting to get out of the bed and standing in the room.  Patient is unable to participate in goal setting and plan of care due to AMS.     Thank you for this referral.  Maura Freeman, PT   Time Calculation: 38 mins

## 2020-01-02 NOTE — PROGRESS NOTES
Orders received, chart reviewed and patient evaluated by physical therapy. Pending progression with skilled acute physical therapy, recommend:  Therapy up to 5 days/week in SNF setting or intensive home health therapy program pending. Recommend with nursing patient to complete as able in order to maintain strength, endurance and independence: OOB to chair 3x/day with assist x1 and ambulating with rolling walker and min assist x1. Thank you for your assistance. Full evaluation to follow.

## 2020-01-02 NOTE — PROGRESS NOTES
Orders received, chart reviewed and patient evaluated by occupational therapy. Pending progression with skilled acute occupational therapy, recommend:  Therapy up to 5 days/week in SNF setting     Recommend with nursing patient to complete as able in order to maintain strength, endurance and independence: OOB to chair 3x/day with 1 assist with RW and functional mobility to the [facility] with 1 assist with RW. Thank you for your assistance. Full evaluation to follow.

## 2020-01-02 NOTE — ED NOTES
Pt is resting quietly on stretcher. Pt BP is 113/79, HR 98. Dr Radha Sharif paged for systolic BP below 486.  Awaiting return call

## 2020-01-03 NOTE — PROGRESS NOTES
01/03/20 0601   Vital Signs   Temp   (pt refuses)   Pulse (Heart Rate) 81   Resp Rate 12   O2 Sat (%) 100 %   Level of Consciousness Alert   BP (!) 73/51  (not accurate/pt fighting)   MAP (Calculated) (!) 58   BP 1 Location Right leg   BP Patient Position At rest     Patient physically combative when attempting temperature measurement or BP measurement. No s/s of distress, resting quietly when not being touched.

## 2020-01-03 NOTE — PROGRESS NOTES
Patient oriented to self, disoriented to time, place and situation. Poor historian. Unable to complete admission database at this time.

## 2020-01-03 NOTE — PROGRESS NOTES
2150 Patient observed to be very agitated, aggressive and with disruptive behavior requiring Haldol x1 to be administered.

## 2020-01-03 NOTE — PROGRESS NOTES
[address] [facility] Adult  Hospitalist Group                                                                                          Hospitalist Progress Note  Jamison Power MD  Answering service: [phone] OR [phone] from in house phone        Date of Service:  1/3/2020  NAME:  Miguelito Christianson  :  1945  MRN:  [id number]      Admission Summary:   Miguelito Christianson is a 76 y.o. male with past medical history significant for diabetes mellitus type 2, hypertension and hyperlipidemia was brought to the emergency room via EMS for evaluation of speech difficulties and increased confusion. History is provided mainly by his son over the phone. He states that today around 1:30pm he noticed that he father was sitting on a chair leaning to one side. He was found to be confused and son had a difficult time understanding his speech. He tried to move his father out of the chair but patient was overall weak. As per son report patient has had similar episodes in the past related to high sugar for he checked patients blood sugar and it was in the 300s. He gave the patient 24 units of Lantus but his status did not improve for which he called EMS. for which  Last time patient was seen normal was around 12pm.    On arrival to ER patient was verbal with no focal deficit. Tele neurology evaluated patient who did not recommend Tpa as no focal deficit. CTA of the head showed occlusion at the M3. ER physician discussed case with interventional radiology Dr. Valencia Colon who reviewed images and as per report no intervention is planned. Patient denies any symptoms at this time. Patient's son states that at baseline his father has episode of confusion where he does not know where he is at and is not good with dates but overall is very functional.  No history of CVAs in the past.  Patient denies any headache, lightheadedness, double vision, sensory disturbance or weakness at this time.     Interval history / Subjective:      Patient is seen in the at room. Denies any weakness, tingling or numbness. Still confused but able to carry out meaningful conversation. Still could not tell me time, place or persons. Oriented to self. No other concerns. Assessment & Plan:     # Acute left CVA Posterior M3-MCA division  -CTA report showed Left M3 Branch occlusion with associated perfusion defect. As per report no IR intervention is indicated based on the location of the lesion. Conservative management. - neurology on board. - MRI brain unrevealing  - PT/OT and speech consult noted. SNF on discharge per PT/OT. Speech evaluation WNL. - Diagnostic labs not concerning as noted. - Aspirin 325mg daily as previous home dose  - Continue Atorvastatin to 40mg QHS  - Okay to focus on blood pressure control now. - EEG per neurology     # Encephalopathy- Confusion likely secondary to seizures versus unclear etiology dementia  - Per son, pt has some mild cognitive impairment at baseline.   - Monitor closely. - Outpatient evaluation for dementia per neurology.     # Hypertension-hypotension noted early morning  - We will resume home medications once blood pressure is better. - Continue to hold amlodipine and losartan. - Labetalol 10mg IV q6 hours for SPB >180.     # DM type 2: Placed on sliding scale insulin ac and hs  - Hold Lantus as blood sugars under 200 for the most part of the day  - Caution for hypoglycemia  - Hold metformin as patient received contrast for CTA today. -A1c 10.3. Diabetes education and close monitoring and follow-up with PCP.     # CKD stage II- likely from diabetic nephropathy. Cr at baseline.  Will monitor for now.     DVT prophy: heparin sq  Code: full code  Surrogate decision making: Son, Faiza Ying.     FUNCTIONAL STATUS PRIOR TO HOSPITALIZATION: independent     Care Plan discussed with: Patient/Family and Nurse  Disposition: SNF/LTC and TBD     Hospital Problems  Date Reviewed: 12/4/2018          Codes Class Noted POA CVA (cerebral vascular accident) [facility]) ICD-10-CM: I63.9  ICD-9-CM: 434.91  1/1/2020 Unknown                Review of Systems:   A comprehensive review of systems was negative except for that written in the HPI. Vital Signs:    Last 24hrs VS reviewed since prior progress note. Most recent are:  Visit Vitals  /62 (BP 1 Location: Right arm, BP Patient Position: At rest)   Pulse 93   Temp 98.3 °F (36.8 °C)   Resp 14   Wt 54.5 kg (120 lb 2.4 oz)   SpO2 100%   BMI 19.99 kg/m²         Intake/Output Summary (Last 24 hours) at 1/3/2020 1728  Last data filed at 1/3/2020 1200  Gross per 24 hour   Intake 240 ml   Output 50 ml   Net 190 ml        Physical Examination:     GEN APPEARANCE: Patient resting in bed in NAD  HEENT: Conjunctiva Clear  CVS: RRR, S1, S2; No M/G/R  LUNGS: CTAB; No Wheezes; No Rhonchi: No rales  ABD: Soft; No TTP; + Normoactive BS  EXT: WWP, no edema   Skin exam: No gross lesions noted on exposed skin surfaces  MENTAL STATUS: Patient oriented to person only. He knows he lives in [place] but is now oriented to place or time. Neuro: Cranial nerve exam: EOMI, CN V sensory/motor intact, no facial asymmetry noted, patient able to hearl, uvula midline, able to move tongue left/right. No gross motor or sensory deficits. No slurred speech. Data Review:    Review and/or order of clinical lab test  Review and/or order of tests in the radiology section of CPT  Review and/or order of tests in the medicine section of CPT    Mri Brain Wo Cont    Result Date: 1/2/2020  IMPRESSION: 1. Evaluation is significantly limited by motion. No acute intracranial abnormality. 2. Generalized parenchymal volume loss and moderate chronic microvascular ischemic disease. Small chronic infarcts in the bilateral thalami. Xr Chest Port    Result Date: 1/1/2020  IMPRESSION: No evidence of acute cardiopulmonary process. Cta Code Neuro Head And Neck W Cont    Result Date: 1/2/2020  IMPRESSION: 1.  Questionable effusion of distal left and 3 branch. Otherwise negative. Ct Code Neuro Head Wo Contrast    Result Date: 1/1/2020  IMPRESSION: No acute infarct, intracranial hemorrhage, or intracranial mass. Unchanged periventricular white matter disease, compatible with chronic small vessel ischemia. Old lacunar infarcts in the basal ganglia. Ct Code Neuro Perf W Cbf    Result Date: 1/2/2020  IMPRESSION: 1. Questionable effusion of distal left and 3 branch. Otherwise negative. Labs:     Recent Labs     01/02/20  0406 01/01/20  1618   WBC 10.1 9.0   HGB 12.0* 13.2   HCT 35.2* 38.6    172     Recent Labs     01/02/20  0406 01/01/20  1618    137   K 3.7 4.0    103   CO2 27 28   BUN 22* 22*   CREA 1.36* 1.49*   GLU 63* 231*   CA 9.0 10.1     Recent Labs     01/01/20  1618   SGOT 13*   ALT 22   AP 97   TBILI 1.0   TP 8.2   ALB 3.9   GLOB 4.3*     Recent Labs     01/01/20  1618   INR 1.1   PTP 10.9      No results for input(s): FE, TIBC, PSAT, FERR in the last 72 hours. No results found for: FOL, RBCF   No results for input(s): PH, PCO2, PO2 in the last 72 hours.   Recent Labs     01/01/20  1618   TROIQ <0.05     Lab Results   Component Value Date/Time    Cholesterol, total 190 01/02/2020 04:06 AM    HDL Cholesterol 64 01/02/2020 04:06 AM    LDL, calculated 115.4 (H) 01/02/2020 04:06 AM    Triglyceride 53 01/02/2020 04:06 AM    CHOL/HDL Ratio 3.0 01/02/2020 04:06 AM     Lab Results   Component Value Date/Time    Glucose (POC) 336 (H) 01/03/2020 05:00 PM    Glucose (POC) 144 (H) 01/03/2020 11:26 AM    Glucose (POC) 151 (H) 01/02/2020 05:02 PM    Glucose (POC) 167 (H) 02/24/2019 10:27 PM    Glucose (POC) 235 (H) 02/24/2019 07:54 PM    Glucose (POC) 223 (H) 02/02/2019 03:40 PM     Lab Results   Component Value Date/Time    Color YELLOW/STRAW 01/01/2020 04:42 PM    Appearance CLEAR 01/01/2020 04:42 PM    Specific gravity 1.014 01/01/2020 04:42 PM    pH (UA) 6.5 01/01/2020 04:42 PM    Protein 100 (A) 01/01/2020 04:42 PM    Glucose 250 (A) 01/01/2020 04:42 PM    Ketone NEGATIVE  01/01/2020 04:42 PM    Bilirubin NEGATIVE  01/01/2020 04:42 PM    Urobilinogen 1.0 01/01/2020 04:42 PM    Nitrites NEGATIVE  01/01/2020 04:42 PM    Leukocyte Esterase NEGATIVE  01/01/2020 04:42 PM    Epithelial cells FEW 01/01/2020 04:42 PM    Bacteria NEGATIVE  01/01/2020 04:42 PM    WBC 0-4 01/01/2020 04:42 PM    RBC 0-5 01/01/2020 04:42 PM         Medications Reviewed:     Current Facility-Administered Medications   Medication Dose Route Frequency    insulin lispro (HUMALOG) injection   SubCUTAneous AC&HS    glucose chewable tablet 16 g  4 Tab Oral PRN    glucagon (GLUCAGEN) injection 1 mg  1 mg IntraMUSCular PRN    dextrose 10% infusion 0-250 mL  0-250 mL IntraVENous PRN    [Held by provider] amLODIPine (NORVASC) tablet 10 mg  10 mg Oral DAILY    insulin lispro (HUMALOG) injection 4 Units  4 Units SubCUTAneous TIDAC    atorvastatin (LIPITOR) tablet 40 mg  40 mg Oral DAILY    aspirin tablet 325 mg  325 mg Oral DAILY    acetaminophen (TYLENOL) tablet 650 mg  650 mg Oral Q4H PRN    Or    acetaminophen (TYLENOL) solution 650 mg  650 mg Per NG tube Q4H PRN    Or    acetaminophen (TYLENOL) suppository 650 mg  650 mg Rectal Q4H PRN    heparin (porcine) injection 5,000 Units  5,000 Units SubCUTAneous Q8H     ______________________________________________________________________  EXPECTED LENGTH OF STAY: 2d 2h  ACTUAL LENGTH OF STAY:          2                 Jeff Larson MD

## 2020-01-03 NOTE — CONSULTS
[address]    Name:  Drena Brittle  MR#:  [id number]  :  1945  ACCOUNT #:  [de-identified]  DATE OF SERVICE:  2020    NEUROLOGY CONSULTATION    HISTORY OF PRESENT ILLNESS:  This is a 70-year-old male, who was admitted yesterday,  2020, with altered mental status and speech changes. Last known normal at 1330, he presented at 2315. The patient is unable to provide any history. All this history is obtained from the chart, there is no one at the bedside to aid in history. According to the ED note, most of his history was obtained by the paramedic. Apparently, he was last normal at 1:30. His son went out to run errands and when he came back, he found his father not acting right, having trouble speaking. EMS reported that the son said this was similar to other presentations when his blood sugars were high. His blood sugars on arrival in the ED was only 223. There were no obvious focal deficits. It was unclear what the patient's baseline is. CT of the head unremarkable. CTA of the head and neck with possible distal left M3 occlusion. No intervention indicated. CTP was negative. His LDL was 115. He has a history of hyperlipidemia, is on Lipitor 20 mg a day at home. His BUN was 22, his creatinine was 1.4. UA was unremarkable. EKG, normal sinus rhythm. Echo is pending. CBC, TSH unremarkable. According to chart notes, Dr. Antonio Gan saw the patient in 2012 for possible seizure. MRI and EEG at that time were unremarkable. The patient is not on any seizure medications at this time. PAST MEDICAL HISTORY:  1.  Diabetes. 2.  Hyperlipidemia. 3.  Hypertension. 4.  History of eye surgery. REVIEW OF SYSTEMS:  Unable to be obtained secondary to the patient's mental status. MEDICATIONS AT HOME:  1. Aspirin 325 mg a day. 2.  Insulin  3. Vitamin E.  4.  Lipitor 20 mg a day. 5.  Losartan. 6.  Metformin. 7.  Norvasc. 8.  Fish oil  9. Nephro-Jesenia. ALLERGIES:  NONE. SOCIAL HISTORY:  He is , lives with his son. No tobacco, alcohol, or drug use. FAMILY HISTORY:  Cannot be obtained secondary to patient factors. PHYSICAL EXAMINATION:  VITAL SIGNS:  Blood pressure 135/92, pulse 92, respiratory rate is 14, satting 96% on room air, temperature is 98.4. He weighs 131 pounds. Height was not provided. GENERAL:  He is a thin, disheveled-appearing elderly male, lying in the stretcher in a diaper, in no distress. HEART:  Tachycardic, regular rhythm. No murmur. Carotids 2+. No bruits. EXTREMITIES:  Warm. He has 2+ radial pulses. NEUROLOGIC:  Mental Status:  Alert, oriented to person. States we are in [place]. Tells me we are in a motel or guest house. Does not know the date. His speech is not dysarthric. Language, he is able to name, repeat, and follow commands. Attention is appropriate. He has impaired memory, unable to provide any history. Cranial Nerves:  He has left facial droop. No ptosis. He has extraocular eye movements. He has disconjugate gaze with nystagmus in his left eye, with right lateral gaze. His left eye is deviated out at primary gaze, unclear if this is chronic. He denies double vision. Pupils are round, reactive. Tongue midline. Palate elevates symmetrically. Sensation intact. Hearing intact. Trapezius and sternocleidomastoid are 5/5. His motor examination, he has right upper extremity neglect. Appears to have 5/5 strength throughout. No pronator drift or tremor seen. Sensory exam, he reported intact to pinprick throughout but question on reliability. His reflexes were symmetric in the upper extremities, absent in the lower extremities. His toes are downgoing. Coordination, he had difficulty performing this, could not follow the instructions. STUDIES AND REPORTS:  Reviewed above.     ASSESSMENT AND PLAN:  This is a 70-year-old male with reported altered mental status and speech changes, with distal left M3 occlusion on CTA and multiple stroke risk factors, on aspirin 325 mg a day at home. Exam with left facial weakness, right upper extremity neglect, disconjugate gaze, absent lower extremity reflexes. It is unclear what the patient's baseline is. Suspect he likely has some baseline dementia with acute worsening, without any obvious metabolic etiology. We will add a urine drug screen to his UA. Recommend an MRI of the brain to assess for stroke. Continue his home aspirin. Hemoglobin A1c is pending. May need to consider EEG.       MD FARIDA [email or web address]  D:  01/02/2020 21:55  T:  01/03/2020 1:10  JOB #:  [id number]

## 2020-01-03 NOTE — PROGRESS NOTES
01/03/20 0020   Vital Signs   Temp 96.4 °F (35.8 °C)   Temp Source Axillary   Pulse (Heart Rate) (!) 104   Heart Rate Source Monitor   Cardiac Rhythm Sinus Tach   Resp Rate 15   O2 Sat (%) 100 %   Level of Consciousness Alert   /59   MAP (Calculated) 82     Patient arrived to the floor.

## 2020-01-03 NOTE — PROGRESS NOTES
Occupational Therapy  01/03/20    Chart reviewed. Attempted to see patient for OT treatment, asleep upon arrival. Patient non-coopertive, decliningany mobility or activity despite max facilitation and encouragement, therefore session aborted. Will follow up as able & appropriate with OT treatment, continue to recommend SNF rehab upon d/c.      Thank you,   William Caputo, OTDONALDO, OTR/L

## 2020-01-03 NOTE — PROGRESS NOTES
Physical Therapy    Chart reviewed. Attempted to see patient for PT treatment, asleep upon arrival. Patient non-coopertive, declining any mobility or activity despite max facilitation and encouragement, therefore session aborted. Will follow up as able & appropriate, continue to recommend SNF rehab upon d/c.      Jered Lezama MS, PT

## 2020-01-03 NOTE — PROCEDURES
PROCEDURE: ROUTINE INPATIENT EEG  NAME:   José Miguel Staton NUMBER : [de-identified]  MRN:   [id number]  DATE OF SERVICE: 1/3/2020     HISTORY/INDICATION: Pt is a 68yo male with probable baseline dementia admitted with AMS. EEG is performed to assess for evidence of underlying epilepsy as an etiology. MEDICATIONS:   Current Facility-Administered Medications   Medication Dose Route Frequency Provider Last Rate Last Dose    insulin lispro (HUMALOG) injection   SubCUTAneous AC&HS Leta Hutchins MD   4 Units at 01/03/20 1717    glucose chewable tablet 16 g  4 Tab Oral PRN Leta Hutchins MD        glucagon (GLUCAGEN) injection 1 mg  1 mg IntraMUSCular PRN Leta Hutchins MD        dextrose 10% infusion 0-250 mL  0-250 mL IntraVENous PRN Leta Hutchins MD        [Held by provider] amLODIPine (NORVASC) tablet 10 mg  10 mg Oral DAILY Maddy Jack MD        insulin lispro (HUMALOG) injection 4 Units  4 Units SubCUTAneous Medardo Qureshi MD   4 Units at 01/03/20 1717    atorvastatin (LIPITOR) tablet 40 mg  40 mg Oral DAILY Leta uHtchins MD   40 mg at 01/02/20 1033    aspirin tablet 325 mg  325 mg Oral DAILY Joey MD Mendez        acetaminophen (TYLENOL) tablet 650 mg  650 mg Oral Q4H PRN Maddy Jack MD        Or   [address] [place] [address] acetaminophen (TYLENOL) solution 650 mg  650 mg Per NG tube Q4H PRN Maddy Jack MD        Or   [facility] acetaminophen (TYLENOL) suppository 650 mg  650 mg Rectal Q4H PRN Maddy Jack MD        heparin (porcine) injection 5,000 Units  5,000 Units SubCUTAneous Q8H Maddy Jack MD   5,000 Units at 01/02/20 1035       CONDITIONS OF RECORDING: This is a routine 21-channel EEG recording performed in accordance with the international 10-20 system with one channel devoted to limited EKG. This study was done during states of wakefulness and drowsiness. Photic stimulation was performed as an activating procedure.        DESCRIPTION:   Upon maximal arousal the posterior dominant rhythm has a frequency of 8.5Hz with an amplitude of 20uV. This activity is symmetric over the bilateral posterior derivations and attenuates with eye opening. Photic stimulation did not significantly alter the tracing. The patient becomes drowsy, but deeper stages of sleep are not attained. There are no focal abnormalities, epileptiform discharges, or electrographic seizures seen. INTERPRETATION: Normal awake and drowsy EEG    CLINICAL CORRELATION: A normal EEG does not definitively exclude a diagnosis of epilepsy if clinical suspicion is high consider sleep deprived EEG or more prolonged monitoring.      Jaycob Mina MD

## 2020-01-03 NOTE — PROGRESS NOTES
Bedside shift change report given to Rajesh Hwang RN (oncoming nurse) by Arely Rios RN (offgoing nurse). Report included the following information SBAR, Kardex, MAR and Dual Neuro Assessment.

## 2020-01-03 NOTE — PROGRESS NOTES
01/03/20 0208   Vital Signs   Temp   (refused )   Pulse (Heart Rate) 88   Heart Rate Source Monitor   Cardiac Rhythm NSR   Resp Rate 10   O2 Sat (%) 100 %   Level of Consciousness Alert   /67   MAP (Calculated) 79   BP 1 Method Automatic   BP 1 Location Right leg   BP Patient Position At rest     Sitter asked for assistance in taking pt temperature. RN entered room to take axillary temperature. Patient grabbed RN's wrist tightly both attempts and began telling RN to \"behave\". Will recheck when patient allows. No s/s of distress, chills, or diaphoresis.

## 2020-01-03 NOTE — PROGRESS NOTES
Neurology Progress Note    Patient ID:  Kali Ward  [id number]  76 y.o.  1945    HISTORY OF PRESENT ILLNESS:  This is a 66-year-old male, who was admitted yesterday,  January 1, 2020, with altered mental status and speech changes. Last known normal at 1330, he presented at 2315. The patient is unable to provide any history. All this history is obtained from the chart. There is no one at the bedside to aid in history. According to the ED note, most of his history was obtained by the paramedic. Apparently, he was last normal at 1:30. His son went out to run errands and he came back. He found his father not acting right, having trouble speaking. EMS reported that the son said this was similar to other presentations when his blood sugars were high. His blood sugars on arrival in the ED was only 223. There were no obvious focal deficits. It was unclear what the patient's baseline was. CT of the head unremarkable. CTA of the head and neck with possible distal left M3 occlusion. No intervention indicated. CTP was negative. His LDL was 115. He has a history of hyperlipidemia, is on Lipitor 20 mg a day at home. His BUN was 22, his creatinine was 1.4. UA was unremarkable. EKG, normal sinus rhythm. Echo is pending. CBC, TSH unremarkable. According to chart notes, Dr. Jayjay Le saw the patient in 05/2012 for possible seizure. MRI and EEG at that time were unremarkable. The patient is not on any seizure medications at this time. Subjective:   Sitter present in the room. No family present in the room. He notes he is doing fine. Tells me about his family starts he has 3  Sister and 5 brothers. He notes that he is in [facility]. MRI reveals No acute intracranial abnormality. Generalized parenchymal volume loss and moderate chronic microvascular ischemic disease. Small chronic infarcts in the bilateral thalami. Objective:    All records in [facility] reviewed and noted    ROS:  Unable to obtain     Meds:  Current Facility-Administered Medications   Medication Dose Route Frequency    insulin lispro (HUMALOG) injection   SubCUTAneous AC&HS    glucose chewable tablet 16 g  4 Tab Oral PRN    glucagon (GLUCAGEN) injection 1 mg  1 mg IntraMUSCular PRN    dextrose 10% infusion 0-250 mL  0-250 mL IntraVENous PRN    [Held by provider] amLODIPine (NORVASC) tablet 10 mg  10 mg Oral DAILY    insulin lispro (HUMALOG) injection 4 Units  4 Units SubCUTAneous TIDAC    atorvastatin (LIPITOR) tablet 40 mg  40 mg Oral DAILY    aspirin tablet 325 mg  325 mg Oral DAILY    acetaminophen (TYLENOL) tablet 650 mg  650 mg Oral Q4H PRN    Or    acetaminophen (TYLENOL) solution 650 mg  650 mg Per NG tube Q4H PRN    Or    acetaminophen (TYLENOL) suppository 650 mg  650 mg Rectal Q4H PRN    heparin (porcine) injection 5,000 Units  5,000 Units SubCUTAneous Q8H       Imaging:  MRI of the brain 1/2/2020  Evaluation is significantly limited by motion. No acute intracranial  abnormality. 2. Generalized parenchymal volume loss and moderate chronic microvascular  ischemic disease. Small chronic infarcts in the bilateral thalami.   Lab Review   Recent Results (from the past 24 hour(s))   ECHO ADULT COMPLETE    Collection Time: 01/02/20  2:50 PM   Result Value Ref Range    LV E' Septal Velocity 5.46 cm/s    LVIDd 3.70 (A) 4.2 - 5.9 cm    LVPWd 0.60 0.6 - 1.0 cm    LVIDs 2.70 cm    IVSd 0.99 0.6 - 1.0 cm    MV E Christian 73.29 cm/s    LV Mass AL 87.1 (A) 88 - 224 g    E/E' septal 13.42     Triscuspid Valve Regurgitation Peak Gradient 30.3 mmHg    TR Max Velocity 275.05 cm/s    Left Ventricular Fractional Shortening by 2D [id number] %   GLUCOSE, POC    Collection Time: 01/02/20  5:02 PM   Result Value Ref Range    Glucose (POC) 151 (H) 65 - 100 mg/dL    Performed by Dania Coronel        Exam:  Visit Vitals  /78 (BP 1 Location: Right arm)   Pulse 92   Temp 98.1 °F (36.7 °C)   Resp 20   Wt 54.5 kg (120 lb 2.4 oz)   SpO2 100%   BMI 19.99 kg/m²     Gen: Well developed  CV: RRR  Lungs: non labored breathing  Neuro: Alert, states we are in [place]  unable tell me the date or year, no dysarthria or aphasia  CN II-XII: PERRL,  disconjugate gaze with nystagmus in his left eye and it  deviates out with right lateral gaze. 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 This patient will be discussed with my collaborating care team physician  and she may have further recommendations regarding this patient's care.      Signed:  Mari Dave NP  1/3/2020  11:16 AM

## 2020-01-03 NOTE — PROGRESS NOTES
Problem: TIA/CVA Stroke: Day 2 Until Discharge  Goal: Activity/Safety  Outcome: Progressing Towards Goal  Goal: Diagnostic Test/Procedures  Outcome: Progressing Towards Goal  Goal: Nutrition/Diet  Outcome: Progressing Towards Goal  Goal: Discharge Planning  Outcome: Progressing Towards Goal  Goal: Medications  Outcome: Progressing Towards Goal  Goal: Respiratory  Outcome: Progressing Towards Goal  Goal: Treatments/Interventions/Procedures  Outcome: Progressing Towards Goal  Goal: Psychosocial  Outcome: Progressing Towards Goal  Goal: *Verbalizes anxiety and depression are reduced or absent  Outcome: Progressing Towards Goal  Goal: *Absence of aspiration  Outcome: Progressing Towards Goal  Goal: *Absence of deep venous thrombosis signs and symptoms(Stroke Metric)  Outcome: Progressing Towards Goal  Goal: *Optimal pain control at patient's stated goal  Outcome: Progressing Towards Goal  Goal: *Tolerating diet  Outcome: Progressing Towards Goal  Goal: *Ability to perform ADLs and demonstrates progressive mobility and function  Outcome: Progressing Towards Goal  Goal: *Stroke education continued(Stroke Metric)  Outcome: Progressing Towards Goal     Problem: Ischemic Stroke: Discharge Outcomes  Goal: *Verbalizes anxiety and depression are reduced or absent  Outcome: Progressing Towards Goal  Goal: *Verbalize understanding of risk factor modification(Stroke Metric)  Outcome: Progressing Towards Goal  Goal: *Hemodynamically stable  Outcome: Progressing Towards Goal  Goal: *Absence of aspiration pneumonia  Outcome: Progressing Towards Goal  Goal: *Aware of needed dietary changes  Outcome: Progressing Towards Goal  Goal: *Verbalize understanding of prescribed medications including anti-coagulants, anti-lipid, and/or anti-platelets(Stroke Metric)  Outcome: Progressing Towards Goal  Goal: *Tolerating diet  Outcome: Progressing Towards Goal  Goal: *Aware of follow-up diagnostics related to anticoagulants  Outcome: Progressing Towards Goal  Goal: *Ability to perform ADLs and demonstrates progressive mobility and function  Outcome: Progressing Towards Goal  Goal: *Absence of DVT(Stroke Metric)  Outcome: Progressing Towards Goal  Goal: *Absence of aspiration  Outcome: Progressing Towards Goal  Goal: *Optimal pain control at patient's stated goal  Outcome: Progressing Towards Goal  Goal: *Home safety concerns addressed  Outcome: Progressing Towards Goal  Goal: *Describes available resources and support systems  Outcome: Progressing Towards Goal  Goal: *Verbalizes understanding of activation of EMS(911) for stroke symptoms(Stroke Metric)  Outcome: Progressing Towards Goal  Goal: *Understands and describes signs and symptoms to report to providers(Stroke Metric)  Outcome: Progressing Towards Goal  Goal: *Neurolgocially stable (absence of additional neurological deficits)  Outcome: Progressing Towards Goal  Goal: *Verbalizes importance of follow-up with primary care physician(Stroke Metric)  Outcome: Progressing Towards Goal  Goal: *Smoking cessation discussed,if applicable(Stroke Metric)  Outcome: Progressing Towards Goal  Goal: *Depression screening completed(Stroke Metric)  Outcome: Progressing Towards Goal

## 2020-01-03 NOTE — PROGRESS NOTES
Transition of Care Plan  TBD-- EEG ordered    Therapy recommending SNF  Lis to facilities will be given to family     Cm talked with son Elian Slater [phone] and he was not able to talk with Cm , he will call Cm after work today. Cm left message for patient's step daughter, Chandler Torres [phone]     Explore with family  applying for medicaid (LTC/ personal care) chart indicates patient is home alone during the day      Reason for Admission:   CVA  Medical hx includes  Diabetes, hypertension, hyperlipidemia   PCP Dr Gerald Ford retired--               RRAT Score:     28             Resources/supports as identified by patient/family:   Son Elian Slater [phone]  Step- daughter Chandler Torres  [phone]                Top Challenges facing patient (as identified by patient/family and CM): Finances/Medication cost?     Has [employer]? Son               Support system or lack thereof? Family support                     Living arrangements? Lives in apartment - town house level with son , son's girlfriend and 2 children. Self-care/ADLs/Cognition? Prior to admission patient was independent and active without use of DME  Was alert and oriented             Current Advanced Directive/Advance Care Plan:  Not in chart                           Plan for utilizing home health:    Not at this time-- Snf  being recommended. Transition of Care Plan:    Rehab--Snf      Patient is refusing medications and has a sitter. Patient is confused. CM met with patient in his room  To introduce self and explain role. Patient was alert and pleasant. Disoriented-- Was able to confirm that he and son live together in an apartment. He told CM that he has 4 brothers and 1 sister-- he is from [place]. He gave CM permission to van his son/ daughter to discuss discharge planning.     CM was not able to discuss discharge plan with son, Heather Iglesias,  as he was working and asked if he could call CM later. Step- Daughter, Deng Vega, was left message to call CM. CM will discuss the option of Snf for patient . CM will provide list of SNF's and secure choice. Care Management Interventions  PCP Verified by CM: (not in chart- )  Transition of Care Consult (CM Consult): Discharge Planning  Discharge Durable Medical Equipment: No  Physical Therapy Consult: Yes  Occupational Therapy Consult: Yes  Speech Therapy Consult: Yes  Current Support Network: Adult Group Home, Other(livs with son in one level apartment- self care and independent prior to admission.   No AMD in chart)  Confirm Follow Up Transport: Family  Discharge Location  Discharge Placement: Skilled nursing facility

## 2020-01-04 NOTE — PROGRESS NOTES
Problem: Falls - Risk of  Goal: *Absence of Falls  Description  Document Bard Ruano Fall Risk and appropriate interventions in the flowsheet. Outcome: Progressing Towards Goal  Note: Fall Risk Interventions:  Mobility Interventions: Communicate number of staff needed for ambulation/transfer    Mentation Interventions: Adequate sleep, hydration, pain control, Reorient patient    Medication Interventions: Evaluate medications/consider consulting pharmacy    Elimination Interventions: Toileting schedule/hourly rounds, Stay With Me (per policy), Patient to call for help with toileting needs, Call light in reach              Problem: Pressure Injury - Risk of  Goal: *Prevention of pressure injury  Description  Document Doni Scale and appropriate interventions in the flowsheet.   Outcome: Progressing Towards Goal  Note: Pressure Injury Interventions:  Sensory Interventions: Assess need for specialty bed, Discuss PT/OT consult with provider, Keep linens dry and wrinkle-free, Float heels, Minimize linen layers    Moisture Interventions: Absorbent underpads, Check for incontinence Q2 hours and as needed, Assess need for specialty bed, Maintain skin hydration (lotion/cream), Minimize layers    Activity Interventions: Pressure redistribution bed/mattress(bed type), PT/OT evaluation    Mobility Interventions: Pressure redistribution bed/mattress (bed type), PT/OT evaluation    Nutrition Interventions: Document food/fluid/supplement intake    Friction and Shear Interventions: Apply protective barrier, creams and emollients, Lift sheet, Minimize layers                Problem: TIA/CVA Stroke: Day 2 Until Discharge  Goal: Activity/Safety  Outcome: Progressing Towards Goal  Goal: Diagnostic Test/Procedures  Outcome: Progressing Towards Goal  Goal: Nutrition/Diet  Outcome: Progressing Towards Goal  Goal: Discharge Planning  Outcome: Progressing Towards Goal  Goal: Medications  Outcome: Progressing Towards Goal  Goal: Respiratory  Outcome: Progressing Towards Goal  Goal: Treatments/Interventions/Procedures  Outcome: Progressing Towards Goal  Goal: Psychosocial  Outcome: Progressing Towards Goal  Goal: *Verbalizes anxiety and depression are reduced or absent  Outcome: Progressing Towards Goal  Goal: *Absence of aspiration  Outcome: Progressing Towards Goal  Goal: *Absence of deep venous thrombosis signs and symptoms(Stroke Metric)  Outcome: Progressing Towards Goal  Goal: *Optimal pain control at patient's stated goal  Outcome: Progressing Towards Goal  Goal: *Tolerating diet  Outcome: Progressing Towards Goal  Goal: *Ability to perform ADLs and demonstrates progressive mobility and function  Outcome: Progressing Towards Goal  Goal: *Stroke education continued(Stroke Metric)  Outcome: Progressing Towards Goal     Problem: Ischemic Stroke: Discharge Outcomes  Goal: *Verbalizes anxiety and depression are reduced or absent  Outcome: Progressing Towards Goal  Goal: *Verbalize understanding of risk factor modification(Stroke Metric)  Outcome: Progressing Towards Goal  Goal: *Hemodynamically stable  Outcome: Progressing Towards Goal  Goal: *Absence of aspiration pneumonia  Outcome: Progressing Towards Goal  Goal: *Aware of needed dietary changes  Outcome: Progressing Towards Goal  Goal: *Verbalize understanding of prescribed medications including anti-coagulants, anti-lipid, and/or anti-platelets(Stroke Metric)  Outcome: Progressing Towards Goal  Goal: *Tolerating diet  Outcome: Progressing Towards Goal  Goal: *Aware of follow-up diagnostics related to anticoagulants  Outcome: Progressing Towards Goal  Goal: *Ability to perform ADLs and demonstrates progressive mobility and function  Outcome: Progressing Towards Goal  Goal: *Absence of DVT(Stroke Metric)  Outcome: Progressing Towards Goal  Goal: *Absence of aspiration  Outcome: Progressing Towards Goal  Goal: *Optimal pain control at patient's stated goal  Outcome: Progressing Towards Goal  Goal: *Home safety concerns addressed  Outcome: Progressing Towards Goal  Goal: *Describes available resources and support systems  Outcome: Progressing Towards Goal  Goal: *Verbalizes understanding of activation of EMS(911) for stroke symptoms(Stroke Metric)  Outcome: Progressing Towards Goal  Goal: *Understands and describes signs and symptoms to report to providers(Stroke Metric)  Outcome: Progressing Towards Goal  Goal: *Neurolgocially stable (absence of additional neurological deficits)  Outcome: Progressing Towards Goal  Goal: *Verbalizes importance of follow-up with primary care physician(Stroke Metric)  Outcome: Progressing Towards Goal  Goal: *Smoking cessation discussed,if applicable(Stroke Metric)  Outcome: Progressing Towards Goal  Goal: *Depression screening completed(Stroke Metric)  Outcome: Progressing Towards Goal     Problem: Discharge Planning  Goal: *Discharge to safe environment  Outcome: Progressing Towards Goal

## 2020-01-04 NOTE — PROGRESS NOTES
[address] [facility]  Hospitalist Group                                                                                          Hospitalist Progress Note  Bushra Sanz MD  Answering service: [phone] from in house phone        Date of Service:  2020  NAME:  Adeline Powell  :  1945  MRN:  [id number]      Admission Summary:   Adeline Powell is a 76 y.o. male with past medical history significant for diabetes mellitus type 2, hypertension and hyperlipidemia was brought to the emergency room via EMS for evaluation of speech difficulties and increased confusion. History is provided mainly by his son over the phone. He states that today around 1:30pm he noticed that he father was sitting on a chair leaning to one side. He was found to be confused and son had a difficult time understanding his speech. He tried to move his father out of the chair but patient was overall weak. As per son report patient has had similar episodes in the past related to high sugar for he checked patients blood sugar and it was in the 300s. He gave the patient 24 units of Lantus but his status did not improve for which he called EMS. for which  Last time patient was seen normal was around 12pm.    On arrival to ER patient was verbal with no focal deficit. Tele neurology evaluated patient who did not recommend Tpa as no focal deficit. CTA of the head showed occlusion at the M3. ER physician discussed case with interventional radiology Dr. Amanda Sanon who reviewed images and as per report no intervention is planned. Patient denies any symptoms at this time. Patient's son states that at baseline his father has episode of confusion where he does not know where he is at and is not good with dates but overall is very functional.  No history of CVAs in the past.  Patient denies any headache, lightheadedness, double vision, sensory disturbance or weakness at this time.     Interval history / Subjective:      Patient is seen in the at room. Denies any weakness, tingling or numbness. Still confused but able to carry out meaningful conversation. Still could not tell me time, place or persons. Oriented to self. No other concerns. Sitter at bedside. Overnight events noted. Assessment & Plan:     # Acute left CVA Posterior M3-MCA division  -CTA report showed Left M3 Branch occlusion with associated perfusion defect. As per report no IR intervention is indicated based on the location of the lesion. Conservative management. - neurology on board. - MRI brain unrevealing  - PT/OT and speech consult noted. SNF on discharge per PT/OT. Speech evaluation WNL. - Diagnostic labs not concerning as noted. - Aspirin 325mg daily as previous home dose  - Continue Atorvastatin to 40mg QHS  - Okay to focus on blood pressure control now. - EEG normal per neurology. Will follow neuro recommendations     # Encephalopathy- Confusion likely secondary to seizures versus unclear etiology dementia  - Per son, pt has some mild cognitive impairment at baseline.   - Monitor closely. - Outpatient evaluation for dementia per neurology.     # Hypertension-hypotension noted early morning  - We will resume home medications once blood pressure is better. - Continue to hold amlodipine and losartan. - Labetalol 10mg IV q6 hours for SPB >180.     # DM type 2: Placed on sliding scale insulin ac and hs  - Hold Lantus as blood sugars under 200 for the most part of the day  - Caution for hypoglycemia  - Hold metformin as patient received contrast for CTA today. -A1c 10.3. Diabetes education and close monitoring and follow-up with PCP.     # CKD stage II- likely from diabetic nephropathy. Cr at baseline.  Will monitor for now.     DVT prophy: heparin sq  Code: full code  Surrogate decision making: SonIvone.     FUNCTIONAL STATUS PRIOR TO HOSPITALIZATION: independent     Care Plan discussed with: Patient/Family and Nurse  Disposition: SNF/LTC and TBD     Hospital Problems  Date Reviewed: 12/4/2018          Codes Class Noted POA    CVA (cerebral vascular accident) [facility]) ICD-10-CM: I63.9  ICD-9-CM: 434.91  1/1/2020 Unknown                Review of Systems:   A comprehensive review of systems was negative except for that written in the HPI. Vital Signs:    Last 24hrs VS reviewed since prior progress note. Most recent are:  Visit Vitals  /79 (BP 1 Location: Left arm, BP Patient Position: At rest)   Pulse 96   Temp 98.9 °F (37.2 °C)   Resp 16   Wt 58 kg (127 lb 13.9 oz)   SpO2 96%   BMI 21.28 kg/m²         Intake/Output Summary (Last 24 hours) at 1/4/2020 174  Last data filed at 1/4/2020 0735  Gross per 24 hour   Intake 500 ml   Output    Net 500 ml        Physical Examination:     GEN APPEARANCE: Patient resting in bed in NAD  HEENT: Conjunctiva Clear  CVS: RRR, S1, S2; No M/G/R  LUNGS: CTAB; No Wheezes; No Rhonchi: No rales  ABD: Soft; No TTP; + Normoactive BS  EXT: WWP, no edema   Skin exam: No gross lesions noted on exposed skin surfaces  MENTAL STATUS: Patient oriented to person only. He knows he lives in [place] but is now oriented to place or time. Neuro: Cranial nerve exam: EOMI, CN V sensory/motor intact, no facial asymmetry noted, patient able to hearl, uvula midline, able to move tongue left/right. No gross motor or sensory deficits. No slurred speech. Data Review:    Review and/or order of clinical lab test  Review and/or order of tests in the radiology section of CPT  Review and/or order of tests in the medicine section of CPT    Mri Brain Wo Cont    Result Date: 1/2/2020  IMPRESSION: 1. Evaluation is significantly limited by motion. No acute intracranial abnormality. 2. Generalized parenchymal volume loss and moderate chronic microvascular ischemic disease. Small chronic infarcts in the bilateral thalami. Xr Chest Port    Result Date: 1/1/2020  IMPRESSION: No evidence of acute cardiopulmonary process.      Cta Code Neuro Head And Neck W Cont    Result Date: 1/2/2020  IMPRESSION: 1. Questionable effusion of distal left and 3 branch. Otherwise negative. Ct Code Neuro Head Wo Contrast    Result Date: 1/1/2020  IMPRESSION: No acute infarct, intracranial hemorrhage, or intracranial mass. Unchanged periventricular white matter disease, compatible with chronic small vessel ischemia. Old lacunar infarcts in the basal ganglia. Ct Code Neuro Perf W Cbf    Result Date: 1/2/2020  IMPRESSION: 1. Questionable effusion of distal left and 3 branch. Otherwise negative. Labs:     Recent Labs     01/02/20  0406   WBC 10.1   HGB 12.0*   HCT 35.2*        Recent Labs     01/02/20  0406      K 3.7      CO2 27   BUN 22*   CREA 1.36*   GLU 63*   CA 9.0     No results for input(s): SGOT, GPT, ALT, AP, TBIL, TBILI, TP, ALB, GLOB, GGT, AML, LPSE in the last 72 hours. No lab exists for component: AMYP, HLPSE  No results for input(s): INR, PTP, APTT, INREXT, INREXT in the last 72 hours. No results for input(s): FE, TIBC, PSAT, FERR in the last 72 hours. No results found for: FOL, RBCF   No results for input(s): PH, PCO2, PO2 in the last 72 hours. No results for input(s): CPK, CKNDX, TROIQ in the last 72 hours.     No lab exists for component: CPKMB  Lab Results   Component Value Date/Time    Cholesterol, total 190 01/02/2020 04:06 AM    HDL Cholesterol 64 01/02/2020 04:06 AM    LDL, calculated 115.4 (H) 01/02/2020 04:06 AM    Triglyceride 53 01/02/2020 04:06 AM    CHOL/HDL Ratio 3.0 01/02/2020 04:06 AM     Lab Results   Component Value Date/Time    Glucose (POC) 182 (H) 01/04/2020 04:56 PM    Glucose (POC) 216 (H) 01/04/2020 11:04 AM    Glucose (POC) 247 (H) 01/04/2020 07:52 AM    Glucose (POC) 112 (H) 01/03/2020 09:16 PM    Glucose (POC) 336 (H) 01/03/2020 05:00 PM     Lab Results   Component Value Date/Time    Color YELLOW/STRAW 01/01/2020 04:42 PM    Appearance CLEAR 01/01/2020 04:42 PM    Specific gravity 1.014 01/01/2020 04:42 PM    pH (UA) 6.5 01/01/2020 04:42 PM    Protein 100 (A) 01/01/2020 04:42 PM    Glucose 250 (A) 01/01/2020 04:42 PM    Ketone NEGATIVE  01/01/2020 04:42 PM    Bilirubin NEGATIVE  01/01/2020 04:42 PM    Urobilinogen 1.0 01/01/2020 04:42 PM    Nitrites NEGATIVE  01/01/2020 04:42 PM    Leukocyte Esterase NEGATIVE  01/01/2020 04:42 PM    Epithelial cells FEW 01/01/2020 04:42 PM    Bacteria NEGATIVE  01/01/2020 04:42 PM    WBC 0-4 01/01/2020 04:42 PM    RBC 0-5 01/01/2020 04:42 PM         Medications Reviewed:     Current Facility-Administered Medications   Medication Dose Route Frequency    insulin lispro (HUMALOG) injection   SubCUTAneous AC&HS    glucose chewable tablet 16 g  4 Tab Oral PRN    glucagon (GLUCAGEN) injection 1 mg  1 mg IntraMUSCular PRN    dextrose 10% infusion 0-250 mL  0-250 mL IntraVENous PRN    [Held by provider] amLODIPine (NORVASC) tablet 10 mg  10 mg Oral DAILY    insulin lispro (HUMALOG) injection 4 Units  4 Units SubCUTAneous TIDAC    atorvastatin (LIPITOR) tablet 40 mg  40 mg Oral DAILY    aspirin tablet 325 mg  325 mg Oral DAILY    acetaminophen (TYLENOL) tablet 650 mg  650 mg Oral Q4H PRN    Or    acetaminophen (TYLENOL) solution 650 mg  650 mg Per NG tube Q4H PRN    Or    acetaminophen (TYLENOL) suppository 650 mg  650 mg Rectal Q4H PRN    heparin (porcine) injection 5,000 Units  5,000 Units SubCUTAneous Q8H     ______________________________________________________________________  EXPECTED LENGTH OF STAY: 2d 2h  ACTUAL LENGTH OF STAY:          3                 Bishop Cabrera MD

## 2020-01-04 NOTE — ROUTINE PROCESS
Bedside and Verbal shift change report given to [id number] Karlos Hilton (oncoming nurse) by Jose Manzo (offgoing nurse). Report included the following information SBAR, Kardex, Procedure Summary, Intake/Output, MAR, Accordion, Recent Results and Cardiac Rhythm NSR.

## 2020-01-05 NOTE — PROGRESS NOTES
Bedside and Verbal shift change report given to Johny Barfield RN (oncoming nurse) by Rad Beckham RN (offgoing nurse). Report included the following information SBAR, Kardex, MAR, Cardiac Rhythm NSR and Dual Neuro Assessment.

## 2020-01-05 NOTE — PROGRESS NOTES
Problem: Falls - Risk of  Goal: *Absence of Falls  Description  Document Kendra Landa Fall Risk and appropriate interventions in the flowsheet. Outcome: Progressing Towards Goal  Note: Fall Risk Interventions:  Mobility Interventions: Communicate number of staff needed for ambulation/transfer    Mentation Interventions: Family/sitter at bedside, More frequent rounding, Increase mobility    Medication Interventions: Evaluate medications/consider consulting pharmacy, Patient to call before getting OOB, Teach patient to arise slowly    Elimination Interventions: Toileting schedule/hourly rounds              Problem: Patient Education: Go to Patient Education Activity  Goal: Patient/Family Education  Outcome: Progressing Towards Goal     Problem: Pressure Injury - Risk of  Goal: *Prevention of pressure injury  Description  Document Doni Scale and appropriate interventions in the flowsheet. Outcome: Progressing Towards Goal  Note: Pressure Injury Interventions:  Sensory Interventions: Keep linens dry and wrinkle-free, Maintain/enhance activity level, Minimize linen layers    Moisture Interventions: Maintain skin hydration (lotion/cream), Minimize layers, Assess need for specialty bed    Activity Interventions: Pressure redistribution bed/mattress(bed type)    Mobility Interventions: Pressure redistribution bed/mattress (bed type), PT/OT evaluation    Nutrition Interventions: Document food/fluid/supplement intake    Friction and Shear Interventions: Apply protective barrier, creams and emollients, Lift sheet, Minimize layers                Problem: Patient Education: Go to Patient Education Activity  Goal: Patient/Family Education  Outcome: Progressing Towards Goal     Problem: Diabetes Self-Management  Goal: *Disease process and treatment process  Description  Define diabetes and identify own type of diabetes; list 3 options for treating diabetes.   Outcome: Progressing Towards Goal  Goal: *Incorporating nutritional management into lifestyle  Description  Describe effect of type, amount and timing of food on blood glucose; list 3 methods for planning meals. Outcome: Progressing Towards Goal  Goal: *Incorporating physical activity into lifestyle  Description  State effect of exercise on blood glucose levels. Outcome: Progressing Towards Goal  Goal: *Developing strategies to promote health/change behavior  Description  Define the ABC's of diabetes; identify appropriate screenings, schedule and personal plan for screenings. Outcome: Progressing Towards Goal  Goal: *Using medications safely  Description  State effect of diabetes medications on diabetes; name diabetes medication taking, action and side effects. Outcome: Progressing Towards Goal  Goal: *Monitoring blood glucose, interpreting and using results  Description  Identify recommended blood glucose targets  and personal targets. Outcome: Progressing Towards Goal  Goal: *Prevention, detection, treatment of acute complications  Description  List symptoms of hyper- and hypoglycemia; describe how to treat low blood sugar and actions for lowering  high blood glucose level. Outcome: Progressing Towards Goal  Goal: *Prevention, detection and treatment of chronic complications  Description  Define the natural course of diabetes and describe the relationship of blood glucose levels to long term complications of diabetes.   Outcome: Progressing Towards Goal  Goal: *Developing strategies to address psychosocial issues  Description  Describe feelings about living with diabetes; identify support needed and support network  Outcome: Progressing Towards Goal  Goal: *Insulin pump training  Outcome: Progressing Towards Goal  Goal: *Sick day guidelines  Outcome: Progressing Towards Goal  Goal: *Patient Specific Goal (EDIT GOAL, INSERT TEXT)  Outcome: Progressing Towards Goal     Problem: Patient Education: Go to Patient Education Activity  Goal: Patient/Family Education  Outcome: Progressing Towards Goal     Problem: TIA/CVA Stroke: Day 2 Until Discharge  Goal: Activity/Safety  Outcome: Progressing Towards Goal  Goal: Diagnostic Test/Procedures  Outcome: Progressing Towards Goal  Goal: Discharge Planning  Outcome: Progressing Towards Goal  Goal: Medications  Outcome: Progressing Towards Goal  Goal: Respiratory  Outcome: Progressing Towards Goal  Goal: Treatments/Interventions/Procedures  Outcome: Progressing Towards Goal  Goal: Psychosocial  Outcome: Progressing Towards Goal  Goal: *Verbalizes anxiety and depression are reduced or absent  Outcome: Progressing Towards Goal  Goal: *Absence of aspiration  Outcome: Progressing Towards Goal  Goal: *Absence of deep venous thrombosis signs and symptoms(Stroke Metric)  Outcome: Progressing Towards Goal  Goal: *Optimal pain control at patient's stated goal  Outcome: Progressing Towards Goal  Goal: *Tolerating diet  Outcome: Progressing Towards Goal  Goal: *Ability to perform ADLs and demonstrates progressive mobility and function  Outcome: Progressing Towards Goal  Goal: *Stroke education continued(Stroke Metric)  Outcome: Progressing Towards Goal     Problem: Ischemic Stroke: Discharge Outcomes  Goal: *Verbalizes anxiety and depression are reduced or absent  Outcome: Progressing Towards Goal  Goal: *Verbalize understanding of risk factor modification(Stroke Metric)  Outcome: Progressing Towards Goal  Goal: *Hemodynamically stable  Outcome: Progressing Towards Goal  Goal: *Absence of aspiration pneumonia  Outcome: Progressing Towards Goal  Goal: *Aware of needed dietary changes  Outcome: Progressing Towards Goal  Goal: *Verbalize understanding of prescribed medications including anti-coagulants, anti-lipid, and/or anti-platelets(Stroke Metric)  Outcome: Progressing Towards Goal  Goal: *Tolerating diet  Outcome: Progressing Towards Goal  Goal: *Aware of follow-up diagnostics related to anticoagulants  Outcome: Progressing Towards Goal  Goal: *Ability to perform ADLs and demonstrates progressive mobility and function  Outcome: Progressing Towards Goal  Goal: *Absence of DVT(Stroke Metric)  Outcome: Progressing Towards Goal  Goal: *Absence of aspiration  Outcome: Progressing Towards Goal  Goal: *Optimal pain control at patient's stated goal  Outcome: Progressing Towards Goal  Goal: *Home safety concerns addressed  Outcome: Progressing Towards Goal  Goal: *Describes available resources and support systems  Outcome: Progressing Towards Goal  Goal: *Verbalizes understanding of activation of EMS(911) for stroke symptoms(Stroke Metric)  Outcome: Progressing Towards Goal  Goal: *Understands and describes signs and symptoms to report to providers(Stroke Metric)  Outcome: Progressing Towards Goal  Goal: *Neurolgocially stable (absence of additional neurological deficits)  Outcome: Progressing Towards Goal  Goal: *Verbalizes importance of follow-up with primary care physician(Stroke Metric)  Outcome: Progressing Towards Goal  Goal: *Smoking cessation discussed,if applicable(Stroke Metric)  Outcome: Progressing Towards Goal  Goal: *Depression screening completed(Stroke Metric)  Outcome: Progressing Towards Goal     Problem: Discharge Planning  Goal: *Discharge to safe environment  Outcome: Progressing Towards Goal

## 2020-01-05 NOTE — PROGRESS NOTES
TRANSFER - OUT REPORT:    Verbal report given to Michael Barros RN (name) on Ran Schmidt  being transferred to  (unit) for routine progression of care       Report consisted of patients Situation, Background, Assessment and   Recommendations(SBAR). Information from the following report(s) SBAR, Kardex, MAR, Cardiac Rhythm NSR and Dual Neuro Assessment was reviewed with the receiving nurse. Lines:   Peripheral IV 01/02/20 Right Forearm (Active)   Site Assessment Clean, dry, & intact 1/5/2020 12:00 PM   Phlebitis Assessment 0 1/5/2020 12:00 PM   Infiltration Assessment 0 1/5/2020 12:00 PM   Dressing Status Clean, dry, & intact 1/5/2020 12:00 PM   Dressing Type Transparent 1/5/2020 12:00 PM   Hub Color/Line Status Pink;Capped 1/5/2020 12:00 PM   Action Taken Open ports on tubing capped 1/5/2020 12:00 PM   Alcohol Cap Used Yes 1/5/2020 12:00 PM        Opportunity for questions and clarification was provided.       Patient transported with:   Registered Nurse

## 2020-01-05 NOTE — PROGRESS NOTES
Problem: Falls - Risk of  Goal: *Absence of Falls  Description  Document Cele Adames Fall Risk and appropriate interventions in the flowsheet. Outcome: Progressing Towards Goal  Note: Fall Risk Interventions:  Mobility Interventions: Communicate number of staff needed for ambulation/transfer    Mentation Interventions: Family/sitter at bedside    Medication Interventions: Evaluate medications/consider consulting pharmacy    Elimination Interventions: Toileting schedule/hourly rounds              Problem: Patient Education: Go to Patient Education Activity  Goal: Patient/Family Education  Outcome: Progressing Towards Goal     Problem: Pressure Injury - Risk of  Goal: *Prevention of pressure injury  Description  Document Doni Scale and appropriate interventions in the flowsheet. Outcome: Progressing Towards Goal  Note: Pressure Injury Interventions:  Sensory Interventions: Assess need for specialty bed, Discuss PT/OT consult with provider, Keep linens dry and wrinkle-free, Float heels, Minimize linen layers    Moisture Interventions: Absorbent underpads, Check for incontinence Q2 hours and as needed, Assess need for specialty bed, Maintain skin hydration (lotion/cream), Minimize layers    Activity Interventions: Pressure redistribution bed/mattress(bed type)    Mobility Interventions: Pressure redistribution bed/mattress (bed type)    Nutrition Interventions: Document food/fluid/supplement intake    Friction and Shear Interventions: Apply protective barrier, creams and emollients, Lift sheet, Minimize layers                Problem: Patient Education: Go to Patient Education Activity  Goal: Patient/Family Education  Outcome: Progressing Towards Goal     Problem: Diabetes Self-Management  Goal: *Disease process and treatment process  Description  Define diabetes and identify own type of diabetes; list 3 options for treating diabetes.   Outcome: Progressing Towards Goal  Goal: *Incorporating nutritional management into lifestyle  Description  Describe effect of type, amount and timing of food on blood glucose; list 3 methods for planning meals. Outcome: Progressing Towards Goal  Goal: *Incorporating physical activity into lifestyle  Description  State effect of exercise on blood glucose levels. Outcome: Progressing Towards Goal  Goal: *Developing strategies to promote health/change behavior  Description  Define the ABC's of diabetes; identify appropriate screenings, schedule and personal plan for screenings. Outcome: Progressing Towards Goal  Goal: *Using medications safely  Description  State effect of diabetes medications on diabetes; name diabetes medication taking, action and side effects. Outcome: Progressing Towards Goal  Goal: *Monitoring blood glucose, interpreting and using results  Description  Identify recommended blood glucose targets  and personal targets. Outcome: Progressing Towards Goal  Goal: *Prevention, detection, treatment of acute complications  Description  List symptoms of hyper- and hypoglycemia; describe how to treat low blood sugar and actions for lowering  high blood glucose level. Outcome: Progressing Towards Goal  Goal: *Prevention, detection and treatment of chronic complications  Description  Define the natural course of diabetes and describe the relationship of blood glucose levels to long term complications of diabetes.   Outcome: Progressing Towards Goal  Goal: *Developing strategies to address psychosocial issues  Description  Describe feelings about living with diabetes; identify support needed and support network  Outcome: Progressing Towards Goal  Goal: *Insulin pump training  Outcome: Progressing Towards Goal  Goal: *Sick day guidelines  Outcome: Progressing Towards Goal  Goal: *Patient Specific Goal (EDIT GOAL, INSERT TEXT)  Outcome: Progressing Towards Goal     Problem: Patient Education: Go to Patient Education Activity  Goal: Patient/Family Education  Outcome: Progressing Towards Goal     Problem: Patient Education: Go to Patient Education Activity  Goal: Patient/Family Education  Outcome: Resolved/Met     Problem: TIA/CVA Stroke: 0-24 hours  Goal: Off Pathway (Use only if patient is Off Pathway)  Outcome: Resolved/Met  Goal: Activity/Safety  Outcome: Resolved/Met  Goal: Consults, if ordered  Outcome: Resolved/Met  Goal: Diagnostic Test/Procedures  Outcome: Resolved/Met  Goal: Nutrition/Diet  Outcome: Resolved/Met  Goal: Discharge Planning  Outcome: Resolved/Met  Goal: Medications  Outcome: Resolved/Met  Goal: Respiratory  Outcome: Resolved/Met  Goal: Treatments/Interventions/Procedures  Outcome: Resolved/Met  Goal: Minimize risk of bleeding post-thrombolytic infusion  Outcome: Resolved/Met  Goal: Monitor for complications post-thrombolytic infusion  Outcome: Resolved/Met  Goal: Psychosocial  Outcome: Resolved/Met  Goal: *Hemodynamically stable  Outcome: Resolved/Met  Goal: *Neurologically stable  Description  Absence of additional neurological deficits    Outcome: Resolved/Met  Goal: *Verbalizes anxiety and depression are reduced or absent  Outcome: Resolved/Met  Goal: *Absence of Signs of Aspiration on Current Diet  Outcome: Resolved/Met  Goal: *Absence of deep venous thrombosis signs and symptoms(Stroke Metric)  Outcome: Resolved/Met  Goal: *Ability to perform ADLs and demonstrates progressive mobility and function  Outcome: Resolved/Met  Goal: *Stroke education started(Stroke Metric)  Outcome: Resolved/Met  Goal: *Dysphagia screen performed(Stroke Metric)  Outcome: Resolved/Met  Goal: *Rehab consulted(Stroke Metric)  Outcome: Resolved/Met     Problem: TIA/CVA Stroke: Day 2 Until Discharge  Goal: Off Pathway (Use only if patient is Off Pathway)  Outcome: Progressing Towards Goal  Goal: Activity/Safety  Outcome: Progressing Towards Goal  Goal: Diagnostic Test/Procedures  Outcome: Progressing Towards Goal  Goal: Nutrition/Diet  Outcome: Progressing Towards Goal  Goal: Discharge Planning  Outcome: Progressing Towards Goal  Goal: Medications  Outcome: Progressing Towards Goal  Goal: Respiratory  Outcome: Progressing Towards Goal  Goal: Treatments/Interventions/Procedures  Outcome: Progressing Towards Goal  Goal: Psychosocial  Outcome: Progressing Towards Goal  Goal: *Verbalizes anxiety and depression are reduced or absent  Outcome: Progressing Towards Goal  Goal: *Absence of aspiration  Outcome: Progressing Towards Goal  Goal: *Absence of deep venous thrombosis signs and symptoms(Stroke Metric)  Outcome: Progressing Towards Goal  Goal: *Optimal pain control at patient's stated goal  Outcome: Progressing Towards Goal  Goal: *Tolerating diet  Outcome: Progressing Towards Goal  Goal: *Ability to perform ADLs and demonstrates progressive mobility and function  Outcome: Progressing Towards Goal  Goal: *Stroke education continued(Stroke Metric)  Outcome: Progressing Towards Goal     Problem: Ischemic Stroke: Discharge Outcomes  Goal: *Verbalizes anxiety and depression are reduced or absent  Outcome: Progressing Towards Goal  Goal: *Verbalize understanding of risk factor modification(Stroke Metric)  Outcome: Progressing Towards Goal  Goal: *Hemodynamically stable  Outcome: Progressing Towards Goal  Goal: *Absence of aspiration pneumonia  Outcome: Progressing Towards Goal  Goal: *Aware of needed dietary changes  Outcome: Progressing Towards Goal  Goal: *Verbalize understanding of prescribed medications including anti-coagulants, anti-lipid, and/or anti-platelets(Stroke Metric)  Outcome: Progressing Towards Goal  Goal: *Tolerating diet  Outcome: Progressing Towards Goal  Goal: *Aware of follow-up diagnostics related to anticoagulants  Outcome: Progressing Towards Goal  Goal: *Ability to perform ADLs and demonstrates progressive mobility and function  Outcome: Progressing Towards Goal  Goal: *Absence of DVT(Stroke Metric)  Outcome: Progressing Towards Goal  Goal: *Absence of aspiration  Outcome: Progressing Towards Goal  Goal: *Optimal pain control at patient's stated goal  Outcome: Progressing Towards Goal  Goal: *Home safety concerns addressed  Outcome: Progressing Towards Goal  Goal: *Describes available resources and support systems  Outcome: Progressing Towards Goal  Goal: *Verbalizes understanding of activation of EMS(911) for stroke symptoms(Stroke Metric)  Outcome: Progressing Towards Goal  Goal: *Understands and describes signs and symptoms to report to providers(Stroke Metric)  Outcome: Progressing Towards Goal  Goal: *Neurolgocially stable (absence of additional neurological deficits)  Outcome: Progressing Towards Goal  Goal: *Verbalizes importance of follow-up with primary care physician(Stroke Metric)  Outcome: Progressing Towards Goal  Goal: *Smoking cessation discussed,if applicable(Stroke Metric)  Outcome: Progressing Towards Goal  Goal: *Depression screening completed(Stroke Metric)  Outcome: Progressing Towards Goal     Problem: Discharge Planning  Goal: *Discharge to safe environment  Outcome: Progressing Towards Goal

## 2020-01-05 NOTE — ROUTINE PROCESS
Bedside and Verbal shift change report given to Lizbeth RN (oncoming nurse) by Ronaldo Morales RN (offgoing nurse). Report included the following information SBAR, Cardiac Rhythm NSR and Dual Neuro Assessment.

## 2020-01-05 NOTE — PROGRESS NOTES
Spoke with Lisa Smith. 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 Just call the  and ask for her. She and her brother are reviewing facilities for discharge,as her father as been declining.

## 2020-01-06 NOTE — PROGRESS NOTES
[address] [facility]  Hospitalist Group                                                                                          Hospitalist Progress Note  Romana Neptune, MD  Answering service: [phone] OR [phone] from in house phone        Date of Service:  2020  NAME:  Ronn Abbott  :  1945  MRN:  [id number]      Admission Summary:   Ronn Abbott is a 76 y.o. male with past medical history significant for diabetes mellitus type 2, hypertension and hyperlipidemia was brought to the emergency room via EMS for evaluation of speech difficulties and increased confusion. History is provided mainly by his son over the phone. He states that today around 1:30pm he noticed that he father was sitting on a chair leaning to one side. He was found to be confused and son had a difficult time understanding his speech. He tried to move his father out of the chair but patient was overall weak. As per son report patient has had similar episodes in the past related to high sugar for he checked patients blood sugar and it was in the 300s. He gave the patient 24 units of Lantus but his status did not improve for which he called EMS. for which  Last time patient was seen normal was around 12pm.    On arrival to ER patient was verbal with no focal deficit. Tele neurology evaluated patient who did not recommend Tpa as no focal deficit. CTA of the head showed occlusion at the M3. ER physician discussed case with interventional radiology Dr. Alok Carty who reviewed images and as per report no intervention is planned. Patient denies any symptoms at this time. Patient's son states that at baseline his father has episode of confusion where he does not know where he is at and is not good with dates but overall is very functional.  No history of CVAs in the past.  Patient denies any headache, lightheadedness, double vision, sensory disturbance or weakness at this time.     Interval history / Subjective:      Patient is seen in the at room. Denies any weakness, tingling or numbness. Up and working with PT. Limited orientation as before, oriented to self. No other concerns. No sitter at bedside since last night. No overnight events noted. Discussed with nurse and  on  floor. 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 - As per report no IR intervention is indicated based on the location of the lesion. Conservative management on admission.  - MRI brain unrevealing. No evidence of acute CVA on MRI.  - EEG normal per neurology. Will follow neuro recommendations.  - PT/OT and speech consult noted. SNF on discharge per PT/OT. Speech evaluation WNL. - Diagnostic labs not concerning as noted. - Aspirin 325mg daily as previous home dose, Atorvastatin to 40mg QHS  - Per son, pt has some mild cognitive impairment at baseline. Would need planning in discharge disposition.  - Outpatient evaluation for dementia per neurology.     # Hypertension.    - Resume amlodipine usual dose and losartan at half dose. - BMP monitoring  - Labetalol 10mg IV q6 hours for SPB >180.     # DM type 2: Placed on sliding scale insulin ac and hs  - Resume lantus 7 U QHS  - Caution for hypoglycemia  - Hold metformin as patient received contrast for CTA while inpatient for now. - A1c 10.3. Diabetes education and close monitoring and follow-up with PCP.     # CKD stage II- likely from diabetic nephropathy. Cr at baseline.  Will monitor for now.     DVT prophy: heparin sq  Code: full code  Surrogate decision making: Son, Yelitza Mcfarlane.     FUNCTIONAL STATUS PRIOR TO HOSPITALIZATION: independent     Care Plan discussed with: Patient/Family and Nurse  Disposition: SNF/LTC and TBD awaiting selection and approval.     Hospital Problems  Date Reviewed: 12/4/2018          Codes Class Noted POA    CVA (cerebral vascular accident) [facility]) ICD-10-CM: I63.9  ICD-9-CM: 434.91  1/1/2020 Unknown                Review of Systems:   A comprehensive review of systems was negative except for that written in the HPI. Vital Signs:    Last 24hrs VS reviewed since prior progress note. Most recent are:  Visit Vitals  /82 (BP 1 Location: Left arm, BP Patient Position: At rest)   Pulse 93   Temp 97.4 °F (36.3 °C)   Resp 16   Wt 60.5 kg (133 lb 6.1 oz)   SpO2 100%   BMI 22.20 kg/m²         Intake/Output Summary (Last 24 hours) at 1/6/2020 1742  Last data filed at 1/6/2020 0914  Gross per 24 hour   Intake    Output 100 ml   Net -100 ml        Physical Examination:     GEN APPEARANCE: Alert, oriented to self, pleasant, following commands okay. HEENT: Conjunctiva Clear, mucosa moist, oropharynx unremarkable  CVS: RRR, S1, S2; No M/G/R  LUNGS: CTAB; No Wheezes; No Rhonchi: No rales  ABD: Soft; No TTP; + Normoactive BS  EXT: WWP, no edema   Skin exam: No gross lesions noted on exposed skin surfaces  MENTAL STATUS: Patient oriented to person only. No gross motor or sensory focal deficits. No slurred speech. Data Review:    Review and/or order of clinical lab test  Review and/or order of tests in the radiology section of CPT  Review and/or order of tests in the medicine section of CPT    Mri Brain Wo Cont    Result Date: 1/2/2020  IMPRESSION: 1. Evaluation is significantly limited by motion. No acute intracranial abnormality. 2. Generalized parenchymal volume loss and moderate chronic microvascular ischemic disease. Small chronic infarcts in the bilateral thalami. Xr Chest Port    Result Date: 1/1/2020  IMPRESSION: No evidence of acute cardiopulmonary process. Cta Code Neuro Head And Neck W Cont    Result Date: 1/2/2020  IMPRESSION: 1. Questionable effusion of distal left and 3 branch. Otherwise negative.     Ct Code Neuro Head Wo Contrast    Result Date: 1/1/2020  IMPRESSION: No acute infarct, intracranial hemorrhage, or intracranial mass. Unchanged periventricular white matter disease, compatible with chronic small vessel ischemia. Old lacunar infarcts in the basal ganglia. Ct Code Neuro Perf W Cbf    Result Date: 1/2/2020  IMPRESSION: 1. Questionable effusion of distal left and 3 branch. Otherwise negative. Labs:     Recent Labs     01/06/20 0125   WBC 10.4   HGB 12.1   HCT 35.4*        Recent Labs     01/06/20 0125      K 4.3      CO2 27   BUN 24*   CREA 1.60*   *   CA 9.0     Recent Labs     01/06/20 0125   SGOT 58*   ALT 53   *   TBILI 0.5   TP 7.4   ALB 3.5   GLOB 3.9     No results for input(s): INR, PTP, APTT, INREXT, INREXT in the last 72 hours. No results for input(s): FE, TIBC, PSAT, FERR in the last 72 hours. No results found for: FOL, RBCF   No results for input(s): PH, PCO2, PO2 in the last 72 hours. No results for input(s): CPK, CKNDX, TROIQ in the last 72 hours.     No lab exists for component: CPKMB  Lab Results   Component Value Date/Time    Cholesterol, total 190 01/02/2020 04:06 AM    HDL Cholesterol 64 01/02/2020 04:06 AM    LDL, calculated 115.4 (H) 01/02/2020 04:06 AM    Triglyceride 53 01/02/2020 04:06 AM    CHOL/HDL Ratio 3.0 01/02/2020 04:06 AM     Lab Results   Component Value Date/Time    Glucose (POC) 342 (H) 01/06/2020 03:51 PM    Glucose (POC) 389 (H) 01/06/2020 11:40 AM    Glucose (POC) 233 (H) 01/06/2020 06:47 AM    Glucose (POC) 386 (H) 01/05/2020 08:58 PM    Glucose (POC) 177 (H) 01/05/2020 04:33 PM     Lab Results   Component Value Date/Time    Color YELLOW/STRAW 01/01/2020 04:42 PM    Appearance CLEAR 01/01/2020 04:42 PM    Specific gravity 1.014 01/01/2020 04:42 PM    pH (UA) 6.5 01/01/2020 04:42 PM    Protein 100 (A) 01/01/2020 04:42 PM    Glucose 250 (A) 01/01/2020 04:42 PM    Ketone NEGATIVE  01/01/2020 04:42 PM    Bilirubin NEGATIVE  01/01/2020 04:42 PM    Urobilinogen 1.0 01/01/2020 04:42 PM    Nitrites NEGATIVE 01/01/2020 04:42 PM    Leukocyte Esterase NEGATIVE  01/01/2020 04:42 PM    Epithelial cells FEW 01/01/2020 04:42 PM    Bacteria NEGATIVE  01/01/2020 04:42 PM    WBC 0-4 01/01/2020 04:42 PM    RBC 0-5 01/01/2020 04:42 PM     Mri Brain Wo Cont    Result Date: 1/2/2020  IMPRESSION: 1. Evaluation is significantly limited by motion. No acute intracranial abnormality. 2. Generalized parenchymal volume loss and moderate chronic microvascular ischemic disease. Small chronic infarcts in the bilateral thalami. Xr Chest Port    Result Date: 1/1/2020  IMPRESSION: No evidence of acute cardiopulmonary process. Cta Code Neuro Head And Neck W Cont    Result Date: 1/2/2020  IMPRESSION: 1. Questionable effusion of distal left and 3 branch. Otherwise negative. Ct Code Neuro Head Wo Contrast    Result Date: 1/1/2020  IMPRESSION: No acute infarct, intracranial hemorrhage, or intracranial mass. Unchanged periventricular white matter disease, compatible with chronic small vessel ischemia. Old lacunar infarcts in the basal ganglia. Ct Code Neuro Perf W Cbf    Result Date: 1/2/2020  IMPRESSION: 1. Questionable effusion of distal left and 3 branch. Otherwise negative.         Medications Reviewed:     Current Facility-Administered Medications   Medication Dose Route Frequency    losartan (COZAAR) tablet 50 mg  50 mg Oral DAILY    insulin glargine (LANTUS) injection 7 Units  7 Units SubCUTAneous QHS    polyethylene glycol (MIRALAX) packet 17 g  17 g Oral BID    bisacodyl (DULCOLAX) suppository 10 mg  10 mg Rectal DAILY PRN    insulin lispro (HUMALOG) injection   SubCUTAneous AC&HS    glucose chewable tablet 16 g  4 Tab Oral PRN    glucagon (GLUCAGEN) injection 1 mg  1 mg IntraMUSCular PRN    dextrose 10% infusion 0-250 mL  0-250 mL IntraVENous PRN    amLODIPine (NORVASC) tablet 10 mg  10 mg Oral DAILY    insulin lispro (HUMALOG) injection 4 Units  4 Units SubCUTAneous TIDAC    atorvastatin (LIPITOR) tablet 40 mg  40 mg Oral DAILY    aspirin tablet 325 mg  325 mg Oral DAILY    acetaminophen (TYLENOL) tablet 650 mg  650 mg Oral Q4H PRN    Or    acetaminophen (TYLENOL) solution 650 mg  650 mg Per NG tube Q4H PRN    Or    acetaminophen (TYLENOL) suppository 650 mg  650 mg Rectal Q4H PRN    heparin (porcine) injection 5,000 Units  5,000 Units SubCUTAneous Q8H     ______________________________________________________________________  EXPECTED LENGTH OF STAY: 2d 2h  ACTUAL LENGTH OF STAY:          5                 Samina Hanley MD

## 2020-01-06 NOTE — PROGRESS NOTES
Problem: Falls - Risk of  Goal: *Absence of Falls  Description  Document Earma Betty Fall Risk and appropriate interventions in the flowsheet. Outcome: Progressing Towards Goal  Note: Fall Risk Interventions:  Mobility Interventions: Bed/chair exit alarm, Communicate number of staff needed for ambulation/transfer, Utilize walker, cane, or other assistive device    Mentation Interventions: Adequate sleep, hydration, pain control, Bed/chair exit alarm    Medication Interventions: Teach patient to arise slowly    Elimination Interventions: Toileting schedule/hourly rounds              Problem: Patient Education: Go to Patient Education Activity  Goal: Patient/Family Education  Outcome: Progressing Towards Goal     Problem: Pressure Injury - Risk of  Goal: *Prevention of pressure injury  Description  Document Doni Scale and appropriate interventions in the flowsheet.   Outcome: Progressing Towards Goal  Note: Pressure Injury Interventions:  Sensory Interventions: Assess changes in LOC, Float heels    Moisture Interventions: Maintain skin hydration (lotion/cream), Minimize layers, Assess need for specialty bed    Activity Interventions: Increase time out of bed, Pressure redistribution bed/mattress(bed type)    Mobility Interventions: Float heels, HOB 30 degrees or less, Pressure redistribution bed/mattress (bed type)    Nutrition Interventions: Document food/fluid/supplement intake    Friction and Shear Interventions: Apply protective barrier, creams and emollients, Lift sheet, Minimize layers                Problem: Patient Education: Go to Patient Education Activity  Goal: Patient/Family Education  Outcome: Progressing Towards Goal

## 2020-01-06 NOTE — PROGRESS NOTES
Problem: Self Care Deficits Care Plan (Adult)  Goal: *Acute Goals and Plan of Care (Insert Text)  Description    FUNCTIONAL STATUS PRIOR TO ADMISSION: Patient was independent and active without use of DME.     HOME SUPPORT: The patient lived with girlfriend but did not require assist.    Occupational Therapy Goals  Initiated 1/2/2020  1. Patient will perform grooming sitting EOB with supervision/set-up within 7 day(s). 2.  Patient will perform bathing with supervision/set-up within 7 day(s). 3.  Patient will perform lower body dressing with supervision/set-up within 7 day(s). 4.  Patient will perform toilet transfers with supervision/set-up within 7 day(s). 5.  Patient will perform all aspects of toileting with supervision/set-up within 7 day(s). 6.  Patient will participate in upper extremity therapeutic exercise/activities with independence for 5 minutes within 7 day(s). 7.  Patient will utilize energy conservation techniques during functional activities with verbal cues within 7 day(s). Outcome: Progressing Towards Goal     Problem: Patient Education: Go to Patient Education Activity  Goal: Patient/Family Education  Outcome: Progressing Towards Goal   OCCUPATIONAL THERAPY TREATMENT  Patient: Adeline Powell (2 y.o. male)  Date: 1/6/2020  Diagnosis: CVA (cerebral vascular accident) [facility]) [I63.9]   <principal problem not specified>       Precautions: Fall  Chart, occupational therapy assessment, plan of care, and goals were reviewed. ASSESSMENT  Patient continues with skilled OT services and is progressing towards goals. Participation impacted by impaired memory, orientation, standing balance, vision, and position in space. Unable to determine visual deficits secondary to patient's decreased ability to follow instruction for visual testing; however, patient followed commands for AROM of bilateral UEs and for self care tasks.  Patient demonstrates running into objects with mobility, impaired visual attention and scanning. Current Level of Function Impacting Discharge (ADLs): UE and LE self care with set up to max assist impacted by impaired standing balance, functional transfers without RW with mod assist of 1    Other factors to consider for discharge: none         PLAN :  Patient continues to benefit from skilled intervention to address the above impairments. Continue treatment per established plan of care. to address goals. Recommend with staff: Derl Fare in chair for meals and self care, toileting on BSC versus bathroom with mod assist of 1 (impaired balance and impaired ability to get to bathroom with RW)    Recommend next OT session: standing at sink to groom, bathing     Recommendation for discharge: (in order for the patient to meet his/her long term goals)  Therapy up to 5 days/week in SNF setting    This discharge recommendation:  Has been made in collaboration with the attending provider and/or case management    IF patient discharges home will need the following DME: none       SUBJECTIVE:   Patient stated That's not my name. I'm Dayoung.  changed whiteboard to reflect name of choice for patient. OBJECTIVE DATA SUMMARY:   Cognitive/Behavioral Status:  Neurologic State: Alert  Orientation Level: Oriented to person;Disoriented to time;Disoriented to situation;Disoriented to place  Cognition: Follows commands;Decreased attention/concentration;Memory loss  Perception: Cues to maintain midline in standing; Tactile;Verbal  Perseveration: No perseveration noted  Safety/Judgement: Decreased awareness of environment;Decreased insight into deficits    Functional Mobility and Transfers for ADLs:  Bed Mobility:  Rolling: Minimum assistance;Assist x1  Supine to Sit: Minimum assistance;Assist x1  Scooting: Minimum assistance;Assist x1    Transfers:  Sit to Stand: Minimum assistance;Assist x1          Balance:  Sitting: Intact; Without support  Standing: Impaired; With support  Standing - Static: Fair  Standing - Dynamic : Fair    ADL Intervention:            Upper Body Bathing  Bathing Assistance: Stand-by assistance(in simulation)  Position Performed: Seated in chair    Lower Body Bathing  Bathing Assistance: (in simulation and inferred from mobility)  Perineal  : Maximum assistance  Position Performed: Standing  Lower Body : Stand-by assistance  Position Performed: Seated in chair  Cues: Verbal cues provided         Lower Body Dressing Assistance  Pants With Elastic Waist: Moderate assistance(inferred from mobility)  Socks: Set-up(performed with cues)  Leg Crossed Method Used: Yes  Position Performed: Bending forward method;Seated in chair;Standing  Cues: Physical assistance;Verbal cues provided; Tactile cues provided         Cognitive Retraining  Orientation Retraining: Reorienting;Place;Situation;Time  Organizing/Sequencing: Breaking task down  Following Commands: Follows one step commands/directions  Safety/Judgement: Decreased awareness of environment;Decreased insight into deficits  Cues: Tactile cues provided;Verbal cues provided;Visual cues provided      Activity Tolerance:   Fair  Please refer to the flowsheet for vital signs taken during this treatment.     After treatment patient left in no apparent distress:   Sitting in chair, Call bell within reach, and Bed / chair alarm activated    COMMUNICATION/COLLABORATION:   The patients plan of care was discussed with: Physical Therapist and Registered Nurse    CEZAR Rubin  Time Calculation: 24 mins

## 2020-01-06 NOTE — PROGRESS NOTES
Problem: Falls - Risk of  Goal: *Absence of Falls  Description  Document Marisela Blood Fall Risk and appropriate interventions in the flowsheet.   Outcome: Progressing Towards Goal  Note: Fall Risk Interventions:  Mobility Interventions: Bed/chair exit alarm    Mentation Interventions: Bed/chair exit alarm    Medication Interventions: Evaluate medications/consider consulting pharmacy, Patient to call before getting OOB, Teach patient to arise slowly    Elimination Interventions: Bed/chair exit alarm              Problem: Patient Education: Go to Patient Education Activity  Goal: Patient/Family Education  Outcome: Progressing Towards Goal

## 2020-01-06 NOTE — PROGRESS NOTES
[address] [facility] Adult  Hospitalist Group                                                                                          Hospitalist Progress Note  Chioma Hdz MD  Answering service: [phone] from in house phone        Date of Service:  2020  NAME:  Annabella Mckoy  :  1945  MRN:  [id number]      Admission Summary:   Annabella Mckoy is a 76 y.o. male with past medical history significant for diabetes mellitus type 2, hypertension and hyperlipidemia was brought to the emergency room via EMS for evaluation of speech difficulties and increased confusion. History is provided mainly by his son over the phone. He states that today around 1:30pm he noticed that he father was sitting on a chair leaning to one side. He was found to be confused and son had a difficult time understanding his speech. He tried to move his father out of the chair but patient was overall weak. As per son report patient has had similar episodes in the past related to high sugar for he checked patients blood sugar and it was in the 300s. He gave the patient 24 units of Lantus but his status did not improve for which he called EMS. for which  Last time patient was seen normal was around 12pm.    On arrival to ER patient was verbal with no focal deficit. Tele neurology evaluated patient who did not recommend Tpa as no focal deficit. CTA of the head showed occlusion at the M3. ER physician discussed case with interventional radiology Dr. Nabil Oliveira who reviewed images and as per report no intervention is planned. Patient denies any symptoms at this time. Patient's son states that at baseline his father has episode of confusion where he does not know where he is at and is not good with dates but overall is very functional.  No history of CVAs in the past.  Patient denies any headache, lightheadedness, double vision, sensory disturbance or weakness at this time.     Interval history / Subjective:      Patient is seen in the at room. Denies any weakness, tingling or numbness. Still confused but able to carry out meaningful conversation. Still could not tell me time, place or persons. Oriented to self. No other concerns. Sitter at bedside. Overnight events noted. 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 - As per report no IR intervention is indicated based on the location of the lesion. Conservative management on admission.  - MRI brain unrevealing. No evidence of acute CVA on MRI.  - EEG normal per neurology. Will follow neuro recommendations.  - PT/OT and speech consult noted. SNF on discharge per PT/OT. Speech evaluation WNL. - Diagnostic labs not concerning as noted. - Aspirin 325mg daily as previous home dose, Atorvastatin to 40mg QHS  - Per son, pt has some mild cognitive impairment at baseline. Would need planning in discharge disposition.  - Outpatient evaluation for dementia per neurology.     # Hypertension.    - Resume amlodipine usual dose and losartan at half dose. - BMP monitoring  - Labetalol 10mg IV q6 hours for SPB >180.     # DM type 2: Placed on sliding scale insulin ac and hs  - Resume lantus 7 U QHS  - Caution for hypoglycemia  - Hold metformin as patient received contrast for CTA while inpatient for now. - A1c 10.3. Diabetes education and close monitoring and follow-up with PCP.     # CKD stage II- likely from diabetic nephropathy. Cr at baseline.  Will monitor for now.     DVT prophy: heparin sq  Code: full code  Surrogate decision making: Son, Sebas Gates.     FUNCTIONAL STATUS PRIOR TO HOSPITALIZATION: independent     Care Plan discussed with: Patient/Family and Nurse  Disposition: SNF/LTC and TBD     Hospital Problems  Date Reviewed: 12/4/2018          Codes Class Noted POA    CVA (cerebral vascular accident) ([facility] [address] ICD-10-CM: I63.9  ICD-9-CM: 434.91  1/1/2020 Unknown                Review of Systems:   A comprehensive review of systems was negative except for that written in the HPI. Vital Signs:    Last 24hrs VS reviewed since prior progress note. Most recent are:  Visit Vitals  /81 (BP 1 Location: Right arm, BP Patient Position: At rest)   Pulse 98   Temp 98.5 °F (36.9 °C)   Resp 16   Wt 61.9 kg (136 lb 7.4 oz)   SpO2 99%   BMI 22.71 kg/m²       No intake or output data in the 24 hours ending 01/05/20 2228     Physical Examination:     GEN APPEARANCE: Patient resting in bed in NAD  HEENT: Conjunctiva Clear  CVS: RRR, S1, S2; No M/G/R  LUNGS: CTAB; No Wheezes; No Rhonchi: No rales  ABD: Soft; No TTP; + Normoactive BS  EXT: WWP, no edema   Skin exam: No gross lesions noted on exposed skin surfaces  MENTAL STATUS: Patient oriented to person only. He knows he lives in [place] but is now oriented to place or time. Neuro: Cranial nerve exam: EOMI, CN V sensory/motor intact, no facial asymmetry noted, patient able to hearl, uvula midline, able to move tongue left/right. No gross motor or sensory deficits. No slurred speech. Data Review:    Review and/or order of clinical lab test  Review and/or order of tests in the radiology section of CPT  Review and/or order of tests in the medicine section of CPT    Mri Brain Wo Cont    Result Date: 1/2/2020  IMPRESSION: 1. Evaluation is significantly limited by motion. No acute intracranial abnormality. 2. Generalized parenchymal volume loss and moderate chronic microvascular ischemic disease. Small chronic infarcts in the bilateral thalami. Xr Chest Port    Result Date: 1/1/2020  IMPRESSION: No evidence of acute cardiopulmonary process. Cta Code Neuro Head And Neck W Cont    Result Date: 1/2/2020  IMPRESSION: 1. Questionable effusion of distal left and 3 branch. Otherwise negative.     Ct Code Neuro Head Wo Contrast    Result Date: 1/1/2020  IMPRESSION: No acute infarct, intracranial hemorrhage, or intracranial mass. Unchanged periventricular white matter disease, compatible with chronic small vessel ischemia. Old lacunar infarcts in the basal ganglia. Ct Code Neuro Perf W Cbf    Result Date: 1/2/2020  IMPRESSION: 1. Questionable effusion of distal left and 3 branch. Otherwise negative. Labs:     No results for input(s): WBC, HGB, HCT, PLT, HGBEXT, HCTEXT, PLTEXT, HGBEXT, HCTEXT, PLTEXT in the last 72 hours. No results for input(s): NA, K, CL, CO2, BUN, CREA, GLU, CA, MG, PHOS, URICA in the last 72 hours. No results for input(s): SGOT, GPT, ALT, AP, TBIL, TBILI, TP, ALB, GLOB, GGT, AML, LPSE in the last 72 hours. No lab exists for component: AMYP, HLPSE  No results for input(s): INR, PTP, APTT, INREXT, INREXT in the last 72 hours. No results for input(s): FE, TIBC, PSAT, FERR in the last 72 hours. No results found for: FOL, RBCF   No results for input(s): PH, PCO2, PO2 in the last 72 hours. No results for input(s): CPK, CKNDX, TROIQ in the last 72 hours.     No lab exists for component: CPKMB  Lab Results   Component Value Date/Time    Cholesterol, total 190 01/02/2020 04:06 AM    HDL Cholesterol 64 01/02/2020 04:06 AM    LDL, calculated 115.4 (H) 01/02/2020 04:06 AM    Triglyceride 53 01/02/2020 04:06 AM    CHOL/HDL Ratio 3.0 01/02/2020 04:06 AM     Lab Results   Component Value Date/Time    Glucose (POC) 386 (H) 01/05/2020 08:58 PM    Glucose (POC) 177 (H) 01/05/2020 04:33 PM    Glucose (POC) 277 (H) 01/05/2020 11:51 AM    Glucose (POC) 208 (H) 01/05/2020 07:22 AM    Glucose (POC) 307 (H) 01/04/2020 11:58 PM     Lab Results   Component Value Date/Time    Color YELLOW/STRAW 01/01/2020 04:42 PM    Appearance CLEAR 01/01/2020 04:42 PM    Specific gravity 1.014 01/01/2020 04:42 PM    pH (UA) 6.5 01/01/2020 04:42 PM    Protein 100 (A) 01/01/2020 04:42 PM    Glucose 250 (A) 01/01/2020 04:42 PM    Ketone NEGATIVE  01/01/2020 04:42 PM    Bilirubin NEGATIVE 01/01/2020 04:42 PM    Urobilinogen 1.0 01/01/2020 04:42 PM    Nitrites NEGATIVE  01/01/2020 04:42 PM    Leukocyte Esterase NEGATIVE  01/01/2020 04:42 PM    Epithelial cells FEW 01/01/2020 04:42 PM    Bacteria NEGATIVE  01/01/2020 04:42 PM    WBC 0-4 01/01/2020 04:42 PM    RBC 0-5 01/01/2020 04:42 PM         Medications Reviewed:     Current Facility-Administered Medications   Medication Dose Route Frequency    polyethylene glycol (MIRALAX) packet 17 g  17 g Oral BID    bisacodyl (DULCOLAX) suppository 10 mg  10 mg Rectal DAILY PRN    insulin lispro (HUMALOG) injection   SubCUTAneous AC&HS    glucose chewable tablet 16 g  4 Tab Oral PRN    glucagon (GLUCAGEN) injection 1 mg  1 mg IntraMUSCular PRN    dextrose 10% infusion 0-250 mL  0-250 mL IntraVENous PRN    [Held by provider] amLODIPine (NORVASC) tablet 10 mg  10 mg Oral DAILY    insulin lispro (HUMALOG) injection 4 Units  4 Units SubCUTAneous TIDAC    atorvastatin (LIPITOR) tablet 40 mg  40 mg Oral DAILY    aspirin tablet 325 mg  325 mg Oral DAILY    acetaminophen (TYLENOL) tablet 650 mg  650 mg Oral Q4H PRN    Or    acetaminophen (TYLENOL) solution 650 mg  650 mg Per NG tube Q4H PRN    Or    acetaminophen (TYLENOL) suppository 650 mg  650 mg Rectal Q4H PRN    heparin (porcine) injection 5,000 Units  5,000 Units SubCUTAneous Q8H     ______________________________________________________________________  EXPECTED LENGTH OF STAY: 2d 2h  ACTUAL LENGTH OF STAY:          4                 Lilian Cee MD

## 2020-01-06 NOTE — PROGRESS NOTES
Problem: Mobility Impaired (Adult and Pediatric)  Goal: *Acute Goals and Plan of Care (Insert Text)  Description  FUNCTIONAL STATUS PRIOR TO ADMISSION: Per chart review, patient was independent without use of DME.    HOME SUPPORT PRIOR TO ADMISSION: Per chart review, the patient lived with his son. Level of assistance required or provided is unknown at this time d/t patient presented w/ AMS, no caregiver present. Patient states he lives with his wife. Physical Therapy Goals  Initiated 1/2/2020  1. Patient will move from supine to sit and sit to supine , scoot up and down and roll side to side in bed with independence within 7 day(s). 2.  Patient will transfer from bed to chair and chair to bed with modified independence using the least restrictive device within 7 day(s). 3.  Patient will perform sit to stand with modified independence within 7 day(s). 4.  Patient will ambulate with modified independence for 150 feet with the least restrictive device within 7 day(s). 5.  Patient will ascend/descend 4 stairs with handrail(s) with minimal assistance/contact guard assist within 7 day(s). Outcome: Progressing Towards Goal   PHYSICAL THERAPY TREATMENT  Patient: Ran Schmidt (89 y.o. male)  Date: 1/6/2020  Diagnosis: CVA (cerebral vascular accident) [facility]) [I63.9]   <principal problem not specified>       Precautions: Fall  Chart, physical therapy assessment, plan of care and goals were reviewed. ASSESSMENT  Patient continues with skilled PT services and is progressing towards goals. Patient limited today by confusion, impaired balance and gait instability. Currently needing Erica for transfers and modA for ambulation with RW. Patient unable to manage RW without manual cues/assist and displays poor upright balance. Anticipate he is below his functional baseline and can benefit from continued rehab post acute hospitalization.      Current Level of Function Impacting Discharge (mobility/balance): Erica for transfers and modA for ambulation    Other factors to consider for discharge: confused, high fall risk, poor balance         PLAN :  Patient continues to benefit from skilled intervention to address the above impairments. Continue treatment per established plan of care. to address goals. Recommendation for discharge: (in order for the patient to meet his/her long term goals)  Therapy up to 5 days/week in SNF setting      IF patient discharges home will need the following DME: to be determined (TBD)       SUBJECTIVE:   Patient stated I'm in the [place].     OBJECTIVE DATA SUMMARY:   Critical Behavior:  Neurologic State: Alert, Confused  Orientation Level: Oriented to person, Oriented to place  Cognition: Decreased attention/concentration, Decreased command following  Safety/Judgement: Decreased awareness of environment, Decreased awareness of need for assistance, Decreased awareness of need for safety, Decreased insight into deficits, Fall prevention  Functional Mobility Training:  Bed Mobility:  Rolling: Minimum assistance;Assist x1  Supine to Sit: Minimum assistance;Assist x1     Scooting: Minimum assistance;Assist x1        Transfers:  Sit to Stand: Minimum assistance;Assist x1  Stand to Sit: Minimum assistance;Assist x1                             Balance:  Sitting: Intact  Standing: Impaired  Standing - Static: Constant support; Fair  Standing - Dynamic : Constant support;Poor  Ambulation/Gait Training:  Distance (ft): 45 Feet (ft)  Assistive Device: Gait belt;Walker, rolling  Ambulation - Level of Assistance: Moderate assistance;Assist x1        Gait Abnormalities: Decreased step clearance;Shuffling gait        Base of Support: Center of gravity altered;Narrowed     Speed/Kika: Pace decreased (<100 feet/min); Slow  Step Length: Left shortened;Right shortened      Pain Rating:  No c/o pain    Activity Tolerance:   Fair  Please refer to the flowsheet for vital signs taken during this treatment.     After treatment patient left in no apparent distress:   Sitting in chair, Call bell within reach, and Bed / chair alarm activated    COMMUNICATION/COLLABORATION:   The patients plan of care was discussed with: Physical Therapist, Occupational Therapist, and Registered Nurse    Afshan Ramirez PT, DPT   Time Calculation: 27 mins

## 2020-01-06 NOTE — PROGRESS NOTES
Transition of Care Plan     Therapy recommending SNF at this time, CM trying to reach out to the family members to explore facility options   Will need Fabby Serrano; will need updated PT/OT notes   CM to discuss LTC/UAI with the family- agreeable to do UAI and apply LTC Medicaid, CM Lead working on it   Nita Hernadez Transport: [address] Follow up with PCP/Specialist     Chart reviewed, previous CM tried to reach patient's son Marcella Hays [phone] on Friday 1/3/20 but was not able to discuss facility options as he was working. Skye Bland told previous CM that he was going to return a call back after work but haven't done so yet. Noted previous CM also left a message to patient's step daughter Abhijeet Quintanilla [phone]. This writer will also called  patient's son Marcella Hays [phone] and step alex Quintanilla [phone] asking to call back. CM continues to follow up.     1:40 PM: CM received a call from patient's step daughter Abhijeet Quintanilla [phone], was returning a call back from this morning. CM discussed NATALIIA in detail and educated on available options. Briana Clark stated the patient has gradually declined to the point where he cannot take care of himself and the family is not available to assist due to work. She wanted to know more on nursing home facilities that has rehab and LTC bed, advised a list will be left on the bedside table to review, Briana Clark to come today after 5 pm. CM also discussed on UAI and LTC Medicaid, agreeable and requested this writer to initiate application, referred to CM Lead. CM will send referral tomorrow once the freedom of choice is signed and returned.      [facility]  Clinical     [phone]

## 2020-01-06 NOTE — ROUTINE PROCESS
Bedside shift change report given to Brdoy (oncoming nurse) by Dino Landin (offgoing nurse). Report included the following information SBAR, Kardex, Intake/Output, MAR and Recent Results.

## 2020-01-07 NOTE — PROGRESS NOTES
Problem: Self Care Deficits Care Plan (Adult)  Goal: *Acute Goals and Plan of Care (Insert Text)  Description    FUNCTIONAL STATUS PRIOR TO ADMISSION: Patient was independent and active without use of DME.     HOME SUPPORT: The patient lived with girlfriend but did not require assist.    Occupational Therapy Goals  Initiated 1/2/2020  1. Patient will perform grooming sitting EOB with supervision/set-up within 7 day(s). 2.  Patient will perform bathing with supervision/set-up within 7 day(s). 3.  Patient will perform lower body dressing with supervision/set-up within 7 day(s). 4.  Patient will perform toilet transfers with supervision/set-up within 7 day(s). 5.  Patient will perform all aspects of toileting with supervision/set-up within 7 day(s). 6.  Patient will participate in upper extremity therapeutic exercise/activities with independence for 5 minutes within 7 day(s). 7.  Patient will utilize energy conservation techniques during functional activities with verbal cues within 7 day(s). Outcome: Progressing Towards Goal   OCCUPATIONAL THERAPY TREATMENT  Patient: Bernice Alejandre (72 y.o. male)  Date: 1/7/2020  Diagnosis: CVA (cerebral vascular accident) [facility]) [I63.9]   <principal problem not specified>       Precautions: Fall  Chart, occupational therapy assessment, plan of care, and goals were reviewed. ASSESSMENT  Patient continues with skilled OT services and is progressing towards goals. Participation impacted by impaired position in space, proprioception (>right), vision, standing balance, right UE coordination, awareness of environment, reach to LEs. Patient unable to complete visual testing.     Current Level of Function Impacting Discharge (ADLs): self care with set up to total assist; urinary incontinence, functional transfers with min to mod assist of 1    Other factors to consider for discharge: needs assist for all self care and mobility         PLAN :  Patient continues to benefit from skilled intervention to address the above impairments. Continue treatment per established plan of care. to address goals. Recommend with staff: Tiburcio Nyhan in chair for self care and mobility,functional transfers with min to mod of 1 and RW, 1:1 if seated on toilet or BSC    Recommend next OT session: LE self care, toileting on BSC versus bathroom    Recommendation for discharge: (in order for the patient to meet his/her long term goals)  Therapy up to 5 days/week in SNF setting    This discharge recommendation:  Has been made in collaboration with the attending provider and/or case management    IF patient discharges home will need the following DME: none       SUBJECTIVE:   Patient stated Is it Monday? Is this [place]? Jak Glass    OBJECTIVE DATA SUMMARY:   Cognitive/Behavioral Status:  Neurologic State: Alert  Orientation Level: Oriented to person;Disoriented to place; Disoriented to situation;Disoriented to time  Cognition: Follows commands;Decreased attention/concentration  Perception: (assist and cues to locate objects within close personal space; impaired proprioception)  Perseveration: No perseveration noted  Safety/Judgement: Decreased awareness of environment    Functional Mobility and Transfers for ADLs:  Bed Mobility:  Supine to Sit: Supervision;Assist x1    Transfers:  Sit to Stand: Minimum assistance;Assist x1     Bed to Chair: Moderate assistance;Assist x1;Additional time; Adaptive equipment    Balance:  Sitting: Intact; Without support  Standing: Impaired; With support  Standing - Static: Fair  Standing - Dynamic : Fair    ADL Intervention:       Grooming  Washing Hands: Set-up(seated)         Lower Body Bathing  Perineal  : Maximum assistance  Position Performed: Standing  Adaptive Equipment: Rubie Mcardle         Lower Body Dressing Assistance  Protective Undergarmet:  Total assistance (dependent)(pull up)  Leg Crossed Method Used: No  Position Performed: Bending forward method;Standing  Cues: Doff;Don;Physical assistance;Verbal cues provided  Adaptive Equipment Used: Walker    Toileting  Toileting Assistance: Total assistance(dependent)(urinary incontinence)  Bladder Hygiene: Maximum assistance  Bowel Hygiene: Total assistance (dependent)  Clothing Management: Total assistance (dependent)  Adaptive Equipment: Walker    Cognitive Retraining  Orientation Retraining: Reorienting;Place;Time  Organizing/Sequencing: Breaking task down  Following Commands: Follows one step commands/directions  Safety/Judgement: Decreased awareness of environment  Cues: Tactile cues provided;Verbal cues provided;Visual cues provided      Activity Tolerance:   Fair  Please refer to the flowsheet for vital signs taken during this treatment.     After treatment patient left in no apparent distress:   Sitting in chair, Call bell within reach, and Bed / chair alarm activated    COMMUNICATION/COLLABORATION:   The patients plan of care was discussed with: Registered Nurse    CEZAR Ochoa  Time Calculation: 34 mins

## 2020-01-07 NOTE — PROGRESS NOTES
[address] [facility] Adult  Hospitalist Group                                                                                          Hospitalist Progress Note  Dunia Morris MD  Answering service: [phone] OR [phone] from in house phone        Date of Service:  2020  NAME:  Kevin Apodaca  :  1945  MRN:  [id number]      Admission Summary:     He states that today around 1:30pm he noticed that he father was sitting on a chair leaning to one side. He was found to be confused and son had a difficult time understanding his speech. He tried to move his father out of the chair but patient was overall weak.  As per son report patient has had similar episodes in the past related to high sugar for he checked patients blood sugar and it was in the 300s. He gave the patient 24 units of Lantus but his status did not improve for which he called EMS. for which  Last time patient was seen normal was around 12pm.    On arrival to ER patient was verbal with no focal deficit. Tele neurology evaluated patient who did not recommend Tpa as no focal deficit.  CTA of the head showed occlusion at the M3. ER physician discussed case with interventional radiology Dr. Nataly El who reviewed images and as per report no intervention is planned. Interval history / Subjective:       Patient has no acute complaint. Currently awaiting placement. Assessment & Plan: Altered mental status  MRI of the brain shows chronic small infarcts with volume loss. No acute pathology. EEG normal.  Altered mental status is likely secondary to worsening dementia per neurology. CTA shows left M3 branch occlusion. Conservative management per interventional neurology. Continue aspirin and statin. Hypertension  Blood pressure stable. Continue amlodipine and losartan. Diabetes  Continue Lantus along with sliding scale insulin coverage. Sugars stable. Chronic kidney disease stage III  Serum creatinine stable.     Code status: Full  DVT prophylaxis: Heparin and SCDs. Care Plan discussed with: Patient/Family  Disposition: SNF/LTC, pending placement at this time. Patient is medically stable. Hospital Problems  Date Reviewed: 12/4/2018          Codes Class Noted POA    CVA (cerebral vascular accident) [facility]) ICD-10-CM: I63.9  ICD-9-CM: 434.91  1/1/2020 Unknown                Review of Systems:   A comprehensive review of systems was negative except for that written in the HPI. Vital Signs:    Last 24hrs VS reviewed since prior progress note. Most recent are:  Visit Vitals  BP (!) 143/91 (BP 1 Location: Left arm, BP Patient Position: Sitting)   Pulse 93   Temp 97.6 °F (36.4 °C)   Resp 16   Wt 61.1 kg (134 lb 11.2 oz)   SpO2 98%   BMI 22.42 kg/m²         Intake/Output Summary (Last 24 hours) at 1/7/2020 1410  Last data filed at 1/7/2020 1139  Gross per 24 hour   Intake 240 ml   Output    Net 240 ml        Physical Examination:             Constitutional:  No acute distress, cooperative, pleasant    ENT:  Oral mucous moist, oropharynx benign. Resp:  CTA bilaterally. No wheezing/rhonchi/rales. No accessory muscle use   CV:  Regular rhythm, normal rate, no murmurs, gallops, rubs    GI:  Soft, non distended, non tender. normoactive bowel sounds, no hepatosplenomegaly     Musculoskeletal:  No edema, warm, 2+ pulses throughout    Neurologic:  Moves all extremities. AAOx3, CN II-XII reviewed     Skin:  Good turgor, no rashes or ulcers       Data Review:    Review and/or order of clinical lab test      Labs:     Recent Labs     01/06/20  0125   WBC 10.4   HGB 12.1   HCT 35.4*        Recent Labs     01/06/20  0125      K 4.3      CO2 27   BUN 24*   CREA 1.60*   *   CA 9.0     Recent Labs     01/06/20  0125   SGOT 58*   ALT 53   *   TBILI 0.5   TP 7.4   ALB 3.5   GLOB 3.9     No results for input(s): INR, PTP, APTT, INREXT in the last 72 hours.    No results for input(s): FE, TIBC, PSAT, FERR in the last 72 hours. No results found for: FOL, RBCF   No results for input(s): PH, PCO2, PO2 in the last 72 hours. No results for input(s): CPK, CKNDX, TROIQ in the last 72 hours.     No lab exists for component: CPKMB  Lab Results   Component Value Date/Time    Cholesterol, total 190 01/02/2020 04:06 AM    HDL Cholesterol 64 01/02/2020 04:06 AM    LDL, calculated 115.4 (H) 01/02/2020 04:06 AM    Triglyceride 53 01/02/2020 04:06 AM    CHOL/HDL Ratio 3.0 01/02/2020 04:06 AM     Lab Results   Component Value Date/Time    Glucose (POC) 170 (H) 01/07/2020 11:31 AM    Glucose (POC) 126 (H) 01/07/2020 06:26 AM    Glucose (POC) 208 (H) 01/06/2020 10:07 PM    Glucose (POC) 342 (H) 01/06/2020 03:51 PM    Glucose (POC) 389 (H) 01/06/2020 11:40 AM     Lab Results   Component Value Date/Time    Color YELLOW/STRAW 01/01/2020 04:42 PM    Appearance CLEAR 01/01/2020 04:42 PM    Specific gravity 1.014 01/01/2020 04:42 PM    pH (UA) 6.5 01/01/2020 04:42 PM    Protein 100 (A) 01/01/2020 04:42 PM    Glucose 250 (A) 01/01/2020 04:42 PM    Ketone NEGATIVE  01/01/2020 04:42 PM    Bilirubin NEGATIVE  01/01/2020 04:42 PM    Urobilinogen 1.0 01/01/2020 04:42 PM    Nitrites NEGATIVE  01/01/2020 04:42 PM    Leukocyte Esterase NEGATIVE  01/01/2020 04:42 PM    Epithelial cells FEW 01/01/2020 04:42 PM    Bacteria NEGATIVE  01/01/2020 04:42 PM    WBC 0-4 01/01/2020 04:42 PM    RBC 0-5 01/01/2020 04:42 PM         Medications Reviewed:     Current Facility-Administered Medications   Medication Dose Route Frequency    losartan (COZAAR) tablet 50 mg  50 mg Oral DAILY    insulin glargine (LANTUS) injection 7 Units  7 Units SubCUTAneous QHS    polyethylene glycol (MIRALAX) packet 17 g  17 g Oral BID    bisacodyl (DULCOLAX) suppository 10 mg  10 mg Rectal DAILY PRN    insulin lispro (HUMALOG) injection   SubCUTAneous AC&HS    glucose chewable tablet 16 g  4 Tab Oral PRN    glucagon (GLUCAGEN) injection 1 mg  1 mg IntraMUSCular PRN    dextrose 10% infusion 0-250 mL  0-250 mL IntraVENous PRN    amLODIPine (NORVASC) tablet 10 mg  10 mg Oral DAILY    insulin lispro (HUMALOG) injection 4 Units  4 Units SubCUTAneous TIDAC    atorvastatin (LIPITOR) tablet 40 mg  40 mg Oral DAILY    aspirin tablet 325 mg  325 mg Oral DAILY    acetaminophen (TYLENOL) tablet 650 mg  650 mg Oral Q4H PRN    Or    acetaminophen (TYLENOL) solution 650 mg  650 mg Per NG tube Q4H PRN    Or    acetaminophen (TYLENOL) suppository 650 mg  650 mg Rectal Q4H PRN    heparin (porcine) injection 5,000 Units  5,000 Units SubCUTAneous Q8H     ______________________________________________________________________  EXPECTED LENGTH OF STAY: 2d 2h  ACTUAL LENGTH OF STAY:          6                 Colonel Jose Antonio MD

## 2020-01-07 NOTE — PROGRESS NOTES
Problem: Falls - Risk of  Goal: *Absence of Falls  Description  Document Bard Haumber Fall Risk and appropriate interventions in the flowsheet.   Outcome: Progressing Towards Goal  Note: Fall Risk Interventions:  Mobility Interventions: Bed/chair exit alarm    Mentation Interventions: Bed/chair exit alarm    Medication Interventions: Bed/chair exit alarm    Elimination Interventions: Bed/chair exit alarm              Problem: Patient Education: Go to Patient Education Activity  Goal: Patient/Family Education  Outcome: Progressing Towards Goal

## 2020-01-07 NOTE — PROGRESS NOTES
Transition of Care Plan     · Therapy recommending SNF at this time, CM spoke with patient's step-daughter Edna Santillan [phone] yesterday and left a SNF list at bedside table, haven't received choices yet   · Will need [employer] auth  · CM  discussed LTC/UAI with the family- agreeable to do UAI and apply LTC Medicaid, CM Lead working on it   · Transport: BLS Likely   · [address]   · Follow up with PCP/Specialist       This writer made a follow up call to patient's daughter Edna Santillan [phone] and checked on SNF choices, states the list was not found despite searching with the assistance of the primary nurse yesterday evening. The list was left at bedside table for the daughter to review and it was there this morning when this writer checked. CM has made a copy of SNF list/choice letter and placed on the bedside chart and bedside table.      Sesar Lopez  Clinical     [phone]

## 2020-01-07 NOTE — PROGRESS NOTES
Rounded on [facility] patients and provided Anointing of the Sick at request of patient    Fr. Pamela Bhandari

## 2020-01-07 NOTE — PROGRESS NOTES
Problem: Mobility Impaired (Adult and Pediatric)  Goal: *Acute Goals and Plan of Care (Insert Text)  Description  FUNCTIONAL STATUS PRIOR TO ADMISSION: Per chart review, patient was independent without use of DME.    HOME SUPPORT PRIOR TO ADMISSION: Per chart review, the patient lived with his son. Level of assistance required or provided is unknown at this time d/t patient presented w/ AMS, no caregiver present. Patient states he lives with his wife. Physical Therapy Goals  Initiated 1/2/2020  1. Patient will move from supine to sit and sit to supine , scoot up and down and roll side to side in bed with independence within 7 day(s). 2.  Patient will transfer from bed to chair and chair to bed with modified independence using the least restrictive device within 7 day(s). 3.  Patient will perform sit to stand with modified independence within 7 day(s). 4.  Patient will ambulate with modified independence for 150 feet with the least restrictive device within 7 day(s). 5.  Patient will ascend/descend 4 stairs with handrail(s) with minimal assistance/contact guard assist within 7 day(s). Outcome: Progressing Towards Goal  PHYSICAL THERAPY TREATMENT  Patient: Abby Virgen (60 y.o. male)  Date: 1/7/2020  Diagnosis: CVA (cerebral vascular accident) [facility]) [I63.9]   <principal problem not specified>       Precautions: Fall  Chart, physical therapy assessment, plan of care and goals were reviewed. ASSESSMENT  Patient continues with skilled PT services and is progressing towards goals. Pt continues generalized weakness, decreased vision, decreased standing balance, decreased motor control of right leg and overall decline in functional status.        Current Level of Function Impacting Discharge (mobility/balance): Min assist for transfers/amb, high fall risk    Other factors to consider for discharge: PLOF independent without assistive device         PLAN :  Patient continues to benefit from skilled intervention to address the above impairments. Continue treatment per established plan of care. to address goals. Recommendation for discharge: (in order for the patient to meet his/her long term goals)  Therapy up to 5 days/week in SNF setting    This discharge recommendation:  Has been made in collaboration with the attending provider and/or case management    IF patient discharges home will need the following DME: to be determined (TBD)       SUBJECTIVE:     OBJECTIVE DATA SUMMARY:   Critical Behavior:  Neurologic State: Alert  Orientation Level: Oriented to person, Disoriented to place, Disoriented to situation, Disoriented to time  Cognition: Follows commands, Decreased attention/concentration  Safety/Judgement: Decreased awareness of environment  Functional Mobility Training:  Bed Mobility:     Supine to Sit: Supervision;Assist x1              Transfers:  Sit to Stand: Minimum assistance  Stand to Sit: Minimum assistance        Bed to Chair: Minimum assistance                    Balance:  Sitting: Intact; Without support  Standing: Impaired; With support  Standing - Static: Fair;Constant support(rolling walker)  Standing - Dynamic : Fair;Constant support(rolling walker)  Ambulation/Gait Training:  Distance (ft): 100 Feet (ft)  Assistive Device: Walker, rolling;Gait belt  Ambulation - Level of Assistance: Minimal assistance;Assist x1        Gait Abnormalities: Decreased step clearance; Path deviations(runs into objects with RW on right, pushes RW in front of CO)        Base of Support: Center of gravity altered;Narrowed     Speed/Kika: Slow  Step Length: Right shortened;Left shortened        Interventions: Verbal cues;Manual cues; Safety awareness training(frequent cues to stay closer to RW)  Pain Rating:  Pt denied pain.     Activity Tolerance:   Good - for sitting up in bedside chair; fair for standing activities/amb - requires rests  Please refer to the flowsheet for vital signs taken during this treatment.     After treatment patient left in no apparent distress:   Sitting in chair, Call bell within reach, and Bed / chair alarm activated    COMMUNICATION/COLLABORATION:   The patients plan of care was discussed with: Registered Nurse    Matthew Baxter, PT   Time Calculation: 20 mins

## 2020-01-08 NOTE — PROGRESS NOTES
[facility]   She verbalized understanding.

## 2020-01-08 NOTE — PROGRESS NOTES
Spiritual Care Assessment/Progress Note  [facility]      NAME: Jovanni Richter      MRN: [id number]  AGE: 76 y.o.  SEX: male  Protestant Affiliation: Baptism   Language: English     1/8/2020     Total Time (in minutes): 5     Spiritual Assessment begun in [facility] through conversation with:         [x]Patient        [] Family    [] Friend(s)        Reason for Consult: [facility] ministry     Spiritual beliefs: (Please include comment if needed)     [x] Identifies with a vidal tradition:  Baptism       [x] Supported by a vidal community:  Went to [employer] in [place], [place]          [] Claims no spiritual orientation:           [] Seeking spiritual identity:                [] Adheres to an individual form of spirituality:           [] Not able to assess:                           Identified resources for coping:      [x] Prayer                               [x] Music                  [] Guided Imagery     [x] Family/friends                 [] Pet visits     [] Devotional reading                         [] Unknown     [] Other:                                             Interventions offered during this visit: (See comments for more details)    Patient Interventions: Affirmation of vidal, Communion (Baptism), Initial/Spiritual assessment, patient floor, Prayer (actual), Prayer (assurance of)           Plan of Care:     [x] Support spiritual and/or cultural needs    [] Support AMD and/or advance care planning process      [] Support grieving process   [] Coordinate Rites and/or Rituals    [] Coordination with community clergy   [] No spiritual needs identified at this time   [] Detailed Plan of Care below (See Comments)  [] Make referral to Music Therapy  [] Make referral to Pet Therapy     [] Make referral to Addiction services  [] Make referral to [facility]  [] Make referral to Spiritual Care Partner  [] No future visits requested        [] Follow up visits as needed     Comments: Mr. Nick Carter has been visited by Arisoko. He was sitting in chair at the time of visit and reports his [employer] is in [place], [place]. He is with his son and daughter-in-law here in [place]. Prayer offered.     RAJINDER Vega, RN, ACSW, LCSW   Page:  [phone])

## 2020-01-08 NOTE — PROGRESS NOTES
Transition of Care Plan to SNF/Rehab    SNF/Rehab Transition:  Patient has been accepted to [facility] and meets criteria for admission. Patient will transported by [facility] and expected to leave at 5:30 pm.    Communication to Patient/Family:  Met with patient and left a message to patient's step daughter Dea Wallace at [phone] (identified care giver) and they are agreeable to the transition plan. Communication to SNF/Rehab:  Bedside RN, Harlan Sotelo, has been notified to update the transition plan to the facility and call report (phone [phone] and ask for wing 1, room # 122 A). Discharge information has been updated on the AVS.     Discharge instructions to be accessed via all scripts  SNF/Rehab Transition:  Patient to follow-up with Home Health: [facility], other  ,none)  PCP/Specialist:   Ambulatory Care Management:      Reviewed and confirmed with facility, Ottoniel Craven at admissions they can manage the patient care needs for the following:     Nikki Forrester with (X) only those applicable:    Medication:  []  Medications will be available at the facility  []  IV Antibiotics   []  Controlled Substance - hard copy to be sent with patient   []  Weekly Labs   Documents:  [] Hard RX  [x] MAR  [x] Kardex  [x] AVS  []Transfer Summary  [x]Discharge   Equipment:  []  CPAP/BiPAP  []  Wound Vacuum  []  Green or Urinary Device  []  PICC/Central Line  []  Nebulizer  []  Ventilator   Treatment:  []Isolation (for MRSA, VRE, etc.)  []Surgical Drain Management  []Tracheostomy Care  []Dressing Changes  []Dialysis with transportation and chair time   []PEG Care  []Oxygen  []Daily Weights for Heart Failure   Dietary:  [x]Any diet limitations diabetic diet   []Tube Feedings   []Total Parenteral Management (TPN)   Eligible for Medicaid Long Term Services and Supports  Yes:  [] Eligible for medical assistance or will become eligible within 180 days and UAI completed.    [] Provider/Patient and/or support system has requested screening. [] UAI copy provided to patient or responsible party,   [x] UAI unavailable at discharge will send once processed to SNF provider. [] UAI unavailable at discharged mailed to patient  No:   [] Private pay and is not financially eligible for Medicaid within the next 180 days. [] Reside out-of-state. [] A residents of a state owned/operated facility that is licensed  by [address] [facility] or [facility]  [] Enrollment in [facility] services  [] [address] [place] [address] [facility]  [] Patient /Family declines to have screening completed or provide financial information for screening     Financial Resources:  Medicaid    [x] Initiated and application pending - advised family to apply LTC medicaid   [] Full coverage     Advanced Care Plan:  []Surrogate Decision Maker of Care  []POA  [x]Communicated Code Status ( \"Full\")    Other    Please assist patient family to apply LTC Medicaid. They are looking for a LTC bed.       Juan De La Cruz  Clinical     [phone]

## 2020-01-08 NOTE — PROGRESS NOTES
Problem: Mobility Impaired (Adult and Pediatric)  Goal: *Acute Goals and Plan of Care (Insert Text)  Description  FUNCTIONAL STATUS PRIOR TO ADMISSION: Per chart review, patient was independent without use of DME.    HOME SUPPORT PRIOR TO ADMISSION: Per chart review, the patient lived with his son. Level of assistance required or provided is unknown at this time d/t patient presented w/ AMS, no caregiver present. Patient states he lives with his wife. Physical Therapy Goals  Initiated 1/2/2020  1. Patient will move from supine to sit and sit to supine , scoot up and down and roll side to side in bed with independence within 7 day(s). 2.  Patient will transfer from bed to chair and chair to bed with modified independence using the least restrictive device within 7 day(s). 3.  Patient will perform sit to stand with modified independence within 7 day(s). 4.  Patient will ambulate with modified independence for 150 feet with the least restrictive device within 7 day(s). 5.  Patient will ascend/descend 4 stairs with handrail(s) with minimal assistance/contact guard assist within 7 day(s). Outcome: Progressing Towards Goal  PHYSICAL THERAPY TREATMENT  Patient: Kali Ward (89 y.o. male)  Date: 1/8/2020  Diagnosis: CVA (cerebral vascular accident) [facility]) [I63.9]   <principal problem not specified>       Precautions: Fall  Chart, physical therapy assessment, plan of care and goals were reviewed. ASSESSMENT  Patient continues with skilled PT services and is progressing towards goals. Pt continues with generalized weakness, decreased motor coordination, visual deficits - ? Field deficit,  decreased dynamic standing balance, and decline in functional mobility.     Current Level of Function Impacting Discharge (mobility/balance): CGA to mod assist for loss of balance in standing without use of RW, min assist for ambulation - largely secondary to veering into objects in hallway    Other factors to consider for discharge:          PLAN :  Patient continues to benefit from skilled intervention to address the above impairments. Continue treatment per established plan of care. to address goals. Recommendation for discharge: (in order for the patient to meet his/her long term goals)  Therapy up to 5 days/week in SNF setting    This discharge recommendation:  Has been made in collaboration with the attending provider and/or case management    IF patient discharges home will need the following DME: to be determined (TBD)       SUBJECTIVE:   Patient stated I like to work hard - I've worked my whole life.     OBJECTIVE DATA SUMMARY:   Critical Behavior:  Neurologic State: Alert  Orientation Level: Oriented to place, Oriented to person, Oriented to situation  Cognition: Decreased attention/concentration, Follows commands, Memory loss  Safety/Judgement: Decreased awareness of environment  Functional Mobility Training:  Bed Mobility:     Supine to Sit: Supervision     Scooting: Supervision(scooting to left in sitting)        Transfers:  Sit to Stand: Contact guard assistance  Stand to Sit: Contact guard assistance        Bed to Chair: Minimum assistance(to maneuver RW around chair/objects)                    Balance:  Sitting: Intact; Without support  Standing: Impaired; With support  Standing - Static: Good;Constant support(rolling walker)  Standing - Dynamic : Fair;Constant support(rolling walker)  Ambulation/Gait Training:  Distance (ft): 150 Feet (ft)  Assistive Device: Walker, rolling  Ambulation - Level of Assistance: Minimal assistance        Gait Abnormalities: Decreased step clearance; Path deviations(veering into objects in hallway right and left side)        Base of Support: Narrowed     Speed/Kika: Slow  Step Length: Right shortened;Left shortened              Therapeutic Exercises/Activity:   Performed supine leg exercise - heel slides and heel to shin - with cues to slow and control movements - x 5 reps.  Seated - performed hip flexion and knee extension x 10 reps. Performed coordination activities - finger to nose and alternating palms up/palms down - motor control and accuracy improved with repetition. Pain Rating:  Pt denied pain. Activity Tolerance:   Fair - improving for amb distance and sustained activity/exercise. Please refer to the flowsheet for vital signs taken during this treatment.     After treatment patient left in no apparent distress:   Sitting in chair, Call bell within reach, and Bed / chair alarm activated    COMMUNICATION/COLLABORATION:   The patients plan of care was discussed with: Registered Nurse    Sourav Goel, PT   Time Calculation: 25 mins

## 2020-01-08 NOTE — PROGRESS NOTES
Transition of Care Plan     · Therapy recommending SNF at this time, CM spoke with patient's step-daughter Zayra Mei [phone], has the list, states will provide list by 3 pm today  · Will need [employer] auth  · CM  discussed LTC/UAI with the family- family to apply LT Medicaid and CM to do UAI   · [address]  · [address]   · Follow up with PCP/Specialist        Spoke with patient's step daughter Zyara Mei [phone]. States she has the SNF list but would have to discuss with her brother Rachel Pena to decide on choices. CM has advised to provide the list atleast by 3 pm today so that we have room to start [facility], agrees and understands. Patricia Bergeron said she will provide a list to this writer at least by 3 pm today. CM also educated on LTC Medicaid and asked Patricia Bergeron to apply from [facility],agreeable. CM will do UAI.     1:02 PM: Received choices from the step-daughter Patricia Bergeron. The Plan for Transition of Care is related to the following treatment goals: The Patient and/or patient representative  Patricia Bergeron was provided with a choice of provider and agrees   with the discharge plan. [x] Yes [] No    Freedom of choice list was provided with basic dialogue that supports the patient's individualized plan of care/goals, treatment preferences and shares the quality data associated with the providers. [x] Yes [] No     Freedom of Choice Letter provided, signed, and placed on the chart. 1. [facility]  2. [facility]  3. [address]     A referral was sent to [facility] via all scripts, accepted, auth initiated by this writer and requested for expedite release.      The following information was submitted to [facility] [employer] [facility] for initial authorization:  Face Sheet/Demographics    (Include: Usual living situation)  History and Physical  Last 24 hours of MD and RN notes   (Include: Current Level of Functioning; cognitive status)  PT/OT/ST Evaluations and/or notes within the last 48 hours  MAR  MD orders  (Include: Attending or ordering MDs name and phone #; skilled nursing needs;    Wound care/etc)  Projected Date of Transfer to SNF  Requested SNF  SNF Point of Contact and Phone #  CM Point of Contact and Phone #  NPI # for SNF    Reference # [id number]      [id number] [facility]    [phone]

## 2020-01-08 NOTE — DISCHARGE SUMMARY
Discharge Summary       PATIENT ID: Juan David Weiss  MRN: [id number]   YOB: 1945    DATE OF ADMISSION: 1/1/2020  3:35 PM    DATE OF DISCHARGE: 1/8/2020   PRIMARY CARE PROVIDER: Herman Renner MD     ATTENDING PHYSICIAN: Aravind Bean  DISCHARGING PROVIDER: Earline Terry MD    To contact this individual call [phone] and ask the  to page. If unavailable ask to be transferred the Adult Hospitalist Department. CONSULTATIONS: IP CONSULT TO NEUROLOGY    PROCEDURES/SURGERIES: * No surgery found *    ADMITTING DIAGNOSES & HOSPITAL COURSE:     History of present illness    Patient states that today around 1:30pm he noticed that he father was sitting on a chair leaning to one side. He was found to be confused and son had a difficult time understanding his speech. He tried to move his father out of the chair but patient was overall weak.  As per son report patient has had similar episodes in the past related to high sugar for he checked patients blood sugar and it was in the 300s. He gave the patient 24 units of Lantus but his status did not improve for which he called EMS. for which  Last time patient was seen normal was around 12pm.    On arrival to ER patient was verbal with no focal deficit. Tele neurology evaluated patient who did not recommend Tpa as no focal deficit.  CTA of the head showed occlusion at the M3. ER physician discussed case with interventional radiology Dr. Yenifer Cobos who reviewed images and as per report no intervention is planned. Hospital course    Patient admitted for further evaluation of altered mental status. Neurology follows the patient. MRI of the brain shows chronic small infarcts with volume loss but no acute pathology. EEG found to be normal.  Neurology's impression was altered mental status secondary to worsening dementia. 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   Patient discharged to skilled nursing facility for further rehab. DISCHARGE DIAGNOSES / PLAN:      1. Altered mental status  2. Dementia  3. Hypertension  4. Diabetes  5. CKD stage III     ADDITIONAL CARE RECOMMENDATIONS:     None     PENDING TEST RESULTS:   At the time of discharge the following test results are still pending: None    FOLLOW UP APPOINTMENTS:    Follow-up Information     Follow up With Specialties Details Why Contact Info    Eulalio Pichardo MD Internal Medicine   [address] [facility]  [address]  [facility]  [place] [address] [phone]      [place], [address] [facility] [phone]               DIET: Cardiac Diet  Oral Nutritional Supplements: No Oral Supplement prescribed    ACTIVITY: PT/OT Eval and Treat    WOUND CARE: None    EQUIPMENT needed: None      DISCHARGE MEDICATIONS:  Current Discharge Medication List      START taking these medications    Details   insulin glargine (LANTUS) 100 unit/mL injection 7 Units by SubCUTAneous route nightly. Qty: 1 Vial, Refills: 0         CONTINUE these medications which have NOT CHANGED    Details   vitamin E (AQUA GEMS) 400 unit capsule Take  by mouth daily. atorvastatin (LIPITOR) 20 mg tablet TAKE 1 TAB BY MOUTH DAILY. Qty: 90 Tab, Refills: 1    Associated Diagnoses: Controlled type 2 diabetes mellitus with diabetic nephropathy, with long-term current use of insulin (HCC)      insulin lispro (HUMALOG U-100 INSULIN) 100 unit/mL injection 4 Units Before breakfast, lunch, and dinner. losartan (COZAAR) 100 mg tablet TAKE 1 TABLET BY MOUTH EVERY DAY  Qty: 90 Tab, Refills: 2    Associated Diagnoses: Essential hypertension with goal blood pressure less than 140/90      metFORMIN (GLUCOPHAGE) 500 mg tablet Take 1 Tab by mouth three (3) times daily (with meals).   Qty: 90 Tab, Refills: 5    Associated Diagnoses: Controlled type 2 diabetes mellitus with diabetic nephropathy, with long-term current use of insulin (HCC)      amLODIPine (NORVASC) 10 mg tablet Take 1 Tab by mouth daily. Qty: 90 Tab, Refills: 3    Comments: PATIENT'S INS REQUIRES 90D RX. PLEASE SEND NEW RX FOR 90DAYS AND WE WILL FILL WHEN DUE. THANKS  Associated Diagnoses: Essential hypertension      DOCOSAHEXANOIC ACID/EPA (FISH OIL PO) Take  by mouth. vit B Cmplx 3-FA-Vit C-Biotin (NEPHRO ALEN RX) 1- mg-mg-mcg tablet Take 1 Tab by mouth daily. Qty: 30 Tab, Refills: 3      aspirin (ASPIRIN) 325 mg tablet Take 325 mg by mouth daily. STOP taking these medications       insulin glargine (LANTUS SOLOSTAR U-100 INSULIN) 100 unit/mL (3 mL) inpn Comments:   Reason for Stopping:                 NOTIFY YOUR PHYSICIAN FOR ANY OF THE FOLLOWING:   Fever over 101 degrees for 24 hours. Chest pain, shortness of breath, fever, chills, nausea, vomiting, diarrhea, change in mentation, falling, weakness, bleeding. Severe pain or pain not relieved by medications. Or, any other signs or symptoms that you may have questions about. DISPOSITION:    Home With:   OT  PT  HH  RN       Long term SNF/Inpatient Rehab    Independent/assisted living    Hospice    Other:       PATIENT CONDITION AT DISCHARGE:     Functional status    Poor     Deconditioned     Independent      Cognition     Lucid     Forgetful     Dementia      Catheters/lines (plus indication)    Green     PICC     PEG     None      Code status     Full code     DNR      PHYSICAL EXAMINATION AT DISCHARGE:  General:          Alert, cooperative, no distress, appears stated age. HEENT:           Atraumatic, anicteric sclerae, pink conjunctivae                          No oral ulcers, mucosa moist, throat clear, dentition fair  Neck:               Supple, symmetrical  Lungs:             Clear to auscultation bilaterally. No Wheezing or Rhonchi. No rales. Chest wall:      No tenderness  No Accessory muscle use.   Heart:              Regular  rhythm,  No  murmur   No edema  Abdomen: Soft, non-tender. Not distended. Bowel sounds normal  Extremities:     No cyanosis. No clubbing,                            Skin turgor normal, Capillary refill normal  Skin:                Not pale. Not Jaundiced  No rashes   Psych:             Not anxious or agitated.   Neurologic:      Alert, moves all extremities, answers questions appropriately and responds to commands       CHRONIC MEDICAL DIAGNOSES:  Problem List as of 1/8/2020 Date Reviewed: 12/4/2018          Codes Class Noted - Resolved    CVA (cerebral vascular accident) [facility]) ICD-10-CM: I63.9  ICD-9-CM: 434.91  1/1/2020 - Present        DM (diabetes mellitus) type II controlled with renal manifestation ([facility] ICD-10-CM: E11.29  ICD-9-CM: 250.40  4/20/2016 - Present        Cataracts, bilateral ICD-10-CM: H26.9  ICD-9-CM: 366.9  11/15/2013 - Present    Overview Signed 6/18/2014 11:37 AM by Welford Head     Removed 4/2014             Glaucoma, right eye ICD-10-CM: H40.9  ICD-9-CM: 365.9  11/15/2013 - Present        HTN (hypertension) ICD-10-CM: I10  ICD-9-CM: 401.9  7/2/2012 - Present        RESOLVED: Type 1 diabetes mellitus with hyperglycemia ([facility] ICD-10-CM: E10.65  ICD-9-CM: 250.01  8/20/2018 - 8/24/2018        RESOLVED: Screening for colon cancer ICD-10-CM: Z12.11  ICD-9-CM: V76.51  6/18/2014 - 9/30/2019        RESOLVED: Right shoulder pain ICD-10-CM: M25.511  ICD-9-CM: 719.41  11/28/2012 - 4/20/2016        RESOLVED: HTN (hypertension) ICD-10-CM: I10  ICD-9-CM: 401.9  7/2/2012 - 11/28/2012        RESOLVED: Diabetes mellitus type 2 in nonobese ([facility] ICD-10-CM: E11.9  ICD-9-CM: 250.00  6/11/2012 - 4/20/2016    Overview Signed 6/11/2012 11:23 AM by Dianelys Le NP     2002 dx               RESOLVED: Hyperglycemia without ketosis ICD-10-CM: R73.9  ICD-9-CM: 790.29  5/26/2012 - 5/29/2012        RESOLVED: Seizure ([facility] ICD-10-CM: R56.9  ICD-9-CM: 780.39  5/26/2012 - 5/29/2012              Greater than 30 minutes were spent with the patient on counseling and coordination of care    Signed:   Shaniqua Avalos MD  1/8/2020  3:44 PM

## 2020-01-08 NOTE — PROGRESS NOTES
Bedside shift change report given to Vj Chicas RN (oncoming nurse) by Kinsey Anaya RN (offgoing nurse). Report included the following information SBAR, MAR and Recent Results.

## 2020-01-08 NOTE — PROGRESS NOTES
Problem: Falls - Risk of  Goal: *Absence of Falls  Description  Document [place] Flow Fall Risk and appropriate interventions in the flowsheet.   Outcome: Progressing Towards Goal  Note: Fall Risk Interventions:  Mobility Interventions: Bed/chair exit alarm    Mentation Interventions: Bed/chair exit alarm    Medication Interventions: Bed/chair exit alarm    Elimination Interventions: Bed/chair exit alarm              Problem: Patient Education: Go to Patient Education Activity  Goal: Patient/Family Education  Outcome: Progressing Towards Goal

## 2020-01-09 NOTE — PROGRESS NOTES
Transition of care outreach postponed for 14 days due to patient's discharge to SNF. Patient discharged to [facility] per EMR.

## 2020-01-21 PROBLEM — R41.82 ALTERED MENTAL STATUS: Status: ACTIVE | Noted: 2020-01-01

## 2020-01-21 PROBLEM — R62.7 FAILURE TO THRIVE IN ADULT: Status: ACTIVE | Noted: 2020-01-01

## 2020-01-21 PROBLEM — E87.5 HYPERKALEMIA: Status: ACTIVE | Noted: 2020-01-01

## 2020-01-21 PROBLEM — E87.0 HYPERNATREMIA: Status: ACTIVE | Noted: 2020-01-01

## 2020-01-21 PROBLEM — N17.9 AKI (ACUTE KIDNEY INJURY) (HCC): Status: ACTIVE | Noted: 2020-01-01

## 2020-01-21 NOTE — PROGRESS NOTES
15:46 TRANSFER - IN REPORT: 
 
Verbal report received from Ethan, RN(name) on Ike Ray  being received from ER(unit) for routine progression of care Report consisted of patients Situation, Background, Assessment and  
Recommendations(SBAR). Information from the following report(s) SBAR, Kardex, Recent Results and Cardiac Rhythm NSR was reviewed with the receiving nurse. Opportunity for questions and clarification was provided. Assessment completed upon patients arrival to unit and care assumed. Visit Vitals /86 (BP 1 Location: Left arm, BP Patient Position: At rest) Pulse 90 Temp 97.4 °F (36.3 °C) Resp 14 Wt 53.8 kg (118 lb 8 oz) SpO2 99% BMI 19.72 kg/m² Primary Nurse Dee Grady RN and DANNA lowry performed a dual skin assessment on this patient Impairment noted- see wound doc flow sheet 
-Pressure ulcer injury on sacrum  
-scattered scabs on bilateral lower extremities 
-red bilateral heels WOUND CARE HAS BEEN CONSULTED  
 
16:45 Notified MD(Dr. Angelica Ruano) Lactic= 3.1, MD order to discontinue checking lactic q 6 hrs. MD stated that patient was taking metformin which can increase level of lactic. MD order to send BMP instead. Problem: Falls - Risk ofGoal: *Absence of Falls Description Document Danielle Owens Fall Risk and appropriate interventions in the flowsheet. Outcome: Progressing Towards Goal 
Note: Fall Risk Interventions: 
 
Mentation Interventions: Adequate sleep, hydration, pain control, Evaluate medications/consider consulting pharmacy, Eyeglasses and hearing aids, Familiar objects from home, Increase mobility, More frequent rounding, Reorient patient, Room close to nurse's station Elimination Interventions: Call light in reach, Patient to call for help with toileting needs, Stay With Me (per policy), Toilet paper/wipes in reach Bedside and Verbal shift change report given to Silvino Olmedo RN (oncoming nurse) by Annette Julien RN (offgoing nurse). Report included the following information SBAR, Kardex, MAR, Recent Results and Cardiac Rhythm NSR. Patient Vitals for the past 8 hrs: 
 Temp Pulse Resp BP SpO2  
01/21/20 1546 97.4 °F (36.3 °C) 90 14 125/86 99 % 01/21/20 1511 97.6 °F (36.4 °C) 94 16 124/79 97 % 01/21/20 1400  90 15 124/70 99 % 01/21/20 1309  97 10  97 % 01/21/20 1300  95 17 121/79 97 % 01/21/20 1226  96 16  97 % 01/21/20 1200  96 21 123/74 97 % 01/21/20 1100  (!) 104 16 123/76 99 %

## 2020-01-21 NOTE — ED NOTES
TRANSFER - OUT REPORT: 
 
Verbal report given to DANNA Tong(name) on Dayoung AMINA Tanner  being transferred to [facility]) for routine progression of care Report consisted of patients Situation, Background, Assessment and  
Recommendations(SBAR). Information from the following report(s) SBAR, Kardex, ED Summary and MAR was reviewed with the receiving nurse. Lines:  
Peripheral IV 01/21/20 Right Hand (Active) Site Assessment Clean, dry, & intact 1/21/2020  2:38 AM  
Phlebitis Assessment 0 1/21/2020  2:38 AM  
Infiltration Assessment 0 1/21/2020  2:38 AM  
Dressing Type Elastic bandage 1/21/2020  2:38 AM  
Hub Color/Line Status Pink 1/21/2020  2:38 AM  
  
 
Opportunity for questions and clarification was provided. Patient transported with: 
 Monitor

## 2020-01-21 NOTE — ED PROVIDER NOTES
HPI 19-year-old male with a history of dementia, diabetes, high cholesterol, hypertension, recently admitted to the hospital for altered mental status presents from rehab facility with concerns for dehydration and failure to thrive. Our nurse called the rehab facility and they stated that in the last several days he has refused to eat or drink anything and has gotten progressively weaker. The doctor ordered labs which came back abnormal so he was sent in for IV fluids. During his last admission for altered mental status he had a full neurologic evaluation including EEG,  MRI. Etc. Neurology's impression was his altered mental status was secondary to worsening dementia. Past Medical History:  
Diagnosis Date  Diabetes ([address] 2002  High cholesterol  Hypertension No past surgical history on file. Family History:  
Problem Relation Age of Onset  No Known Problems Mother  No Known Problems Father  No Known Problems Sister  No Known Problems Brother  No Known Problems Brother  No Known Problems Brother  No Known Problems Brother  No Known Problems Brother  No Known Problems Brother  No Known Problems Maternal Grandmother  No Known Problems Maternal Grandfather  No Known Problems Paternal Grandmother  No Known Problems Paternal Grandfather  Diabetes Neg Hx   
 Heart Disease Neg Hx Social History Socioeconomic History  Marital status:  Spouse name: Not on file  Number of children: Not on file  Years of education: Not on file  Highest education level: Not on file Occupational History  Not on file Social Needs  Financial resource strain: Not on file  Food insecurity:  
  Worry: Not on file Inability: Not on file  Transportation needs:  
  Medical: Not on file Non-medical: Not on file Tobacco Use  Smoking status: Never Smoker  Smokeless tobacco: Never Used Substance and Sexual Activity  Alcohol use: No  
  Alcohol/week: 0.0 standard drinks  Drug use: No  
  Types: Prescription, OTC  Sexual activity: Never Lifestyle  Physical activity:  
  Days per week: Not on file Minutes per session: Not on file  Stress: Not on file Relationships  Social connections:  
  Talks on phone: Not on file Gets together: Not on file Attends [employer] service: Not on file Active member of club or organization: Not on file Attends meetings of clubs or organizations: Not on file Relationship status: Not on file  Intimate partner violence:  
  Fear of current or ex partner: Not on file Emotionally abused: Not on file Physically abused: Not on file Forced sexual activity: Not on file Other Topics Concern  Not on file Social History Narrative ** Merged History Encounter ** ALLERGIES: Patient has no known allergies. Review of Systems Unable to perform ROS: Dementia There were no vitals filed for this visit. Physical Exam 
Constitutional:   
   General: He is not in acute distress. Appearance: He is well-developed. Comments: chronically ill appearing, thin/frait HENT:  
   Head: Normocephalic and atraumatic. Mouth/Throat:  
   Pharynx: No oropharyngeal exudate. Eyes:  
   General: No scleral icterus. Right eye: No discharge. Left eye: No discharge. Pupils: Pupils are equal, round, and reactive to light. Neck: Musculoskeletal: Normal range of motion and neck supple. Vascular: No JVD. Cardiovascular:  
   Rate and Rhythm: Normal rate and regular rhythm. Heart sounds: Normal heart sounds. No murmur. Pulmonary:  
   Effort: Pulmonary effort is normal. No respiratory distress. Breath sounds: Normal breath sounds. No stridor. No wheezing or rales. Chest:  
   Chest wall: No tenderness. Abdominal: General: Bowel sounds are normal. There is no distension. Palpations: Abdomen is soft. There is no mass. Tenderness: There is no tenderness. There is no guarding or rebound. Musculoskeletal: Normal range of motion. Skin: 
   General: Skin is warm and dry. Capillary Refill: Capillary refill takes less than 2 seconds. Findings: No rash. Neurological:  
   Comments: confused Psychiatric:     
   Behavior: Behavior normal.     
   Thought Content: Thought content normal.     
   Judgment: Judgment normal.  
 
  
 
MDM Number of Diagnoses or Management Options Acute kidney injury [facility]):  
Dehydration: Hypernatremia:  
Critical Care Total time providing critical care: 30-74 minutes 40 minutes Procedures ED EKG interpretation: 
Rhythm: sinus tachycardia; and regular . Rate (approx.): 101; Axis: normal; P wave: normal; QRS interval: normal ; ST/T wave: non-specific changes; This EKG was interpreted by Alex Lisa MD,ED Provider. Lucho Guidry Hospitalist Perfect Serve for Admission 2:46 AM 
 
ED Room Number: RN22/97 Patient Name and age:  Ty Felder 76 y.o.  male Working Diagnosis: 1. Dehydration 2. Acute kidney injury ([facility] [address] 3. Hypernatremia Readmission: yes Isolation Requirements:  no 
Recommended Level of Care:  telemetry Code Status:  Full Code Department:[facility] Adult ED - [phone] Other:  76year old male with dementia recently admitted for work up of AMS, presents with failure to thrive- reportedly hasn't eaten in days. JOSH by labs, sodium 157, bicarb 18, chloride 129, K 6.0 glucose 245. No acute EKG changes. Ordered fluids, insulin/glucose. Slightly tachy around 100, bp ok. Lactate pending, wbc 13- no clear source, maybe hemoconcentration.

## 2020-01-21 NOTE — PROGRESS NOTES
Patient's son Ike Haynes stopped in, had to leave, requested nursing call with questions. Azar Overall [phone].

## 2020-01-21 NOTE — PROGRESS NOTES
Patient seen and examined this morning. He has altered mental status and is barely able to follow commands. Though easily opens eyes to voice commands. Patient is on IV fluids for hypernatremia. He is having hyperglycemia from the dextrose. He might be having glycosuria and that is why his sodium is also going up even with dextrose. I will change his fluids to dextrose with half-normal saline at double the rate at 150 mL/h. Note the pharmacy does not carry 2.5% dextrose. Started on Lantus for better control of blood sugars. Is on sliding scale already. Potassium is normal now. No family bedside. He is able to lift both his left and her right arm up against gravity. He is having difficulty raising his lower extremities against gravity. CT of the head does not show any acute abnormality. He is very confused and I am not sure if he is able to follow commands very well

## 2020-01-21 NOTE — PROGRESS NOTES
Admission Medication Reconciliation: 
 
Information obtained from: [facility] medication list updated with information provided by [facility] dated 20. Summary:  
 
Medications added: midodrine Medications deleted: amlodipine Doses changed: atorvastatin 40 mg (vs 20 mg), insulin glargine 9 units before meals (vs 7 units), insulin lispro 6 units), losartan 50 mg daily (vs 100 mg) Inpatient orders have been reviewed and no changes are needed. Chief Complaint for this Admission:  hyperkalemia Significant PMH/Disease States:  
Past Medical History:  
Diagnosis Date Diabetes ([address] 2002 High cholesterol Hypertension Allergies:  Patient has no known allergies. Prior to Admission Medications:  
Prior to Admission Medications Prescriptions Last Dose Informant Patient Reported? Taking?  
aspirin (ASPIRIN) 325 mg tablet 2020  Yes Yes Sig: Take 325 mg by mouth daily. atorvastatin (LIPITOR) 20 mg tablet 2020  Yes Yes Sig: Take 40 mg by mouth nightly. fish oil-omega-3 fatty acids (FISH OIL) 340-1,000 mg capsule 2020  Yes Yes Sig: Take 1 Cap by mouth daily. insulin glargine (LANTUS) 100 unit/mL injection 2020  Yes Yes Si Units by SubCUTAneous route nightly. insulin lispro (HUMALOG KWIKPEN INSULIN) 100 unit/mL kwikpen 2020  Yes Yes Si Units by SubCUTAneous route Before breakfast, lunch, and dinner. losartan (COZAAR) 100 mg tablet 2020  Yes Yes Sig: Take 50 mg by mouth daily. metFORMIN (GLUCOPHAGE) 500 mg tablet 2020  No Yes Sig: Take 1 Tab by mouth three (3) times daily (with meals). midodrine (PROAMITINE) 10 mg tablet 2020  Yes Yes Sig: Take 10 mg by mouth three (3) times daily. vit B Cmplx 3-FA-Vit C-Biotin (NEPHRO ALEN RX) 1- mg-mg-mcg tablet 2020  Yes Yes Sig: Take 1 Tab by mouth daily. vitamin E (AQUA GEMS) 400 unit capsule 2020  Yes Yes Sig: Take 400 Units by mouth daily. Facility-Administered Medications: None Thank you for allowing me to participate in the care of this patient. Please contact the pharmacy at  or the medication reconciliation pharmacist at  with any questions. Allegra Arnett, Pharm. D., BCPS, BCPPS

## 2020-01-21 NOTE — ED NOTES
Hospitalist notified of lactic of 2.3. She was also informed that pt's BG is consistently elevated and suggested that continuous fluids be changed from D5. MD will review labs and place new orders.

## 2020-01-21 NOTE — ED TRIAGE NOTES
Chief Complaint Patient presents with  Abnormal Lab Results  
  sent from [facility] for abnormal results

## 2020-01-21 NOTE — PROGRESS NOTES
Reason for Readmission:     Dehydration RRAT Score/Risk Level:     28/ High Risk Level Is a Care Conference indicated: Not at this time. Did you attend your follow up appointment (s): If not, why not: NO, currently at [facility] for skilled services. Resources/supports as identified by patient/family:   Family Top Challenges facing patient (as identified by patient/family and CM):     Health challenges and decline Finances/Medication cost?     SS/ Medication administered at facility Transportation      BLS Support system or lack thereof? Family: son, Sera Bryan [phone], daughter; Brigido Bridegroom [phone]/ [phone], daughter-in-law; Florence Frey [phone], son; Mk Casillas [phone]. Living arrangements? [facility] Self-care/ADLs/Cognition? Dementia, follows basic commands. Receives assistance with ADL's and IADL's Current Advanced Directive/Advance Care Plan:  Full code with no advance care plan on file at this time. Will address at a later time Plan for utilizing home health:   Not at this time. Transition of Care Plan:    Based on readmission, the patient's previous Plan of Care 
 has been evaluated and/or modified. The current Transition of Care Plan is:       Anticipated NATALIIA plan is for patient to return to [facility] for skilled services. Patient will require [employer] Auth. Referral submitted to [facility] via [employer]Alloc[employer]ia. Previous admission UAI was completed and family agreeable to apply for LTC Medicaid. Family has not applied at this time. E-mail submitted to [employer] to assist family. CM will continue to follow and assist with NATALIIA needs as they arise. Care Management Interventions PCP Verified by CM: Yes 
Palliative Care Criteria Met (RRAT>21 & CHF Dx)?: No 
Mode of Transport at Discharge: [facility] Transition of Care Consult (CM Consult): (No current CM consult) Discharge Durable Medical Equipment: No 
Physical Therapy Consult: No 
Occupational Therapy Consult: No 
Speech Therapy Consult: No 
Current Support Network: [address] [place] [address] Confirm Follow Up Transport: Other (see comment) Discharge Location Discharge Placement: Skilled nursing facility([facility]) CATHERINE Bethea/BLAKE Care Management 10:01 AM

## 2020-01-21 NOTE — H&P
[facility] Adult  Hospitalist Group History and Physical 
 
Primary Care Provider: Elvira Cano NP Subjective:  
 
Altaf Gupta is a 76 y.o. male with past medical history significant for diabetes mellitus type 2, hypertension, history of dementia presents the emergency room from a rehab facility due to concern for dehydration and failure to thrive. As per medical report patient has refused to eat or drink anything in the last several days becoming weaker. Medical provider at the facility decided to order blood work-up which were abnormal.  Sent to ER for IV fluid. Patient at this time awake following basic commands but disoriented. Does not seem in acute distress but appears very dry. Denies any complaint. No family at the bedside. Work-up in the emergency room patient was found to have hyponatremia, hyperkalemia and acute kidney injury. He was started on normal saline and given regular insulin plus glucose for the hyperkalemia. Admission requested for dehydration and failure to thrive. Unable to obtain more history due to patient's mental status. Of note patient was recently discharged from [facility] after he was admitted for altered mental status. He had work-up including CT scan of the head, MRI of the brain and EEG that did not show any acute pathology. He was thought that altered mental status was progression of his dementia. Once medically cleared, he was sent to rehab. Review of Systems: 
 
Review of systems not obtained due to patient factors. Past Medical History:  
Diagnosis Date  Diabetes ([facility] [address] 2002  High cholesterol  Hypertension No past surgical history on file. Prior to Admission medications Medication Sig Start Date End Date Taking? Authorizing Provider  
insulin glargine (LANTUS) 100 unit/mL injection 7 Units by SubCUTAneous route nightly. 1/8/20   Pina Amaya MD  
vitamin E (AQUA GEMS) 400 unit capsule Take  by mouth daily.     Provider, Historical  
atorvastatin (LIPITOR) 20 mg tablet TAKE 1 TAB BY MOUTH DAILY. 11/19/18   Anat Gama MD  
insulin lispro (HUMALOG U-100 INSULIN) 100 unit/mL injection 4 Units Before breakfast, lunch, and dinner. Provider, Historical  
losartan (COZAAR) 100 mg tablet TAKE 1 TABLET BY MOUTH EVERY DAY 6/21/18   Anat Gama MD  
metFORMIN (GLUCOPHAGE) 500 mg tablet Take 1 Tab by mouth three (3) times daily (with meals). 4/17/18   Anat Gama MD  
amLODIPine (NORVASC) 10 mg tablet Take 1 Tab by mouth daily. 4/16/18   Anat Gama MD  
DOCOSAHEXANOIC ACID/EPA (FISH OIL PO) Take  by mouth. Provider, Historical  
vit B Cmplx 3-FA-Vit C-Biotin (NEPHRO ALEN RX) 1- mg-mg-mcg tablet Take 1 Tab by mouth daily. 2/20/13   Daisy Arellano MD  
aspirin (ASPIRIN) 325 mg tablet Take 325 mg by mouth daily. Provider, Historical  
 
No Known Allergies Family History: Unable to obtain Family history due to patient's mental status. SOCIAL HISTORY: 
Patient resides at [facility]. Patient ambulates with wheelchair. Smoking history: former smoker Alcohol history: None Objective:  
 
 
Physical Exam:  
Patient Vitals for the past 12 hrs: 
 Temp Pulse Resp BP  
01/21/20 0132 96.4 °F (35.8 °C) (!) 101 12 110/76 GEN APPEARANCE: Cachectic chronically ill. HEENT: Conjunctiva Clear. Dry oral mucosa CVS: Tachycardia. S1, S2; No M/G/R 
LUNGS: CTAB; No Wheezes; No Rhonchi: No rales ABD: Soft; No TTP; + Normoactive BS 
EXT:  no edema Skin exam: excoriation lower extremity bilaterally. MENTAL STATUS: Oriented to person only. Responds to commands. Neuro: left facial drop. Left eye gaze deviated laterally. able to move tongue left/right. Right upper extremity neglect. Motor strength right lower extremity 4/5/. MS on the left is 5/5. Data Review:  
Recent Results (from the past 24 hour(s)) EKG, 12 LEAD, INITIAL  Collection Time: 01/21/20  1:47 AM  
 Result Value Ref Range Ventricular Rate 101 BPM  
 Atrial Rate 101 BPM  
 P-R Interval 102 ms QRS Duration 56 ms  
 Q-T Interval 330 ms QTC Calculation (Bezet) 427 ms Calculated P Axis 48 degrees Calculated R Axis 63 degrees Calculated T Axis 71 degrees Diagnosis Sinus tachycardia with short OR When compared with ECG of 01-JAN-2020 16:40, No significant change was found CBC WITH AUTOMATED DIFF Collection Time: 01/21/20  1:53 AM  
Result Value Ref Range WBC 13.6 (H) 4.1 - 11.1 K/uL  
 RBC 5.04 4.10 - 5.70 M/uL  
 HGB 14.4 12.1 - 17.0 g/dL HCT 43.8 36.6 - 50.3 % MCV 86.9 80.0 - 99.0 FL  
 MCH 28.6 26.0 - 34.0 PG  
 MCHC 32.9 30.0 - 36.5 g/dL  
 RDW 14.8 (H) 11.5 - 14.5 % PLATELET 876 075 - 665 K/uL MPV 10.9 8.9 - 12.9 FL  
 NRBC 0.0 0  WBC ABSOLUTE NRBC 0.00 0.00 - 0.01 K/uL NEUTROPHILS 60 32 - 75 % LYMPHOCYTES 31 12 - 49 % MONOCYTES 6 5 - 13 % EOSINOPHILS 2 0 - 7 % BASOPHILS 0 0 - 1 % IMMATURE GRANULOCYTES 0 0.0 - 0.5 % ABS. NEUTROPHILS 8.2 (H) 1.8 - 8.0 K/UL  
 ABS. LYMPHOCYTES 4.2 (H) 0.8 - 3.5 K/UL  
 ABS. MONOCYTES 0.8 0.0 - 1.0 K/UL  
 ABS. EOSINOPHILS 0.3 0.0 - 0.4 K/UL  
 ABS. BASOPHILS 0.1 0.0 - 0.1 K/UL  
 ABS. IMM. GRANS. 0.0 0.00 - 0.04 K/UL  
 DF AUTOMATED METABOLIC PANEL, COMPREHENSIVE Collection Time: 01/21/20  1:53 AM  
Result Value Ref Range Sodium 157 (H) 136 - 145 mmol/L Potassium 6.0 (H) 3.5 - 5.1 mmol/L Chloride 129 (H) 97 - 108 mmol/L  
 CO2 18 (L) 21 - 32 mmol/L Anion gap 10 5 - 15 mmol/L Glucose 245 (H) 65 - 100 mg/dL BUN 99 (H) 6 - 20 MG/DL Creatinine 2.95 (H) 0.70 - 1.30 MG/DL  
 BUN/Creatinine ratio 34 (H) 12 - 20 GFR est AA 25 (L) >60 ml/min/1.73m2 GFR est non-AA 21 (L) >60 ml/min/1.73m2 Calcium 9.5 8.5 - 10.1 MG/DL Bilirubin, total 0.9 0.2 - 1.0 MG/DL  
 ALT (SGPT) 28 12 - 78 U/L  
 AST (SGOT) 21 15 - 37 U/L Alk.  phosphatase 93 45 - 117 U/L  
 Protein, total 8.5 (H) 6.4 - 8.2 g/dL Albumin 3.4 (L) 3.5 - 5.0 g/dL Globulin 5.1 (H) 2.0 - 4.0 g/dL A-G Ratio 0.7 (L) 1.1 - 2.2    
TROPONIN I Collection Time: 01/21/20  1:53 AM  
Result Value Ref Range Troponin-I, Qt. <0.05 <0.05 ng/mL SAMPLES BEING HELD Collection Time: 01/21/20  1:57 AM  
Result Value Ref Range SAMPLES BEING HELD 1BLUE 1RED 2BC(1PURP, 1BLUE) COMMENT Add-on orders for these samples will be processed based on acceptable specimen integrity and analyte stability, which may vary by analyte. LACTIC ACID Collection Time: 01/21/20  1:57 AM  
Result Value Ref Range Lactic acid 2.2 (HH) 0.4 - 2.0 MMOL/L  
URINALYSIS W/MICROSCOPIC Collection Time: 01/21/20  2:46 AM  
Result Value Ref Range Color YELLOW/STRAW Appearance CLEAR CLEAR Specific gravity 1.018 1.003 - 1.030    
 pH (UA) 5.0 5.0 - 8.0 Protein NEGATIVE  NEG mg/dL Glucose NEGATIVE  NEG mg/dL Ketone NEGATIVE  NEG mg/dL Bilirubin NEGATIVE  NEG Blood TRACE (A) NEG Urobilinogen 0.2 0.2 - 1.0 EU/dL Nitrites NEGATIVE  NEG Leukocyte Esterase NEGATIVE  NEG    
 WBC 0-4 0 - 4 /hpf  
 RBC 0-5 0 - 5 /hpf Epithelial cells FEW FEW /lpf Bacteria NEGATIVE  NEG /hpf Hyaline cast 2-5 0 - 5 /lpf URINE CULTURE HOLD SAMPLE Collection Time: 01/21/20  2:46 AM  
Result Value Ref Range Urine culture hold URINE ON HOLD IN MICROBIOLOGY DEPT FOR 3 DAYS. IF UNPRESERVED URINE IS SUBMITTED, IT CANNOT BE USED FOR ADDITIONAL TESTING AFTER 24 HRS, RECOLLECTION WILL BE REQUIRED. Xr Chest [facility] Result Date: 1/21/2020 IMPRESSION: No acute process. Assessment:  
 
Active Problems: Hyperkalemia (1/21/2020) Hypernatremia (1/21/2020) Altered mental status (1/21/2020) Failure to thrive in adult (1/21/2020) JOSH (acute kidney injury) ([facility] [address] (1/21/2020) Plan: 1. Hypernatremia- severe hypernatremia likely from failure to thrive. - Water deficit of 2.5L. Will start D5W at 75ml/hr.  
- Monitor Na michelle 6 hours. Correct Na by 10mmol/L in the first 24 hours. 2. JOSH on CKD- likely prerenal in etiology. IV fluids as above - Monitor renal function and urine output. 3. Non anion gap metabolic acidosis- secondary to renal failure and mild elevated lactic acid in the setting of dehydration. 
- IV fluids. Repeat Lactic acid now. 4. Hyperkalemia- likely secondary to renal failure. Received insulin/glucose in ER.  
- Give calcium gluconate, amp of bicarb, albuterol.  
- IV fluids as above 
- repeat K level an hour after treatment given 5. Right Lower extremity weakness-  Patient had recent work in January 2020 including MRI of the brain and EEG that were unremarkable. As per medication documentation he did not have lower extremity weakness but today he has noticeable weakness on the right compared to the left. Will obtain CT of the head for further evaluation. - neuro check q4 hours. 6. Diabetes Mellitus type 2- uncontrolled. Could be contributing to dehydration.  
- Placed on sliding scale insulin ac and hs q6 hours. 7. Failure to thrive- could be related to worsening dementia. - swallow eval recommended once mental status improves. 8. Leukocytosis - likely hemoconcentration from dehydration. No source of infection at this time. - Hold antibiotic.  
- monitor WBC. 9. HTN- bp stable. Hold home meds due to ams. DVT prophy: heparin Code status: full code as per medical record. Unable to address with patient Surrogate decision maker is his son as per previous chart. Attempted to contact son unsuccessfully. Unable to leave . FUNCTIONAL STATUS PRIOR TO HOSPITALIZATION wheelchair. Signed By: Lee Ann Swanson MD   
 January 21, 2020

## 2020-01-22 NOTE — PROGRESS NOTES
Bedside shift change report given to Saira (oncoming nurse) by Stephanie Hoskins (offgoing nurse). Report included the following information SBAR, Kardex, MAR, Recent Results, Med Rec Status and Cardiac Rhythm NSR.

## 2020-01-22 NOTE — PROGRESS NOTES
visit for Advance Medical Directive (AMD) consult. Pt was getting back into room and pt's RN was at bedside. Pt was not able to talk with me at this time. The RN and  discussed AMD and RN assured that pt is unable to complete paperwork at this time. Pt's son and sister are involved and his contacts. Please contact [phone] [address] [facility] for further support.  follow up as needed. Shara Elizondo, [facility] 
 [phone])

## 2020-01-22 NOTE — PROGRESS NOTES
17:18 TRANSFER - OUT REPORT: 
 
Verbal report given to DANNA Dowd(name) on Dayoung AMINA Tanner  being transferred to [address] [facility] (unit) for routine progression of care Report consisted of patients Situation, Background, Assessment and  
Recommendations(SBAR). Information from the following report(s) SBAR, Kardex, MAR, Recent Results and Cardiac Rhythm NSR was reviewed with the receiving nurse. Lines:  
Peripheral IV 01/21/20 Right Hand (Active) Site Assessment Clean, dry, & intact 1/22/2020  4:00 PM  
Phlebitis Assessment 0 1/22/2020  4:00 PM  
Infiltration Assessment 0 1/22/2020  4:00 PM  
Dressing Status Clean, dry, & intact 1/22/2020  4:00 PM  
Dressing Type Tape;Transparent 1/22/2020  4:00 PM  
Hub Color/Line Status Pink; Infusing 1/22/2020  4:00 PM  
Action Taken Open ports on tubing capped 1/22/2020  4:00 PM  
Alcohol Cap Used Yes 1/22/2020  4:00 PM  
   
Peripheral IV 01/21/20 Posterior;Right Wrist (Active) Site Assessment Clean, dry, & intact 1/22/2020  4:00 PM  
Phlebitis Assessment 0 1/22/2020  4:00 PM  
Infiltration Assessment 0 1/22/2020  4:00 PM  
Dressing Status Clean, dry, & intact 1/22/2020  4:00 PM  
Dressing Type Tape;Transparent 1/22/2020  4:00 PM  
Hub Color/Line Status Pink;Capped 1/22/2020  4:00 PM  
Action Taken Open ports on tubing capped 1/22/2020  4:00 PM  
Alcohol Cap Used Yes 1/22/2020  4:00 PM  
  
 
Opportunity for questions and clarification was provided. Patient transported with: 
 [employer] Patient Vitals for the past 12 hrs: 
 Temp Pulse Resp BP SpO2  
01/22/20 1701 98 °F (36.7 °C) 98 14 125/82 98 % 01/22/20 1154 97.7 °F (36.5 °C) 98 18 133/86 96 % 01/22/20 0810 98.2 °F (36.8 °C) 96 17 130/86 100 % 01/22/20 0800     99 % Problem: Diabetes Self-Management Goal: *Incorporating nutritional management into lifestyle Description Describe effect of type, amount and timing of food on blood glucose; list 3 methods for planning meals. Outcome: Progressing Towards Goal 
Goal: *Incorporating physical activity into lifestyle Description State effect of exercise on blood glucose levels. Outcome: Progressing Towards Goal 
  
Problem: Falls - Risk of 
Goal: *Absence of Falls Description Document James Severino Fall Risk and appropriate interventions in the flowsheet. Outcome: Progressing Towards Goal 
Note: Fall Risk Interventions: 
 
Mentation Interventions: Adequate sleep, hydration, pain control, Door open when patient unattended, Evaluate medications/consider consulting pharmacy, Eyeglasses and hearing aids, Gait belt with transfers/ambulation, Increase mobility, More frequent rounding Medication Interventions: Assess postural VS orthostatic hypotension, Evaluate medications/consider consulting pharmacy, Patient to call before getting OOB Elimination Interventions: Call light in reach, Patient to call for help with toileting needs, Stay With Me (per policy), Toileting schedule/hourly rounds Problem: Pressure Injury - Risk of 
Goal: *Prevention of pressure injury Description Document Doni Scale and appropriate interventions in the flowsheet. Outcome: Progressing Towards Goal 
Note: Pressure Injury Interventions: 
Sensory Interventions: Avoid rigorous massage over bony prominences, Discuss PT/OT consult with provider, Float heels, Keep linens dry and wrinkle-free, Minimize linen layers, Monitor skin under medical devices, Turn and reposition approx. every two hours (pillows and wedges if needed) Moisture Interventions: Apply protective barrier, creams and emollients, Check for incontinence Q2 hours and as needed, Limit adult briefs, Maintain skin hydration (lotion/cream), Minimize layers, Offer toileting Q_hr Activity Interventions: Assess need for specialty bed, Increase time out of bed, PT/OT evaluation Mobility Interventions: Assess need for specialty bed, Float heels, PT/OT evaluation, Turn and reposition approx. every two hours(pillow and wedges) Nutrition Interventions: Document food/fluid/supplement intake Friction and Shear Interventions: Apply protective barrier, creams and emollients, Lift sheet, Lift team/patient mobility team, Minimize layers Problem: Impaired Skin Integrity/Pressure Injury Treatment Goal: *Improvement of Existing Pressure Injury Outcome: Progressing Towards Goal 
Goal: *Prevention of pressure injury Description Document Doni Scale and appropriate interventions in the flowsheet. Outcome: Progressing Towards Goal 
Note: Pressure Injury Interventions: 
Sensory Interventions: Avoid rigorous massage over bony prominences, Discuss PT/OT consult with provider, Float heels, Keep linens dry and wrinkle-free, Minimize linen layers, Monitor skin under medical devices, Turn and reposition approx. every two hours (pillows and wedges if needed) Moisture Interventions: Apply protective barrier, creams and emollients, Check for incontinence Q2 hours and as needed, Limit adult briefs, Maintain skin hydration (lotion/cream), Minimize layers, Offer toileting Q_hr Activity Interventions: Assess need for specialty bed, Increase time out of bed, PT/OT evaluation Mobility Interventions: Assess need for specialty bed, Float heels, PT/OT evaluation, Turn and reposition approx. every two hours(pillow and wedges) Nutrition Interventions: Document food/fluid/supplement intake Friction and Shear Interventions: Apply protective barrier, creams and emollients, Lift sheet, Lift team/patient mobility team, Minimize layers

## 2020-01-22 NOTE — PROGRESS NOTES
NUTRITION COMPLETE ASSESSMENT 
 
RECOMMENDATIONS:  
1. RD to add supplements with meals for increased kcal and protein 2. Assistance with feeding at all meals 3. If PO intake remains inadequate to maintain nutrition/hydration, patient may benefit from palliative consult Interventions/Plan:  
Food/Nutrient Delivery:    Commercial supplement Feeding assistance at meals Assessment:  
Reason for Assessment: Pressure Injury Referral 
 
 
Diet: Pureed Supplements: none at this time Meal Intake: No data found. Subjective: 
N/a Objective: 
76 y.o. male admitted with Failure to thrive in adult [R62.7] Hypernatremia [E87.0] Altered mental status [R41.82] JOSH (acute kidney injury) ([facility] [address] [N17.9] Hyperkalemia [E87.5] Pt has a past medical history of Diabetes ([facility] [address] (2002), High cholesterol, and Hypertension. Pt also with history of dementia. Pt admitted from [facility]. Seen by SLP today. Note reviewed. \"Patient presents with moderate oral and mild pharyngeal dysphagia. .. no overt s/s aspiration observed when son carefully fed patient sips from cup. ... patient will always be at risk for aspiration secondary to dementia, AMS, and dependence for feeding. \"  Pureed diet/thin liquids recommended. Pt with probable wt loss as below, although unconfirmed. No family present and pt unreliable. Has baseline dementia, but per RN even more confused. Nephrology following for JOSH and hypernatremia. Per MD, encephalopathy - hypernatremia contributing. Na+ 161 today. On D5 @ 150 mL/hr. Hyperkalemia resolved. JOSH likely from volume depletion. Baseline CKD III. Since K+ WNL, would avoid any sort of renal restriction especially given limited diet with pureed and already known poor PO intake. RD will add supplements with meals - Magic Cup BID, Glucerna TID. RD will continue to follow along and monitor for supplement acceptance and PO adequacy.   As per SLP, if PO intake remains inadequate to maintain nutrition/hydration, patient may benefit from palliative consult. Wt Readings from Last 20 Encounters:  
01/22/20 53.7 kg (118 lb 7.6 oz) 01/08/20 62 kg (136 lb 11 oz) 02/02/19 63 kg (138 lb 14.2 oz) 12/04/18 63.8 kg (140 lb 9.6 oz) 08/27/18 63.5 kg (140 lb) 08/24/18 59.5 kg (131 lb 3.2 oz) 04/16/18 60.4 kg (133 lb 3.2 oz) 12/18/17 65.5 kg (144 lb 4.8 oz) 08/15/17 66.3 kg (146 lb 1.6 oz) 04/18/17 64.2 kg (141 lb 9.6 oz) 03/07/17 64 kg (141 lb)  
11/29/16 62.1 kg (137 lb) 08/30/16 63.9 kg (140 lb 14.4 oz) 05/31/16 57.6 kg (127 lb) 04/25/16 56.7 kg (125 lb) 04/25/16 53.1 kg (117 lb) 04/20/16 54.4 kg (120 lb) 04/16/16 69.9 kg (154 lb)  
06/18/14 64.9 kg (143 lb) 02/14/14 60.8 kg (134 lb) Patient Active Problem List  
Diagnosis Code  
 HTN (hypertension) I10  
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  
 CVA (cerebral vascular accident) ([facility] [address] I63.9  Hyperkalemia E87.5  Hypernatremia E87.0  Altered mental status R41.82  
 Failure to thrive in adult R62.7  JOSH (acute kidney injury) ([facility] [address] N17.9 Nutritionally Significant Medications:  
D5 @ 150 mL/hr, Lantus, SSI Intake/Output: 
 
Intake/Output Summary (Last 24 hours) at 1/22/2020 1630 Last data filed at 1/22/2020 0330 Gross per 24 hour Intake 2000 ml Output  Net 2000 ml Last BM: 1/21 - formed Physical Findings: 
 
Nutrition focused physical exam: deferred at this time Edema: none Abdomen: WDL Skin Integrity: per WOCN - sacral unstageable pressure injury Anthropometrics: 
Weight Loss Metrics 1/22/2020 1/8/2020 2/24/2019 2/2/2019 12/4/2018 8/27/2018 8/24/2018 Today's Wt 118 lb 7.6 oz 136 lb 11 oz - 138 lb 14.2 oz 140 lb 9.6 oz 140 lb 131 lb 3.2 oz BMI 19.72 kg/m2 22.75 kg/m2 23.11 kg/m2 23.11 kg/m2 23.4 kg/m2 23.3 kg/m2 -  
  
Weight Source: Bed Height: 5' 5\" (165.1 cm), Body mass index is 19.72 kg/m². IBW : 61.7 kg (136 lb), % IBW (Calculated): 87.12 % 
 ,   
 
Labs:   
 
Recent Labs  
  01/22/20 
0653 01/22/20 
0456 01/21/20 
1735 01/21/20 
1041 * 170* 450* 324* BUN 61* 67* 77* 88* CREA 2.12* 2.11* 2.25* 2.56* * 163* 155* 159*  
K 4.1 4.0 4.3 4.5  
* 136* 132* 131* CO2 24 22 18* 22  
CA 8.7 9.0 8.5 9.3 Recent Labs  
  01/21/20 
0153 SGOT 21 ALT 28 AP 93 TBILI 0.9 TP 8.5* ALB 3.4*  
GLOB 5.1* Recent Labs  
  01/21/20 
1532 01/21/20 
1042 01/21/20 
0157 LAC 3.1* 2.3* 2.2* Recent Labs  
  01/21/20 
0153 WBC 13.6* HGB 14.4 HCT 43.8  No results for input(s): PREALB in the last 72 hours. No results for input(s): TRIGL in the last 72 hours. Recent Labs  
  01/22/20 
1227 01/22/20 
0547 01/21/20 
2350 01/21/20 
1721 01/21/20 
1128 01/21/20 
0941 01/21/20 
0535 GLUCPOC 248* 185* 288* 201* 295* 267* 262* Lab Results Component Value Date/Time Hemoglobin A1c 10.6 (H) 01/02/2020 04:06 AM  
 Hemoglobin A1c (POC) 12.8 08/20/2018 10:06 AM  
 
 
Cultural, Yazidism and ethnic food preferences identified: None Food Allergies: NKFA Diet Restrictions: Cultural/Zoroastrian Preference(s): None Pre-Hospitalization: 
  
 
Current Hospitalization:  
Fluid Restriction:   
Appetite:   
PO Ability: Total feed Average po intake:Less than 25% Average supplements intake:    
 
 
Estimated Nutrition Needs:  
Kcals/day: 1600 Kcals/day Protein: 65 g(1.2 gm/kg) Fluid: 1600 ml(30 mL/kg) Based On: Kcal/kg - specify (Comment)(30 kcal/kg) Weight Used: Actual wt(53.8 kg) Pt expected to meet estimated nutrient needs:  Not at this time/ until mental status improves Nutrition Diagnosis:  
1. Inadequate oral intake related to AMS and baseline dementia with increased energy needs d/t wounds as evidenced by wt loss, poor PO intake 2. Increased nutrient needs related to wound healing as evidenced by unstageable to sacrum 3.   related to   as evidenced by   
 
Goals:   
 pt will consume >25% of meals and >50% of 1-2 supplements daily in next 3-4 days Monitoring & Evaluation: Total energy intake, Oral fluids amount, Protein intake Weight/weight change, Electrolyte and renal profile, Glucose profile, GI Behavior, Acceptance of assist with eating Previous Nutrition Goals Met:   N/A Previous Recommendations:    N/A Education & Discharge Needs: 
 [x] None Identified 
 [] Identified and addressed [x] Participated in care plan, discharge planning, and/or interdisciplinary rounds Rick Yu RD

## 2020-01-22 NOTE — PROGRESS NOTES
Physical Therapy Screening: An [facility] screening referral was triggered for physical therapy based on results obtained during the nursing admission assessment. The patients chart was reviewed and the patient is appropriate for a skilled therapy evaluation if there is a decline in functional mobility from baseline. Please order a consult for physical therapy if you are in agreement and would like an evaluation to be completed. Thank you. Sree Gill, PT 
 
FUNCTIONAL STATUS PRIOR TO ADMISSION: Per chart review, patient was independent without use of DME. 
 
HOME SUPPORT PRIOR TO ADMISSION: Per chart review, the patient lived with his son. Level of assistance required or provided is unknown at this time d/t patient presented w/ AMS, no caregiver present. Patient states he lives with his wife (chart indicates wife is ).

## 2020-01-22 NOTE — CONSULTS
NEPHROLOGY CONSULT NOTE Patient: Francis Diego MRN: [id number]  PCP: John Woods NP  
:     1945  Age:   76 y.o. Sex:  male Referring physician: Pamela Marsh MD 
Reason for consultation: 76 y.o. male with Failure to thrive in adult [R62.7] Hypernatremia [E87.0] Altered mental status [R41.82] JOSH (acute kidney injury) ([facility] [address] [N17.9] Hyperkalemia [O20.7] complicated by JOSH Admission Date: 2020  1:26 AM  LOS: 1 day ASSESSMENT and PLAN :  
JOSH on CKD: 
- likely from volume depletion 
- trace blood, no protein in UA, does not appear to be infected 
- cont IVF 
- daily labs while here - Renal U/S while here CKD III w/ baseline Cr 1.4 to 1.6: 
- likely from DM and HTN Hypernatremia: 
- from lack of free water intake 
- agree with D5W 
- serial Na levels Hyperkalemia: 
- from JOSH and ARB 
- resolved 
- no ACE/ARB Volume depletion: 
- cont hypotonic fluids Encephalopathy: 
- hypernatremia contributing 
- infectious w/u pending DM2: 
- on insulin FTT Dementia Active Problems / Assessment AAActive  : Active Problems: Hyperkalemia (2020) Hypernatremia (2020) Altered mental status (2020) Failure to thrive in adult (2020) JOSH (acute kidney injury) ([facility] [address] (2020) Subjective: HPI: Francis Diego is a 76 y.o.  male who has been admitted to the hospital for AMS. He has DM2, HTN, dementia, sent over from [facility] for AMS, FTT, poor po intake. Labs revealed Cr of 2.9 (baseline 1.4 to 1.6), Na of 159,  K of 6, lactic acid of 3. CT head and CXR neg. Cultures pending. He is on insulin and IVF. He was on 1/2 NS, Na went up to 163 and was started on D5W. He is demented and altered and unable to provide any history. Past Medical Hx:  
Past Medical History:  
Diagnosis Date  Diabetes ([facility] [address] 2002  High cholesterol  Hypertension Past Surgical Hx: 
 History reviewed. No pertinent surgical history. Medications: 
Prior to Admission medications Medication Sig Start Date End Date Taking? Authorizing Provider  
atorvastatin (LIPITOR) 20 mg tablet Take 40 mg by mouth nightly. Yes Provider, Historical  
fish oil-omega-3 fatty acids (FISH OIL) 340-1,000 mg capsule Take 1 Cap by mouth daily. Yes Provider, Historical  
insulin glargine (LANTUS) 100 unit/mL injection 9 Units by SubCUTAneous route nightly. Yes Provider, Historical  
insulin lispro (HUMALOG KWIKPEN INSULIN) 100 unit/mL kwikpen 6 Units by SubCUTAneous route Before breakfast, lunch, and dinner. Yes Provider, Historical  
losartan (COZAAR) 100 mg tablet Take 50 mg by mouth daily. Yes Provider, Historical  
midodrine (PROAMITINE) 10 mg tablet Take 10 mg by mouth three (3) times daily. Yes Provider, Historical  
vitamin E (AQUA GEMS) 400 unit capsule Take 400 Units by mouth daily. Yes Provider, Historical  
metFORMIN (GLUCOPHAGE) 500 mg tablet Take 1 Tab by mouth three (3) times daily (with meals). 4/17/18  Yes Angel Kirk MD  
vit B Cmplx 3-FA-Vit C-Biotin (NEPHRO ALEN RX) 1- mg-mg-mcg tablet Take 1 Tab by mouth daily. 2/20/13  Yes Dangelo Al MD  
aspirin (ASPIRIN) 325 mg tablet Take 325 mg by mouth daily. Yes Provider, Historical  
 
 
No Known Allergies Social Hx:  reports that he has never smoked. He has never used smokeless tobacco. He reports that he does not drink alcohol or use drugs. Family History Problem Relation Age of Onset  No Known Problems Mother  No Known Problems Father  No Known Problems Sister  No Known Problems Brother  No Known Problems Brother  No Known Problems Brother  No Known Problems Brother  No Known Problems Brother  No Known Problems Brother  No Known Problems Maternal Grandmother  No Known Problems Maternal Grandfather  No Known Problems Paternal Grandmother  No Known Problems Paternal Grandfather  Diabetes Neg Hx   
 Heart Disease Neg Hx Review of Systems: 
Unable to obtain due to patient's condition Objective:   
Vitals:   
Vitals:  
 01/21/20 2311 01/22/20 0330 01/22/20 0800 01/22/20 0810 BP: 145/88 126/76  130/86 Pulse: 84 96  96 Resp: 16 14  17 Temp: 97 °F (36.1 °C) 98 °F (36.7 °C)  98.2 °F (36.8 °C) SpO2: 100% 100% 99% 100% Weight:      
 
I&O's:  01/21 0701 - 01/22 0700 In: 2000 [I.V.:2000] Out: - Visit Vitals /86 (BP 1 Location: Left arm, BP Patient Position: At rest) Pulse 96 Temp 98.2 °F (36.8 °C) Resp 17 Wt 53.8 kg (118 lb 8 oz) SpO2 100% BMI 19.72 kg/m² Physical Exam: 
General:Alert, No distress, confused HEENT: Eyes are PERRL. Conjunctiva without pallor ,erythema. The sclerae without icterus. .  
Neck:Supple,no mass palpable Lungs : Clears to auscultation Bilaterally, Normal respiratory effort CVS: RRR, S1 S2 normal, No rub,  no LE edema Abdomen: Soft, Non tender, No hepatosplenomegaly, bowel sounds present Extremities: No cyanosis, No clubbing Skin: No rash or lesions. Lymph nodes: No palpable nodes MS: No joint swelling, erythema, warmth Neurologic: nonverbal, confused Laboratory Results: 
 
Lab Results Component Value Date BUN 61 (H) 01/22/2020  (HH) 01/22/2020  
 K 4.1 01/22/2020  (H) 01/22/2020 CO2 24 01/22/2020 Lab Results Component Value Date BUN 61 (H) 01/22/2020 BUN 67 (H) 01/22/2020 BUN 77 (H) 01/21/2020 BUN 88 (H) 01/21/2020 BUN 99 (H) 01/21/2020  
 K 4.1 01/22/2020  
 K 4.0 01/22/2020  
 K 4.3 01/21/2020  
 K 4.5 01/21/2020 K 6.0 (H) 01/21/2020 Lab Results Component Value Date WBC 13.6 (H) 01/21/2020 RBC 5.04 01/21/2020 HGB 14.4 01/21/2020 HCT 43.8 01/21/2020 MCV 86.9 01/21/2020 MCH 28.6 01/21/2020 RDW 14.8 (H) 01/21/2020  01/21/2020 Lab Results Component Value Date PHOS 2.0 (L) 04/17/2016 Urine dipstick:  
Lab Results Component Value Date/Time Color YELLOW/STRAW 01/21/2020 02:46 AM  
 Appearance CLEAR 01/21/2020 02:46 AM  
 Specific gravity 1.018 01/21/2020 02:46 AM  
 pH (UA) 5.0 01/21/2020 02:46 AM  
 Protein NEGATIVE  01/21/2020 02:46 AM  
 Glucose NEGATIVE  01/21/2020 02:46 AM  
 Ketone NEGATIVE  01/21/2020 02:46 AM  
 Bilirubin NEGATIVE  01/21/2020 02:46 AM  
 Urobilinogen 0.2 01/21/2020 02:46 AM  
 Nitrites NEGATIVE  01/21/2020 02:46 AM  
 Leukocyte Esterase NEGATIVE  01/21/2020 02:46 AM  
 Epithelial cells FEW 01/21/2020 02:46 AM  
 Bacteria NEGATIVE  01/21/2020 02:46 AM  
 WBC 0-4 01/21/2020 02:46 AM  
 RBC 0-5 01/21/2020 02:46 AM  
 
 
 
 
 
 
Thank you for allowing us to participate in the care of this patient. We will follow patient. Please dont hesitate to call with any questions Antonette Mancuso MD 
1/22/2020 [address] [place] [address], [address] [facility] Phone - [phone] Fax - [phone] 
[email or web address]

## 2020-01-22 NOTE — CDMP QUERY
Pt admitted with Failure to thrive, JOSH Pt noted to have AMS. If possible, please document in the progress notes and d/c summary if you are evaluating and / or treating any of the following: ? Metabolic Encephalopathy 
? Encephalopathy d/t Altered Renal function ? Other, please specify ? Clinically unable to determine The medical record reflects the following: 
  Risk Factors: Dehydration, Failure to Thrive, JOSH Clinical Indicators:  
 
ED documentation 1/21/20 
chronically ill appearing, thin/frail, confused JOSH by labs, sodium 157, bicarb 18, chloride 129, K 6.0 glucose 245. 
 
1/21/19 PN attending He has altered mental status and is barely able to follow commands. Though easily opens eyes to voice commands. He is able to lift both his left and  right arm up against gravity. He is having difficulty raising his lower extremities against gravity. 1/21/2020 01:53 Glucose: 245 (H) BUN: 99 (H) Creatinine: 2.95 (H) GFR est AA: 25 (L) 
 
 
1/21/2020 10:41 Glucose: 324 (H) BUN: 88 (H) Creatinine: 2.56 (H) GFR est AA: 30 (L) Treatment: Nephrology consult, IV fluids NS bolus x 1 ltr, IVF 150ml/hr, Lab monitoring, High Fall Risk precautions. Cheryle J Rilee RN,BSN Clinical Documentation Integrity [phone]

## 2020-01-22 NOTE — WOUND CARE
Wound Care Note:  
 
New consult placed by nurse request for pressure ulcer injury on sacrum Chart shows: 
Admitted for hyperkalemia, hypernatremia, altered mental status, failure to thrive in adult and acute kidney injury Past Medical History:  
Diagnosis Date  Diabetes ([address] 2002  High cholesterol  Hypertension WBC = 13.6 1/21/20 Admitted from [facility] Assessment:  
Patient is alert and talking, incontinent with some assistance needed in repositioning. Bed: [employer] Patient wearing briefs for incontinence. Diet: No diet order - speech to evaluate Patient reports no pain. Bilateral heels and buttocks skin intact and without erythema. 1. POA sacral unstageable pressure injury measures 2.5 cm x 1.5 cm x 0.2 cm, wound bed is 95% pink and 5% tan slough, no drainage, wound edges are open, alba-wound intact. Hydrocolloid applied. 2.  POA heels were blanchable erythema but have resolved; will order Venelex. Spoke with Dr. Cody Ma, notified of POA pressure injury; wound care orders obtained. Patient repositioned on left side. Heels offloaded on pillow. Recommendations:   
Sacrum- Please maintain Exuderm Hydrocolloid up to one week or change as needed with soiling or rolled edges. Please use adhesive remover wipes when changing. Bilateral heels- Every 8 hours liberally apply Venelex ointment Specialty bed: P-500 bed ordered via [employer]. Use only flat sheet and one incontinence pad. Please call [employer] at [phone] if not delivered and when patient discharged. Confirmation #[id number] Skin Care & Pressure Prevention: 
Minimize layers of linen/pads under patient to optimize support surface. Turn/reposition approximately every 2 hours and offload heels. Manage incontinence / promote continence Nourishing Skin Cream to dry skin, minimize use of briefs when able Discussed above plan with patient & DANNA Chávez Transition of Care: Plan to follow as needed while admitted to hospital. 
 
Elspeth Pancake" Sandie Ganser, BSN, RN, [facility], [facility]. 
office [phone] 
pager [phone] or call  to page

## 2020-01-22 NOTE — ROUTINE PROCESS
TRANSFER - IN REPORT: 
 
Verbal report received from Saira RN(name) on Dayounzuri Hooks  being received from [facility] (unit) for routine progression of care Report consisted of patients Situation, Background, Assessment and  
Recommendations(SBAR). Information from the following report(s) SBAR, Kardex, [facility] and Recent Results was reviewed with the receiving nurse. Opportunity for questions and clarification was provided. Assessment completed upon patients arrival to unit and care assumed. Patient received at [phone]

## 2020-01-22 NOTE — CDMP QUERY
Pt admitted with Failure to thrive Pt noted to have weight loss. If possible, please document in the progress notes and d/c summary if you are evaluating and / or treating any of the following: ? Acute Severe Protein Calorie Malnutrition ? Chronic Severe Protein Calorie Malnutrition ? Other, please specify ? Clinically unable to determine The medical record reflects the following: 
  Risk Factors: Altered Mental Status, Dehydration, Failure to thrive Clinical Indicators:  
H&P As per medical report patient has refused to eat or drink anything in the last several days becoming weaker. Previous hospital admission 1/1/20 - 1/8/20 Ht: 5' 5\" (1.651 m) Wt: 62 kg (136 lb 11 oz) Admission Wt: 59.6 kg (131 lb 6.3 oz) BMI: 22.75 kg/m 2 Current Hospital admission 1/21/20 Ht: 5' 5\" (1.651 m) Wt: 53.8 kg (118 lb 8 oz) Admission Wt: 54.3 kg (119 lb 11.4 BMI: 19.72 kg/m 2 
 
9.16% weight loss in 2 weeks. Treatment: IV fluids NS bolus x 1 ltr, IVF 150ml/hr, Lab monitoring, Speech therapy consult, Pureed Diet, POC Glucose Q 6hrs, Auto nutritional consult. Cheryle J Rilee RN,BSN Clinical Documentation Integrity [phone]

## 2020-01-22 NOTE — PROGRESS NOTES
Problem: Diabetes Self-Management Goal: *Monitoring blood glucose, interpreting and using results Description Identify recommended blood glucose targets  and personal targets. Outcome: Progressing Towards Goal 
Note:  
Q6 BG checks. Insulin administered per sliding scale Problem: Falls - Risk of 
Goal: *Absence of Falls Description Document Neyda Siddiqui Fall Risk and appropriate interventions in the flowsheet. Outcome: Progressing Towards Goal 
Note:  
Fall Risk Interventions: 
 
Mentation Interventions: Adequate sleep, hydration, pain control, Door open when patient unattended, Reorient patient, Room close to nurse's station, Bed/chair exit alarm Medication Interventions: Evaluate medications/consider consulting pharmacy, Bed/chair exit alarm Elimination Interventions: Call light in reach, Bed/chair exit alarm Pt reoriented, demonstrates understanding. Will continue to monitor for safety.

## 2020-01-22 NOTE — PROGRESS NOTES
Problem: Dysphagia (Adult) Goal: *Acute Goals and Plan of Care (Insert Text) Description Speech pathology goals Initiated 1/22/2020 1. Patient will tolerate puree/thin liquid diet with no overt s/s aspiration within 7 days 2. Patient will tolerate trials of solids with timely/complete mastication and full oral clearance within 7 days Outcome: Progressing Towards Goal 
  
SPEECH LANGUAGE PATHOLOGY BEDSIDE SWALLOW EVALUATION Patient: Rodolfo Looney (17 y.o. male) Date: 1/22/2020 Primary Diagnosis: Failure to thrive in adult [R62.7] Hypernatremia [E87.0] Altered mental status [R41.82] OJSH (acute kidney injury) ([facility] [address] [N17.9] Hyperkalemia [E87.5] Precautions: swallow ASSESSMENT : 
Based on the objective data described below, the patient presents with moderate oral and mild pharyngeal dysphagia characterized by slow oral prep, suspected premature spillage, delayed posterior propulsion, moderate lingual residue that cleared with a liquid wash, and delayed swallow initiation. Patient with strong, repeated coughing with thin liquids via straw sip however no overt s/s aspiration observed when son carefully fed patient sips from cup. Patient will always be at risk for aspiration secondary to dementia, AMS, and dependence for feeding. Patient will benefit from skilled intervention to address the above impairments. Patients rehabilitation potential is considered to be Fair PLAN : 
Recommendations and Planned Interventions: 
-Puree / Thin liquid diet.  
-Upright 90 degrees, 1:1 supervision, No straws, Single sips, Small bites, and Alternate bites / sips. Total assist for feeding. Carefully give patient thin liquid via small, single cup sips 
-Meds crushed in puree 
-SLP to follow for diet tolerance and liberalization  
-If PO intake remains inadequate to maintain nutrition/hydration, patient may benefit from palliative consult Frequency/Duration: Patient will be followed by speech-language pathology 2-3 times a week to address goals. Discharge Recommendations: To Be Determined SUBJECTIVE:  
Patient agreeable to eat with assistance from son. OBJECTIVE:  
 
Past Medical History:  
Diagnosis Date Diabetes ([address] 2002 High cholesterol Hypertension History reviewed. No pertinent surgical history. Prior Level of Function/Home Situation:  
Home Situation Home Environment: Rehabilitation facility One/Two Story Residence: One story Living Alone: Yes Support Systems: Child(danette), Family member(s) Patient Expects to be Discharged to[de-identified] Rehabilitation facility Current DME Used/Available at Home: Wheelchair(since being in rehab to regain his strenght) Diet prior to admission: regular/thin Current Diet:  NPO Cognitive and Communication Status: 
Neurologic State: Alert, Confused Orientation Level: Disoriented X4 Cognition: Decreased command following, Decreased attention/concentration Oral Assessment: 
Oral Assessment Labial: Other (comment)(unable to assess; poor command following) Dentition: Natural 
Oral Hygiene: moist oral mucosa P.O. Trials: 
Patient Position: upright in bed Vocal quality prior to P.O.: Hoarse;Breathy Consistency Presented: Thin liquid;Puree How Presented: Self-fed/presented;Straw;Successive swallows;Spoon; Other (comment)(son-fed, cup sip) Bolus Acceptance: No impairment Bolus Formation/Control: Impaired Type of Impairment: Delayed;Premature spillage Propulsion: Delayed (# of seconds) Oral Residue: Lingual;10-50% of bolus(cleared with liquid wash) Initiation of Swallow: Delayed (# of seconds) Laryngeal Elevation: Functional 
Aspiration Signs/Symptoms: Strong cough; Other (comment)(with thin liquid via straw, none with cup) Pharyngeal Phase Characteristics: No impairment, issues, or problems Effective Modifications: Cup/sip Cues for Modifications: Minimal 
  
 
 Oral Phase Severity: Moderate Pharyngeal Phase Severity : Mild NOMS:  
The NOMS functional outcome measure was used to quantify this patient's level of swallowing impairment. Based on the NOMS, the patient was determined to be at level 3 for swallow function NOMS Swallowing Levels: 
Level 1 (CN): NPO Level 2 (CM): NPO but takes consistency in therapy Level 3 (CL): Takes less than 50% of nutrition p.o. and continues with nonoral feedings; and/or safe with mod cues; and/or max diet restriction Level 4 (CK): Safe swallow but needs mod cues; and/or mod diet restriction; and/or still requires some nonoral feeding/supplements Level 5 (CJ): Safe swallow with min diet restriction; and/or needs min cues Level 6 (CI): Independent with p.o.; rare cues; usually self cues; may need to avoid some foods or needs extra time Level 7 (CH): Independent for all p.o. JAN. (2003). National Outcomes Measurement System (NOMS): Adult Speech-Language Pathology User's Guide. Pain: 
Pain Scale 1: Adult Nonverbal Pain Scale Pain Intensity 1: 0 After treatment:  
Patient left in no apparent distress in bed, Call bell within reach, Nursing notified, and Caregiver / family present COMMUNICATION/EDUCATION:  
Patient was educated regarding his deficit(s) of dysphagia as this relates to his diagnosis of dementia, AMS. He demonstrated Guarded understanding as evidenced by dementia. Son verbalized understanding. The patient's plan of care including recommendations, planned interventions, and recommended diet changes were discussed with: Registered Nurse. Patient/family have participated as able in goal setting and plan of care. Patient/family agree to work toward stated goals and plan of care. Thank you for this referral. 
Dameon Ansari SLP Time Calculation: 20 mins

## 2020-01-22 NOTE — ACP (ADVANCE CARE PLANNING)
visit for Advance Medical Directive (AMD) consult. Pt was getting back into room and pt's RN was at bedside. Pt was not able to talk with me at this time. The RN and  discussed AMD and RN assured that pt is unable to complete paperwork at this time. Pt's son and sister are involved and his contacts. Please contact [phone] [address] [facility] for further support.  follow up as needed. Shara Elizondo, [facility] 
 [phone])

## 2020-01-22 NOTE — PROGRESS NOTES
[address] visit. Attempted to complete a spiritual assessment but Mr. Jason Baxter was asleep. He was visited by a [employer] today for communion. He is [employer]ec[employer]alism. Prayer offered. RAJINDER Palacio, RN, ACSW, LCSW  Page:  [phone]

## 2020-01-23 NOTE — PROGRESS NOTES
Nephrology Progress Note Yamilex Robbins Date of Admission : 1/21/2020 CC: Follow up for JOSH on CKD Assessment and Plan JOSH on CKD: 
- likely from volume depletion 
- Renal U/S neg for obstruction 
- improving 
- cont hypotonic fluids 
- daily labs 
  
CKD III w/ baseline Cr 1.4 to 1.6: 
- likely from DM and HTN 
  
Hypernatremia: 
- from lack of free water intake 
- improving 
- cont D5W 
  
Hyperkalemia: 
- from JOSH and ARB 
- resolved 
- no ACE/ARB 
  
Volume depletion: 
- cont hypotonic fluids 
  
Encephalopathy: 
- hypernatremia contributing 
- improving 
- infectious w/u per hospitalist 
  
DM2: 
- on insulin FTT Dementia Interval History: 
Seen and examined. Feeling better. Cr and Na improving. No reported cp or sob. Still w/ weakness and fatigue. Current Medications: all current  Medications have been eviewed in [facility] Review of Systems: Pertinent items are noted in HPI. Objective: 
Vitals:   
Vitals:  
 01/22/20 1909 01/22/20 2039 01/23/20 0007 01/23/20 1963 BP: 126/81 118/64 94/58 111/71 Pulse: 98 98 89 86 Resp: 14 14 14 14 Temp: 98.5 °F (36.9 °C) 98.3 °F (36.8 °C) 97.5 °F (36.4 °C) 99.4 °F (37.4 °C) SpO2: 100% 97% 100% 99% Weight:      
Height:      
 
Intake and Output: 
No intake/output data recorded. 01/21 1901 - 01/23 0700 In: 2000 [I.V.:2000] Out: 550 [Urine:550] Physical Examination: 
Pt intubated    No 
General: NAD,Conversant, chronically ill appeargin Neck:  Supple, no mass Resp:  Lungs CTA B/L, no wheezing , normal respiratory effort CV:  RRR,  no murmur or rub, no LE edema GI:  Soft, NT, + Bowel sounds, no hepatosplenomegaly Neurologic:  Non focal, still w/ mild confusion Psych:             Unable to assess Skin:  No Rash :   no waggoner []    High complexity decision making was performed 
[]    Patient is at high-risk of decompensation with multiple organ involvement Lab Data Personally Reviewed: I have reviewed all the pertinent labs, microbiology data and radiology studies during assessment. Recent Labs  
  01/23/20 
0507 01/22/20 
1644 01/22/20 
6480  01/21/20 
0153 * 155* 161*   < > 157* K 3.8 4.0 4.1   < > 6.0*  
* 125* 134*   < > 129* CO2 22 23 24   < > 18* * 213* 177*   < > 245* BUN 33* 46* 61*   < > 99* CREA 1.60* 1.91* 2.12*   < > 2.95* CA 8.0* 8.2* 8.7   < > 9.5 ALB  --   --   --   --  3.4* SGOT  --   --   --   --  21 ALT  --   --   --   --  28  
 < > = values in this interval not displayed. Recent Labs  
  01/21/20 
0153 WBC 13.6* HGB 14.4 HCT 43.8  No results found for: [facility] Lab Results Component Value Date/Time Culture result: MRSA NOT PRESENT 01/21/2020 09:30 PM  
 Culture result:  01/21/2020 09:30 PM  
      Screening of patient nares for MRSA is for surveillance purposes and, if positive, to facilitate isolation considerations in high risk settings. It is not intended for automatic decolonization interventions per se as regimens are not sufficiently effective to warrant routine use. Culture result: NO GROWTH 5 DAYS 04/17/2016 12:32 AM  
 
Recent Results (from the past 24 hour(s)) GLUCOSE, POC Collection Time: 01/22/20 12:27 PM  
Result Value Ref Range Glucose (POC) 248 (H) 65 - 100 mg/dL Performed by Lisbeth Nyhan METABOLIC PANEL, BASIC Collection Time: 01/22/20  4:44 PM  
Result Value Ref Range Sodium 155 (H) 136 - 145 mmol/L Potassium 4.0 3.5 - 5.1 mmol/L Chloride 125 (H) 97 - 108 mmol/L  
 CO2 23 21 - 32 mmol/L Anion gap 7 5 - 15 mmol/L Glucose 213 (H) 65 - 100 mg/dL BUN 46 (H) 6 - 20 MG/DL Creatinine 1.91 (H) 0.70 - 1.30 MG/DL  
 BUN/Creatinine ratio 24 (H) 12 - 20 GFR est AA 42 (L) >60 ml/min/1.73m2 GFR est non-AA 35 (L) >60 ml/min/1.73m2 Calcium 8.2 (L) 8.5 - 10.1 MG/DL  
GLUCOSE, POC  Collection Time: 01/22/20  5:50 PM  
 Result Value Ref Range Glucose (POC) 202 (H) 65 - 100 mg/dL Performed by CELSO GARCIA   
GLUCOSE, POC Collection Time: 01/22/20  9:27 PM  
Result Value Ref Range Glucose (POC) 174 (H) 65 - 100 mg/dL Performed by [facility] METABOLIC PANEL, BASIC Collection Time: 01/23/20  5:07 AM  
Result Value Ref Range Sodium 150 (H) 136 - 145 mmol/L Potassium 3.8 3.5 - 5.1 mmol/L Chloride 122 (H) 97 - 108 mmol/L  
 CO2 22 21 - 32 mmol/L Anion gap 6 5 - 15 mmol/L Glucose 183 (H) 65 - 100 mg/dL BUN 33 (H) 6 - 20 MG/DL Creatinine 1.60 (H) 0.70 - 1.30 MG/DL  
 BUN/Creatinine ratio 21 (H) 12 - 20 GFR est AA 51 (L) >60 ml/min/1.73m2 GFR est non-AA 42 (L) >60 ml/min/1.73m2 Calcium 8.0 (L) 8.5 - 10.1 MG/DL  
GLUCOSE, POC Collection Time: 01/23/20  5:59 AM  
Result Value Ref Range Glucose (POC) 152 (H) 65 - 100 mg/dL Performed by [facility] Caitlin Barcenas MD 
[address] [facility] Phone - [phone] Fax - [phone] 
[email or web address]

## 2020-01-23 NOTE — PROGRESS NOTES
Bedside shift change report given to Ramesh Gallagher (oncoming nurse) by [facility] (offgoing nurse). Report included the following information SBAR.

## 2020-01-23 NOTE — PROGRESS NOTES
Provided pastoral care visit to [facility] 5 patient. Did not include sacramental care. Dianna Ingram

## 2020-01-23 NOTE — CONSULTS
Palliative Medicine Consult [place]: [phone]) Patient Name: Topher Cali YOB: 1945 Date of Initial Consult: 1/23/20 Reason for Consult: Care decisions Requesting Provider: Dr. Gus Chadwick Primary Care Physician: Guerline Buenrostro NP 
 
 SUMMARY:  
Tohper Cali is a 76 y. o. with a past history of labile DM type 2, hypertension, debility, who was admitted on 1/21/2020 from [facility] rehab with a diagnosis of dehydration and failure to thrive after refusal to eat/drink at rehab for a couple of days. He was last admitted 1/1-1/8 after an acute episode of confusion, etiology not clear as MRI brain showed no acute changes and EEG was negative. Family was not available for corroborative information during that hospital stay. Palliative medicine was consulted to assist with care goals discussions. Psychosocial- Born in [facility], , two sons Kwabena Jane (whom he lives with along with Ace's longtime girlfriend Mark) and [facility] ([facility]). Also has a stepdaughter Shaheen Thomas. PALLIATIVE DIAGNOSES:  
1. Cognitive deficits 2. Debility 3. Labile DM type 2 
4. Dehydration 5. 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  
1. Assessed pt- he is alert and able to answer simple questions but not fully oriented and lacks capacity for complex decision making. 2. Pt has two biological sons- Kwabena Jane and [facility]. Explained to Kwabena Jane that both share decision making duties despite the fact that pt lives with Kwabena Jane. 3. Called and spoke with son Kwabena Jane. He provided history that his Father was generally well, ambulated with a cane, able to recognize all and carry on normal conversation prior to Jan 1. Since that day when he found his Father confused, he has not returned to previous baseline.    
4. Talked about concerns of his Father having a frail brain in the setting of longstanding hypertension, h/o alcoholism and labile diabetes, how insults may result in a sudden change in baseline. It's possible his Father had a seizure on 1/1 that we are unable to confirm. He was aware of findings of chronic / past stroke. 5. Maninder Hilliard aware that his Father will now need 24/hr care. They cannot provide this in the home. Alerted CM who will f/u with family about Medicaid application. 6. Introduced concern that Mr. Cece Nolasco is likely nearing end of life, given his refusal of food/drink prior to admission. It is possible he will eat well for a period of time, as long as blood sugars remain under control and nothing new happens, but he is at risk of further decline both cognitive and physical.  Talked about potentially using a service like Hospice. 7. Called irais Haynes after to make arrangements for joint call with Maninder Hilliard tomorrow at [facility].  Will review again medical information, concerns about potential for recurrent decline once he is off IVF and active management here in the hospital, and consideration of Hospice care. Will also plan to review code status. 8. Initial consult note routed to primary continuity provider and/or primary health care team members 9. Communicated plan of care with: Palliative IDT, [facility] Team, CM and Dr. Christian Kelly GOALS OF CARE / TREATMENT PREFERENCES:  
 
GOALS OF CARE: 
Patient/Health Care Proxy Stated Goals: Other (comment)(unclear pending further conversation) TREATMENT PREFERENCES:  
Code Status: Full Code Advance Care Planning: 
[x] [facility] Interdisciplinary Team has updated the ACP Navigator with [place] and Patient Capacity Primary Decision Maker (Active): Saul Falcon - [phone] Primary Decision MakerWalter Mariano Davidson - [phone] Advance Care Planning 1/21/2020 Patient's Healthcare Decision Maker is: - Confirm Advance Directive None Patient Would Like to Complete Advance Directive Yes Medical Interventions: Full interventions(pending further family discussion) Other: As far as possible, the palliative care team has discussed with patient / health care proxy about goals of care / treatment preferences for patient. HISTORY:  
 
History obtained from:  Son, chart review CHIEF COMPLAINT: admitted with dehydration (abnormal labs) HPI/SUBJECTIVE: The patient is:  
[x] Verbal and participatory [] Non-participatory due to:  
 
1/23- pt sent in from rehab with abnormal lab values (hypernatremia, JOSH, hyperkalemia) after refusal of food and drink for several days. He remains confused and not able to answer questions about his history, but is alert to state his name and knows he is in a hospital.  He denies discomfort, nausea or anxiety. Clinical Pain Assessment (nonverbal scale for severity on nonverbal patients):  
Clinical Pain Assessment Severity: 0 Activity (Movement): Lying quietly, normal position Duration: for how long has pt been experiencing pain (e.g., 2 days, 1 month, years) Frequency: how often pain is an issue (e.g., several times per day, once every few days, constant) FUNCTIONAL ASSESSMENT:  
 
Palliative Performance Scale (PPS): PPS: 50 PSYCHOSOCIAL/SPIRITUAL SCREENING:  
 
Palliative IDT has assessed this patient for cultural preferences / practices and a referral made as appropriate to needs (Cultural Services, Patient Advocacy, Ethics, etc.) Any spiritual / Faith concerns: 
[] Yes /  [x] No 
 
Caregiver Burnout: 
[] Yes /  [x] No /  [] No Caregiver Present Anticipatory grief assessment:  
[x] Normal  / [] Maladaptive ESAS Anxiety: ESAS Depression:    
 
 
 REVIEW OF SYSTEMS:  
 
Positive and pertinent negative findings in ROS are noted above in HPI. The following systems were [x] reviewed / [] unable to be reviewed as noted in HPI Other findings are noted below. Systems: constitutional, ears/nose/mouth/throat, respiratory, gastrointestinal, genitourinary, musculoskeletal, integumentary, neurologic, psychiatric, endocrine. Positive findings noted below. Modified ESAS Completed by: provider Pain: 0 Dyspnea: 0 PHYSICAL EXAM:  
 
From RN flowsheet: 
Wt Readings from Last 3 Encounters:  
01/22/20 53.7 kg (118 lb 7.6 oz) 01/08/20 62 kg (136 lb 11 oz) 02/02/19 63 kg (138 lb 14.2 oz) Blood pressure 111/71, pulse 86, temperature 99.4 °F (37.4 °C), resp. rate 14, height 5' 5\" (1.651 m), weight 53.7 kg (118 lb 7.6 oz), SpO2 99 %. Pain Scale 1: Adult Nonverbal Pain Scale Pain Intensity 1: 0 Constitutional: frail elderly male lying in bed, appears comfortable ENMT: no nasal discharge, moist mucous membranes Eyes:  Left eye deviation, right lateral gaze Cardiovascular: regular rhythm, distal pulses intact Respiratory: breathing not labored, symmetric bs anteriorly Gastrointestinal: soft non-tender, +bowel sounds Musculoskeletal: no deformity, no tenderness to palpation Skin: warm, dry Neurologic: following commands, moving all extremities Psychiatric: full affect, no hallucinations HISTORY:  
 
Active Problems: Hyperkalemia (1/21/2020) Hypernatremia (1/21/2020) Altered mental status (1/21/2020) Failure to thrive in adult (1/21/2020) JOSH (acute kidney injury) ([facility] [address] (1/21/2020) Past Medical History:  
Diagnosis Date  Diabetes ([facility] [address] 2002  High cholesterol  Hypertension History reviewed. No pertinent surgical history. Family History Problem Relation Age of Onset  No Known Problems Mother  No Known Problems Father  No Known Problems Sister  No Known Problems Brother  No Known Problems Brother  No Known Problems Brother  No Known Problems Brother  No Known Problems Brother  No Known Problems Brother  No Known Problems Maternal Grandmother  No Known Problems Maternal Grandfather  No Known Problems Paternal Grandmother  No Known Problems Paternal Grandfather  Diabetes Neg Hx   
 Heart Disease Neg Hx History reviewed, no pertinent family history. Social History Tobacco Use  Smoking status: Never Smoker  Smokeless tobacco: Never Used Substance Use Topics  Alcohol use: No  
  Alcohol/week: 0.0 standard drinks No Known Allergies Current Facility-Administered Medications Medication Dose Route Frequency  dextrose 5% infusion  150 mL/hr IntraVENous CONTINUOUS  
 insulin glargine (LANTUS) injection 20 Units  20 Units SubCUTAneous DAILY  balsam peru-castor oil (VENELEX) ointment   Topical Q8H  
 sodium chloride (NS) flush 5-40 mL  5-40 mL IntraVENous Q8H  
 sodium chloride (NS) flush 5-40 mL  5-40 mL IntraVENous PRN  
 glucose chewable tablet 16 g  4 Tab Oral PRN  
 glucagon (GLUCAGEN) injection 1 mg  1 mg IntraMUSCular PRN  
 dextrose 10% infusion 0-250 mL  0-250 mL IntraVENous PRN  
 insulin lispro (HUMALOG) injection   SubCUTAneous Q6H  
 influenza vaccine 2019-20 (6 mos+)(PF) (FLUARIX/FLULAVAL/FLUZONE QUAD) injection 0.5 mL  0.5 mL IntraMUSCular PRIOR TO DISCHARGE  
 
 
 
 LAB AND IMAGING FINDINGS:  
 
Lab Results Component Value Date/Time WBC 13.6 (H) 01/21/2020 01:53 AM  
 HGB 14.4 01/21/2020 01:53 AM  
 PLATELET 248 63/01/7890 01:53 AM  
 
Lab Results Component Value Date/Time Sodium 150 (H) 01/23/2020 05:07 AM  
 Potassium 3.8 01/23/2020 05:07 AM  
 Chloride 122 (H) 01/23/2020 05:07 AM  
 CO2 22 01/23/2020 05:07 AM  
 BUN 33 (H) 01/23/2020 05:07 AM  
 Creatinine 1.60 (H) 01/23/2020 05:07 AM  
 Calcium 8.0 (L) 01/23/2020 05:07 AM  
 Magnesium 1.9 02/24/2019 08:35 PM  
 Phosphorus 2.0 (L) 04/17/2016 12:46 AM  
  
Lab Results Component Value Date/Time AST (SGOT) 21 01/21/2020 01:53 AM  
 Alk.  phosphatase 93 01/21/2020 01:53 AM  
 Protein, total 8.5 (H) 01/21/2020 01:53 AM  
 Albumin 3.4 (L) 01/21/2020 01:53 AM  
 Globulin 5.1 (H) 01/21/2020 01:53 AM  
 
Lab Results Component Value Date/Time INR 1.1 01/01/2020 04:18 PM  
 Prothrombin time 10.9 01/01/2020 04:18 PM  
 aPTT 23.9 02/02/2019 03:56 PM  
  
No results found for: IRON, FE, TIBC, IBCT, PSAT, FERR No results found for: PH, PCO2, PO2 No components found for: [place] Lab Results Component Value Date/Time CK 89 04/17/2016 12:46 AM  
 CK - MB <0.5 (L) 04/17/2016 12:46 AM  
  
 
 
   
 
Total time:  70m Counseling / coordination time, spent as noted above: 40m 
> 50% counseling / coordination?:  y 
 
Prolonged service was provided for  []30 min   []75 min in face to face time in the presence of the patient, spent as noted above. Time Start:  
Time End:  
Note: this can only be billed with 17017 (initial) or 21290 (follow up). If multiple start / stop times, list each separately.

## 2020-01-23 NOTE — PROGRESS NOTES
Bedside shift change report given to [address] (oncoming nurse) by Eppie Baumgarten RN (offgoing nurse). Report included the following information SBAR, Kardex, Intake/Output, MAR and Recent Results.

## 2020-01-23 NOTE — PROGRESS NOTES
Bedside shift change report given to [facility] (oncoming nurse) by Rik Valdivia (offgoing nurse). Report included the following information SBAR.

## 2020-01-23 NOTE — PROGRESS NOTES
[address] [facility] Adult  Hospitalist Group Hospitalist Progress Note Milvia Sanchez MD 
Answering service: [phone] OR [phone] from in house phone Date of Service:  2020 NAME:  Susan Agustin :  1945 MRN:  [id number] Admission Summary:  
Susan Agustin is a 76 y.o. male with past medical history significant for diabetes mellitus type 2, hypertension, history of dementia presents the emergency room from a rehab facility due to concern for dehydration and failure to thrive. As per medical report patient has refused to eat or drink anything in the last several days becoming weaker. Medical provider at the facility decided to order blood work-up which were abnormal.  Sent to ER for IV fluid. Patient at this time awake following basic commands but disoriented. Does not seem in acute distress but appears very dry. Denies any complaint. No family at the bedside. Work-up in the emergency room patient was found to have hyponatremia, hyperkalemia and acute kidney injury. He was started on normal saline and given regular insulin plus glucose for the hyperkalemia. Admission requested for dehydration and failure to thrive. Unable to obtain more history due to patient's mental status.  
  
Interval history / Subjective:  
   
Patient seen and examined today. Still confused and not eating and drinking Palliative talking about future goals Assessment & Plan: 1. Hypernatremia-POA  
 severe hypernatremia likely from failure to thrive. 150 now Change dextrose half-normal saline to dextrose at 150 mL/h 
- Monitor Na michelle 6 hours. Nephrology on board now 
  
2. JOSH on CKD-  
likely prerenal in etiology. IV fluids as above - Monitor renal function and urine output. 3. Non anion gap metabolic acidosis- 
Likely from dehydration and JOSH now improving. 
  
4.  Hyperkalemia- 
 likely secondary to renal failure. Received insulin/glucose in ER.  
- Give calcium gluconate, amp of bicarb, albuterol.  
- IV fluids as above Resolved 
  
5. Metabolic encephalopathy due to hypernatremia Patient had recent work in January 2020 including MRI of the brain and EEG that were unremarkable. He is able to move all his extremities but is very confused and not following commands 
  
6. Diabetes Mellitus type 2- uncontrolled. Increase his Lantus - Placed on sliding scale insulin ac and hs q6 hours.  
  
7. Failure to thrive- Body mass index is 19.72 kg/m². severe protein calorie malnutrition 
could be related to worsening dementia. SLP to see him today 
  
8. Leukocytosis 
 - likely hemoconcentration from dehydration Eleni Edwards No source of infection at this time. Resolved 
  
9. HTN- 
 bp stable. Hold home meds due to ams Palliative care consult because of failure to thrive. Code status: full DVT prophylaxis:  
 
Care Plan discussed with: Patient/Family Disposition: TBD Hospital Problems  Date Reviewed: 12/4/2018 Codes Class Noted POA Hyperkalemia ICD-10-CM: E87.5 ICD-9-CM: 276.7  1/21/2020 Unknown Hypernatremia ICD-10-CM: E87.0 ICD-9-CM: 276.0  1/21/2020 Unknown Altered mental status ICD-10-CM: R41.82 
ICD-9-CM: 780.97  1/21/2020 Unknown Failure to thrive in adult ICD-10-CM: R62.7 ICD-9-CM: 783.7  1/21/2020 Unknown JOSH (acute kidney injury) ([facility] [address] ICD-10-CM: N17.9 ICD-9-CM: 584.9  1/21/2020 Unknown Review of Systems: A comprehensive review of systems was negative except for that written in the HPI. Vital Signs:  
 Last 24hrs VS reviewed since prior progress note. Most recent are: 
Visit Vitals /74 Pulse 90 Temp 98.7 °F (37.1 °C) Resp 14 Ht 5' 5\" (1.651 m) Wt 53.7 kg (118 lb 7.6 oz) SpO2 97% BMI 19.72 kg/m² Intake/Output Summary (Last 24 hours) at 1/23/2020 1042 Last data filed at 1/23/2020 1406 Gross per 24 hour Intake  Output 525 ml Net -525 ml Physical Examination:  
 
 
     
Constitutional:  Confused and not following commands ENT:  Oral mucous moist, oropharynx benign. Resp:  CTA bilaterally. No wheezing/rhonchi/rales. No accessory muscle use CV:  Regular rhythm, normal rate, no murmurs, gallops, rubs GI:  Soft, non distended, non tender. normoactive bowel sounds, no hepatosplenomegaly Musculoskeletal:  No edema, warm, 2+ pulses throughout Neurologic:  Moves all extremities. But not following commands Data Review:  
 Review and/or order of clinical lab test 
 
 
Labs:  
 
Recent Labs  
  01/21/20 
0153 WBC 13.6* HGB 14.4 HCT 43.8  Recent Labs  
  01/23/20 
0507 01/22/20 
1644 01/22/20 
7463 * 155* 161*  
K 3.8 4.0 4.1 * 125* 134* CO2 22 23 24 BUN 33* 46* 61* CREA 1.60* 1.91* 2.12* * 213* 177* CA 8.0* 8.2* 8.7 Recent Labs  
  01/21/20 
0153 SGOT 21 ALT 28 AP 93 TBILI 0.9 TP 8.5* ALB 3.4*  
GLOB 5.1* No results for input(s): INR, PTP, APTT, INREXT, INREXT in the last 72 hours. No results for input(s): FE, TIBC, PSAT, FERR in the last 72 hours. No results found for: FOL, RBCF No results for input(s): PH, PCO2, PO2 in the last 72 hours. Recent Labs  
  01/21/20 
0153 TROIQ <0.05 Lab Results Component Value Date/Time Cholesterol, total 190 01/02/2020 04:06 AM  
 HDL Cholesterol 64 01/02/2020 04:06 AM  
 LDL, calculated 115.4 (H) 01/02/2020 04:06 AM  
 Triglyceride 53 01/02/2020 04:06 AM  
 CHOL/HDL Ratio 3.0 01/02/2020 04:06 AM  
 
Lab Results Component Value Date/Time Glucose (POC) 174 (H) 01/23/2020 11:38 AM  
 Glucose (POC) 152 (H) 01/23/2020 05:59 AM  
 Glucose (POC) 174 (H) 01/22/2020 09:27 PM  
 Glucose (POC) 202 (H) 01/22/2020 05:50 PM  
 Glucose (POC) 248 (H) 01/22/2020 12:27 PM  
 
Lab Results Component Value Date/Time Color YELLOW/STRAW 01/21/2020 02:46 AM  
 Appearance CLEAR 01/21/2020 02:46 AM  
 Specific gravity 1.018 01/21/2020 02:46 AM  
 pH (UA) 5.0 01/21/2020 02:46 AM  
 Protein NEGATIVE  01/21/2020 02:46 AM  
 Glucose NEGATIVE  01/21/2020 02:46 AM  
 Ketone NEGATIVE  01/21/2020 02:46 AM  
 Bilirubin NEGATIVE  01/21/2020 02:46 AM  
 Urobilinogen 0.2 01/21/2020 02:46 AM  
 Nitrites NEGATIVE  01/21/2020 02:46 AM  
 Leukocyte Esterase NEGATIVE  01/21/2020 02:46 AM  
 Epithelial cells FEW 01/21/2020 02:46 AM  
 Bacteria NEGATIVE  01/21/2020 02:46 AM  
 WBC 0-4 01/21/2020 02:46 AM  
 RBC 0-5 01/21/2020 02:46 AM  
 
 
 
Medications Reviewed:  
 
Current Facility-Administered Medications Medication Dose Route Frequency  dextrose 5% infusion  150 mL/hr IntraVENous CONTINUOUS  
 insulin glargine (LANTUS) injection 20 Units  20 Units SubCUTAneous DAILY  balsam peru-castor oil (VENELEX) ointment   Topical Q8H  
 sodium chloride (NS) flush 5-40 mL  5-40 mL IntraVENous Q8H  
 sodium chloride (NS) flush 5-40 mL  5-40 mL IntraVENous PRN  
 glucose chewable tablet 16 g  4 Tab Oral PRN  
 glucagon (GLUCAGEN) injection 1 mg  1 mg IntraMUSCular PRN  
 dextrose 10% infusion 0-250 mL  0-250 mL IntraVENous PRN  
 insulin lispro (HUMALOG) injection   SubCUTAneous Q6H  
 influenza vaccine 2019-20 (6 mos+)(PF) (FLUARIX/FLULAVAL/FLUZONE QUAD) injection 0.5 mL  0.5 mL IntraMUSCular PRIOR TO DISCHARGE  
 
______________________________________________________________________ EXPECTED LENGTH OF STAY: 3d 4h 
ACTUAL LENGTH OF STAY:          2 Sonali Blum MD

## 2020-01-23 NOTE — PROGRESS NOTES
Problem: Diabetes Self-Management Goal: *Disease process and treatment process Description Define diabetes and identify own type of diabetes; list 3 options for treating diabetes. Outcome: Not Progressing Towards Goal 
Goal: *Incorporating nutritional management into lifestyle Description Describe effect of type, amount and timing of food on blood glucose; list 3 methods for planning meals. Outcome: Not Progressing Towards Goal 
Goal: *Using medications safely Description State effect of diabetes medications on diabetes; name diabetes medication taking, action and side effects. Outcome: Not Progressing Towards Goal 
  
Problem: Falls - Risk of 
Goal: *Absence of Falls Description [facility] Fall Risk and appropriate interventions in the flowsheet. Outcome: Not Progressing Towards Goal 
Note: Fall Risk Interventions: 
  
 
Mentation Interventions: Adequate sleep, hydration, pain control, Door open when patient unattended, Bed/chair exit alarm, Familiar objects from home, More frequent rounding, Reorient patient, Toileting rounds, Update white board Medication Interventions: Patient to call before getting OOB, Teach patient to arise slowly Elimination Interventions: Bed/chair exit alarm, Call light in reach, Stay With Me (per policy), Patient to call for help with toileting needs

## 2020-01-23 NOTE — PROGRESS NOTES
NATALIIA: 
 
Med Assist representative contacted by SELAM lead to help patient's son with Medicaid Application. She attempted to call and waiting for a call back. Que Hidalgo RN/CRM

## 2020-01-23 NOTE — PROGRESS NOTES
Problem: Dysphagia (Adult) Goal: *Acute Goals and Plan of Care (Insert Text) Description Speech pathology goals Initiated 1/22/2020 1. Patient will tolerate puree/thin liquid diet with no overt s/s aspiration within 7 days 2. Patient will tolerate trials of solids with timely/complete mastication and full oral clearance within 7 days Outcome: Progressing Towards Goal 
 SPEECH LANGUAGE PATHOLOGY DYSPHAGIA TREATMENT Patient: Andrew Anders (27 y.o. male) Date: 1/23/2020 Diagnosis: Failure to thrive in adult [R62.7] Hypernatremia [E87.0] Altered mental status [R41.82] JOSH (acute kidney injury) ([facility] [address] [N17.9] Hyperkalemia [E87.5] <principal problem not specified> Precautions: Aspiration ASSESSMENT: 
Patient tolerating diet per RN. He tolerated PO trials at bedside except for 1 cough on initial cup presentation, when he took a large gulp. Otherwise, no s/s of aspiration. He tolerated soft solids with adequate mastication and oral clearance, however presented with prolonged mastication for regular solids. Recommend diet upgrade. PLAN: 
Recommendations and Planned Interventions: Dysphagia 2/altered diet Thin liquids No straw Small bites/sips Assistance with feeding Patient continues to benefit from skilled intervention to address the above impairments. Continue treatment per established plan of care. Discharge Recommendations: To Be Determined SUBJECTIVE:  
Patient stated That's me. OBJECTIVE:  
Cognitive and Communication Status: 
Neurologic State: Alert, Confused Orientation Level: Oriented to person Cognition: Follows commands Safety/Judgement: Decreased awareness of environment, Decreased awareness of need for assistance, Decreased awareness of need for safety Dysphagia Treatment: 
Oral Assessment: P.O. Trials: 
Patient Position: (upright in bed) Vocal quality prior to P.O.: Hoarse;Breathy Consistency Presented: Solid;Puree; Thin liquid How Presented: Cup/sip; Self-fed/presented Bolus Acceptance: No impairment Bolus Formation/Control: Impaired Type of Impairment: Delayed;Mastication Propulsion: Delayed (# of seconds) Oral Residue: None Initiation of Swallow: Delayed (# of seconds) Laryngeal Elevation: Functional 
Aspiration Signs/Symptoms: Other (comment); Strong cough(strong cough on initial cup sip when he took a large gulp) Pharyngeal Phase Characteristics: Other (comment)(possible delay) Effective Modifications: Cup/sip Cues for Modifications: Minimal 
  
 
  
  
Exercises: 
Laryngeal Exercises: 
  
  
  
  
  
  
  
  
  
  
  
  
  
  
  
  
  
  
  
  
  
  
  
  
  
  
  
  
  
  
  
  
  
  
  
  
  
  
  
  
  
  
  
Pain: After treatment:  
Patient left in no apparent distress in bed, Call bell within reach, and Nursing notified COMMUNICATION/EDUCATION:  
 
 
The patient's plan of care including recommendations, planned interventions, and recommended diet changes were discussed with: Registered Nurse. LARA Burgess Time Calculation: 15 mins

## 2020-01-23 NOTE — PROGRESS NOTES
Bedside shift change report given to Nick 91Roberto (oncoming nurse) by Rachid Gambino RN (offgoing nurse). Report included the following information SBAR and Kardex.

## 2020-01-24 NOTE — PROGRESS NOTES
Nephrology Progress Note Isidro Grady Date of Admission : 1/21/2020 CC: Follow up for JOSH on CKD Assessment and Plan JOSH on CKD: 
- likely from volume depletion 
- Renal U/S neg for obstruction 
- improving 
- cont current care 
  
CKD III w/ baseline Cr 1.4 to 1.6: 
- likely from DM and HTN 
  
Hypernatremia: 
- from lack of free water intake 
- improving  
- can stop D5W and monitor 
  
Hyperkalemia: 
- from JOSH and ARB 
- resolved 
- no ACE/ARB 
  
Volume depletion: 
- cont hypotonic fluids 
  
Encephalopathy: 
- hypernatremia contributing 
- improving 
- infectious w/u per hospitalist 
  
DM2: 
- on insulin FTT Dementia Interval History: 
Seen and examined. Feeling better. Cr and Na improving. No reported cp or sob. Still w/ weakness and fatigue. Current Medications: all current  Medications have been eviewed in [facility] Review of Systems: Pertinent items are noted in HPI. Objective: 
Vitals:   
Vitals:  
 01/23/20 3570 01/23/20 1522 01/24/20 0018 01/24/20 3053 BP: 111/71 119/74 118/89 120/86 Pulse: 86 90 86 82 Resp: 14 14 14 16 Temp: 99.4 °F (37.4 °C) 98.7 °F (37.1 °C) 98.4 °F (36.9 °C) 98.6 °F (37 °C) SpO2: 99% 97% 98% 98% Weight:      
Height:      
 
Intake and Output: 
No intake/output data recorded. 01/22 1901 - 01/24 0700 In: 5800 [P.O.:100; I.V.:5700] Out: 2925 [Urine:2925] Physical Examination: 
Pt intubated    No 
General: NAD,Conversant, chronically ill appeargin Neck:  Supple, no mass Resp:  Lungs CTA B/L, no wheezing , normal respiratory effort CV:  RRR,  no murmur or rub, no LE edema GI:  Soft, NT, + Bowel sounds, no hepatosplenomegaly Neurologic:  Non focal, still w/ mild confusion Psych:             Unable to assess Skin:  No Rash :   no waggoner []    High complexity decision making was performed 
[]    Patient is at high-risk of decompensation with multiple organ involvement Lab Data Personally Reviewed: I have reviewed all the pertinent labs, microbiology data and radiology studies during assessment. Recent Labs  
  01/24/20 
0928 01/23/20 
0507 01/22/20 
1644  150* 155* K 4.1 3.8 4.0  
* 122* 125* CO2 24 22 23 * 183* 213* BUN 23* 33* 46* CREA 1.50* 1.60* 1.91* CA 8.1* 8.0* 8.2* No results for input(s): WBC, HGB, HCT, PLT, HGBEXT, HCTEXT, PLTEXT, HGBEXT, HCTEXT, PLTEXT in the last 72 hours. No results found for: SDES Lab Results Component Value Date/Time Culture result: MRSA NOT PRESENT 01/21/2020 09:30 PM  
 Culture result:  01/21/2020 09:30 PM  
      Screening of patient nares for MRSA is for surveillance purposes and, if positive, to facilitate isolation considerations in high risk settings. It is not intended for automatic decolonization interventions per se as regimens are not sufficiently effective to warrant routine use. Culture result: NO GROWTH 5 DAYS 04/17/2016 12:32 AM  
 
Recent Results (from the past 24 hour(s)) GLUCOSE, POC Collection Time: 01/23/20  5:08 PM  
Result Value Ref Range Glucose (POC) 210 (H) 65 - 100 mg/dL Performed by Jose Yang GLUCOSE, POC Collection Time: 01/23/20  9:10 PM  
Result Value Ref Range Glucose (POC) 199 (H) 65 - 100 mg/dL Performed by Alexandra Fermin GLUCOSE, POC Collection Time: 01/24/20  6:36 AM  
Result Value Ref Range Glucose (POC) 397 (H) 65 - 100 mg/dL Performed by Earlhyacinth Kevin METABOLIC PANEL, BASIC Collection Time: 01/24/20  9:28 AM  
Result Value Ref Range Sodium 143 136 - 145 mmol/L Potassium 4.1 3.5 - 5.1 mmol/L Chloride 115 (H) 97 - 108 mmol/L  
 CO2 24 21 - 32 mmol/L Anion gap 4 (L) 5 - 15 mmol/L Glucose 224 (H) 65 - 100 mg/dL BUN 23 (H) 6 - 20 MG/DL Creatinine 1.50 (H) 0.70 - 1.30 MG/DL  
 BUN/Creatinine ratio 15 12 - 20 GFR est AA 55 (L) >60 ml/min/1.73m2 GFR est non-AA 46 (L) >60 ml/min/1.73m2 Calcium 8.1 (L) 8.5 - 10.1 MG/DL  
GLUCOSE, POC Collection Time: 01/24/20 10:19 AM  
Result Value Ref Range Glucose (POC) 254 (H) 65 - 100 mg/dL Performed by Ash Agudelo MD 
[address], [address] [facility] Phone - [phone] Fax - [phone] 
[email or web address]

## 2020-01-24 NOTE — PROGRESS NOTES
Bedside shift change report given to Milo Roy RN (oncoming nurse) by Jennifer Harrington RN (offgoing nurse). Report included the following information SBAR, Kardex, Intake/Output, MAR and Recent Results.

## 2020-01-24 NOTE — PROGRESS NOTES
Bedside shift change report given to Jimbo Mason (oncoming nurse) by [facility] (offgoing nurse). Report included the following information SBAR.

## 2020-01-24 NOTE — PROGRESS NOTES
[facility] Adult  Hospitalist Group Hospitalist Progress Note Rebecca Pearson MD 
Answering service: [phone] OR [phone] from in house phone Date of Service:  2020 NAME:  Saul Napoles :  1945 MRN:  [id number] Admission Summary:  
Saul Napoles is a 76 y.o. male with past medical history significant for diabetes mellitus type 2, hypertension, history of dementia presents the emergency room from a rehab facility due to concern for dehydration and failure to thrive. As per medical report patient has refused to eat or drink anything in the last several days becoming weaker. Medical provider at the facility decided to order blood work-up which were abnormal.  Sent to ER for IV fluid. Patient at this time awake following basic commands but disoriented. Does not seem in acute distress but appears very dry. Denies any complaint. No family at the bedside. Work-up in the emergency room patient was found to have hyponatremia, hyperkalemia and acute kidney injury. He was started on normal saline and given regular insulin plus glucose for the hyperkalemia. Admission requested for dehydration and failure to thrive. Unable to obtain more history due to patient's mental status.  
  
Interval history / Subjective:  
   
Patient seen and examined today. Still confused tho is eating Palliative talking about future goals Assessment & Plan: 1. Hypernatremia-POA  
 severe hypernatremia likely from failure to thrive. normal now Stop dextrose Nephrology on board now 
  
2. JOSH on CKD-  
likely prerenal in etiology. IV fluids as above - Monitor renal function and urine output. 3. Non anion gap metabolic acidosis- 
Likely from dehydration and JOSH now improving. 
  
4. Hyperkalemia- 
 likely secondary to renal failure. Received insulin/glucose in ER. - Give calcium gluconate, amp of bicarb, albuterol.  
- IV fluids as above Resolved 
  
5. Metabolic encephalopathy due to hypernatremia Patient had recent work in January 2020 including MRI of the brain and EEG that were unremarkable. He is able to move all his extremities but is very confused and not following commands 
  
6. Diabetes Mellitus type 2- uncontrolled. Increase his Lantus - Placed on sliding scale insulin ac and hs q6 hours.  
  
7. Failure to thrive- Body mass index is 19.72 kg/m². severe protein calorie malnutrition 
could be related to worsening dementia. SLP to see him today 
  
8. Leukocytosis 
 - likely hemoconcentration from dehydration Alka Chang No source of infection at this time. Resolved 
  
9. HTN- 
 bp stable. Hold home meds due to ams Palliative care consult because of failure to thrive. Code status: full DVT prophylaxis:  
 
Care Plan discussed with: Patient/Family Disposition: TBD Hospital Problems  Date Reviewed: 12/4/2018 Codes Class Noted POA Hyperkalemia ICD-10-CM: E87.5 ICD-9-CM: 276.7  1/21/2020 Unknown Hypernatremia ICD-10-CM: E87.0 ICD-9-CM: 276.0  1/21/2020 Unknown Altered mental status ICD-10-CM: R41.82 
ICD-9-CM: 780.97  1/21/2020 Unknown Failure to thrive in adult ICD-10-CM: R62.7 ICD-9-CM: 783.7  1/21/2020 Unknown JOSH (acute kidney injury) ([facility] [address] ICD-10-CM: N17.9 ICD-9-CM: 584.9  1/21/2020 Unknown Review of Systems: A comprehensive review of systems was negative except for that written in the HPI. Vital Signs:  
 Last 24hrs VS reviewed since prior progress note. Most recent are: 
Visit Vitals /74 Pulse 98 Temp 98.1 °F (36.7 °C) Resp 16 Ht 5' 5\" (1.651 m) Wt 53.7 kg (118 lb 7.6 oz) SpO2 98% BMI 19.72 kg/m² Intake/Output Summary (Last 24 hours) at 1/24/2020 1635 Last data filed at 1/24/2020 3698 Gross per 24 hour Intake 5700 ml Output 2400 ml Net 3300 ml Physical Examination:  
 
 
     
Constitutional:  Confused and not following commands ENT:  Oral mucous moist, oropharynx benign. Resp:  CTA bilaterally. No wheezing/rhonchi/rales. No accessory muscle use CV:  Regular rhythm, normal rate, no murmurs, gallops, rubs GI:  Soft, non distended, non tender. normoactive bowel sounds, no hepatosplenomegaly Musculoskeletal:  No edema, warm, 2+ pulses throughout Neurologic:  Moves all extremities. But not following commands Data Review:  
 Review and/or order of clinical lab test 
 
 
Labs:  
 
No results for input(s): WBC, HGB, HCT, PLT, HGBEXT, HCTEXT, PLTEXT, HGBEXT, HCTEXT, PLTEXT in the last 72 hours. Recent Labs  
  01/24/20 
0928 01/23/20 
0507 01/22/20 
1644  150* 155* K 4.1 3.8 4.0  
* 122* 125* CO2 24 22 23 BUN 23* 33* 46* CREA 1.50* 1.60* 1.91* * 183* 213* CA 8.1* 8.0* 8.2* No results for input(s): SGOT, GPT, ALT, AP, TBIL, TBILI, TP, ALB, GLOB, GGT, AML, LPSE in the last 72 hours. No lab exists for component: AMYP, HLPSE No results for input(s): INR, PTP, APTT, INREXT, INREXT in the last 72 hours. No results for input(s): FE, TIBC, PSAT, FERR in the last 72 hours. No results found for: FOL, RBCF No results for input(s): PH, PCO2, PO2 in the last 72 hours. No results for input(s): CPK, CKNDX, TROIQ in the last 72 hours. No lab exists for component: CPKMB Lab Results Component Value Date/Time Cholesterol, total 190 01/02/2020 04:06 AM  
 HDL Cholesterol 64 01/02/2020 04:06 AM  
 LDL, calculated 115.4 (H) 01/02/2020 04:06 AM  
 Triglyceride 53 01/02/2020 04:06 AM  
 CHOL/HDL Ratio 3.0 01/02/2020 04:06 AM  
 
Lab Results Component Value Date/Time  Glucose (POC) 160 (H) 01/24/2020 04:28 PM  
 Glucose (POC) 254 (H) 01/24/2020 10:19 AM  
 Glucose (POC) 397 (H) 01/24/2020 06:36 AM  
 Glucose (POC) 199 (H) 01/23/2020 09:10 PM  
 Glucose (POC) 210 (H) 01/23/2020 05:08 PM  
 
Lab Results Component Value Date/Time Color YELLOW/STRAW 01/21/2020 02:46 AM  
 Appearance CLEAR 01/21/2020 02:46 AM  
 Specific gravity 1.018 01/21/2020 02:46 AM  
 pH (UA) 5.0 01/21/2020 02:46 AM  
 Protein NEGATIVE  01/21/2020 02:46 AM  
 Glucose NEGATIVE  01/21/2020 02:46 AM  
 Ketone NEGATIVE  01/21/2020 02:46 AM  
 Bilirubin NEGATIVE  01/21/2020 02:46 AM  
 Urobilinogen 0.2 01/21/2020 02:46 AM  
 Nitrites NEGATIVE  01/21/2020 02:46 AM  
 Leukocyte Esterase NEGATIVE  01/21/2020 02:46 AM  
 Epithelial cells FEW 01/21/2020 02:46 AM  
 Bacteria NEGATIVE  01/21/2020 02:46 AM  
 WBC 0-4 01/21/2020 02:46 AM  
 RBC 0-5 01/21/2020 02:46 AM  
 
 
 
Medications Reviewed:  
 
Current Facility-Administered Medications Medication Dose Route Frequency  insulin glargine (LANTUS) injection 20 Units  20 Units SubCUTAneous DAILY  balsam peru-castor oil (VENELEX) ointment   Topical Q8H  
 sodium chloride (NS) flush 5-40 mL  5-40 mL IntraVENous Q8H  
 sodium chloride (NS) flush 5-40 mL  5-40 mL IntraVENous PRN  
 glucose chewable tablet 16 g  4 Tab Oral PRN  
 glucagon (GLUCAGEN) injection 1 mg  1 mg IntraMUSCular PRN  
 dextrose 10% infusion 0-250 mL  0-250 mL IntraVENous PRN  
 insulin lispro (HUMALOG) injection   SubCUTAneous Q6H  
 influenza vaccine 2019-20 (6 mos+)(PF) (FLUARIX/FLULAVAL/FLUZONE QUAD) injection 0.5 mL  0.5 mL IntraMUSCular PRIOR TO DISCHARGE  
 
______________________________________________________________________ EXPECTED LENGTH OF STAY: 4d 12h ACTUAL LENGTH OF STAY:          3 Tennille De La Torre MD

## 2020-01-24 NOTE — CONSULTS
Palliative Medicine Consult [place]: [phone] ([phone]) Patient Name: Mo Covington YOB: 1945 Date of Initial Consult: 1/23/20 Reason for Consult: Care decisions Requesting Provider: Dr. Ethel Michael Primary Care Physician: Freda Kumari, ERICA 
 
 SUMMARY:  
Mo Covington is a 76 y. o. with a past history of labile DM type 2, hypertension, debility, who was admitted on 1/21/2020 from [facility] rehab with a diagnosis of dehydration and failure to thrive after refusal to eat/drink at rehab for a couple of days. He was last admitted 1/1-1/8 after an acute episode of confusion, etiology not clear as MRI brain showed no acute changes and EEG was negative. Family was not available for corroborative information during that hospital stay. Palliative medicine was consulted to assist with care goals discussions. Psychosocial- Born in [facility], , two sons Earline Lopez (whom he lives with along with Ace's longtime girlfriend Mark) and [facility] Also has a stepdaughter Ronny Molina. PALLIATIVE DIAGNOSES:  
1. Goals of care counseling 2. DNR discussion 3. Cognitive deficits 4. Debility 5. Labile DM type 2 6. Dehydration 7. 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  
1. Family meeting with two sons Earline Lopez and [facility] 1. Reviewed medical condition 2. Shared he is eating 100% today but requiring hand feeding 3. Plan is to observe off IVF starting today. 4. All agree he needs 24/7 care at this point, Earline Lopez meeting with [facility] next Tues to apply for Medicaid. NH would be best option for family. 5. Code status- talked through this thoroughly and explained. This was first conversation about resuscitation for sons. Reviewed option for signing a DDNR. They will talk amongst themselves and call me back next week if decision made to change status. They understand as it stands he is a Full code.   
6. Talked about his frailty and potential for future decline in the setting of labile diabetes and cognitive deficits- unclear circumstances where he refused food/drink at rehab; perhaps this won't be the case if someone available to hand feed him in the future? He remains at risk however, and good to be prepared for future decline. If a new event, or recurrent food/drink refusal, he would certainly be a candidate for Hospice support. Briefly explained this service. 2. They have my contact information in case of further questions. 3. Will follow peripherally for now, thank you for this consult. 4. Communicated plan of care with: Palliative [facility] Team 
 
 GOALS OF CARE / TREATMENT PREFERENCES:  
 
GOALS OF CARE: 
Patient/Health Care Proxy Stated Goals: Other (comment)(initiated conversation- right now sons want their Father to have the 24/7 safe care that he needs, no other care goals yet defined) TREATMENT PREFERENCES:  
Code Status: Full Code Advance Care Planning: 
[x] [facility] Interdisciplinary Team has updated the ACP Navigator with [place] and Patient Capacity Primary Decision Maker (Active): Nohemy Avalos - [phone] Primary Decision MakerMurel Adrian Davidson - [phone] Advance Care Planning 1/21/2020 Patient's Healthcare Decision Maker is: - Confirm Advance Directive None Patient Would Like to Complete Advance Directive Yes Medical Interventions: Full interventions Other: As far as possible, the palliative care team has discussed with patient / health care proxy about goals of care / treatment preferences for patient. HISTORY:  
 
History obtained from:  Son, chart review CHIEF COMPLAINT: admitted with dehydration (abnormal labs) HPI/SUBJECTIVE: The patient is:  
[x] Verbal and participatory [] Non-participatory due to:  
 
1/23- pt sent in from rehab with abnormal lab values (hypernatremia, JOSH, hyperkalemia) after refusal of food and drink for several days. He remains confused and not able to answer questions about his history, but is alert to state his name and knows he is in a hospital.  He denies discomfort, nausea or anxiety. 1/24- no overnight events. Pt comfortable. He ate 100% of breakfast and lunch (hand fed). IVF stopped this am- trial off to observe Na. Clinical Pain Assessment (nonverbal scale for severity on nonverbal patients):  
Clinical Pain Assessment Severity: 0 Activity (Movement): Lying quietly, normal position Duration: for how long has pt been experiencing pain (e.g., 2 days, 1 month, years) Frequency: how often pain is an issue (e.g., several times per day, once every few days, constant) FUNCTIONAL ASSESSMENT:  
 
Palliative Performance Scale (PPS): PPS: 50 PSYCHOSOCIAL/SPIRITUAL SCREENING:  
 
Palliative IDT has assessed this patient for cultural preferences / practices and a referral made as appropriate to needs (Cultural Services, Patient Advocacy, Ethics, etc.) Any spiritual / Orthodox concerns: 
[] Yes /  [x] No 
 
Caregiver Burnout: 
[] Yes /  [] No /  [x] No Caregiver Present Anticipatory grief assessment:  
[x] Normal  / [] Maladaptive ESAS Anxiety: ESAS Depression:    
 
 
 REVIEW OF SYSTEMS:  
 
Positive and pertinent negative findings in ROS are noted above in HPI. The following systems were [x] reviewed / [] unable to be reviewed as noted in HPI Other findings are noted below. Systems: constitutional, ears/nose/mouth/throat, respiratory, gastrointestinal, genitourinary, musculoskeletal, integumentary, neurologic, psychiatric, endocrine. Positive findings noted below. Modified ESAS Completed by: provider Pain: 0 Dyspnea: 0 Stool Occurrence(s): 1 PHYSICAL EXAM:  
 
From RN flowsheet: 
Wt Readings from Last 3 Encounters:  
01/22/20 53.7 kg (118 lb 7.6 oz) 01/08/20 62 kg (136 lb 11 oz) 02/02/19 63 kg (138 lb 14.2 oz) Blood pressure 122/74, pulse 98, temperature 98.1 °F (36.7 °C), resp. rate 16, height 5' 5\" (1.651 m), weight 53.7 kg (118 lb 7.6 oz), SpO2 98 %. Pain Scale 1: Adult Nonverbal Pain Scale Pain Intensity 1: 0 Constitutional: frail elderly male lying in bed, appears comfortable ENMT: no nasal discharge, moist mucous membranes Eyes:  Left eye deviation, right lateral gaze Cardiovascular: regular rhythm, distal pulses intact Respiratory: breathing not labored, symmetric bs anteriorly Gastrointestinal: soft non-tender, +bowel sounds Musculoskeletal: no deformity, no tenderness to palpation Skin: warm, dry Neurologic: alert, oriented to self and hospital, lacks insight, follows simple commands Psychiatric: appropriate affect, no hallucinations HISTORY:  
 
Active Problems: Hyperkalemia (1/21/2020) Hypernatremia (1/21/2020) Altered mental status (1/21/2020) Failure to thrive in adult (1/21/2020) JOSH (acute kidney injury) ([facility] [address] (1/21/2020) Past Medical History:  
Diagnosis Date  Diabetes ([facility] [address] 2002  High cholesterol  Hypertension History reviewed. No pertinent surgical history. Family History Problem Relation Age of Onset  No Known Problems Mother  No Known Problems Father  No Known Problems Sister  No Known Problems Brother  No Known Problems Brother  No Known Problems Brother  No Known Problems Brother  No Known Problems Brother  No Known Problems Brother  No Known Problems Maternal Grandmother  No Known Problems Maternal Grandfather  No Known Problems Paternal Grandmother  No Known Problems Paternal Grandfather  Diabetes Neg Hx   
 Heart Disease Neg Hx History reviewed, no pertinent family history. Social History Tobacco Use  Smoking status: Never Smoker  Smokeless tobacco: Never Used Substance Use Topics  Alcohol use: No  
  Alcohol/week: 0.0 standard drinks No Known Allergies Current Facility-Administered Medications Medication Dose Route Frequency  insulin glargine (LANTUS) injection 20 Units  20 Units SubCUTAneous DAILY  balsam peru-castor oil (VENELEX) ointment   Topical Q8H  
 sodium chloride (NS) flush 5-40 mL  5-40 mL IntraVENous Q8H  
 sodium chloride (NS) flush 5-40 mL  5-40 mL IntraVENous PRN  
 glucose chewable tablet 16 g  4 Tab Oral PRN  
 glucagon (GLUCAGEN) injection 1 mg  1 mg IntraMUSCular PRN  
 dextrose 10% infusion 0-250 mL  0-250 mL IntraVENous PRN  
 insulin lispro (HUMALOG) injection   SubCUTAneous Q6H  
 influenza vaccine 2019-20 (6 mos+)(PF) (FLUARIX/FLULAVAL/FLUZONE QUAD) injection 0.5 mL  0.5 mL IntraMUSCular PRIOR TO DISCHARGE  
 
 
 
 LAB AND IMAGING FINDINGS:  
 
Lab Results Component Value Date/Time WBC 13.6 (H) 01/21/2020 01:53 AM  
 HGB 14.4 01/21/2020 01:53 AM  
 PLATELET 120 91/74/4684 01:53 AM  
 
Lab Results Component Value Date/Time Sodium 143 01/24/2020 09:28 AM  
 Potassium 4.1 01/24/2020 09:28 AM  
 Chloride 115 (H) 01/24/2020 09:28 AM  
 CO2 24 01/24/2020 09:28 AM  
 BUN 23 (H) 01/24/2020 09:28 AM  
 Creatinine 1.50 (H) 01/24/2020 09:28 AM  
 Calcium 8.1 (L) 01/24/2020 09:28 AM  
 Magnesium 1.9 02/24/2019 08:35 PM  
 Phosphorus 2.0 (L) 04/17/2016 12:46 AM  
  
Lab Results Component Value Date/Time AST (SGOT) 21 01/21/2020 01:53 AM  
 Alk. phosphatase 93 01/21/2020 01:53 AM  
 Protein, total 8.5 (H) 01/21/2020 01:53 AM  
 Albumin 3.4 (L) 01/21/2020 01:53 AM  
 Globulin 5.1 (H) 01/21/2020 01:53 AM  
 
Lab Results Component Value Date/Time INR 1.1 01/01/2020 04:18 PM  
 Prothrombin time 10.9 01/01/2020 04:18 PM  
 aPTT 23.9 02/02/2019 03:56 PM  
  
No results found for: IRON, FE, TIBC, IBCT, PSAT, FERR No results found for: PH, PCO2, PO2 No components found for: [place] Lab Results Component Value Date/Time CK 89 04/17/2016 12:46 AM  
 CK - MB <0.5 (L) 04/17/2016 12:46 AM  
  
 
 
   
 
Total time:  40m Counseling / coordination time, spent as noted above:  25m 
> 50% counseling / coordination?:  y 
 
Prolonged service was provided for  []30 min   []75 min in face to face time in the presence of the patient, spent as noted above. Time Start:  16:00 Time End:  16:40 Note: this can only be billed with [id number] (initial) or [id number] (follow up). If multiple start / stop times, list each separately.

## 2020-01-24 NOTE — PROGRESS NOTES
NATALIIA: 
 
CM spoke to Etelvina Adkins, patient's son regarding Medicaid Application. He is scheduled to meet with Stefano Martinez with [facility] on Tuesday at 10:30. March Bianca, RN/CRM

## 2020-01-25 NOTE — PROGRESS NOTES
Nephrology Progress Note Richelle Huitron Date of Admission : 1/21/2020 CC: Follow up for JOSH on CKD Assessment and Plan JOSH on CKD: 
- likely from volume depletion 
- Renal U/S neg for obstruction 
- improving 
- cont current care 
- off IVF for now , rise in NA noted  
- advised pt to increase fluid intake  
  
CKD III w/ baseline Cr 1.4 to 1.6: 
- likely from DM and HTN 
  
Hypernatremia: 
- from lack of free water intake - Na worsened off D5w 
- advised increased fluid intake today 
- may need IVF back tomorrow  
  
Hyperkalemia: 
- from JOSH and ARB 
- resolved 
- no ACE/ARB 
  
Volume depletion: 
- cont hypotonic fluids 
  
Encephalopathy: 
- hypernatremia contributing 
- improving 
- infectious w/u per hospitalist 
  
DM2: 
- on insulin FTT Dementia Interval History: 
Seen and examined. Feeling better. CR better, na at 148, will monitor Current Medications: all current  Medications have been eviewed in [facility] Review of Systems: Pertinent items are noted in HPI. Objective: 
Vitals:   
Vitals:  
 01/24/20 0795 01/24/20 1537 01/24/20 2252 01/25/20 8134 BP: 120/86 122/74 124/80 142/88 Pulse: 82 98 94 97 Resp: 16 16 16 16 Temp: 98.6 °F (37 °C) 98.1 °F (36.7 °C) 98 °F (36.7 °C) 98.6 °F (37 °C) SpO2: 98% 98% 97% 99% Weight:      
Height:      
 
Intake and Output: 
No intake/output data recorded. 01/23 1901 - 01/25 0700 In: 5700 [I.V.:5700] Out: 4150 [VWTXO:1215] Physical Examination: 
Pt intubated    No 
General: NAD,Conversant, chronically ill appeargin Neck:  Supple, no mass Resp:  Lungs CTA B/L, no wheezing , normal respiratory effort CV:  RRR,  no murmur or rub, no LE edema GI:  Soft, NT, + Bowel sounds, no hepatosplenomegaly Neurologic:  Non focal, still w/ mild confusion Psych:             Unable to assess Skin:  No Rash :   no waggoner [x]    High complexity decision making was performed [x]    Patient is at high-risk of decompensation with multiple organ involvement Lab Data Personally Reviewed: I have reviewed all the pertinent labs, microbiology data and radiology studies during assessment. Recent Labs  
  01/25/20 
0545 01/24/20 
6697 01/23/20 
0507 * 143 150*  
K 4.4 4.1 3.8 * 115* 122* CO2 25 24 22 GLU 99 224* 183* BUN 25* 23* 33* CREA 1.36* 1.50* 1.60* CA 8.6 8.1* 8.0* No results for input(s): WBC, HGB, HCT, PLT, HGBEXT, HCTEXT, PLTEXT, HGBEXT, HCTEXT, PLTEXT in the last 72 hours. No results found for: SDES Lab Results Component Value Date/Time Culture result: MRSA NOT PRESENT 01/21/2020 09:30 PM  
 Culture result:  01/21/2020 09:30 PM  
      Screening of patient nares for MRSA is for surveillance purposes and, if positive, to facilitate isolation considerations in high risk settings. It is not intended for automatic decolonization interventions per se as regimens are not sufficiently effective to warrant routine use. Culture result: NO GROWTH 5 DAYS 04/17/2016 12:32 AM  
 
Recent Results (from the past 24 hour(s)) GLUCOSE, POC Collection Time: 01/24/20  4:28 PM  
Result Value Ref Range Glucose (POC) 160 (H) 65 - 100 mg/dL Performed by Sary Ybarra GLUCOSE, POC Collection Time: 01/24/20  9:50 PM  
Result Value Ref Range Glucose (POC) 103 (H) 65 - 100 mg/dL Performed by Isis Munoz METABOLIC PANEL, BASIC Collection Time: 01/25/20  5:45 AM  
Result Value Ref Range Sodium 147 (H) 136 - 145 mmol/L Potassium 4.4 3.5 - 5.1 mmol/L Chloride 118 (H) 97 - 108 mmol/L  
 CO2 25 21 - 32 mmol/L Anion gap 4 (L) 5 - 15 mmol/L Glucose 99 65 - 100 mg/dL BUN 25 (H) 6 - 20 MG/DL Creatinine 1.36 (H) 0.70 - 1.30 MG/DL  
 BUN/Creatinine ratio 18 12 - 20 GFR est AA >60 >60 ml/min/1.73m2 GFR est non-AA 51 (L) >60 ml/min/1.73m2  Calcium 8.6 8.5 - 10.1 MG/DL  
GLUCOSE, POC  
 Collection Time: 01/25/20  6:40 AM  
Result Value Ref Range Glucose (POC) 101 (H) 65 - 100 mg/dL Performed by Taran Plater GLUCOSE, POC Collection Time: 01/25/20 12:26 PM  
Result Value Ref Range Glucose (POC) 103 (H) 65 - 100 mg/dL Performed by Samuel Mello MD 
[facility] Nephrology THE [address], [facility] Phone - [phone] Fax - [phone] 
[email or web address]

## 2020-01-25 NOTE — PROGRESS NOTES
[address] [facility] Adult  Hospitalist Group Hospitalist Progress Note Katie Smith MD 
Answering service: [phone] OR [phone] from in house phone Date of Service:  2020 NAME:  Francis Diego :  1945 MRN:  [id number] Admission Summary:  
Francis Diego is a 76 y.o. male with past medical history significant for diabetes mellitus type 2, hypertension, history of dementia presents the emergency room from a rehab facility due to concern for dehydration and failure to thrive. As per medical report patient has refused to eat or drink anything in the last several days becoming weaker. Medical provider at the facility decided to order blood work-up which were abnormal.  Sent to ER for IV fluid. Patient at this time awake following basic commands but disoriented. Does not seem in acute distress but appears very dry. Denies any complaint. No family at the bedside. Work-up in the emergency room patient was found to have hyponatremia, hyperkalemia and acute kidney injury. He was started on normal saline and given regular insulin plus glucose for the hyperkalemia. Admission requested for dehydration and failure to thrive. Unable to obtain more history due to patient's mental status.  
  
Interval history / Subjective:  
   
Patient seen and examined today. Confused. No new complaints Assessment & Plan: 1. Hypernatremia-POA Restart dextrose IVF. Monitor 
  
2. JOSH on CKD-  
likely prerenal in etiology. IV fluids as above - Monitor renal function and urine output. 3. Non anion gap metabolic acidosis- 
Likely from dehydration and JOSH now improving. 
  
4. Hyperkalemia- 
Resolved 
  
5. Metabolic encephalopathy due to hypernatremia Confused.  
MRI brain: Generalized parenchymal volume loss and moderate chronic microvascular ischemic disease 
  
 6. Diabetes Mellitus type 2-  
- Continue sliding scale insulin & lantus - Accu checks 
  
7. Failure to thrive- Body mass index is 19.72 kg/m². severe protein calorie malnutrition On nutritional supplements 
  
8. Leukocytosis 
 - likely hemoconcentration from dehydration Kena Riser No source of infection at this time.  
  
9. HTN- 
 bp stable. Hold home meds due to ams Palliative care consult because of failure to thrive. Code status: full DVT prophylaxis:  
 
Care Plan discussed with: Patient/Family Disposition: TBD Hospital Problems  Date Reviewed: 12/4/2018 Codes Class Noted POA Hyperkalemia ICD-10-CM: E87.5 ICD-9-CM: 276.7  1/21/2020 Unknown Hypernatremia ICD-10-CM: E87.0 ICD-9-CM: 276.0  1/21/2020 Unknown Altered mental status ICD-10-CM: R41.82 
ICD-9-CM: 780.97  1/21/2020 Unknown Failure to thrive in adult ICD-10-CM: R62.7 ICD-9-CM: 783.7  1/21/2020 Unknown JOSH (acute kidney injury) ([facility] [address] ICD-10-CM: N17.9 ICD-9-CM: 584.9  1/21/2020 Unknown Review of Systems: A comprehensive review of systems was negative except for that written in the HPI. Vital Signs:  
 Last 24hrs VS reviewed since prior progress note. Most recent are: 
Visit Vitals /87 (BP 1 Location: Right arm, BP Patient Position: At rest) Pulse 100 Temp 98.1 °F (36.7 °C) Resp 16 Ht 5' 5\" (1.651 m) Wt 53.7 kg (118 lb 7.6 oz) SpO2 98% BMI 19.72 kg/m² Intake/Output Summary (Last 24 hours) at 1/25/2020 1558 Last data filed at 1/25/2020 1506 Gross per 24 hour Intake  Output 1500 ml Net -1500 ml Physical Examination:  
 
 
     
Constitutional:  Confused and not following commands ENT:  Oral mucous moist, oropharynx benign. Resp:  CTA bilaterally. No wheezing/rhonchi/rales. No accessory muscle use CV:  Regular rhythm, normal rate, no murmurs, gallops, rubs GI:  Soft, non distended, non tender.  normoactive bowel sounds, no hepatosplenomegaly Musculoskeletal:  No edema, warm, 2+ pulses throughout Neurologic:  Moves all extremities. But not following commands Data Review:  
 Review and/or order of clinical lab test 
 
 
Labs:  
 
No results for input(s): WBC, HGB, HCT, PLT, HGBEXT, HCTEXT, PLTEXT, HGBEXT, HCTEXT, PLTEXT in the last 72 hours. Recent Labs  
  01/25/20 
0545 01/24/20 
9123 01/23/20 
0507 * 143 150*  
K 4.4 4.1 3.8 * 115* 122* CO2 25 24 22 BUN 25* 23* 33* CREA 1.36* 1.50* 1.60* GLU 99 224* 183* CA 8.6 8.1* 8.0* No results for input(s): SGOT, GPT, ALT, AP, TBIL, TBILI, TP, ALB, GLOB, GGT, AML, LPSE in the last 72 hours. No lab exists for component: AMYP, HLPSE No results for input(s): INR, PTP, APTT, INREXT, INREXT in the last 72 hours. No results for input(s): FE, TIBC, PSAT, FERR in the last 72 hours. No results found for: FOL, RBCF No results for input(s): PH, PCO2, PO2 in the last 72 hours. No results for input(s): CPK, CKNDX, TROIQ in the last 72 hours. No lab exists for component: CPKMB Lab Results Component Value Date/Time Cholesterol, total 190 01/02/2020 04:06 AM  
 HDL Cholesterol 64 01/02/2020 04:06 AM  
 LDL, calculated 115.4 (H) 01/02/2020 04:06 AM  
 Triglyceride 53 01/02/2020 04:06 AM  
 CHOL/HDL Ratio 3.0 01/02/2020 04:06 AM  
 
Lab Results Component Value Date/Time Glucose (POC) 103 (H) 01/25/2020 12:26 PM  
 Glucose (POC) 101 (H) 01/25/2020 06:40 AM  
 Glucose (POC) 103 (H) 01/24/2020 09:50 PM  
 Glucose (POC) 160 (H) 01/24/2020 04:28 PM  
 Glucose (POC) 254 (H) 01/24/2020 10:19 AM  
 
Lab Results Component Value Date/Time  Color YELLOW/STRAW 01/21/2020 02:46 AM  
 Appearance CLEAR 01/21/2020 02:46 AM  
 Specific gravity 1.018 01/21/2020 02:46 AM  
 pH (UA) 5.0 01/21/2020 02:46 AM  
 Protein NEGATIVE  01/21/2020 02:46 AM  
 Glucose NEGATIVE  01/21/2020 02:46 AM  
 Ketone NEGATIVE  01/21/2020 02:46 AM  
 Bilirubin NEGATIVE  01/21/2020 02:46 AM  
 Urobilinogen 0.2 01/21/2020 02:46 AM  
 Nitrites NEGATIVE  01/21/2020 02:46 AM  
 Leukocyte Esterase NEGATIVE  01/21/2020 02:46 AM  
 Epithelial cells FEW 01/21/2020 02:46 AM  
 Bacteria NEGATIVE  01/21/2020 02:46 AM  
 WBC 0-4 01/21/2020 02:46 AM  
 RBC 0-5 01/21/2020 02:46 AM  
 
 
 
Medications Reviewed:  
 
Current Facility-Administered Medications Medication Dose Route Frequency  insulin glargine (LANTUS) injection 10 Units  10 Units SubCUTAneous DAILY  balsam peru-castor oil (VENELEX) ointment   Topical Q8H  
 sodium chloride (NS) flush 5-40 mL  5-40 mL IntraVENous Q8H  
 sodium chloride (NS) flush 5-40 mL  5-40 mL IntraVENous PRN  
 glucose chewable tablet 16 g  4 Tab Oral PRN  
 glucagon (GLUCAGEN) injection 1 mg  1 mg IntraMUSCular PRN  
 dextrose 10% infusion 0-250 mL  0-250 mL IntraVENous PRN  
 insulin lispro (HUMALOG) injection   SubCUTAneous Q6H  
 influenza vaccine 2019-20 (6 mos+)(PF) (FLUARIX/FLULAVAL/FLUZONE QUAD) injection 0.5 mL  0.5 mL IntraMUSCular PRIOR TO DISCHARGE  
 
______________________________________________________________________ EXPECTED LENGTH OF STAY: 4d 12h ACTUAL LENGTH OF STAY:          4 Cadence Breaux MD

## 2020-01-25 NOTE — PROGRESS NOTES
Bedside shift change report given to Yoon Light RN (oncoming nurse) by Claudine Lieberman RN (offgoing nurse). Report included the following information SBAR, Kardex, Intake/Output, MAR and Recent Results.

## 2020-01-25 NOTE — PROGRESS NOTES
Patients morning BG was 101. RN spoke with Dr. Stefano Pond who stated to change the lantus from 20 units to 10 units.

## 2020-01-26 NOTE — PROGRESS NOTES
Bedside shift change report given to Jalen Ferrera RN (oncoming nurse) by DANNA Valdez (offgoing nurse). Report included the following information SBAR, Kardex, Intake/Output, MAR and Recent Results.

## 2020-01-26 NOTE — ROUTINE PROCESS
Bedside and Verbal shift change report given to Vitaly Chen (oncoming nurse) by Fanta Chang (offgoing nurse). Report included the following information SBAR, Kardex and Recent Results.

## 2020-01-26 NOTE — PROGRESS NOTES
0155-Pt IV began leaking at the site when flushed, this RN changed dressing and reassessed. Fluids were paused, and RN removed IV. A second nurse unsuccessfully attempted to start an IV. CCU was called and informed this RN they would send a nurse when able. 0420-RN called CCU and was informed staffing was critical and the floor would be unable to send a nurse to attempt IV. The pt is stable and comfortable at this time, RN will continue to monitor and reassess in the AM. 0530-CCU nurse arrived to place IV, successfully started a 22G. IV fluid restarted.

## 2020-01-26 NOTE — PROGRESS NOTES
[address] [facility] Adult  Hospitalist Group Hospitalist Progress Note Evalina Castleman, MD 
Answering service: [phone] or [phone] from in house phone Date of Service:  2020 NAME:  David Tristan :  1945 MRN:  [id number] Admission Summary:  
David Tristan is a 76 y.o. male with past medical history significant for diabetes mellitus type 2, hypertension, history of dementia presents the emergency room from a rehab facility due to concern for dehydration and failure to thrive. As per medical report patient has refused to eat or drink anything in the last several days becoming weaker. Medical provider at the facility decided to order blood work-up which were abnormal.  Sent to ER for IV fluid. Patient at this time awake following basic commands but disoriented. Does not seem in acute distress but appears very dry. Denies any complaint. No family at the bedside. Work-up in the emergency room patient was found to have hyponatremia, hyperkalemia and acute kidney injury. He was started on normal saline and given regular insulin plus glucose for the hyperkalemia. Admission requested for dehydration and failure to thrive. Unable to obtain more history due to patient's mental status.  
  
Interval history / Subjective:  
   
Patient seen and examined today. No new complaints. Seating in bed with both feet hanging over the rails. Assessment & Plan: 1. Hypernatremia-POA  
 continue dextrose IVF. Monitor 
  
2. JOSH on CKD-  
likely prerenal in etiology. IV fluids as above - Monitor renal function and urine output. 3. Non anion gap metabolic acidosis- 
Likely from dehydration and JOSH now improving. 
  
4. Hyperkalemia- 
Resolved 
  
5. Metabolic encephalopathy due to hypernatremia Confused.  
MRI brain: Generalized parenchymal volume loss and moderate chronic microvascular ischemic disease 
  
6. Diabetes Mellitus type 2-  
- Continue sliding scale insulin & lantus - Accu checks 
  
7. Failure to thrive- Body mass index is 19.72 kg/m². severe protein calorie malnutrition On nutritional supplements 
  
8. Leukocytosis 
 - likely hemoconcentration from dehydration Eleni Edwards No source of infection at this time.  
  
9. HTN- 
 bp stable. Hold home meds due to ams Palliative care consult because of failure to thrive. Code status: full DVT prophylaxis:  
 
Care Plan discussed with: Patient/Family Disposition: TBD. Awaiting medicaid application Hospital Problems  Date Reviewed: 12/4/2018 Codes Class Noted POA Hyperkalemia ICD-10-CM: E87.5 ICD-9-CM: 276.7  1/21/2020 Unknown Hypernatremia ICD-10-CM: E87.0 ICD-9-CM: 276.0  1/21/2020 Unknown Altered mental status ICD-10-CM: R41.82 
ICD-9-CM: 780.97  1/21/2020 Unknown Failure to thrive in adult ICD-10-CM: R62.7 ICD-9-CM: 783.7  1/21/2020 Unknown JOSH (acute kidney injury) ([facility] [address] ICD-10-CM: N17.9 ICD-9-CM: 584.9  1/21/2020 Unknown Review of Systems: A comprehensive review of systems was negative except for that written in the HPI. Vital Signs:  
 Last 24hrs VS reviewed since prior progress note. Most recent are: 
Visit Vitals BP (!) 156/93 Pulse 90 Temp 98.6 °F (37 °C) Resp 16 Ht 5' 5\" (1.651 m) Wt 53.7 kg (118 lb 7.6 oz) SpO2 100% BMI 19.72 kg/m² Intake/Output Summary (Last 24 hours) at 1/26/2020 1422 Last data filed at 1/26/2020 6819 Gross per 24 hour Intake  Output 1100 ml Net -1100 ml Physical Examination:  
 
 
     
Constitutional:  Confused and not following commands ENT:  Oral mucous moist, oropharynx benign. Resp:  CTA bilaterally. No wheezing/rhonchi/rales. No accessory muscle use CV:  Regular rhythm, normal rate, no murmurs, gallops, rubs GI:  Soft, non distended, non tender. normoactive bowel sounds, no hepatosplenomegaly Musculoskeletal:  No edema, warm, 2+ pulses throughout Neurologic:  Moves all extremities. But not following commands Data Review:  
 Review and/or order of clinical lab test 
 
 
Labs:  
 
No results for input(s): WBC, HGB, HCT, PLT, HGBEXT, HCTEXT, PLTEXT, HGBEXT, HCTEXT, PLTEXT in the last 72 hours. Recent Labs  
  01/25/20 
0545 01/24/20 
9177 * 143  
K 4.4 4.1 * 115* CO2 25 24 BUN 25* 23* CREA 1.36* 1.50* GLU 99 224* CA 8.6 8.1* No results for input(s): SGOT, GPT, ALT, AP, TBIL, TBILI, TP, ALB, GLOB, GGT, AML, LPSE in the last 72 hours. No lab exists for component: AMYP, HLPSE No results for input(s): INR, PTP, APTT, INREXT, INREXT in the last 72 hours. No results for input(s): FE, TIBC, PSAT, FERR in the last 72 hours. No results found for: FOL, RBCF No results for input(s): PH, PCO2, PO2 in the last 72 hours. No results for input(s): CPK, CKNDX, TROIQ in the last 72 hours. No lab exists for component: CPKMB Lab Results Component Value Date/Time Cholesterol, total 190 01/02/2020 04:06 AM  
 HDL Cholesterol 64 01/02/2020 04:06 AM  
 LDL, calculated 115.4 (H) 01/02/2020 04:06 AM  
 Triglyceride 53 01/02/2020 04:06 AM  
 CHOL/HDL Ratio 3.0 01/02/2020 04:06 AM  
 
Lab Results Component Value Date/Time Glucose (POC) 120 (H) 01/26/2020 11:41 AM  
 Glucose (POC) 103 (H) 01/26/2020 08:57 AM  
 Glucose (POC) 97 01/26/2020 05:57 AM  
 Glucose (POC) 123 (H) 01/26/2020 12:07 AM  
 Glucose (POC) 244 (H) 01/25/2020 06:16 PM  
 
Lab Results Component Value Date/Time  Color YELLOW/STRAW 01/21/2020 02:46 AM  
 Appearance CLEAR 01/21/2020 02:46 AM  
 Specific gravity 1.018 01/21/2020 02:46 AM  
 pH (UA) 5.0 01/21/2020 02:46 AM  
 Protein NEGATIVE  01/21/2020 02:46 AM  
 Glucose NEGATIVE  01/21/2020 02:46 AM  
 Ketone NEGATIVE  01/21/2020 02:46 AM  
 Bilirubin NEGATIVE  01/21/2020 02:46 AM  
 Urobilinogen 0.2 01/21/2020 02:46 AM  
 Nitrites NEGATIVE  01/21/2020 02:46 AM  
 Leukocyte Esterase NEGATIVE  01/21/2020 02:46 AM  
 Epithelial cells FEW 01/21/2020 02:46 AM  
 Bacteria NEGATIVE  01/21/2020 02:46 AM  
 WBC 0-4 01/21/2020 02:46 AM  
 RBC 0-5 01/21/2020 02:46 AM  
 
 
 
Medications Reviewed:  
 
Current Facility-Administered Medications Medication Dose Route Frequency  [START ON 1/27/2020] amLODIPine (NORVASC) tablet 5 mg  5 mg Oral DAILY  insulin glargine (LANTUS) injection 10 Units  10 Units SubCUTAneous DAILY  dextrose 5% infusion  50 mL/hr IntraVENous CONTINUOUS  
 balsam peru-castor oil (VENELEX) ointment   Topical Q8H  
 sodium chloride (NS) flush 5-40 mL  5-40 mL IntraVENous Q8H  
 sodium chloride (NS) flush 5-40 mL  5-40 mL IntraVENous PRN  
 glucose chewable tablet 16 g  4 Tab Oral PRN  
 glucagon (GLUCAGEN) injection 1 mg  1 mg IntraMUSCular PRN  
 dextrose 10% infusion 0-250 mL  0-250 mL IntraVENous PRN  
 insulin lispro (HUMALOG) injection   SubCUTAneous Q6H  
 influenza vaccine 2019-20 (6 mos+)(PF) (FLUARIX/FLULAVAL/FLUZONE QUAD) injection 0.5 mL  0.5 mL IntraMUSCular PRIOR TO DISCHARGE  
 
______________________________________________________________________ EXPECTED LENGTH OF STAY: 4d 12h ACTUAL LENGTH OF STAY:          5 Myrna Mills MD

## 2020-01-27 NOTE — PROGRESS NOTES
[address] [facility] Adult  Hospitalist Group Hospitalist Progress Note Kae Saavedra MD 
Answering service: [phone] OR [phone] from in house phone Date of Service:  2020 NAME:  Rikki Chaney :  1945 MRN:  [id number] Admission Summary:  
Rikki Chaney is a 76 y.o. male with past medical history significant for diabetes mellitus type 2, hypertension, history of dementia presents the emergency room from a rehab facility due to concern for dehydration and failure to thrive. As per medical report patient has refused to eat or drink anything in the last several days becoming weaker. Medical provider at the facility decided to order blood work-up which were abnormal.  Sent to ER for IV fluid. Patient at this time awake following basic commands but disoriented. Does not seem in acute distress but appears very dry. Denies any complaint. No family at the bedside. Work-up in the emergency room patient was found to have hyponatremia, hyperkalemia and acute kidney injury. He was started on normal saline and given regular insulin plus glucose for the hyperkalemia. Admission requested for dehydration and failure to thrive. Unable to obtain more history due to patient's mental status.  
  
Interval history / Subjective:  
   
Patient seen and examined today. Awake, alert, comfortable Offers no new complaints Denies any pain DW NS, TAYE overnight Assessment & Plan: 1. Hypernatremia-POA  
nephro on board Resolved. IVF stopped, repeat labs in am 
  
2. JOSH on CKD-  
likely prerenal in etiology. Improving, off IVF, encourage oral intake, repeat labs in am 
 
  
3. Non anion gap metabolic acidosis- 
Likely from dehydration and JOSH now improving. 
  
4. Hyperkalemia- 
Resolved 
  
5. Metabolic encephalopathy due to hypernatremia Confused. MRI brain: Generalized parenchymal volume loss and moderate chronic microvascular ischemic disease 
  
6. Diabetes Mellitus type 2-  
- Continue sliding scale insulin & lantus - Accu checks 
  
7. Failure to thrive- Body mass index is 19.72 kg/m². severe protein calorie malnutrition On nutritional supplements 
  
8. Leukocytosis 
 - likely hemoconcentration from dehydration Irma  No source of infection at this time.  
  
9. HTN- 
 bp stable. Hold home meds due to ams Palliative care consult noted, family to decide on code status. Code status: full DVT prophylaxis:  
 
Care Plan discussed with: Patient/Family Disposition: TBD. Awaiting medicaid application Discussed during IDRs, patient will need to [facility] to GB while he awaits medicaid Hospital Problems  Date Reviewed: 12/4/2018 Codes Class Noted POA Hyperkalemia ICD-10-CM: E87.5 ICD-9-CM: 276.7  1/21/2020 Unknown Hypernatremia ICD-10-CM: E87.0 ICD-9-CM: 276.0  1/21/2020 Unknown Altered mental status ICD-10-CM: R41.82 
ICD-9-CM: 780.97  1/21/2020 Unknown Failure to thrive in adult ICD-10-CM: R62.7 ICD-9-CM: 783.7  1/21/2020 Unknown JOSH (acute kidney injury) ([facility] [address] ICD-10-CM: N17.9 ICD-9-CM: 584.9  1/21/2020 Unknown Review of Systems: A comprehensive review of systems was negative except for that written in the HPI. Vital Signs:  
 Last 24hrs VS reviewed since prior progress note. Most recent are: 
Visit Vitals /77 (BP 1 Location: Left arm, BP Patient Position: At rest) Pulse (!) 101 Temp 98.2 °F (36.8 °C) Resp 18 Ht 5' 5\" (1.651 m) Wt 53.7 kg (118 lb 7.6 oz) SpO2 98% BMI 19.72 kg/m² Intake/Output Summary (Last 24 hours) at 1/27/2020 1246 Last data filed at 1/27/2020 1146 Gross per 24 hour Intake 1855.84 ml Output 600 ml Net 1255.84 ml Physical Examination:  
 
 
     
Constitutional:  Confused,awake, calm, cooperative ENT:  Oral mucous moist, oropharynx benign. Resp:  CTA bilaterally. No wheezing/rhonchi/rales. No accessory muscle use CV:  Regular rhythm, normal rate, no murmurs, gallops, rubs GI:  Soft, non distended, non tender. normoactive bowel sounds, no hepatosplenomegaly Musculoskeletal:  No edema, warm, 2+ pulses throughout Neurologic:  Moves all extremities. But not following commands Data Review:  
 Review and/or order of clinical lab test 
 
 
Labs:  
 
No results for input(s): WBC, HGB, HCT, PLT, HGBEXT, HCTEXT, PLTEXT, HGBEXT, HCTEXT, PLTEXT in the last 72 hours. Recent Labs  
  01/27/20 
0410 01/25/20 
0545  147* K 4.4 4.4 * 118* CO2 24 25 BUN 21* 25* CREA 1.28 1.36* * 99  
CA 8.1* 8.6 No results for input(s): SGOT, GPT, ALT, AP, TBIL, TBILI, TP, ALB, GLOB, GGT, AML, LPSE in the last 72 hours. No lab exists for component: AMYP, HLPSE No results for input(s): INR, PTP, APTT, INREXT, INREXT in the last 72 hours. No results for input(s): FE, TIBC, PSAT, FERR in the last 72 hours. No results found for: FOL, RBCF No results for input(s): PH, PCO2, PO2 in the last 72 hours. No results for input(s): CPK, CKNDX, TROIQ in the last 72 hours. No lab exists for component: CPKMB Lab Results Component Value Date/Time Cholesterol, total 190 01/02/2020 04:06 AM  
 HDL Cholesterol 64 01/02/2020 04:06 AM  
 LDL, calculated 115.4 (H) 01/02/2020 04:06 AM  
 Triglyceride 53 01/02/2020 04:06 AM  
 CHOL/HDL Ratio 3.0 01/02/2020 04:06 AM  
 
Lab Results Component Value Date/Time Glucose (POC) 176 (H) 01/27/2020 11:38 AM  
 Glucose (POC) 140 (H) 01/27/2020 05:17 AM  
 Glucose (POC) 202 (H) 01/26/2020 10:44 PM  
 Glucose (POC) 151 (H) 01/26/2020 04:34 PM  
 Glucose (POC) 120 (H) 01/26/2020 11:41 AM  
 
Lab Results Component Value Date/Time  Color YELLOW/STRAW 01/21/2020 02:46 AM  
 Appearance CLEAR 01/21/2020 02:46 AM  
 Specific gravity 1.018 01/21/2020 02:46 AM  
 pH (UA) 5.0 01/21/2020 02:46 AM  
 Protein NEGATIVE  01/21/2020 02:46 AM  
 Glucose NEGATIVE  01/21/2020 02:46 AM  
 Ketone NEGATIVE  01/21/2020 02:46 AM  
 Bilirubin NEGATIVE  01/21/2020 02:46 AM  
 Urobilinogen 0.2 01/21/2020 02:46 AM  
 Nitrites NEGATIVE  01/21/2020 02:46 AM  
 Leukocyte Esterase NEGATIVE  01/21/2020 02:46 AM  
 Epithelial cells FEW 01/21/2020 02:46 AM  
 Bacteria NEGATIVE  01/21/2020 02:46 AM  
 WBC 0-4 01/21/2020 02:46 AM  
 RBC 0-5 01/21/2020 02:46 AM  
 
 
 
Medications Reviewed:  
 
Current Facility-Administered Medications Medication Dose Route Frequency  amLODIPine (NORVASC) tablet 5 mg  5 mg Oral DAILY  insulin glargine (LANTUS) injection 10 Units  10 Units SubCUTAneous DAILY  balsam peru-castor oil (VENELEX) ointment   Topical Q8H  
 sodium chloride (NS) flush 5-40 mL  5-40 mL IntraVENous Q8H  
 sodium chloride (NS) flush 5-40 mL  5-40 mL IntraVENous PRN  
 glucose chewable tablet 16 g  4 Tab Oral PRN  
 glucagon (GLUCAGEN) injection 1 mg  1 mg IntraMUSCular PRN  
 dextrose 10% infusion 0-250 mL  0-250 mL IntraVENous PRN  
 insulin lispro (HUMALOG) injection   SubCUTAneous Q6H  
 influenza vaccine 2019-20 (6 mos+)(PF) (FLUARIX/FLULAVAL/FLUZONE QUAD) injection 0.5 mL  0.5 mL IntraMUSCular PRIOR TO DISCHARGE  
 
______________________________________________________________________ EXPECTED LENGTH OF STAY: 4d 12h ACTUAL LENGTH OF STAY:          6 Flash Winchester MD

## 2020-01-27 NOTE — PROGRESS NOTES
Problem: Diabetes Self-Management Goal: *Disease process and treatment process Description Define diabetes and identify own type of diabetes; list 3 options for treating diabetes. 1/27/2020 1126 by Ranjith Guidry RN Outcome: Progressing Towards Goal 
1/27/2020 1125 by Ranjith Guidry RN Outcome: Progressing Towards Goal 
Goal: *Incorporating nutritional management into lifestyle Description Describe effect of type, amount and timing of food on blood glucose; list 3 methods for planning meals. 1/27/2020 1126 by Ranjith Guidry RN Outcome: Progressing Towards Goal 
1/27/2020 1125 by Ranjith Guidry RN Outcome: Progressing Towards Goal 
Goal: *Incorporating physical activity into lifestyle Description State effect of exercise on blood glucose levels. 1/27/2020 1126 by Ranjith Guidry RN Outcome: Progressing Towards Goal 
1/27/2020 1125 by Ranjith Guidry RN Outcome: Progressing Towards Goal 
Goal: *Developing strategies to promote health/change behavior Description Define the ABC's of diabetes; identify appropriate screenings, schedule and personal plan for screenings. 1/27/2020 1126 by Ranjith Guidry RN Outcome: Progressing Towards Goal 
1/27/2020 1125 by Ranjith Guidry RN Outcome: Progressing Towards Goal 
Goal: *Using medications safely Description State effect of diabetes medications on diabetes; name diabetes medication taking, action and side effects. 1/27/2020 1126 by Ranjith Guidry RN Outcome: Progressing Towards Goal 
1/27/2020 1125 by Ranjith Guidry RN Outcome: Progressing Towards Goal 
Goal: *Monitoring blood glucose, interpreting and using results Description Identify recommended blood glucose targets  and personal targets. 1/27/2020 1126 by Ranjith Guidry RN Outcome: Progressing Towards Goal 
1/27/2020 1125 by Ranjith Guidry RN Outcome: Progressing Towards Goal 
Goal: *Prevention, detection, treatment of acute complications Description List symptoms of hyper- and hypoglycemia; describe how to treat low blood sugar and actions for lowering  high blood glucose level. 1/27/2020 1126 by Lazarus Rodes, RN Outcome: Progressing Towards Goal 
1/27/2020 1125 by Lazarus Rodes, RN Outcome: Progressing Towards Goal 
Goal: *Prevention, detection and treatment of chronic complications Description Define the natural course of diabetes and describe the relationship of blood glucose levels to long term complications of diabetes. 1/27/2020 1126 by Lazarus Rodes, RN Outcome: Progressing Towards Goal 
1/27/2020 1125 by Lazarus Rodes, RN Outcome: Progressing Towards Goal 
Goal: *Developing strategies to address psychosocial issues Description Describe feelings about living with diabetes; identify support needed and support network 1/27/2020 1126 by Lazarus Rodes, RN Outcome: Progressing Towards Goal 
1/27/2020 1125 by Lazarus Rodes, RN Outcome: Progressing Towards Goal 
Goal: *Insulin pump training Outcome: Progressing Towards Goal 
Goal: *Sick day guidelines Outcome: Progressing Towards Goal 
Goal: *Patient Specific Goal (EDIT GOAL, INSERT TEXT) Outcome: Progressing Towards Goal 
  
Problem: Patient Education: Go to Patient Education Activity Goal: Patient/Family Education 1/27/2020 1126 by Lazarus Rodes, RN Outcome: Progressing Towards Goal 
1/27/2020 1125 by Lazarus Rodes, RN Outcome: Progressing Towards Goal 
  
Problem: Falls - Risk of 
Goal: *Absence of Falls Description Document Leanne Kong Fall Risk and appropriate interventions in the flowsheet. 1/27/2020 1126 by Lazarus Rodes, RN Outcome: Progressing Towards Goal 
Note: Fall Risk Interventions: 
  
 
Mentation Interventions: Bed/chair exit alarm, Door open when patient unattended, More frequent rounding, Toileting rounds, Update white board Medication Interventions: Patient to call before getting OOB, Teach patient to arise slowly Elimination Interventions: Call light in reach 1/27/2020 1125 by Herrera Reis RN Outcome: Progressing Towards Goal 
Note: Fall Risk Interventions: 
  
 
Mentation Interventions: Bed/chair exit alarm, Door open when patient unattended, More frequent rounding, Toileting rounds, Update white board Medication Interventions: Patient to call before getting OOB, Teach patient to arise slowly Elimination Interventions: Call light in reach Problem: Pressure Injury - Risk of 
Goal: *Prevention of pressure injury Description Document Doni Scale and appropriate interventions in the flowsheet. 1/27/2020 1126 by Herrera Reis RN Outcome: Progressing Towards Goal 
Note: Pressure Injury Interventions: 
Sensory Interventions: Assess changes in LOC Moisture Interventions: Absorbent underpads, Apply protective barrier, creams and emollients Activity Interventions: Pressure redistribution bed/mattress(bed type) Mobility Interventions: Turn and reposition approx. every two hours(pillow and wedges) Nutrition Interventions: Document food/fluid/supplement intake, Discuss nutritional consult with provider Friction and Shear Interventions: Apply protective barrier, creams and emollients, Transferring/repositioning devices 1/27/2020 1125 by Herrera Reis RN Outcome: Progressing Towards Goal 
Note: Pressure Injury Interventions: 
Sensory Interventions: Assess changes in LOC Moisture Interventions: Absorbent underpads, Apply protective barrier, creams and emollients Activity Interventions: Pressure redistribution bed/mattress(bed type) Mobility Interventions: Turn and reposition approx. every two hours(pillow and wedges) Nutrition Interventions: Document food/fluid/supplement intake, Discuss nutritional consult with provider Friction and Shear Interventions: Apply protective barrier, creams and emollients, Transferring/repositioning devices Problem: [address] (Adult/Pediatric) Goal: *STG: Participates in treatment plan 1/27/2020 1126 by Loco Brooks RN Outcome: Progressing Towards Goal 
1/27/2020 1125 by Loco Brooks RN Outcome: Progressing Towards Goal 
Goal: *STG: Seeks staff when feelings of anxiety and fear arise 1/27/2020 1126 by Loco Brooks RN Outcome: Progressing Towards Goal 
1/27/2020 1125 by Loco Brooks RN Outcome: Progressing Towards Goal 
Goal: *STG: Attends activities and groups 1/27/2020 1126 by Loco Brooks RN Outcome: Progressing Towards Goal 
1/27/2020 1125 by Loco Brooks RN Outcome: Progressing Towards Goal 
Goal: *STG: Absence of lethality 1/27/2020 1126 by Loco Brooks RN Outcome: Progressing Towards Goal 
1/27/2020 1125 by Loco Brooks RN Outcome: Progressing Towards Goal 
Goal: *STG: Demonstrates ability to understand and use improved judgment in daily activities and relationships 1/27/2020 1126 by Loco Brooks RN Outcome: Progressing Towards Goal 
1/27/2020 1125 by Loco Brooks RN Outcome: Progressing Towards Goal 
Goal: *STG: Remains safe in hospital 
1/27/2020 1126 by Loco Brooks RN Outcome: Progressing Towards Goal 
1/27/2020 1125 by Loco Brooks RN Outcome: Progressing Towards Goal 
Goal: *LTG: Obtains optimum level of functioning 1/27/2020 1126 by Loco Brooks RN Outcome: Progressing Towards Goal 
1/27/2020 1125 by Loco Brooks RN Outcome: Progressing Towards Goal 
Goal: *STG/LTG: Maintain structure for daily activities 1/27/2020 1126 by Loco Brooks RN Outcome: Progressing Towards Goal 
1/27/2020 1125 by Loco Brooks RN Outcome: Progressing Towards Goal 
Goal: *STG/LTG: Complies with medication therapy 1/27/2020 1126 by Loco Brooks RN Outcome: Progressing Towards Goal 
1/27/2020 1125 by Loco Brooks RN Outcome: Progressing Towards Goal 
Goal: Interventions 1/27/2020 1126 by Loco Brooks RN 
 Outcome: Progressing Towards Goal 
1/27/2020 1125 by Guy Gregg RN Outcome: Progressing Towards Goal 
  
Problem: Impaired Skin Integrity/Pressure Injury Treatment Goal: *Improvement of Existing Pressure Injury 1/27/2020 1126 by Guy Gregg RN Outcome: Progressing Towards Goal 
1/27/2020 1125 by Guy Gregg RN Outcome: Progressing Towards Goal 
Goal: *Prevention of pressure injury Description Document Doni Scale and appropriate interventions in the flowsheet. 1/27/2020 1126 by Guy Gregg RN Outcome: Progressing Towards Goal 
Note: Pressure Injury Interventions: 
Sensory Interventions: Assess changes in LOC Moisture Interventions: Absorbent underpads, Apply protective barrier, creams and emollients Activity Interventions: Pressure redistribution bed/mattress(bed type) Mobility Interventions: Turn and reposition approx. every two hours(pillow and wedges) Nutrition Interventions: Document food/fluid/supplement intake, Discuss nutritional consult with provider Friction and Shear Interventions: Apply protective barrier, creams and emollients, Transferring/repositioning devices 1/27/2020 1125 by Guy Gregg RN Outcome: Progressing Towards Goal 
Note: Pressure Injury Interventions: 
Sensory Interventions: Assess changes in LOC Moisture Interventions: Absorbent underpads, Apply protective barrier, creams and emollients Activity Interventions: Pressure redistribution bed/mattress(bed type) Mobility Interventions: Turn and reposition approx. every two hours(pillow and wedges) Nutrition Interventions: Document food/fluid/supplement intake, Discuss nutritional consult with provider Friction and Shear Interventions: Apply protective barrier, creams and emollients, Transferring/repositioning devices

## 2020-01-27 NOTE — PROGRESS NOTES
Nephrology Progress Note Ike Ray Date of Admission : 1/21/2020 CC: Follow up for JOSH on CKD Assessment and Plan JOSH on CKD: 
- likely from volume depletion 
- Renal U/S neg for obstruction 
- resolving 
- d/c IVF and monitor 
  
CKD III w/ baseline Cr 1.4 to 1.6: 
- likely from DM and HTN 
  
Hypernatremia: 
- from lack of free water intake 
- resolved - d/c D5W 
  
Hyperkalemia: 
- from JOSH and ARB 
- resolved 
- no ACE/ARB 
  
Volume depletion: 
- cont hypotonic fluids 
  
Encephalopathy: 
- hypernatremia contributing 
- improving 
- infectious w/u per hospitalist 
  
DM2: 
- on insulin FTT Dementia Interval History: 
Seen and examined. Feeling better. Cr and Na improving. No cp, sob, n/v/d Current Medications: all current  Medications have been eviewed in [facility] Review of Systems: Pertinent items are noted in HPI. Objective: 
Vitals:   
Vitals:  
 01/26/20 9042 01/26/20 1545 01/27/20 0039 01/27/20 0001 BP: (!) 156/93 150/90 148/89 169/83 Pulse: 90 88 92 (!) 103 Resp: 16 18 18 18 Temp: 98.6 °F (37 °C) 98.4 °F (36.9 °C) 98.6 °F (37 °C) 97.8 °F (36.6 °C) SpO2: 100%  99% 98% Weight:      
Height:      
 
Intake and Output: 
No intake/output data recorded. 01/25 1901 - 01/27 0700 In: 1855.8 [I.V.:1855.8] Out: 1277 [EBRSE:0721] Physical Examination: 
Pt intubated    No 
General: NAD,Conversant, chronically ill appeargin Neck:  Supple, no mass Resp:  Lungs CTA B/L, no wheezing , normal respiratory effort CV:  RRR,  no murmur or rub, no LE edema GI:  Soft, NT, + Bowel sounds, no hepatosplenomegaly Neurologic:  Non focal, still w/ mild confusion Psych:             Unable to assess Skin:  No Rash :   no waggoner [x]    High complexity decision making was performed 
[x]    Patient is at high-risk of decompensation with multiple organ involvement Lab Data Personally Reviewed: I have reviewed all the pertinent labs, microbiology data and radiology studies during assessment. Recent Labs  
  01/27/20 
0410 01/25/20 
0545  147* K 4.4 4.4 * 118* CO2 24 25 * 99 BUN 21* 25* CREA 1.28 1.36* CA 8.1* 8.6 No results for input(s): WBC, HGB, HCT, PLT, HGBEXT, HCTEXT, PLTEXT, HGBEXT, HCTEXT, PLTEXT in the last 72 hours. No results found for: SDES Lab Results Component Value Date/Time Culture result: MRSA NOT PRESENT 01/21/2020 09:30 PM  
 Culture result:  01/21/2020 09:30 PM  
      Screening of patient nares for MRSA is for surveillance purposes and, if positive, to facilitate isolation considerations in high risk settings. It is not intended for automatic decolonization interventions per se as regimens are not sufficiently effective to warrant routine use. Culture result: NO GROWTH 5 DAYS 04/17/2016 12:32 AM  
 
Recent Results (from the past 24 hour(s)) GLUCOSE, POC Collection Time: 01/26/20 11:41 AM  
Result Value Ref Range Glucose (POC) 120 (H) 65 - 100 mg/dL Performed by [employer] GLUCOSE, POC Collection Time: 01/26/20  4:34 PM  
Result Value Ref Range Glucose (POC) 151 (H) 65 - 100 mg/dL Performed by [employer] GLUCOSE, POC Collection Time: 01/26/20 10:44 PM  
Result Value Ref Range Glucose (POC) 202 (H) 65 - 100 mg/dL Performed by Meliza Montoya METABOLIC PANEL, BASIC Collection Time: 01/27/20  4:10 AM  
Result Value Ref Range Sodium 139 136 - 145 mmol/L Potassium 4.4 3.5 - 5.1 mmol/L Chloride 114 (H) 97 - 108 mmol/L  
 CO2 24 21 - 32 mmol/L Anion gap 1 (L) 5 - 15 mmol/L Glucose 137 (H) 65 - 100 mg/dL BUN 21 (H) 6 - 20 MG/DL Creatinine 1.28 0.70 - 1.30 MG/DL  
 BUN/Creatinine ratio 16 12 - 20 GFR est AA >60 >60 ml/min/1.73m2 GFR est non-AA 55 (L) >60 ml/min/1.73m2 Calcium 8.1 (L) 8.5 - 10.1 MG/DL  
GLUCOSE, POC Collection Time: 01/27/20  5:17 AM  
Result Value Ref Range Glucose (POC) 140 (H) 65 - 100 mg/dL Performed by Terry Abreu MD 
[facility]  
[address], [address] [facility] Phone - [phone] Fax - [phone] 
[email or web address]

## 2020-01-27 NOTE — ROUTINE PROCESS
Bedside and Verbal shift change report given to Silvano Hancock (oncoming nurse) by Elliott Otto (offgoing nurse). Report included the following information SBAR, Kardex and Recent Results.

## 2020-01-28 NOTE — PROGRESS NOTES
NATALIIA: 
 
Patient's son met with [employer] today. An application for Medicaid was completed and submitted to [facility]. CM spoke to son, Tosin Pearson to inform him that [facility] Penning will be started for SNF. Tosin Pearson would like patient to go back to [facility] short term and transition to LTC care when [facility] application is processed. [employer] auth started, clinicals faxed. Reference number [id number] Ponce Segundo RN/CRM

## 2020-01-28 NOTE — PROGRESS NOTES
Problem: Dysphagia (Adult) Goal: *Acute Goals and Plan of Care (Insert Text) Description Speech pathology goals Initiated 1/22/2020 1. Patient will tolerate puree/thin liquid diet with no overt s/s aspiration within 7 days. MET. Upgrade to dysphagia 2 diet/thin liquids 2. Patient will tolerate trials of solids with timely/complete mastication and full oral clearance within 7 days Outcome: Progressing Towards Goal 
  
SPEECH LANGUAGE PATHOLOGY DYSPHAGIA TREATMENT Patient: [facility] (45 y.o. male) Date: 1/28/2020 Diagnosis: Failure to thrive in adult [R62.7] Hypernatremia [E87.0] Altered mental status [R41.82] JOSH (acute kidney injury) ([facility] [address] [N17.9] Hyperkalemia [E87.5] <principal problem not specified> Precautions:    
 
ASSESSMENT: 
Patient presents with mild oropharyngeal dysphagia characterized by slow but complete mastication of solids, mildly slow oral transit of solids and suspected pharyngeal swallow delay. No oral residue noted. Patient tolerated all PO trials without overt s/s aspiration. Patient at increased risk for aspiration given dysphagia and impaired mentation. Given bedside presentation this date feel patient is safe to advance to mechanical soft diet. PLAN: 
Recommendations and Planned Interventions: 
Mechanical soft diet Thin liquids Sit up for all meals Small bites Single sips Patient continues to benefit from skilled intervention to address the above impairments. Continue treatment per established plan of care. Discharge Recommendations: To Be Determined SUBJECTIVE:  
Patient stated I eat just about anything. Patient agreed to session and reports appetite is [place]. \" OBJECTIVE:  
Cognitive and Communication Status: 
Neurologic State: Alert Orientation Level: Disoriented to place, Disoriented to situation, Disoriented to time, Oriented to person Cognition: Follows commands Safety/Judgement: Decreased awareness of environment, Decreased awareness of need for assistance, Decreased awareness of need for safety Dysphagia Treatment: 
Oral Assessment: 
Oral Assessment Labial: No impairment Dentition: Natural 
Oral Hygiene: Moist oral mucosa Lingual: Decreased rate Velum: Unable to visualize Mandible: No impairment P.O. Trials: 
Patient Position: Upright in bed Vocal quality prior to P.O.: Hoarse Consistency Presented: Solid; Thin liquid How Presented: Self-fed/presented;SLP-fed/presented;Straw;Successive swallows Bolus Acceptance: No impairment Bolus Formation/Control: Impaired Type of Impairment: Delayed;Mastication Propulsion: Delayed (# of seconds) Oral Residue: None Initiation of Swallow: Delayed (# of seconds) Laryngeal Elevation: Functional 
Aspiration Signs/Symptoms: None Oral Phase Severity: Mild Pharyngeal Phase Severity : Mild After treatment:  
Patient left in no apparent distress in bed and Call bell within reach COMMUNICATION/EDUCATION:  
Patient was educated regarding role of SLP, diet and POC. Patient indicated understanding. Further education warranted. The patient's plan of care including recommendations, planned interventions, and recommended diet changes were discussed with: 
 
LARA Robb Time Calculation: 14 mins

## 2020-01-28 NOTE — PROGRESS NOTES
Rounded on Denominational patients and provided Anointing of the Sick at request of patient Fr. Oswaldo Arcos

## 2020-01-28 NOTE — PROGRESS NOTES
[facility] Adult  Hospitalist Group Hospitalist Progress Note Tiffanie Johnson MD 
Answering service: [phone] OR [phone] from in house phone Date of Service:  2020 NAME:  Rodolfo Looney :  1945 MRN:  [id number] Admission Summary:  
Rodolfo Looney is a 76 y.o. male with past medical history significant for diabetes mellitus type 2, hypertension, history of dementia presents the emergency room from a rehab facility due to concern for dehydration and failure to thrive. As per medical report patient has refused to eat or drink anything in the last several days becoming weaker. Medical provider at the facility decided to order blood work-up which were abnormal.  Sent to ER for IV fluid. Patient at this time awake following basic commands but disoriented. Does not seem in acute distress but appears very dry. Denies any complaint. No family at the bedside. Work-up in the emergency room patient was found to have hyponatremia, hyperkalemia and acute kidney injury. He was started on normal saline and given regular insulin plus glucose for the hyperkalemia. Admission requested for dehydration and failure to thrive. Unable to obtain more history due to patient's mental status.  
  
Interval history / Subjective:  
   
Patient seen and examined today. Comfortable, afebrile, no NVD, slept well, confused at baseline. Assessment & Plan: 1. Hypernatremia-POA  
nephro on board 
resolved 
  
2. JOSH on CKD-  
likely prerenal in etiology. Improved, Cr at baseline, off IVF, encourage oral intake Repeat labs as needed. 3. Non anion gap metabolic acidosis- 
Likely from dehydration and JOSH - resolved 
  
4. Hyperkalemia- 
Resolved 
  
5. Metabolic encephalopathy due to hypernatremia Confused.  
MRI brain: Generalized parenchymal volume loss and moderate chronic microvascular ischemic disease 
  
6. Diabetes Mellitus type 2-  
- Continue sliding scale insulin & lantus - Accu checks 
  
7. Failure to thrive- Body mass index is 19.72 kg/m². severe protein calorie malnutrition On nutritional supplements 
  
8. Leukocytosis 
 - likely hemoconcentration from dehydration Kena Riser No source of infection at this time.  
  
9. HTN- 
 bp stable. Hold home meds due to ams Palliative care consult noted, family to decide on code status. Code status: full DVT prophylaxis:  
 
Care Plan discussed with: Patient/Family Disposition: TBD. Awaiting medicaid application Awaiting placement to [facility] Hospital Problems  Date Reviewed: 12/4/2018 Codes Class Noted POA Hyperkalemia ICD-10-CM: E87.5 ICD-9-CM: 276.7  1/21/2020 Unknown Hypernatremia ICD-10-CM: E87.0 ICD-9-CM: 276.0  1/21/2020 Unknown Altered mental status ICD-10-CM: R41.82 
ICD-9-CM: 780.97  1/21/2020 Unknown Failure to thrive in adult ICD-10-CM: R62.7 ICD-9-CM: 783.7  1/21/2020 Unknown JOSH (acute kidney injury) ([facility] [address] ICD-10-CM: N17.9 ICD-9-CM: 584.9  1/21/2020 Unknown Review of Systems: A comprehensive review of systems was negative except for that written in the HPI. Vital Signs:  
 Last 24hrs VS reviewed since prior progress note. Most recent are: 
Visit Vitals /73 Pulse 100 Temp 98.4 °F (36.9 °C) Resp 18 Ht 5' 5\" (1.651 m) Wt 53.7 kg (118 lb 7.6 oz) SpO2 100% BMI 19.72 kg/m² No intake or output data in the 24 hours ending 01/28/20 1142 Physical Examination:  
 
 
     
Constitutional:  Confused,awake, calm, cooperative ENT:  Oral mucous moist, oropharynx benign. Resp:  CTA bilaterally. No wheezing/rhonchi/rales. No accessory muscle use CV:  Regular rhythm, normal rate, no murmurs, gallops, rubs GI:  Soft, non distended, non tender. normoactive bowel sounds, no hepatosplenomegaly Musculoskeletal:  No edema, warm, 2+ pulses throughout Neurologic:  Moves all extremities. But not following commands Data Review:  
 Review and/or order of clinical lab test 
 
 
Labs:  
 
Recent Labs  
  01/28/20 
0245 WBC 8.7 HGB 11.2* HCT 33.1*  
* Recent Labs  
  01/28/20 
0245 01/27/20 
0410  139  
K 4.4 4.4 * 114* CO2 25 24 BUN 26* 21* CREA 1.36* 1.28  
* 137* CA 8.7 8.1* No results for input(s): SGOT, GPT, ALT, AP, TBIL, TBILI, TP, ALB, GLOB, GGT, AML, LPSE in the last 72 hours. No lab exists for component: AMYP, HLPSE No results for input(s): INR, PTP, APTT, INREXT, INREXT in the last 72 hours. No results for input(s): FE, TIBC, PSAT, FERR in the last 72 hours. No results found for: FOL, RBCF No results for input(s): PH, PCO2, PO2 in the last 72 hours. No results for input(s): CPK, CKNDX, TROIQ in the last 72 hours. No lab exists for component: CPKMB Lab Results Component Value Date/Time Cholesterol, total 190 01/02/2020 04:06 AM  
 HDL Cholesterol 64 01/02/2020 04:06 AM  
 LDL, calculated 115.4 (H) 01/02/2020 04:06 AM  
 Triglyceride 53 01/02/2020 04:06 AM  
 CHOL/HDL Ratio 3.0 01/02/2020 04:06 AM  
 
Lab Results Component Value Date/Time Glucose (POC) 247 (H) 01/28/2020 11:29 AM  
 Glucose (POC) 148 (H) 01/28/2020 06:21 AM  
 Glucose (POC) 198 (H) 01/27/2020 09:21 PM  
 Glucose (POC) 97 01/27/2020 04:52 PM  
 Glucose (POC) 53 (L) 01/27/2020 04:23 PM  
 
Lab Results Component Value Date/Time  Color YELLOW/STRAW 01/21/2020 02:46 AM  
 Appearance CLEAR 01/21/2020 02:46 AM  
 Specific gravity 1.018 01/21/2020 02:46 AM  
 pH (UA) 5.0 01/21/2020 02:46 AM  
 Protein NEGATIVE  01/21/2020 02:46 AM  
 Glucose NEGATIVE  01/21/2020 02:46 AM  
 Ketone NEGATIVE  01/21/2020 02:46 AM  
 Bilirubin NEGATIVE  01/21/2020 02:46 AM  
 Urobilinogen 0.2 01/21/2020 02:46 AM  
 Nitrites NEGATIVE  01/21/2020 02:46 AM  
 Leukocyte Esterase NEGATIVE  01/21/2020 02:46 AM  
 Epithelial cells FEW 01/21/2020 02:46 AM  
 Bacteria NEGATIVE  01/21/2020 02:46 AM  
 WBC 0-4 01/21/2020 02:46 AM  
 RBC 0-5 01/21/2020 02:46 AM  
 
 
 
Medications Reviewed:  
 
Current Facility-Administered Medications Medication Dose Route Frequency  amLODIPine (NORVASC) tablet 5 mg  5 mg Oral DAILY  insulin glargine (LANTUS) injection 10 Units  10 Units SubCUTAneous DAILY  balsam peru-castor oil (VENELEX) ointment   Topical Q8H  
 sodium chloride (NS) flush 5-40 mL  5-40 mL IntraVENous Q8H  
 sodium chloride (NS) flush 5-40 mL  5-40 mL IntraVENous PRN  
 glucose chewable tablet 16 g  4 Tab Oral PRN  
 glucagon (GLUCAGEN) injection 1 mg  1 mg IntraMUSCular PRN  
 dextrose 10% infusion 0-250 mL  0-250 mL IntraVENous PRN  
 insulin lispro (HUMALOG) injection   SubCUTAneous Q6H  
 influenza vaccine 2019-20 (6 mos+)(PF) (FLUARIX/FLULAVAL/FLUZONE QUAD) injection 0.5 mL  0.5 mL IntraMUSCular PRIOR TO DISCHARGE  
 
______________________________________________________________________ EXPECTED LENGTH OF STAY: 4d 12h ACTUAL LENGTH OF STAY:          7 Cathleen Camara MD

## 2020-01-28 NOTE — PROGRESS NOTES
Nephrology Progress Note [facility] Date of Admission : 1/21/2020 CC: Follow up for JOSH on CKD Assessment and Plan JOSH on CKD: 
- likely from volume depletion 
- Renal U/S neg for obstruction 
- resolved, Cr at baseline 
- cont present care 
  
CKD III w/ baseline Cr 1.4 to 1.6: 
- likely from DM and HTN 
  
Hypernatremia: 
- resolved, Na 140 
  
Hyperkalemia: 
- from JOSH and ARB 
- resolved 
- no ACE/ARB 
  
Volume depletion: 
- resolved 
  
Encephalopathy: 
- hypernatremia contributing 
- improving 
  
DM2: 
- on insulin FTT Dementia Interval History: 
Seen and examined. Feeling better. Ate all of his breakfast this AM.  Cr and Na stable. No cp, sob, n/v/d Current Medications: all current  Medications have been eviewed in [facility] Review of Systems: Pertinent items are noted in HPI. Objective: 
Vitals:   
Vitals:  
 01/27/20 1225 01/27/20 1523 01/27/20 2241 01/28/20 4103 BP: 131/77 103/59 133/80 126/75 Pulse: (!) 101 99 95 94 Resp: 18 18 17 18 Temp: 98.2 °F (36.8 °C) 98 °F (36.7 °C) 98.1 °F (36.7 °C) 98.4 °F (36.9 °C) SpO2: 98% 97% 98% 100% Weight:      
Height:      
 
Intake and Output: 
No intake/output data recorded. 01/26 1901 - 01/28 0700 In: 1855.8 [I.V.:1855.8] Out: 600 [Urine:600] Physical Examination: 
Pt intubated    No 
General: NAD,Conversant, chronically ill appeargin Neck:  Supple, no mass Resp:  Lungs CTA B/L, no wheezing , normal respiratory effort CV:  RRR,  no murmur or rub, no LE edema GI:  Soft, NT, + Bowel sounds, no hepatosplenomegaly Neurologic:  Non focal, still w/ mild confusion Psych:             Unable to assess Skin:  No Rash :   no waggoner [x]    High complexity decision making was performed 
[x]    Patient is at high-risk of decompensation with multiple organ involvement Lab Data Personally Reviewed: I have reviewed all the pertinent labs, microbiology data and radiology studies during assessment. Recent Labs  
  01/28/20 
0245 01/27/20 
0410  139  
K 4.4 4.4 * 114* CO2 25 24 * 137* BUN 26* 21* CREA 1.36* 1.28  
CA 8.7 8.1* Recent Labs  
  01/28/20 
0245 WBC 8.7 HGB 11.2* HCT 33.1*  
* No results found for: SDES Lab Results Component Value Date/Time Culture result: MRSA NOT PRESENT 01/21/2020 09:30 PM  
 Culture result:  01/21/2020 09:30 PM  
      Screening of patient nares for MRSA is for surveillance purposes and, if positive, to facilitate isolation considerations in high risk settings. It is not intended for automatic decolonization interventions per se as regimens are not sufficiently effective to warrant routine use. Culture result: NO GROWTH 5 DAYS 04/17/2016 12:32 AM  
 
Recent Results (from the past 24 hour(s)) GLUCOSE, POC Collection Time: 01/27/20 11:38 AM  
Result Value Ref Range Glucose (POC) 176 (H) 65 - 100 mg/dL Performed by [employer] GLUCOSE, POC Collection Time: 01/27/20  4:23 PM  
Result Value Ref Range Glucose (POC) 53 (L) 65 - 100 mg/dL Performed by [employer] GLUCOSE, POC Collection Time: 01/27/20  4:52 PM  
Result Value Ref Range Glucose (POC) 97 65 - 100 mg/dL Performed by [employer] GLUCOSE, POC Collection Time: 01/27/20  9:21 PM  
Result Value Ref Range Glucose (POC) 198 (H) 65 - 100 mg/dL Performed by Natasha Bennett CBC WITH AUTOMATED DIFF Collection Time: 01/28/20  2:45 AM  
Result Value Ref Range WBC 8.7 4.1 - 11.1 K/uL  
 RBC 4.01 (L) 4.10 - 5.70 M/uL  
 HGB 11.2 (L) 12.1 - 17.0 g/dL HCT 33.1 (L) 36.6 - 50.3 % MCV 82.5 80.0 - 99.0 FL  
 MCH 27.9 26.0 - 34.0 PG  
 MCHC 33.8 30.0 - 36.5 g/dL  
 RDW 13.8 11.5 - 14.5 % PLATELET 134 (L) 346 - 400 K/uL MPV 11.1 8.9 - 12.9 FL  
 NRBC 0.0 0  WBC ABSOLUTE NRBC 0.00 0.00 - 0.01 K/uL NEUTROPHILS 49 32 - 75 % LYMPHOCYTES 39 12 - 49 % MONOCYTES 8 5 - 13 % EOSINOPHILS 3 0 - 7 % BASOPHILS 1 0 - 1 % IMMATURE GRANULOCYTES 1 (H) 0.0 - 0.5 % ABS. NEUTROPHILS 4.3 1.8 - 8.0 K/UL  
 ABS. LYMPHOCYTES 3.4 0.8 - 3.5 K/UL  
 ABS. MONOCYTES 0.7 0.0 - 1.0 K/UL  
 ABS. EOSINOPHILS 0.3 0.0 - 0.4 K/UL  
 ABS. BASOPHILS 0.0 0.0 - 0.1 K/UL  
 ABS. IMM. GRANS. 0.1 (H) 0.00 - 0.04 K/UL  
 DF AUTOMATED METABOLIC PANEL, BASIC Collection Time: 01/28/20  2:45 AM  
Result Value Ref Range Sodium 140 136 - 145 mmol/L Potassium 4.4 3.5 - 5.1 mmol/L Chloride 109 (H) 97 - 108 mmol/L  
 CO2 25 21 - 32 mmol/L Anion gap 6 5 - 15 mmol/L Glucose 145 (H) 65 - 100 mg/dL BUN 26 (H) 6 - 20 MG/DL Creatinine 1.36 (H) 0.70 - 1.30 MG/DL  
 BUN/Creatinine ratio 19 12 - 20 GFR est AA >60 >60 ml/min/1.73m2 GFR est non-AA 51 (L) >60 ml/min/1.73m2 Calcium 8.7 8.5 - 10.1 MG/DL  
GLUCOSE, POC Collection Time: 01/28/20  6:21 AM  
Result Value Ref Range Glucose (POC) 148 (H) 65 - 100 mg/dL Performed by Lakeisha Castillo MD 
[address] [place] [address], [address] [facility] Phone - [phone] Fax - [phone] 
www. [facility]

## 2020-01-29 NOTE — PROGRESS NOTES
Problem: Falls - Risk of 
Goal: *Absence of Falls Description Document Bren Getting Fall Risk and appropriate interventions in the flowsheet. Outcome: Progressing Towards Goal 
Note: Fall Risk Interventions: 
  
 
Mentation Interventions: Adequate sleep, hydration, pain control, Bed/chair exit alarm, Reorient patient, Update white board Medication Interventions: Patient to call before getting OOB, Teach patient to arise slowly Elimination Interventions: Call light in reach, Toileting schedule/hourly rounds, Bed/chair exit alarm

## 2020-01-29 NOTE — PROGRESS NOTES
Problem: Mobility Impaired (Adult and Pediatric) Goal: *Acute Goals and Plan of Care (Insert Text) Description FUNCTIONAL STATUS PRIOR TO ADMISSION: Patient is a poor historian. Originally from the [place] working as a . Currently lives with son who provides assistance as needed. Suspect significant caregiver burden. Was admitted from [facility] where he was for short term rehab. Unclear if he was walking with rehab. He was transferring to the wheelchair with assist x 1. HOME SUPPORT PRIOR TO ADMISSION: Patient lives with his son who works during the day (patient is home alone during that time). Patient's functional mobility and mentation have been declining the past few months. Patient is ambulatory inside the home with no device though relies on furniture and walls for support. He is sedentary and relies on family to bring meals upstairs to him, check his blood sugars, and for bathing. Patient is incontinent of urine and lately his bowels. There is a question of how much longer patient's son will be able to care for him in his home. Physical Therapy Goals Initiated 1/29/2020 1. Patient will move from supine to sit and sit to supine  in bed with minimal assistance/contact guard assist within 7 day(s). 2.  Patient will transfer from bed to chair and chair to bed with moderate assistance  using the least restrictive device within 7 day(s). 3.  Patient will perform sit to stand with minimal assistance within 7 day(s). 4.  Patient will ambulate with moderate assistance  for 15 feet with the least restrictive device within 7 day(s). Outcome: Progressing Towards Goal 
 
PHYSICAL THERAPY EVALUATION Patient: Saul Napoles (82 y.o. male) Date: 1/29/2020 Primary Diagnosis: Failure to thrive in adult [R62.7] Hypernatremia [E87.0] Altered mental status [R41.82] JOSH (acute kidney injury) ([facility] [address] [N17.9] Hyperkalemia [E87.5] Precautions:   Fall, L blindness ASSESSMENT Based on the objective data described below, the patient presents with generalized back pain, confusion, and general decline in functioning. 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 He has been at [facility] rehab since that admission. Patient is incontinent of urine and without gown. Assisted into gown and educated on log rolling technique secondary to back pain. Patient tolerating sitting EOB approx 5 minutes, intermittently Patient trying to return to supine. Attempted standing with RW x 3. Bilat knee flexion is pronounced, and Patient is unable to take any steps even along EOB at this time. He is able to scoot with UEs and max verbal cues for better position in the bed. Recommend SNF rehab upon discharge. Current Level of Function Impacting Discharge (mobility/balance): unable to ambulate, mod/ max assist sit to stand with RW 
 
Functional Outcome Measure: The patient scored 0/28 on the Tinetti balance outcome measure which is indicative of high fall risk. Other factors to consider for discharge: Home alone during the day as son works Patient will benefit from skilled therapy intervention to address the above noted impairments. PLAN : 
Recommendations and Planned Interventions: bed mobility training, transfer training, gait training, therapeutic exercises, neuromuscular re-education, patient and family training/education, and therapeutic activities Frequency/Duration: Patient will be followed by physical therapy:  3 times a week to address goals. Recommendation for discharge: (in order for the patient to meet his/her long term goals) Therapy up to 5 days/week in SNF setting This discharge recommendation: 
Has been made in collaboration with the attending provider and/or case management IF patient discharges home will need the following DME: to be determined (TBD) SUBJECTIVE:  
 Patient stated Kenneth Mendez was a  in the [facility], then I had to stop working when I got sick.  OBJECTIVE DATA SUMMARY:  
HISTORY:   
Past Medical History:  
Diagnosis Date Diabetes ([address] 2002 High cholesterol Hypertension History reviewed. No pertinent surgical history. Personal factors and/or comorbidities impacting plan of care: DM, HTN Home Situation Home Environment: Private residence One/Two Story Residence: One story Living Alone: No 
Support Systems: Child(danette)(lives with son, daughter near by) Patient Expects to be Discharged to[de-identified] Rehabilitation facility Current DME Used/Available at Home: Walker, rolling EXAMINATION/PRESENTATION/DECISION MAKING:  
Critical Behavior: 
Neurologic State: Alert Orientation Level: Oriented to person, Disoriented to situation, Disoriented to time, Oriented to place Cognition: Follows commands, Poor safety awareness Safety/Judgement: Decreased awareness of environment, Decreased awareness of need for assistance, Decreased awareness of need for safety Hearing: Auditory Auditory Impairment: None Strength:   
Strength: Grossly decreased, non-functional 
 
Tone & Sensation:  
Tone: Normal 
 
Coordination: 
Coordination: Generally decreased, functional 
 
Vision: L eye deviation/ blindness? Functional Mobility: 
Bed Mobility: 
Rolling: Moderate assistance;Assist x1;Additional time Supine to Sit: Moderate assistance;Assist x1(log rolling) Sit to Supine: Moderate assistance;Assist x1 Transfers: 
Sit to Stand: Moderate assistance;Maximum assistance;Assist x1 Stand to Sit: Moderate assistance;Assist x1 Balance:  
Sitting: Impaired Sitting - Static: Fair (occasional) Sitting - Dynamic: Fair (occasional) Standing: Impaired Standing - Static: Poor;Constant support Standing - Dynamic : Poor Functional Measure: 
Tinetti test: 
 
Sitting Balance: 0 Arises: 0 Attempts to Rise: 0 Immediate Standing Balance: 0 Standing Balance: 0 Nudged: 0 Eyes Closed: 0 Turn 360 Degrees - Continuous/Discontinuous: 0 Turn 360 Degrees - Steady/Unsteady: 0 Sitting Down: 0 Balance Score: 0 Balance total score Indication of Gait: 0 
R Step Length/Height: 0 
L Step Length/Height: 0 
R Foot Clearance: 0 
L Foot Clearance: 0 Step Symmetry: 0 Step Continuity: 0 Path: 0 Trunk: 0 Walking Time: 0 Gait Score: 0 Gait total score Total Score: 0/28 Overall total score Tinetti Tool Score Risk of Falls 
<19 = High Fall Risk 19-24 = Moderate Fall Risk 25-28 = Low Fall Risk Tinetti ME. Performance-Oriented Assessment of Mobility Problems in Elderly Patients. [facility]; [id number]. (Scoring Description: PT Bulletin Feb. 10, 1993) Older adults: Jarvis Morales et al, 2009; n = [address] [facility] elderly evaluated with ABC, MAYELA, ADL, and IADL) · Mean MAYELA score for males aged 69-68 years = 26.21(3.40) · Mean MAYELA score for females age 69-68 years = 25.16(4.30) · Mean MAYELA score for males over 80 years = 23.29(6.02) · Mean MAYELA score for females over 80 years = 17.20(8.32) Physical Therapy Evaluation Charge Determination History Examination Presentation Decision-Making MEDIUM  Complexity : 1-2 comorbidities / personal factors will impact the outcome/ POC  LOW Complexity : 1-2 Standardized tests and measures addressing body structure, function, activity limitation and / or participation in recreation  LOW Complexity : Stable, uncomplicated  LOW Complexity : FOTO score of  Based on the above components, the patient evaluation is determined to be of the following complexity level: LOW Pain Rating: 
Unable to give number, but moaning and grimacing with reports of back discomfort during be mobility. Activity Tolerance:  
Fair and requires rest breaks Please refer to the flowsheet for vital signs taken during this treatment. After treatment patient left in no apparent distress: Supine in bed, Call bell within reach, and Bed / chair alarm activated COMMUNICATION/EDUCATION:  
The patients plan of care was discussed with: Occupational Therapist and Registered Nurse. Fall prevention education was provided and the patient/caregiver indicated understanding., Patient/family have participated as able in goal setting and plan of care. , and Patient/family agree to work toward stated goals and plan of care. Thank you for this referral. 
Cinthia Reid, PT, DPT Geriatric Clinical Specialist   
 Time Calculation: 22 mins

## 2020-01-29 NOTE — PROGRESS NOTES
Bedside shift change report given to [facility], [facility] (oncoming nurse) by Sae Pereyra RN (offgoing nurse). Report included the following information SBAR, Kardex, Intake/Output, MAR and Recent Results.

## 2020-01-29 NOTE — PROGRESS NOTES
NATALIIA: 
 
Possible discharge tomorrow. [employer] Auth approved. CM spoke to [facility]. Referral sent to [facility] for SNF rehab, possible transition into LTC there. CM to call [employer] when [facility] accepts [phone] Option 3. CM left message with Lupe at [facility] to get status of referral. 
 
Medicaid application submitted 0/19. Artur Silverman RN/CRM

## 2020-01-29 NOTE — WOUND CARE
Wound Care Note:  
 
Re-consulted by nurse request for blood red area on left heel Chart shows: 
Admitted for hyperkalemia, hypernatremia, altered mental status, failure to thrive in adult and acute kidney injury Past Medical History:  
Diagnosis Date  Diabetes ([address] 2002  High cholesterol  Hypertension WBC = 8.7 on 1/28/20 Admitted from [facility] Assessment:  
Patient is alert and talking, incontinent with some assistance needed in repositioning. Bed: P-500 bed Patient wearing briefs for incontinence Diet: Diabetic with options consistent carb 2000 kcal, regular with nutritional supplements Patient reports no pain. Right heel and bilateral buttocks skin intact and without erythema. Palpable DP pulses bilaterally. 1. POA unstageable gluteal cleft pressure injury is improving, measures 2 cm x 1.5 cm x 0.1 cm, wound bed is still covered with slough, scant serous drainage, wound edges are open, alba-wound intact. Hydrocolloid applied. 2.  Left heel with red, non-blanchable erythema, area measures 1.8 cm x 1.5 cm, no open area, alba-wound intact. Venelex ointment applied. Patient repositioned on right side. Heels offloaded on pillows. Recommendations:   
Continue with current wound care. Sacrum- Please maintain Exuderm Hydrocolloid up to one week or change as needed with soiling or rolled edges. Please use adhesive remover wipes when changing. 
  
Bilateral heels- Every 8 hours liberally apply Venelex ointment 
  
Specialty bed: 500 bed ordered via [employer]. Use only flat sheet and one incontinence pad. Please call [facility], [facility] at [phone] if not delivered and when patient discharged. Confirmation #[id number] Skin Care & Pressure Prevention: 
Minimize layers of linen/pads under patient to optimize support surface. Turn/reposition approximately every 2 hours and offload heels. Manage incontinence / promote continence Nourishing Skin Cream to dry skin Discussed above plan with patient & DANNA Yang Transition of Care: Plan to follow as needed while admitted to hospital. 
 
KELLY Johnson, RN, [facility], [facility]. 
office [phone] 
pager [phone] or call  to page

## 2020-01-29 NOTE — CONSULTS
Palliative Medicine Consult [place]: [phone]) Patient Name: Andrew Anders YOB: 1945 Date of Initial Consult: 1/23/20 Reason for Consult: Care decisions Requesting Provider: Dr. Lizeth Arora Primary Care Physician: Yamileth Cuevas NP 
 
 SUMMARY:  
Andrew Anders is a 76 y. o. with a past history of labile DM type 2, hypertension, debility, who was admitted on 1/21/2020 from [facility] rehab with a diagnosis of dehydration and failure to thrive after refusal to eat/drink at rehab for a couple of days. He was last admitted 1/1-1/8 after an acute episode of confusion, etiology not clear as MRI brain showed no acute changes and EEG was negative. Family was not available for corroborative information during that hospital stay. Palliative medicine was consulted to assist with care goals discussions. Psychosocial- Born in [facility]", , two sons Kaylan Álvarez (whom he lives with along with Ace's longtime girlfriend Mark) and [facility] Also has a stepdaughter Levolayla Ribeiro. PALLIATIVE DIAGNOSES:  
1. Goals of care counseling 2. DNR discussion 3. Cognitive deficits 4. Debility 5. Labile DM type 2 6. Dehydration 7. 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  
1. Followed up today with son Kaylan Álvarez. After our discussion last week he and [facility] talked amongst themselves and made the decision to protect their Father from resuscitation at time of death. Code status changed to DNR to reflect this decision. Kaylan Álvarez unable to come in until after 5:30 pm- I signed a DDNR and left it in the paper chart for Kaylan Álvarez to sign, he is aware. 2. He met with med assist and applied for Medicaid. Plan is for transition to LTC after another attempt at skilled rehab.   Kaylan Álvarez understands there remains some unknowns about his Father's trajectory; how well he will do at rehab, etc.    
Reinaldo Pandey hopes above all that his Father is not in pain or uncomfortable- if his Father were to require readmission they would not want heroics and would be amenable to a plan for comfort. 4. From 1/24:   Family meeting with two sons Ike Haynes and [facility] 1. Reviewed medical condition 2. Shared he is eating 100% today but requiring hand feeding 3. Plan is to observe off IVF starting today. 4. All agree he needs 24/7 care at this point, Ike Haynes meeting with [employer] next Tues to apply for Medicaid. NH would be best option for family. 5. Code status- talked through this thoroughly and explained. This was first conversation about resuscitation for sons. Reviewed option for signing a DDNR. They will talk amongst themselves and call me back next week if decision made to change status. They understand as it stands he is a Full code. 6. Talked about his frailty and potential for future decline in the setting of labile diabetes and cognitive deficits- unclear circumstances where he refused food/drink at rehab; perhaps this won't be the case if someone available to hand feed him in the future? He remains at risk however, and good to be prepared for future decline. If a new event, or recurrent food/drink refusal, he would certainly be a candidate for Hospice support. Briefly explained this service. 5. They have my contact information in case of further questions. 6. Will follow peripherally for now, thank you for this consult. 7. Communicated plan of care with: Palliative [facility] Team 
 
 GOALS OF CARE / TREATMENT PREFERENCES:  
 
GOALS OF CARE: 
Patient/Health Care Proxy Stated Goals: Comfort(efforts at rehab if pt able to tolerate, if future decline or acute illness, family would not want him to suffer or have heroics pursued) TREATMENT PREFERENCES:  
Code Status: DNR Advance Care Planning: 
[x] [facility] Interdisciplinary Team has updated the ACP Navigator with Devinhbarringtonn and Patient Capacity Primary Decision Maker (Active): Quinten Wood - [phone] Primary Decision MakerDors Board Son - [phone] Advance Care Planning 1/21/2020 Patient's Healthcare Decision Maker is: - Confirm Advance Directive None Patient Would Like to Complete Advance Directive Yes Medical Interventions: Limited additional interventions Other: As far as possible, the palliative care team has discussed with patient / health care proxy about goals of care / treatment preferences for patient. HISTORY:  
 
History obtained from:  Son, chart review CHIEF COMPLAINT: admitted with dehydration (abnormal labs) HPI/SUBJECTIVE: The patient is:  
[x] Verbal and participatory [] Non-participatory due to:  
 
1/23- pt sent in from rehab with abnormal lab values (hypernatremia, JOSH, hyperkalemia) after refusal of food and drink for several days. He remains confused and not able to answer questions about his history, but is alert to state his name and knows he is in a hospital.  He denies discomfort, nausea or anxiety. 1/24- no overnight events. Pt comfortable. He ate 100% of breakfast and lunch (hand fed). IVF stopped this am- trial off to observe Na.  
 
1/28- pt alert, pleasantly confused. No distress appreciated. Clinical Pain Assessment (nonverbal scale for severity on nonverbal patients):  
Clinical Pain Assessment Severity: 0 Activity (Movement): Lying quietly, normal position Duration: for how long has pt been experiencing pain (e.g., 2 days, 1 month, years) Frequency: how often pain is an issue (e.g., several times per day, once every few days, constant) FUNCTIONAL ASSESSMENT:  
 
Palliative Performance Scale (PPS): PPS: 50 PSYCHOSOCIAL/SPIRITUAL SCREENING:  
 
Palliative IDT has assessed this patient for cultural preferences / practices and a referral made as appropriate to needs (Cultural Services, Patient Advocacy, Ethics, etc.) Any spiritual / Oriental orthodox concerns: 
[] Yes /  [x] No 
 
Caregiver Burnout: 
[] Yes /  [] No /  [x] No Caregiver Present Anticipatory grief assessment:  
[x] Normal  / [] Maladaptive ESAS Anxiety: ESAS Depression:    
 
 
 REVIEW OF SYSTEMS:  
 
Positive and pertinent negative findings in ROS are noted above in HPI. The following systems were [x] reviewed / [] unable to be reviewed as noted in HPI Other findings are noted below. Systems: constitutional, ears/nose/mouth/throat, respiratory, gastrointestinal, genitourinary, musculoskeletal, integumentary, neurologic, psychiatric, endocrine. Positive findings noted below. Modified ESAS Completed by: provider Pain: 0 Dyspnea: 0 Stool Occurrence(s): 1 PHYSICAL EXAM:  
 
From RN flowsheet: 
Wt Readings from Last 3 Encounters:  
01/22/20 53.7 kg (118 lb 7.6 oz) 01/08/20 62 kg (136 lb 11 oz) 02/02/19 63 kg (138 lb 14.2 oz) Blood pressure 140/70, pulse 90, temperature 98.7 °F (37.1 °C), resp. rate 18, height 5' 5\" (1.651 m), weight 53.7 kg (118 lb 7.6 oz), SpO2 97 %. Pain Scale 1: Numeric (0 - 10) Pain Intensity 1: 0 Constitutional: frail elderly male lying in bed, appears comfortable ENMT: no nasal discharge, moist mucous membranes Eyes:  Left eye deviation, right lateral gaze Cardiovascular: regular rhythm, distal pulses intact Respiratory: breathing not labored, symmetric bs anteriorly Gastrointestinal: soft non-tender, +bowel sounds Musculoskeletal: no deformity, no tenderness to palpation Skin: warm, dry Neurologic: alert, oriented to self and hospital, lacks insight, follows simple commands Psychiatric: appropriate affect, no hallucinations HISTORY:  
 
Active Problems: Hyperkalemia (1/21/2020) Hypernatremia (1/21/2020) Altered mental status (1/21/2020) Failure to thrive in adult (1/21/2020) JOSH (acute kidney injury) [address] [address] (1/21/2020) Past Medical History:  
Diagnosis Date  Diabetes [address] [address] 2002  High cholesterol  Hypertension History reviewed. No pertinent surgical history. Family History Problem Relation Age of Onset  No Known Problems Mother  No Known Problems Father  No Known Problems Sister  No Known Problems Brother  No Known Problems Brother  No Known Problems Brother  No Known Problems Brother  No Known Problems Brother  No Known Problems Brother  No Known Problems Maternal Grandmother  No Known Problems Maternal Grandfather  No Known Problems Paternal Grandmother  No Known Problems Paternal Grandfather  Diabetes Neg Hx   
 Heart Disease Neg Hx History reviewed, no pertinent family history. Social History Tobacco Use  Smoking status: Never Smoker  Smokeless tobacco: Never Used Substance Use Topics  Alcohol use: No  
  Alcohol/week: 0.0 standard drinks No Known Allergies Current Facility-Administered Medications Medication Dose Route Frequency  amLODIPine (NORVASC) tablet 5 mg  5 mg Oral DAILY  insulin glargine (LANTUS) injection 10 Units  10 Units SubCUTAneous DAILY  balsam peru-castor oil (VENELEX) ointment   Topical Q8H  
 sodium chloride (NS) flush 5-40 mL  5-40 mL IntraVENous Q8H  
 sodium chloride (NS) flush 5-40 mL  5-40 mL IntraVENous PRN  
 glucose chewable tablet 16 g  4 Tab Oral PRN  
 glucagon (GLUCAGEN) injection 1 mg  1 mg IntraMUSCular PRN  
 dextrose 10% infusion 0-250 mL  0-250 mL IntraVENous PRN  
 insulin lispro (HUMALOG) injection   SubCUTAneous Q6H  
 influenza vaccine 2019-20 (6 mos+)(PF) (FLUARIX/FLULAVAL/FLUZONE QUAD) injection 0.5 mL  0.5 mL IntraMUSCular PRIOR TO DISCHARGE  
 
 
 
 LAB AND IMAGING FINDINGS:  
 
Lab Results Component Value Date/Time WBC 8.7 01/28/2020 02:45 AM  
 HGB 11.2 (L) 01/28/2020 02:45 AM  
 PLATELET 444 (L) 84/30/1459 02:45 AM  
 
Lab Results Component Value Date/Time Sodium 140 01/28/2020 02:45 AM  
 Potassium 4.4 01/28/2020 02:45 AM  
 Chloride 109 (H) 01/28/2020 02:45 AM  
 CO2 25 01/28/2020 02:45 AM  
 BUN 26 (H) 01/28/2020 02:45 AM  
 Creatinine 1.36 (H) 01/28/2020 02:45 AM  
 Calcium 8.7 01/28/2020 02:45 AM  
 Magnesium 1.9 02/24/2019 08:35 PM  
 Phosphorus 2.0 (L) 04/17/2016 12:46 AM  
  
Lab Results Component Value Date/Time AST (SGOT) 21 01/21/2020 01:53 AM  
 Alk. phosphatase 93 01/21/2020 01:53 AM  
 Protein, total 8.5 (H) 01/21/2020 01:53 AM  
 Albumin 3.4 (L) 01/21/2020 01:53 AM  
 Globulin 5.1 (H) 01/21/2020 01:53 AM  
 
Lab Results Component Value Date/Time INR 1.1 01/01/2020 04:18 PM  
 Prothrombin time 10.9 01/01/2020 04:18 PM  
 aPTT 23.9 02/02/2019 03:56 PM  
  
No results found for: IRON, FE, TIBC, IBCT, PSAT, FERR No results found for: PH, PCO2, PO2 No components found for: [place] Lab Results Component Value Date/Time CK 89 04/17/2016 12:46 AM  
 CK - MB <0.5 (L) 04/17/2016 12:46 AM  
  
 
 
   
 
Total time:  30m Counseling / coordination time, spent as noted above:  25m 
> 50% counseling / coordination?:  y 
 
Prolonged service was provided for  []30 min   []75 min in face to face time in the presence of the patient, spent as noted above. Time Start:   
Time End:   
Note: this can only be billed with 03054 (initial) or 27936 (follow up). If multiple start / stop times, list each separately.

## 2020-01-29 NOTE — PROGRESS NOTES
Nephrology Progress Note [place] [facility] Date of Admission : 1/21/2020 CC: Follow up for JOSH on CKD Assessment and Plan JOSH on CKD: 
- likely from volume depletion 
- Renal U/S neg for obstruction 
- resolved, Cr at baseline 
- labs in AM 
  
CKD III w/ baseline Cr 1.4 to 1.6: 
- likely from DM and HTN 
  
Hypernatremia: 
- resolved, Na 140 
  
Hyperkalemia: 
- from JOSH and ARB 
- resolved 
- no ACE/ARB 
  
Volume depletion: 
- resolved 
  
Encephalopathy: 
- hypernatremia contributing 
- improving 
  
DM2: 
- on insulin FTT Dementia Interval History: 
Seen and examined. Resting in bed. No complaints. No labs for today. Current Medications: all current  Medications have been eviewed in [facility] Review of Systems: Pertinent items are noted in HPI. Objective: 
Vitals:   
Vitals:  
 01/28/20 1005 01/28/20 1504 01/28/20 2233 01/29/20 0740 BP: 118/73 108/64 142/73 140/70 Pulse: 100 80 93 90 Resp:  18 18 18 Temp:  99 °F (37.2 °C) 98.8 °F (37.1 °C) 98.7 °F (37.1 °C) SpO2:  97% 99% 97% Weight:      
Height:      
 
Intake and Output: 
No intake/output data recorded. No intake/output data recorded. Physical Examination: 
Pt intubated    No 
General: NAD,Conversant, chronically ill appeargin Neck:  Supple, no mass Resp:  Lungs CTA B/L, no wheezing , normal respiratory effort CV:  RRR,  no murmur or rub, no LE edema GI:  Soft, NT, + Bowel sounds, no hepatosplenomegaly Neurologic:  Non focal, still w/ mild confusion Psych:             Unable to assess Skin:  No Rash :   no waggoner [x]    High complexity decision making was performed 
[x]    Patient is at high-risk of decompensation with multiple organ involvement Lab Data Personally Reviewed: I have reviewed all the pertinent labs, microbiology data and radiology studies during assessment. Recent Labs  
  01/28/20 
0245 01/27/20 
0410  139  
K 4.4 4.4 * 114* CO2 25 24 * 137* BUN 26* 21* CREA 1.36* 1.28  
CA 8.7 8.1* Recent Labs  
  01/28/20 
0245 WBC 8.7 HGB 11.2* HCT 33.1*  
* No results found for: SDES Lab Results Component Value Date/Time Culture result: MRSA NOT PRESENT 01/21/2020 09:30 PM  
 Culture result:  01/21/2020 09:30 PM  
      Screening of patient nares for MRSA is for surveillance purposes and, if positive, to facilitate isolation considerations in high risk settings. It is not intended for automatic decolonization interventions per se as regimens are not sufficiently effective to warrant routine use. Culture result: NO GROWTH 5 DAYS 04/17/2016 12:32 AM  
 
Recent Results (from the past 24 hour(s)) GLUCOSE, POC Collection Time: 01/28/20 11:29 AM  
Result Value Ref Range Glucose (POC) 247 (H) 65 - 100 mg/dL Performed by Lluvia Salcedo GLUCOSE, POC Collection Time: 01/28/20  4:52 PM  
Result Value Ref Range Glucose (POC) 190 (H) 65 - 100 mg/dL Performed by Lluvia Salcedo GLUCOSE, POC Collection Time: 01/28/20 11:55 PM  
Result Value Ref Range Glucose (POC) 155 (H) 65 - 100 mg/dL Performed by Edel Boggs GLUCOSE, POC Collection Time: 01/29/20  5:40 AM  
Result Value Ref Range Glucose (POC) 81 65 - 100 mg/dL Performed by Pippa Mathias MD 
[address] [place] [address], [facility] Phone - [phone] Fax - [phone] 
[email or web address]

## 2020-01-29 NOTE — ROUTINE PROCESS
Bedside shift change report given to Sona Tolbert (oncoming nurse) by Johan Sands RN (offgoing nurse). Report included the following information SBAR, Kardex, Intake/Output, MAR and Recent Results.

## 2020-01-29 NOTE — PROGRESS NOTES
Problem: Dysphagia (Adult) Goal: *Acute Goals and Plan of Care (Insert Text) Description Speech pathology goals Initiated 1/22/2020 1. Patient will tolerate puree/thin liquid diet with no overt s/s aspiration within 7 days. MET. Upgrade to dysphagia 2 diet/thin liquids 2. Patient will tolerate trials of solids with timely/complete mastication and full oral clearance within 7 days  MET Outcome: Resolved/Met SPEECH LANGUAGE PATHOLOGY DYSPHAGIA TREATMENT/DISCHARGE Patient: Marie Suarez (17 y.o. male) Date: 1/29/2020 Diagnosis: Failure to thrive in adult [R62.7] Hypernatremia [E87.0] Altered mental status [R41.82] JOSH (acute kidney injury) ([facility] [address] [N17.9] Hyperkalemia [E87.5] <principal problem not specified> Precautions:  Fall ASSESSMENT: 
Patient with WFL oropharyngeal swallow, and no overt s/s aspiration observed. Recommend resume baseline diet of regular/thin. PLAN: 
-Resume baseline diet of Regular / Thin liquid diet.  
-Upright 90 degrees. -Meds whole Patient will be discharged from acute skilled speech therapy at this time. Rationale for discharge: 
Goals achieved Discharge Recommendations:  None SUBJECTIVE:  
Patient agreeable to PO trials. OBJECTIVE:  
Cognitive and Communication Status: 
Neurologic State: Alert Orientation Level: Oriented to person Cognition: Decreased command following Safety/Judgement: Decreased awareness of environment, Decreased awareness of need for assistance, Decreased awareness of need for safety Dysphagia Treatment: P.O. Trials: 
Patient Position: upright in bed Vocal quality prior to P.O.: Hoarse Consistency Presented: Solid; Thin liquid How Presented: Self-fed/presented;SLP-fed/presented;Straw;Successive swallows Bolus Acceptance: No impairment Bolus Formation/Control: No impairment Propulsion: No impairment Oral Residue: None Initiation of Swallow: No impairment Laryngeal Elevation: Functional 
 Aspiration Signs/Symptoms: None Pharyngeal Phase Characteristics: No impairment, issues, or problems NOMS:  
The NOMS functional outcome measure was used to quantify this patient's level of swallowing impairment. Based on the NOMS, the patient was determined to be at level 6 for swallow function NOMS Swallowing Levels: 
Level 1 (CN): NPO Level 2 (CM): NPO but takes consistency in therapy Level 3 (CL): Takes less than 50% of nutrition p.o. and continues with nonoral feedings; and/or safe with mod cues; and/or max diet restriction Level 4 (CK): Safe swallow but needs mod cues; and/or mod diet restriction; and/or still requires some nonoral feeding/supplements Level 5 (CJ): Safe swallow with min diet restriction; and/or needs min cues Level 6 (CI): Independent with p.o.; rare cues; usually self cues; may need to avoid some foods or needs extra time Level 7 (CH): Independent for all p.o. JAN. (2003). National Outcomes Measurement System (NOMS): Adult Speech-Language Pathology User's Guide. Pain: 
Pain Scale 1: Numeric (0 - 10) Pain Intensity 1: 0 After treatment:  
Patient left in no apparent distress in bed, Call bell within reach, and Nursing notified COMMUNICATION/EDUCATION:  
The patient's plan of care including recommendations, planned interventions, and recommended diet changes were discussed with: Registered Nurse. Jl Sahu, SLP Time Calculation: 10 mins

## 2020-01-29 NOTE — PROGRESS NOTES
Problem: Self Care Deficits Care Plan (Adult) Goal: *Acute Goals and Plan of Care (Insert Text) Description FUNCTIONAL STATUS PRIOR TO ADMISSION: Patient is a poor historian. Originally from the [facility] working as a . Currently lives with son who provides assistance as needed. Suspect significant caregiver burden. Was admitted from [facility] where he was for short term rehab. Unclear if he was walking with rehab. He was transferring to the wheelchair with assist x 1. When asked regarding ADL performance, pt reports he sometimes needs assistance and sometimes doesn't. HOME SUPPORT PRIOR TO ADMISSION: Patient lives with his son who works during the day (patient is home alone during that time). Patient's functional mobility and mentation have been declining the past few months. Patient is ambulatory inside the home with no device though relies on furniture and walls for support. He is sedentary and relies on family to bring meals upstairs to him, check his blood sugars, and for bathing. Patient is incontinent of urine and lately his bowels. There is a question of how much longer patient's son will be able to care for him in his home. Occupational Therapy Goals Initiated 1/29/2020 1. Patient will sit EOB for 10 minutes with supervision/set-up to perform functional task within 7 days. 2.  Patient will perform grooming with supervision/set-up within 7 day(s). 3.  Patient will perform upper body dressing and bathing with minimal assistance/contact guard assist within 7 day(s). 4.  Patient will perform toilet transfers with moderate assistance using least restrictive assistive device within 7 day(s). 5.  Patient will perform all aspects of toileting with moderate assistance  within 7 day(s). 6.  Patient will participate in upper extremity therapeutic exercise/activities with minimal assistance/contact guard assist for 5 minutes within 7 day(s). Outcome: Progressing Towards Goal 
  
OCCUPATIONAL THERAPY EVALUATION Patient: Katalina Yuan (89 y.o. male) Date: 1/29/2020 Primary Diagnosis: Failure to thrive in adult [R62.7] Hypernatremia [E87.0] Altered mental status [R41.82] JOSH (acute kidney injury) ([facility] [address] [N17.9] Hyperkalemia [E87.5] Precautions:  Fall ASSESSMENT Based on the objective data described below, the patient presents with confusion, decreased attention and awareness of environment, decreased command following, impaired coordination, impaired balance, and impaired mobility and ADL performance following admission for AMS, JOSH, and failure to thrive. Patient is received in bed, alert and oriented to self and place. He responds to questions regarding PLOF but unclear how accurate. He requires mod A for bed mobility and demonstrates fair balance at EOB. During dressing task, pt requires extensive cueing and mod A to don sleeves. He requires assist of second person in standing to achieve full upright position and benefits from tactile cues at hips and chest. Noted posterior and lateral lean in standing, requiring assist for toileting hygiene and clothing mgmt. Per chart, pt is blind in R eye. Noted L eye with drift and difficulty focusing on stimulus. Unable to more formally assess vision due to pts attention. Patient is functioning below baseline and will require continued skilled OT services to address above mentioned deficits. Current Level of Function Impacting Discharge (ADLs/self-care): overall max A ADLs; Functional Outcome Measure: The patient scored Total: 10/100 on the Barthel Index outcome measure which is indicative of 90% impaired ability to care for basic self needs/dependency on others; inferred 100% dependency on others for instrumental ADLs. Other factors to consider for discharge: recent decline; confusion; suspect caregiver burden; is alone during the day; poor balance; high fall risk Patient will benefit from skilled therapy intervention to address the above noted impairments. PLAN : 
Recommendations and Planned Interventions: self care training, functional mobility training, therapeutic exercise, balance training, visual/perceptual training, therapeutic activities, cognitive retraining, endurance activities, neuromuscular re-education, patient education, home safety training and family training/education Frequency/Duration: Patient will be followed by occupational therapy 4 times a week to address goals. Recommendation for discharge: (in order for the patient to meet his/her long term goals) Therapy up to 5 days/week in SNF setting This discharge recommendation: 
Has been made in collaboration with the attending provider and/or case management IF patient discharges home will need the following DME: TBD; unclear what pt has available at home SUBJECTIVE:  
Patient stated Okay.  OBJECTIVE DATA SUMMARY:  
HISTORY:  
Past Medical History:  
Diagnosis Date  Diabetes ([facility] [address] 2002  High cholesterol  Hypertension History reviewed. No pertinent surgical history. Expanded or extensive additional review of patient history:  
 
Home Situation Home Environment: Private residence One/Two Story Residence: One story Living Alone: No 
Support Systems: Child(danette)(lives with son, daughter near by) Patient Expects to be Discharged to[de-identified] Rehabilitation facility Current DME Used/Available at Home: Walker, rolling Hand dominance: Right EXAMINATION OF PERFORMANCE DEFICITS: 
Cognitive/Behavioral Status: 
Neurologic State: Alert Orientation Level: Oriented to person;Oriented to place; Disoriented to time;Disoriented to situation Cognition: Decreased attention/concentration;Decreased command following;Poor safety awareness Perception: Cues to maintain midline in standing; Tactile;Verbal;Visual(per chart, pt is blind in R eye) Perseveration: No perseveration noted Safety/Judgement: Decreased awareness of environment;Decreased awareness of need for assistance;Decreased awareness of need for safety;Decreased insight into deficits Hearing: Auditory Auditory Impairment: None Vision/Perceptual:   
Tracking: Unable to test secondary due to decreased visual attention Visual Fields: Unable to test secondary due to decreased visual attention Acuity: (per chart, pt is blind in R eye, however he denies it) Range of Motion: 
AROM: Generally decreased, functional In bilateral UEs Strength: 
Strength: Grossly decreased, non-functional In bilateral UEs Coordination: 
Coordination: Generally decreased, functional In bilateral UEs Fine Motor Skills-Upper: Left Impaired;Right Impaired Gross Motor Skills-Upper: Left Impaired;Right Impaired Tone: 
Tone: Normal 
 
Balance: 
Sitting: Impaired Sitting - Static: Fair (occasional) Sitting - Dynamic: Fair (occasional) Standing: Impaired; With support Standing - Static: Poor;Constant support Standing - Dynamic : Poor;Constant support Functional Mobility and Transfers for ADLs: 
Bed Mobility: 
Rolling: Moderate assistance;Assist x1;Additional time(heavy cues for log roll) Supine to Sit: Moderate assistance;Assist x1(assist for trunkn mgmt) Sit to Supine: Moderate assistance;Assist x1(for LEs) Transfers: 
Sit to Stand: Moderate assistance;Assist x1 Stand to Sit: Moderate assistance;Assist x1 Toilet Transfer : Maximum assistance;Assist x2(infer based on observations) ADL Assessment: 
Feeding: Minimum assistance; Moderate assistance(infer based on observations ) Oral Facial Hygiene/Grooming: Minimum assistance; Moderate assistance(infer based on observations) Bathing: Moderate assistance;Maximum assistance(infer based on observations) Upper Body Dressing: Minimum assistance Lower Body Dressing: Maximum assistance Toileting: Maximum assistance(infer based on observations) ADL Intervention and task modifications: 
Upper Body Dressing Assistance [facility] Gown: Moderate assistance(assist to get arms through sleeves) Cues: Physical assistance; Tactile cues provided;Verbal cues provided;Visual cues provided Lower Body Dressing Assistance Protective Undergarmet: Maximum assistance(to don brief simulated as underwear) Leg Crossed Method Used: No 
Position Performed: Seated edge of bed Cues: Don;Physical assistance; Tactile cues provided;Verbal cues provided;Visual cues provided Toileting Bowel Hygiene: Maximum assistance; Total assistance (dependent)(constant support in standing) Clothing Management: Maximum assistance Cues: Physical assistance for pants down;Physical assistance for pants up; Tactile cues provided;Verbal cues provided;Visual cues provided(physical assist for hygiene) Cognitive Retraining Safety/Judgement: Decreased awareness of environment;Decreased awareness of need for assistance;Decreased awareness of need for safety;Decreased insight into deficits Patient is received in bed, alert and oriented to self and place. He responds to questions regarding PLOF but unclear how accurate. He requires mod A for bed mobility and demonstrates fair balance at EOB. During dressing task, pt requires extensive cueing and mod A to don sleeves. He requires assist of second person in standing to achieve full upright position and benefits from tactile cues at hips and chest. Noted posterior and lateral lean in standing, requiring assist for toileting hygiene and clothing mgmt. Pt unable to take side steps to [facility], even with assist for weight shift. Per chart, pt is blind in R eye. Noted L eye with drift and difficulty focusing on stimulus. Unable to more formally assess vision due to pts attention. Functional Measure: 
Barthel Index: 
 
Bathin Bladder: 0 Bowels: 0 Groomin Dressin Feedin Mobility: 0 Stairs: 0 Toilet Use: 0 Transfer (Bed to Chair and Back): 5 Total: 10/100 The Barthel ADL Index: Guidelines 1. The index should be used as a record of what a patient does, not as a record of what a patient could do. 2. The main aim is to establish degree of independence from any help, physical or verbal, however minor and for whatever reason. 3. The need for supervision renders the patient not independent. 4. A patient's performance should be established using the best available evidence. Asking the patient, friends/relatives and nurses are the usual sources, but direct observation and common sense are also important. However direct testing is not needed. 5. Usually the patient's performance over the preceding 24-48 hours is important, but occasionally longer periods will be relevant. 6. Middle categories imply that the patient supplies over 50 per cent of the effort. 7. Use of aids to be independent is allowed. Rick Gonzalez., Barthel, D.W. ([id number]). Functional evaluation: the Barthel Index. [facility] (14)2. Nate Ocampo elisha ASHLEY Oliver, Sari Sanchez., Ponce Cullen., [place], [address] [place] [address] (1999). Measuring the change indisability after inpatient rehabilitation; comparison of the responsiveness of the Barthel Index and Functional [place] Measure. Journal of Neurology, Neurosurgery, and Psychiatry, 66(4), 716-605. Teresa Jean, N.J.A, CARISSA Tenorio, & Pricilla Fernandez M.A. (2004.) Assessment of post-stroke quality of life in cost-effectiveness studies: The usefulness of the Barthel Index and the EuroQoL-5D. [facility], 62, 366-17 Occupational Therapy Evaluation Charge Determination History Examination Decision-Making LOW Complexity : Brief history review  HIGH Complexity : 5 or more performance deficits relating to physical, cognitive , or psychosocial skils that result in activity limitations and / or participation restrictions HIGH Complexity : Patient presents with comorbidities that affect occupational performance. Signifigant modification of tasks or assistance (eg, physical or verbal) with assessment (s) is necessary to enable patient to complete evaluation Based on the above components, the patient evaluation is determined to be of the following complexity level: LOW Pain Rating: 
Pt denied pain this session Activity Tolerance:  
Fair and SpO2 stable on RA Please refer to the flowsheet for vital signs taken during this treatment. After treatment patient left in no apparent distress:   
Supine in bed, Call bell within reach, Bed / chair alarm activated and Side rails x 3 
 
COMMUNICATION/EDUCATION:  
The patients plan of care was discussed with: Physical Therapist and Registered Nurse. Patient/family have participated as able in goal setting and plan of care. This patients plan of care is appropriate for delegation to [facility]. Thank you for this referral. 
Kodi Albright OT Time Calculation: 18 mins

## 2020-01-29 NOTE — PROGRESS NOTES
[facility] Adult  Hospitalist Group Hospitalist Progress Note Vanessa Jean MD 
Answering service: [phone] OR [phone] from in house phone Date of Service:  2020 NAME:  Rikki Chaney :  1945 MRN:  [id number] Admission Summary:  
Rikki Chaney is a 76 y.o. male with past medical history significant for diabetes mellitus type 2, hypertension, history of dementia presents the emergency room from a rehab facility due to concern for dehydration and failure to thrive. As per medical report patient has refused to eat or drink anything in the last several days becoming weaker. Medical provider at the facility decided to order blood work-up which were abnormal.  Sent to ER for IV fluid. Patient at this time awake following basic commands but disoriented. Does not seem in acute distress but appears very dry. Denies any complaint. No family at the bedside. Work-up in the emergency room patient was found to have hyponatremia, hyperkalemia and acute kidney injury. He was started on normal saline and given regular insulin plus glucose for the hyperkalemia. Admission requested for dehydration and failure to thrive. Unable to obtain more history due to patient's mental status.  
  
Interval history / Subjective:  
Examined patient today. He is alert and oriented to person and place only. He denies any complaints. Tell me his son's name but unable to recall his phone number. Assessment & Plan: Hypernatremia-POA  
resolved 
  
JOSH on CKD-  
likely prerenal in etiology. Improved, Cr at baseline, off IVF, encourage oral intake Repeat labs as needed. Non anion gap metabolic acidosis- 
Likely from dehydration and JOSH - resolved 
  
 Hyperkalemia- 
Resolved 
  
Metabolic encephalopathy due to hypernatremia Patient most probably has baseline dementia. He is alert, pleasant but confused, oriented only to place and person Confused. MRI brain: Generalized parenchymal volume loss and moderate chronic microvascular ischemic disease 
  
 Diabetes Mellitus type 2-  
- Continue sliding scale insulin & lantus - Accu checks 
  
Failure to thrive- Body mass index is 19.72 kg/m². severe protein calorie malnutrition On nutritional supplements 
  
Leukocytosis, resolved. - likely hemoconcentration from dehydration Mliss Kowalski No source of infection at this time.  
  
HTN- 
 bp stable. Hold home meds due to ams Code status: DNR 
DVT prophylaxis:  
 
Care Plan discussed with: Patient/Family Disposition: Short-term SNF then needs long-term care placement Inspect discharge: When accepted Hospital Problems  Date Reviewed: 12/4/2018 Codes Class Noted POA Hyperkalemia ICD-10-CM: E87.5 ICD-9-CM: 276.7  1/21/2020 Unknown Hypernatremia ICD-10-CM: E87.0 ICD-9-CM: 276.0  1/21/2020 Unknown Altered mental status ICD-10-CM: R41.82 
ICD-9-CM: 780.97  1/21/2020 Unknown Failure to thrive in adult ICD-10-CM: R62.7 ICD-9-CM: 783.7  1/21/2020 Unknown JOSH ([facility]) ([facility] ICD-10-CM: N17.9 ICD-9-CM: 584.9  1/21/2020 Unknown Review of Systems: A comprehensive review of systems was negative except for that written in the HPI. Vital Signs:  
 Last 24hrs VS reviewed since prior progress note. Most recent are: 
Visit Vitals /84 Pulse 93 Temp 98.4 °F (36.9 °C) Resp 18 Ht 5' 5\" (1.651 m) Wt 53.7 kg (118 lb 7.6 oz) SpO2 98% BMI 19.72 kg/m² No intake or output data in the 24 hours ending 01/29/20 1557 Physical Examination:  
 
 
     
Constitutional:  Alert oriented to place and person otherwise confused ENT:  Oral mucous moist, oropharynx benign. Resp:  CTA bilaterally. No wheezing/rhonchi/rales. No accessory muscle use CV:  Regular rhythm, normal rate, no murmurs, gallops, rubs GI:  Soft, non distended, non tender. normoactive bowel sounds, no hepatosplenomegaly Musculoskeletal:  No edema, warm, 2+ pulses throughout Neurologic:  Alert oriented to place and person. Right eye blind. Moves arms and legs symmetrically without any focal deficit. Data Review:  
 Review and/or order of clinical lab test 
 
 
Labs:  
 
Recent Labs  
  01/28/20 
0245 WBC 8.7 HGB 11.2* HCT 33.1*  
* Recent Labs  
  01/28/20 
0245 01/27/20 
0410  139  
K 4.4 4.4 * 114* CO2 25 24 BUN 26* 21* CREA 1.36* 1.28  
* 137* CA 8.7 8.1* No results for input(s): SGOT, GPT, ALT, AP, TBIL, TBILI, TP, ALB, GLOB, GGT, AML, LPSE in the last 72 hours. No lab exists for component: AMYP, HLPSE No results for input(s): INR, PTP, APTT, INREXT, INREXT in the last 72 hours. No results for input(s): FE, TIBC, PSAT, FERR in the last 72 hours. No results found for: FOL, RBCF No results for input(s): PH, PCO2, PO2 in the last 72 hours. No results for input(s): CPK, CKNDX, TROIQ in the last 72 hours. No lab exists for component: CPKMB Lab Results Component Value Date/Time Cholesterol, total 190 01/02/2020 04:06 AM  
 HDL Cholesterol 64 01/02/2020 04:06 AM  
 LDL, calculated 115.4 (H) 01/02/2020 04:06 AM  
 Triglyceride 53 01/02/2020 04:06 AM  
 CHOL/HDL Ratio 3.0 01/02/2020 04:06 AM  
 
Lab Results Component Value Date/Time Glucose (POC) 220 (H) 01/29/2020 03:27 PM  
 Glucose (POC) 81 01/29/2020 05:40 AM  
 Glucose (POC) 155 (H) 01/28/2020 11:55 PM  
 Glucose (POC) 190 (H) 01/28/2020 04:52 PM  
 Glucose (POC) 247 (H) 01/28/2020 11:29 AM  
 
Lab Results Component Value Date/Time  Color YELLOW/STRAW 01/21/2020 02:46 AM  
 Appearance CLEAR 01/21/2020 02:46 AM  
 Specific gravity 1.018 01/21/2020 02:46 AM  
 pH (UA) 5.0 01/21/2020 02:46 AM  
 Protein NEGATIVE  01/21/2020 02:46 AM  
 Glucose NEGATIVE  01/21/2020 02:46 AM  
 Ketone NEGATIVE  01/21/2020 02:46 AM  
 Bilirubin NEGATIVE  01/21/2020 02:46 AM  
 Urobilinogen 0.2 01/21/2020 02:46 AM  
 Nitrites NEGATIVE  01/21/2020 02:46 AM  
 Leukocyte Esterase NEGATIVE  01/21/2020 02:46 AM  
 Epithelial cells FEW 01/21/2020 02:46 AM  
 Bacteria NEGATIVE  01/21/2020 02:46 AM  
 WBC 0-4 01/21/2020 02:46 AM  
 RBC 0-5 01/21/2020 02:46 AM  
 
 
 
Medications Reviewed:  
 
Current Facility-Administered Medications Medication Dose Route Frequency  amLODIPine (NORVASC) tablet 5 mg  5 mg Oral DAILY  insulin glargine (LANTUS) injection 10 Units  10 Units SubCUTAneous DAILY  balsam peru-castor oil (VENELEX) ointment   Topical Q8H  
 sodium chloride (NS) flush 5-40 mL  5-40 mL IntraVENous Q8H  
 sodium chloride (NS) flush 5-40 mL  5-40 mL IntraVENous PRN  
 glucose chewable tablet 16 g  4 Tab Oral PRN  
 glucagon (GLUCAGEN) injection 1 mg  1 mg IntraMUSCular PRN  
 dextrose 10% infusion 0-250 mL  0-250 mL IntraVENous PRN  
 insulin lispro (HUMALOG) injection   SubCUTAneous Q6H  
 influenza vaccine 2019-20 (6 mos+)(PF) (FLUARIX/FLULAVAL/FLUZONE QUAD) injection 0.5 mL  0.5 mL IntraMUSCular PRIOR TO DISCHARGE  
 
______________________________________________________________________ EXPECTED LENGTH OF STAY: 2d 0h 
ACTUAL LENGTH OF STAY:          8 Alverto Deal MD

## 2020-01-29 NOTE — PROGRESS NOTES
NUTRITION COMPLETE ASSESSMENT 
 
RECOMMENDATIONS:  
1. Continue current diet order 
 - continue supplements on discharge to facility 2. Assist with tray set-up PRN 
3. Weekly weights Interventions/Plan:  
Food/Nutrient Delivery:    Commercial supplement Feeding assistance at meals Assessment:  
Reason for Assessment: reassessment Diet: consistent CHO, regular texture, thin liquids Supplements: Glucerna TID, Magic Cup TID Meds; lantus (10 units), SSI Meal Intake:  
Patient Vitals for the past 100 hrs: 
 % Diet Eaten 01/29/20 1220 0 % 01/29/20 0740 100 % 01/26/20 0929 100 % 01/25/20 1558 90 % Subjective: \"I like the chocolate, I'm doing ok. \" 
 
Objective: 
76 y.o. male admitted with FTT. Pt has a past medical history of Diabetes ([address] (2002), High cholesterol, and Hypertension. Pt also with history of dementia. Pt admitted from [facility]. Plans to return with rehab as tolerated with eventual transition to LTC per rounds discussion. Palliative following with code changed to DNR. Pt with probable wt loss as below. LE wasting observed with pt bedbound. Happy to see diet advanced to regular texture today by SLP. Good appetite per documentation and pt reports. He notes drinking Glucerna and Magic Cup (although baseline dementia so unsure of accuracy of reporting). Will continue supplements as ordered to provide: 1240kcal, 48g protein. Wt Readings:  
01/22/20 53.7 kg (118 lb 7.6 oz) 01/08/20 62 kg (136 lb 11 oz) 02/02/19 63 kg (138 lb 14.2 oz) 12/04/18 63.8 kg (140 lb 9.6 oz) With good appetite predict pt to be meeting nutrition needs with supplements. Will continue to follow for wt trends, PO intake. Estimated Nutrition Needs:  
Kcals/day: 1600 Kcals/day Protein: 65 g(1.2 gm/kg) Fluid: 1600 ml(30 mL/kg) Based On: Kcal/kg - specify (Comment)(30 kcal/kg) Weight Used: Actual wt(53.8 kg) Pt expected to meet estimated nutrient needs:  [x]   Yes     []  No [] Unable to predict at this time Nutrition Diagnosis:  
1. Inadequate oral intake(resolved) related to AMS and baseline dementia with increased energy needs d/t wounds as evidenced by consuming >75% meals; diet advanced to regular 2. Increased nutrient needs related to wound healing as evidenced by unstageable to sacrum Goals:   
 Continued consumption of at least 75% meals and 2 supplements/day in 5-7 days; gradual wt gain of 1-2# per week Monitoring & Evaluation: - Total energy intake, Liquid meal replacement, Protein intake - Weight/weight change 
 - Behavior, Acceptance of assist with eating Previous Nutrition Goals Met:   Yes Previous Recommendations:    Yes 
 
Education & Discharge Needs: 
 [x] None Identified 
 [] Identified and addressed [x] Participated in care plan, discharge planning, and/or interdisciplinary rounds Cultural, Baptism and ethnic food preferences identified: None Skin Integrity: []Intact  [x]Other: see WOCN note Edema: [x]None []Other Last BM: 1/28 Food Allergies: [x]None []Other Diet Restrictions: Cultural/Mosque Preference(s): None Anthropometrics:   
Weight Loss Metrics 1/22/2020 1/8/2020 2/24/2019 2/2/2019 12/4/2018 8/27/2018 8/24/2018 Today's Wt 118 lb 7.6 oz 136 lb 11 oz - 138 lb 14.2 oz 140 lb 9.6 oz 140 lb 131 lb 3.2 oz BMI 19.72 kg/m2 22.75 kg/m2 23.11 kg/m2 23.11 kg/m2 23.4 kg/m2 23.3 kg/m2 - Weight Source: Bed Height: 5' 5\" (165.1 cm), Body mass index is 19.72 kg/m². IBW : 61.7 kg (136 lb), % IBW (Calculated): 87.12 % 
 ,   
 
Labs:   
Lab Results Component Value Date/Time  Sodium 140 01/28/2020 02:45 AM  
 Potassium 4.4 01/28/2020 02:45 AM  
 Chloride 109 (H) 01/28/2020 02:45 AM  
 CO2 25 01/28/2020 02:45 AM  
 Glucose 145 (H) 01/28/2020 02:45 AM  
 BUN 26 (H) 01/28/2020 02:45 AM  
 Creatinine 1.36 (H) 01/28/2020 02:45 AM  
 Calcium 8.7 01/28/2020 02:45 AM  
 Magnesium 1.9 02/24/2019 08:35 PM  
 Phosphorus 2.0 (L) 04/17/2016 12:46 AM  
 Albumin 3.4 (L) 01/21/2020 01:53 AM  
 
Lab Results Component Value Date/Time Hemoglobin A1c 10.6 (H) 01/02/2020 04:06 AM  
 Hemoglobin A1c (POC) 12.8 08/20/2018 10:06 AM  
 
Hayes Damico RD [facility], Pager #[phone] or via [employer]

## 2020-01-29 NOTE — ROUTINE PROCESS
Heel boots placed due to new wound on R heel, patient constantly fidgeting and kicking off boots so they are unable to stay in place. Bedside shift change report given to [address] [place] [address] Edin (oncoming nurse) by Jose CLAY (offgoing nurse). Report included the following information SBAR, Kardex, Intake/Output, MAR and Recent Results.

## 2020-01-29 NOTE — ACP (ADVANCE CARE PLANNING)
GOALS OF CARE: 
Patient/Health Care Proxy Stated Goals: Comfort(efforts at rehab if pt able to tolerate, if future decline or acute illness, family would not want him to suffer or have heroics pursued) 
  
TREATMENT PREFERENCES:  
Code Status: DNR- DDNR to be completed prior to discharge (left for son to sign) 
  Advance Care Planning: 
[x]? The [employer] Interdisciplinary Team has updated the ACP Navigator with [place] and Patient Capacity 
  
  Primary Decision Maker (Active): Azeb Green - [phone] Primary Decision MakerTilda Bumpers - [phone]  
  
Advance Care Planning 1/21/2020 Patient's Healthcare Decision Maker is: - Confirm Advance Directive None Patient Would Like to Complete Advance Directive Yes  
  
  
Medical Interventions: Limited additional interventions

## 2020-01-29 NOTE — PROGRESS NOTES
NATALIIA: 
 
CM received call from [address] [employer] requesting more information for auth. CM spoke to Barbara Quiles regarding request.  PT/OT evaluations needed, and updated MD notes. CM will fax when complete. Ponce Segundo RN/CRM

## 2020-01-29 NOTE — PROGRESS NOTES
Occupational Therapy: 
01/29/20 Orders received, chart reviewed and patient evaluated by occupational therapy. Pending progression with skilled acute occupational therapy, recommend: 
Therapy up to 5 days/week in SNF setting Recommend with nursing patient to complete as able in order to maintain strength, endurance and independence: bed in chair position at least 3x/day and bedpan use for toileting. Thank you for your assistance. Full evaluation to follow. Thank you, Carlee Kirby OTR/L

## 2020-01-30 NOTE — PROGRESS NOTES
Nephrology Progress Note [facility] Date of Admission : 1/21/2020 CC: Follow up for JOSH on CKD Assessment and Plan JOSH on CKD: 
- likely from volume depletion 
- Renal U/S neg for obstruction 
- resolved - Cr at baseline 
- cont present care 
  
CKD III w/ baseline Cr 1.4 to 1.6: 
- likely from DM and HTN 
  
Hypernatremia: 
- resolved, Na 140 
  
Hyperkalemia: 
- from JOSH and ARB 
- resolved 
- no ACE/ARB 
  
Volume depletion: 
- resolved 
  
Encephalopathy: 
- hypernatremia contributing 
- improving 
  
DM2: 
- on insulin FTT Dementia Interval History: 
Seen and examined. Resting in bed. No complaints. Labs reviewed and stable. No cp or sob reported Current Medications: all current  Medications have been eviewed in [facility] Review of Systems: Pertinent items are noted in HPI. Objective: 
Vitals:   
Vitals:  
 01/29/20 0740 01/29/20 1533 01/29/20 2256 01/30/20 3102 BP: 140/70 145/84 130/77 126/78 Pulse: 90 93 94 92 Resp: 18 18 17 18 Temp: 98.7 °F (37.1 °C) 98.4 °F (36.9 °C) 97.9 °F (36.6 °C) 97.9 °F (36.6 °C) SpO2: 97% 98% 99% 98% Weight:      
Height:      
 
Intake and Output: 
No intake/output data recorded. 01/28 1901 - 01/30 0700 In: 240 [P.O.:240] Out: - Physical Examination: 
Pt intubated    No 
General: NAD,Conversant, chronically ill appeargin Neck:  Supple, no mass Resp:  Lungs CTA B/L, no wheezing , normal respiratory effort CV:  RRR,  no murmur or rub, no LE edema GI:  Soft, NT, + Bowel sounds, no hepatosplenomegaly Neurologic:  Non focal, still w/ mild confusion Psych:             Unable to assess Skin:  No Rash :   no waggoner [x]    High complexity decision making was performed 
[x]    Patient is at high-risk of decompensation with multiple organ involvement Lab Data Personally Reviewed: I have reviewed all the pertinent labs, microbiology data and radiology studies during assessment. Recent Labs  
  01/30/20 6082 01/28/20 0245  140  
K 4.3 4.4  
* 109* CO2 24 25 * 145* BUN 28* 26* CREA 1.32* 1.36* CA 8.7 8.7 Recent Labs  
  01/28/20 0245 WBC 8.7 HGB 11.2* HCT 33.1*  
* No results found for: SDES Lab Results Component Value Date/Time Culture result: MRSA NOT PRESENT 01/21/2020 09:30 PM  
 Culture result:  01/21/2020 09:30 PM  
      Screening of patient nares for MRSA is for surveillance purposes and, if positive, to facilitate isolation considerations in high risk settings. It is not intended for automatic decolonization interventions per se as regimens are not sufficiently effective to warrant routine use. Culture result: NO GROWTH 5 DAYS 04/17/2016 12:32 AM  
 
Recent Results (from the past 24 hour(s)) GLUCOSE, POC Collection Time: 01/29/20  3:27 PM  
Result Value Ref Range Glucose (POC) 220 (H) 65 - 100 mg/dL Performed by Usman Jon GLUCOSE, POC Collection Time: 01/29/20  5:53 PM  
Result Value Ref Range Glucose (POC) 89 65 - 100 mg/dL Performed by Inés Pond GLUCOSE, POC Collection Time: 01/29/20 11:00 PM  
Result Value Ref Range Glucose (POC) 327 (H) 65 - 100 mg/dL Performed by Matheus Welch METABOLIC PANEL, BASIC Collection Time: 01/30/20  2:53 AM  
Result Value Ref Range Sodium 140 136 - 145 mmol/L Potassium 4.3 3.5 - 5.1 mmol/L Chloride 111 (H) 97 - 108 mmol/L  
 CO2 24 21 - 32 mmol/L Anion gap 5 5 - 15 mmol/L Glucose 202 (H) 65 - 100 mg/dL BUN 28 (H) 6 - 20 MG/DL Creatinine 1.32 (H) 0.70 - 1.30 MG/DL  
 BUN/Creatinine ratio 21 (H) 12 - 20 GFR est AA >60 >60 ml/min/1.73m2 GFR est non-AA 53 (L) >60 ml/min/1.73m2 Calcium 8.7 8.5 - 10.1 MG/DL  
GLUCOSE, POC Collection Time: 01/30/20  6:03 AM  
Result Value Ref Range Glucose (POC) 98 65 - 100 mg/dL Performed by Zoe Gómez GLUCOSE, POC  Collection Time: 01/30/20  8:52 AM  
 Result Value Ref Range Glucose (POC) 125 (H) 65 - 100 mg/dL Performed by Thom Alamo MD 
[employer]  
[address] [address], [facility] Phone - [phone] Fax - [phone] 
[email or web address]

## 2020-01-30 NOTE — DISCHARGE INSTRUCTIONS
Nutrition Recommendations for Discharge:    Continue Oral Nutrition Supplements at discharge:   Glucerna Advance, Glucerna Shake or similar product   Twice daily for 30 days unless otherwise directed by your Primary Care Physician. Lion Borrero RD                    Discharge SNF/Rehab Instructions/LTAC       PATIENT ID: Kemal Lyons  MRN: [id number]   YOB: 1945    DATE OF ADMISSION: 1/21/2020  1:26 AM    DATE OF DISCHARGE: 1/30/2020    PRIMARY CARE PROVIDER: Prakash Still NP       ATTENDING PHYSICIAN: Anisha Beltran MD  DISCHARGING PROVIDER: Aakash Tolbert MD     To contact this individual call [phone] and ask the  to page. If unavailable ask to be transferred the Adult Hospitalist Department. CONSULTATIONS: IP CONSULT TO NEPHROLOGY  IP CONSULT TO PALLIATIVE CARE - PROVIDER    PROCEDURES/SURGERIES: * No surgery found *    ADMITTING DIAGNOSES & HOSPITAL COURSE:   Excerpts from the H&P  Kemal Lyons is a 76 y.o. male with past medical history significant for diabetes mellitus type 2, hypertension, history of dementia presents the emergency room from a rehab facility due to concern for dehydration and failure to thrive. As per medical report patient has refused to eat or drink anything in the last several days becoming weaker. He was found to have severe electrolte derangements and was admitted        [address] / PLAN:    Hypernatremia-POA resolved  JOSH on CKD- Improved to baseline  Non anion gap metabolic acidosis-Likely from dehydration and JOSH - resolved  Hyperkalemia-Resolved  Underlying dementia. Metabolic encephalopathy due to hypernatremia,at baseline now. He is alert, pleasant but confused, oriented only to place and person  MRI brain: Generalized parenchymal volume loss and moderate chronic microvascular ischemic disease      Diabetes Mellitus type 2-Continue with Lantus. Metformin discontinued  -Check blood sugar AC&Hs and cover with sliding scale insulin per facility protocol. Monitor for hypoglycemia      Failure to thrive- Body mass index is 19.72 kg/m². severe protein calorie malnutrition  On nutritional supplements     Leukocytosis, resolved. HTN-on amlodipine. Losartan discontinued to kidney failure    Additional recommendations  -CBC,BMP next week           PENDING TEST RESULTS:   At the time of discharge the following test results are still pending: none    FOLLOW UP APPOINTMENTS:    Follow-up Information     Follow up With Specialties Details Why Contact Gurpreet Blair NP [address] [place] [address]  [phone]             ADDITIONAL CARE RECOMMENDATIONS:     DIET: Regular Diet, Cardiac Diet and Diabetic Diet        OXYGEN / BiPAP SETTINGS: on room air    ACTIVITY: Activity as tolerated and PT/OT Eval and Treat    WOUND CARE: NA    EQUIPMENT needed: TBD after rehab      DISCHARGE MEDICATIONS:   See Medication Reconciliation Form      NOTIFY YOUR PHYSICIAN FOR ANY OF THE FOLLOWING:   Fever over 101 degrees for 24 hours. Chest pain, shortness of breath, fever, chills, nausea, vomiting, diarrhea, change in mentation, falling, weakness, bleeding. Severe pain or pain not relieved by medications. Or, any other signs or symptoms that you may have questions about. DISPOSITION:    Home With:   OT  PT  HH  RN      x SNF/Inpatient Rehab/LTAC    Independent/assisted living    Hospice    Other:       PATIENT CONDITION AT DISCHARGE:     Functional status    Poor    x Deconditioned     Independent      Cognition     Lucid     Forgetful    x Dementia      Catheters/lines (plus indication)    Green     PICC     PEG    x None      Code status     Full code    x DNR      PHYSICAL EXAMINATION AT DISCHARGE:                                                Constitutional:  Alert oriented to place and person otherwise confused    ENT:  Oral mucous moist, oropharynx benign. Resp:  CTA bilaterally.  No wheezing/rhonchi/rales. No accessory muscle use   CV:  Regular rhythm, normal rate, no murmurs, gallops, rubs    GI:  Soft, non distended, non tender. normoactive bowel sounds, no hepatosplenomegaly     Musculoskeletal:  No edema, warm, 2+ pulses throughout    Neurologic:  Alert oriented to place and person. Right eye blind. Moves arms and legs symmetrically without any focal deficit.                                  CHRONIC MEDICAL DIAGNOSES:  Problem List as of 1/30/2020 Date Reviewed: 12/4/2018          Codes Class Noted - Resolved    Hyperkalemia ICD-10-CM: E87.5  ICD-9-CM: 276.7  1/21/2020 - Present        Hypernatremia ICD-10-CM: E87.0  ICD-9-CM: 276.0  1/21/2020 - Present        Altered mental status ICD-10-CM: R41.82  ICD-9-CM: 780.97  1/21/2020 - Present        Failure to thrive in adult ICD-10-CM: R62.7  ICD-9-CM: 783.7  1/21/2020 - Present        [facility]acute [facility]) ([facility] ICD-10-CM: N17.9  ICD-9-CM: 584.9  1/21/2020 - Present        CVA (cerebral vascular accident) [facility]) ICD-10-CM: I63.9  ICD-9-CM: 434.91  1/1/2020 - Present        DM (diabetes mellitus) type II controlled with renal manifestation ([facility] ICD-10-CM: E11.29  ICD-9-CM: 250.40  4/20/2016 - Present        Cataracts, bilateral ICD-10-CM: H26.9  ICD-9-CM: 366.9  11/15/2013 - Present    Overview Signed 6/18/2014 11:37 AM by Sonja Castro 4/2014             Glaucoma, right eye ICD-10-CM: H40.9  ICD-9-CM: 365.9  11/15/2013 - Present        HTN (hypertension) ICD-10-CM: I10  ICD-9-CM: 401.9  7/2/2012 - Present        RESOLVED: Type 1 diabetes mellitus with hyperglycemia ([facility] ICD-10-CM: E10.65  ICD-9-CM: 250.01  8/20/2018 - 8/24/2018        RESOLVED: Screening for colon cancer ICD-10-CM: Z12.11  ICD-9-CM: V76.51  6/18/2014 - 9/30/2019        RESOLVED: Right shoulder pain ICD-10-CM: M25.511  ICD-9-CM: 719.41  11/28/2012 - 4/20/2016        RESOLVED: HTN (hypertension) ICD-10-CM: I10  ICD-9-CM: 401.9  7/2/2012 - 11/28/2012        RESOLVED: Diabetes mellitus type 2 in nonobese [facility]) ICD-10-CM: E11.9  ICD-9-CM: 250.00  6/11/2012 - 4/20/2016    Overview Signed 6/11/2012 11:23 AM by Ac Villaseñor NP     2002 dx               RESOLVED: Hyperglycemia without ketosis ICD-10-CM: R73.9  ICD-9-CM: 790.29  5/26/2012 - 5/29/2012        RESOLVED: Seizure ([address] ICD-10-CM: R56.9  ICD-9-CM: 780.39  5/26/2012 - 5/29/2012                    Signed:    Jabari Marks MD  1/30/2020  12:14 PM

## 2020-01-30 NOTE — PROGRESS NOTES
NATALIIA: 
 
Discharge possible today. [facility]. Jazmin Pereira # [id number] CM will notify SonThor Phan needs proof that medicaid application was submitted. SELAM sent email to Lance Francis requesting copy of fax receipt. SELAM left message for Mrs. Blank,  assigned to Mr. Gigi Beasley. SELAM was told by the  that the status is Medicaid Pending but Mrs. Blank should be able to provide a T number. 12:55 SELAM received fax confirmation from Lance Francis and uploaded into [employer] for Emilie to view. Bobby Mode, RN/CRM Bobby Mode, RN/CRM

## 2020-01-30 NOTE — PROGRESS NOTES
Patient seen and examined this afternoon. He is aware he will be discharged. He is alert and oriented pleasant person, no complaints. I told him that I talked with his son and he is aware of his discharge.

## 2020-01-30 NOTE — PROGRESS NOTES
Bedside shift change report given to DANNA Dowd (oncoming nurse) by Bhavani Garcia RN (offgoing nurse). Report included the following information SBAR, Kardex, Intake/Output, MAR and Recent Results.

## 2020-01-30 NOTE — DISCHARGE SUMMARY
Discharge Summary PATIENT ID: Katalina Yuan MRN: [id number] YOB: 1945 DATE OF ADMISSION: 1/21/2020  1:26 AM   
DATE OF DISCHARGE: 01/30/20 PRIMARY CARE PROVIDER: Tian Martin NP  
 
ATTENDING PHYSICIAN: Irving Jamison MD 
 
DISCHARGING PROVIDER: Irving Jamison MD   
To contact this individual call [phone] and ask the  to page. If unavailable ask to be transferred the Adult Hospitalist Department. CONSULTATIONS: IP CONSULT TO NEPHROLOGY 
IP CONSULT TO PALLIATIVE CARE - PROVIDER PROCEDURES/SURGERIES: * No surgery found * ADMITTING DIAGNOSES & HOSPITAL COURSE:  
Excerpts from the H&P Katalina Yuan is a 76 y.o. male with past medical history significant for diabetes mellitus type 2, hypertension, history of dementia presents the emergency room from a rehab facility due to concern for dehydration and failure to thrive. As per medical report patient has refused to eat or drink anything in the last several days becoming weaker. He was found to have severe electrolte derangements and was admitted DISCHARGE DIAGNOSES / PLAN:   
Hypernatremia-POA resolved JOSH on CKD- Improved to baseline Non anion gap metabolic acidosis-Likely from dehydration and JOSH - resolved Hyperkalemia-Resolved Underlying dementia. Metabolic encephalopathy due to hypernatremia,at baseline now. He is alert, pleasant but confused, oriented only to place and person MRI brain: Generalized parenchymal volume loss and moderate chronic microvascular ischemic disease Diabetes Mellitus type 2-Continue with Lantus. Metformin discontinued 
-Check blood sugar AC&Hs and cover with sliding scale insulin per facility protocol. Failure to thrive- Body mass index is 19.72 kg/m². severe protein calorie malnutrition On nutritional supplements Leukocytosis, resolved. HTN-on amlodipine. Losartan discontinued to kidney failure I called and updated his son,Maricruz Ye ([phone])JS d/c plan,all questions answered. PENDING TEST RESULTS:  
At the time of discharge the following test results are still pending: none FOLLOW UP APPOINTMENTS:   
Follow-up Information Follow up With Specialties Details Why Contact Info Rachele Garcia  [address] [place] [address], [facility] 
[phone] ADDITIONAL CARE RECOMMENDATIONS:  
 
DIET: Regular Diet, Cardiac Diet and Diabetic Diet OXYGEN / BiPAP SETTINGS: on room air ACTIVITY: Activity as tolerated and PT/OT Eval and Treat WOUND CARE: NA 
 
EQUIPMENT needed: TBD after rehab DISCHARGE MEDICATIONS: 
 See Medication Reconciliation Form NOTIFY YOUR PHYSICIAN FOR ANY OF THE FOLLOWING:  
Fever over 101 degrees for 24 hours. Chest pain, shortness of breath, fever, chills, nausea, vomiting, diarrhea, change in mentation, falling, weakness, bleeding. Severe pain or pain not relieved by medications. Or, any other signs or symptoms that you may have questions about. DISPOSITION: 
  Home With: 
 OT  PT  HH  RN  
  
x SNF/Inpatient Rehab/LTAC Independent/assisted living Hospice Other:  
 
 
PATIENT CONDITION AT DISCHARGE:  
 
Functional status Poor   
x Deconditioned Independent Cognition Bettey Cane Forgetful   
x Dementia Catheters/lines (plus indication) Green PICC   
 PEG   
x None Code status Full code   
x DNR PHYSICAL EXAMINATION AT DISCHARGE: 
                                             
Constitutional:  Alert oriented to place and person otherwise confused ENT:  Oral mucous moist, oropharynx benign. Resp:  CTA bilaterally. No wheezing/rhonchi/rales. No accessory muscle use CV:  Regular rhythm, normal rate, no murmurs, gallops, rubs GI:  Soft, non distended, non tender. normoactive bowel sounds, no hepatosplenomegaly Musculoskeletal:  No edema, warm, 2+ pulses throughout Neurologic:  Alert oriented to place and person. Right eye blind. Moves arms and legs symmetrically without any focal deficit. CHRONIC MEDICAL DIAGNOSES: 
Problem List as of 1/30/2020 Date Reviewed: 12/4/2018 Codes Class Noted - Resolved Hyperkalemia ICD-10-CM: E87.5 ICD-9-CM: 276.7  1/21/2020 - Present Hypernatremia ICD-10-CM: E87.0 ICD-9-CM: 276.0  1/21/2020 - Present Altered mental status ICD-10-CM: R41.82 
ICD-9-CM: 780.97  1/21/2020 - Present Failure to thrive in adult ICD-10-CM: R62.7 ICD-9-CM: 783.7  1/21/2020 - Present JOSH (acute kidney injury) ([facility] [address] ICD-10-CM: N17.9 ICD-9-CM: 584.9  1/21/2020 - Present CVA (cerebral vascular accident) [facility]) ICD-10-CM: I63.9 ICD-9-CM: 434.91  1/1/2020 - Present DM (diabetes mellitus) type II controlled with renal manifestation ([facility]) ICD-10-CM: E11.29 ICD-9-CM: 250.40  4/20/2016 - Present Cataracts, bilateral ICD-10-CM: H26.9 ICD-9-CM: 366.9  11/15/2013 - Present Overview Signed 6/18/2014 11:37 AM by Sharyn Castro 4/2014 Glaucoma, right eye ICD-10-CM: H40.9 ICD-9-CM: 365.9  11/15/2013 - Present HTN (hypertension) ICD-10-CM: I10 
ICD-9-CM: 401.9  7/2/2012 - Present RESOLVED: Type 1 diabetes mellitus with hyperglycemia (HCC) ICD-10-CM: E10.65 ICD-9-CM: 250.01  8/20/2018 - 8/24/2018 RESOLVED: Screening for colon cancer ICD-10-CM: Z12.11 ICD-9-CM: V76.51  6/18/2014 - 9/30/2019 RESOLVED: Right shoulder pain ICD-10-CM: M25.511 ICD-9-CM: 719.41  11/28/2012 - 4/20/2016 RESOLVED: HTN (hypertension) ICD-10-CM: I10 
ICD-9-CM: 401.9  7/2/2012 - 11/28/2012 RESOLVED: Diabetes mellitus type 2 in nonobese [facility]) ICD-10-CM: E11.9 ICD-9-CM: 250.00  6/11/2012 - 4/20/2016 Overview Signed 6/11/2012 11:23 AM by Narciso Calvin NP  
  2002 dx RESOLVED: Hyperglycemia without ketosis ICD-10-CM: R73.9 ICD-9-CM: 790.29  5/26/2012 - 5/29/2012 RESOLVED: Seizure ([address] ICD-10-CM: R56.9 ICD-9-CM: 780.39  5/26/2012 - 5/29/2012 DISCHARGE MEDICATIONS: 
Current Discharge Medication List  
  
START taking these medications Details  
amLODIPine (NORVASC) 5 mg tablet Take 1 Tab by mouth daily for 30 days. Qty: 30 Tab, Refills: 0 CONTINUE these medications which have NOT CHANGED Details  
atorvastatin (LIPITOR) 20 mg tablet Take 40 mg by mouth nightly. fish oil-omega-3 fatty acids (FISH OIL) 340-1,000 mg capsule Take 1 Cap by mouth daily. insulin glargine (LANTUS) 100 unit/mL injection 9 Units by SubCUTAneous route nightly. midodrine (PROAMITINE) 10 mg tablet Take 10 mg by mouth three (3) times daily. vitamin E (AQUA GEMS) 400 unit capsule Take 400 Units by mouth daily. vit B Cmplx 3-FA-Vit C-Biotin (NEPHRO ALEN RX) 1- mg-mg-mcg tablet Take 1 Tab by mouth daily. Qty: 30 Tab, Refills: 3  
  
aspirin (ASPIRIN) 325 mg tablet Take 325 mg by mouth daily. STOP taking these medications  
  
 insulin lispro (HUMALOG KWIKPEN INSULIN) 100 unit/mL kwikpen Comments:  
Reason for Stopping:   
   
 losartan (COZAAR) 100 mg tablet Comments:  
Reason for Stopping:   
   
 metFORMIN (GLUCOPHAGE) 500 mg tablet Comments:  
Reason for Stopping:   
   
  
 
 
Greater than 30 minutes were spent with the patient on counseling and coordination of care Signed:   
Rolanda Simon MD 
1/30/2020 
12:21 PM

## 2020-01-30 NOTE — PROGRESS NOTES
Problem: Diabetes Self-Management Goal: *Disease process and treatment process Description Define diabetes and identify own type of diabetes; list 3 options for treating diabetes. Outcome: Not Progressing Towards Goal 
Goal: *Incorporating nutritional management into lifestyle Description Describe effect of type, amount and timing of food on blood glucose; list 3 methods for planning meals. Outcome: Not Progressing Towards Goal 
Goal: *Incorporating physical activity into lifestyle Description State effect of exercise on blood glucose levels. Outcome: Not Progressing Towards Goal 
Goal: *Developing strategies to promote health/change behavior Description Define the ABC's of diabetes; identify appropriate screenings, schedule and personal plan for screenings. Outcome: Not Progressing Towards Goal 
Goal: *Using medications safely Description State effect of diabetes medications on diabetes; name diabetes medication taking, action and side effects. Outcome: Not Progressing Towards Goal 
Goal: *Monitoring blood glucose, interpreting and using results Description Identify recommended blood glucose targets  and personal targets. Outcome: Not Progressing Towards Goal 
Goal: *Prevention, detection, treatment of acute complications Description List symptoms of hyper- and hypoglycemia; describe how to treat low blood sugar and actions for lowering  high blood glucose level. Outcome: Not Progressing Towards Goal 
Goal: *Prevention, detection and treatment of chronic complications Description Define the natural course of diabetes and describe the relationship of blood glucose levels to long term complications of diabetes. Outcome: Not Progressing Towards Goal 
Goal: *Developing strategies to address psychosocial issues Description Describe feelings about living with diabetes; identify support needed and support network Outcome: Not Progressing Towards Goal 
Goal: *Insulin pump training Outcome: Not Progressing Towards Goal 
Goal: *Sick day guidelines Outcome: Not Progressing Towards Goal

## 2020-01-30 NOTE — ROUTINE PROCESS
This nurse [facility] The patient is to be picked up by [facility] around 3pm.  
 
The patient has recently been cleaned up with a new brief and Exuderm and is awaiting pickup. No IV access at this time. 1511: [facility] picked up the patient at this time.

## 2020-01-30 NOTE — PROGRESS NOTES
NATALIIA: 
 
Discharge today. [facility] to  patient at 3pm. [facility] phone [phone] CM notified patient's son, Jos Younger of plan. RN call report to [phone], [facility]. Chris Driscoll RN/CRM

## 2020-02-09 NOTE — ED TRIAGE NOTES
Pt from [facility], hx dementia, A&ox1 at baseline. Pt denies pain. Pt was found down in bedroom at 0930, ~1000. VSS. Pt with c-collar per EMS. BLE contracted.

## 2020-02-09 NOTE — ED PROVIDER NOTES
76 y.o. male with past medical history significant for DM, HTN, high cholesterol, difficulty walking, Dementia, and Stroke who presents from [facility] via EMS for evaluation s/p GLF. EMS gave most of the patient's history. Patient had an unwitnessed fall around 0930 with increased confusion prompting nursing staff to call 911 today. EMS reports en route to hospital, patient was stabilized with a C-Collar. Patient presents to [facility] ED for evaluation s/p GLF. Of note, patient is on Aspirin and Insulin. Patient is not on any anticoagulants. There are no other acute medical concerns at this time. Social hx: No tobacco use, No EtOH use, No illicit drug use. PCP: Tian Martin NP Full history, physical exam, and ROS unable to be obtained due to: Dementia. Note written by Jaden Quarles, as dictated by Nohemy Van MD 1:03 PM 
 
The history is provided by the EMS personnel and medical records. No  was used. Past Medical History:  
Diagnosis Date  Diabetes ([address] 2002  High cholesterol  Hypertension History reviewed. No pertinent surgical history. Family History:  
Problem Relation Age of Onset  No Known Problems Mother  No Known Problems Father  No Known Problems Sister  No Known Problems Brother  No Known Problems Brother  No Known Problems Brother  No Known Problems Brother  No Known Problems Brother  No Known Problems Brother  No Known Problems Maternal Grandmother  No Known Problems Maternal Grandfather  No Known Problems Paternal Grandmother  No Known Problems Paternal Grandfather  Diabetes Neg Hx   
 Heart Disease Neg Hx Social History Socioeconomic History  Marital status:  Spouse name: Not on file  Number of children: Not on file  Years of education: Not on file  Highest education level: Not on file Occupational History  Not on file Social Needs  Financial resource strain: Not on file  Food insecurity:  
  Worry: Not on file Inability: Not on file  Transportation needs:  
  Medical: Not on file Non-medical: Not on file Tobacco Use  Smoking status: Never Smoker  Smokeless tobacco: Never Used Substance and Sexual Activity  Alcohol use: No  
  Alcohol/week: 0.0 standard drinks  Drug use: No  
  Types: Prescription, OTC  Sexual activity: Never Lifestyle  Physical activity:  
  Days per week: Not on file Minutes per session: Not on file  Stress: Not on file Relationships  Social connections:  
  Talks on phone: Not on file Gets together: Not on file Attends Zoroastrian service: Not on file Active member of club or organization: Not on file Attends meetings of clubs or organizations: Not on file Relationship status: Not on file  Intimate partner violence:  
  Fear of current or ex partner: Not on file Emotionally abused: Not on file Physically abused: Not on file Forced sexual activity: Not on file Other Topics Concern  Not on file Social History Narrative ** Merged History Encounter ** ALLERGIES: Patient has no known allergies. Review of Systems Unable to perform ROS: Dementia Vitals:  
 02/09/20 1302 BP: 121/72 Pulse: 100 Resp: 18 Temp: 98.3 °F (36.8 °C) SpO2: 100% Physical Exam 
Vitals signs and nursing note reviewed. Constitutional:   
   General: He is not in acute distress. Appearance: He is well-developed. He is ill-appearing. Comments: No obvious trauma. Non-compliant with exam.   
HENT:  
   Head: Normocephalic and atraumatic. Eyes:  
   Conjunctiva/sclera: Conjunctivae normal.  
Neck: Musculoskeletal: Normal range of motion and neck supple. Comments: C-spine immobilized. Cardiovascular:  
   Rate and Rhythm: Normal rate and regular rhythm. Heart sounds: Normal heart sounds. No murmur. Pulmonary:  
   Effort: Pulmonary effort is normal. No respiratory distress. Breath sounds: Normal breath sounds. Abdominal:  
   General: Bowel sounds are normal. There is no distension. Palpations: Abdomen is soft. Tenderness: There is no abdominal tenderness. There is no rebound. Musculoskeletal: Normal range of motion. General: No tenderness. Skin: 
   General: Skin is warm and dry. Neurological:  
   Mental Status: He is alert. Cranial Nerves: No cranial nerve deficit. Comments: Alert and moves all extremities. Non-verbal.  
 
Note written by Jaden Lozano, as dictated by Carmen Tanner MD 1:03 PM  
 
MDM Procedures The patient presented with a complaint of a fall or minor trauma. The patient is now resting comfortably and feels better, is alert and in no distress. The patient has a normal mental status and is neurologically intact. The history, exam, diagnostic testing (if any) and current condition do not demonstrate signs of clinically significant intra-cranial, intra-thoracic, intra-abdominal, or musculoskeletal trauma. The vital signs have been stable. The patient's condition is stable and appropriate for discharge. The patient will pursue further outpatient evaluation with the primary care physician or other designated or consulting physician as indicated in the discharge instructions.

## 2020-02-09 NOTE — ED NOTES
TRANSFER - OUT REPORT: 
 
Verbal report given to Baldo(name) on Dayoung AMINA Tanner  being transferred to [facility] for routine progression of care Report consisted of patients Situation, Background, Assessment and  
Recommendations(SBAR). Information from the following report(s) ED Summary and Recent Results was reviewed with the receiving nurse. Lines:  
Peripheral IV 02/09/20 Left Forearm (Active) Site Assessment Clean, dry, & intact 2/9/2020 12:58 PM  
Phlebitis Assessment 0 2/9/2020 12:58 PM  
Dressing Status Clean, dry, & intact 2/9/2020 12:58 PM  
Dressing Type Transparent 2/9/2020 12:58 PM  
Alcohol Cap Used No 2/9/2020 12:58 PM  
  
 
Opportunity for questions and clarification was provided. Patient transported with: 
 Tech/GUEVARA

## 2020-02-09 NOTE — DISCHARGE INSTRUCTIONS
Patient Education        Preventing Falls: Care Instructions  Your Care Instructions    Getting around your home safely can be a challenge if you have injuries or health problems that make it easy for you to fall. Loose rugs and furniture in walkways are among the dangers for many older people who have problems walking or who have poor eyesight. People who have conditions such as arthritis, osteoporosis, or dementia also have to be careful not to fall. You can make your home safer with a few simple measures. Follow-up care is a key part of your treatment and safety. Be sure to make and go to all appointments, and call your doctor if you are having problems. It's also a good idea to know your test results and keep a list of the medicines you take. How can you care for yourself at home? Taking care of yourself  · You may get dizzy if you do not drink enough water. To prevent dehydration, drink plenty of fluids, enough so that your urine is light yellow or clear like water. Choose water and other caffeine-free clear liquids. If you have kidney, heart, or liver disease and have to limit fluids, talk with your doctor before you increase the amount of fluids you drink. · Exercise regularly to improve your strength, muscle tone, and balance. Walk if you can. Swimming may be a good choice if you cannot walk easily. · Have your vision and hearing checked each year or any time you notice a change. If you have trouble seeing and hearing, you might not be able to avoid objects and could lose your balance. · Know the side effects of the medicines you take. Ask your doctor or pharmacist whether the medicines you take can affect your balance. Sleeping pills or sedatives can affect your balance. · Limit the amount of alcohol you drink. Alcohol can impair your balance and other senses. · Ask your doctor whether calluses or corns on your feet need to be removed.  If you wear loose-fitting shoes because of calluses or corns, you can lose your balance and fall. · Talk to your doctor if you have numbness in your feet. Preventing falls at home  · Remove raised doorway thresholds, throw rugs, and clutter. Repair loose carpet or raised areas in the floor. · Move furniture and electrical cords to keep them out of walking paths. · Use nonskid floor wax, and wipe up spills right away, especially on ceramic tile floors. · If you use a walker or cane, put rubber tips on it. If you use crutches, clean the bottoms of them regularly with an abrasive pad, such as steel wool. · Keep your house well lit, especially [place], and outside walkways. Use night-lights in areas such as hallways and bathrooms. Add extra light switches or use remote switches (such as switches that go on or off when you clap your hands) to make it easier to turn lights on if you have to get up during the night. · Install sturdy handrails on stairways. · Move items in your cabinets so that the things you use a lot are on the lower shelves (about waist level). · Keep a cordless phone and a flashlight with new batteries by your bed. If possible, put a phone in each of the main rooms of your house, or carry a cell phone in case you fall and cannot reach a phone. Or, you can wear a device around your neck or wrist. You push a button that sends a signal for help. · Wear low-heeled shoes that fit well and give your feet good support. Use footwear with nonskid soles. Check the heels and soles of your shoes for wear. Repair or replace worn heels or soles. · Do not wear socks without shoes on wood floors. · Walk on the grass when the sidewalks are slippery. If you live in an area that gets snow and ice in the winter, sprinkle salt on slippery steps and sidewalks. Preventing falls in the bath  · Install grab bars and nonskid mats inside and outside your shower or tub and near the toilet and sinks. · Use shower chairs and bath benches.   · Use a hand-held shower head that will allow you to sit while showering. · Get into a tub or shower by putting the weaker leg in first. Get out of a tub or shower with your strong side first.  · Repair loose toilet seats and consider installing a raised toilet seat to make getting on and off the toilet easier. · Keep your bathroom door unlocked while you are in the shower. Where can you learn more? Go to http://santi-stanford.info/. Enter [id number] [id number] [phone] in the search box to learn more about \"Preventing Falls: Care Instructions. \"  Current as of: March 16, 2018  Content Version: 11.8  © 4810-1765 [employer], [employer] Care instructions adapted under license by [employer] (which disclaims liability or warranty for this information). If you have questions about a medical condition or this instruction, always ask your healthcare professional. [address] any warranty or liability for your use of this information.

## 2020-02-13 PROBLEM — G93.40 ENCEPHALOPATHY: Status: ACTIVE | Noted: 2020-01-01

## 2020-02-13 PROBLEM — E11.10 DKA (DIABETIC KETOACIDOSES): Status: ACTIVE | Noted: 2020-01-01

## 2020-02-13 NOTE — ROUTINE PROCESS
TRANSFER - OUT REPORT: 
 
Verbal report given to Dominic CLAY(name) on David Tristan  being transferred to (unit) for routine progression of care Report consisted of patients Situation, Background, Assessment and  
Recommendations(SBAR). Information from the following report(s) SBAR, ED Summary, Procedure Summary, MAR and Recent Results was reviewed with the receiving nurse. Lines:  
Triple Lumen 02/13/20 Right Femoral (Active) Central Line Being Utilized Yes 2/13/2020 12:12 PM  
Site Assessment Clean, dry, & intact 2/13/2020 12:12 PM  
Infiltration Assessment 0 2/13/2020 12:12 PM  
Dressing Status Clean, dry, & intact 2/13/2020 12:12 PM  
Proximal Hub Color/Line Status White 2/13/2020 12:12 PM  
Positive Blood Return (Medial Site) Yes 2/13/2020 12:12 PM  
Medial Hub Color/Line Status Blue 2/13/2020 12:12 PM  
Positive Blood Return (Lateral Site) Yes 2/13/2020 12:12 PM  
Distal Hub Color/Line Status Brown 2/13/2020 12:12 PM  
Positive Blood Return (Site #3) Yes 2/13/2020 12:12 PM  
   
Peripheral IV 02/13/20 Right External jugular (Active) Site Assessment Clean, dry, & intact 2/13/2020  1:32 PM  
Phlebitis Assessment 0 2/13/2020  1:32 PM  
Infiltration Assessment 0 2/13/2020  1:32 PM  
Dressing Status Clean, dry, & intact 2/13/2020  1:32 PM  
  
 
Opportunity for questions and clarification was provided. Patient transported with: 
 Monitor O2 @ [facility] liters Registered Nurse

## 2020-02-13 NOTE — ED NOTES
1216 93-95% on RA - placed on 2LNC per MD order; trumpet in place in RIGHT nostril - removed per MD okay.

## 2020-02-13 NOTE — H&P
History and Physical 
 
76year-old gentleman who was found unresponsive at about 10 AM this morning at [facility]. Blood gas was checked by EMS and route and read \"high\". In the emergency room a further work-up he was found to have features in keeping with severe DKA/HHS, acute kidney injury, severe lactic acidosis, hypothermia and a UTI. He was treated appropriately but at some point work of breathing worsened so he ended up getting intubated. Patient seen in the ER, daughter and son at bedside. Per son he was not called by [facility] about any acute changes. Review of systems unable to assess at this time Past Medical History:  
Diagnosis Date  Diabetes ([facility] [address] 2002  High cholesterol  Hypertension  Stroke ([facility] [address] M3 occlusion Jan 2020 History reviewed. No pertinent surgical history. No Known Allergies No current facility-administered medications on file prior to encounter. Current Outpatient Medications on File Prior to Encounter Medication Sig Dispense Refill  glipiZIDE (GLUCOTROL) 5 mg tablet Take 2.5 mg by mouth Daily (before breakfast). 30 min before breakfast    
 insulin lispro (HUMALOG U-100 INSULIN) 100 unit/mL injection by SubCUTAneous route Before breakfast, lunch, and dinner. SSI  amLODIPine (NORVASC) 5 mg tablet Take 1 Tab by mouth daily for 30 days. 30 Tab 0  
 atorvastatin (LIPITOR) 20 mg tablet Take 40 mg by mouth nightly.  fish oil-omega-3 fatty acids (FISH OIL) 340-1,000 mg capsule Take 1 Cap by mouth daily.  insulin glargine (LANTUS) 100 unit/mL injection 24 Units by SubCUTAneous route nightly.  midodrine (PROAMITINE) 10 mg tablet Take 10 mg by mouth three (3) times daily.  vitamin E (AQUA GEMS) 400 unit capsule Take 400 Units by mouth daily.  vit B Cmplx 3-FA-Vit C-Biotin (NEPHRO ALEN RX) 1- mg-mg-mcg tablet Take 1 Tab by mouth daily.  30 Tab 3  
  aspirin (ASPIRIN) 325 mg tablet Take 325 mg by mouth daily. Social History Socioeconomic History  Marital status:  Spouse name: Not on file  Number of children: Not on file  Years of education: Not on file  Highest education level: Not on file Occupational History  Not on file Social Needs  Financial resource strain: Not on file  Food insecurity:  
  Worry: Not on file Inability: Not on file  Transportation needs:  
  Medical: Not on file Non-medical: Not on file Tobacco Use  Smoking status: Never Smoker  Smokeless tobacco: Never Used Substance and Sexual Activity  Alcohol use: No  
  Alcohol/week: 0.0 standard drinks  Drug use: No  
  Types: Prescription, OTC  Sexual activity: Never Lifestyle  Physical activity:  
  Days per week: Not on file Minutes per session: Not on file  Stress: Not on file Relationships  Social connections:  
  Talks on phone: Not on file Gets together: Not on file Attends Pentecostal service: Not on file Active member of club or organization: Not on file Attends meetings of clubs or organizations: Not on file Relationship status: Not on file  Intimate partner violence:  
  Fear of current or ex partner: Not on file Emotionally abused: Not on file Physically abused: Not on file Forced sexual activity: Not on file Other Topics Concern  Not on file Social History Narrative ** Merged History Encounter ** Family History Problem Relation Age of Onset  No Known Problems Mother  No Known Problems Father  No Known Problems Sister  No Known Problems Brother  No Known Problems Brother  No Known Problems Brother  No Known Problems Brother  No Known Problems Brother  No Known Problems Brother  No Known Problems Maternal Grandmother  No Known Problems Maternal Grandfather  No Known Problems Paternal Grandmother  No Known Problems Paternal Grandfather  Diabetes Neg Hx   
 Heart Disease Neg Hx Patient Vitals for the past 24 hrs: 
 Temp Pulse Resp BP SpO2  
02/13/20 1700  81 (!) 33 125/73 100 % 02/13/20 1645  81 (!) 33 113/67 100 % 02/13/20 1643  81 (!) 32 111/70 100 % 02/13/20 1625  82 14    
02/13/20 1530 (!) 91.8 °F (33.2 °C) 79 (!) 31 92/52 100 % 02/13/20 1515  81 (!) 32 93/57 100 % 02/13/20 1500  74 (!) 33 99/59 (!) 68 %  
02/13/20 1445  72 (!) 36 105/57 100 % 02/13/20 1430  88 (!) 35 109/64 99 % 02/13/20 1424  84 (!) 35  99 % 02/13/20 1415  88 25 136/71 100 % 02/13/20 1412    147/43   
02/13/20 1348  86 16 (!) 73/43 (!) 71 % 02/13/20 1332  75 22 (!) 81/44 91 % 02/13/20 1315  80 23 (!) 82/43 97 % 02/13/20 1300  82 23 (!) 81/45 97 % 02/13/20 1245  84 26 (!) 84/47 97 % 02/13/20 1230 (!) 90.5 °F (32.5 °C) 84 26 (!) 85/46 95 % 02/13/20 1215  89 24 91/46 100 % 02/13/20 1154  94 26 92/49 100 % Physical exam 
General-intubated, sedated, santiago hugger on Neuro-pupils reactive, baseline right-sided deficits, moving left upper extremity spontaneously, not following commands Cardiac-sinus with frequent PVCs Lungs-coarse bilaterally, use of accessory muscles Abdomen-soft, nontender, nondistended Extremities-warm Recent Results (from the past 24 hour(s)) CBC WITH AUTOMATED DIFF Collection Time: 02/13/20 11:30 AM  
Result Value Ref Range WBC 26.8 (H) 4.1 - 11.1 K/uL  
 RBC 4.26 4.10 - 5.70 M/uL  
 HGB 11.8 (L) 12.1 - 17.0 g/dL HCT 41.1 36.6 - 50.3 % MCV 96.5 80.0 - 99.0 FL  
 MCH 27.7 26.0 - 34.0 PG  
 MCHC 28.7 (L) 30.0 - 36.5 g/dL  
 RDW 17.8 (H) 11.5 - 14.5 % PLATELET 025 333 - 273 K/uL MPV 12.4 8.9 - 12.9 FL  
 NRBC 0.2 (H) 0  WBC ABSOLUTE NRBC 0.05 (H) 0.00 - 0.01 K/uL NEUTROPHILS 75 32 - 75 % BAND NEUTROPHILS 1 0 - 6 % LYMPHOCYTES 19 12 - 49 % MONOCYTES 4 (L) 5 - 13 % EOSINOPHILS 0 0 - 7 % BASOPHILS 0 0 - 1 % MYELOCYTES 1 (H) 0 % IMMATURE GRANULOCYTES 0 %  
 ABS. NEUTROPHILS 20.4 (H) 1.8 - 8.0 K/UL  
 ABS. LYMPHOCYTES 5.1 (H) 0.8 - 3.5 K/UL  
 ABS. MONOCYTES 1.1 (H) 0.0 - 1.0 K/UL  
 ABS. EOSINOPHILS 0.0 0.0 - 0.4 K/UL  
 ABS. BASOPHILS 0.0 0.0 - 0.1 K/UL  
 ABS. IMM. GRANS. 0.0 K/UL  
 DF MANUAL    
 RBC COMMENTS HYPOCHROMIA 1+ 
    
 RBC COMMENTS ANISOCYTOSIS 
1+ METABOLIC PANEL, COMPREHENSIVE Collection Time: 02/13/20 11:30 AM  
Result Value Ref Range Sodium 157 (H) 136 - 145 mmol/L Potassium 6.6 (HH) 3.5 - 5.1 mmol/L Chloride 134 (H) 97 - 108 mmol/L  
 CO2 <5 (LL) 21 - 32 mmol/L Anion gap Cannot be calculated 5 - 15 mmol/L Glucose 1,016 (HH) 65 - 100 mg/dL BUN QUANTITY NOT SUFFICIENT. SUGGEST RECOLLECTION 6 - 20 MG/DL Creatinine 5.94 (H) 0.70 - 1.30 MG/DL  
 BUN/Creatinine ratio QUANTITY NOT SUFFICIENT. SUGGEST RECOLLECTION 12 - 20 GFR est AA 11 (L) >60 ml/min/1.73m2 GFR est non-AA 9 (L) >60 ml/min/1.73m2 Calcium 9.4 8.5 - 10.1 MG/DL Bilirubin, total 0.6 0.2 - 1.0 MG/DL  
 ALT (SGPT) 28 12 - 78 U/L  
 AST (SGOT) 35 15 - 37 U/L Alk. phosphatase 187 (H) 45 - 117 U/L Protein, total 8.6 (H) 6.4 - 8.2 g/dL Albumin 2.3 (L) 3.5 - 5.0 g/dL Globulin 6.3 (H) 2.0 - 4.0 g/dL A-G Ratio 0.4 (L) 1.1 - 2.2    
TROPONIN I Collection Time: 02/13/20 11:30 AM  
Result Value Ref Range Troponin-I, Qt. <0.05 <0.05 ng/mL NT-PRO BNP Collection Time: 02/13/20 11:30 AM  
Result Value Ref Range NT pro- (H) <125 PG/ML  
LIPASE Collection Time: 02/13/20 11:30 AM  
Result Value Ref Range Lipase 193 73 - 393 U/L  
LACTIC ACID Collection Time: 02/13/20 11:30 AM  
Result Value Ref Range Lactic acid 11.1 (HH) 0.4 - 2.0 MMOL/L  
MAGNESIUM Collection Time: 02/13/20 11:30 AM  
Result Value Ref Range Magnesium 3.3 (H) 1.6 - 2.4 mg/dL BETA-HYDROXYBUTYRATE Collection Time: 02/13/20 11:30 AM  
Result Value Ref Range B-hydroxybutyrate 2.36 (H) <0.40 mmol/L  
POC EG7 Collection Time: 02/13/20 12:34 PM  
Result Value Ref Range Calcium, ionized (POC) 1.14 1.12 - 1.32 mmol/L  
 pH (POC) 6.982 (LL) 7.35 - 7.45    
 pCO2 (POC) 28.5 (L) 35.0 - 45.0 MMHG  
 pO2 (POC) 68 (L) 80 - 100 MMHG  
 HCO3 (POC) 6.7 (L) 22 - 26 MMOL/L Base deficit (POC) 25 mmol/L  
 sO2 (POC) 81 (L) 92 - 97 % Site RIGHT BRACHIAL Device: NASAL CANNULA Flow rate (POC) 4 L/M Allens test (POC) YES Specimen type (POC) ARTERIAL    
GLUCOSTABILIZER Collection Time: 02/13/20 12:48 PM  
Result Value Ref Range Glucose 601 mg/dL Insulin order 10.8 units/hour Insulin adminstered 10.8 units/hour Multiplier 0.020 Low target 150 mg/dL High target 250 mg/dL D50 order 0.0 ml  
 D50 administered 0.00 ml Minutes until next BG 60 min Order initials AR Administered initials AR   
 GLSCOM Comments URINALYSIS W/ RFLX MICROSCOPIC Collection Time: 02/13/20  1:07 PM  
Result Value Ref Range Color YELLOW/STRAW Appearance TURBID (A) CLEAR Specific gravity 1.020 1.003 - 1.030    
 pH (UA) 5.0 5.0 - 8.0 Protein 100 (A) NEG mg/dL Glucose >1,000 (A) NEG mg/dL Ketone NEGATIVE  NEG mg/dL Bilirubin NEGATIVE  NEG Blood MODERATE (A) NEG Urobilinogen 0.2 0.2 - 1.0 EU/dL Nitrites POSITIVE (A) NEG Leukocyte Esterase MODERATE (A) NEG    
 WBC >100 (H) 0 - 4 /hpf  
 RBC 5-10 0 - 5 /hpf Epithelial cells FEW FEW /lpf Bacteria 4+ (A) NEG /hpf Hyaline cast 0-2 0 - 5 /lpf  
GLUCOSE, POC Collection Time: 02/13/20  1:58 PM  
Result Value Ref Range Glucose (POC) >600 (HH) 65 - 100 mg/dL Performed by Eli Peraza Collection Time: 02/13/20  1:59 PM  
Result Value Ref Range Glucose 601 mg/dL Insulin order 16.2 units/hour Insulin adminstered 16.2 units/hour  Multiplier 0.030   
 Low target 150 mg/dL High target 250 mg/dL D50 order 0.0 ml  
 D50 administered 0.00 ml Minutes until next BG 60 min Order initials AR Administered initials AR   
 GLSCOM Comments POC EG7 Collection Time: 02/13/20  3:05 PM  
Result Value Ref Range Calcium, ionized (POC) 1.05 (L) 1.12 - 1.32 mmol/L  
 FIO2 (POC) 100 % pH (POC) 7.068 (LL) 7.35 - 7.45    
 pCO2 (POC) 30.2 (L) 35.0 - 45.0 MMHG  
 pO2 (POC) 51 (L) 80 - 100 MMHG  
 HCO3 (POC) 8.7 (L) 22 - 26 MMOL/L Base deficit (POC) 21 mmol/L  
 sO2 (POC) 70 (L) 92 - 97 % Site RIGHT BRACHIAL Device: VENT Mode ASSIST CONTROL Tidal volume 400 ml Set Rate 35 bpm  
 PEEP/CPAP (POC) 8 cmH2O Allens test (POC) YES Specimen type (POC) ARTERIAL Volume control YES    
LACTIC ACID Collection Time: 02/13/20  3:11 PM  
Result Value Ref Range Lactic acid 12.5 (HH) 0.4 - 2.0 MMOL/L  
GLUCOSE, POC Collection Time: 02/13/20  3:13 PM  
Result Value Ref Range Glucose (POC) >600 (HH) 65 - 100 mg/dL Performed by Raphael Durándmont Collection Time: 02/13/20  3:17 PM  
Result Value Ref Range Glucose 601 mg/dL Insulin order 21.6 units/hour Insulin adminstered 21.6 units/hour Multiplier 0.040 Low target 150 mg/dL High target 250 mg/dL D50 order 0.0 ml  
 D50 administered 0.00 ml Minutes until next BG 60 min Order initials AR Administered initials AR   
 GLSCOM Comments GLUCOSE, POC Collection Time: 02/13/20  5:00 PM  
Result Value Ref Range Glucose (POC) >600 (HH) 65 - 100 mg/dL Performed by Xiao Conception Collection Time: 02/13/20  5:02 PM  
Result Value Ref Range Glucose 601 mg/dL Insulin order 27.1 units/hour Insulin adminstered 27.1 units/hour Multiplier 0.050 Low target 150 mg/dL High target 250 mg/dL D50 order 0.0 ml  
 D50 administered 0.00 ml Minutes until next BG 60 min Order initials sd Administered initials sd GLSCOM Comments GLUCOSE, POC Collection Time: 02/13/20  5:57 PM  
Result Value Ref Range Glucose (POC) >600 (HH) 65 - 100 mg/dL Performed by [employer] Collection Time: 02/13/20  5:58 PM  
Result Value Ref Range Glucose 601 mg/dL Insulin order 32.5 units/hour Insulin adminstered 32.5 units/hour Multiplier 0.060 Low target 150 mg/dL High target 250 mg/dL D50 order 0.0 ml  
 D50 administered 0.00 ml Minutes until next BG 60 min Order initials sd Administered initials sd GLSCOM Comments Imaging reviewed Assessment Metabolic encephalopathy-secondary to diabetic emergency and septic process HHS/DKA Acute kidney injury Septic shock, UTI Lactic acidosis Acute respiratory failure now requiring intubation Hypothermia Plan Sedation/pain control with propofol and Tylenol as needed, daily awakening trials, early mobility as tolerated, avoid benzodiazepines, other delirium prevention strategies Continue hemodynamic monitoring, map goal greater than 65, continue aggressive fluid resuscitation, currently on norepinephrine-dose is slowly going up-if on more than 0.2 mics per kilogram add vasopressin at which time we will consider starting stress dose steroids, severely elevated lactate levels-continue to trend until clear Continue lung protective strategies on the ventilator, serial chest x-rays and ABGs as indicated, daily breathing trials N.p.o. for now, antiemetics as needed, serial abdominal examinations Monitor urine output closely, dose medication renally, avoid nephrotoxic agents, serial BMPs, correct electrolyte derangements as needed, will work-up acute kidney injury-likely secondary to severe dehydration Daily CBCs Start broad-spectrum antibiotic coverage with vancomycin and Zosyn, follow-up micro studies, purulent urine Keep glucose less than 180-currently on an insulin drip per protocol, as above-aggressive fluid resuscitation, serial BMPs, fingersticks q. 1, check a stat phosphorus level, acidosis might be more from elevated lactate levels as opposed to ketoacidosis-urine ketones negative, check an acetone level check a TSH level Prognosis very guarded at this time Critical care time-65 minutes

## 2020-02-13 NOTE — PROCEDURES
The patient's right wrist was prepped in sterile fashion. 1% lidocaine was used to anesthetize the area. A 20-gauge arrow arterial line was introduced into the radial artery. Catheter was threaded over guidewire and the needle was removed with appropriate pulsatile blood return. The catheter was then sutured in place to the skin and a sterile dressing was applied. Perfusion to the extremity distal to the point of catheter insertion was checked and found to be adequate.

## 2020-02-13 NOTE — ED PROVIDER NOTES
76 y.o. male with past medical history significant for DM, HTN, high cholesterol, and CVA who presents from [facility] with chief complaint of unresponsive. EMS report the pt was found unresponsive at 10:00 AM this morning by staff. Last known well is unknown. EMS report a BG of \"HI\" en route. Pt is alert and non-verbal at baseline, per staff. Per chart review, the pt was seen in the ED on 2/09/20 for a GLF and had a negative head CT at that time. There are no other acute medical concerns at this time. Full history, physical exam, and ROS unable to be obtained due to: Unresponsive. Social hx: Unable to obtain PCP: Nicole Palma MD 
 
Note written by Esmer Rowell, as dictated by Isidro Javier, DO 11:05 AM 
 
 
The history is provided by the EMS personnel and the nursing home. Past Medical History:  
Diagnosis Date  Diabetes ([address] 2002  High cholesterol  Hypertension No past surgical history on file. Family History:  
Problem Relation Age of Onset  No Known Problems Mother  No Known Problems Father  No Known Problems Sister  No Known Problems Brother  No Known Problems Brother  No Known Problems Brother  No Known Problems Brother  No Known Problems Brother  No Known Problems Brother  No Known Problems Maternal Grandmother  No Known Problems Maternal Grandfather  No Known Problems Paternal Grandmother  No Known Problems Paternal Grandfather  Diabetes Neg Hx   
 Heart Disease Neg Hx Social History Socioeconomic History  Marital status:  Spouse name: Not on file  Number of children: Not on file  Years of education: Not on file  Highest education level: Not on file Occupational History  Not on file Social Needs  Financial resource strain: Not on file  Food insecurity:  
  Worry: Not on file Inability: Not on file  Transportation needs: Medical: Not on file Non-medical: Not on file Tobacco Use  Smoking status: Never Smoker  Smokeless tobacco: Never Used Substance and Sexual Activity  Alcohol use: No  
  Alcohol/week: 0.0 standard drinks  Drug use: No  
  Types: Prescription, OTC  Sexual activity: Never Lifestyle  Physical activity:  
  Days per week: Not on file Minutes per session: Not on file  Stress: Not on file Relationships  Social connections:  
  Talks on phone: Not on file Gets together: Not on file Attends Zoroastrian service: Not on file Active member of club or organization: Not on file Attends meetings of clubs or organizations: Not on file Relationship status: Not on file  Intimate partner violence:  
  Fear of current or ex partner: Not on file Emotionally abused: Not on file Physically abused: Not on file Forced sexual activity: Not on file Other Topics Concern  Not on file Social History Narrative ** Merged History Encounter ** ALLERGIES: Patient has no known allergies. Review of Systems Unable to perform ROS: Patient unresponsive Vitals:  
 02/13/20 1332 02/13/20 1348 02/13/20 1412 02/13/20 1415 BP: (!) 81/44 (!) 73/43 147/43 136/71 Pulse: 75 86  88 Resp: 22 16  25 Temp:      
SpO2: 91% (!) 71%  100% Weight:      
      
 
Physical Exam 
Vitals signs and nursing note reviewed. Constitutional:   
   General: He is in acute distress. Appearance: He is ill-appearing. HENT:  
   Head: Normocephalic and atraumatic. Mouth/Throat:  
   Mouth: Mucous membranes are dry. Eyes:  
   Comments: Irregular left pupil. Pupils sluggish. Disconjugate gaze. Roving eye movements. Neck: Musculoskeletal: Neck supple. Cardiovascular:  
   Rate and Rhythm: Normal rate and regular rhythm. Heart sounds: Normal heart sounds. No murmur. No friction rub. No gallop. Comments: Weak pulses Pulmonary: Effort: Tachypnea and accessory muscle usage present. Comments: Tachypnea Clear lung sounds throughout Chest:  
   Comments: Weak, but palpable peripheral pulses. Abdominal:  
   General: Bowel sounds are normal. There is no distension. Palpations: Abdomen is soft. Skin: 
   General: Skin is dry. Capillary Refill: Capillary refill takes less than 2 seconds. Findings: No rash. Comments: cool Neurological:  
   Mental Status: He is unresponsive. GCS: GCS eye subscore is 1. GCS verbal subscore is 1. GCS motor subscore is 1. Comments: Unresponsive Does not withdraw to pain GCS of 3 Psychiatric:  
   Comments: Unable to assess Note written by Carlitos Shine. Tomasz Watkins, as dictated by Manuel Kc DO 11:09 AM 
 
 
 
Course: The patient arrived unresponsive, hypotensive and very ill-appearing IV access was extremely difficult to obtain. I attempted multiple times with ultrasound guidance and the nurses also tried multiple times. Given his critical illness the decision was made to place a left tibial IO which was placed by RN. At the same time I worked to establish a central line. With ultrasound I was unable to clearly visualize the IJ as it was completely collapsing with every respiration. I elected to go for a right femoral line Procedure Note - Central Venous Access:  
Performed by Andre Amezcua DO 
 
Obtained emergent Consent. Immediately prior to the procedure, the patient was reevaluated and found suitable for the planned procedure and any planned medications. Immediately prior to the procedure a time out was called to verify the correct patient, procedure, equipment, staff, and marking as appropriate. The site was prepped with ChloraPrep.   
Using Seldinger technique a Triple Lumen CVC was placed in the R Femoral Vein via direct cannulation with 1 number of attempts for Blood Drawing and IV Access. Ultrasound Guidance was utilized. There was good blood return. The following complications were encountered: None. A follow-up chest x-ray was not ordered post procedure. The procedure was tolerated well. Labs Reviewed:  
Lactate elevated 11.1 Beta ketones 2.36 Hyper mag at 3.3 Lipase normal 
Mild elevation in BNP Troponin normal 
Marked leukocytosis without anemia Creatinine of 5.94 consistent with acute renal failure and hyperkalemia of 6.6 with hypernatremia of 157 which corrects to 179 Marked metabolic acidosis with bicarb less than 5 ABG shows significant acidemia with pH of 6.982 UTI c/w infection Imaging Reviewed:  
CT head negative Chest x-ray negative Once labs resulted patient was started on an insulin infusion for presumed DKA as well as a bicarb drip for profound acidemia. He was given 3 L of IV fluids and total. He also received broad spectrum antibiotics for presumed sepsis. His blood pressure was not fluid responsive therefore he was started on levo fed. While awaiting Levophed drip he was given several pushes of phenylephrine for hypotension. He was placed on Dayana hugger for hypothermia. I discussed with the  intensivist who was consulted. The patient will be admitted to the ICU. While awaiting an ICU bed the patient went from tachypnea slow agonal-like respirations. He became hypoxic. The decision was made to intubate the patient awake as to prevent further acidosis. Procedure Note - Intubation:  
Performed by Lauren English DO . Immediately prior to the procedure, the patient was reevaluated and found suitable for the planned procedure and any planned medications. Immediately prior to the procedure a time out was called to verify the correct patient, procedure, equipment, staff, and marking as appropriate. Medications given were ketamine. A number 7.5 cuffed ETT was placed to 23 cm at the teeth. Placement was evaluated by noting bilateral, symmetric breath sounds, good end-tidal CO2 detector color change , no breath sounds over stomach and chest x-ray visualization. Attempts required: 1. Complications: none. Awake intubation The procedure was tolerated well. The patient was placed on the ventilator with a rate of 35 to help with his acidosis. I had a lengthy discussion with the patient's son. He would like for the patient to not receive chest compressions however intubation and life-saving medications are okay. Repeat ABG showing improvement with pH is 7.068. Vital signs significantly improved with no tachycardia, saturating in the upper 90s/100% and we have been able to wean his Levophed a bit. He was started on a Versed drip for sedation. MDM: 
79-year-old male presented to the emergency department from nursing home with what appears to be septic shock complicated by DKA with coma, marked hypernatremia and renal failure. Patient was given IV fluids, antibiotics, started on vasopressors. Central line obtained and he was intubated due to respiratory distress. Initially try to hold off of intubating given his very low pH but ultimately it need to be performed given loss of respiratory drive and hypoxia. Patient to be admitted to the ICU for further management. Clinical Impression: ICD-10-CM ICD-9-CM 1. Diabetic ketoacidosis with coma associated with other specified diabetes mellitus ([facility] [address] E13.11 250.32   
2. Acute renal failure, unspecified acute renal failure type ([facility] [address] N17.9 584.9 3. Acute hyperkalemia E87.5 276.7 4. Hypernatremia E87.0 276.0 5. Septic shock (H[facility]) A41.9 038.9   
 R65.21 785.52   
  995.92   
6. Acute cystitis without hematuria N30.00 595.0 Disposition: Admit ICU Total critical care time spent exclusive of procedures:  90 Due to a high probability of clinically significant, life threatening deterioration, the patient required my highest level of preparedness to intervene emergently and I personally spent this critical care time directly and personally managing the patient. This critical care time included obtaining a history; examining the patient; pulse oximetry; ordering and review of studies; arranging urgent treatment with development of a management plan; evaluation of patient's response to treatment; frequent reassessment; and, discussions with other providers. This critical care time was performed to assess and manage the high probability of imminent, life-threatening deterioration that could result in multi-organ failure. It was exclusive of separately billable procedures and treating other patients and teaching time.  
 
Manuel Garza, DO

## 2020-02-13 NOTE — PROGRESS NOTES
Transition of Care Plan:   
   
· Hospitalist Consulted, Unresponsive, tachypnea, use accessory muscle usage, Emergently intubated in ED · SonReyes Has at bedside per MD,  FULL Code · Critically ill, Admitted to ICU, Sepsis, Propofol, Versed, NaHC03, Insulin gtt, Levophed · Admitted from [facility] [place] [facility] application submitted, Medicaid Pending · Readmission 2/9/20  EDV   [facility]   Fall,  1/21/20-1/30/20  [facility] Hyperkalemia   1/1/20-1/8/20  CVA · RRAT 30/1/1 · Care Management will follow for transition of care Reason for Readmission:     Found down in room RUR Score/Risk Level:     30/1/1   High Risk for Readmission Is a Care Conference indicated:    TBD Did you attend your follow up appointment (s): If not, why not:   Emilie Resources/supports as identified by patient/family:    
    
Top Challenges facing patient (as identified by patient/family and CM): Finances/Medication cost?     On last admission Medicaid application submitted, Medicaid pending,  
Transportation      Stretcher Transport Support system or lack thereof? Per chart review listed phone numbers below in chart, Vivek Mark at bedside. Family: son, Vivek Mark [phone], daughter; Andrés Georges [phone]/ [phone], daughter-in-law; Dana Manzo [phone], son; Yonny Su [phone]. Living arrangements? Discharged to short term rehab with transition to LTC at [facility] [place]. Self-care/ADLs/Cognition? Dependent for care Current Advanced Directive/Advance Care Plan:    Full Code, No advance care plan on file Plan for utilizing home health:   TBD, Anticipate if patient recovers will need LTC Care Management Interventions PCP Verified by CM: Yes(PCP at SNF) Palliative Care Criteria Met (RRAT>21 & CHF Dx)?: No 
Transition of Care Consult (CM Consult): Discharge Planning(Readmission/RRAT 30/1/1  Recent Admission, EDV 2/9/20) MyChart Signup: No 
Discharge Durable Medical Equipment: No 
Health Maintenance Reviewed: Yes Physical Therapy Consult: No 
Occupational Therapy Consult: No 
Speech Therapy Consult: No 
Current Support Network: (No family at bedside, ) David Justin RN, BSN, [facility] Care Management [phone]

## 2020-02-13 NOTE — ED NOTES
1346 Preparing pt for intubation at this time. Dr Radha Alvarez at bedside. Pharmacy and RT at bedside. 1352 7.5mm at 23cm at the teeth. Placement verified with color change/auscultation.

## 2020-02-13 NOTE — PROGRESS NOTES
Admission Medication Reconciliation: 
 
Information obtained from:  [facility] transfer documents RxQuery data available¹:  NO 
 
Comments/Recommendations: Updated PTA meds/reviewed patient's allergies. Unable to interview patient due to clinical condition. PTA med list compiled from [facility] and [address] transfer documents. Transfer documents returned to RN in ER 06. Medication changes (since last review): Added: 
Glipizide Humalog lispro for SSI Revised: 
Lantus from 9 units to 24 units QHS Thank you for allowing me to participate in the care of your patient. Torsten Queen PharmD, RN #[id number] ¹RxQuery pharmacy benefit data reflects medications filled and processed through the patient's insurance, however  
this data does NOT capture whether the medication was picked up or is currently being taken by the patient. Allergies:  Patient has no known allergies. Significant PMH/Disease States:  
Past Medical History:  
Diagnosis Date Diabetes ([facility] [address]  High cholesterol Hypertension Stroke [facility]) M3 occlusion 2020 Chief Complaint for this Admission: Chief Complaint Patient presents with Unresponsive Prior to Admission Medications:  
Prior to Admission Medications Prescriptions Last Dose Informant Taking? amLODIPine (NORVASC) 5 mg tablet   Yes Sig: Take 1 Tab by mouth daily for 30 days. aspirin (ASPIRIN) 325 mg tablet   Yes Sig: Take 325 mg by mouth daily. atorvastatin (LIPITOR) 20 mg tablet   Yes Sig: Take 40 mg by mouth nightly. fish oil-omega-3 fatty acids (FISH OIL) 340-1,000 mg capsule   Yes Sig: Take 1 Cap by mouth daily. glipiZIDE (GLUCOTROL) 5 mg tablet   Yes Sig: Take 2.5 mg by mouth Daily (before breakfast). 30 min before breakfast  
insulin glargine (LANTUS) 100 unit/mL injection   Yes Si Units by SubCUTAneous route nightly. insulin lispro (HUMALOG U-100 INSULIN) 100 unit/mL injection   Yes Sig: by SubCUTAneous route Before breakfast, lunch, and dinner. SSI  
midodrine (PROAMITINE) 10 mg tablet   Yes Sig: Take 10 mg by mouth three (3) times daily. vit B Cmplx 3-FA-Vit C-Biotin (NEPHRO ALEN RX) 1- mg-mg-mcg tablet   Yes Sig: Take 1 Tab by mouth daily. vitamin E (AQUA GEMS) 400 unit capsule   Yes Sig: Take 400 Units by mouth daily. Facility-Administered Medications: None Please contact the main inpatient pharmacy with any questions or concerns at [phone] and we will direct you to the clinical pharmacist covering this patient's care while in-house.   
RADAMES Franco

## 2020-02-13 NOTE — ED TRIAGE NOTES
Pt is from [facility] and staff found him at 10 am non responsive with baseline of \"being awake\" but non verbal per the staff.   EMS states that BS read HIGH

## 2020-02-13 NOTE — PROGRESS NOTES
Pharmacist Note - Vancomycin Dosing Consult provided for this 76 y.o. male admitted to the ED with JOSH. -found unresponsive; now intubated Antibiotic regimen(s): Vanc + Zosyn Recent Labs  
  20 
1130 WBC 26.8*  
CREA 5.94* BUN QUANTITY NOT SUFFICIENT. SUGGEST RECOLLECTION Frequency of BMP: daily Height: 165 cm Weight: 54 kg Est CrCl: <10 ml/min Temp (24hrs), Av.2 °F (32.9 °C), Min:90.5 °F (32.5 °C), Max:91.8 °F (33.2 °C) Goal trough = 15 - 20 mcg/mL A 1250 mg loading dose was given in the ED @12:27 No maintenance dose at this time due to JOSH; am labs to be reviewed prior to next dose vs level Pharmacy to follow patient daily and order levels / make dose adjustments as appropriate.

## 2020-02-14 NOTE — PROGRESS NOTES
Shift Summary:  Pt has been weaned off propofol since midnight to assess neuro status. Is not following commands but is more quick to respond to stimulus: opens eyes, coughing, moving extremities spontaneously. Remains on pressors, but have weaned levophed from 30 mcg to 5 mcg. Vasopressin continuous at 0.03 units. Serial labs for BMP and L. A throughout night. Na 168, CO2 12 - 13, L. A. at 3.7 by this am, all critical values reported to ERICA Aranda. Insulin drip continues per glucostabilizer. Starting free water at 50ml/ hr via ngt.

## 2020-02-14 NOTE — PROGRESS NOTES
Bedside and Verbal shift change report given to Rafal RN (oncoming nurse) by Diana Christy RN (offgoing nurse). Report included the following information SBAR.  
 
0800: Pt intubated and sedated, pupils round and sluggish, eyes deviate upward, withdrawals X4, on a levo gtt & vasopressin gtt to maintain MAP >65, VSS. 0900: Nephrology at bedside & D5 infusion started. 0920: Propofol gtt restarted and fentanyl one time order given due to ventilator asynchrony 1015: Son updated at bedside 1045: Versed given due to continued asynchrony 1110: Pt placed back on pressure control due to vent asynchrony 1140: Roger NP made aware of critical results 1200: Reassessment, no changes 1600: Reassessment, no changes Bedside and Verbal shift change report given to Genie Cardona (oncoming nurse) by Rashida Dsouza (offgoing nurse). Report included the following information SBAR.

## 2020-02-14 NOTE — PROGRESS NOTES
Pharmacist Note - Vancomycin Dosing Therapy day 2 Indication:  Empiric for sepsis of unknown etiology Current regimen:  Based on levels, last dose: 1.25 gm on 2/13 @ 1227 A Random Level resulted at 13.7 mcg/mL which was obtained ~25 hrs post-dose. Goal trough: 15 - 20 mcg/mL Plan: 750 mg IV Vancomycin x1 now. Pharmacy will continue to monitor this patient daily for changes in clinical status and renal function.

## 2020-02-14 NOTE — CONSULTS
NEPHROLOGY CONSULT NOTE Patient: Mercy Swanson MRN: [id number]  PCP: Mireya Santiago MD  
:     1945  Age:   76 y.o. Sex:  male Referring physician: Radha Olivia MD 
Reason for consultation: 76 y.o. male with [facility] [E11.10] JOSH (acute kidney injury) ([facility] [N17.9] Encephalopathy [Q73.33] complicated by JOSH Admission Date: 2020 10:57 AM  LOS: 1 day ASSESSMENT and PLAN :  
JOSH on CKD: 
- likely from volume depletion from hyperglycemia vs ATN from hypotension and sepsis 
- neg renal U/S on admission - Cr improving 
- cont IVF 
- ok to change to D5W 
- serial labs Hypernatremia: 
- from dehydration/lack of free water intake  
- cont D5W as above 
  
Sepsis: 
- presumed urosepsis - awaiting cultures 
- on broad spectrum abx and pressors Hypotension: 
- on alise 
- keep MAP > 65 CKD III w/ baseline Cr 1.4 to 1.6: 
- likely from DM and HTN 
  
HHNK: 
- on isulin drip and IVF 
- per CCM Volume depletion: 
- cont IVF as above 
  
Encephalopathy: 
- from infection, hyperglycemia and hypernatremia 
- sedated on the vent 
  
DM2: 
- on insulin drip now FTT Dementia Active Problems / Assessment AAActive  : Active Problems: 
  JOSH (acute kidney injury) ([facility] (2020) DKA (diabetic ketoacidoses) ([facility] (2020) Encephalopathy (2020) Subjective: HPI: Mercy Swanson is a 76 y.o.  male who has been admitted to the hospital for AMS from [facility]. He had a BG > 1000, K of 6.6, Cr of 5.9, bicarb of < 5. He had cloudy urine and presumed UTI. He was hypotensive on arrival.  He was given fluids and started on alise and intubated for airway protection. Na has risen from 157 to 168 after correction of hyperglycemia. He was started on D5W this AM.  He is sedated on the vent. Making urine, K and Cr improving as well has BG and acidosis. Unable to obtain ROS at this time. Past Medical Hx:  
Past Medical History:  
Diagnosis Date  Diabetes ([facility] [address] 2002  High cholesterol  Hypertension  Stroke ([facility] [address] M3 occlusion Jan 2020 Past Surgical Hx: 
 History reviewed. No pertinent surgical history. Medications: 
Prior to Admission medications Medication Sig Start Date End Date Taking? Authorizing Provider  
glipiZIDE (GLUCOTROL) 5 mg tablet Take 2.5 mg by mouth Daily (before breakfast). 30 min before breakfast   Yes Provider, Historical  
insulin lispro (HUMALOG U-100 INSULIN) 100 unit/mL injection by SubCUTAneous route Before breakfast, lunch, and dinner. SSI   Yes Provider, Historical  
amLODIPine (NORVASC) 5 mg tablet Take 1 Tab by mouth daily for 30 days. 1/31/20 3/1/20 Yes David Rebollar MD  
atorvastatin (LIPITOR) 20 mg tablet Take 40 mg by mouth nightly. Yes Provider, Historical  
fish oil-omega-3 fatty acids (FISH OIL) 340-1,000 mg capsule Take 1 Cap by mouth daily. Yes Provider, Historical  
insulin glargine (LANTUS) 100 unit/mL injection 24 Units by SubCUTAneous route nightly. Yes Provider, Historical  
midodrine (PROAMITINE) 10 mg tablet Take 10 mg by mouth three (3) times daily. Yes Provider, Historical  
vitamin E (AQUA GEMS) 400 unit capsule Take 400 Units by mouth daily. Yes Provider, Historical  
vit B Cmplx 3-FA-Vit C-Biotin (NEPHRO ALEN RX) 1- mg-mg-mcg tablet Take 1 Tab by mouth daily. 2/20/13  Yes Nettie Freeman MD  
aspirin (ASPIRIN) 325 mg tablet Take 325 mg by mouth daily. Yes Provider, Historical  
 
 
No Known Allergies Social Hx:  reports that he has never smoked. He has never used smokeless tobacco. He reports that he does not drink alcohol or use drugs. Family History Problem Relation Age of Onset  No Known Problems Mother  No Known Problems Father  No Known Problems Sister  No Known Problems Brother  No Known Problems Brother  No Known Problems Brother  No Known Problems Brother  No Known Problems Brother  No Known Problems Brother  No Known Problems Maternal Grandmother  No Known Problems Maternal Grandfather  No Known Problems Paternal Grandmother  No Known Problems Paternal Grandfather  Diabetes Neg Hx   
 Heart Disease Neg Hx Review of Systems: 
Unable to obtain due to patient's condition Objective:   
Vitals:   
Vitals:  
 02/14/20 0830 02/14/20 0845 02/14/20 0911 02/14/20 0932 BP: 118/72 Pulse:  92 94 Resp:  29 21 Temp:      
SpO2:  100% 92% Weight:    54 kg (119 lb 0.8 oz) Height:    5' 5\" (1.651 m) I&O's:  02/13 0701 - 02/14 0700 In: 8125.6 [I.V.:8050.6] Out: 1005 [OQYOX:8542] Visit Vitals /72 Pulse 94 Temp 99.3 °F (37.4 °C) Resp 21 Ht 5' 5\" (1.651 m) Wt 54 kg (119 lb 0.8 oz) SpO2 92% BMI 19.81 kg/m² Physical Exam: 
General:sedated on the vent HEENT: ET tube in place Lungs : Clears to auscultation Bilaterally, Normal respiratory effort CVS: RRR, S1 S2 normal, No rub,  no LE edema Abdomen: Soft, Non tender, No hepatosplenomegaly, bowel sounds present Extremities: No cyanosis, No clubbing Skin: No rash or lesions. Lymph nodes: No palpable nodes MS: No joint swelling, erythema, warmth Neurologic: sedated on the vent : waggoner in place Laboratory Results: 
 
Lab Results Component Value Date  (H) 02/14/2020  (HH) 02/14/2020  
 K 3.7 02/14/2020  (H) 02/14/2020 CO2 13 (LL) 02/14/2020 Lab Results Component Value Date  (H) 02/14/2020  (H) 02/14/2020  (H) 02/13/2020  (H) 02/13/2020 BUN QUANTITY NOT SUFFICIENT. SUGGEST RECOLLECTION 02/13/2020  
 K 3.7 02/14/2020  
 K 3.5 02/14/2020  
 K 3.6 02/13/2020  
 K 3.7 02/13/2020 K 6.6 (HH) 02/13/2020 Lab Results Component Value Date WBC 26.8 (H) 02/13/2020 RBC 4.26 02/13/2020 HGB 11.8 (L) 02/13/2020 HCT 41.1 02/13/2020 MCV 96.5 02/13/2020 MCH 27.7 02/13/2020 RDW 17.8 (H) 02/13/2020  02/13/2020 Lab Results Component Value Date PHOS 5.8 (H) 02/13/2020 Urine dipstick:  
Lab Results Component Value Date/Time Color YELLOW/STRAW 02/13/2020 01:07 PM  
 Appearance TURBID (A) 02/13/2020 01:07 PM  
 Specific gravity 1.020 02/13/2020 01:07 PM  
 pH (UA) 5.0 02/13/2020 01:07 PM  
 Protein 100 (A) 02/13/2020 01:07 PM  
 Glucose >1,000 (A) 02/13/2020 01:07 PM  
 Ketone NEGATIVE  02/13/2020 01:07 PM  
 Bilirubin NEGATIVE  02/13/2020 01:07 PM  
 Urobilinogen 0.2 02/13/2020 01:07 PM  
 Nitrites POSITIVE (A) 02/13/2020 01:07 PM  
 Leukocyte Esterase MODERATE (A) 02/13/2020 01:07 PM  
 Epithelial cells FEW 02/13/2020 01:07 PM  
 Bacteria 4+ (A) 02/13/2020 01:07 PM  
 WBC >100 (H) 02/13/2020 01:07 PM  
 RBC 5-10 02/13/2020 01:07 PM  
 
 
 
   
 
Thank you for allowing us to participate in the care of this patient. We will follow patient. Please dont hesitate to call with any questions Gisselle Gold MD 
2/14/2020 [facility]  
[address] [address], [address] [facility] Phone - [phone] Fax - [phone] 
[email or web address]. [email or web address]

## 2020-02-14 NOTE — PROCEDURES
A time-out was completed verifying correct patient, procedure, site, positioning, and special equipment if applicable. The patient was placed in a dependent position appropriate for central line placement based on the vein to be cannulated. The patients left shoulder was prepped and draped in sterile fashion. 1% Lidocaine was used to anesthetize the surrounding skin area. A quadriple lumen catheter was introduced into the the subclavian vein using the Seldinger technique . The catheter was threaded smoothly over the guide wire and appropriate blood return was obtained. Each lumen of the catheter was evacuated of air and flushed with sterile saline. The catheter was then sutured in place to the skin and a sterile dressing applied.

## 2020-02-14 NOTE — PROGRESS NOTES
NUTRITION COMPLETE ASSESSMENT 
 
RECOMMENDATIONS:  
Add 1 packet Prosource daily Once off Diprivan/IVF: Nepro @ 35 ml/hr with 100 ml water flush q 4 hr Adjust flush prn per sodium Interventions/Plan:  
Food/Nutrient Delivery:  Initiate enteral nutrition: 
   Nepro @ 20 ml/r with 50 ml water flush q hr Assessment:  
Reason for Assessment:  
[x] Provider Consult-Tube Feeding management Tube Feeding: Nepro @ 20 ml/hr with 50 ml water flush q hr Diet: NPO Nutritionally Significant Medications: [x] Reviewed & Includes: hydrocortisone, D5 @ 150 ml/hr, Insulin drip, Diprivan, Levophed @ 6 mcg Subjective: 
 
Objective: Mr Janay Mariee was admitted with JOSH. PMHx: DM 2, HTN, CVA, HLD, Dementia. Patient found unresponsive @ NH d/t HHS/DKA, JOSH on CKD, severe lactic acidosis, UTI; acute respiratory failure/metabolic encephalopathy-intubated in ED. Noted: Urosepsis, HHNK, volume depletion; sacral pressure injury per WOCN. Per weight trends in EHR pt lost 12.5% UBW in 2 weeks last month (unclear of accuracy). Muscle wasting of LE's noted-?bed/wheel chair bound PTA. Hypernatremia on admission (worse after correction of hyperglycemia); now on D5 which provides 3.6 liters, 180 gm CHO and 612 calories per day. BG being managed with insulin drip. Poor BG control PTA per elevated A1c. Tube feeding also ordered to run as above: this will provide 480 ml, 720 calories, 39 gm protein, 76 gm CHO and 1550 ml free water (tube feeding/flush) per day. Diprivan @ current rate of 16.1 ml/hr will provide 425 lipid calories per day. IVF/Diprivan/Tube feeding will provide a total of 5150 ml fluid, 1755 calories and 39 gm protein per day. Consider adding 1 packet Prosource daily to meet minimal protein needs.  Once stabilized and off Diprivan/IVF (or reduced rate) suggest increasing Nepro to 35 ml/hr with 100 ml water flush q 4hr to provide 840 ml, 1510 calories, 68 gm protein and 1500 ml free water (tube feeding/flush) per day. 
 
Estimated Nutrition Needs:  
Kcals/day: 8082 Kcals/day Protein: 54 g(54-70 (1.0-1.3g/kg-increase as JOSH resolves) Fluid: (1 ml/kcal) Based On: [facility] (8848Q) Weight Used: Actual wt(54-70 (1.0-1.3g/kg) Pt expected to meet estimated nutrient needs:  [x]   Yes via enteral feeding     []  No  [] Unable to predict at this time Nutrition Diagnosis:  
1. Inadequate oral intake related to acute respiratory failure as evidenced by NPO d/t intubation. 2. 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 Goals: Tolerate trophic tube feeding x 2-3 days. Monitoring & Evaluation: - Enteral/parenteral nutrition intake - Electrolyte and renal profile, Weight/weight change, Glucose profile Previous Nutrition Goals Met: N/A Previous Recommendations: N/A Education & Discharge Needs: 
 [x] None Identified 
 [] Identified and addressed [x] Participated in care plan, discharge planning, and/or interdisciplinary rounds Cultural, Oriental orthodox and ethnic food preferences identified: 
 None Skin Integrity: [x]Intact  []Other Edema: [x]None []Other Last BM: 2/14 Food Allergies: [x]None []Other Anthropometrics:   
Weight Loss Metrics 2/14/2020 1/22/2020 1/8/2020 2/24/2019 2/2/2019 12/4/2018 8/27/2018 Today's Wt 119 lb 0.8 oz 118 lb 7.6 oz 136 lb 11 oz - 138 lb 14.2 oz 140 lb 9.6 oz 140 lb BMI 19.81 kg/m2 19.72 kg/m2 22.75 kg/m2 23.11 kg/m2 23.11 kg/m2 23.4 kg/m2 23.3 kg/m2 Last 3 Recorded Weights in this Encounter 02/13/20 1210 02/14/20 0200 02/14/20 0932 Weight: 53.7 kg (118 lb 6.2 oz) 54 kg (119 lb 0.8 oz) 54 kg (119 lb 0.8 oz) Weight Source: Bed Height: 5' 5\" (165.1 cm), Body mass index is 19.81 kg/m². IBW : 61.7 kg (136 lb), % IBW (Calculated): 87.54 % 
 ,   
 
Labs:   
Lab Results Component Value Date/Time  Sodium 168 (HH) 02/14/2020 09:39 AM  
 Potassium 4.0 02/14/2020 09:39 AM  
 Chloride 148 (H) 02/14/2020 09:39 AM  
 CO2 15 (LL) 02/14/2020 09:39 AM  
 Glucose 161 (H) 02/14/2020 09:39 AM  
  (H) 02/14/2020 09:39 AM  
 Creatinine 3.20 (H) 02/14/2020 09:39 AM  
 Calcium 6.5 (L) 02/14/2020 09:39 AM  
 Magnesium 1.8 02/14/2020 09:39 AM  
 Phosphorus 2.8 02/14/2020 09:39 AM  
 Albumin 2.3 (L) 02/13/2020 11:30 AM  
 
Lab Results Component Value Date/Time Hemoglobin A1c 10.6 (H) 01/02/2020 04:06 AM  
 Hemoglobin A1c (POC) 12.8 08/20/2018 10:06 AM  
 
Lab Results Component Value Date/Time Glucose (POC) 233 (H) 02/14/2020 12:57 PM  
  
Lab Results Component Value Date/Time ALT (SGPT) 28 02/13/2020 11:30 AM  
 AST (SGOT) 35 02/13/2020 11:30 AM  
 Alk.  phosphatase 187 (H) 02/13/2020 11:30 AM  
 Bilirubin, total 0.6 02/13/2020 11:30 AM  
  
 
Shannon Zarate RD [facility]

## 2020-02-14 NOTE — PROGRESS NOTES
[facility] 
 
ICU TEAM Progress Note Name: Susan Agustin : 1945 MRN: [id number] Date: 2020 HPI: 
Patient is a 35-year-old gentleman who was found unresponsive at at [facility]. Blood gas was checked by EMS and route and read \"high\". In the emergency room a further work-up he was found to have features in keeping with severe DKA/HHS, acute kidney injury, severe lactic acidosis, hypothermia and a UTI. He was treated appropriately but at some point work of breathing worsened so he was intubated. Transferred to ICU for continued managment Assessment:  
 
ICU Problems: Metabolic encephalopathy-secondary to diabetic emergency and septic process HHS/DKA - gap now closed Acute kidney injury - improving Septic shock, Urosepsis Severe Lactic acidosis Acute respiratory failure 2' to uncompensated metabolic acidosis in setting of septic shock - now requiring intubation Hypothermia - improved ICU Comprehensive Plan of Care:  
 
Plans for this Shift:  
1. Continue on mechanical ventilation. Vent settings changed as patient not synchronous with ventilator on volume control. Change over to pressure control. Settings now pressure control 26 PEEP 8 rate of 14 and FiO2 60%. ABG again this afternoon. 2. Sedation/pain control with propofol and Tylenol as needed, daily awakening trials, early mobility as tolerated, avoid benzodiazepines, other delirium prevention strategies 3. Daily chest x-ray and ABG 4. Continue hemodynamic monitoring, map goal greater than 65 mmHg. arterial line placed yesterday 5. Levophed and vasopressin, titrate for map greater than 65 as patient tolerates 6. Change IV fluids to D5 at 150 mL an hour. If blood sugars start to increase then decreased rate down to 100 mL an hour. 7. Consult nephrology for JOSH. 8. Ports on femoral line not pulling back blood may need to change CVL 9. Glucose levels are now more stabilized while on glucose stabilizer gap has closed. Will continue patient on insulin drip glucose stabilizer as patient still has many electrolyte derangements. 10. May start trickle feeds with water flushes. We will start to decrease IV fluids first once sodium levels begin to stabilize 11. Continue broad-spectrum antibiotic coverage with Vanco and Zosyn follow-up cultures 12. Continue every 4 hours BMPs mag and phosphorus. Daily CBC 13. Lactic has normalized may discontinue serial checks. 14. Replace calcium check ionized calcium in a.m. 13. Talk to spoke to this patient Kt Leon, who is primary decision maker, regarding goals of care and CODE STATUS. Updated on patient status. Patient to remain full code at this time. Also updated patient's other son and daughter from out of town who arrived to bedside. Updated them on patient status as well. Answered all questions to the best of my abilities and to family satisfaction. 16. Rest of care plan as follows below: 
 
Cardiac Gtts: Norepinephrine and Vasopressin SBP Goal of: > 90 mmHg MAP Goal of: > 65 mmHg Transfusion Trigger (Hgb): <7 g/dL Respiratory Goals: Chlorhexidine Optimize PEEP/Ventilation/Oxygenation Goal Tidal Volume 6 cc/kg based on IBW Aim for lung protective ventilation Head of bed > 30 degrees Aggressive bronchopulmonary hygiene SPO2 Goal: > 92% Pulmonary toilet: Suctioning as needed DVT Prophylaxis (if no, list reason): SCD's or Sequential Compression Device, platelet level has decreased over the last day may consider starting subcu heparin if platelet levels improves. GI Prophylaxis: Pepcid (famotidine) Nutrition: Yes trickle tube feeds IVFs: Dextrose 5% Bowel Movement: Pending Bowel Regimen: None needed at this time Green Catheter Present: Yes Glycemic Control - Insulin: Yes Antibiotics:Vancomycin Zosyn Pain Medications: Dilaudid Target RASS: 0 - Alert & Calm - Spontaneously pays attention to caregiver Sedation Medications: Propofol CAM-ICU:  NA Mobility: Bedrest 
PT/OT: willconsult once appropriate Restraints: Soft wrist restraints Discussed Plan of Care/Code Status: Full Code T/L/D Tubes: ETT and Nasogastric Tube Lines: Arterial Line and [employer] Drains: Green Catheter Subjective:  
Progress Note: 2/14/2020 Reason for ICU Admission:  
Acute metabolic acidosis in the setting of septic shock Maintains Intubated and Sedated on mechanical ventilation On vasopressors Overnight Events: Maintained on vasopressors, arterial line placed, continued on insulin drip on glucose stabilizer POD:* No surgery found * S/P:  
NA Active Problem List:  
 
Problem List  Date Reviewed: 12/4/2018 Codes Class DKA (diabetic ketoacidoses) ([facility] ICD-10-CM: E11.10 ICD-9-CM: 250.10 Encephalopathy ICD-10-CM: G93.40 ICD-9-CM: 348.30 Hyperkalemia ICD-10-CM: E87.5 ICD-9-CM: 276.7 Hypernatremia ICD-10-CM: E87.0 ICD-9-CM: 276.0 Altered mental status ICD-10-CM: R41.82 
ICD-9-CM: 780.97 Failure to thrive in adult ICD-10-CM: R62.7 ICD-9-CM: 783.7 JOSH (acute kidney injury) ([facility] ICD-10-CM: N17.9 ICD-9-CM: 584.9   
   
 CVA (cerebral vascular accident) ([facility] ICD-10-CM: I63.9 ICD-9-CM: 434.91   
   
 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 
ICD-9-CM: 401.9 Past Medical History:  
 
 has a past medical history of Diabetes ([facility] (2002), High cholesterol, Hypertension, and Stroke ([facility] Past Surgical History: has no past surgical history on file. Home Medications:  
 
Prior to Admission medications Medication Sig Start Date End Date Taking? Authorizing Provider  
glipiZIDE (GLUCOTROL) 5 mg tablet Take 2.5 mg by mouth Daily (before breakfast). 30 min before breakfast   Yes Provider, Historical  
insulin lispro (HUMALOG U-100 INSULIN) 100 unit/mL injection by SubCUTAneous route Before breakfast, lunch, and dinner. SSI   Yes Provider, Historical  
amLODIPine (NORVASC) 5 mg tablet Take 1 Tab by mouth daily for 30 days. 1/31/20 3/1/20 Yes Sylvia Austin MD  
atorvastatin (LIPITOR) 20 mg tablet Take 40 mg by mouth nightly. Yes Provider, Historical  
fish oil-omega-3 fatty acids (FISH OIL) 340-1,000 mg capsule Take 1 Cap by mouth daily. Yes Provider, Historical  
insulin glargine (LANTUS) 100 unit/mL injection 24 Units by SubCUTAneous route nightly. Yes Provider, Historical  
midodrine (PROAMITINE) 10 mg tablet Take 10 mg by mouth three (3) times daily. Yes Provider, Historical  
vitamin E (AQUA GEMS) 400 unit capsule Take 400 Units by mouth daily. Yes Provider, Historical  
vit B Cmplx 3-FA-Vit C-Biotin (NEPHRO ALEN RX) 1- mg-mg-mcg tablet Take 1 Tab by mouth daily. 2/20/13  Yes Sharyn Tanner MD  
aspirin (ASPIRIN) 325 mg tablet Take 325 mg by mouth daily. Yes Provider, Historical  
 
 
Allergies/Social/Family History: No Known Allergies Social History Tobacco Use  Smoking status: Never Smoker  Smokeless tobacco: Never Used Substance Use Topics  Alcohol use: No  
  Alcohol/week: 0.0 standard drinks Family History Problem Relation Age of Onset  No Known Problems Mother  No Known Problems Father  No Known Problems Sister  No Known Problems Brother  No Known Problems Brother  No Known Problems Brother  No Known Problems Brother  No Known Problems Brother  No Known Problems Brother  No Known Problems Maternal Grandmother  No Known Problems Maternal Grandfather  No Known Problems Paternal Grandmother  No Known Problems Paternal Grandfather  Diabetes Neg Hx   
 Heart Disease Neg Hx Review of Systems:  
 
Review of systems not obtained due to patient factors. Intubated Objective:  
Vital Signs: 
Visit Vitals /67 (BP 1 Location: Left arm, BP Patient Position: At rest) Pulse 83 Temp 97 °F (36.1 °C) Resp 24 Ht 5' 5\" (1.651 m) Wt 54 kg (119 lb 0.8 oz) SpO2 97% BMI 19.81 kg/m² O2 Flow Rate (L/min): 4 l/min O2 Device: Endotracheal tube Temp (24hrs), Av.9 °F (37.2 °C), Min:96.9 °F (36.1 °C), Max:100.4 °F (38 °C) Intake/Output:  
 
Intake/Output Summary (Last 24 hours) at 2020 1740 Last data filed at 2020 1700 Gross per 24 hour Intake 6523.17 ml Output 1525 ml Net 4998.17 ml Physical Exam: 
General:  mild distress, appears stated age, toxic, intubated and sedated Eye:  conjunctivae/corneas clear. Pupils are equal reactive and round, 2 mm bilaterally Neurologic: Limited, patient sedated, does not follow commands on decreased sedation. Too unstable to do sedation vacation today. Non focal. 
Lymphatic:  Cervical, supraclavicular, and axillary nodes normal.  
Neck:  normal and no erythema or exudates noted. Lungs: Diminished in bilateral bases, breathing is irregular and tachypnea is present patient breathing above vent setting off vent not pulling great volumes. Heart:  regular rate and rhythm, S1, S2 normal, no murmur, click, rub or gallop Abdomen:  soft, non-tender. Bowel sounds normal. No masses,  no organomegaly Cardiovascular:  Regular rate and rhythm, S1S2 present, without murmur or extra heart sounds, pedal pulses normal and no edema Skin:  Dry, decreased turgor, Sacral unstagable wound on sacrum, left heel with pressure area LABS AND  DATA: Personally reviewed Recent Labs  
  20 1769 02/13/20 
1130 WBC 10.6 26.8* HGB 10.4* 11.8* HCT 31.4* 41.1 PLT 78* 219 Recent Labs  
  02/14/20 
1341 02/14/20 
0939  02/13/20 
1804 02/13/20 
1130 * 168*   < > 168* 157* K 4.2 4.0   < > 3.7 6.6*  
* 148*   < > 145* 134* CO2 16* 15*   < > 13* <5* * 103*   < > 126* QUANTITY NOT SUFFICIENT. SUGGEST RECOLLECTION  
CREA 3.10* 3.20*   < > 4.47* 5.94* * 161*   < > 559* 1,016* CA 6.2* 6.5*   < > 6.6* 9.4 MG  --  1.8  --   --  3.3*  
PHOS  --  2.8  --  5.8*  --   
 < > = values in this interval not displayed. Recent Labs  
  02/14/20 
0459 02/13/20 
1130 SGOT  --  35  
AP  --  187* TP  --  8.6* ALB  --  2.3*  
GLOB  --  6.3*  
LPSE 94 193 No results for input(s): INR, PTP, APTT, INREXT, INREXT in the last 72 hours. Recent Labs  
  02/14/20 
1251 02/14/20 
0115 PHI 7.362 7.345* PCO2I 23.9* 20.1*  
PO2I 82 191* FIO2I 0.60 70 Recent Labs  
  02/13/20 
1804 02/13/20 
1130 CPK 1,654*  --   
TROIQ  --  <0.05 Hemodynamics:  
PAP:   CO:    
Wedge:   CI:    
CVP:    SVR:    
  PVR:    
 
Ventilator Settings: 
Mode Rate Tidal Volume Pressure FiO2 PEEP Assist control, Pressure control   300 ml    60 % 8 cm H20 Peak airway pressure: 23 cm H2O Minute ventilation: 10.7 l/min MEDS: Reviewed Chest X-Ray: CXR Results  (Last 48 hours) 02/14/20 1542  XR CHEST PORT Final result Impression:  IMPRESSION: Central line placement. No pneumothorax demonstrated. Narrative:  EXAM: XR CHEST PORT INDICATION: central line placement COMPARISON: 2/13/2020 FINDINGS: A portable AP radiograph of the chest was obtained at 1544 hours. An  
endotracheal tube is again shown terminating approximately 3 cm above the level  
of the madhu. A transesophageal tube is demonstrated extending into the  
stomach.  There is interval placement of a left subclavian line terminating in  
 the superior vena cava. No pneumothorax or pleural effusion is shown. The lungs  
are clear. Cardiac, mediastinal and hilar contours are normal.  Mild thoracic  
spine degenerative changes are again shown.   
   
  
 02/13/20 1449  XR CHEST PORT Final result Impression:  IMPRESSION:  
No acute cardiopulmonary disease radiographically. . ET tube position as  
described. Kena Risemarycarmen Narrative:  INDICATION:  post intubation EXAM: Chest single view. COMPARISON: Earlier same day. FINDINGS: A single frontal view of the chest at 1426 hours shows ET tube with  
its tip approximately 2.6 cm above the madhu. This is in satisfactory position. There are no acute infiltrates or pleural effusions. Lung volumes are increased  
since the prior study. .  The heart, mediastinum and pulmonary vasculature are  
stable . The bony thorax is unremarkable for age. . Gastric tube traverses the  
thorax with its tip over the left upper quadrant, in the expected position of  
the gastric fundus. 02/13/20 1243  XR CHEST PORT Final result Impression:  IMPRESSION:  
No acute cardiopulmonary disease radiographically. .  . Narrative:  INDICATION:  ams, unresponsive EXAM: Chest single view. COMPARISON: 1/21/2020. FINDINGS: A single frontal view of the chest at 1222 hours shows clear lungs. The heart, mediastinum and pulmonary vasculature are stable . The bony thorax  
is unremarkable for age. .  
   
  
  
 
CT Results  (Last 48 hours) 02/13/20 1202  CT HEAD WO CONT Final result Impression:  IMPRESSION:   
   
No acute intracranial abnormality on this noncontrast head CT. No change. Narrative:  EXAM: CT HEAD WO CONT INDICATION: Found unresponsive today. Hyperglycemia. Diabetes. COMPARISON: CT head on 2/9/2020 and 1/21/2020 TECHNIQUE: Noncontrast head CT. Coronal and sagittal reformats. CT dose reduction was achieved through the use of a standardized protocol tailored for  
this examination and automatic exposure control for dose modulation. Adaptive  
statistical iterative reconstruction (ASIR) was utilized. FINDINGS: The diffuse volume loss is unchanged without hydrocephalus. There is  
no mass effect or midline shift. There is no intracranial hemorrhage or  
extra-axial fluid collection. Chronic microvascular ischemic disease is  
unchanged. Vascular calcifications are unchanged. The calvarium is intact. The visualized paranasal sinuses and mastoid air cells  
are clear. ECHO: 01/02/2020 Interpretation Summary Result status: Final result · Image quality for this study was technically difficult. · Agitated saline contrast study was performed. no evidence of right to left shunt · Normal cavity size and systolic function (ejection fraction normal). Estimated left ventricular ejection fraction is 55 - 60%. Multidisciplinary Rounds Completed: Yes ABCDEF Bundle/Checklist Completed: 
Yes SPECIAL EQUIPMENT Mechanical Ventilator DISPOSITION Stay in ICU CRITICAL CARE CONSULTANT NOTE I had a face to face encounter with the patient, reviewed and interpreted patient data including clinical events, labs, images, vital signs, I/O's, and examined patient. I have discussed the case and the plan and management of the patient's care with the consulting services, the bedside nurses and the respiratory therapist.   
 
NOTE OF PERSONAL INVOLVEMENT IN CARE This patient has a high probability of imminent, clinically significant deterioration, which requires the highest level of p   reparedness to intervene urgently. I participated in the decision-making and personally managed or directed the management of the following life and organ supporting interventions that required my frequent assessment to treat or prevent imminent deterioration. I personally spent 85 minutes of critical care time. This is time spent at this critically ill patient's bedside actively involved in patient care as well as the coordination of care and discussions with the patient's family. This does not include any procedural time which has been billed separately. Tila Mak [facility] Critical [facility] Medicine [facility] 2/14/2020

## 2020-02-14 NOTE — WOUND CARE
Wound Care Note:  
 
New consult placed by nurse request for sacral pressure injury Chart shows: 
Admitted for JOSH, DKA, encephalopathy Past Medical History:  
Diagnosis Date  Diabetes ([facility] 2002  High cholesterol  Hypertension  Stroke ([facility] M3 occlusion Jan 2020 WBC = 10.6 on 2/14/20 Admitted from [facility] Assessment:  
Patient is intubated and sedated, incontinent with moderate assistance needed in repositioning. Bed: Total Care Sport Patient has a Green. Diet: Tube feeding Patient did not moan or grimace with wound care or turning. Right heel and bilateral buttocks skin intact and without erythema. 1. POA unstageable sacral pressure injury measures 3.8 cm x 2.5 cm x 0.3 cm, wound bed is dusky red with some slough at the deepest area, small serous drainage, wound edges are open, alba-wound intact. Santyl ointment was ordered. 2.  POA left heel with superficial crusted wound measures 2 cm x 1.5 cm, no drainage, alba-wound intact. Left open to air/offloaded. Spoke with ERICA Knox, advised of unstageable sacral pressure injury; wound care orders obtained. Patient repositioned on left side. Heels offloaded on pillows. Recommendations:   
Sacrum- Daily cleanse with normal saline wipe wound bed clean and dry, apply nickel thickness of Santyl ointment, place 2 x 2 into base of wound, cover with Optifoam Gentle. Skin Care & Pressure Prevention: 
Minimize layers of linen/pads under patient to optimize support surface. Turn/reposition approximately every 2 hours and offload heels. Manage incontinence / promote continence Nourishing Skin Cream to dry skin, minimize use of briefs when able Discussed above plan with patient & Rafal RN Transition of Care: Plan to follow as needed while admitted to hospital. 
 
KELLY Grimm, RN, [facility], [facility]. 
office [phone] pager [phone] or call  to page

## 2020-02-14 NOTE — ROUTINE PROCESS
2000. Bedside and Verbal shift change report given to [address] (oncoming nurse) by Spero Curling (offgoing nurse). Report included the following information SBAR, Kardex, ED Summary, Procedure Summary, Intake/Output, MAR, Accordion, Recent Results, Cardiac Rhythm nsr, Alarm Parameters  and Dual Neuro Assessment. 0800. Bedside and Verbal shift change report given to Rafal Cintron RN and Landen PRAJAPATI RN (oncoming nurse) by [address]  (offgoing nurse). Report included the following information SBAR, Kardex, ED Summary, Procedure Summary, Intake/Output, MAR, Accordion, Recent Results, Cardiac Rhythm nsr, Alarm Parameters  and Dual Neuro Assessment.

## 2020-02-14 NOTE — PROGRESS NOTES
1620: TRANSFER - IN REPORT: 
 
Verbal report received from [address] (name) on Mo Covington  being received from ED(unit) for routine progression of care Report consisted of patients Situation, Background, Assessment and  
Recommendations(SBAR). Information from the following report(s) ED Summary, Intake/Output, MAR, Recent Results, Cardiac Rhythm NSR and Alarm Parameters  was reviewed with the receiving nurse. Opportunity for questions and clarification was provided. Assessment completed upon patients arrival to unit and care assumed. 1640: Primary Nurse Feliciano Marcus and Marilee Gamble RN performed a dual skin assessment on this patient Impairment noted- see wound doc flow sheet Doni score is 10 
 
 
1930: Bedside and Verbal shift change report given to [place] (oncoming nurse) by Hanh Martell  (offgoing nurse). Report included the following information SBAR, ED Summary, Intake/Output, Recent Results, Cardiac Rhythm NSR and Dual Neuro Assessment.

## 2020-02-14 NOTE — PROGRESS NOTES
Spiritual Care Assessment/Progress Note [address] 
 
 
NAME: Claudia Cho      MRN: [id number] AGE: 76 y.o. SEX: male Zoroastrianism Affiliation: Sikhism Language: [place] 2/14/2020     Total Time (in minutes): 5 Spiritual Assessment begun in [facility] 7S2 INTENSIVE CARE through conversation with: 
  
    []Patient        [] Family    [] Friend(s) Reason for Consult: Initial visit Spiritual beliefs: (Please include comment if needed) 
   [] Identifies with a vidal tradition:     
   [] Supported by a vidal community:        
   [] Claims no spiritual orientation:       
   [] Seeking spiritual identity:            
   [] Adheres to an individual form of spirituality:       
   [x] Not able to assess:                   
 
    
Identified resources for coping:  
   [] Prayer                           
   [] Music                  [] Guided Imagery 
   [] Family/friends                 [] Pet visits [] Devotional reading                         [] Unknown 
   [] Other:                                       
 
 
Interventions offered during this visit: (See comments for more details) Patient Interventions: Initial visit Plan of Care: 
 
 [] Support spiritual and/or cultural needs  
 [] Support AMD and/or advance care planning process    
 [] Support grieving process 
 [] Coordinate Rites and/or Rituals  
 [] Coordination with community clergy [] No spiritual needs identified at this time 
 [] Detailed Plan of Care below (See Comments)  [] Make referral to Music Therapy 
[] Make referral to Pet Therapy    
[] Make referral to Addiction services 
[] Make referral to [facility] 
[] Make referral to Spiritual Care Partner 
[] No future visits requested       
[x] Follow up visits as needed Attempted to visit pt on ICU without success. Pt intubated and no family present. Chaplains will continue to offer support as needed.  
Chaplain Pineda MDiv, MS, [facility] 
 Edis [phone])

## 2020-02-15 NOTE — PROGRESS NOTES
0730: Bedside shift change report given to Bill Valente and Rafal RN (oncoming nurse) by [employer] (offgoing nurse). Report included the following information SBAR, Intake/Output, MAR, Recent Results, Cardiac Rhythm NSR and Alarm Parameters . Pt received one unit PRBCs this morning, improved hgb. Still on insulin gtt. No significant neurological changes but some increased grimacing to stimulus.

## 2020-02-15 NOTE — PROGRESS NOTES
Pharmacist Note - Vancomycin Dosing Therapy day 3 Indication: empiric; septic shock Current regimen:  by levels; last 750 mg on 2/14 @ 16:00 Vanc level resulted at 15.5 mcg/mL which was obtained 22 hrs post-dose. Goal trough: 15 - 20 mcg/mL Plan: Vancomycin 750 mg x 1 dose now; will review am labs prior to scheduling next dose/level Pharmacy will continue to monitor this patient daily for changes in clinical status and renal function.

## 2020-02-15 NOTE — PROGRESS NOTES
Nephrology Progress Note Mega Gutierrez Date of Admission : 2/13/2020 CC: Follow up for JOSH, hypernatremia Assessment and Plan JOSH on CKD: 
- likely from volume depletion from hyperglycemia vs ATN from hypotension and sepsis 
- neg renal U/S on admission - Cr improving 
- cont IVF 
- serila labs 
  
Hypernatremia: 
- from dehydration/lack of free water intake  
- improving w/ D5W 
  
Sepsis: 
- presumed urosepsis - f/u cultures 
- on broad spectrum abx and pressors 
  
Anemia: 
- ? Blood loss vs dilutional 
- getting PRBCs this AM 
 
Hypotension: 
- on alise 
- keep MAP > 65 
  
CKD III w/ baseline Cr 1.4 to 1.6: 
- likely from DM and HTN 
  
HHNK: 
- per CCM 
  
Volume depletion: 
- cont IVF as above 
  
Encephalopathy: 
- from infection, hyperglycemia and hypernatremia 
- sedated on the vent 
  
DM2: 
- on insulin drip now FTT Dementia Interval History: 
Seen and examined. Labs improving. Getting PRBCs. Sedated on the vent. UOP stable. Current Medications: all current  Medications have been eviewed in [facility] Review of Systems: Pertinent items are noted in HPI. Objective: 
Vitals:   
Vitals:  
 02/15/20 0830 02/15/20 0842 02/15/20 0900 02/15/20 0940 BP: 134/69  145/72 Pulse: 85 89 90 89 Resp: 19 26 22 20 Temp:      
SpO2: 98% 98% 99% 98% Weight:      
Height:      
 
Intake and Output: 
02/15 0701 - 02/15 1900 In: 493.2 [I.V.:113.2] Out: 325 [Urine:325] 02/13 1901 - 02/15 0700 In: 8899.2 [I.V.:7714.2] Out: 3305 [QYAUL:6248] Physical Examination: 
Pt intubated    Yes General: Sedated on the vent Neck:  Supple, no mass Resp:  Lungs CTA B/L, no wheezing , normal respiratory effort CV:  RRR,  no murmur or rub,no LE edema GI:  Soft, NT, + Bowel sounds, no hepatosplenomegaly Neurologic:  sedated Psych:             Unable to assess Skin:  No Rash :  Green in place 
 
[]    High complexity decision making was performed []    Patient is at high-risk of decompensation with multiple organ involvement Lab Data Personally Reviewed: I have reviewed all the pertinent labs, microbiology data and radiology studies during assessment. Recent Labs  
  02/15/20 
0533 02/15/20 
0208 02/14/20 
2154  02/14/20 
0939  02/13/20 
1804 02/13/20 
1130 * 158* 160*   < > 168*   < > 168* 157* K 3.7 3.9 3.8   < > 4.0   < > 3.7 6.6*  
* 136* 134*   < > 148*   < > 145* 134* CO2 14* 17* 14*   < > 15*   < > 13* <5* * 168* 160*   < > 161*   < > 559* 1,016* BUN 80* 82* 90*   < > 103*   < > 126* QUANTITY NOT SUFFICIENT. SUGGEST RECOLLECTION  
CREA 2.41* 2.51* 2.67*   < > 3.20*   < > 4.47* 5.94* CA 6.5* 6.6* 6.8*   < > 6.5*   < > 6.6* 9.4 MG  --  1.5*  --   --  1.8  --   --  3.3*  
PHOS  --  3.5  --   --  2.8  --  5.8*  --   
ALB  --   --   --   --   --   --   --  2.3*  
SGOT  --   --   --   --   --   --   --  35 ALT  --   --   --   --   --   --   --  28  
 < > = values in this interval not displayed. Recent Labs  
  02/15/20 
0339 02/14/20 
0952 02/13/20 
1130 WBC 16.8* 10.6 26.8* HGB 6.6* 10.4* 11.8* HCT 20.2* 31.4* 41.1 PLT 45* 78* 219 No results found for: MAEGAN Lab Results Component Value Date/Time Culture result: PENDING 02/14/2020 01:41 PM  
 Culture result: GRAM NEGATIVE RODS (A) 02/13/2020 06:05 PM  
 Culture result: NO GROWTH 2 DAYS 02/13/2020 01:32 PM  
 Culture result: NO GROWTH 2 DAYS 02/13/2020 01:32 PM  
 
Recent Results (from the past 24 hour(s)) GLUCOSE, POC Collection Time: 02/14/20 10:52 AM  
Result Value Ref Range Glucose (POC) 182 (H) 65 - 100 mg/dL Performed by Nahomi Browning Collection Time: 02/14/20 10:53 AM  
Result Value Ref Range Glucose 182 mg/dL Insulin order 0.7 units/hour Insulin adminstered 0.7 units/hour Multiplier 0.006 Low target 150 mg/dL High target 250 mg/dL D50 order 0.0 ml  
 D50 administered 0.00 ml Minutes until next BG 60 min Order initials ms Administered initials ms GLSCOM Comments GLUCOSE, POC Collection Time: 02/14/20 11:53 AM  
Result Value Ref Range Glucose (POC) 199 (H) 65 - 100 mg/dL Performed by Jorge Tavares (CON) Cody Man Collection Time: 02/14/20 11:54 AM  
Result Value Ref Range Glucose 199 mg/dL Insulin order 0.8 units/hour Insulin adminstered 0.8 units/hour Multiplier 0.006 Low target 150 mg/dL High target 250 mg/dL D50 order 0.0 ml  
 D50 administered 0.00 ml Minutes until next BG 60 min Order initials ms Administered initials ms GLSCOM Comments POC EG7 Collection Time: 02/14/20 12:51 PM  
Result Value Ref Range Calcium, ionized (POC) 1.01 (L) 1.12 - 1.32 mmol/L  
 FIO2 (POC) 0.60 % pH (POC) 7.362 7.35 - 7.45    
 pCO2 (POC) 23.9 (L) 35.0 - 45.0 MMHG  
 pO2 (POC) 82 80 - 100 MMHG  
 HCO3 (POC) 13.6 (L) 22 - 26 MMOL/L Base deficit (POC) 12 mmol/L  
 sO2 (POC) 96 92 - 97 % Site DRAWN FROM ARTERIAL LINE Device: VENT Mode ASSIST CONTROL Tidal volume 302 ml Set Rate 14 bpm  
 PEEP/CPAP (POC) 8 cmH2O  
 PIP (POC) 26 Allens test (POC) N/A Inspiratory Time 1.43 sec Specimen type (POC) ARTERIAL Total resp. rate 27 Pressure control YES    
GLUCOSE, POC Collection Time: 02/14/20 12:57 PM  
Result Value Ref Range Glucose (POC) 233 (H) 65 - 100 mg/dL Performed by Kandace Bridges Collection Time: 02/14/20 12:57 PM  
Result Value Ref Range Glucose 233 mg/dL Insulin order 1.0 units/hour Insulin adminstered 1.0 units/hour Multiplier 0.006 Low target 150 mg/dL High target 250 mg/dL D50 order 0.0 ml  
 D50 administered 0.00 ml Minutes until next BG 60 min Order initials ms Administered initials ms GLSCOM Comments METABOLIC PANEL, BASIC Collection Time: 02/14/20  1:41 PM  
Result Value Ref Range Sodium 162 (HH) 136 - 145 mmol/L Potassium 4.2 3.5 - 5.1 mmol/L Chloride 139 (H) 97 - 108 mmol/L  
 CO2 16 (L) 21 - 32 mmol/L Anion gap 7 5 - 15 mmol/L Glucose 280 (H) 65 - 100 mg/dL  (H) 6 - 20 MG/DL Creatinine 3.10 (H) 0.70 - 1.30 MG/DL  
 BUN/Creatinine ratio 32 (H) 12 - 20 GFR est AA 24 (L) >60 ml/min/1.73m2 GFR est non-AA 20 (L) >60 ml/min/1.73m2 Calcium 6.2 (LL) 8.5 - 10.1 MG/DL  
LACTIC ACID Collection Time: 02/14/20  1:41 PM  
Result Value Ref Range Lactic acid 2.0 0.4 - 2.0 MMOL/L  
VANCOMYCIN, RANDOM Collection Time: 02/14/20  1:41 PM  
Result Value Ref Range Vancomycin, random 13.7 UG/ML  
CULTURE, RESPIRATORY/SPUTUM/BRONCH W GRAM STAIN Collection Time: 02/14/20  1:41 PM  
Result Value Ref Range Special Requests: NO SPECIAL REQUESTS    
 GRAM STAIN RARE EPITHELIAL CELLS SEEN    
 GRAM STAIN FEW WBCS SEEN    
 GRAM STAIN FEW BUDDING YEAST    
 GRAM STAIN FEW GRAM POSITIVE COCCI    
 GRAM STAIN OCCASIONAL GRAM POSITIVE RODS Culture result: PENDING   
GLUCOSE, POC Collection Time: 02/14/20  1:56 PM  
Result Value Ref Range Glucose (POC) 272 (H) 65 - 100 mg/dL Performed by Teresa Cerda Collection Time: 02/14/20  1:58 PM  
Result Value Ref Range Glucose 272 mg/dL Insulin order 3.4 units/hour Insulin adminstered 3.4 units/hour Multiplier 0.016 Low target 150 mg/dL High target 250 mg/dL D50 order 0.0 ml  
 D50 administered 0.00 ml Minutes until next BG 60 min Order initials ms Administered initials ms GLSCOM Comments GLUCOSE, POC Collection Time: 02/14/20  3:02 PM  
Result Value Ref Range Glucose (POC) 273 (H) 65 - 100 mg/dL Performed by Holland Phan (CON) Collection Time: 02/14/20  3:03 PM  
Result Value Ref Range Glucose 273 mg/dL Insulin order 5.5 units/hour Insulin adminstered 5.5 units/hour  Multiplier 0.026   
 Low target 150 mg/dL High target 250 mg/dL D50 order 0.0 ml  
 D50 administered 0.00 ml Minutes until next BG 60 min Order initials ms Administered initials ms GLSCOM Comments GLUCOSE, POC Collection Time: 02/14/20  4:07 PM  
Result Value Ref Range Glucose (POC) 276 (H) 65 - 100 mg/dL Performed by Curry Farmer (DEBI) Ailyn Yolette Collection Time: 02/14/20  4:07 PM  
Result Value Ref Range Glucose 276 mg/dL Insulin order 7.8 units/hour Insulin adminstered 7.8 units/hour Multiplier 0.036 Low target 150 mg/dL High target 250 mg/dL D50 order 0.0 ml  
 D50 administered 0.00 ml Minutes until next BG 60 min Order initials sm Administered initials sm GLSCOM Comments GLUCOSE, POC Collection Time: 02/14/20  5:10 PM  
Result Value Ref Range Glucose (POC) 277 (H) 65 - 100 mg/dL Performed by Curry Farmer (DEBI) Ailyn De La Vega Collection Time: 02/14/20  5:11 PM  
Result Value Ref Range Glucose 277 mg/dL Insulin order 10.0 units/hour Insulin adminstered 10.0 units/hour Multiplier 0.046 Low target 150 mg/dL High target 250 mg/dL D50 order 0.0 ml  
 D50 administered 0.00 ml Minutes until next BG 60 min Order initials ms Administered initials ms GLSCOM Comments GLUCOSE, POC Collection Time: 02/14/20  6:24 PM  
Result Value Ref Range Glucose (POC) 226 (H) 65 - 100 mg/dL Performed by Martha Carlton Collection Time: 02/14/20  6:26 PM  
Result Value Ref Range Glucose 226 mg/dL Insulin order 7.6 units/hour Insulin adminstered 7.6 units/hour Multiplier 0.046 Low target 150 mg/dL High target 250 mg/dL D50 order 0.0 ml  
 D50 administered 0.00 ml Minutes until next BG 60 min Order initials ms Administered initials ms GLSCOM Comments METABOLIC PANEL, BASIC Collection Time: 02/14/20  6:35 PM  
Result Value Ref Range Sodium 160 (H) 136 - 145 mmol/L Potassium 4.0 3.5 - 5.1 mmol/L Chloride 140 (H) 97 - 108 mmol/L  
 CO2 15 (LL) 21 - 32 mmol/L Anion gap 5 5 - 15 mmol/L Glucose 235 (H) 65 - 100 mg/dL BUN 93 (H) 6 - 20 MG/DL Creatinine 3.01 (H) 0.70 - 1.30 MG/DL  
 BUN/Creatinine ratio 31 (H) 12 - 20 GFR est AA 25 (L) >60 ml/min/1.73m2 GFR est non-AA 20 (L) >60 ml/min/1.73m2 Calcium 7.0 (L) 8.5 - 10.1 MG/DL  
GLUCOSE, POC Collection Time: 02/14/20  7:34 PM  
Result Value Ref Range Glucose (POC) 177 (H) 65 - 100 mg/dL Performed by Poornima Gunter (CON) Collection Time: 02/14/20  7:35 PM  
Result Value Ref Range Glucose 177 mg/dL Insulin order 5.4 units/hour Insulin adminstered 5.4 units/hour Multiplier 0.046 Low target 150 mg/dL High target 250 mg/dL D50 order 0.0 ml  
 D50 administered 0.00 ml Minutes until next BG 60 min Order initials ms Administered initials ms GLSCOM Comments GLUCOSE, POC Collection Time: 02/14/20  8:43 PM  
Result Value Ref Range Glucose (POC) 178 (H) 65 - 100 mg/dL Performed by [facility] Collection Time: 02/14/20  8:43 PM  
Result Value Ref Range Glucose 178 mg/dL Insulin order 5.4 units/hour Insulin adminstered 5.4 units/hour Multiplier 0.046 Low target 150 mg/dL High target 250 mg/dL D50 order 0.0 ml  
 D50 administered 0.00 ml Minutes until next BG 60 min Order initials vs   
 Administered initials vs   
 GLSCOM Comments GLUCOSE, POC Collection Time: 02/14/20  9:50 PM  
Result Value Ref Range Glucose (POC) 159 (H) 65 - 100 mg/dL Performed by [facility] Collection Time: 02/14/20  9:51 PM  
Result Value Ref Range Glucose 159 mg/dL Insulin order 4.6 units/hour Insulin adminstered 4.6 units/hour Multiplier 0.046 Low target 150 mg/dL High target 250 mg/dL  D50 order 0.0 ml  
 D50 administered 0.00 ml Minutes until next BG 60 min Order initials vs   
 Administered initials vs   
 GLSCOM Comments METABOLIC PANEL, BASIC Collection Time: 02/14/20  9:54 PM  
Result Value Ref Range Sodium 160 (H) 136 - 145 mmol/L Potassium 3.8 3.5 - 5.1 mmol/L Chloride 134 (H) 97 - 108 mmol/L  
 CO2 14 (LL) 21 - 32 mmol/L Anion gap 12 5 - 15 mmol/L Glucose 160 (H) 65 - 100 mg/dL BUN 90 (H) 6 - 20 MG/DL Creatinine 2.67 (H) 0.70 - 1.30 MG/DL  
 BUN/Creatinine ratio 34 (H) 12 - 20 GFR est AA 29 (L) >60 ml/min/1.73m2 GFR est non-AA 24 (L) >60 ml/min/1.73m2 Calcium 6.8 (L) 8.5 - 10.1 MG/DL  
GLUCOSE, POC Collection Time: 02/14/20 10:56 PM  
Result Value Ref Range Glucose (POC) 140 (H) 65 - 100 mg/dL Performed by [facility] Collection Time: 02/14/20 10:58 PM  
Result Value Ref Range Glucose 140 mg/dL Insulin order 2.9 units/hour Insulin adminstered 2.9 units/hour Multiplier 0.036 Low target 150 mg/dL High target 250 mg/dL D50 order 0.0 ml  
 D50 administered 0.00 ml Minutes until next BG 60 min Order initials vs   
 Administered initials vs   
 GLSCOM Comments GLUCOSE, POC Collection Time: 02/15/20 12:00 AM  
Result Value Ref Range Glucose (POC) 99 65 - 100 mg/dL Performed by [facility] Collection Time: 02/15/20 12:00 AM  
Result Value Ref Range Glucose 99 mg/dL Insulin order 1.0 units/hour Insulin adminstered 1.0 units/hour Multiplier 0.026 Low target 150 mg/dL High target 250 mg/dL D50 order 0.0 ml  
 D50 administered 0.00 ml Minutes until next BG 60 min Order initials vs   
 Administered initials vs   
 GLSCOM Comments GLUCOSE, POC Collection Time: 02/15/20  1:02 AM  
Result Value Ref Range Glucose (POC) 105 (H) 65 - 100 mg/dL Performed by [facility]  Collection Time: 02/15/20  1:03 AM  
 Result Value Ref Range Glucose 105 mg/dL Insulin order 0.7 units/hour Insulin adminstered 0.7 units/hour Multiplier 0.016 Low target 150 mg/dL High target 250 mg/dL D50 order 0.0 ml  
 D50 administered 0.00 ml Minutes until next BG 60 min Order initials vs   
 Administered initials vs   
 GLSCOM Comments METABOLIC PANEL, BASIC Collection Time: 02/15/20  2:08 AM  
Result Value Ref Range Sodium 158 (H) 136 - 145 mmol/L Potassium 3.9 3.5 - 5.1 mmol/L Chloride 136 (H) 97 - 108 mmol/L  
 CO2 17 (L) 21 - 32 mmol/L Anion gap 5 5 - 15 mmol/L Glucose 168 (H) 65 - 100 mg/dL BUN 82 (H) 6 - 20 MG/DL Creatinine 2.51 (H) 0.70 - 1.30 MG/DL  
 BUN/Creatinine ratio 33 (H) 12 - 20 GFR est AA 31 (L) >60 ml/min/1.73m2 GFR est non-AA 25 (L) >60 ml/min/1.73m2 Calcium 6.6 (L) 8.5 - 10.1 MG/DL MAGNESIUM Collection Time: 02/15/20  2:08 AM  
Result Value Ref Range Magnesium 1.5 (L) 1.6 - 2.4 mg/dL PHOSPHORUS Collection Time: 02/15/20  2:08 AM  
Result Value Ref Range Phosphorus 3.5 2.6 - 4.7 MG/DL PROCALCITONIN Collection Time: 02/15/20  2:08 AM  
Result Value Ref Range Procalcitonin 5.64 ng/mL GLUCOSE, POC Collection Time: 02/15/20  2:15 AM  
Result Value Ref Range Glucose (POC) 170 (H) 65 - 100 mg/dL Performed by [facility] Collection Time: 02/15/20  2:15 AM  
Result Value Ref Range Glucose 170 mg/dL Insulin order 1.8 units/hour Insulin adminstered 1.8 units/hour Multiplier 0.016 Low target 150 mg/dL High target 250 mg/dL D50 order 0.0 ml  
 D50 administered 0.00 ml Minutes until next BG 60 min Order initials vs   
 Administered initials vs   
 GLSCOM Comments GLUCOSE, POC Collection Time: 02/15/20  3:17 AM  
Result Value Ref Range Glucose (POC) 190 (H) 65 - 100 mg/dL Performed by [facility]  Collection Time: 02/15/20  3:17 AM  
 Result Value Ref Range Glucose 190 mg/dL Insulin order 2.1 units/hour Insulin adminstered 2.1 units/hour Multiplier 0.016 Low target 150 mg/dL High target 250 mg/dL D50 order 0.0 ml  
 D50 administered 0.00 ml Minutes until next BG 60 min Order initials vs   
 Administered initials vs   
 GLSCOM Comments CBC WITH AUTOMATED DIFF Collection Time: 02/15/20  3:39 AM  
Result Value Ref Range WBC 16.8 (H) 4.1 - 11.1 K/uL  
 RBC 2.35 (L) 4.10 - 5.70 M/uL HGB 6.6 (L) 12.1 - 17.0 g/dL HCT 20.2 (L) 36.6 - 50.3 % MCV 86.0 80.0 - 99.0 FL  
 MCH 28.1 26.0 - 34.0 PG  
 MCHC 32.7 30.0 - 36.5 g/dL  
 RDW 16.2 (H) 11.5 - 14.5 % PLATELET 45 (LL) 052 - 400 K/uL MPV 12.3 8.9 - 12.9 FL  
 NRBC 0.5 (H) 0  WBC ABSOLUTE NRBC 0.08 (H) 0.00 - 0.01 K/uL NEUTROPHILS 74 32 - 75 % BAND NEUTROPHILS 9 (H) 0 - 6 % LYMPHOCYTES 11 (L) 12 - 49 % MONOCYTES 3 (L) 5 - 13 % EOSINOPHILS 0 0 - 7 % BASOPHILS 0 0 - 1 % METAMYELOCYTES 1 (H) 0 % MYELOCYTES 2 (H) 0 % IMMATURE GRANULOCYTES 0 %  
 ABS. NEUTROPHILS 13.9 (H) 1.8 - 8.0 K/UL  
 ABS. LYMPHOCYTES 1.8 0.8 - 3.5 K/UL  
 ABS. MONOCYTES 0.5 0.0 - 1.0 K/UL  
 ABS. EOSINOPHILS 0.0 0.0 - 0.4 K/UL  
 ABS. BASOPHILS 0.0 0.0 - 0.1 K/UL  
 ABS. IMM. GRANS. 0.0 K/UL  
 DF MANUAL    
 RBC COMMENTS HYPOCHROMIA 2+ 
    
 RBC COMMENTS ANISOCYTOSIS 1+ 
    
 RBC COMMENTS MICROCYTOSIS 2+ 
    
 RBC COMMENTS ROULEAUX PRESENT 
    
GLUCOSE, POC Collection Time: 02/15/20  4:24 AM  
Result Value Ref Range Glucose (POC) 238 (H) 65 - 100 mg/dL Performed by [address], POC Collection Time: 02/15/20  4:31 AM  
Result Value Ref Range Glucose (POC) 235 (H) 65 - 100 mg/dL Performed by [facility] Collection Time: 02/15/20  4:32 AM  
Result Value Ref Range Glucose 235 mg/dL Insulin order 2.8 units/hour Insulin adminstered 2.8 units/hour  Multiplier 0.016   
 Low target 150 mg/dL High target 250 mg/dL D50 order 0.0 ml  
 D50 administered 0.00 ml Minutes until next BG 60 min Order initials vs   
 Administered initials vs   
 GLSCOM Comments TYPE + CROSSMATCH Collection Time: 02/15/20  4:48 AM  
Result Value Ref Range Crossmatch Expiration 02/18/2020 ABO/Rh(D) O POSITIVE Antibody screen NEG Unit number [id number] Blood component type RC LR,2 Unit division 00 Status of unit ISSUED Crossmatch result Compatible METABOLIC PANEL, BASIC Collection Time: 02/15/20  5:33 AM  
Result Value Ref Range Sodium 155 (H) 136 - 145 mmol/L Potassium 3.7 3.5 - 5.1 mmol/L Chloride 130 (H) 97 - 108 mmol/L  
 CO2 14 (LL) 21 - 32 mmol/L Anion gap 11 5 - 15 mmol/L Glucose 264 (H) 65 - 100 mg/dL BUN 80 (H) 6 - 20 MG/DL Creatinine 2.41 (H) 0.70 - 1.30 MG/DL  
 BUN/Creatinine ratio 33 (H) 12 - 20 GFR est AA 32 (L) >60 ml/min/1.73m2 GFR est non-AA 26 (L) >60 ml/min/1.73m2 Calcium 6.5 (L) 8.5 - 10.1 MG/DL  
GLUCOSE, POC Collection Time: 02/15/20  5:40 AM  
Result Value Ref Range Glucose (POC) 267 (H) 65 - 100 mg/dL Performed by [facility] Collection Time: 02/15/20  5:40 AM  
Result Value Ref Range Glucose 267 mg/dL Insulin order 5.4 units/hour Insulin adminstered 5.4 units/hour Multiplier 0.026 Low target 150 mg/dL High target 250 mg/dL D50 order 0.0 ml  
 D50 administered 0.00 ml Minutes until next BG 60 min Order initials vs   
 Administered initials vs   
 GLSCOM Comments POC EG7 Collection Time: 02/15/20  5:52 AM  
Result Value Ref Range Calcium, ionized (POC) 0.99 (L) 1.12 - 1.32 mmol/L  
 FIO2 (POC) 60 % pH (POC) 7.392 7.35 - 7.45    
 pCO2 (POC) 20.7 (L) 35.0 - 45.0 MMHG  
 pO2 (POC) 38 (LL) 80 - 100 MMHG  
 HCO3 (POC) 12.6 (L) 22 - 26 MMOL/L Base deficit (POC) 12 mmol/L  
 sO2 (POC) 74 (L) 92 - 97 %  [facility]    
 Device: VENT Mode ASSIST CONTROL Set Rate 14 bpm  
 PEEP/CPAP (POC) 8 cmH2O  
 PIP (POC) 14 Allens test (POC) N/A Inspiratory Time 1.43 sec Specimen type (POC) VENOUS BLOOD    
GLUCOSE, POC Collection Time: 02/15/20  6:50 AM  
Result Value Ref Range Glucose (POC) 252 (H) 65 - 100 mg/dL Performed by [facility] Collection Time: 02/15/20  6:50 AM  
Result Value Ref Range Glucose 252 mg/dL Insulin order 6.9 units/hour Insulin adminstered 6.9 units/hour Multiplier 0.036 Low target 150 mg/dL High target 250 mg/dL D50 order 0.0 ml  
 D50 administered 0.00 ml Minutes until next BG 60 min Order initials vs   
 Administered initials vs   
 GLSCOM Comments GLUCOSE, POC Collection Time: 02/15/20  7:54 AM  
Result Value Ref Range Glucose (POC) 203 (H) 65 - 100 mg/dL Performed by Teresa Cerda Collection Time: 02/15/20  7:55 AM  
Result Value Ref Range Glucose 203 mg/dL Insulin order 5.1 units/hour Insulin adminstered 5.1 units/hour Multiplier 0.036 Low target 150 mg/dL High target 250 mg/dL D50 order 0.0 ml  
 D50 administered 0.00 ml Minutes until next BG 60 min Order initials vs   
 Administered initials vs   
 GLSCOM Comments GLUCOSE, POC Collection Time: 02/15/20  9:00 AM  
Result Value Ref Range Glucose (POC) 180 (H) 65 - 100 mg/dL Performed by Teresa Cerda Collection Time: 02/15/20  9:01 AM  
Result Value Ref Range Glucose 180 mg/dL Insulin order 4.3 units/hour Insulin adminstered 4.3 units/hour Multiplier 0.036 Low target 150 mg/dL High target 250 mg/dL D50 order 0.0 ml  
 D50 administered 0.00 ml Minutes until next BG 60 min Order initials sm Administered initials sm GLSCOM Comments GLUCOSE, POC Collection Time: 02/15/20 10:06 AM  
Result Value Ref Range Glucose (POC) 169 (H) 65 - 100 mg/dL Performed by Mando Ford (DEBI) Yessi Crane Collection Time: 02/15/20 10:07 AM  
Result Value Ref Range Glucose 169 mg/dL Insulin order 3.9 units/hour Insulin adminstered 3.9 units/hour Multiplier 0.036 Low target 150 mg/dL High target 250 mg/dL D50 order 0.0 ml  
 D50 administered 0.00 ml Minutes until next BG 60 min Order initials ms Administered initials ms GLSCOM Comments Kendell Wallace MD 
[address] [place] [address] [facility] Phone - [phone] Fax - [phone] 
[email or web address]. [email or web address] Center[facility]

## 2020-02-15 NOTE — PROGRESS NOTES
[facility] 
 
ICU TEAM Progress Note Name: Mega Gutierrez : 1945 MRN: [id number] Date: 2/15/2020 HPI: 
Patient is a 49-year-old gentleman who was found unresponsive at at [place]. Blood gas was checked by EMS and route and read \"high\". In the emergency room on further work-up he was found to have severe DKA/HHS, acute kidney injury, severe lactic acidosis, hypothermia and a UTI. He was treated appropriately but at some point work of breathing worsened and he was intubated and transferred to ICU for continued management. Assessment:  
 
ICU Problems: Metabolic encephalopathy-secondary to diabetic emergency and septic process HHS/DKA Acute kidney injury - improving Septic shock, Urosepsis Severe Lactic acidosis Acute respiratory failure 2' to uncompensated metabolic acidosis in setting of septic shock - now requiring intubation Hypothermia - improved Leukocytosis White count up to 17 but patient on steroids. Anemia, GI bleed? Hypokalemia ICU Comprehensive Plan of Care:  
 
Plans for this Shift:  
1. Continue on mechanical ventilation. ABG showed decreased pCO2 so rate dropped to 12 from 14.   
2. Sedation/pain control with propofol and Tylenol as needed, daily awakening trials, early mobility as tolerated, avoid benzodiazepines, other delirium prevention strategies 3. Daily chest x-ray and ABG 4. Continue hemodynamic monitoring, map goal greater than 65 mmHg. 5. Titrate Levophed for map <65. 
6. Change IV fluids to D5 1/2 with 3 amps of NAHCO3.   
7. Nephrology following. 8. Continue trickle feeds with water flushes. 9. Decrease IV fluids first once sodium levels begin to stabilize 10. Continue broad-spectrum antibiotic coverage with Vanco and Zosyn follow-up cultures 11. Received one unit of packed cells over night. 12. Repeat ABG and labs this afternoon. 13. Potassium repleted. 14. Increase tube feeding flushes to 100 ml/hr from 50 ml/hr. Cardiac Gtts: Levophed SBP Goal of: > 90 mmHg MAP Goal of: > 65 mmHg Transfusion Trigger (Hgb): <7 g/dL Respiratory Goals: Chlorhexidine Optimize PEEP/Ventilation/Oxygenation Goal Tidal Volume 6 cc/kg based on IBW Aim for lung protective ventilation Head of bed > 30 degrees Aggressive bronchopulmonary hygiene SPO2 Goal: > 92% Pulmonary toilet: Suctioning as needed DVT Prophylaxis (if no, list reason): SCD's or Sequential Compression Device GI Prophylaxis: Pepcid (famotidine) Nutrition: Yes trickle tube feeds IVFs: Dextrose 5% with NaHCO3 Bowel Movement: Pending Bowel Regimen: None needed at this time Green Catheter Present: Yes Glycemic Control - Insulin: Yes Antibiotics:Vancomycin Zosyn Pain Medications: Dilaudid Target RASS: 0 - Alert & Calm - Spontaneously pays attention to caregiver Sedation Medications: Propofol CAM-ICU:  NA Mobility: Bedrest 
PT/OT: willconsult once appropriate Restraints: Soft wrist restraints Discussed Plan of Care/Code Status: Full Code T/L/D Tubes: ETT and Nasogastric Tube Lines: Arterial Line and [employer] Drains: Green Catheter Subjective:  
Progress Note: 2/15/2020 Reason for ICU Admission:  
Acute metabolic acidosis in the setting of septic shock Maintains Intubated and Sedated on mechanical ventilation On vasopressors Overnight Events: Maintained on vasopressors, continued on insulin drip on glucose stabilizer Active Problem List:  
 
Problem List  Date Reviewed: 12/4/2018 Codes Class DKA (diabetic ketoacidoses) ([facility] ICD-10-CM: E11.10 ICD-9-CM: 250.10 Encephalopathy ICD-10-CM: G93.40 ICD-9-CM: 348.30 Hyperkalemia ICD-10-CM: E87.5 ICD-9-CM: 276.7 Hypernatremia ICD-10-CM: E87.0 ICD-9-CM: 276.0 Altered mental status ICD-10-CM: R41.82 
ICD-9-CM: 780.97 Failure to thrive in adult ICD-10-CM: R62.7 ICD-9-CM: 783.7 JOSH (acute kidney injury) ([facility] ICD-10-CM: N17.9 ICD-9-CM: 584.9   
   
 CVA (cerebral vascular accident) ([facility] ICD-10-CM: I63.9 ICD-9-CM: 434.91   
   
 DM (diabetes mellitus) type II controlled with renal manifestation (HCC) ICD-10-CM: E11.29 ICD-9-CM: 250.40 Cataracts, bilateral ICD-10-CM: H26.9 ICD-9-CM: 366.9 Overview Signed 6/18/2014 11:37 AM by Jose G Castro 4/2014 Glaucoma, right eye ICD-10-CM: H40.9 ICD-9-CM: 365.9 HTN (hypertension) ICD-10-CM: I10 
ICD-9-CM: 401.9 Past Medical History:  
 
 has a past medical history of Diabetes ([facility] (2002), High cholesterol, Hypertension, and Stroke ([facility] Past Surgical History:  
 
 has no past surgical history on file. Home Medications:  
 
Prior to Admission medications Medication Sig Start Date End Date Taking? Authorizing Provider  
glipiZIDE (GLUCOTROL) 5 mg tablet Take 2.5 mg by mouth Daily (before breakfast). 30 min before breakfast   Yes Provider, Historical  
insulin lispro (HUMALOG U-100 INSULIN) 100 unit/mL injection by SubCUTAneous route Before breakfast, lunch, and dinner. SSI   Yes Provider, Historical  
amLODIPine (NORVASC) 5 mg tablet Take 1 Tab by mouth daily for 30 days. 1/31/20 3/1/20 Yes Yari Goodson MD  
atorvastatin (LIPITOR) 20 mg tablet Take 40 mg by mouth nightly. Yes Provider, Historical  
fish oil-omega-3 fatty acids (FISH OIL) 340-1,000 mg capsule Take 1 Cap by mouth daily. Yes Provider, Historical  
insulin glargine (LANTUS) 100 unit/mL injection 24 Units by SubCUTAneous route nightly. Yes Provider, Historical  
midodrine (PROAMITINE) 10 mg tablet Take 10 mg by mouth three (3) times daily. Yes Provider, Historical  
vitamin E (AQUA GEMS) 400 unit capsule Take 400 Units by mouth daily.    Yes Provider, Historical  
vit B Cmplx 3-FA-Vit C-Biotin (NEPHRO ALEN RX) 1- mg-mg-mcg tablet Take 1 Tab by mouth daily. 13  Yes Dipika Horton MD  
aspirin (ASPIRIN) 325 mg tablet Take 325 mg by mouth daily. Yes Provider, Historical  
 
 
Allergies/Social/Family History: No Known Allergies Social History Tobacco Use  Smoking status: Never Smoker  Smokeless tobacco: Never Used Substance Use Topics  Alcohol use: No  
  Alcohol/week: 0.0 standard drinks Family History Problem Relation Age of Onset  No Known Problems Mother  No Known Problems Father  No Known Problems Sister  No Known Problems Brother  No Known Problems Brother  No Known Problems Brother  No Known Problems Brother  No Known Problems Brother  No Known Problems Brother  No Known Problems Maternal Grandmother  No Known Problems Maternal Grandfather  No Known Problems Paternal Grandmother  No Known Problems Paternal Grandfather  Diabetes Neg Hx   
 Heart Disease Neg Hx Review of Systems:  
 
Review of systems not obtained due to patient factors. Intubated Objective:  
Vital Signs: 
Visit Vitals /70 Pulse 88 Temp 97.1 °F (36.2 °C) Resp 22 Ht 5' 5\" (1.651 m) Wt 57.1 kg (125 lb 14.1 oz) SpO2 99% BMI 20.95 kg/m² O2 Flow Rate (L/min): 4 l/min O2 Device: Endotracheal tube Temp (24hrs), Av °F (36.1 °C), Min:95.6 °F (35.3 °C), Max:97.8 °F (36.6 °C) Intake/Output:  
 
Intake/Output Summary (Last 24 hours) at 2/15/2020 1321 Last data filed at 2/15/2020 1100 Gross per 24 hour Intake 6001.98 ml Output 2935 ml Net 3066.98 ml Physical Exam: 
General:  Appears stated age, toxic, intubated and sedated Eye:  conjunctivae/corneas clear. Pupils are unequal (?previous surgery) and round Neurologic: Limited, patient sedated, does not follow commands on decreased sedation. Neck:  normal and no erythema or exudates noted. Lungs: Diminished; on ventilator Heart:  regular rate and rhythm, S1, S2 normal, no murmur, click, rub or gallop Abdomen:  soft, non-tender. Bowel sounds normal. No masses,  no organomegaly Cardiovascular:  Regular rate and rhythm, S1S2 present, without murmur or extra heart sounds, pedal pulses normal and no edema Skin:  Dry, decreased turgor, sacral unstagable wound present on admission, left heel with pressure area LABS AND  DATA: Personally reviewed Recent Labs  
  02/15/20 
0339 02/14/20 
0808 WBC 16.8* 10.6 HGB 6.6* 10.4* HCT 20.2* 31.4* PLT 45* 78* Recent Labs  
  02/15/20 
1013 02/15/20 
0533 02/15/20 
2650  02/14/20 
3527 * 155* 158*   < > 168* K 3.4* 3.7 3.9   < > 4.0  
* 130* 136*   < > 148* CO2 15* 14* 17*   < > 15* BUN 76* 80* 82*   < > 103* CREA 2.31* 2.41* 2.51*   < > 3.20* * 264* 168*   < > 161* CA 8.4* 6.5* 6.6*   < > 6.5* MG  --   --  1.5*  --  1.8 PHOS  --   --  3.5  --  2.8  
 < > = values in this interval not displayed. Recent Labs  
  02/14/20 
0459 02/13/20 
1130 SGOT  --  35  
AP  --  187* TP  --  8.6* ALB  --  2.3*  
GLOB  --  6.3*  
LPSE 94 193 No results for input(s): INR, PTP, APTT, INREXT, INREXT in the last 72 hours. Recent Labs  
  02/15/20 
1209 02/15/20 
4982 PHI 7.417 7.392 PCO2I 18.8* 20.7* PO2I 168* 38* FIO2I 60 60 Recent Labs  
  02/13/20 
1804 02/13/20 
1130 CPK 1,654*  --   
TROIQ  --  <0.05 Ventilator Settings: 
Mode Rate Tidal Volume Pressure FiO2 PEEP Assist control, Pressure control   300 ml    60 % 7 cm H20(weaned at this time per ABG PaO2 result 168) Peak airway pressure: 24 cm H2O Minute ventilation: 14.4 l/min MEDS: Reviewed Chest X-Ray: CXR Results  (Last 48 hours) 02/14/20 2122  XR CHEST PORT Final result Impression:  IMPRESSION: Left basilar consolidation. Narrative:  EXAM: XR CHEST PORT INDICATION: ? aspiration COMPARISON: 1541 hours FINDINGS: A portable AP radiograph of the chest was obtained at 2118 hours. An  
endotracheal tube is again shown terminating approximately 3 cm proximal to the  
madhu, transesophageal tube extending into the stomach, and a left subclavian  
line terminating in the superior vena cava. Patchy densities are noted at the  
left lung base which are concerning for developing consolidation. The right lung  
is clear. There is no pneumothorax or pleural effusion. Cardiac, mediastinal  
and hilar contours are normal.. 02/14/20 1542  XR CHEST PORT Final result Impression:  IMPRESSION: Central line placement. No pneumothorax demonstrated. Narrative:  EXAM: XR CHEST PORT INDICATION: central line placement COMPARISON: 2/13/2020 FINDINGS: A portable AP radiograph of the chest was obtained at 1544 hours. An  
endotracheal tube is again shown terminating approximately 3 cm above the level  
of the madhu. A transesophageal tube is demonstrated extending into the  
stomach. There is interval placement of a left subclavian line terminating in  
the superior vena cava. No pneumothorax or pleural effusion is shown. The lungs  
are clear. Cardiac, mediastinal and hilar contours are normal.  Mild thoracic  
spine degenerative changes are again shown.   
   
  
 02/13/20 1449  XR CHEST PORT Final result Impression:  IMPRESSION:  
No acute cardiopulmonary disease radiographically. . ET tube position as  
described. Alexandra Jauregui Narrative:  INDICATION:  post intubation EXAM: Chest single view. COMPARISON: Earlier same day. FINDINGS: A single frontal view of the chest at 1426 hours shows ET tube with  
its tip approximately 2.6 cm above the madhu. This is in satisfactory position. There are no acute infiltrates or pleural effusions. Lung volumes are increased  
since the prior study. .  The heart, mediastinum and pulmonary vasculature are stable . The bony thorax is unremarkable for age. . Gastric tube traverses the  
thorax with its tip over the left upper quadrant, in the expected position of  
the gastric fundus. CT Results  (Last 48 hours) None ECHO: 01/02/2020 Interpretation Summary Result status: Final result · Image quality for this study was technically difficult. · Agitated saline contrast study was performed. no evidence of right to left shunt · Normal cavity size and systolic function (ejection fraction normal). Estimated left ventricular ejection fraction is 55 - 60%. ABCDEF Bundle/Checklist Completed: 
Yes SPECIAL EQUIPMENT Mechanical Ventilator DISPOSITION Stay in ICU CRITICAL CARE CONSULTANT NOTE I had a face to face encounter with the patient, reviewed and interpreted patient data including clinical events, labs, images, vital signs, I/O's, and examined patient. I have discussed the case and the plan and management of the patient's care with the consulting services, the bedside nurses and the respiratory therapist.   
 
NOTE OF PERSONAL INVOLVEMENT IN CARE This patient has a high probability of imminent, clinically significant deterioration, which requires the highest level of p   reparedness to intervene urgently. I participated in the decision-making and personally managed or directed the management of the following life and organ supporting interventions that required my frequent assessment to treat or prevent imminent deterioration. I personally spent 30 minutes of critical care time. This is time spent at this critically ill patient's bedside actively involved in patient care as well as the coordination of care and discussions with the patient's family. This does not include any procedural time which has been billed separately. Suresh Cleveland, AGACNP-BC, MSN 
Critical Care Medicine [facility] 2/15/2020

## 2020-02-15 NOTE — ROUTINE PROCESS
2000. Bedside and Verbal shift change report given to [address]  (oncoming nurse) by Salma James and Robbie PRAJAPATI RN (offgoing nurse). Report included the following information SBAR, Kardex, Procedure Summary, Intake/Output, MAR, Accordion, Recent Results, Cardiac Rhythm nsr, Alarm Parameters  and Dual Neuro Assessment. 0800. Bedside and Verbal shift change report given to Rafal Cintron RN and Robbie PRAJAPATI RN (oncoming nurse) by [address] (offgoing nurse). Report included the following information SBAR, Kardex, Procedure Summary, Intake/Output, MAR, Accordion, Recent Results, Cardiac Rhythm nsr, Alarm Parameters  and Dual Neuro Assessment.

## 2020-02-15 NOTE — PROGRESS NOTES
2000. Care assumed, assessment completed.  
 
2100. Tube fdg residual 130 ml, bowel sounds very hypoactive. Pt's ETT suctioned for mod to large amt thick light tan colored secretions, ? resembling nepro tube fdg. Tube fdg on hold. Spoke with [place], NP at bedside, will stop tube fdg for now and obtain cxr. 
 
2130. Pt's temp 95.6, warming blanket placed. Will continue to hold tube fdgs, and recheck residual. 
 
2300. Residual is 20 ml, tube fdgs resumed. Suctioning ett now with negligible amt secretions obtained. 0200. Temp now 97, santiago hugger off.  
 
0600. Pt hgb 6.6 this am, ALFA Dalton NP called son Zainab Queen to obtain consent for blood transfusion. 0720. One unit PC transfusion started. Shift Summary:  All pressors weaned off over this past shift. Propofol weaned to 20 mcg, pt still occ will be tachypneic and having some vent asynchrony. Still minimally interactive, w/d to stimulus and opens eyes briefly. Has tolerated tube fdg after fdg restarted. Remains on insulin gtt as per by glucostabilizer. BMP q 4h, all criitcal values relayed to MD and / or NP for orders. One unit packed cells infusing at shift change.

## 2020-02-15 NOTE — PROGRESS NOTES
Bedside and Verbal shift change report given to Rafal RN (oncoming nurse) by Thomas Galeazzi RN (offgoing nurse). Report included the following information SBAR.

## 2020-02-16 NOTE — PROGRESS NOTES
[facility] 
 
ICU TEAM Progress Note Name: Claudia Cho : 1945 MRN: [id number] Date: 2020 HPI: 
Patient is a 80-year-old gentleman who was found unresponsive at at [facility]. Blood gas was checked by EMS and route and read \"high\". In the emergency room on further work-up he was found to have severe DKA/HHS, acute kidney injury, severe lactic acidosis, hypothermia and a UTI. He was treated appropriately but at some point work of breathing worsened and he was intubated and transferred to ICU for continued management. Assessment:  
 
ICU Problems: Metabolic encephalopathy-secondary to diabetic emergency and septic process HHS/DKA Acute kidney injury - improving Septic shock, Urosepsis Acute respiratory failure 2' to uncompensated metabolic acidosis in setting of septic shock - now requiring intubation Metabolic Alkalosis - overcorrected acidosis, stop bicarb gtt. Leukocytosis - improved Anemia - no signs of active bleed. H/H stable at 8.0  ? GI bleed Hypokalemia - replacing ICU Comprehensive Plan of Care:  
 
Plans for this Shift:  
1. Continue on mechanical ventilation. 2. Sedation/pain control with propofol and Tylenol as needed, daily awakening trials, early mobility as tolerated, avoid benzodiazepines, other delirium prevention strategies 3. Daily chest x-ray and ABG. 4. Continue hemodynamic monitoring, map goal greater than 65 mmHg. 5. Titrate Levophed for map <65.  
6. Discontinue Bicarb gtt. Start on Dextrose 5% @ 125ml/hr 7. Start on Lantus 24 unit Daily and stop insulin gtt in 4 hours. Start on SSI q 4hrs. 8. Nephrology following. 9. Continue trickle feeds with water flushes. 10. Continue broad-spectrum antibiotic coverage with Vanco and Zosyn follow-up cultures 11. Repeat ABG and labs this afternoon. 12. Potassium repleted. 13. Free H2O flushes to  ml/hr . 14. Decrease hydrocortisone to 50 mg q 8 hrs. Cardiac Gtts: None SBP Goal of: > 90 mmHg MAP Goal of: > 65 mmHg Transfusion Trigger (Hgb): <7 g/dL Respiratory Goals: Chlorhexidine Optimize PEEP/Ventilation/Oxygenation Goal Tidal Volume 6 cc/kg based on IBW Aim for lung protective ventilation Head of bed > 30 degrees Aggressive bronchopulmonary hygiene SPO2 Goal: > 92% Pulmonary toilet: Suctioning as needed DVT Prophylaxis (if no, list reason): SCD's or Sequential Compression Device GI Prophylaxis: Pepcid (famotidine) Nutrition: Yes Increase TF IVFs: Dextrose 5% Bowel Movement: Pending Bowel Regimen: None needed at this time Green Catheter Present: Yes Glycemic Control - Insulin: Yes Antibiotics:Vancomycin Zosyn Pain Medications: Dilaudid Target RASS: 0 - Alert & Calm - Spontaneously pays attention to caregiver Sedation Medications: Propofol CAM-ICU:  NA Mobility: Bedrest 
PT/OT: willconsult once appropriate Restraints: Soft wrist restraints Discussed Plan of Care/Code Status: Full Code T/L/D Tubes: ETT and Nasogastric Tube Lines: Arterial Line and [employer] Drains: Green Catheter Subjective:  
Progress Note: 2/16/2020 Reason for ICU Admission:  
Acute metabolic acidosis in the setting of septic shock Maintains Intubated and Sedated on mechanical ventilation On vasopressors Overnight Events: Maintained on vasopressors, continued on insulin drip on glucose stabilizer Active Problem List:  
 
Problem List  Date Reviewed: 12/4/2018 Codes Class DKA (diabetic ketoacidoses) ([facility] ICD-10-CM: E11.10 ICD-9-CM: 250.10 Encephalopathy ICD-10-CM: G93.40 ICD-9-CM: 348.30 Hyperkalemia ICD-10-CM: E87.5 ICD-9-CM: 276.7 Hypernatremia ICD-10-CM: E87.0 ICD-9-CM: 276.0 Altered mental status ICD-10-CM: R41.82 
ICD-9-CM: 780.97 Failure to thrive in adult ICD-10-CM: R62.7 ICD-9-CM: 783.7 JOSH (acute kidney injury) ([facility] ICD-10-CM: N17.9 ICD-9-CM: 584.9 CVA (cerebral vascular accident) [facility]) ICD-10-CM: I63.9 ICD-9-CM: 434.91   
   
 DM (diabetes mellitus) type II controlled with renal manifestation (HCC) ICD-10-CM: E11.29 ICD-9-CM: 250.40 Cataracts, bilateral ICD-10-CM: H26.9 ICD-9-CM: 366.9 Overview Signed 6/18/2014 11:37 AM by Chau Singletary Removed 4/2014 Glaucoma, right eye ICD-10-CM: H40.9 ICD-9-CM: 365.9 HTN (hypertension) ICD-10-CM: I10 
ICD-9-CM: 401.9 Past Medical History:  
 
 has a past medical history of Diabetes ([facility] (2002), High cholesterol, Hypertension, and Stroke ([facility] Past Surgical History:  
 
 has no past surgical history on file. Home Medications:  
 
Prior to Admission medications Medication Sig Start Date End Date Taking? Authorizing Provider  
glipiZIDE (GLUCOTROL) 5 mg tablet Take 2.5 mg by mouth Daily (before breakfast). 30 min before breakfast   Yes Provider, Historical  
insulin lispro (HUMALOG U-100 INSULIN) 100 unit/mL injection by SubCUTAneous route Before breakfast, lunch, and dinner. SSI   Yes Provider, Historical  
amLODIPine (NORVASC) 5 mg tablet Take 1 Tab by mouth daily for 30 days. 1/31/20 3/1/20 Yes Iraida Palma MD  
atorvastatin (LIPITOR) 20 mg tablet Take 40 mg by mouth nightly. Yes Provider, Historical  
fish oil-omega-3 fatty acids (FISH OIL) 340-1,000 mg capsule Take 1 Cap by mouth daily. Yes Provider, Historical  
insulin glargine (LANTUS) 100 unit/mL injection 24 Units by SubCUTAneous route nightly. Yes Provider, Historical  
midodrine (PROAMITINE) 10 mg tablet Take 10 mg by mouth three (3) times daily. Yes Provider, Historical  
vitamin E (AQUA GEMS) 400 unit capsule Take 400 Units by mouth daily. Yes Provider, Historical  
vit B Cmplx 3-FA-Vit C-Biotin (NEPHRO ALEN RX) 1- mg-mg-mcg tablet Take 1 Tab by mouth daily.  2/20/13  Yes Chau Singletary MD  
 aspirin (ASPIRIN) 325 mg tablet Take 325 mg by mouth daily. Yes Provider, Historical  
 
 
Allergies/Social/Family History: No Known Allergies Social History Tobacco Use  Smoking status: Never Smoker  Smokeless tobacco: Never Used Substance Use Topics  Alcohol use: No  
  Alcohol/week: 0.0 standard drinks Family History Problem Relation Age of Onset  No Known Problems Mother  No Known Problems Father  No Known Problems Sister  No Known Problems Brother  No Known Problems Brother  No Known Problems Brother  No Known Problems Brother  No Known Problems Brother  No Known Problems Brother  No Known Problems Maternal Grandmother  No Known Problems Maternal Grandfather  No Known Problems Paternal Grandmother  No Known Problems Paternal Grandfather  Diabetes Neg Hx   
 Heart Disease Neg Hx Review of Systems:  
 
Review of systems not obtained due to patient factors. Intubated Objective:  
Vital Signs: 
Visit Vitals /78 Pulse 84 Temp 97.4 °F (36.3 °C) Resp 14 Ht 5' 5\" (1.651 m) Wt 58.2 kg (128 lb 4.9 oz) SpO2 97% BMI 21.35 kg/m² O2 Flow Rate (L/min): 4 l/min O2 Device: Endotracheal tube Temp (24hrs), Av.3 °F (36.3 °C), Min:96.2 °F (35.7 °C), Max:98.5 °F (36.9 °C) Intake/Output:  
 
Intake/Output Summary (Last 24 hours) at 2020 1611 Last data filed at 2020 1600 Gross per 24 hour Intake 5279.2 ml Output 3760 ml Net 1519.2 ml Physical Exam: 
General:  Appears stated age, toxic, intubated and sedated Eye:  conjunctivae/corneas clear. Pupils are unequal (?previous surgery) and round Neurologic: Limited, patient sedated, does not follow commands on decreased sedation. Neck:  normal and no erythema or exudates noted. Lungs: Diminished; on ventilator Heart:  regular rate and rhythm, S1, S2 normal, no murmur, click, rub or gallop Abdomen:  soft, non-tender. Bowel sounds normal. No masses,  no organomegaly Cardiovascular:  Regular rate and rhythm, S1S2 present, without murmur or extra heart sounds, pedal pulses normal and no edema Skin:  Dry, decreased turgor, sacral unstagable wound present on admission, left heel with pressure area LABS AND  DATA: Personally reviewed Recent Labs  
  02/16/20 
0443 02/15/20 
1409 WBC 14.8* 14.6* HGB 8.0* 8.1* HCT 23.4* 25.6*  
PLT 38* 41* Recent Labs  
  02/16/20 
1414 02/16/20 
9813 * 157* K 3.4* 3.8 * 130* CO2 24 24 BUN 57* 57* CREA 1.72* 1.84* * 213* CA 7.8* 7.8*  
MG 1.9 2.0 PHOS 2.2* 1.9* Recent Labs  
  02/14/20 
0459 LPSE 94 No results for input(s): INR, PTP, APTT, INREXT, INREXT in the last 72 hours. Recent Labs  
  02/16/20 
0900 02/15/20 
1756 PHI 7.582* 7.434 PCO2I 24.8* 21.4*  
PO2I 94 163* FIO2I 0.50 0.60 Recent Labs  
  02/13/20 
1804 CPK 1,654* Ventilator Settings: 
Mode Rate Tidal Volume Pressure FiO2 PEEP Assist control, Pressure control   300 ml    50 % 6 cm H20 Peak airway pressure: 21 cm H2O Minute ventilation: 8.84 l/min MEDS: Reviewed Chest X-Ray: CXR Results  (Last 48 hours) 02/14/20 2122  XR CHEST PORT Final result Impression:  IMPRESSION: Left basilar consolidation. Narrative:  EXAM: XR CHEST PORT INDICATION: ? aspiration COMPARISON: 1541 hours FINDINGS: A portable AP radiograph of the chest was obtained at 2118 hours. An  
endotracheal tube is again shown terminating approximately 3 cm proximal to the  
madhu, transesophageal tube extending into the stomach, and a left subclavian  
line terminating in the superior vena cava. Patchy densities are noted at the  
left lung base which are concerning for developing consolidation. The right lung  
is clear. There is no pneumothorax or pleural effusion.   Cardiac, mediastinal  
 and hilar contours are normal.. CT Results  (Last 48 hours) None ECHO: 01/02/2020 Interpretation Summary Result status: Final result · Image quality for this study was technically difficult. · Agitated saline contrast study was performed. no evidence of right to left shunt · Normal cavity size and systolic function (ejection fraction normal). Estimated left ventricular ejection fraction is 55 - 60%. ABCDEF Bundle/Checklist Completed: 
Yes SPECIAL EQUIPMENT Mechanical Ventilator DISPOSITION Stay in ICU CRITICAL CARE CONSULTANT NOTE I had a face to face encounter with the patient, reviewed and interpreted patient data including clinical events, labs, images, vital signs, I/O's, and examined patient. I have discussed the case and the plan and management of the patient's care with the consulting services, the bedside nurses and the respiratory therapist.   
 
NOTE OF PERSONAL INVOLVEMENT IN CARE This patient has a high probability of imminent, clinically significant deterioration, which requires the highest level of p   reparedness to intervene urgently. I participated in the decision-making and personally managed or directed the management of the following life and organ supporting interventions that required my frequent assessment to treat or prevent imminent deterioration. I personally spent 45 minutes of critical care time. This is time spent at this critically ill patient's bedside actively involved in patient care as well as the coordination of care and discussions with the patient's family. This does not include any procedural time which has been billed separately. Valentina Jenkins [facility] Critical [facility]

## 2020-02-16 NOTE — PROGRESS NOTES
Shift Summary:   Neurologically, pt remains minimally interactive, although he is on propofol at 20 mcg. Not lightened due to his significantly increased work of breathing and marked tachypnea when sedation lifted. Pt remains on insulin gtt for duration of night. Continuing serial labs q4h, with ongoing electrolyte replacement as guided by electrolyte replacement protocol. Remains off pressors. Remains on vent, desat with turning, recovering with suctioning and rest after ~ 15 -20 min.  Suctioning copious tan secretions from ETT by this am.

## 2020-02-16 NOTE — PROGRESS NOTES
0730: Bedside shift change report given to [address] [place] [address] and Rafal RN (oncoming nurse) by [employer] (offgoing nurse). Report included the following information SBAR.  
 
0800: Shift assessment 
 
0900: Abilio MALDONADO notified of critical ABG pH 7.582. Continue to monitor and repeat ABG at 1600. Bicarb gtt d/c.  
 
1200: d/c glucostabilizer per [facility] AT [place] NP orders. Now on lantus and sliding scale. Thick, tan//brown secretions with inline suctioning. 1600: Opens eyes to voice,  with left hand, moves BLE to light touch. Withdraws RUE but no . Pupils are sluggish 2mm bilaterally, opacities L eye. Weak cough with suctioning. Slight command following. Abilio MALDONADO notified of neuro changes. 1700: Sister at bedside 1720: Updated son [place] Popper over the phone. 1736: Repeat blood gas shows improved pH 7.52.  
 
1930: Bedside shift change report given to RN (oncoming nurse) by [address] [place] [address] and Rafal RN (offgoing nurse). Report included the following information SBAR and Cardiac Rhythm NSR.

## 2020-02-16 NOTE — PROGRESS NOTES
Nephrology Progress Note Chaparrita Weller Date of Admission : 2/13/2020 CC: Follow up for JOSH, hypernatremia Assessment and Plan JOSH on CKD: 
- likely from volume depletion from hyperglycemia vs ATN from hypotension and sepsis 
- neg renal U/S on admission - Cr improving 
- cont D5W - increase rate 
- daily labs 
  
Hypernatremia: 
- from dehydration/lack of free water intake  
- increase D5W rate 
  
Sepsis: 
- presumed urosepsis - f/u cultures 
- on broad spectrum abx and pressors 
  
Anemia: 
- ? Blood loss vs dilutional 
- getting PRBCs this AM 
 
Hypotension: 
- on alise 
- keep MAP > 65 
  
CKD III w/ baseline Cr 1.4 to 1.6: 
- likely from DM and HTN 
  
HHNK: 
- per CCM 
  
Volume depletion: 
- cont IVF as above 
  
Encephalopathy: 
- from infection, hyperglycemia and hypernatremia 
- sedated on the vent 
  
DM2: 
- on insulin drip now FTT Dementia Interval History: 
Seen and examined. Off pressors, stable UOP, Cr improving. Na up. Was on bicarb drip that was stopped this AM.  Unable to obtain ROS Current Medications: all current  Medications have been eviewed in [facility] Review of Systems: Pertinent items are noted in HPI. Objective: 
Vitals:   
Vitals:  
 02/16/20 0858 02/16/20 0900 02/16/20 1000 02/16/20 1100 BP:  148/79 142/78 150/76 Pulse: 93 93 92 91 Resp: 19 19 18 19 Temp:      
SpO2: 97% 97% 97% 97% Weight:      
Height:      
 
Intake and Output: 
02/16 0701 - 02/16 1900 In: 686.1 [I.V.:346.1] Out: 530 [Urine:530] 02/14 1901 - 02/16 0700 In: 8845.3 [I.V.:6625.3] Out: 6030 [OZXTY:4300] Physical Examination: 
Pt intubated    Yes General: Sedated on the vent Neck:  Supple, no mass Resp:  Lungs CTA B/L, no wheezing , normal respiratory effort CV:  RRR,  no murmur or rub,no LE edema GI:  Soft, NT, + Bowel sounds, no hepatosplenomegaly Neurologic:  sedated Psych:             Unable to assess Skin:  No Rash :  Green in place []    High complexity decision making was performed 
[]    Patient is at high-risk of decompensation with multiple organ involvement Lab Data Personally Reviewed: I have reviewed all the pertinent labs, microbiology data and radiology studies during assessment. Recent Labs  
  02/16/20 
8403 02/16/20 
0443 02/16/20 
0030  02/15/20 
7017 * 160* 158*   < > 158* K 3.8 3.3* 3.0*   < > 3.9 * 132* 130*   < > 136* CO2 24 22 20*   < > 17* * 106* 247*   < > 168* BUN 57* 61* 66*   < > 82* CREA 1.84* 1.87* 1.96*   < > 2.51* CA 7.8* 7.9* 7.5*   < > 6.6*  
MG 2.0  --  1.6  --  1.5* PHOS 1.9* 2.1* 2.8   < > 3.5  
 < > = values in this interval not displayed. Recent Labs  
  02/16/20 
0443 02/15/20 
1409 02/15/20 
8371 WBC 14.8* 14.6* 16.8* HGB 8.0* 8.1* 6.6* HCT 23.4* 25.6* 20.2* PLT 38* 41* 45* No results found for: SDES Lab Results Component Value Date/Time Culture result: HEAVY NORMAL RESPIRATORY CEDRICK 02/14/2020 01:41 PM  
 Culture result: HEAVY YEAST, (APPARENT CANDIDA ALBICANS) (A) 02/14/2020 01:41 PM  
 Culture result: ESCHERICHIA COLI (A) 02/13/2020 06:05 PM  
 
Recent Results (from the past 24 hour(s)) POC EG7 Collection Time: 02/15/20 12:09 PM  
Result Value Ref Range Calcium, ionized (POC) 1.27 1.12 - 1.32 mmol/L  
 FIO2 (POC) 60 % pH (POC) 7.417 7.35 - 7.45    
 pCO2 (POC) 18.8 (L) 35.0 - 45.0 MMHG  
 pO2 (POC) 168 (H) 80 - 100 MMHG  
 HCO3 (POC) 12.1 (L) 22 - 26 MMOL/L Base deficit (POC) 12 mmol/L  
 sO2 (POC) 100 (H) 92 - 97 % Site RIGHT BRACHIAL Device: VENT Mode ASSIST CONTROL Set Rate 14 bpm  
 PEEP/CPAP (POC) 8 cmH2O  
 PIP (POC) 15 Allens test (POC) N/A Inspiratory Time 1.43 sec Specimen type (POC) ARTERIAL Pressure control YES    
GLUCOSE, POC Collection Time: 02/15/20 12:25 PM  
Result Value Ref Range Glucose (POC) 160 (H) 65 - 100 mg/dL  Performed by Humberto Pennington  
 Collection Time: 02/15/20 12:25 PM  
Result Value Ref Range Glucose 160 mg/dL Insulin order 3.6 units/hour Insulin adminstered 3.6 units/hour Multiplier 0.036 Low target 150 mg/dL High target 250 mg/dL D50 order 0.0 ml  
 D50 administered 0.00 ml Minutes until next  min Order initials sd Administered initials sd GLSCOM Comments METABOLIC PANEL, BASIC Collection Time: 02/15/20  2:09 PM  
Result Value Ref Range Sodium 155 (H) 136 - 145 mmol/L Potassium 3.3 (L) 3.5 - 5.1 mmol/L Chloride 132 (H) 97 - 108 mmol/L  
 CO2 15 (LL) 21 - 32 mmol/L Anion gap 8 5 - 15 mmol/L Glucose 161 (H) 65 - 100 mg/dL BUN 73 (H) 6 - 20 MG/DL Creatinine 2.18 (H) 0.70 - 1.30 MG/DL  
 BUN/Creatinine ratio 33 (H) 12 - 20 GFR est AA 36 (L) >60 ml/min/1.73m2 GFR est non-AA 30 (L) >60 ml/min/1.73m2 Calcium 8.0 (L) 8.5 - 10.1 MG/DL  
CBC W/O DIFF Collection Time: 02/15/20  2:09 PM  
Result Value Ref Range WBC 14.6 (H) 4.1 - 11.1 K/uL  
 RBC 2.84 (L) 4.10 - 5.70 M/uL HGB 8.1 (L) 12.1 - 17.0 g/dL HCT 25.6 (L) 36.6 - 50.3 % MCV 90.1 80.0 - 99.0 FL  
 MCH 28.5 26.0 - 34.0 PG  
 MCHC 31.6 30.0 - 36.5 g/dL  
 RDW 16.4 (H) 11.5 - 14.5 % PLATELET 41 (LL) 068 - 400 K/uL NRBC 0.3 (H) 0  WBC ABSOLUTE NRBC 0.05 (H) 0.00 - 0.01 K/uL PHOSPHORUS Collection Time: 02/15/20  2:09 PM  
Result Value Ref Range Phosphorus 3.7 2.6 - 4.7 MG/DL Moe Legato Collection Time: 02/15/20  2:09 PM  
Result Value Ref Range Vancomycin, random 15.5 UG/ML  
GLUCOSE, POC Collection Time: 02/15/20  2:27 PM  
Result Value Ref Range Glucose (POC) 147 (H) 65 - 100 mg/dL Performed by Blanca Jacinto Collection Time: 02/15/20  2:28 PM  
Result Value Ref Range Glucose 147 mg/dL Insulin order 2.3 units/hour Insulin adminstered 2.3 units/hour Multiplier 0.026 Low target 150 mg/dL High target 250 mg/dL D50 order 0.0 ml  
 D50 administered 0.00 ml Minutes until next BG 60 min Order initials hyacinth Administered initials hyacinth GLSCOM Comments GLUCOSE, POC Collection Time: 02/15/20  3:34 PM  
Result Value Ref Range Glucose (POC) 150 (H) 65 - 100 mg/dL Performed by VANCL Collection Time: 02/15/20  3:35 PM  
Result Value Ref Range Glucose 150 mg/dL Insulin order 2.3 units/hour Insulin adminstered 2.3 units/hour Multiplier 0.026 Low target 150 mg/dL High target 250 mg/dL D50 order 0.0 ml  
 D50 administered 0.00 ml Minutes until next BG 60 min Order initials SM Administered initials SM   
 GLSCOM Comments GLUCOSE, POC Collection Time: 02/15/20  4:37 PM  
Result Value Ref Range Glucose (POC) 132 (H) 65 - 100 mg/dL Performed by VANCL Collection Time: 02/15/20  4:38 PM  
Result Value Ref Range Glucose 132 mg/dL Insulin order 1.2 units/hour Insulin adminstered 1.2 units/hour Multiplier 0.016 Low target 150 mg/dL High target 250 mg/dL D50 order 0.0 ml  
 D50 administered 0.00 ml Minutes until next BG 60 min Order initials sm Administered initials sm GLSCOM Comments GLUCOSE, POC Collection Time: 02/15/20  5:45 PM  
Result Value Ref Range Glucose (POC) 131 (H) 65 - 100 mg/dL Performed by Rhodia Sicard (CON) Rannie Cast Collection Time: 02/15/20  5:46 PM  
Result Value Ref Range Glucose 131 mg/dL Insulin order 0.4 units/hour Insulin adminstered 0.4 units/hour Multiplier 0.006 Low target 150 mg/dL High target 250 mg/dL D50 order 0.0 ml  
 D50 administered 0.00 ml Minutes until next BG 60 min Order initials ms Administered initials ms GLSCOM Comments POC EG7 Collection Time: 02/15/20  5:56 PM  
Result Value Ref Range Calcium, ionized (POC) 1.21 1.12 - 1.32 mmol/L  
 FIO2 (POC) 0.60 % pH (POC) 7.434 7.35 - 7.45    
 pCO2 (POC) 21.4 (L) 35.0 - 45.0 MMHG  
 pO2 (POC) 163 (H) 80 - 100 MMHG  
 HCO3 (POC) 14.3 (L) 22 - 26 MMOL/L Base deficit (POC) 10 mmol/L  
 sO2 (POC) 100 (H) 92 - 97 % Site RIGHT BRACHIAL Device: VENT Mode ASSIST CONTROL Tidal volume 580 ml Set Rate 12 bpm  
 PEEP/CPAP (POC) 7.0 cmH2O  
 PIP (POC) 24 Allens test (POC) YES Inspiratory Time 1.43 sec Specimen type (POC) ARTERIAL Total resp. rate 22 GLUCOSE, POC Collection Time: 02/15/20  6:48 PM  
Result Value Ref Range Glucose (POC) 158 (H) 65 - 100 mg/dL Performed by Miguelito Aviles Collection Time: 02/15/20  6:49 PM  
Result Value Ref Range Glucose 158 mg/dL Insulin order 0.6 units/hour Insulin adminstered 0.6 units/hour Multiplier 0.006 Low target 150 mg/dL High target 250 mg/dL D50 order 0.0 ml  
 D50 administered 0.00 ml Minutes until next BG 60 min Order initials jsh Administered initials js GLSCOM Comments METABOLIC PANEL, BASIC Collection Time: 02/15/20  7:45 PM  
Result Value Ref Range Sodium 158 (H) 136 - 145 mmol/L Potassium 3.7 3.5 - 5.1 mmol/L Chloride 132 (H) 97 - 108 mmol/L  
 CO2 19 (L) 21 - 32 mmol/L Anion gap 7 5 - 15 mmol/L Glucose 221 (H) 65 - 100 mg/dL BUN 68 (H) 6 - 20 MG/DL Creatinine 2.03 (H) 0.70 - 1.30 MG/DL  
 BUN/Creatinine ratio 33 (H) 12 - 20 GFR est AA 39 (L) >60 ml/min/1.73m2 GFR est non-AA 32 (L) >60 ml/min/1.73m2 Calcium 7.7 (L) 8.5 - 10.1 MG/DL  
GLUCOSE, POC Collection Time: 02/15/20  7:47 PM  
Result Value Ref Range Glucose (POC) 218 (H) 65 - 100 mg/dL Performed by Janie Fung Collection Time: 02/15/20  7:47 PM  
Result Value Ref Range Glucose 218 mg/dL Insulin order 0.9 units/hour Insulin adminstered 0.9 units/hour Multiplier 0.006 Low target 150 mg/dL High target 250 mg/dL  D50 order 0.0 ml  
 D50 administered 0.00 ml Minutes until next BG 60 min Order initials sm Administered initials sm GLSCOM Comments GLUCOSE, POC Collection Time: 02/15/20  8:59 PM  
Result Value Ref Range Glucose (POC) 265 (H) 65 - 100 mg/dL Performed by Harsh Ybarra Collection Time: 02/15/20  9:00 PM  
Result Value Ref Range Glucose 265 mg/dL Insulin order 3.3 units/hour Insulin adminstered 3.3 units/hour Multiplier 0.016 Low target 150 mg/dL High target 250 mg/dL D50 order 0.0 ml  
 D50 administered 0.00 ml Minutes until next BG 60 min Order initials NDW Administered initials NDW   
 GLSCOM Comments GLUCOSE, POC Collection Time: 02/15/20 10:06 PM  
Result Value Ref Range Glucose (POC) 312 (H) 65 - 100 mg/dL Performed by [facility] Collection Time: 02/15/20 10:07 PM  
Result Value Ref Range Glucose 312 mg/dL Insulin order 6.6 units/hour Insulin adminstered 6.6 units/hour Multiplier 0.026 Low target 150 mg/dL High target 250 mg/dL D50 order 0.0 ml  
 D50 administered 0.00 ml Minutes until next BG 60 min Order initials vs   
 Administered initials vs   
 GLSCOM Comments GLUCOSE, POC Collection Time: 02/15/20 11:25 PM  
Result Value Ref Range Glucose (POC) 284 (H) 65 - 100 mg/dL Performed by [facility] Collection Time: 02/15/20 11:25 PM  
Result Value Ref Range Glucose 284 mg/dL Insulin order 8.1 units/hour Insulin adminstered 8.1 units/hour Multiplier 0.036 Low target 150 mg/dL High target 250 mg/dL D50 order 0.0 ml  
 D50 administered 0.00 ml Minutes until next BG 60 min Order initials vs   
 Administered initials vs   
 GLSCOM Comments METABOLIC PANEL, BASIC Collection Time: 02/16/20 12:30 AM  
Result Value Ref Range Sodium 158 (H) 136 - 145 mmol/L  Potassium 3.0 (L) 3.5 - 5.1 mmol/L  
 Chloride 130 (H) 97 - 108 mmol/L  
 CO2 20 (L) 21 - 32 mmol/L Anion gap 8 5 - 15 mmol/L Glucose 247 (H) 65 - 100 mg/dL BUN 66 (H) 6 - 20 MG/DL Creatinine 1.96 (H) 0.70 - 1.30 MG/DL  
 BUN/Creatinine ratio 34 (H) 12 - 20 GFR est AA 41 (L) >60 ml/min/1.73m2 GFR est non-AA 34 (L) >60 ml/min/1.73m2 Calcium 7.5 (L) 8.5 - 10.1 MG/DL MAGNESIUM Collection Time: 02/16/20 12:30 AM  
Result Value Ref Range Magnesium 1.6 1.6 - 2.4 mg/dL PHOSPHORUS Collection Time: 02/16/20 12:30 AM  
Result Value Ref Range Phosphorus 2.8 2.6 - 4.7 MG/DL  
GLUCOSE, POC Collection Time: 02/16/20 12:36 AM  
Result Value Ref Range Glucose (POC) 265 (H) 65 - 100 mg/dL Performed by [facility] Collection Time: 02/16/20 12:38 AM  
Result Value Ref Range Glucose 265 mg/dL Insulin order 9.4 units/hour Insulin adminstered 9.4 units/hour Multiplier 0.046 Low target 150 mg/dL High target 250 mg/dL D50 order 0.0 ml  
 D50 administered 0.00 ml Minutes until next BG 60 min Order initials vs   
 Administered initials vs   
 GLSCOM Comments GLUCOSE, POC Collection Time: 02/16/20  1:43 AM  
Result Value Ref Range Glucose (POC) 212 (H) 65 - 100 mg/dL Performed by [facility] Collection Time: 02/16/20  1:43 AM  
Result Value Ref Range Glucose 212 mg/dL Insulin order 7.0 units/hour Insulin adminstered 7.0 units/hour Multiplier 0.046 Low target 150 mg/dL High target 250 mg/dL D50 order 0.0 ml  
 D50 administered 0.00 ml Minutes until next BG 60 min Order initials vs   
 Administered initials vs   
 GLSCOM Comments GLUCOSE, POC Collection Time: 02/16/20  2:48 AM  
Result Value Ref Range Glucose (POC) 155 (H) 65 - 100 mg/dL Performed by [facility] Collection Time: 02/16/20  2:49 AM  
Result Value Ref Range Glucose 155 mg/dL Insulin order 4.4 units/hour Insulin adminstered 4.4 units/hour Multiplier 0.046 Low target 150 mg/dL High target 250 mg/dL D50 order 0.0 ml  
 D50 administered 0.00 ml Minutes until next BG 60 min Order initials vs   
 Administered initials VS   
 GLSCOM Comments GLUCOSE, POC Collection Time: 02/16/20  4:14 AM  
Result Value Ref Range Glucose (POC) 116 (H) 65 - 100 mg/dL Performed by [address] [place], [place] [address]ming Oconnell, POC Collection Time: 02/16/20  4:16 AM  
Result Value Ref Range Glucose (POC) 111 (H) 65 - 100 mg/dL Performed by [facility] Collection Time: 02/16/20  4:16 AM  
Result Value Ref Range Glucose 111 mg/dL Insulin order 1.8 units/hour Insulin adminstered 1.8 units/hour Multiplier 0.036 Low target 150 mg/dL High target 250 mg/dL D50 order 0.0 ml  
 D50 administered 0.00 ml Minutes until next BG 60 min Order initials vs   
 Administered initials vs   
 GLSCOM Comments METABOLIC PANEL, BASIC Collection Time: 02/16/20  4:43 AM  
Result Value Ref Range Sodium 160 (H) 136 - 145 mmol/L Potassium 3.3 (L) 3.5 - 5.1 mmol/L Chloride 132 (H) 97 - 108 mmol/L  
 CO2 22 21 - 32 mmol/L Anion gap 6 5 - 15 mmol/L Glucose 106 (H) 65 - 100 mg/dL BUN 61 (H) 6 - 20 MG/DL Creatinine 1.87 (H) 0.70 - 1.30 MG/DL  
 BUN/Creatinine ratio 33 (H) 12 - 20 GFR est AA 43 (L) >60 ml/min/1.73m2 GFR est non-AA 35 (L) >60 ml/min/1.73m2 Calcium 7.9 (L) 8.5 - 10.1 MG/DL  
CBC WITH AUTOMATED DIFF Collection Time: 02/16/20  4:43 AM  
Result Value Ref Range WBC 14.8 (H) 4.1 - 11.1 K/uL  
 RBC 2.82 (L) 4.10 - 5.70 M/uL HGB 8.0 (L) 12.1 - 17.0 g/dL HCT 23.4 (L) 36.6 - 50.3 % MCV 83.0 80.0 - 99.0 FL  
 MCH 28.4 26.0 - 34.0 PG  
 MCHC 34.2 30.0 - 36.5 g/dL  
 RDW 15.3 (H) 11.5 - 14.5 % PLATELET 38 (LL) 283 - 400 K/uL NRBC 0.3 (H) 0  WBC ABSOLUTE NRBC 0.05 (H) 0.00 - 0.01 K/uL NEUTROPHILS 80 (H) 32 - 75 % BAND NEUTROPHILS 10 (H) 0 - 6 % LYMPHOCYTES 8 (L) 12 - 49 % MONOCYTES 2 (L) 5 - 13 % EOSINOPHILS 0 0 - 7 % BASOPHILS 0 0 - 1 % IMMATURE GRANULOCYTES 0 %  
 ABS. NEUTROPHILS 13.3 (H) 1.8 - 8.0 K/UL  
 ABS. LYMPHOCYTES 1.2 0.8 - 3.5 K/UL  
 ABS. MONOCYTES 0.3 0.0 - 1.0 K/UL  
 ABS. EOSINOPHILS 0.0 0.0 - 0.4 K/UL  
 ABS. BASOPHILS 0.0 0.0 - 0.1 K/UL  
 ABS. IMM. GRANS. 0.0 K/UL  
 DF MANUAL    
 RBC COMMENTS ANISOCYTOSIS 1+ 
    
 RBC COMMENTS OVALOCYTES PRESENT 
    
 RBC COMMENTS TARGET CELLS 
PRESENT 
    
 WBC COMMENTS Pathology Review Requested PHOSPHORUS Collection Time: 02/16/20  4:43 AM  
Result Value Ref Range Phosphorus 2.1 (L) 2.6 - 4.7 MG/DL  
GLUCOSE, POC Collection Time: 02/16/20  5:31 AM  
Result Value Ref Range Glucose (POC) 115 (H) 65 - 100 mg/dL Performed by [facility] Collection Time: 02/16/20  5:31 AM  
Result Value Ref Range Glucose 115 mg/dL Insulin order 1.4 units/hour Insulin adminstered 1.4 units/hour Multiplier 0.026 Low target 150 mg/dL High target 250 mg/dL D50 order 0.0 ml  
 D50 administered 0.00 ml Minutes until next BG 60 min Order initials vs   
 Administered initials vs   
 GLSCOM Comments GLUCOSE, POC Collection Time: 02/16/20  6:36 AM  
Result Value Ref Range Glucose (POC) 163 (H) 65 - 100 mg/dL Performed by [facility] Collection Time: 02/16/20  6:36 AM  
Result Value Ref Range Glucose 163 mg/dL Insulin order 2.7 units/hour Insulin adminstered 2.7 units/hour Multiplier 0.026 Low target 150 mg/dL High target 250 mg/dL D50 order 0.0 ml  
 D50 administered 0.00 ml Minutes until next BG 60 min Order initials vs   
 Administered initials vs   
 GLSCOM Comments GLUCOSE, POC Collection Time: 02/16/20  8:11 AM  
Result Value Ref Range Glucose (POC) 201 (H) 65 - 100 mg/dL Performed by Curry Farmer (CON) Ailyn De La Vega Collection Time: 02/16/20  8:12 AM  
Result Value Ref Range Glucose 201 mg/dL Insulin order 3.7 units/hour Insulin adminstered 3.7 units/hour Multiplier 0.026 Low target 150 mg/dL High target 250 mg/dL D50 order 0.0 ml  
 D50 administered 0.00 ml Minutes until next BG 60 min Order initials ms Administered initials ms GLSCOM Comments PH, URINE Collection Time: 02/16/20  8:33 AM  
Result Value Ref Range pH (UA) 5.0 5.0 - 8.0 POTASSIUM, UR, RANDOM Collection Time: 02/16/20  8:33 AM  
Result Value Ref Range Potassium urine, random 34 MMOL/L  
SODIUM, UR, RANDOM Collection Time: 02/16/20  8:33 AM  
Result Value Ref Range Sodium,urine random 64 MMOL/L  
METABOLIC PANEL, BASIC Collection Time: 02/16/20  8:33 AM  
Result Value Ref Range Sodium 157 (H) 136 - 145 mmol/L Potassium 3.8 3.5 - 5.1 mmol/L Chloride 130 (H) 97 - 108 mmol/L  
 CO2 24 21 - 32 mmol/L Anion gap 3 (L) 5 - 15 mmol/L Glucose 213 (H) 65 - 100 mg/dL BUN 57 (H) 6 - 20 MG/DL Creatinine 1.84 (H) 0.70 - 1.30 MG/DL  
 BUN/Creatinine ratio 31 (H) 12 - 20 GFR est AA 44 (L) >60 ml/min/1.73m2 GFR est non-AA 36 (L) >60 ml/min/1.73m2 Calcium 7.8 (L) 8.5 - 10.1 MG/DL  
PHOSPHORUS Collection Time: 02/16/20  8:33 AM  
Result Value Ref Range Phosphorus 1.9 (L) 2.6 - 4.7 MG/DL MAGNESIUM Collection Time: 02/16/20  8:33 AM  
Result Value Ref Range Magnesium 2.0 1.6 - 2.4 mg/dL POC EG7 Collection Time: 02/16/20  9:00 AM  
Result Value Ref Range Calcium, ionized (POC) 1.09 (L) 1.12 - 1.32 mmol/L  
 FIO2 (POC) 0.50 % pH (POC) 7.582 (HH) 7.35 - 7.45    
 pCO2 (POC) 24.8 (L) 35.0 - 45.0 MMHG  
 pO2 (POC) 94 80 - 100 MMHG  
 HCO3 (POC) 23.3 22 - 26 MMOL/L Base excess (POC) 1 mmol/L  
 sO2 (POC) 99 (H) 92 - 97 % Site RIGHT BRACHIAL Device: VENT Mode ASSIST CONTROL  Set Rate 10 bpm  
 PEEP/CPAP (POC) 6.0 cmH2O  
 PIP (POC) 21 Allens test (POC) N/A Specimen type (POC) ARTERIAL Total resp. rate 19 GLUCOSE, POC Collection Time: 02/16/20  9:15 AM  
Result Value Ref Range Glucose (POC) 177 (H) 65 - 100 mg/dL Performed by Lala  (CON) Radha Lan Collection Time: 02/16/20  9:16 AM  
Result Value Ref Range Glucose 177 mg/dL Insulin order 3.0 units/hour Insulin adminstered 3.0 units/hour Multiplier 0.026 Low target 150 mg/dL High target 250 mg/dL D50 order 0.0 ml  
 D50 administered 0.00 ml Minutes until next BG 60 min Order initials ms Administered initials ms GLSCOM Comments GLUCOSE, POC Collection Time: 02/16/20 10:19 AM  
Result Value Ref Range Glucose (POC) 166 (H) 65 - 100 mg/dL Performed by Dakota Martinez Collection Time: 02/16/20 10:19 AM  
Result Value Ref Range Glucose 166 mg/dL Insulin order 2.8 units/hour Insulin adminstered 2.8 units/hour Multiplier 0.026 Low target 150 mg/dL High target 250 mg/dL D50 order 0.0 ml  
 D50 administered 0.00 ml Minutes until next BG 60 min Order initials sm Administered initials sm GLSCOM Comments GLUCOSE, POC Collection Time: 02/16/20 11:20 AM  
Result Value Ref Range Glucose (POC) 157 (H) 65 - 100 mg/dL Performed by Lala  (CON) Radha Lan Collection Time: 02/16/20 11:21 AM  
Result Value Ref Range Glucose 157 mg/dL Insulin order 2.5 units/hour Insulin adminstered 2.5 units/hour Multiplier 0.026 Low target 150 mg/dL High target 250 mg/dL D50 order 0.0 ml  
 D50 administered 0.00 ml Minutes until next BG 60 min Order initials ms Administered initials ms GLSCOM Comments El Wade MD 
[facility]  
[address] [address], [address] [facility] Phone - [phone] Fax - [phone] [email or web address]

## 2020-02-16 NOTE — ROUTINE PROCESS
2000. Bedside and Verbal shift change report given to [address] (oncoming nurse) by Mik Bennett (offgoing nurse). Report included the following information SBAR, Kardex, Procedure Summary, Intake/Output, MAR, Accordion, Recent Results, Cardiac Rhythm nsr, Alarm Parameters  and Dual Neuro Assessment. 0800. Bedside and Verbal shift change report given to RafalRN and DANNA Rogers (oncoming nurse) by [address] (offgoing nurse). Report included the following information SBAR, Kardex, Intake/Output, MAR, Accordion, Recent Results, Cardiac Rhythm nsr, Alarm Parameters  and Dual Neuro Assessment.

## 2020-02-17 NOTE — PROGRESS NOTES
[facility] 
 
ICU TEAM Progress Note Name: Susan Agustin : 1945 MRN: [id number] Date: 2020 HPI: 
Patient is a 80-year-old gentleman who was found unresponsive at at Counts include 234 beds at the [facility]. Blood gas was checked by EMS and route and read \"high\". In the emergency room on further work-up he was found to have severe DKA/HHS, acute kidney injury, severe lactic acidosis, hypothermia and a UTI. He was treated appropriately but at some point work of breathing worsened and he was intubated and transferred to ICU for continued management. Subjective:  
Progress Note: 2020 Reason for ICU Admission:  
Acute metabolic acidosis in the setting of septic shock - improved Maintains Intubated and Sedated on mechanical ventilation On vasopressors Overnight Events: None Weaned off of vasopressors Will SBT with possible extubation today Patient is following commands, but weak. May need Bi-pap post extubation if passes SBT. Tolerating tube feeds with H2O flushes. DC IVF Chest xray today, may need diuresis Active Problem List:  
 
Problem List  Date Reviewed: 2018 Codes Class DKA (diabetic ketoacidoses) ([facility] ICD-10-CM: E11.10 ICD-9-CM: 250.10 Encephalopathy ICD-10-CM: G93.40 ICD-9-CM: 348.30 Hyperkalemia ICD-10-CM: E87.5 ICD-9-CM: 276.7 Hypernatremia ICD-10-CM: E87.0 ICD-9-CM: 276.0 Altered mental status ICD-10-CM: R41.82 
ICD-9-CM: 780.97 Failure to thrive in adult ICD-10-CM: R62.7 ICD-9-CM: 783.7 JOSH (acute kidney injury) ([facility] ICD-10-CM: N17.9 ICD-9-CM: 584.9   
   
 CVA (cerebral vascular accident) ([facility] ICD-10-CM: I63.9 ICD-9-CM: 434.91   
   
 DM (diabetes mellitus) type II controlled with renal manifestation (HCC) ICD-10-CM: E11.29 ICD-9-CM: 250.40 Cataracts, bilateral ICD-10-CM: H26.9 ICD-9-CM: 366.9 Overview Signed 2014 11:37 AM by Cesar Baxter Removed 4/2014 Glaucoma, right eye ICD-10-CM: H40.9 ICD-9-CM: 365.9 HTN (hypertension) ICD-10-CM: I10 
ICD-9-CM: 401.9 Past Medical History:  
 
 has a past medical history of Diabetes ([facility] (2002), High cholesterol, Hypertension, and Stroke ([facility] Past Surgical History:  
 
 has no past surgical history on file. Home Medications:  
 
Prior to Admission medications Medication Sig Start Date End Date Taking? Authorizing Provider  
glipiZIDE (GLUCOTROL) 5 mg tablet Take 2.5 mg by mouth Daily (before breakfast). 30 min before breakfast   Yes Provider, Historical  
insulin lispro (HUMALOG U-100 INSULIN) 100 unit/mL injection by SubCUTAneous route Before breakfast, lunch, and dinner. SSI   Yes Provider, Historical  
amLODIPine (NORVASC) 5 mg tablet Take 1 Tab by mouth daily for 30 days. 1/31/20 3/1/20 Yes Ameena Garcia MD  
atorvastatin (LIPITOR) 20 mg tablet Take 40 mg by mouth nightly. Yes Provider, Historical  
fish oil-omega-3 fatty acids (FISH OIL) 340-1,000 mg capsule Take 1 Cap by mouth daily. Yes Provider, Historical  
insulin glargine (LANTUS) 100 unit/mL injection 24 Units by SubCUTAneous route nightly. Yes Provider, Historical  
midodrine (PROAMITINE) 10 mg tablet Take 10 mg by mouth three (3) times daily. Yes Provider, Historical  
vitamin E (AQUA GEMS) 400 unit capsule Take 400 Units by mouth daily. Yes Provider, Historical  
vit B Cmplx 3-FA-Vit C-Biotin (NEPHRO ALEN RX) 1- mg-mg-mcg tablet Take 1 Tab by mouth daily. 2/20/13  Yes Daisy Arellano MD  
aspirin (ASPIRIN) 325 mg tablet Take 325 mg by mouth daily. Yes Provider, Historical  
 
 
Allergies/Social/Family History: No Known Allergies Social History Tobacco Use  Smoking status: Never Smoker  Smokeless tobacco: Never Used Substance Use Topics  Alcohol use: No  
  Alcohol/week: 0.0 standard drinks Family History Problem Relation Age of Onset  No Known Problems Mother  No Known Problems Father  No Known Problems Sister  No Known Problems Brother  No Known Problems Brother  No Known Problems Brother  No Known Problems Brother  No Known Problems Brother  No Known Problems Brother  No Known Problems Maternal Grandmother  No Known Problems Maternal Grandfather  No Known Problems Paternal Grandmother  No Known Problems Paternal Grandfather  Diabetes Neg Hx   
 Heart Disease Neg Hx Review of Systems:  
 
Review of systems not obtained due to patient factors. Intubated Objective:  
Vital Signs: 
Visit Vitals /61 Pulse 90 Temp 98 °F (36.7 °C) Resp 22 Ht 5' 5\" (1.651 m) Wt 67.9 kg (149 lb 11.1 oz) SpO2 97% BMI 24.91 kg/m² O2 Flow Rate (L/min): 18 l/min O2 Device: BIPAP Temp (24hrs), Av.5 °F (36.4 °C), Min:97.1 °F (36.2 °C), Max:98 °F (36.7 °C) Intake/Output:  
 
Intake/Output Summary (Last 24 hours) at 2020 1509 Last data filed at 2020 1500 Gross per 24 hour Intake 5358.44 ml Output 4150 ml Net 1208.44 ml Physical Exam: 
General:  Appears stated age, toxic, intubated and sedated Eye:  conjunctivae/corneas clear. Pupils are unequal (?previous surgery/cva unchanged) and round, reactive to light. Neurologic: Limited, open eyes on command and able to nod head yes and no appropriately. Moves extremities to pain. Generalized weakness. Neck:  normal and no erythema or exudates noted. Lungs: Diminished; crackles in bilateral lower lobes, un labored. Heart:  regular rate and rhythm, S1, S2 normal, no murmur, click, rub or gallop Abdomen:  soft, non-tender. Bowel sounds normal. No masses, no organomegaly Cardiovascular:  Regular rate and rhythm, S1S2 present, without murmur or extra heart sounds, pedal pulses normal and no edema Skin:  Dry, decreased turgor, sacral unstagable wound present on admission, left heel with pressure area LABS AND  DATA: Personally reviewed Recent Labs  
  02/17/20 
0242 02/16/20 
0443 WBC 12.7* 14.8* HGB 8.3* 8.0*  
HCT 24.3* 23.4*  
PLT 38* 38* Recent Labs  
  02/17/20 
1421 02/17/20 
1201 * 148* K 3.5 4.4  
* 117* CO2 24 24 BUN 49* 44* CREA 1.38* 1.36* * 278* CA 7.7* 7.2*  
MG 1.6 1.6 PHOS 2.5* 2.8 No results for input(s): SGOT, GPT, AP, TBIL, TP, ALB, GLOB, AML, LPSE in the last 72 hours. No lab exists for component: AMYP No results for input(s): INR, PTP, APTT, INREXT, INREXT in the last 72 hours. Recent Labs  
  02/17/20 
1019 02/17/20 
0456 PHI 7.489* 7.515* PCO2I 28.1* 27.9*  
PO2I 96 141* FIO2I 0.40 40 No results for input(s): CPK, CKMB, TROIQ, BNPP in the last 72 hours. Ventilator Settings: 
Mode Rate Tidal Volume Pressure FiO2 PEEP Spontaneous   300 ml    40 % 5 cm H20 Peak airway pressure: 20 cm H2O Minute ventilation: 10.8 l/min MEDS: Reviewed Chest X-Ray: CXR Results  (Last 48 hours) 02/17/20 1215  XR CHEST PORT Final result Impression:  IMPRESSION:  
1. Interval development of small bilateral pleural effusions left greater than  
right 2. Persistent opacities in left lower lobe have worsened. This could be due to  
pneumonia and/or atelectasis Narrative:  EXAM: XR CHEST PORT INDICATION: fluid overload COMPARISON: February 14, 2020 FINDINGS: A portable AP radiograph of the chest was obtained at 1144 hours. The  
patient is on a cardiac monitor. Left subclavian catheter overlies the SVC. NG  
tube courses into the stomach. There is been interval development of small bilateral pleural effusions left  
greater than right. Areas of opacity in left lower lobe are noted. This could be  
related to pneumonia or atelectasis. This has worsened when compared to previous  
examination. No pulmonary edema. No pneumothorax. CT Results  (Last 48 hours) None ECHO: 01/02/2020 Interpretation Summary Result status: Final result · Image quality for this study was technically difficult. · Agitated saline contrast study was performed. no evidence of right to left shunt · Normal cavity size and systolic function (ejection fraction normal). Estimated left ventricular ejection fraction is 55 - 60%. Assessment:  
 
ICU Problems: Metabolic encephalopathy-secondary to diabetic emergency and septic process - improved. HHS/DKA - resolved Acute kidney injury - improving Septic shock, Urosepsis Acute respiratory failure 2' to uncompensated metabolic acidosis in setting of septic shock - now requiring intubation - improved Metabolic Alkalosis - resolved Leukocytosis - improved Anemia - no signs of active bleed. H/H stable at 8.0 after 1 unit PRBCs ? GI bleed vs dilutional 
Hypokalemia - replacing ICU Comprehensive Plan of Care:  
 
Plans for this Shift:  
1. Wean mechanical ventilation, SBT and may extubate to bi-pap as patient has been passing SBTs over the last 3 days. Mentation improved today, however patient is still week. 2. Chest xray today and in am after diuresis. 3. ABG in am on Bi-pap. 4. Continue hemodynamic monitoring, map goal greater than 65 mmHg. 5. Off levophed, DC IVF. 6. Restarted on Norvasc 5mg daily. 7. Increase Lantus to BID, 24 units in am and 22 units in pm. Discussed with glycemic control. SSI q 4hrs. 8. Nephrology following. Free water decreased to 200 q 3 hrs. 9. Continue tube feeds and increase to goal per nutritional recommendations. 10. Urine cultures + E-coli and 1/2 bottles + for E-Coli. De-escalate abx to Rocephin, D/C Vancomycin and Zosyn. Respiratory cx normal. ernesto with + candida yeast. Pt not able to tolerate nystatin PO at this time. 11. Hydrocortisone tapered down to 50 q 12 hours. 12. Potassium repleted.  Recheck labs in am. 
15. Talked with patient's POA Johnna Hernández and updated on patient's status including passing SBT and extubation. Son does state that he would like patient to be re intubated if need and to remain Full Code. 14. Rest of plan as below: 
 
Cardiac Gtts: None SBP Goal of: > 90 mmHg MAP Goal of: > 65 mmHg Transfusion Trigger (Hgb): <7 g/dL Respiratory Goals: Chlorhexidine Optimize PEEP/Ventilation/Oxygenation Goal Tidal Volume 6 cc/kg based on IBW Aim for lung protective ventilation Head of bed > 30 degrees Aggressive bronchopulmonary hygiene SPO2 Goal: > 92% Pulmonary toilet: Suctioning as needed DVT Prophylaxis (if no, list reason): SCD's or Sequential Compression Device GI Prophylaxis: Pepcid (famotidine) Nutrition: Yes TF IVFs: None Bowel Movement: Pending Bowel Regimen: None needed at this time Green Catheter Present: Yes Glycemic Control - Insulin: Yes Antibiotics : Rocephin 
 
Pain Medications: Dilaudid Target RASS: 0 - Alert & Calm - Spontaneously pays attention to caregiver Sedation Medications: Propofol CAM-ICU:  NA Mobility: Bedrest 
PT/OT: will consult once appropriate Restraints: Soft wrist restraints Discussed Plan of Care/Code Status: Full Code T/L/D Tubes: ETT and Nasogastric Tube Lines: [employer] Drains: Green Catheter ABCDEF Bundle/Checklist Completed: 
Yes SPECIAL EQUIPMENT Mechanical Ventilator DISPOSITION Stay in ICU CRITICAL CARE CONSULTANT NOTE I had a face to face encounter with the patient, reviewed and interpreted patient data including clinical events, labs, images, vital signs, I/O's, and examined patient. I have discussed the case and the plan and management of the patient's care with the consulting services, the bedside nurses and the respiratory therapist.   
 
NOTE OF PERSONAL INVOLVEMENT IN CARE This patient has a high probability of imminent, clinically significant deterioration, which requires the highest level of p   reparedness to intervene urgently. I participated in the decision-making and personally managed or directed the management of the following life and organ supporting interventions that required my frequent assessment to treat or prevent imminent deterioration. I personally spent 50 minutes of critical care time. This is time spent at this critically ill patient's bedside actively involved in patient care as well as the coordination of care and discussions with the patient's family. This does not include any procedural time which has been billed separately. Radha [facility]red [facility] Critical Care Medicine [facility] [place] [facility]

## 2020-02-17 NOTE — PROGRESS NOTES
Nephrology Progress Note Julio C Flores Date of Admission : 2020 CC: Follow up for JOSH, hypernatremia Assessment and Plan JOSH on CKD: 
- likely from volume depletion from hyperglycemia vs ATN from hypotension and sepsis 
- neg renal U/S on admission - Cr improving 
- daily labs  
  
Hypernatremia: 
- from dehydration/lack of free water intake  
- ifree water through [address] can be reduce to 200 ml Q3hr  
  
Sepsis: 
- presumed urosepsis - f/u cultures 
- on broad spectrum abx and pressors 
  
Anemia: 
 
Hypotension: 
-off pressors  
  
CKD III w/ baseline Cr 1.4 to 1.6: 
- likely from DM and HTN 
  
HHNK: 
- per [facility] 
  
Encephalopathy: 
- from infection, hyperglycemia and hypernatremia 
- sedated on the vent 
  
FTT Dementia Interval History: 
Seen and examined. Off pressors, stable UOP, Cr improving. Na better. Was on  Unable to obtain ROS Current Medications: all current  Medications have been eviewed in [facility] Review of Systems: Pertinent items are noted in HPI. Objective: 
Vitals:   
Vitals:  
 20 0500 20 0600 20 0700 20 0701 BP: 143/75 132/74 154/79 Pulse: 81 75 71 Resp: 12 12 10 Temp:      
SpO2: 97% 96% 97% Weight:    67.9 kg (149 lb 11.1 oz) Height:      
 
Intake and Output: 
No intake/output data recorded. 02/15 1901 -  0700 In: 9489.4 [I.V.:5059.4] Out: 0388 [BSTM] Physical Examination: 
Pt intubated    Yes General: Sedated on the vent Neck:  Supple, no mass Resp:  Lungs CTA B/L, no wheezing , normal respiratory effort CV:  RRR,  no murmur or rub,no LE edema GI:  Soft, NT, + Bowel sounds, no hepatosplenomegaly Neurologic:  sedated Psych:             Unable to assess Skin:  No Rash :  Green in place 
 
[]    High complexity decision making was performed 
[]    Patient is at high-risk of decompensation with multiple organ involvement Lab Data Personally Reviewed: I have reviewed all the pertinent labs, microbiology data and radiology studies during assessment. Recent Labs  
  02/17/20 
0242 02/16/20 2051 02/16/20 
1414 * 153* 156*  
K 3.0* 3.4* 3.4*  
* 123* 128* CO2 24 24 24 * 272* 188* BUN 49* 51* 57* CREA 1.47* 1.56* 1.72* CA 7.3* 7.5* 7.8*  
MG 1.6 1.7 1.9 PHOS 2.7 3.2 2.2* Recent Labs  
  02/17/20 
0242 02/16/20 
0443 02/15/20 
1409 WBC 12.7* 14.8* 14.6* HGB 8.3* 8.0* 8.1* HCT 24.3* 23.4* 25.6*  
PLT 38* 38* 41* No results found for: SDES Lab Results Component Value Date/Time Culture result: HEAVY NORMAL RESPIRATORY CEDRICK 02/14/2020 01:41 PM  
 Culture result: HEAVY YEAST, (APPARENT CANDIDA ALBICANS) (A) 02/14/2020 01:41 PM  
 Culture result: ESCHERICHIA COLI (A) 02/13/2020 06:05 PM  
 
Recent Results (from the past 24 hour(s)) GLUCOSE, POC Collection Time: 02/16/20  8:11 AM  
Result Value Ref Range Glucose (POC) 201 (H) 65 - 100 mg/dL Performed by Mari Michael (DEBI) My [place] Collection Time: 02/16/20  8:12 AM  
Result Value Ref Range Glucose 201 mg/dL Insulin order 3.7 units/hour Insulin adminstered 3.7 units/hour Multiplier 0.026 Low target 150 mg/dL High target 250 mg/dL D50 order 0.0 ml  
 D50 administered 0.00 ml Minutes until next BG 60 min Order initials ms Administered initials ms GLSCOM Comments PH, URINE Collection Time: 02/16/20  8:33 AM  
Result Value Ref Range pH (UA) 5.0 5.0 - 8.0 POTASSIUM, UR, RANDOM Collection Time: 02/16/20  8:33 AM  
Result Value Ref Range Potassium urine, random 34 MMOL/L  
SODIUM, UR, RANDOM Collection Time: 02/16/20  8:33 AM  
Result Value Ref Range Sodium,urine random 64 MMOL/L  
METABOLIC PANEL, BASIC Collection Time: 02/16/20  8:33 AM  
Result Value Ref Range Sodium 157 (H) 136 - 145 mmol/L  Potassium 3.8 3.5 - 5.1 mmol/L  
 Chloride 130 (H) 97 - 108 mmol/L  
 CO2 24 21 - 32 mmol/L Anion gap 3 (L) 5 - 15 mmol/L Glucose 213 (H) 65 - 100 mg/dL BUN 57 (H) 6 - 20 MG/DL Creatinine 1.84 (H) 0.70 - 1.30 MG/DL  
 BUN/Creatinine ratio 31 (H) 12 - 20 GFR est AA 44 (L) >60 ml/min/1.73m2 GFR est non-AA 36 (L) >60 ml/min/1.73m2 Calcium 7.8 (L) 8.5 - 10.1 MG/DL  
PHOSPHORUS Collection Time: 02/16/20  8:33 AM  
Result Value Ref Range Phosphorus 1.9 (L) 2.6 - 4.7 MG/DL MAGNESIUM Collection Time: 02/16/20  8:33 AM  
Result Value Ref Range Magnesium 2.0 1.6 - 2.4 mg/dL POC EG7 Collection Time: 02/16/20  9:00 AM  
Result Value Ref Range Calcium, ionized (POC) 1.09 (L) 1.12 - 1.32 mmol/L  
 FIO2 (POC) 0.50 % pH (POC) 7.582 (HH) 7.35 - 7.45    
 pCO2 (POC) 24.8 (L) 35.0 - 45.0 MMHG  
 pO2 (POC) 94 80 - 100 MMHG  
 HCO3 (POC) 23.3 22 - 26 MMOL/L Base excess (POC) 1 mmol/L  
 sO2 (POC) 99 (H) 92 - 97 % Site RIGHT BRACHIAL Device: VENT Mode ASSIST CONTROL Set Rate 10 bpm  
 PEEP/CPAP (POC) 6.0 cmH2O  
 PIP (POC) 21 Allens test (POC) N/A Specimen type (POC) ARTERIAL Total resp. rate 19 GLUCOSE, POC Collection Time: 02/16/20  9:15 AM  
Result Value Ref Range Glucose (POC) 177 (H) 65 - 100 mg/dL Performed by Mary Ellen Castillo (DEBI) Vinicius Jacinto Collection Time: 02/16/20  9:16 AM  
Result Value Ref Range Glucose 177 mg/dL Insulin order 3.0 units/hour Insulin adminstered 3.0 units/hour Multiplier 0.026 Low target 150 mg/dL High target 250 mg/dL D50 order 0.0 ml  
 D50 administered 0.00 ml Minutes until next BG 60 min Order initials ms Administered initials ms GLSCOM Comments GLUCOSE, POC Collection Time: 02/16/20 10:19 AM  
Result Value Ref Range Glucose (POC) 166 (H) 65 - 100 mg/dL Performed by Alex Sunshine Collection Time: 02/16/20 10:19 AM  
Result Value Ref Range Glucose 166 mg/dL Insulin order 2.8 units/hour Insulin adminstered 2.8 units/hour Multiplier 0.026 Low target 150 mg/dL High target 250 mg/dL D50 order 0.0 ml  
 D50 administered 0.00 ml Minutes until next BG 60 min Order initials sm Administered initials sm GLSCOM Comments GLUCOSE, POC Collection Time: 02/16/20 11:20 AM  
Result Value Ref Range Glucose (POC) 157 (H) 65 - 100 mg/dL Performed by Laura Chen (CON) [place] Collection Time: 02/16/20 11:21 AM  
Result Value Ref Range Glucose 157 mg/dL Insulin order 2.5 units/hour Insulin adminstered 2.5 units/hour Multiplier 0.026 Low target 150 mg/dL High target 250 mg/dL D50 order 0.0 ml  
 D50 administered 0.00 ml Minutes until next BG 60 min Order initials ms Administered initials ms GLSCOM Comments GLUCOSE, POC Collection Time: 02/16/20 12:27 PM  
Result Value Ref Range Glucose (POC) 178 (H) 65 - 100 mg/dL Performed by Laura Chen (CON) METABOLIC PANEL, BASIC Collection Time: 02/16/20  2:14 PM  
Result Value Ref Range Sodium 156 (H) 136 - 145 mmol/L Potassium 3.4 (L) 3.5 - 5.1 mmol/L Chloride 128 (H) 97 - 108 mmol/L  
 CO2 24 21 - 32 mmol/L Anion gap 4 (L) 5 - 15 mmol/L Glucose 188 (H) 65 - 100 mg/dL BUN 57 (H) 6 - 20 MG/DL Creatinine 1.72 (H) 0.70 - 1.30 MG/DL  
 BUN/Creatinine ratio 33 (H) 12 - 20 GFR est AA 47 (L) >60 ml/min/1.73m2 GFR est non-AA 39 (L) >60 ml/min/1.73m2 Calcium 7.8 (L) 8.5 - 10.1 MG/DL  
PHOSPHORUS Collection Time: 02/16/20  2:14 PM  
Result Value Ref Range Phosphorus 2.2 (L) 2.6 - 4.7 MG/DL MAGNESIUM Collection Time: 02/16/20  2:14 PM  
Result Value Ref Range Magnesium 1.9 1.6 - 2.4 mg/dL GLUCOSE, POC Collection Time: 02/16/20  3:39 PM  
Result Value Ref Range Glucose (POC) 202 (H) 65 - 100 mg/dL Performed by Laura Chen (DEBI) POC EG7  Collection Time: 02/16/20  5:36 PM  
 Result Value Ref Range Calcium, ionized (POC) 1.10 (L) 1.12 - 1.32 mmol/L  
 FIO2 (POC) 0.50 % pH (POC) 7.524 (H) 7.35 - 7.45    
 pCO2 (POC) 25.8 (L) 35.0 - 45.0 MMHG  
 pO2 (POC) 224 (H) 80 - 100 MMHG  
 HCO3 (POC) 21.2 (L) 22 - 26 MMOL/L Base deficit (POC) 2 mmol/L  
 sO2 (POC) 100 (H) 92 - 97 % Site RIGHT BRACHIAL Device: VENT Mode ASSIST CONTROL Tidal volume 723 ml Set Rate 10 bpm  
 PEEP/CPAP (POC) 6.0 cmH2O  
 PIP (POC) 21 Allens test (POC) YES Inspiratory Time 1.43 sec Specimen type (POC) ARTERIAL Total resp. rate 13 GLUCOSE, POC Collection Time: 02/16/20  8:50 PM  
Result Value Ref Range Glucose (POC) 281 (H) 65 - 100 mg/dL Performed by [employer] METABOLIC PANEL, BASIC Collection Time: 02/16/20  8:51 PM  
Result Value Ref Range Sodium 153 (H) 136 - 145 mmol/L Potassium 3.4 (L) 3.5 - 5.1 mmol/L Chloride 123 (H) 97 - 108 mmol/L  
 CO2 24 21 - 32 mmol/L Anion gap 6 5 - 15 mmol/L Glucose 272 (H) 65 - 100 mg/dL BUN 51 (H) 6 - 20 MG/DL Creatinine 1.56 (H) 0.70 - 1.30 MG/DL  
 BUN/Creatinine ratio 33 (H) 12 - 20 GFR est AA 53 (L) >60 ml/min/1.73m2 GFR est non-AA 44 (L) >60 ml/min/1.73m2 Calcium 7.5 (L) 8.5 - 10.1 MG/DL  
PHOSPHORUS Collection Time: 02/16/20  8:51 PM  
Result Value Ref Range Phosphorus 3.2 2.6 - 4.7 MG/DL MAGNESIUM Collection Time: 02/16/20  8:51 PM  
Result Value Ref Range Magnesium 1.7 1.6 - 2.4 mg/dL GLUCOSE, POC Collection Time: 02/17/20 12:25 AM  
Result Value Ref Range Glucose (POC) 280 (H) 65 - 100 mg/dL Performed by [employer] METABOLIC PANEL, BASIC Collection Time: 02/17/20  2:42 AM  
Result Value Ref Range Sodium 150 (H) 136 - 145 mmol/L Potassium 3.0 (L) 3.5 - 5.1 mmol/L Chloride 120 (H) 97 - 108 mmol/L  
 CO2 24 21 - 32 mmol/L Anion gap 6 5 - 15 mmol/L Glucose 252 (H) 65 - 100 mg/dL  BUN 49 (H) 6 - 20 MG/DL  
 Creatinine 1.47 (H) 0.70 - 1.30 MG/DL  
 BUN/Creatinine ratio 33 (H) 12 - 20 GFR est AA 57 (L) >60 ml/min/1.73m2 GFR est non-AA 47 (L) >60 ml/min/1.73m2 Calcium 7.3 (L) 8.5 - 10.1 MG/DL  
PHOSPHORUS Collection Time: 02/17/20  2:42 AM  
Result Value Ref Range Phosphorus 2.7 2.6 - 4.7 MG/DL MAGNESIUM Collection Time: 02/17/20  2:42 AM  
Result Value Ref Range Magnesium 1.6 1.6 - 2.4 mg/dL CBC WITH AUTOMATED DIFF Collection Time: 02/17/20  2:42 AM  
Result Value Ref Range WBC 12.7 (H) 4.1 - 11.1 K/uL  
 RBC 2.92 (L) 4.10 - 5.70 M/uL HGB 8.3 (L) 12.1 - 17.0 g/dL HCT 24.3 (L) 36.6 - 50.3 % MCV 83.2 80.0 - 99.0 FL  
 MCH 28.4 26.0 - 34.0 PG  
 MCHC 34.2 30.0 - 36.5 g/dL  
 RDW 15.6 (H) 11.5 - 14.5 % PLATELET 38 (LL) 891 - 400 K/uL MPV ABNORMAL 8.9 - 12.9 FL  
 NRBC 0.4 (H) 0  WBC ABSOLUTE NRBC 0.05 (H) 0.00 - 0.01 K/uL NEUTROPHILS 79 (H) 32 - 75 % BAND NEUTROPHILS 2 0 - 6 % LYMPHOCYTES 16 12 - 49 % MONOCYTES 3 (L) 5 - 13 % EOSINOPHILS 0 0 - 7 % BASOPHILS 0 0 - 1 % IMMATURE GRANULOCYTES 0 %  
 ABS. NEUTROPHILS 10.3 (H) 1.8 - 8.0 K/UL  
 ABS. LYMPHOCYTES 2.0 0.8 - 3.5 K/UL  
 ABS. MONOCYTES 0.4 0.0 - 1.0 K/UL  
 ABS. EOSINOPHILS 0.0 0.0 - 0.4 K/UL  
 ABS. BASOPHILS 0.0 0.0 - 0.1 K/UL  
 ABS. IMM. GRANS. 0.0 K/UL  
 DF MANUAL PLATELET COMMENTS Large Platelets RBC COMMENTS ANISOCYTOSIS 1+ 
    
 RBC COMMENTS POLYCHROMASIA 1+ GLUCOSE, POC Collection Time: 02/17/20  4:38 AM  
Result Value Ref Range Glucose (POC) 245 (H) 65 - 100 mg/dL Performed by Rolande Gilford POC EG7 Collection Time: 02/17/20  4:56 AM  
Result Value Ref Range Calcium, ionized (POC) 1.04 (L) 1.12 - 1.32 mmol/L  
 FIO2 (POC) 40 % pH (POC) 7.515 (H) 7.35 - 7.45    
 pCO2 (POC) 27.9 (L) 35.0 - 45.0 MMHG  
 pO2 (POC) 141 (H) 80 - 100 MMHG  
 HCO3 (POC) 22.5 22 - 26 MMOL/L Base excess (POC) 0 mmol/L  
 sO2 (POC) 99 (H) 92 - 97 % Site RIGHT BRACHIAL Device: VENT Mode ASSIST CONTROL Set Rate 10 bpm  
 PIP (POC) 15 Allens test (POC) N/A Inspiratory Time 1.43 sec Specimen type (POC) ARTERIAL Total resp. rate 20 Jose Marrero MD 
[address] [place] [address] [facility] Phone - [phone] Fax - [phone] 
[email or web address]. [email or web address]

## 2020-02-17 NOTE — PROGRESS NOTES
02/17/20 1008 Weaning Parameters Spontaneous Breathing Trial Complete Yes Resp Rate Observed 15 Ve 8.3  RSBI 27 SBT Results, cuff leak presented

## 2020-02-17 NOTE — PROGRESS NOTES
Renal Dosing/Monitoring Medication: Zosyn for Urosepsis x 7 days Current regimen:  3.375 gm IV every 12 hr 
Recent Labs  
  02/17/20 
0812 02/17/20 
0242 02/16/20 
2051  02/16/20 
0443  02/15/20 
1409 WBC  --  12.7*  --   --  14.8*  --  14.6*  
CREA 1.36* 1.47* 1.56*   < > 1.87*   < > 2.18* BUN 44* 49* 51*   < > 61*   < > 73*  
 < > = values in this interval not displayed. Estimated CrCl:  40-45 ml/min Plan: Change to 3.375 gm IV q8h for crcl > 20 per protocol.

## 2020-02-17 NOTE — PROGRESS NOTES
1930: Bedside shift change report given to Eleazar Smiley RN (oncoming nurse) by Alena Quigley RN (offgoing nurse). Report included the following information SBAR, Kardex, ED Summary, Intake/Output, MAR, Recent Results, Med Rec Status, Cardiac Rhythm SR and Alarm Parameters . 0730: Bedside shift change report given to Rodger Sage (oncoming nurse) by Eleazar Smiley RN (offgoing nurse). Report included the following information SBAR, Kardex, ED Summary, Intake/Output, MAR, Recent Results, Cardiac Rhythm SR and Alarm Parameters .

## 2020-02-17 NOTE — CDMP QUERY
Patient admitted with sepsis. metabolic encephalopathy, and respiratory failure, noted to have WOCN consult. If possible, please document in progress notes and discharge summary the following regarding the ulcer: 
 
 TYPE of Ulcer (Decubitus, Diabetic, Venous stasis, other)  SITE of Ulcer (Including laterality, if applicable)  STAGE/DEPTH of Ulcer (If applicable)  POA Status (Present on Admission (POA) or Hospital Acquired)  Other, please specify  Clinically unable to determine The medical record reflects the following: 
  Risk Factors: advanced age, admission from rehab/SNF, h/o CVA Clinical Indicators: 2/14 WOCN- POA unstageable sacral pressure injury Treatment: WOCN consult, Santyl ointment, frequent turns and repositioning, monitoring Thank you, Mayito Barcenas RN 
[facility] 
[phone]

## 2020-02-17 NOTE — DIABETES MGMT
[address] Presentation David Tristan is a 76 y.o. male admitted with [address]. Current clinical course has been complicated by septic shock and respiratory failure. Patient has known diabetes. Consulted by Provider for advanced diabetes nursing assessment and care, specifically related to 
[x] Inpatient management strategy [x] Home management assessment Diabetes-related medical history Acute complications [address] Neurological complications Peripheral neuropathy Microvascular disease Nephropathy Macrovascular disease Cerebral vascular accident Other associated conditions Diabetes medication history Drug class Currently in use Discontinued Never used Biguanide DDP-4 inhibitor Sulfonylurea Glipizide 5 mg Thiazolidinedione GLP-1 RA SGLT-2 inhibitors Basal insulin Lantus 24 units Daily Bolus insulin Humalog Sliding Scale with meals Subjective Mr Cece Nolasco was admitted late last week after he was found unresponsive at [facility]. On admission, he was found to have a blood glucose of 1016, to be profoundly dehydrated and hypotensive and in respiratory distress. He was intubated and managed per sepsis protocol. He was admitted to the ICU on pressors, intubated on an insulin infusion. His acidosis was resolved, transitioned off the insulin infusion over the weekend and started on Lantus with correctional insulin. He has been experiencing steroid induced hyperglycemia. A1C was 10.6% on 1/2/20. Objective Physical exam 
General Sedated and Intubated Vital Signs Visit Vitals /82 Pulse 88 Temp 97.1 °F (36.2 °C) Resp 12 Ht 5' 5\" (1.651 m) Wt 67.9 kg (149 lb 11.1 oz) SpO2 97% BMI 24.91 kg/m² Alka Charlene Skin  Warm and dry. CAMILA- Acanthosis No lipohypertrophy or lipoatrophy noted at injection sites Heart   Regular rate and rhythm. No murmurs, rubs or gallops Lungs  Clear without rales or rhonchi Extremities Diabetic foot exam: 
  Left Foot Visual Exam: callous - Dry skin, edema, fungus on all toenails Pulse DP: 1+ (weak) Vibratory and Filament test: deferred due to clinical condition Right Foot Visual Exam: callous - Dry skin, edema, fungus on all toenails. Right big toe dark Pulse DP: 1+ (weak) Vibratory and Filament test: deferred due to clinical condition Laboratory Lab Results Component Value Date/Time Hemoglobin A1c 10.6 (H) 01/02/2020 04:06 AM  
 Hemoglobin A1c (POC) 12.8 08/20/2018 10:06 AM  
 
Lab Results Component Value Date/Time LDL, calculated 115.4 (H) 01/02/2020 04:06 AM  
 
Lab Results Component Value Date/Time Creatinine (POC) 1.3 02/24/2019 10:27 PM  
 Creatinine 1.36 (H) 02/17/2020 08:12 AM  
 
Lab Results Component Value Date/Time Sodium 148 (H) 02/17/2020 08:12 AM  
 Potassium 4.4 02/17/2020 08:12 AM  
 Chloride 117 (H) 02/17/2020 08:12 AM  
 CO2 24 02/17/2020 08:12 AM  
 Anion gap 7 02/17/2020 08:12 AM  
 Glucose 278 (H) 02/17/2020 08:12 AM  
 BUN 44 (H) 02/17/2020 08:12 AM  
 Creatinine 1.36 (H) 02/17/2020 08:12 AM  
 BUN/Creatinine ratio 32 (H) 02/17/2020 08:12 AM  
 GFR est AA >60 02/17/2020 08:12 AM  
 GFR est non-AA 51 (L) 02/17/2020 08:12 AM  
 Calcium 7.2 (L) 02/17/2020 08:12 AM  
 Bilirubin, total 0.6 02/13/2020 11:30 AM  
 AST (SGOT) 35 02/13/2020 11:30 AM  
 Alk. phosphatase 187 (H) 02/13/2020 11:30 AM  
 Protein, total 8.6 (H) 02/13/2020 11:30 AM  
 Albumin 2.3 (L) 02/13/2020 11:30 AM  
 Globulin 6.3 (H) 02/13/2020 11:30 AM  
 A-G Ratio 0.4 (L) 02/13/2020 11:30 AM  
 ALT (SGPT) 28 02/13/2020 11:30 AM  
 
Lab Results Component Value Date/Time ALT (SGPT) 28 02/13/2020 11:30 AM  
 
 
Blood glucose pattern Assessment and Plan Nursing Diagnosis Risk for unstable blood glucose pattern Nursing Intervention Domain 3622 Decision-making Support Nursing Interventions Examined current inpatient diabetes control Explored factors facilitating and impeding inpatient management Explored corrective strategies with responsible inpatient provider Informed patient of rational for insulin strategy while hospitalized Evaluation This gentleman, with Type 2 diabetes, hasn't achieved inpatient blood glucose target of 100-180mg/dl. Steroids weaned today to Q12 from Q8. Concern for home management as this is his second admission since January for [facility] and also presented to ED two weeks ago for a fall. Plans to extubated today and resume tube feeds. Inpatient blood glucose management has been impacted by 
[x] Kidney dysfunction 
[x] Erratic meal consumption 
[x] Glucocorticoid use Recommendations Recommend: 1. Adjust Basal insulin:  
 
Based off of three things: 
-Home Lantus Dose: 24 units Lantus 
-Cover for tube feeds 1 units/10 grams CHO (add to Lantus dose) -Cover for steroids- Hydrocortisone 50 mg Q12 Additional Insulin coverage for hydrocortisone 20 mg Hydrocortisone: add 0.05 units/kg Lantus to total daily insulin dose 40 mg Hydrocortisone: add 0.1 units/kg Lantus to total daily insulin dose 50 mg Hydrocortisone: add 0.15 units/kg Lantus to total daily insulin dose 
100 mg Hydrocortisone: add 0.3 units/kg Lantus to total daily insulin dose 2. Continue Corrective insulin 
[] Normal sensitivity Correctional Scale for Normal Sensitivity 200-249: 2 units Humalog 250-299: 4 units Humalog 300-349: 6 units Humalog 350-399: 8 units Humalog Over 400: 10 units Humalog Do NOT hold for NPO; give in addition to meal time insulin dose. If patient does not eat, give correction dose only. [x] Will require insulin adjustments as glucocorticoids are in use Referral  
[x] Case Management 
[x] Inpatient nutrition services Billing Code(s) [x] 10210 IP subsequent hospital care - 45 minutes Maurizio Gonzalez, CNS Access via [facility] [place] [phone]

## 2020-02-17 NOTE — PROGRESS NOTES
Problem: Ventilator Management Goal: *Adequate oxygenation and ventilation Outcome: Progressing Towards Goal 
Goal: *Patient maintains clear airway/free of aspiration Outcome: Progressing Towards Goal 
Goal: *Absence of infection signs and symptoms Outcome: Progressing Towards Goal 
Goal: *Normal spontaneous ventilation Outcome: Progressing Towards Goal 
  
Problem: Patient Education: Go to Patient Education Activity Goal: Patient/Family Education Outcome: Progressing Towards Goal 
  
Problem: Non-Violent Restraints Goal: *Removal from restraints as soon as assessed to be safe Outcome: Progressing Towards Goal 
Goal: *No harm/injury to patient while restraints in use Outcome: Progressing Towards Goal 
Goal: *Patient's dignity will be maintained Outcome: Progressing Towards Goal 
Goal: *Patient Specific Goal (EDIT GOAL, INSERT TEXT) Outcome: Progressing Towards Goal 
Goal: Non-violent Restaints:Standard Interventions Outcome: Progressing Towards Goal 
Goal: Non-violent Restraints:Patient Interventions Outcome: Progressing Towards Goal 
Goal: Patient/Family Education Outcome: Progressing Towards Goal 
  
Problem: Pressure Injury - Risk of 
Goal: *Prevention of pressure injury Description Document Doni Scale and appropriate interventions in the flowsheet. Outcome: Progressing Towards Goal 
Note: Pressure Injury Interventions: 
Sensory Interventions: Assess changes in LOC Moisture Interventions: Absorbent underpads, Internal/External urinary devices Activity Interventions: Pressure redistribution bed/mattress(bed type) Mobility Interventions: HOB 30 degrees or less, Pressure redistribution bed/mattress (bed type), Turn and reposition approx. every two hours(pillow and wedges) Nutrition Interventions: Document food/fluid/supplement intake Friction and Shear Interventions: Lift sheet, HOB 30 degrees or less, Lift team/patient mobility team 
 
  
 
 
 
  
 Problem: Patient Education: Go to Patient Education Activity Goal: Patient/Family Education Outcome: Progressing Towards Goal 
  
Problem: Sepsis: Day of Diagnosis Goal: Off Pathway (Use only if patient is Off Pathway) Outcome: Progressing Towards Goal 
Goal: *Fluid resuscitation Outcome: Progressing Towards Goal 
Goal: *Paired blood cultures prior to first dose of antibiotic Outcome: Progressing Towards Goal 
Goal: *First dose of  appropriate antibiotic within 3 hours of arrival to ED, within 1 hour of arrival to ICU Outcome: Progressing Towards Goal 
Goal: *Lactic acid with first set of blood cultures Outcome: Progressing Towards Goal 
Goal: *Pneumococcal immunization (if eligible) Outcome: Progressing Towards Goal 
Goal: *Influenza immunization (if eligible) Outcome: Progressing Towards Goal 
Goal: Activity/Safety Outcome: Progressing Towards Goal 
Goal: Consults, if ordered Outcome: Progressing Towards Goal 
Goal: Diagnostic Test/Procedures Outcome: Progressing Towards Goal 
Goal: Nutrition/Diet Outcome: Progressing Towards Goal 
Goal: Discharge Planning Outcome: Progressing Towards Goal 
Goal: Medications Outcome: Progressing Towards Goal 
Goal: Respiratory Outcome: Progressing Towards Goal 
Goal: Treatments/Interventions/Procedures Outcome: Progressing Towards Goal 
Goal: Psychosocial 
Outcome: Progressing Towards Goal 
  
Problem: Sepsis: Day 2 Goal: Off Pathway (Use only if patient is Off Pathway) Outcome: Progressing Towards Goal 
Goal: *Central Venous Pressure maintained at 8-12 mm Hg Outcome: Progressing Towards Goal 
Goal: *Hemodynamically stable Outcome: Progressing Towards Goal 
Goal: *Tolerating diet Outcome: Progressing Towards Goal 
Goal: Activity/Safety Outcome: Progressing Towards Goal 
Goal: Consults, if ordered Outcome: Progressing Towards Goal 
Goal: Diagnostic Test/Procedures Outcome: Progressing Towards Goal 
Goal: Nutrition/Diet Outcome: Progressing Towards Goal 
 Goal: Discharge Planning Outcome: Progressing Towards Goal 
Goal: Medications Outcome: Progressing Towards Goal 
Goal: Respiratory Outcome: Progressing Towards Goal 
Goal: Treatments/Interventions/Procedures Outcome: Progressing Towards Goal 
Goal: Psychosocial 
Outcome: Progressing Towards Goal 
  
Problem: Falls - Risk of 
Goal: *Absence of Falls Description Document Sierra Mark Fall Risk and appropriate interventions in the flowsheet. Outcome: Progressing Towards Goal 
Note: Fall Risk Interventions: 
  
 
Mentation Interventions: Evaluate medications/consider consulting pharmacy Medication Interventions: Evaluate medications/consider consulting pharmacy Elimination Interventions: Toileting schedule/hourly rounds History of Falls Interventions: Evaluate medications/consider consulting pharmacy Problem: Patient Education: Go to Patient Education Activity Goal: Patient/Family Education Outcome: Progressing Towards Goal 
  
Problem: Impaired Skin Integrity/Pressure Injury Treatment Goal: *Improvement of Existing Pressure Injury Outcome: Progressing Towards Goal 
Goal: *Prevention of pressure injury Description Document Doni Scale and appropriate interventions in the flowsheet. Outcome: Progressing Towards Goal 
Note: Pressure Injury Interventions: 
Sensory Interventions: Assess changes in LOC Moisture Interventions: Absorbent underpads, Internal/External urinary devices Activity Interventions: Pressure redistribution bed/mattress(bed type) Mobility Interventions: HOB 30 degrees or less, Pressure redistribution bed/mattress (bed type), Turn and reposition approx. every two hours(pillow and wedges) Nutrition Interventions: Document food/fluid/supplement intake Friction and Shear Interventions: Lift sheet, HOB 30 degrees or less, Lift team/patient mobility team 
 
  
 
 
 
  
Problem: Patient Education: Go to Patient Education Activity Goal: Patient/Family Education Outcome: Progressing Towards Goal 
  
Problem: Nutrition Deficit Goal: *Optimize nutritional status Outcome: Progressing Towards Goal

## 2020-02-17 NOTE — PROGRESS NOTES
0730: Bedside and Verbal shift change report given to Elaina Mckenzie, RN and Keli Reveles RN (oncoming nurse) by Elizabeth Frances RN (offgoing nurse). Report included the following information SBAR, Kardex, MAR, Recent Results and Cardiac Rhythm NSR.  
 
0800: Shift assessment completed; details documented in flow sheet. Pt sedated and intubated at this time; eyes open to voice; minimally follow commands. Clear breath sounds throughout lung fields; diminished in lower bases. NSR. Afebrile. All extremities weak but withdraws from pain. Upper extremities non-pitting edematous and Pitting edema in lower extremities. Green present with adequate UOP. 0900: Family at bedside. 0930: Sedation stopped per NP request. Respiratory at bedside, starting SBT. 1015: Discontinue D5W at 125mL/hr per NP request.  
 
1030: Interdisciplinary team rounds were held 2/17/2020 with the following team members:Nursing, Nurse Practitioner, Nutrition and Respiratory Therapy and the patient. Plan of care discussed. See clinical pathway and/or care plan for interventions and desired outcomes. 1059: Patient extubated; placed on high flow nasal cannula 39% FiO2 and 18L/min O2. 
 
1200: Reassessment completed; Changes documented in flow sheet. NSR with occasional PVC's noted. Coarse lung sounds with copious amounts of oral secretions. Minimial command following at this time. 1305: SBP- 164; 10 mg of PRN hydralazine given at this time. 1310: NP notified of pt increased thick oral  secretions and decreased command following.  
 
1349: Pt switched to Bi-PAP; FiO2-40%. Tolerating well at this time. Will continue to monitor. 1530: Bedside and Verbal shift change report given to Keli Reveles RN (oncoming nurse) by Elaina Mckenzie RN (offgoing nurse). Report included the following information SBAR, Kardex, MAR, Recent Results and Cardiac Rhythm NSR.

## 2020-02-17 NOTE — PROGRESS NOTES
NUTRITION Chart reviewed; discussed during multidisciplinary rounds. Mr Nicole Ram was extubated to BiPAP today. Blood glucose well-controlled until weaned off insulin drip yesterday. Patient was receiving Nepro @ 20 ml/hr, D5 @ 125 ml/hr and hydrocortisone-all contributing to elevated BG. D5 discontinued today and steroid being weaned which will help with BG control. Changing formula to Glucerna 1.2 may help as well (note feeding was only running @ 20 ml/hr). Suggested goal: Glucerna 1.2 @ 50 ml/hr with 200 ml water flush q 3 hr. This will provide 1200 ml, 1440 calories, 72 gm protein and 2570 ml free water (tube feeding/flush) per day to meet estimated needs. RD to follow. Estimated Nutrition Needs:  
Kcals/day: 1450 Kcals/day(MSJ x 1.2) Protein: 54 g(54-70 (1.0-1.3g/kg) Fluid: (1 ml/kcal) Based On: Kendell 1898 Weight Used: Actual wt 54 kg Suzan Justin RD [facility]

## 2020-02-18 NOTE — DIABETES MGMT
One [facility] Followup Progress Note Presentation Yamilex Robbins is a 76 y.o. male admitted with HHNK and UTI sepsis and consulted by Provider for advanced specialty nursing care related to inpatient diabetes management. Hyperglycemia management order set is in place. Subjective Mr Grace Aguilar was extubated yesterday to BiPAP. Blood glucose within goal.  Received 24 units Lanus in the am and 17 units of correctional insulin during the day. Feeds changed from Nepro to Glucerna and steroids weaned from Q8 to Q12 hrs. Objective Physical exam 
 
General Weak and ill-appearing. Conversant and cooperative Vital Signs Visit Vitals /65 Pulse 87 Temp 99.2 °F (37.3 °C) Resp 17 Ht 5' 5\" (1.651 m) Wt 64.1 kg (141 lb 5 oz) SpO2 100% BMI 23.52 kg/m² Skin  Warm and dry Heart   Regular rate and rhythm. No murmurs, rubs or gallops Lungs  Clear to auscultation without rales or rhonchi Extremities No foot wounds Laboratory CBC WITH AUTOMATED DIFF Collection Time: 02/18/20  3:44 AM  
Result Value Ref Range WBC 8.9 4.1 - 11.1 K/uL  
 RBC 3.10 (L) 4.10 - 5.70 M/uL HGB 8.6 (L) 12.1 - 17.0 g/dL HCT 25.9 (L) 36.6 - 50.3 % MCV 83.5 80.0 - 99.0 FL  
 MCH 27.7 26.0 - 34.0 PG  
 MCHC 33.2 30.0 - 36.5 g/dL  
 RDW 15.7 (H) 11.5 - 14.5 % PLATELET 45 (LL) 894 - 400 K/uL MPV 13.3 (H) 8.9 - 12.9 FL  
 NRBC 0.4 (H) 0  WBC ABSOLUTE NRBC 0.04 (H) 0.00 - 0.01 K/uL NEUTROPHILS 93 (H) 32 - 75 % BAND NEUTROPHILS 1 0 - 6 % LYMPHOCYTES 5 (L) 12 - 49 % MONOCYTES 1 (L) 5 - 13 % EOSINOPHILS 0 0 - 7 % BASOPHILS 0 0 - 1 % IMMATURE GRANULOCYTES 0 %  
 ABS. NEUTROPHILS 8.4 (H) 1.8 - 8.0 K/UL  
 ABS. LYMPHOCYTES 0.4 (L) 0.8 - 3.5 K/UL  
 ABS. MONOCYTES 0.1 0.0 - 1.0 K/UL  
 ABS. EOSINOPHILS 0.0 0.0 - 0.4 K/UL  
 ABS. BASOPHILS 0.0 0.0 - 0.1 K/UL  
 ABS. IMM. GRANS. 0.0 K/UL  
 DF MANUAL PLATELET COMMENTS Large Platelets RBC COMMENTS ANISOCYTOSIS 1+ 
    
 RBC COMMENTS POLYCHROMASIA 1+ BMP:  
Lab Results Component Value Date/Time  (H) 02/18/2020 03:44 AM  
 K 3.3 (L) 02/18/2020 03:44 AM  
  (H) 02/18/2020 03:44 AM  
 CO2 25 02/18/2020 03:44 AM  
 AGAP 8 02/18/2020 03:44 AM  
  (H) 02/18/2020 03:44 AM  
 BUN 48 (H) 02/18/2020 03:44 AM  
 CREA 1.43 (H) 02/18/2020 03:44 AM  
 GFRAA 59 (L) 02/18/2020 03:44 AM  
 GFRNA 48 (L) 02/18/2020 03:44 AM  
  
 
 
Blood glucose pattern Assessment and Plan Nursing Diagnosis Risk for unstable blood glucose pattern Nursing Intervention Domain 5258 Decision-making Support Nursing Interventions Examined current inpatient diabetes control Explored factors facilitating and impeding inpatient management Identified self-management practices impeding diabetes control Explored corrective strategies with patient and responsible inpatient provider Informed patient of rational for basal bolus insulin strategy while hospitalized Evaluation Basal insulin dosing has stabilized blood glucose levels in the past 24 hours. Improvement of blood glucose with change in enteral feeds and steroid dosing. Correction dosing indicates the need for adjustment in insulin dosing Blood glucose management has been impacted by O Erratic meal consumption O Use of glucocorticoids Recommendations 1. Adjust Basal insulin:  
  
Based off of three things: 
-0.2 units/kg Lantus: 12 units Lantus 
-Cover for tube feeds 1 units/10 grams CHO (add to Lantus dose): Glucerna at 50ml/hr- additional 14 units added to Lantus dose 
-Cover for steroids- Hydrocortisone 50 mg: add additional 9 units to Lantus dose TOTAL LANTUS DOSE: 35 units Daily 
  
Additional Insulin coverage for hydrocortisone 20 mg Hydrocortisone: add 0.05 units/kg Lantus to total daily insulin dose 40 mg Hydrocortisone: add 0.1 units/kg Lantus to total daily insulin dose 50 mg Hydrocortisone: add 0.15 units/kg Lantus to total daily insulin dose 
100 mg Hydrocortisone: add 0.3 units/kg Lantus to total daily insulin dose 
  
  
2. Continue Corrective insulin 
[]? Normal sensitivity Correctional Scale for Normal Sensitivity 
  
200-249: 2 units Humalog 250-299: 4 units Humalog 300-349: 6 units Humalog 350-399: 8 units Humalog Over 400: 10 units Humalog 
  
Do NOT hold for NPO; give in addition to meal time insulin dose. If patient does not eat, give correction dose only. 
  
  
[x]? Will require insulin adjustments as glucocorticoids are in use  
  
Referral  
[x]? Case Management 
[x]? Inpatient nutrition services Billing Code(s)  
 
[address], [address] [facility] 
[phone]

## 2020-02-18 NOTE — INTERDISCIPLINARY ROUNDS
ICU multidisciplinary rounds lead by Martha Mccurdy NP (Intensivist): The following were reviewed and discussed: current labs,  recent imaging, lines/drains, review of body systems, nutrition, cultures, mobility, length of ICU stay. The plan of care for the day is as follows  Continue to monitor (written note by RN)

## 2020-02-18 NOTE — PROGRESS NOTES
[facility] 
 
ICU TEAM Progress Note Name: Susan Agustin : 1945 MRN: [id number] Date: 2020 HPI: 
Patient is a 63-year-old gentleman who was found unresponsive at at [facility]. Blood gas was checked by EMS and route and read \"high\". In the emergency room on further work-up he was found to have severe DKA/HHS, acute kidney injury, severe lactic acidosis, hypothermia and a UTI. He was treated appropriately but at some point work of breathing worsened and he was intubated and transferred to ICU for continued management. Subjective:  
Progress Note: 2020 Reason for ICU Admission:  
Acute metabolic acidosis in the setting of septic shock, DKA/HHS, and JOSH - improved Acute hypoxic respiratory failure Overnight Events: No acute events overnight. Pt extubated yesterday. DHT placed for free water flushes. Active Problem List:  
 
Problem List  Date Reviewed: 2018 Codes Class DKA (diabetic ketoacidoses) ([facility] ICD-10-CM: E11.10 ICD-9-CM: 250.10 Encephalopathy ICD-10-CM: G93.40 ICD-9-CM: 348.30 Hyperkalemia ICD-10-CM: E87.5 ICD-9-CM: 276.7 Hypernatremia ICD-10-CM: E87.0 ICD-9-CM: 276.0 Altered mental status ICD-10-CM: R41.82 
ICD-9-CM: 780.97 Failure to thrive in adult ICD-10-CM: R62.7 ICD-9-CM: 783.7 JOSH (acute kidney injury) ([facility] ICD-10-CM: N17.9 ICD-9-CM: 584.9   
   
 CVA (cerebral vascular accident) ([facility] ICD-10-CM: I63.9 ICD-9-CM: 434.91   
   
 DM (diabetes mellitus) type II controlled with renal manifestation (HCC) ICD-10-CM: E11.29 ICD-9-CM: 250.40 Cataracts, bilateral ICD-10-CM: H26.9 ICD-9-CM: 366.9 Overview Signed 2014 11:37 AM by Cesar Baxter Removed 2014 Glaucoma, right eye ICD-10-CM: H40.9 ICD-9-CM: 365.9 HTN (hypertension) ICD-10-CM: I10 
ICD-9-CM: 401.9 Past Medical History: has a past medical history of Diabetes ([facility] (2002), High cholesterol, Hypertension, and Stroke ([facility] Past Surgical History:  
 
 has no past surgical history on file. Home Medications:  
 
Prior to Admission medications Medication Sig Start Date End Date Taking? Authorizing Provider  
glipiZIDE (GLUCOTROL) 5 mg tablet Take 2.5 mg by mouth Daily (before breakfast). 30 min before breakfast   Yes Provider, Historical  
insulin lispro (HUMALOG U-100 INSULIN) 100 unit/mL injection by SubCUTAneous route Before breakfast, lunch, and dinner. SSI   Yes Provider, Historical  
amLODIPine (NORVASC) 5 mg tablet Take 1 Tab by mouth daily for 30 days. 1/31/20 3/1/20 Yes Sylvia Austin MD  
atorvastatin (LIPITOR) 20 mg tablet Take 40 mg by mouth nightly. Yes Provider, Historical  
fish oil-omega-3 fatty acids (FISH OIL) 340-1,000 mg capsule Take 1 Cap by mouth daily. Yes Provider, Historical  
insulin glargine (LANTUS) 100 unit/mL injection 24 Units by SubCUTAneous route nightly. Yes Provider, Historical  
midodrine (PROAMITINE) 10 mg tablet Take 10 mg by mouth three (3) times daily. Yes Provider, Historical  
vitamin E (AQUA GEMS) 400 unit capsule Take 400 Units by mouth daily. Yes Provider, Historical  
vit B Cmplx 3-FA-Vit C-Biotin (NEPHRO ALEN RX) 1- mg-mg-mcg tablet Take 1 Tab by mouth daily. 2/20/13  Yes Sharyn Tanner MD  
aspirin (ASPIRIN) 325 mg tablet Take 325 mg by mouth daily. Yes Provider, Historical  
 
 
Allergies/Social/Family History: No Known Allergies Social History Tobacco Use  Smoking status: Never Smoker  Smokeless tobacco: Never Used Substance Use Topics  Alcohol use: No  
  Alcohol/week: 0.0 standard drinks Family History Problem Relation Age of Onset  No Known Problems Mother  No Known Problems Father  No Known Problems Sister  No Known Problems Brother  No Known Problems Brother  No Known Problems Brother  No Known Problems Brother  No Known Problems Brother  No Known Problems Brother  No Known Problems Maternal Grandmother  No Known Problems Maternal Grandfather  No Known Problems Paternal Grandmother  No Known Problems Paternal Grandfather  Diabetes Neg Hx   
 Heart Disease Neg Hx Review of Systems:  
 
Review of systems not obtained due to patient factors. Objective:  
Vital Signs: 
Visit Vitals /65 Pulse 87 Temp 99.2 °F (37.3 °C) Resp 17 Ht 5' 5\" (1.651 m) Wt 64.1 kg (141 lb 5 oz) SpO2 100% BMI 23.52 kg/m² O2 Flow Rate (L/min): 35 l/min O2 Device: Hi flow nasal cannula Temp (24hrs), Av.9 °F (37.2 °C), Min:98 °F (36.7 °C), Max:99.9 °F (37.7 °C) Intake/Output:  
 
Intake/Output Summary (Last 24 hours) at 2020 0915 Last data filed at 2020 0700 Gross per 24 hour Intake 1318.6 ml Output 2810 ml Net -1491.4 ml Physical Exam: 
General:  Appears stated age, lethargic, no acute distress Eye:  conjunctivae/corneas clear. Dysconjugate gaze, Pupils are unequal L>R (?previous surgery/cva unchanged) and round, minimally reactive to light. Neurologic: Limited, open eyes on command and able to nod head yes and no appropriately. Able to follow commands in all extremities- although delayed at times. Generalized weakness. Neck:  normal and no erythema or exudates noted. Lungs: Diminished; course to auscultation, clears with cough, weak cough Heart:  regular rate and rhythm, S1, S2 normal, no murmur, click, rub or gallop. Pulses palpable, 1+ edema in BLE Abdomen:  soft, non-tender. Bowel sounds normal. No masses, no organomegaly Skin:  Dry, decreased turgor, sacral unstagable wound present on admission, left heel with pressure area LABS AND  DATA: Personally reviewed Recent Labs  
  20 
0344 20 
0242 WBC 8.9 12.7* HGB 8.6* 8.3* HCT 25.9* 24.3*  
PLT 45* 38* Recent Labs  
  20 8409 02/17/20 
2129 * 151*  
K 3.3* 3.2*  
* 119* CO2 25 25 BUN 48* 48* CREA 1.43* 1.38* * 127* CA 7.3* 7.5* MG 2.1 2.2 PHOS 2.5* 1.8* No results for input(s): SGOT, GPT, AP, TBIL, TP, ALB, GLOB, AML, LPSE in the last 72 hours. No lab exists for component: AMYP No results for input(s): INR, PTP, APTT, INREXT, INREXT in the last 72 hours. Recent Labs  
  02/18/20 
0546 02/17/20 
1737 PHI 7.551* 7.551* PCO2I 29.3* 24.1*  
PO2I 420* 89 FIO2I 94 0.40 No results for input(s): CPK, CKMB, TROIQ, BNPP in the last 72 hours. MEDS: Reviewed Chest X-Ray: CXR Results  (Last 48 hours) 02/18/20 0440  XR CHEST PORT Final result Impression:  IMPRESSION:  
1. Persistent opacities in the left lower lobe likely combination of left lower  
lobe collapse and left pleural effusion. Continued follow-up is suggested. Narrative:  EXAM: XR CHEST PORT INDICATION: Follow-up LL opacity, abnormal x-ray COMPARISON: 2/17/2020 FINDINGS: A portable AP radiograph of the chest was obtained at 0356 hours. The  
patient is on a cardiac monitor. Left subclavian catheter overlies the SVC. NG  
tube courses into the stomach and the tip is near the first portion of the  
duodenum. Lungs demonstrate persistent opacity at the left lung base and overall the  
volume in left hemithorax has decreased. The opacities in left lower lobe are  
likely due to combination of left pleural effusion and left lower lobe  
atelectasis. Pneumonia is not excluded but felt to be less likely. Small right  
pleural effusion is suspected. Right lung is clear. 02/17/20 1215  XR CHEST PORT Final result Impression:  IMPRESSION:  
1. Interval development of small bilateral pleural effusions left greater than  
right 2. Persistent opacities in left lower lobe have worsened. This could be due to  
pneumonia and/or atelectasis  Narrative:  EXAM: XR CHEST PORT  
 INDICATION: fluid overload COMPARISON: February 14, 2020 FINDINGS: A portable AP radiograph of the chest was obtained at 1144 hours. The  
patient is on a cardiac monitor. Left subclavian catheter overlies the SVC. NG  
tube courses into the stomach. There is been interval development of small bilateral pleural effusions left  
greater than right. Areas of opacity in left lower lobe are noted. This could be  
related to pneumonia or atelectasis. This has worsened when compared to previous  
examination. No pulmonary edema. No pneumothorax. CT Results  (Last 48 hours) None ECHO: 01/02/2020 Interpretation Summary Result status: Final result · Image quality for this study was technically difficult. · Agitated saline contrast study was performed. no evidence of right to left shunt · Normal cavity size and systolic function (ejection fraction normal). Estimated left ventricular ejection fraction is 55 - 60%. Assessment:  
 
ICU Problems: 
Acute metabolic encephalopathy- due to DKA/HHS and septic shock- improving E coli UTI and bacteremia- septic shock resolved- now off pressors JOSH-  stable Respiratory alkalosis- stable Hypernatremia Type II DM- previous HHS/DKA resolved Leukocytosis - improved Anemia - no signs of active bleed. H/H stable followng 1 unit PRBCs Hypokalemia - replacing Severe anion gap metabolic acidosis and respiratory acidosis- resolved Acute hypoxic and hypercarbic respiratory failure 2' to uncompensated metabolic acidosis in setting of septic shock- previously intubated, now extubated ICU Comprehensive Plan of Care:  
 
Plans for this Shift:  
1. Consider starting low dose seroquel for hypoactive delirium 2. Given blood gas, remove Hi flow nasal canula- transition to nasal canula 3. Obtain CXR in am  
4. Urine cultures + E-coli and 1/2 bottles + for E-Coli- continue Ceftriaxone 5. Hydrocortisone tapered down to 50 mg daily- to complete today 6. Continue hemodynamic monitoring, MAP goal greater than 65 mmHg. 7. Restarted on Norvasc 5mg daily. 8. Increase Lantus to BID, 24 units in am and 22 units in pm. Was going to change to Lantus 35u daily, but given initiation of D5 infusion did not change. SSI q 4hrs. 9. Nephrology following. Free water increased to 200 q 2 hrs and pt started on D5 infusion. 10. Continue tube feeds and increase to goal per nutritional recommendations. 6. Son does state that he would like patient to be re intubated if need and to remain Full Code. 12. Rest of plan as below: 
 
Cardiac Gtts: None SBP Goal of: > 90 mmHg MAP Goal of: > 65 mmHg Transfusion Trigger (Hgb): <7 g/dL Respiratory Goals: Aggressive pulmonary hygiene, IS when able SPO2 Goal: > 92% Pulmonary toilet: Suctioning as needed DVT Prophylaxis (if no, list reason): SCD's or Sequential Compression Device GI Prophylaxis: Pepcid (famotidine) Nutrition: Yes TF IVFs: None Bowel Movement: Pending Bowel Regimen: None needed at this time Green Catheter Present: Yes Glycemic Control - Insulin: Yes Antibiotics : Rocephin 
 
Pain Medications: Dilaudid PRN Target RASS: 0 - Alert & Calm - Spontaneously pays attention to caregiver Sedation Medications: None CAM-ICU:  NA Mobility: Bedrest 
PT/OT: will consult once appropriate Restraints: Soft wrist restraints Discussed Plan of Care/Code Status: Full Code T/L/D Tubes: ETT and Nasogastric Tube Lines: [employer] Drains: Green Catheter ABCDEF Bundle/Checklist Completed: 
Yes SPECIAL EQUIPMENT Mechanical Ventilator DISPOSITION Stay in ICU CRITICAL CARE CONSULTANT NOTE I had a face to face encounter with the patient, reviewed and interpreted patient data including clinical events, labs, images, vital signs, I/O's, and examined patient.   I have discussed the case and the plan and management of the patient's care with the consulting services, the bedside nurses and the respiratory therapist.   
 
NOTE OF PERSONAL INVOLVEMENT IN CARE This patient has a high probability of imminent, clinically significant deterioration, which requires the highest level of p   reparedness to intervene urgently. I participated in the decision-making and personally managed or directed the management of the following life and organ supporting interventions that required my frequent assessment to treat or prevent imminent deterioration. I personally spent 35 minutes of critical care time. This is time spent at this critically ill patient's bedside actively involved in patient care as well as the coordination of care and discussions with the patient's family. This does not include any procedural time which has been billed separately. Cat , ACNP Critical Care Medicine [facility]

## 2020-02-18 NOTE — PROGRESS NOTES
Nephrology Progress Note Keke Ewing Date of Admission : 2/13/2020 CC: Follow up for JOSH, hypernatremia Assessment and Plan JOSH on CKD: 
- likely from volume depletion from hyperglycemia vs ATN from hypotension and sepsis 
- neg renal U/S on admission - Cr improving 
- daily labs  
  
Hypernatremia: 
- from dehydration/lack of free water intake  
- continue enteral water  
- added back D5W 
  
E coli Urosepsis w/ bacteremia -abx per [facility] 
  
Anemia: 
  
CKD III w/ baseline Cr 1.4 to 1.6: 
- likely from DM and HTN 
  
HHNK: 
- per [facility] 
  
Encephalopathy: 
- from infection, hyperglycemia and hypernatremia 
  
FTT Dementia Interval History: 
Seen and examined. Remains lethargic. Unable to obtain ROS Current Medications: all current  Medications have been eviewed in [facility] Review of Systems: Pertinent items are noted in HPI. Objective: 
Vitals:   
Vitals:  
 02/18/20 0900 02/18/20 0943 02/18/20 1000 02/18/20 1100 BP: 148/79  128/70 134/77 Pulse: 92  90 94 Resp: 18 16 20 Temp:      
SpO2: 99% 95% 94% 98% Weight:      
Height:      
 
Intake and Output: 
02/18 0701 - 02/18 1900 In: 810 [I.V.:150] Out: 375 [Urine:375] 02/16 1901 - 02/18 0700 In: 4746.7 [I.V.:1391.7] Out: 5275 [EUCWH:1147] Physical Examination: 
Pt intubated    No  
General: Lethargic Neck:  Supple, no mass Resp:  Lungs CTA B/L, no wheezing , normal respiratory effort CV:  RRR,  no murmur or rub,no LE edema GI:  Soft, NT, + Bowel sounds, no hepatosplenomegaly Neurologic:  Not following commands Psych:             Unable to assess :  Green in place 
 
[]    High complexity decision making was performed 
[]    Patient is at high-risk of decompensation with multiple organ involvement Lab Data Personally Reviewed: I have reviewed all the pertinent labs, microbiology data and radiology studies during assessment. Recent Labs  
  02/18/20 
1006 02/18/20 
0344 02/17/20 
5850 * 151* 151*  
K 3.7 3.3* 3.2*  
* 118* 119* CO2 26 25 25 * 187* 127* BUN 46* 48* 48* CREA 1.37* 1.43* 1.38* CA 7.2* 7.3* 7.5* MG 2.0 2.1 2.2 PHOS 2.3* 2.5* 1.8* Recent Labs  
  02/18/20 
0344 02/17/20 
0242 02/16/20 
0443 WBC 8.9 12.7* 14.8* HGB 8.6* 8.3* 8.0*  
HCT 25.9* 24.3* 23.4*  
PLT 45* 38* 38* No results found for: SDES Lab Results Component Value Date/Time Culture result: NO GROWTH AFTER 15 HOURS 02/17/2020 05:48 PM  
 Culture result: HEAVY NORMAL RESPIRATORY CEDRICK 02/14/2020 01:41 PM  
 Culture result: HEAVY YEAST, (APPARENT CANDIDA ALBICANS) (A) 02/14/2020 01:41 PM  
 
Recent Results (from the past 24 hour(s)) METABOLIC PANEL, BASIC Collection Time: 02/17/20  2:21 PM  
Result Value Ref Range Sodium 149 (H) 136 - 145 mmol/L Potassium 3.5 3.5 - 5.1 mmol/L Chloride 117 (H) 97 - 108 mmol/L  
 CO2 24 21 - 32 mmol/L Anion gap 8 5 - 15 mmol/L Glucose 259 (H) 65 - 100 mg/dL BUN 49 (H) 6 - 20 MG/DL Creatinine 1.38 (H) 0.70 - 1.30 MG/DL  
 BUN/Creatinine ratio 36 (H) 12 - 20 GFR est AA >60 >60 ml/min/1.73m2 GFR est non-AA 50 (L) >60 ml/min/1.73m2 Calcium 7.7 (L) 8.5 - 10.1 MG/DL  
PHOSPHORUS Collection Time: 02/17/20  2:21 PM  
Result Value Ref Range Phosphorus 2.5 (L) 2.6 - 4.7 MG/DL MAGNESIUM Collection Time: 02/17/20  2:21 PM  
Result Value Ref Range Magnesium 1.6 1.6 - 2.4 mg/dL POC EG7 Collection Time: 02/17/20  5:37 PM  
Result Value Ref Range Calcium, ionized (POC) 1.08 (L) 1.12 - 1.32 mmol/L  
 FIO2 (POC) 0.40 % pH (POC) 7.551 (HH) 7.35 - 7.45    
 pCO2 (POC) 24.1 (L) 35.0 - 45.0 MMHG  
 pO2 (POC) 89 80 - 100 MMHG  
 HCO3 (POC) 21.1 (L) 22 - 26 MMOL/L Base deficit (POC) 1 mmol/L  
 sO2 (POC) 98 (H) 92 - 97 % Site LEFT BRACHIAL Device: BIPAP    
 PEEP/CPAP (POC) 6 cmH2O  
 PIP (POC) 16 Allens test (POC) N/A Specimen type (POC) ARTERIAL Total resp.  rate 17    
 CULTURE, BLOOD, PAIRED Collection Time: 02/17/20  5:48 PM  
Result Value Ref Range Special Requests: NO SPECIAL REQUESTS Culture result: NO GROWTH AFTER 15 HOURS OCCULT BLOOD, STOOL Collection Time: 02/17/20  5:48 PM  
Result Value Ref Range Occult blood, stool NEGATIVE  NEG    
GLUCOSE, POC Collection Time: 02/17/20  5:53 PM  
Result Value Ref Range Glucose (POC) 169 (H) 65 - 100 mg/dL Performed by Calli Hubbard GLUCOSE, POC Collection Time: 02/17/20  9:28 PM  
Result Value Ref Range Glucose (POC) 127 (H) 65 - 100 mg/dL Performed by Marie Robert METABOLIC PANEL, BASIC Collection Time: 02/17/20  9:29 PM  
Result Value Ref Range Sodium 151 (H) 136 - 145 mmol/L Potassium 3.2 (L) 3.5 - 5.1 mmol/L Chloride 119 (H) 97 - 108 mmol/L  
 CO2 25 21 - 32 mmol/L Anion gap 7 5 - 15 mmol/L Glucose 127 (H) 65 - 100 mg/dL BUN 48 (H) 6 - 20 MG/DL Creatinine 1.38 (H) 0.70 - 1.30 MG/DL  
 BUN/Creatinine ratio 35 (H) 12 - 20 GFR est AA >60 >60 ml/min/1.73m2 GFR est non-AA 50 (L) >60 ml/min/1.73m2 Calcium 7.5 (L) 8.5 - 10.1 MG/DL  
PHOSPHORUS Collection Time: 02/17/20  9:29 PM  
Result Value Ref Range Phosphorus 1.8 (L) 2.6 - 4.7 MG/DL MAGNESIUM Collection Time: 02/17/20  9:29 PM  
Result Value Ref Range Magnesium 2.2 1.6 - 2.4 mg/dL GLUCOSE, POC Collection Time: 02/18/20 12:48 AM  
Result Value Ref Range Glucose (POC) 136 (H) 65 - 100 mg/dL Performed by Marie Robert METABOLIC PANEL, BASIC Collection Time: 02/18/20  3:44 AM  
Result Value Ref Range Sodium 151 (H) 136 - 145 mmol/L Potassium 3.3 (L) 3.5 - 5.1 mmol/L Chloride 118 (H) 97 - 108 mmol/L  
 CO2 25 21 - 32 mmol/L Anion gap 8 5 - 15 mmol/L Glucose 187 (H) 65 - 100 mg/dL BUN 48 (H) 6 - 20 MG/DL Creatinine 1.43 (H) 0.70 - 1.30 MG/DL  
 BUN/Creatinine ratio 34 (H) 12 - 20 GFR est AA 59 (L) >60 ml/min/1.73m2 GFR est non-AA 48 (L) >60 ml/min/1.73m2 Calcium 7.3 (L) 8.5 - 10.1 MG/DL  
PHOSPHORUS Collection Time: 02/18/20  3:44 AM  
Result Value Ref Range Phosphorus 2.5 (L) 2.6 - 4.7 MG/DL MAGNESIUM Collection Time: 02/18/20  3:44 AM  
Result Value Ref Range Magnesium 2.1 1.6 - 2.4 mg/dL CBC WITH AUTOMATED DIFF Collection Time: 02/18/20  3:44 AM  
Result Value Ref Range WBC 8.9 4.1 - 11.1 K/uL  
 RBC 3.10 (L) 4.10 - 5.70 M/uL HGB 8.6 (L) 12.1 - 17.0 g/dL HCT 25.9 (L) 36.6 - 50.3 % MCV 83.5 80.0 - 99.0 FL  
 MCH 27.7 26.0 - 34.0 PG  
 MCHC 33.2 30.0 - 36.5 g/dL  
 RDW 15.7 (H) 11.5 - 14.5 % PLATELET 45 (LL) 188 - 400 K/uL MPV 13.3 (H) 8.9 - 12.9 FL  
 NRBC 0.4 (H) 0  WBC ABSOLUTE NRBC 0.04 (H) 0.00 - 0.01 K/uL NEUTROPHILS 93 (H) 32 - 75 % BAND NEUTROPHILS 1 0 - 6 % LYMPHOCYTES 5 (L) 12 - 49 % MONOCYTES 1 (L) 5 - 13 % EOSINOPHILS 0 0 - 7 % BASOPHILS 0 0 - 1 % IMMATURE GRANULOCYTES 0 %  
 ABS. NEUTROPHILS 8.4 (H) 1.8 - 8.0 K/UL  
 ABS. LYMPHOCYTES 0.4 (L) 0.8 - 3.5 K/UL  
 ABS. MONOCYTES 0.1 0.0 - 1.0 K/UL  
 ABS. EOSINOPHILS 0.0 0.0 - 0.4 K/UL  
 ABS. BASOPHILS 0.0 0.0 - 0.1 K/UL  
 ABS. IMM. GRANS. 0.0 K/UL  
 DF MANUAL PLATELET COMMENTS Large Platelets RBC COMMENTS ANISOCYTOSIS 1+ 
    
 RBC COMMENTS POLYCHROMASIA 1+ GLUCOSE, POC Collection Time: 02/18/20  3:45 AM  
Result Value Ref Range Glucose (POC) 181 (H) 65 - 100 mg/dL Performed by Erik Mcconnell POC EG7 Collection Time: 02/18/20  5:46 AM  
Result Value Ref Range Calcium, ionized (POC) 1.07 (L) 1.12 - 1.32 mmol/L  
 FIO2 (POC) 94 % pH (POC) 7.551 (HH) 7.35 - 7.45    
 pCO2 (POC) 29.3 (L) 35.0 - 45.0 MMHG  
 pO2 (POC) 420 (H) 80 - 100 MMHG  
 HCO3 (POC) 25.6 22 - 26 MMOL/L Base excess (POC) 3 mmol/L  
 sO2 (POC) 100 (H) 92 - 97 % Site RIGHT BRACHIAL Device: Non rebreather Flow rate (POC) 45 L/M  Allens test (POC) YES    
 Specimen type (POC) ARTERIAL Total resp. rate 17 GLUCOSE, POC Collection Time: 02/18/20  8:52 AM  
Result Value Ref Range Glucose (POC) 186 (H) 65 - 100 mg/dL Performed by Compa Gonzalez METABOLIC PANEL, BASIC Collection Time: 02/18/20 10:06 AM  
Result Value Ref Range Sodium 150 (H) 136 - 145 mmol/L Potassium 3.7 3.5 - 5.1 mmol/L Chloride 121 (H) 97 - 108 mmol/L  
 CO2 26 21 - 32 mmol/L Anion gap 3 (L) 5 - 15 mmol/L Glucose 195 (H) 65 - 100 mg/dL BUN 46 (H) 6 - 20 MG/DL Creatinine 1.37 (H) 0.70 - 1.30 MG/DL  
 BUN/Creatinine ratio 34 (H) 12 - 20 GFR est AA >60 >60 ml/min/1.73m2 GFR est non-AA 51 (L) >60 ml/min/1.73m2 Calcium 7.2 (L) 8.5 - 10.1 MG/DL  
PHOSPHORUS Collection Time: 02/18/20 10:06 AM  
Result Value Ref Range Phosphorus 2.3 (L) 2.6 - 4.7 MG/DL MAGNESIUM Collection Time: 02/18/20 10:06 AM  
Result Value Ref Range Magnesium 2.0 1.6 - 2.4 mg/dL GLUCOSE, POC Collection Time: 02/18/20 12:52 PM  
Result Value Ref Range Glucose (POC) 196 (H) 65 - 100 mg/dL Performed by Compa Chisholm MD 
[address], [address] [facility] Phone - [phone] Fax - [phone] 
[email or web address] [employer]

## 2020-02-18 NOTE — PROGRESS NOTES
0730: Bedside shift change report given to Activity Rocket, RN (oncoming nurse) by [facility] [facility], RN (offgoing nurse). Report included the following information SBAR, Kardex, Intake/Output, MAR, Recent Results, Cardiac Rhythm NSR and Alarm Parameters . 1000: Scheduled BMP drawn.

## 2020-02-18 NOTE — PROGRESS NOTES
1930: Bedside shift change report given to Eleazar Smiley RN (oncoming nurse) by Wanda Marie RN (offgoing nurse). Report included the following information SBAR, Kardex, ED Summary, Intake/Output, MAR, Recent Results, Med Rec Status, Cardiac Rhythm SR and Alarm Parameters . 0730: Bedside shift change report given to [facility] DANNA HOYOS (oncoming nurse) by Eleazar Smiley RN (offgoing nurse). Report included the following information SBAR, Kardex, ED Summary, Intake/Output, MAR, Recent Results, Med Rec Status, Cardiac Rhythm SR and Alarm Parameters .

## 2020-02-19 NOTE — PROGRESS NOTES
Critical Care Update Patient's blood glucose in the 100's over the last few checks. Did go up to 200's earlier in day. Lantus was held last night. Will give Lantus tonight at reduced dose of 15 units and reduce daily am dose to 20 units. Glycemic Control continue management in the am and adjust as needed. Continue on TF tonight and Dextrose IVF. Iraida Oswald [facility] Critical Care Medicine [employer]

## 2020-02-19 NOTE — ROUTINE PROCESS
Primary Nurse Asiya Quinteros RN and Enrique Gan RN performed a dual skin assessment on this patient Impairment noted- see wound doc flow sheet Doni score is 13

## 2020-02-19 NOTE — PROGRESS NOTES
1930: Bedside shift change report given to Jenn Valdez RN (oncoming nurse) by Diego Howard RN (offgoing nurse). Report included the following information SBAR, Kardex, ED Summary, Intake/Output, MAR, Recent Results, Med Rec Status, Cardiac Rhythm SR and Alarm Parameters 0730: Bedside shift change report given to Diego Howard RN (oncoming nurse) by Jenn Valdez RN (offgoing nurse). Report included the following information SBAR, Kardex, ED Summary, Intake/Output, MAR, Recent Results, Med Rec Status, Cardiac Rhythm SR and Alarm Parameters .

## 2020-02-19 NOTE — PROGRESS NOTES
Problem: Ventilator Management Goal: *Adequate oxygenation and ventilation Outcome: Progressing Towards Goal 
Goal: *Patient maintains clear airway/free of aspiration Outcome: Progressing Towards Goal 
Goal: *Absence of infection signs and symptoms Outcome: Progressing Towards Goal 
Goal: *Normal spontaneous ventilation Outcome: Progressing Towards Goal

## 2020-02-19 NOTE — PROGRESS NOTES
[address] [facility] visit. Unable to complete a spiritual assessment or to receive communion due to medical condition. No family present at the time of the visit. Prayer offered for Mr. Dave Cristobal. RAJINDER Boogie, RN, ACSW, LCSW  Page:  [phone]

## 2020-02-19 NOTE — DIABETES MGMT
One [facility] Followup Progress Note Presentation Mo Covington is a 76 y.o. male admitted with HHNK and UTI sepsis and consulted by Provider for advanced specialty nursing care related to inpatient diabetes management. Hyperglycemia management order set is in place. Subjective Mr Dave Cristobal remains in the ICU. Steroids were D/C yesterday and blood glucose trended down 103-224. Fasting blood glucose was 83 this morning. Yesterday he received 24 units Lantus in the am and 15 units Lantus pm.    
 
Objective Physical exam 
 
General Alert, oriented and in no acute distress/ill-appearing. Conversant and cooperative Vital Signs Visit Vitals /76 Pulse 91 Temp 98.2 °F (36.8 °C) Resp 13 Ht 5' 5\" (1.651 m) Wt 66.3 kg (146 lb 2.6 oz) SpO2 100% BMI 24.32 kg/m² Skin  Warm and dry Heart   Regular rate and rhythm. No murmurs, rubs or gallops Lungs  Clear to auscultation without rales or rhonchi Extremities No foot wounds Laboratory CBC W/O DIFF Collection Time: 02/19/20  4:40 AM  
Result Value Ref Range WBC 7.4 4.1 - 11.1 K/uL  
 RBC 3.16 (L) 4.10 - 5.70 M/uL HGB 8.8 (L) 12.1 - 17.0 g/dL HCT 26.7 (L) 36.6 - 50.3 % MCV 84.5 80.0 - 99.0 FL  
 MCH 27.8 26.0 - 34.0 PG  
 MCHC 33.0 30.0 - 36.5 g/dL  
 RDW 16.0 (H) 11.5 - 14.5 % PLATELET 55 (L) 648 - 400 K/uL MPV ABNORMAL 8.9 - 12.9 FL  
 NRBC 0.3 (H) 0  WBC ABSOLUTE NRBC 0.02 (H) 0.00 - 0.01 K/uL BMP:  
Lab Results Component Value Date/Time  (H) 02/19/2020 04:40 AM  
 K 3.8 02/19/2020 04:40 AM  
  (H) 02/19/2020 04:40 AM  
 CO2 25 02/19/2020 04:40 AM  
 AGAP 6 02/19/2020 04:40 AM  
  (H) 02/19/2020 04:40 AM  
 BUN 38 (H) 02/19/2020 04:40 AM  
 CREA 1.07 02/19/2020 04:40 AM  
 GFRAA >60 02/19/2020 04:40 AM  
 GFRNA >60 02/19/2020 04:40 AM  
  
 
 
Blood glucose pattern Assessment and Plan Nursing Diagnosis Risk for unstable blood glucose pattern Nursing Intervention Domain 4257 Decision-making Support Nursing Interventions Examined current inpatient diabetes control Explored factors facilitating and impeding inpatient management Identified self-management practices impeding diabetes control Explored corrective strategies with patient and responsible inpatient provider Informed patient of rational for basal bolus insulin strategy while hospitalized Evaluation Basal insulin dosing has stabilized blood glucose levels in the past 24 hours. Fasting blood glucose 83 this morning after a total Lantus dose of 39 units in the past 24 hours. Adjustment in insulin dosing needed for basal and enteral feeds Recommendations 1. Adjust Basal insulin:  
  
Based off of two things: 
-0.2 units/kg Lantus: 12 units Lantus 
-Cover for tube feeds 1 units/10 grams CHO (add to Lantus dose): Glucerna at 50ml/hr- additional 14 units added to Lantus dose 
  
With glucose in goal at this time would start with a lower dose- would aim for a blood glucose 140-180 TOTAL LANTUS DOSE: 20 units Daily- start this evening  
  
2. Continue Corrective insulin 
[]? ?        Normal sensitivity Correctional Scale for Normal Sensitivity 
  
200-249: 2 units Humalog 250-299: 4 units Humalog 300-349: 6 units Humalog 350-399: 8 units Humalog Over 400: 10 units Humalog 
  
Do NOT hold for NPO; give in addition to meal time insulin dose. If patient does not eat, give correction dose only. 
   
Referral  
[x]? ?        Case Management 
[x]? ?        Inpatient nutrition services Billing Code(s)  
[address] [address], [address] [facility] 
[phone]

## 2020-02-19 NOTE — PROGRESS NOTES
Nephrology Progress Note Topher Cali Date of Admission : 2/13/2020 CC: Follow up for JOSH, hypernatremia Assessment and Plan JOSH on CKD: 
- 2/2 sepsis ==> resolved and Cr better than baseline  
- daily labs  
  
Hypernatremia: 
-better. Stopped D5W 
- continue enteral water flushes  
  
E coli Urosepsis w/ bacteremia -abx per [facility] 
  
Anemia: 
  
CKD III w/ baseline Cr 1.4 to 1.6: 
- likely from DM and HTN 
  
HHNK: 
- per [facility] 
  
Encephalopathy: 
- toxic, metabolic picture  
  
FTT Dementia Interval History: 
Seen and examined. Remains lethargic. Unable to obtain ROS Chemistry better Current Medications: all current  Medications have been eviewed in [facility] Review of Systems: Review of systems not obtained due to patient factors. Objective: 
Vitals:   
Vitals:  
 02/19/20 0700 02/19/20 0800 02/19/20 0900 02/19/20 1000 BP: 134/78 148/86 133/77 163/81 Pulse: 84 95 90 89 Resp: 12 15 13 12 Temp:  98.2 °F (36.8 °C) SpO2: 100% 98% 98% Weight:      
Height:      
 
Intake and Output: 
02/19 0701 - 02/19 1900 In: 100 [I.V.:100] Out: -  
02/17 1901 - 02/19 0700 In: 6213.8 [I.V.:1778.8] Out: 6752 [Urine:2645] Physical Examination: 
Pt intubated    No  
General: Lethargic Neck:  Supple, no mass Resp:  CTA 
CV:  RRR,  no murmur or rub,no LE edema GI:  Soft, NT, + Bowel sounds, no hepatosplenomegaly Neurologic:  Not following commands Psych:             Unable to assess 
 
 
[]    High complexity decision making was performed 
[]    Patient is at high-risk of decompensation with multiple organ involvement Lab Data Personally Reviewed: I have reviewed all the pertinent labs, microbiology data and radiology studies during assessment. Recent Labs  
  02/19/20 
0440 02/18/20 
1641 02/18/20 
1006 02/18/20 
0344 * 147* 150* 151*  
K 3.8 3.5 3.7 3.3*  
* 116* 121* 118* CO2 25 24 26 25 * 203* 195* 187* BUN 38* 45* 46* 48* CREA 1.07 1.28 1.37* 1.43* CA 6.9* 7.1* 7.2* 7.3*  
MG 1.8  --  2.0 2.1 PHOS 2.1*  --  2.3* 2.5* Recent Labs  
  02/19/20 
0440 02/18/20 
0344 02/17/20 
0242 WBC 7.4 8.9 12.7* HGB 8.8* 8.6* 8.3* HCT 26.7* 25.9* 24.3*  
PLT 55* 45* 38* No results found for: SDES Lab Results Component Value Date/Time Culture result: NO GROWTH 2 DAYS 02/17/2020 05:48 PM  
 Culture result: HEAVY NORMAL RESPIRATORY CEDRICK 02/14/2020 01:41 PM  
 Culture result: HEAVY YEAST, (APPARENT CANDIDA ALBICANS) (A) 02/14/2020 01:41 PM  
 
Recent Results (from the past 24 hour(s)) GLUCOSE, POC Collection Time: 02/18/20 12:52 PM  
Result Value Ref Range Glucose (POC) 196 (H) 65 - 100 mg/dL Performed by [employer] GLUCOSE, POC Collection Time: 02/18/20  4:25 PM  
Result Value Ref Range Glucose (POC) 224 (H) 65 - 100 mg/dL Performed by [employer] GLUCOSE, POC Collection Time: 02/18/20  4:26 PM  
Result Value Ref Range Glucose (POC) 213 (H) 65 - 100 mg/dL Performed by [employer] METABOLIC PANEL, BASIC Collection Time: 02/18/20  4:41 PM  
Result Value Ref Range Sodium 147 (H) 136 - 145 mmol/L Potassium 3.5 3.5 - 5.1 mmol/L Chloride 116 (H) 97 - 108 mmol/L  
 CO2 24 21 - 32 mmol/L Anion gap 7 5 - 15 mmol/L Glucose 203 (H) 65 - 100 mg/dL BUN 45 (H) 6 - 20 MG/DL Creatinine 1.28 0.70 - 1.30 MG/DL  
 BUN/Creatinine ratio 35 (H) 12 - 20 GFR est AA >60 >60 ml/min/1.73m2 GFR est non-AA 55 (L) >60 ml/min/1.73m2 Calcium 7.1 (L) 8.5 - 10.1 MG/DL  
GLUCOSE, POC Collection Time: 02/18/20  8:33 PM  
Result Value Ref Range Glucose (POC) 129 (H) 65 - 100 mg/dL Performed by [facility] GLUCOSE, POC Collection Time: 02/19/20 12:23 AM  
Result Value Ref Range Glucose (POC) 117 (H) 65 - 100 mg/dL Performed by [facility] GLUCOSE, POC Collection Time: 02/19/20  4:38 AM  
Result Value Ref Range Glucose (POC) 103 (H) 65 - 100 mg/dL [place] METABOLIC PANEL, BASIC Collection Time: 02/19/20  4:40 AM  
Result Value Ref Range Sodium 147 (H) 136 - 145 mmol/L Potassium 3.8 3.5 - 5.1 mmol/L Chloride 116 (H) 97 - 108 mmol/L  
 CO2 25 21 - 32 mmol/L Anion gap 6 5 - 15 mmol/L Glucose 106 (H) 65 - 100 mg/dL BUN 38 (H) 6 - 20 MG/DL Creatinine 1.07 0.70 - 1.30 MG/DL  
 BUN/Creatinine ratio 36 (H) 12 - 20 GFR est AA >60 >60 ml/min/1.73m2 GFR est non-AA >60 >60 ml/min/1.73m2 Calcium 6.9 (L) 8.5 - 10.1 MG/DL MAGNESIUM Collection Time: 02/19/20  4:40 AM  
Result Value Ref Range Magnesium 1.8 1.6 - 2.4 mg/dL PHOSPHORUS Collection Time: 02/19/20  4:40 AM  
Result Value Ref Range Phosphorus 2.1 (L) 2.6 - 4.7 MG/DL  
CBC W/O DIFF Collection Time: 02/19/20  4:40 AM  
Result Value Ref Range WBC 7.4 4.1 - 11.1 K/uL  
 RBC 3.16 (L) 4.10 - 5.70 M/uL HGB 8.8 (L) 12.1 - 17.0 g/dL HCT 26.7 (L) 36.6 - 50.3 % MCV 84.5 80.0 - 99.0 FL  
 MCH 27.8 26.0 - 34.0 PG  
 MCHC 33.0 30.0 - 36.5 g/dL  
 RDW 16.0 (H) 11.5 - 14.5 % PLATELET 55 (L) 343 - 400 K/uL MPV ABNORMAL 8.9 - 12.9 FL  
 NRBC 0.3 (H) 0  WBC ABSOLUTE NRBC 0.02 (H) 0.00 - 0.01 K/uL GLUCOSE, POC Collection Time: 02/19/20  8:52 AM  
Result Value Ref Range Glucose (POC) 83 65 - 100 mg/dL Performed by Silvino Carlton MD 
[address], [facility] Phone - [phone] Fax - [phone] 
[email or web address]

## 2020-02-19 NOTE — PROGRESS NOTES
[facility] Adult  Hospitalist Group ICU Transfer/Accept Summary [place] [address] [place] 
DATE OF TRANSFER: 2/19/2020 PATIENT ID: Preston Casarez MRN: [id number] YOB: 1945 PRIMARY CARE PROVIDER: Sade Josue MD  
DATE OF ADMISSION: 2/13/2020 10:57 AM   
ATTENDING PHYSICIAN: Lily Jones MD 
CONSULTATIONS:  
IP CONSULT TO NEPHROLOGY 
IP CONSULT TO HOSPITALIST 
 
PROCEDURES/SURGERIES:  
* No surgery found * REASON FOR ADMISSION: <principal problem not specified> HOSPITAL PROBLEM LIST: 
Patient Active Problem List  
Diagnosis Code  
 HTN (hypertension) I10  
 Cataracts, bilateral H26.9  Glaucoma, right eye H40.9  DM (diabetes mellitus) type II controlled with renal manifestation ([facility]) E11.29  
 CVA (cerebral vascular accident) ([facility] I63.9  Hyperkalemia E87.5  Hypernatremia E87.0  Altered mental status R41.82  
 Failure to thrive in adult R62.7  JOSH (acute kidney injury) ([facility] N17.9  DKA (diabetic ketoacidoses) ([facility]) E11.10  Encephalopathy G93.40 Brief HPI and Hospital Course:   
 
77-year-old gentleman with past medical history of hypertension, diabetes, CVA, dementia 
 who was found unresponsive at at [facility] gas was checked by EMS and route and read \"high\".  In the emergency room on further work-up he was found to have severe DKA/HHS, acute kidney injury, severe lactic acidosis, hypothermia and a UTI. He was then intubated for airway protection, on admission. Work-up revealed DKA/HHS, he was started on insulin gtt and managed in the ICU. Work-up revealed pan sensitive E. coli in his urine, and blood. He was started on broad-spectrum antibiotics, and then narrowed to ceftriaxone. His mental status, has improved somewhat, however he is definitely not back to his baseline.   Due to his mental status he has not been able to tolerate any p.o., and he has been on NG feeds. Hospital course is also been complicated by hypernatremia, for which he is on D5 and free water flushes. Assessment and Plan: 
 
DKA/HHSresolved (original BMP significant for glucose over 1000, CO2 less than 5), serum awesome 415 Type 2 diabetes, A1c 10.6% 
- Holding glargine for now, blood sugars have been low - Sliding scale insulin, point-of-care glucose test every 6 hours, hypoglycemia protocol Severe sepsis with lactic acidosis, and acute hypoxic respiratory failure-secondary to E. coli bacteremia, UTI resolving 
-Continue ceftriaxone, may swtich to levofloxacin on discharge. Needs a total 14 day course due to bacteremia.  
- F/U repeat culture, negative. Dysphagia-has not been able to work with speech due to his mental status 
-We will consult speech to continue to work with him 
-Reviewed with son today that I do suspect that he may need a PEG tube depending on patient and family wishes, if he is not going to improve much. 
- Nutrition to follow, continue tube feeding for now Hypernatremia, improving. Was as high as 168 on admit, improving. 
-Continue free water flushes, D5W for now 
-Monitor closely Acute kidney injury -on admission creatinine 5.9 has now down trended nicely, improving 
-Continue to monitor 
-Avoid nephrotoxins, I's and O's 
 
Acute hypoxic respiratory failure - intubated on admission 2/13, extubated 2/17 
- O2 as needed, wean as tolerated. Lactic acidosis POA - resolved HTN - home amlodipine HLD - home statin Palliative care consult placed, given dementia, CVA, and degree of debility. Suspect he may need a PEG, and to re discuss code status with family. PHYSICAL EXAMINATION: 
Visit Vitals /82 Pulse 98 Temp 97.9 °F (36.6 °C) Resp 15 Ht 5' 5\" (1.651 m) Wt 66.3 kg (146 lb 2.6 oz) SpO2 100% BMI 24.32 kg/m² General:          Asleep, opens eyes to voice HEENT:           Atraumatic, MMM, NGT in place Neck:               Supple, symmetrical,  thyroid: non tender Lungs:             Clear to auscultation bilaterally. No Wheezing or Rhonchi. No rales. NC in place Heart:              Regular  rhythm,  No  murmur   No edema Abdomen:       Soft, non-tender. Not distended. Bowel sounds normal 
Extremities:     No cyanosis. No clubbing,  +2 distal pulses Skin:                Not pale. Not Jaundiced  No rashes Psych:             Not anxious or agitated. Neurologic:      Drowsy, opens eyes to voice, not answering any questions. CODE STATUS: 
X Full Code DNR Partial  
 Comfort Care Signed:  
Jose Francisco Loomis MD 
Date of Service:  2/19/2020 
3:08 PM

## 2020-02-20 NOTE — PROGRESS NOTES
[facility]  Hospitalist Group Hospitalist Progress Note Suzette Freeman MD 
Answering service: [phone] OR [phone] from in house phone Date of Service:  2020 NAME:  Lorna Castillo :  1945 MRN:  [id number] Admission Summary:  
Patient was admitted to the ICU on 2020 from [facility]. · Acute metabolic encephalopathy · Septic shock · Acute respiratory failure requiring intubation · HHS/DKA 51-year-old gentleman with past medical history of hypertension, diabetes, CVA, dementia 
 who was found unresponsive at at [facility]. Blood gas was checked by EMS and route and read \"high\". In the emergency room on further work-up he was found to have severe DKA/HHS, acute kidney injury, severe lactic acidosis, hypothermia and a UTI. He was then intubated for airway protection, on admission. Work-up revealed DKA/HHS, he was started on insulin gtt and managed in the ICU. Work-up revealed pan sensitive E. coli in his urine, and blood. He was started on broad-spectrum antibiotics, and then narrowed to ceftriaxone. His mental status, has improved somewhat, however he is definitely not back to his baseline. Due to his mental status he has not been able to tolerate any p.o., and he has been on NG feeds. Hospital course is also been complicated by hypernatremia, for which he is on D5 and free water flushes. Interval history / Subjective:  
 Opens eye,tried to voice when I asked for his name otherwise non verbal.NGT and left wrist restraint in place. Not in distress No family in the room I called nursing staff at [facility]. His nurse said he could answer \"yes\" or \"no\" at best and hs right sided weakness,has tried to walk with PT,but does not ambulate independently.   
 
Assessment & Plan:  
 
Severe sepsis,septic shock, with lactic acidosis, and acute hypoxic respiratory failure-secondary to E. coli bacteremia, UTI resolving 
-required vasopressors initially,weaned off 
-Continue ceftriaxone, hope to [facility] to levofloxacin on discharge. Needs a total 14 day course due to bacteremia.  
- F/U repeat culture, negative. DKA/HHSresolved (original BMP significant for glucose over 1000, CO2 less than 5), serum osmolality 415 Type 2 diabetes, A1c 10.6% - Lantus was being held for hypoglycemia. Lower dose to 6 units,start from tomorrow - Sliding scale insulin, point-of-care glucose test every 6 hours, hypoglycemia protocol Dysphagia-has not been able to work with speech due to his mental status 
-Currently getting  tube feeding via NGT 
-consult speech to continue to work with him - Nutrition to follow, continue tube feeding for now Hypernatremia, resolved Acute kidney injury -on admission creatinine 5.9 has now down trended nicely,resolved Acute hypoxic respiratory failure - intubated on admission 2/13, extubated 2/17 
- O2 as needed, wean as tolerated. Acute metabolic encephalopathy,baseline dementia 
-Head CT on admission negative. Thrombocytopenia chronic intermittent  
-platelet seem to be improving after jayson of 39(2/17) this admission 
-No bleeding complications Right hemiparesis noted as baseline on admission H&P  
-Head CT negative on admission  
-He was admitted early January 2020 and CTA H&N was questionable for occlusion of the distal left M3 branch Lactic acidosis POA - resolved HTN - home amlodipine HLD - home statin Palliative care consult placed, given dementia, CVA, and degree of debility. Suspect he may need a PEG, and to re discuss code status with family. Code status: full code DVT prophylaxis: scd Care Plan discussed with: Nurse Anticipated Disposition: SNF/LTC Anticipated Discharge: Greater than 48 hours I talked with his son Nakita Hartley on the [facility]([phone]) and updated him.He is already aware his dad is on tube feeding via NGT and that he may not be safe for oral intake anytime soon he may need PEG if that is his wish. Earline Lopez indicated they would probably go with PEG if needed. He wants for his dad to remain full cose. He also does not want his dad return to [facility]. Hospital Problems  Date Reviewed: 12/4/2018 Codes Class Noted POA DKA (diabetic ketoacidoses) ([facility] ICD-10-CM: E11.10 ICD-9-CM: 250.10  2/13/2020 Unknown Encephalopathy ICD-10-CM: G93.40 ICD-9-CM: 348.30  2/13/2020 Unknown JOSH (acute kidney injury) ([facility] ICD-10-CM: N17.9 ICD-9-CM: 584.9  1/21/2020 Unknown Review of Systems:  
Review of systems not obtained due to patient factors. Vital Signs:  
 Last 24hrs VS reviewed since prior progress note. Most recent are: 
Visit Vitals /82 Pulse 94 Temp 98.1 °F (36.7 °C) Resp 13 Ht 5' 5\" (1.651 m) Wt 64.7 kg (142 lb 10.2 oz) SpO2 100% BMI 23.74 kg/m² Intake/Output Summary (Last 24 hours) at 2/20/2020 1346 Last data filed at 2/20/2020 0700 Gross per 24 hour Intake 2250 ml Output 635 ml Net 1615 ml Physical Examination:  
 
 
     
Constitutional: Lethargic,opens even,non verba ENT: NGT in place. Resp: Symmetrical air entry. On oxygen via nasal canula CV:  Regular rhythm, normal rate, no murmurs, gallops, rubs GI:  Soft, non distended, non tender. normoactive bowel sounds, no hepatosplenomegaly Musculoskeletal:  SCDs in place,+1 edema legs Neurologic: Lethargic,non verbal,rigth hemiparesis. Moves the left side Data Review:  
 Review and/or order of clinical lab test 
Review and/or order of tests in the radiology section of CPT Review and/or order of tests in the medicine section of CPT Labs:  
 
Recent Labs  
  02/20/20 
0507 02/19/20 
0440 WBC 9.4 7.4 HGB 8.8* 8.8* HCT 26.7* 26.7*  
PLT 77* 55* Recent Labs  
  02/20/20 
0507 02/19/20 7951 02/18/20 
1641 02/18/20 
1006  147* 147* 150*  
K 3.4* 3.8 3.5 3.7 * 116* 116* 121* CO2 24 25 24 26 BUN 31* 38* 45* 46* CREA 0.98 1.07 1.28 1.37* * 106* 203* 195* CA 7.0* 6.9* 7.1* 7.2*  
MG 1.7 1.8  --  2.0 PHOS 3.0 2.1*  --  2.3* No results for input(s): SGOT, GPT, ALT, AP, TBIL, TBILI, TP, ALB, GLOB, GGT, AML, LPSE in the last 72 hours. No lab exists for component: AMYP, HLPSE No results for input(s): INR, PTP, APTT, INREXT in the last 72 hours. No results for input(s): FE, TIBC, PSAT, FERR in the last 72 hours. No results found for: FOL, RBCF No results for input(s): PH, PCO2, PO2 in the last 72 hours. No results for input(s): CPK, CKNDX, TROIQ in the last 72 hours. No lab exists for component: CPKMB Lab Results Component Value Date/Time Cholesterol, total 190 01/02/2020 04:06 AM  
 HDL Cholesterol 64 01/02/2020 04:06 AM  
 LDL, calculated 115.4 (H) 01/02/2020 04:06 AM  
 Triglyceride 53 01/02/2020 04:06 AM  
 CHOL/HDL Ratio 3.0 01/02/2020 04:06 AM  
 
Lab Results Component Value Date/Time Glucose (POC) 169 (H) 02/20/2020 12:59 PM  
 Glucose (POC) 137 (H) 02/20/2020 08:42 AM  
 Glucose (POC) 108 (H) 02/20/2020 05:06 AM  
 Glucose (POC) 95 02/20/2020 12:18 AM  
 Glucose (POC) 100 02/19/2020 08:52 PM  
 
Lab Results Component Value Date/Time  Color YELLOW/STRAW 02/13/2020 01:07 PM  
 Appearance TURBID (A) 02/13/2020 01:07 PM  
 Specific gravity 1.020 02/13/2020 01:07 PM  
 pH (UA) 5.0 02/16/2020 08:33 AM  
 Protein 100 (A) 02/13/2020 01:07 PM  
 Glucose >1,000 (A) 02/13/2020 01:07 PM  
 Ketone NEGATIVE  02/13/2020 01:07 PM  
 Bilirubin NEGATIVE  02/13/2020 01:07 PM  
 Urobilinogen 0.2 02/13/2020 01:07 PM  
 Nitrites POSITIVE (A) 02/13/2020 01:07 PM  
 Leukocyte Esterase MODERATE (A) 02/13/2020 01:07 PM  
 Epithelial cells FEW 02/13/2020 01:07 PM  
 Bacteria 4+ (A) 02/13/2020 01:07 PM  
 WBC >100 (H) 02/13/2020 01:07 PM  
 RBC 5-10 02/13/2020 01:07 PM  
 
 
 
Medications Reviewed:  
 
Current Facility-Administered Medications Medication Dose Route Frequency  insulin lispro (HUMALOG) injection   SubCUTAneous Q6H  
 hydrALAZINE (APRESOLINE) 20 mg/mL injection 10 mg  10 mg IntraVENous Q6H PRN  
 insulin glargine (LANTUS) injection 12 Units  12 Units SubCUTAneous DAILY  balsam peru-castor oil (VENELEX) ointment   Topical BID  amLODIPine (NORVASC) tablet 5 mg  5 mg Oral DAILY  atorvastatin (LIPITOR) tablet 20 mg  20 mg Oral DAILY  cefTRIAXone (ROCEPHIN) 2 g in 0.9% sodium chloride (MBP/ADV) 50 mL  2 g IntraVENous Q24H  
 glucose chewable tablet 16 g  4 Tab Oral PRN  
 glucagon (GLUCAGEN) injection 1 mg  1 mg IntraMUSCular PRN  
 dextrose 10% infusion 0-250 mL  0-250 mL IntraVENous PRN  
 0.9% sodium chloride infusion 250 mL  250 mL IntraVENous PRN  
 collagenase (SANTYL) 250 unit/gram ointment   Topical DAILY  sodium chloride (NS) flush 5-40 mL  5-40 mL IntraVENous Q8H  
 sodium chloride (NS) flush 5-40 mL  5-40 mL IntraVENous PRN  
 ondansetron (ZOFRAN) injection 4 mg  4 mg IntraVENous Q6H PRN  
 acetaminophen (TYLENOL) tablet 650 mg  650 mg Per G Tube Q4H PRN  
 
______________________________________________________________________ EXPECTED LENGTH OF STAY: 12d 9h 
ACTUAL LENGTH OF STAY:          7 Vanesa Sanchez MD

## 2020-02-20 NOTE — PROGRESS NOTES
NUTRITION COMPLETE ASSESSMENT 
 
RECOMMENDATIONS:  
Adjust flush prn to keep sodium WNL Interventions/Plan:  
Food/Nutrient Delivery:   Modify rate, concentration, composition, and schedule Reduce flush to 200 ml q 3 hr 
 
Assessment:  
Reason for Assessment:  
[x]Reassessment Tube Feeding: Glucerna 1.2 @ 50 ml/hr with 200 ml water flush q 2 hr 
Diet: NPO Nutritionally Significant Medications: [x] Reviewed & Includes: Lantus, correction scale insulin, KCL Subjective: 
 
Objective: Mr Santa Addison was admitted with JOSH. PMHx: DM 2, HTN, CVA, HLD, Dementia. Patient found unresponsive @ NH d/t HHS/DKA, JOSH on CKD, severe lactic acidosis, UTI; acute respiratory failure/metabolic encephalopathy-intubated in ED. Noted: E Coli urosepsis with bacteremia; HHNK; volume depletion, JOSH on CKD. Hypernatremia resolved after free water flushes increased to q 2 hr-suggest reducing to q 3 hr. Continue to monitor labs and adjust flush prn. Blood glucose control improved. Potassium replaced today and phosphorus finally WNL. Tube feeding as ordered provides 1200 ml, 1440 calories, 72 gm protein and 3365 ml free water (tube feeding/flush) per day. Reducing water flush to q 3 hr will reduce total free water to 2570 ml/day. Estimated Nutrition Needs:  
Kcals/day: 1450 Kcals/day(MSJ x 1.2) Protein: 54 g(54-70 (1.0-1.3g/kg) Fluid: (1 ml/kcal) Based On: Jessie Swanton Weight Used: Actual wt(54-70 (1.0-1.3g/kg) Pt expected to meet estimated nutrient needs:  [x]   Yes     []  No  [] Unable to predict at this time Nutrition Diagnosis:  
1. Inadequate oral intake related to AMS as evidenced by NPO with enteral nutrition support. 2. 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 Goals:   
 Tube feeding to meet 90% estimated needs x 4-7 days. Monitoring & Evaluation: - Enteral/parenteral nutrition intake - Electrolyte and renal profile, Glucose profile, Weight/weight change Previous Nutrition Goals Met: 
Yes Previous Recommendations:     
Yes 
 
Education & Discharge Needs: 
 [x] None Identified 
 [] Identified and addressed [x] Participated in care plan, discharge planning, and/or interdisciplinary rounds Cultural, Protestant and ethnic food preferences identified: 
 None Skin Integrity: []Intact  [x] Unstageable sacral pressure injury Edema: []None [x] 2+ NP BUE's Last BM: 2/19 Food Allergies: [x]None []Other Anthropometrics:   
Weight Loss Metrics 2/20/2020 1/22/2020 1/8/2020 2/24/2019 2/2/2019 12/4/2018 8/27/2018 Today's Wt 142 lb 10.2 oz 118 lb 7.6 oz 136 lb 11 oz - 138 lb 14.2 oz 140 lb 9.6 oz 140 lb BMI 23.74 kg/m2 19.72 kg/m2 22.75 kg/m2 23.11 kg/m2 23.11 kg/m2 23.4 kg/m2 23.3 kg/m2 Last 3 Recorded Weights in this Encounter 02/19/20 0400 02/20/20 0000 02/20/20 1050 Weight: 66.3 kg (146 lb 2.6 oz) 64.7 kg (142 lb 10.2 oz) 64.7 kg (142 lb 10.2 oz) Weight Source: Bed Height: 5' 5\" (165.1 cm), Body mass index is 23.74 kg/m². IBW : 61.7 kg (136 lb), % IBW (Calculated): 104.88 % 
 ,   
 
Labs:   
Lab Results Component Value Date/Time Sodium 143 02/20/2020 05:07 AM  
 Potassium 3.4 (L) 02/20/2020 05:07 AM  
 Chloride 113 (H) 02/20/2020 05:07 AM  
 CO2 24 02/20/2020 05:07 AM  
 Glucose 109 (H) 02/20/2020 05:07 AM  
 BUN 31 (H) 02/20/2020 05:07 AM  
 Creatinine 0.98 02/20/2020 05:07 AM  
 Calcium 7.0 (L) 02/20/2020 05:07 AM  
 Magnesium 1.7 02/20/2020 05:07 AM  
 Phosphorus 3.0 02/20/2020 05:07 AM  
 Albumin 2.3 (L) 02/13/2020 11:30 AM  
 
Lab Results Component Value Date/Time Hemoglobin A1c 10.6 (H) 01/02/2020 04:06 AM  
 Hemoglobin A1c (POC) 12.8 08/20/2018 10:06 AM  
 
Lab Results Component Value Date/Time Glucose (POC) 137 (H) 02/20/2020 08:42 AM  
  
Lab Results Component Value Date/Time  ALT (SGPT) 28 02/13/2020 11:30 AM  
 AST (SGOT) 35 02/13/2020 11:30 AM  
 Alk.  phosphatase 187 (H) 02/13/2020 11:30 AM  
 Bilirubin, total 0.6 02/13/2020 11:30 AM  
  
 
Jolane Cranker, RD [facility]

## 2020-02-20 NOTE — PROGRESS NOTES
Critical Care Note CDMP Query · Pressure ulcers assessed on admission. · POA unstageable sacral pressure injury measures 3.8 cm x 2.5 cm x 0.3 cm, wound bed is dusky red with some slough at the deepest area, small serous drainage wound edges are open, alba-wound intact. · POA left heel with superficial crusted wound measures 2 cm x 1.5 cm, no drainage, alba-wound intact. · Right heel and bilateral buttocks skin intact and without erythema. · Both pressure ulcers present upon this admission. 
  
Wound care consulted and evaluated. Jonelle Mckeon [facility] Critical Care Medicine [employer]

## 2020-02-20 NOTE — PROGRESS NOTES
Nephrology Progress Note Mo [place] Date of Admission : 2/13/2020 CC: Follow up for JOSH, hypernatremia Assessment and Plan JOSH on CKD: 
- 2/2 sepsis ==> resolved and Cr better than baseline  
- daily labs  
  
Hypernatremia: 
-resolved  
  
E coli Urosepsis w/ bacteremia -abx per [facility] 
  
Anemia: 
  
CKD III w/ baseline Cr 1.4 to 1.6: 
- likely from DM and HTN 
  
HHNK: 
- per [facility] 
  
Encephalopathy: 
- toxic, metabolic picture  
  
FTT Dementia 
 
_______________________________________________________________ Signing off Thank you for allowing us to participate in this patient's care. Interval History: 
Seen and examined. Remains lethargic. Unable to obtain ROS Current Medications: all current  Medications have been eviewed in [facility] Review of Systems: Review of systems not obtained due to patient factors. Objective: 
Vitals:   
Vitals:  
 02/20/20 0500 02/20/20 0600 02/20/20 0700 02/20/20 0800 BP: 136/80 129/74 140/75 142/78 Pulse: 98 95 94 93 Resp: 14 14 12 13 Temp:    98.5 °F (36.9 °C) SpO2: 100% 97% 100% 100% Weight:      
Height:      
 
Intake and Output: 
No intake/output data recorded. 02/18 1901 - 02/20 0700 In: 3296 [I.V.:1610] Out: 805 [Urine:805] Physical Examination: 
Pt intubated    No  
General: Lethargic Neck:  Supple, no mass Resp:  CTA 
CV:  RRR,  no murmur or rub,no LE edema GI:  Soft, NT, + Bowel sounds, no hepatosplenomegaly Neurologic:  Not following commands Psych:             Unable to assess 
 
 
[]    High complexity decision making was performed 
[]    Patient is at high-risk of decompensation with multiple organ involvement Lab Data Personally Reviewed: I have reviewed all the pertinent labs, microbiology data and radiology studies during assessment. Recent Labs  
  02/20/20 
0507 02/19/20 
0440 02/18/20 
1641 02/18/20 
1006  147* 147* 150*  
K 3.4* 3.8 3.5 3.7 * 116* 116* 121* CO2 24 25 24 26 * 106* 203* 195* BUN 31* 38* 45* 46* CREA 0.98 1.07 1.28 1.37* CA 7.0* 6.9* 7.1* 7.2*  
MG 1.7 1.8  --  2.0 PHOS 3.0 2.1*  --  2.3* Recent Labs  
  02/20/20 
0507 02/19/20 
0440 02/18/20 
0344 WBC 9.4 7.4 8.9 HGB 8.8* 8.8* 8.6* HCT 26.7* 26.7* 25.9*  
PLT 77* 55* 45* No results found for: SDES Lab Results Component Value Date/Time Culture result: NO GROWTH 3 DAYS 02/17/2020 05:48 PM  
 Culture result: HEAVY NORMAL RESPIRATORY CEDRICK 02/14/2020 01:41 PM  
 Culture result: HEAVY YEAST, (APPARENT CANDIDA ALBICANS) (A) 02/14/2020 01:41 PM  
 
Recent Results (from the past 24 hour(s)) GLUCOSE, POC Collection Time: 02/19/20 12:45 PM  
Result Value Ref Range Glucose (POC) 93 65 - 100 mg/dL Performed by Chapito Loja GLUCOSE, POC Collection Time: 02/19/20  4:46 PM  
Result Value Ref Range Glucose (POC) 66 65 - 100 mg/dL Performed by Ceci Posadas GLUCOSE, POC Collection Time: 02/19/20  4:58 PM  
Result Value Ref Range Glucose (POC) 90 65 - 100 mg/dL Performed by Ceci Posadas GLUCOSE, POC Collection Time: 02/19/20  8:52 PM  
Result Value Ref Range Glucose (POC) 100 65 - 100 mg/dL Performed by Harley Pang GLUCOSE, POC Collection Time: 02/20/20 12:18 AM  
Result Value Ref Range Glucose (POC) 95 65 - 100 mg/dL Performed by Harley Pang GLUCOSE, POC Collection Time: 02/20/20  5:06 AM  
Result Value Ref Range Glucose (POC) 108 (H) 65 - 100 mg/dL Performed by Harley Pang MAGNESIUM Collection Time: 02/20/20  5:07 AM  
Result Value Ref Range Magnesium 1.7 1.6 - 2.4 mg/dL METABOLIC PANEL, BASIC Collection Time: 02/20/20  5:07 AM  
Result Value Ref Range Sodium 143 136 - 145 mmol/L Potassium 3.4 (L) 3.5 - 5.1 mmol/L Chloride 113 (H) 97 - 108 mmol/L  
 CO2 24 21 - 32 mmol/L Anion gap 6 5 - 15 mmol/L Glucose 109 (H) 65 - 100 mg/dL  BUN 31 (H) 6 - 20 MG/DL  
 Creatinine 0.98 0.70 - 1.30 MG/DL  
 BUN/Creatinine ratio 32 (H) 12 - 20 GFR est AA >60 >60 ml/min/1.73m2 GFR est non-AA >60 >60 ml/min/1.73m2 Calcium 7.0 (L) 8.5 - 10.1 MG/DL  
PHOSPHORUS Collection Time: 02/20/20  5:07 AM  
Result Value Ref Range Phosphorus 3.0 2.6 - 4.7 MG/DL  
CBC WITH AUTOMATED DIFF Collection Time: 02/20/20  5:07 AM  
Result Value Ref Range WBC 9.4 4.1 - 11.1 K/uL  
 RBC 3.18 (L) 4.10 - 5.70 M/uL HGB 8.8 (L) 12.1 - 17.0 g/dL HCT 26.7 (L) 36.6 - 50.3 % MCV 84.0 80.0 - 99.0 FL  
 MCH 27.7 26.0 - 34.0 PG  
 MCHC 33.0 30.0 - 36.5 g/dL  
 RDW 16.2 (H) 11.5 - 14.5 % PLATELET 77 (L) 903 - 400 K/uL MPV ABNORMAL 8.9 - 12.9 FL  
 NRBC 0.0 0  WBC ABSOLUTE NRBC 0.00 0.00 - 0.01 K/uL NEUTROPHILS 59 32 - 75 % BAND NEUTROPHILS 8 (H) 0 - 6 % LYMPHOCYTES 25 12 - 49 % MONOCYTES 5 5 - 13 % EOSINOPHILS 2 0 - 7 % BASOPHILS 1 0 - 1 % IMMATURE GRANULOCYTES 0 %  
 ABS. NEUTROPHILS 6.2 1.8 - 8.0 K/UL  
 ABS. LYMPHOCYTES 2.4 0.8 - 3.5 K/UL  
 ABS. MONOCYTES 0.5 0.0 - 1.0 K/UL  
 ABS. EOSINOPHILS 0.2 0.0 - 0.4 K/UL  
 ABS. BASOPHILS 0.1 0.0 - 0.1 K/UL  
 ABS. IMM. GRANS. 0.0 K/UL  
 DF MANUAL PLATELET COMMENTS Large Platelets RBC COMMENTS ANISOCYTOSIS 1+ 
    
 RBC COMMENTS MICROCYTOSIS 1+ 
    
 RBC COMMENTS MACROCYTOSIS 1+ 
    
 RBC COMMENTS ATYPICAL LYMPHOCYTES PRESENT 
POLYCHROMASIA 1+ GLUCOSE, POC Collection Time: 02/20/20  8:42 AM  
Result Value Ref Range Glucose (POC) 137 (H) 65 - 100 mg/dL Performed by Rupa Ruiz MD 
[address] [place] [address], [facility] Phone - [phone] Fax - [phone] 
[email or web address]

## 2020-02-20 NOTE — DIABETES MGMT
[facility] Followup Progress Note Presentation David Tristan is a 76 y.o. male admitted with HHS and consulted by Provider for advanced specialty nursing care related to inpatient diabetes management. Hyperglycemia management order set is in place. Subjective Mr Cece Nolasco has been off insulin for the past 24 hours following blood glucose values 80s-100s. Continues to tolerate enteral feeds. Blood glucose now rising. Objective Physical exam 
 
General In no acute distress/ill-appearing. Minimal verbal communication. Will shake head yes and no. Vital Signs Visit Vitals BP (!) 163/92 Pulse (!) 101 Temp 98.5 °F (36.9 °C) Resp 16 Ht 5' 5\" (1.651 m) Wt 64.7 kg (142 lb 10.2 oz) SpO2 100% BMI 23.74 kg/m² Skin  Warm and dry Heart   Regular rate and rhythm. No murmurs, rubs or gallops Lungs  Clear to auscultation without rales or rhonchi Extremities Left heel wound Laboratory CBC WITH AUTOMATED DIFF Collection Time: 02/20/20  5:07 AM  
Result Value Ref Range WBC 9.4 4.1 - 11.1 K/uL  
 RBC 3.18 (L) 4.10 - 5.70 M/uL HGB 8.8 (L) 12.1 - 17.0 g/dL HCT 26.7 (L) 36.6 - 50.3 % MCV 84.0 80.0 - 99.0 FL  
 MCH 27.7 26.0 - 34.0 PG  
 MCHC 33.0 30.0 - 36.5 g/dL  
 RDW 16.2 (H) 11.5 - 14.5 % PLATELET 77 (L) 421 - 400 K/uL MPV ABNORMAL 8.9 - 12.9 FL  
 NRBC 0.0 0  WBC ABSOLUTE NRBC 0.00 0.00 - 0.01 K/uL NEUTROPHILS 59 32 - 75 % BAND NEUTROPHILS 8 (H) 0 - 6 % LYMPHOCYTES 25 12 - 49 % MONOCYTES 5 5 - 13 % EOSINOPHILS 2 0 - 7 % BASOPHILS 1 0 - 1 % IMMATURE GRANULOCYTES 0 %  
 ABS. NEUTROPHILS 6.2 1.8 - 8.0 K/UL  
 ABS. LYMPHOCYTES 2.4 0.8 - 3.5 K/UL  
 ABS. MONOCYTES 0.5 0.0 - 1.0 K/UL  
 ABS. EOSINOPHILS 0.2 0.0 - 0.4 K/UL  
 ABS. BASOPHILS 0.1 0.0 - 0.1 K/UL  
 ABS. IMM. GRANS. 0.0 K/UL  
 DF MANUAL PLATELET COMMENTS Large Platelets RBC COMMENTS ANISOCYTOSIS 1+ 
    
 RBC COMMENTS MICROCYTOSIS 
1+ 
 RBC COMMENTS MACROCYTOSIS 1+ 
    
 RBC COMMENTS ATYPICAL LYMPHOCYTES PRESENT 
POLYCHROMASIA 1+ BMP:  
Lab Results Component Value Date/Time  2020 05:07 AM  
 K 3.4 (L) 2020 05:07 AM  
  (H) 2020 05:07 AM  
 CO2 24 2020 05:07 AM  
 AGAP 6 2020 05:07 AM  
  (H) 2020 05:07 AM  
 BUN 31 (H) 2020 05:07 AM  
 CREA 0.98 2020 05:07 AM  
 GFRAA >60 2020 05:07 AM  
 GFRNA >60 2020 05:07 AM  
  
 
Blood glucose pattern Assessment and Plan Nursing Diagnosis Risk for unstable blood glucose pattern Nursing Intervention Domain 6434 Decision-making Support Nursing Interventions Examined current inpatient diabetes control Explored factors facilitating and impeding inpatient management Identified self-management practices impeding diabetes control Explored corrective strategies with responsible inpatient provider Informed patient of rational for basal bolus insulin strategy while hospitalized Evaluation Patient blood glucose  after 40 units Lantus 36 hours ago. No insulin requirements in the past 24 hours. Is receiving low carbohydrate intake with enteral feeds. I expect a slow rise in blood glucose today. Recommendations 1. Adjust Basal insulin: Ok to restart Lantus 0.2 units/k units 
  
2. Continue Corrective insulin 
[x]? ??        Normal sensitivity Correctional Scale for Normal Sensitivity 
  
200-249: 2 units Humalog 250-299: 4 units Humalog 300-349: 6 units Humalog 350-399: 8 units Humalog Over 400: 10 units Humalog 
  
Do NOT hold for NPO; give in addition to meal time insulin dose. If patient does not eat, give correction dose only. 
   
Referral  
[x]? ??        Case Management to assess placement 
[x]? ??        Inpatient nutrition services Billing Code(s)  
[address] [address], [address] [facility] 
[phone]

## 2020-02-20 NOTE — PROGRESS NOTES
0730:  Bedside shift change report given to Gerard Rivera and Penny Whitley RN (oncoming nurse) by Eleazar Smiley RN (offgoing nurse). Report included the following information SBAR, Kardex, Intake/Output, Accordion, Recent Results and Cardiac Rhythm SR.  
 
1930:  Bedside shift change report given to [address] (oncoming nurse) by Penny Whitley RN and Gerard Rivera (offgoing nurse).  Report included the following information SBAR, Kardex, ED Summary, Intake/Output, MAR, Accordion and Cardiac Rhythm SR.

## 2020-02-20 NOTE — ROUTINE PROCESS
Primary Nurse Sourav Watts RN and DANNA Farias performed a dual skin assessment on this patient Impairment noted- see wound doc flow sheet Doni score is 12

## 2020-02-20 NOTE — PROGRESS NOTES
Bedside shift change report given to Eleazar Smiley RN (oncoming nurse) by [employer], RN (offgoing nurse). Report included the following information SBAR, Kardex, ED Summary, Intake/Output, MAR, Recent Results, Med Rec Status, Cardiac Rhythm SR and Alarm Parameters Primary Nurse Summer Myers RN and Janneth Bhakta RN performed a dual skin assessment on this patient Impairment noted- see wound doc flow sheet Doni score is 12 
 
0730: Bedside shift change report given to Kassy Cardona RN (oncoming nurse) by Eleazar Smiley RN (offgoing nurse). Report included the following information SBAR, Kardex, ED Summary, Intake/Output, MAR, Recent Results, Med Rec Status, Cardiac Rhythm SR and Alarm Parameters .

## 2020-02-20 NOTE — PROGRESS NOTES
Transitions of care Return to [facility] Referral made through [facility] Patient remains in the ICU extubated 2/17. Patient is from [facility], per notes originally admitted for short term rehab transitioning to LTC. Per notes Medicaid application has been completed prior to this admission. Palliative care consult placed to discuss goals of care. Patient has not been able to tolerate po intake, receiving nutrition through [facility], if patient does not improve, may need a PEG tube. Rachele Melendez RN,CRM

## 2020-02-20 NOTE — PROGRESS NOTES
Provided pastoral care visit to [facility] 5 patient. Did not include sacramental care. Baldo Campos

## 2020-02-21 NOTE — PROGRESS NOTES
1930: Bedside and Verbal shift change report given to [address] (oncoming nurse) by Florencio Hassan RN and Jose Luis Finch (offgoing nurse). Report included the following information SBAR, Kardex, ED Summary, Intake/Output, MAR, Recent Results, Cardiac Rhythm SR and Alarm Parameters . 0730: Bedside and Verbal shift change report given to [address] Hernández (oncoming nurse) by Michael Dominguez RN (offgoing nurse). Report included the following information SBAR, Kardex, ED Summary, Intake/Output, MAR, Recent Results, Cardiac Rhythm SR and Alarm Parameters .

## 2020-02-21 NOTE — CONSULTS
Palliative Medicine Consult [place]: [phone] ([phone]) Patient Name: Lorna Castillo YOB: 1945 Date of Initial Consult: 2/21/20 Reason for Consult: care decisions Requesting Provider: Dr. Kian Fine Primary Care Physician: Ronak Pereira MD 
 
 SUMMARY:  
oLrna Castillo is a 76 y. o. with a past history of  labile DM type 2, CKD 3, hypertension, h/o alcoholism in remission, debility who was admitted on 2/13/2020 from the ED after being found unresponsive at [facility]. Hospital course has included treatment for DKA/HHS, hypernatremia, JOSH and severe sepsis 2nd to E. Coli UTI. He was intubated in the ED for airway protection, later extubated to [facility] and is now on RA. Recent admissions include 1/1-1/8 for episode of confusion (presented from home) and 1/21-1/30 (from SNF) for dehydration and failure to thrive after refusal to eat/drink at rehab for a couple of days. Current medical issues leading to Palliative Medicine involvement include:  Poor prognosis, recent decline and rehospitalization, continuity of care. Psychosocial- Born in [facility], , two sons Carlitos Roth (whom he lives with along with Ace's longtime girlfriend Renetta Palmer) and [facility] Also has a stepdaughter Johnnette Rubinstein. Prior to 1/1 hospitalization he was more functional and independent but showing signs of decline. Since 1/1 acute episode of confusion and subsequent persistent confusion. He has transitioned to need for LTC / 24 hr supervision. PALLIATIVE DIAGNOSES:  
1. Minimally responsive state 2. Frailty 3. Debility 4. Labile DM type 2- A1c 10.2 in Jan.  
5. Abnormal CT head- Chronic microvascular ischemic changes, severe cerebral atrophy (1/21/20) 6. Alcoholism in remission PLAN:  
1. Patient and family known to me from prior admission in January. 2. Two sons share decision making and legally BOTH need to be involved with consent. 3. Last admission I held extensive discussions (albeit by phone) with two sons regarding pt's frail brain and general debility and concern he was at high risk for recurrent dehydration / DKA. 4. He is now readmitted with same, with increased severity of illness, perhaps infection was inciting event. Its possible he also had another episode of refusal to eat/drink. 5. Decision had been made last admission DNR. DDNR hadn't been signed yet by son. Currently he is Full Code. 6. Patient not interactive enough to participate in SLP eval.   
7. Long term feeding tubes are not recommended for patients with advanced dementia. I had discussed this with sons last admission. He does not carry a firm diagnosis of dementia but a frail brain with evidence of microvascular ischemic changes, cerebral atrophy. Likely due to longstanding DM, hypertension, alcohol use. 8. Will call both sons tomorrow morning to regroup and provide education decision making support. 9. Thank you for the opportunity to care for Mr. Hudson Alberts. 10. Communicated plan of care with: Palliative IDTZunilda 192 Team 
 
 GOALS OF CARE / TREATMENT PREFERENCES:  
 
GOALS OF CARE: 
Patient/Health Care Proxy Stated Goals: Prolong life TREATMENT PREFERENCES:  
Code Status: Full Code Advance Care Planning: 
[x] [facility] Interdisciplinary Team has updated the ACP Navigator with [place] and Patient Capacity Primary Decision Maker (Active): Heidi Hoang - [phone] Primary Decision MakerTshane Davidson - [phone] Advance Care Planning 1/21/2020 Patient's Healthcare Decision Maker is: - Confirm Advance Directive None Patient Would Like to Complete Advance Directive Yes Medical Interventions: Full interventions Artificially Administered Nutrition: (needs to be discussed) Other: As far as possible, the palliative care team has discussed with patient / health care proxy about goals of care / treatment preferences for patient. HISTORY:  
 
History obtained from:  Chart review CHIEF COMPLAINT:  Pt not able to report HPI/SUBJECTIVE: The patient is:  
[] Verbal and participatory [x] Non-participatory due to:   Lethargy / medical condition Clinical Pain Assessment (nonverbal scale for severity on nonverbal patients): Activity (Movement): Lying quietly, normal position Duration: for how long has pt been experiencing pain (e.g., 2 days, 1 month, years) Frequency: how often pain is an issue (e.g., several times per day, once every few days, constant) FUNCTIONAL ASSESSMENT:  
 
Palliative Performance Scale (PPS): PPS: 10 PSYCHOSOCIAL/SPIRITUAL SCREENING:  
 
Palliative IDT has assessed this patient for cultural preferences / practices and a referral made as appropriate to needs (Cultural Services, Patient Advocacy, Ethics, etc.) Any spiritual / Buddhism concerns: 
[] Yes /  [x] No 
 
Caregiver Burnout: 
[] Yes /  [] No /  [x] No Caregiver Present Anticipatory grief assessment:  
[x] Normal  / [] Maladaptive ESAS Anxiety: ESAS Depression:    
 
 
 REVIEW OF SYSTEMS:  
 
Positive and pertinent negative findings in ROS are noted above in HPI. The following systems were [] reviewed / [x] unable to be reviewed as noted in HPI Other findings are noted below. Systems: constitutional, ears/nose/mouth/throat, respiratory, gastrointestinal, genitourinary, musculoskeletal, integumentary, neurologic, psychiatric, endocrine. Positive findings noted below. Modified ESAS Completed by: provider Stool Occurrence(s): 1 PHYSICAL EXAM:  
 
From RN flowsheet: 
Wt Readings from Last 3 Encounters:  
02/20/20 64.7 kg (142 lb 10.2 oz) 01/22/20 53.7 kg (118 lb 7.6 oz) 01/08/20 62 kg (136 lb 11 oz) Blood pressure 120/65, pulse 93, temperature 97.5 °F (36.4 °C), resp.  rate 13, height 5' 5\" (1.651 m), weight 64.7 kg (142 lb 10.2 oz), SpO2 100 %. Pain Scale 1: Adult Nonverbal Pain Scale Pain Intensity 1: 0 Last bowel movement, if known:  FMS in place Thin ill appearing, lethargic AA male lying in ICU bed. Respirations unlabored, on 2L NC Abdomen scaphoid, non tender LEs w/ dependent edema He opens eyes to voice, squeezes left hand to command. Does not move LEs, LUE for me. Mutters softly one word but unintelligible. Not restless nor agitated. HISTORY:  
 
Active Problems: 
  JOSH (acute kidney injury) ([facility] (1/21/2020) DKA (diabetic ketoacidoses) ([facility] [facility] (2/13/2020) Encephalopathy (2/13/2020) Past Medical History:  
Diagnosis Date  Diabetes ([facility] 2002  High cholesterol  Hypertension  Stroke ([facility] M3 occlusion Jan 2020 History reviewed. No pertinent surgical history. Family History Problem Relation Age of Onset  No Known Problems Mother  No Known Problems Father  No Known Problems Sister  No Known Problems Brother  No Known Problems Brother  No Known Problems Brother  No Known Problems Brother  No Known Problems Brother  No Known Problems Brother  No Known Problems Maternal Grandmother  No Known Problems Maternal Grandfather  No Known Problems Paternal Grandmother  No Known Problems Paternal Grandfather  Diabetes Neg Hx   
 Heart Disease Neg Hx History reviewed, no pertinent family history. Social History Tobacco Use  Smoking status: Never Smoker  Smokeless tobacco: Never Used Substance Use Topics  Alcohol use: No  
  Alcohol/week: 0.0 standard drinks No Known Allergies Current Facility-Administered Medications Medication Dose Route Frequency  insulin lispro (HUMALOG) injection   SubCUTAneous Q6H  
 hydrALAZINE (APRESOLINE) 20 mg/mL injection 10 mg  10 mg IntraVENous Q6H PRN  
  [START ON 2/21/2020] insulin glargine (LANTUS) injection 6 Units  6 Units SubCUTAneous DAILY  balsam peru-castor oil (VENELEX) ointment   Topical BID  amLODIPine (NORVASC) tablet 5 mg  5 mg Oral DAILY  atorvastatin (LIPITOR) tablet 20 mg  20 mg Oral DAILY  cefTRIAXone (ROCEPHIN) 2 g in 0.9% sodium chloride (MBP/ADV) 50 mL  2 g IntraVENous Q24H  
 glucose chewable tablet 16 g  4 Tab Oral PRN  
 glucagon (GLUCAGEN) injection 1 mg  1 mg IntraMUSCular PRN  
 dextrose 10% infusion 0-250 mL  0-250 mL IntraVENous PRN  
 0.9% sodium chloride infusion 250 mL  250 mL IntraVENous PRN  
 collagenase (SANTYL) 250 unit/gram ointment   Topical DAILY  sodium chloride (NS) flush 5-40 mL  5-40 mL IntraVENous Q8H  
 sodium chloride (NS) flush 5-40 mL  5-40 mL IntraVENous PRN  
 ondansetron (ZOFRAN) injection 4 mg  4 mg IntraVENous Q6H PRN  
 acetaminophen (TYLENOL) tablet 650 mg  650 mg Per G Tube Q4H PRN  
 
 
 
 LAB AND IMAGING FINDINGS:  
 
Lab Results Component Value Date/Time WBC 9.4 02/20/2020 05:07 AM  
 HGB 8.8 (L) 02/20/2020 05:07 AM  
 PLATELET 77 (L) 93/23/2050 05:07 AM  
 
Lab Results Component Value Date/Time Sodium 143 02/20/2020 05:07 AM  
 Potassium 3.4 (L) 02/20/2020 05:07 AM  
 Chloride 113 (H) 02/20/2020 05:07 AM  
 CO2 24 02/20/2020 05:07 AM  
 BUN 31 (H) 02/20/2020 05:07 AM  
 Creatinine 0.98 02/20/2020 05:07 AM  
 Calcium 7.0 (L) 02/20/2020 05:07 AM  
 Magnesium 1.7 02/20/2020 05:07 AM  
 Phosphorus 3.0 02/20/2020 05:07 AM  
  
Lab Results Component Value Date/Time AST (SGOT) 35 02/13/2020 11:30 AM  
 Alk. phosphatase 187 (H) 02/13/2020 11:30 AM  
 Protein, total 8.6 (H) 02/13/2020 11:30 AM  
 Albumin 2.3 (L) 02/13/2020 11:30 AM  
 Globulin 6.3 (H) 02/13/2020 11:30 AM  
 
Lab Results Component Value Date/Time  INR 1.1 01/01/2020 04:18 PM  
 Prothrombin time 10.9 01/01/2020 04:18 PM  
 aPTT 23.9 02/02/2019 03:56 PM  
  
 No results found for: IRON, FE, TIBC, IBCT, PSAT, FERR No results found for: PH, PCO2, PO2 No components found for: [place] Lab Results Component Value Date/Time CK 1,654 (H) 02/13/2020 06:04 PM  
 CK - MB <0.5 (L) 04/17/2016 12:46 AM  
  
 
 
   
 
Total time:  45m Counseling / coordination time, spent as noted above: 25m 
> 50% counseling / coordination?: y 
 
Prolonged service was provided for  []30 min   []75 min in face to face time in the presence of the patient, spent as noted above. Time Start:  
Time End:  
Note: this can only be billed with 56716 (initial) or 16029 (follow up). If multiple start / stop times, list each separately.

## 2020-02-21 NOTE — WOUND CARE
Wound Care Note: Follow-up visit for sacral wound. Chart shows: 
Admitted for JOSH, DKA, encephalopathy Past Medical History:  
Diagnosis Date  Diabetes ([facility] 2002  High cholesterol  Hypertension  Stroke ([facility] [facility] M3 occlusion Jan 2020 WBC = 10.3 on 2/21/20 Admitted from [facility] Assessment:  
Patient is groggy, incontinent with moderate assistance needed in repositioning. Bed: Total Care Sport Patient has a Green. Diet: Tube feeding Patient moaned with movement of legs and turning; no number given. Right heel and bilateral buttocks skin intact and without erythema. 1. POA unstageable sacral pressure injury is larger, measures 7 cm x 6.5 cm x 0.3 cm, proximal area is still unstageable, now all covered with slough, new area surrounds the original wound and is pink tissue, some blanching but not in all areas, small serous drainage, wound edges are open, alba-wound intact. Santyl, 2 x 2 and sacral border applied. 2.  POA left heel with superficial crusted area, appears the same, no open area. Left open to air; offloaded. Patient kept turn to left. Heels offloaded in Heel Medix boots. Recommendations:   
Sacrum- Daily cleanse with normal saline wipe wound bed clean and dry, apply nickel thickness of Santyl ointment, place 2 x 2 into base of wound, cover with Optifoam Gentle. Minimize layers under patient. Skin Care & Pressure Prevention: 
Minimize layers of linen/pads under patient to optimize support surface. Turn/reposition approximately every 2 hours and offload heels. Manage incontinence / promote continence Nourishing Skin Cream to dry skin Discussed above plan with patient & Thanh Falk RN Transition of Care: Plan to follow as needed while admitted to hospital. 
 
Bernice [place]" KELLY Martines, RN, [facility], [facility]. 
office [phone] 
pager [phone] or call  to page

## 2020-02-21 NOTE — DIABETES MGMT
[facility] Followup Progress Note Presentation Topher Cali is a 76 y.o. male admitted with HHS and consulted by Provider for advanced specialty nursing care related to inpatient diabetes management. Hyperglycemia management order set is in place. Subjective Mr Tiera Murcia remains in stable condition under the hospitalist service. Blood glucose 169-221 on sliding scale insulin (received 8 units). Lantus restarted this morning after being held for 24 hours. He continues to have a minimally responsive state and not interactive enough to participate in SLP. Palliative medicine was consulted yesterday to assist with current hospital plan of care and assist with long term plans for patient. Objective Physical exam 
 
General Minimally responsive. Vital Signs Visit Vitals /71 Pulse 96 Temp 98.2 °F (36.8 °C) Resp 12 Ht 5' 5\" (1.651 m) Wt 64.9 kg (143 lb 1.3 oz) SpO2 100% BMI 23.81 kg/m² Skin  Warm and dry Heart   Regular rate and rhythm. No murmurs, rubs or gallops Lungs  Clear to auscultation without rales or rhonchi Extremities See wound care note Laboratory CBC WITH AUTOMATED DIFF Collection Time: 02/21/20  3:44 AM  
Result Value Ref Range WBC 10.3 4.1 - 11.1 K/uL  
 RBC 3.08 (L) 4.10 - 5.70 M/uL HGB 8.7 (L) 12.1 - 17.0 g/dL HCT 25.8 (L) 36.6 - 50.3 % MCV 83.8 80.0 - 99.0 FL  
 MCH 28.2 26.0 - 34.0 PG  
 MCHC 33.7 30.0 - 36.5 g/dL  
 RDW 16.3 (H) 11.5 - 14.5 % PLATELET 703 (L) 302 - 400 K/uL NRBC 0.0 0  WBC ABSOLUTE NRBC 0.00 0.00 - 0.01 K/uL NEUTROPHILS 68 32 - 75 % BAND NEUTROPHILS 3 0 - 6 % LYMPHOCYTES 21 12 - 49 % MONOCYTES 4 (L) 5 - 13 % EOSINOPHILS 2 0 - 7 % BASOPHILS 1 0 - 1 % MYELOCYTES 1 (H) 0 % IMMATURE GRANULOCYTES 0 %  
 ABS. NEUTROPHILS 7.3 1.8 - 8.0 K/UL  
 ABS. LYMPHOCYTES 2.2 0.8 - 3.5 K/UL  
 ABS. MONOCYTES 0.4 0.0 - 1.0 K/UL ABS. EOSINOPHILS 0.2 0.0 - 0.4 K/UL  
 ABS. BASOPHILS 0.1 0.0 - 0.1 K/UL  
 ABS. IMM. GRANS. 0.0 K/UL  
 DF MANUAL PLATELET COMMENTS Large Platelets RBC COMMENTS ANISOCYTOSIS 1+ 
    
 RBC COMMENTS MICROCYTOSIS 1+ 
    
 RBC COMMENTS HYPOCHROMIA 1+ 
    
 RBC COMMENTS TARGET CELLS 1+ BMP:  
Lab Results Component Value Date/Time  02/21/2020 03:44 AM  
 K 4.0 02/21/2020 03:44 AM  
  (H) 02/21/2020 03:44 AM  
 CO2 25 02/21/2020 03:44 AM  
 AGAP 5 02/21/2020 03:44 AM  
  (H) 02/21/2020 03:44 AM  
 BUN 30 (H) 02/21/2020 03:44 AM  
 CREA 1.03 02/21/2020 03:44 AM  
 GFRAA >60 02/21/2020 03:44 AM  
 GFRNA >60 02/21/2020 03:44 AM  
  
 
 
Blood glucose pattern Assessment and Plan Nursing Diagnosis Risk for unstable blood glucose pattern Nursing Intervention Domain 5255 Decision-making Support Nursing Interventions Examined current inpatient diabetes control Explored factors facilitating and impeding inpatient management Identified self-management practices impeding diabetes control Explored corrective strategies with responsible inpatient provider Informed patient of rational for basal bolus insulin strategy while hospitalized Evaluation Mr Cece Nolasco is a 76year old gentleman who was admitted for HHS, UTI septic shock with lactic acidosis and respiratory depression on 2/13. HHS, lactic acidosis and respiratory depression is now resolved and he is under the management of the hospitalist team.  Continues enteral feeds with limited ability to participate in speech therapy. Given his degree of debility and decreased mental status, Palliative Medicine consulted to assist in hospital management planning. With the cessation of long acting insulin for 36 hours, his blood glucose corona to the 200s due to underlying diagnosis of type 2 diabetes. Sliding scale insulin was not successful in lowering glucose to target of 100-180 in the past 24 hours. Recommendations 1. Agree with restarting basal insulin. 6 units Lantus was given today. His BMI is 23 and would require an insulin sensitive dose of long acting Lantus (0.2 units/kg). If blood glucose is over 200 tomorrow morning, safe to increase this to 12 units Lantus Daily. This dose does not include carbohydrates consumed in enteral feeds which can also elevate blood glucose. Consider Lantus adjustment for CHO intake with tube feeds to control blood glucose if he continues to be over goal blood glucose at 0.2 units/kg basal Lantus dose. 2. Continue insulin sensative (BMI below 27) correctional insulin with Lantus to assist with hyperglycemia to lower blood glucose to goal of 100-180. 
 
3. On discharge: Due to his change in mental status and decreased PO intake, oral antihyperglycemics should not continued on discharge as this can precipitate hypoglycemia. Basal and correctional insulin would be ideal to continue. Billing Code(s)  
 
[id number] [address], [address] [facility] 
[phone]

## 2020-02-21 NOTE — PROGRESS NOTES
Palliative care- 
 
Left  for son Annette Kaba [phone]. Reached son Valente  [phone] and provided update. I strongly recommended all coming together today for a family meeting. Ladi Pleitez will reach out to Sersterling (step-daughter) to see if this can be arranged. Addendum 2/21- Family requesting meeting at 5 pm.  Trying to confirm and discuss further by phone if possible.

## 2020-02-21 NOTE — PROGRESS NOTES
Transitions of care SNF placement, family does not want patient to return to [facility] Spoke with sons Wauneta Goldberg [phone] and Parker Erum [phone]  and per their request left a list of SNF for them to research. Notified Mary Anne Berman via [address] Per Kanwal Murillo they applied for Medicaid approximately one month ago. Care management will continue to follow. Liol Valderrama RN,CRM

## 2020-02-21 NOTE — PROGRESS NOTES
02/20/20 2101 Oxygen Therapy O2 Sat (%) 100 % Pulse via Oximetry 97 beats per minute O2 Device Nasal cannula O2 Flow Rate (L/min) 2 l/min Pre-Treatment Cough (none noted) Breath Sounds Bilateral Clear 
(shallow breaths) Patient unable to perform CPT times 2 days in a row. Patient is weak, non-verbal. Patient unable even with coaching & re-enforcing.

## 2020-02-21 NOTE — PROGRESS NOTES
[facility] [facility] Adult  Hospitalist Group Hospitalist Progress Note Linda Larson MD 
Answering service: [phone] OR [phone] from in house phone Date of Service:  2020 NAME:  Andrew Anders :  1945 MRN:  [id number] Admission Summary:  
Patient was admitted to the ICU on 2020 from [facility]. · Acute metabolic encephalopathy · Septic shock · Acute respiratory failure requiring intubation · HHS/DKA 80-year-old gentleman with past medical history of hypertension, diabetes, CVA, dementia 
 [facility] Blood gas was checked by EMS and route and read \"high\". In the emergency room on further work-up he was found to have severe DKA/HHS, acute kidney injury, severe lactic acidosis, hypothermia and a UTI. He was then intubated for airway protection, on admission. Work-up revealed DKA/HHS, he was started on insulin gtt and managed in the ICU. Work-up revealed pan sensitive E. coli in his urine, and blood. He was started on broad-spectrum antibiotics, and then narrowed to ceftriaxone. His mental status, has improved somewhat, however he is definitely not back to his baseline. Due to his mental status he has not been able to tolerate any p.o., and he has been on NG feeds. Hospital course is also been complicated by hypernatremia, for which he is on D5 and free water flushes. Interval history / Subjective:  
 
Seen and examined earlier. He opens eye to voice. Assessment & Plan:  
 
Severe sepsis,septic shock, with lactic acidosis, and acute hypoxic respiratory failure-secondary to E. coli bacteremia, UTI resolving 
-required vasopressors initially,weaned off 
-Continue ceftriaxone, hope to swtich to levofloxacin on discharge. Needs a total 14 day course due to bacteremia. -Repeat culture, negative. DKA/HHSresolved (original BMP significant for glucose over 1000, CO2 less than 5), serum osmolality 415 Type 2 diabetes, A1c 10.6% - Lantus was being held for hypoglycemia,now on TF and BG>200. Increase lantus to 12 units 
- High sensitivity sliding scale insulin, point-of-care glucose test every 6 hours, hypoglycemia protocol Dysphagia-has not been able to work with speech due to his mental status 
-Currently getting  tube feeding via NGT 
-consult speech to continue to work with him - Nutrition to follow, continue tube feeding for now Hypernatremia, resolved Acute kidney injury -on admission creatinine 5.9 has now down trended nicely,resolved Acute hypoxic respiratory failure - intubated on admission 2/13, extubated 2/17 
- O2 as needed, wean as tolerated. Acute metabolic encephalopathy,baseline dementia 
-Head CT on admission negative. Thrombocytopenia chronic intermittent  
-platelet seem to be improving after jayson of 39(2/17) this admission 
-No bleeding complications Right hemiparesis noted as baseline on admission H&P  
-Head CT negative on admission  
-He was admitted early January 2020 and CTA H&N was questionable for occlusion of the distal left M3 branch Lactic acidosis POA - resolved HTN - home amlodipine HLD - home statin Elevated LFTS 
-Multifactorial:hypoperfusion/congestive+statins may be contributing with these risk factors 
-Hold  Statin 
-Monitor LFTs and work up if LFTs dont come down. Access:Left subclavian central line. Placed,2/14 NGT,placed 2/13 Palliative care consult placed, given dementia, CVA, and degree of debility. Suspect he may need a PEG, and to re discuss code status with family. Code status: full code DVT prophylaxis: scd Care Plan discussed with: Nurse Anticipated Disposition: SNF/LTC Anticipated Discharge: Greater than 48 hours I talked with his son Savannah Pineda on the [id number]([phone]) and updated him.He is already aware his dad is on tube feeding via NGT and that he may not be safe for oral intake anytime soon he may need PEG if that is his wish. Vipul indicated they would probably go with PEG if needed. He wants for his dad to remain full cose. He also does not want his dad return to [facility]. Hospital Problems  Date Reviewed: 12/4/2018 Codes Class Noted POA DKA (diabetic ketoacidoses) ([facility] ICD-10-CM: E11.10 ICD-9-CM: 250.10  2/13/2020 Unknown Encephalopathy ICD-10-CM: G93.40 ICD-9-CM: 348.30  2/13/2020 Unknown JOSH (acute kidney injury) ([facility].) ICD-10-CM: N17.9 ICD-9-CM: 584.9  1/21/2020 Unknown Review of Systems:  
Review of systems not obtained due to patient factors. Vital Signs:  
 Last 24hrs VS reviewed since prior progress note. Most recent are: 
Visit Vitals /72 Pulse 98 Temp 98 °F (36.7 °C) Resp 12 Ht 5' 5\" (1.651 m) Wt 64.9 kg (143 lb 1.3 oz) SpO2 100% BMI 23.81 kg/m² Intake/Output Summary (Last 24 hours) at 2/21/2020 1701 Last data filed at 2/21/2020 1200 Gross per 24 hour Intake 1910 ml Output 1795 ml Net 115 ml Physical Examination:  
 
 
     
Constitutional: Lethargic,opens even,non verba ENT: NGT in place. Resp: Symmetrical air entry. On oxygen via nasal canula CV:  Regular rhythm, normal rate, no murmurs, gallops, rubs GI:  Soft, non distended, non tender. normoactive bowel sounds, no hepatosplenomegaly Musculoskeletal:  SCDs in place,+1 edema legs Neurologic: Lethargic,non verbal,rigth hemiparesis. Moves the left side Data Review:  
 Review and/or order of clinical lab test 
Review and/or order of tests in the radiology section of CPT Review and/or order of tests in the medicine section of CPT Labs:  
 
Recent Labs  
  02/21/20 
0344 02/20/20 
0507 WBC 10.3 9.4 HGB 8.7* 8.8* HCT 25.8* 26.7*  
* 77* Recent Labs  
  02/21/20 
0344 02/20/20 [id number] 02/19/20 
0440  143 147* K 4.0 3.4* 3.8 * 113* 116* CO2 25 24 25 BUN 30* 31* 38* CREA 1.03 0.98 1.07  
* 109* 106* CA 7.2* 7.0* 6.9*  
MG 1.6 1.7 1.8 PHOS 2.8 3.0 2.1* Recent Labs  
  02/21/20 
0344 SGOT 100* * * TBILI 0.4 TP 5.6* ALB 1.8*  
GLOB 3.8 No results for input(s): INR, PTP, APTT, INREXT, INREXT in the last 72 hours. No results for input(s): FE, TIBC, PSAT, FERR in the last 72 hours. No results found for: FOL, RBCF No results for input(s): PH, PCO2, PO2 in the last 72 hours. No results for input(s): CPK, CKNDX, TROIQ in the last 72 hours. No lab exists for component: CPKMB Lab Results Component Value Date/Time Cholesterol, total 190 01/02/2020 04:06 AM  
 HDL Cholesterol 64 01/02/2020 04:06 AM  
 LDL, calculated 115.4 (H) 01/02/2020 04:06 AM  
 Triglyceride 53 01/02/2020 04:06 AM  
 CHOL/HDL Ratio 3.0 01/02/2020 04:06 AM  
 
Lab Results Component Value Date/Time Glucose (POC) 243 (H) 02/21/2020 01:16 PM  
 Glucose (POC) 205 (H) 02/21/2020 06:06 AM  
 Glucose (POC) 191 (H) 02/20/2020 11:07 PM  
 Glucose (POC) 221 (H) 02/20/2020 06:06 PM  
 Glucose (POC) 212 (H) 02/20/2020 04:25 PM  
 
Lab Results Component Value Date/Time Color YELLOW/STRAW 02/13/2020 01:07 PM  
 Appearance TURBID (A) 02/13/2020 01:07 PM  
 Specific gravity 1.020 02/13/2020 01:07 PM  
 pH (UA) 5.0 02/16/2020 08:33 AM  
 Protein 100 (A) 02/13/2020 01:07 PM  
 Glucose >1,000 (A) 02/13/2020 01:07 PM  
 Ketone NEGATIVE  02/13/2020 01:07 PM  
 Bilirubin NEGATIVE  02/13/2020 01:07 PM  
 Urobilinogen 0.2 02/13/2020 01:07 PM  
 Nitrites POSITIVE (A) 02/13/2020 01:07 PM  
 Leukocyte Esterase MODERATE (A) 02/13/2020 01:07 PM  
 Epithelial cells FEW 02/13/2020 01:07 PM  
 Bacteria 4+ (A) 02/13/2020 01:07 PM  
 WBC >100 (H) 02/13/2020 01:07 PM  
 RBC 5-10 02/13/2020 01:07 PM  
 
 
 
Medications Reviewed:  
 
Current Facility-Administered Medications Medication Dose Route Frequency  insulin lispro (HUMALOG) injection   SubCUTAneous Q6H  
 hydrALAZINE (APRESOLINE) 20 mg/mL injection 10 mg  10 mg IntraVENous Q6H PRN  
 insulin glargine (LANTUS) injection 6 Units  6 Units SubCUTAneous DAILY  balsam peru-castor oil (VENELEX) ointment   Topical BID  amLODIPine (NORVASC) tablet 5 mg  5 mg Oral DAILY  atorvastatin (LIPITOR) tablet 20 mg  20 mg Oral DAILY  cefTRIAXone (ROCEPHIN) 2 g in 0.9% sodium chloride (MBP/ADV) 50 mL  2 g IntraVENous Q24H  
 glucose chewable tablet 16 g  4 Tab Oral PRN  
 glucagon (GLUCAGEN) injection 1 mg  1 mg IntraMUSCular PRN  
 dextrose 10% infusion 0-250 mL  0-250 mL IntraVENous PRN  
 0.9% sodium chloride infusion 250 mL  250 mL IntraVENous PRN  
 collagenase (SANTYL) 250 unit/gram ointment   Topical DAILY  sodium chloride (NS) flush 5-40 mL  5-40 mL IntraVENous Q8H  
 sodium chloride (NS) flush 5-40 mL  5-40 mL IntraVENous PRN  
 ondansetron (ZOFRAN) injection 4 mg  4 mg IntraVENous Q6H PRN  
 acetaminophen (TYLENOL) tablet 650 mg  650 mg Per G Tube Q4H PRN  
 
______________________________________________________________________ EXPECTED LENGTH OF STAY: 12d 9h 
ACTUAL LENGTH OF STAY:          8 Evon Rain MD

## 2020-02-22 NOTE — PROGRESS NOTES
1930: Bedside and Verbal shift change report given to Chaz Montalvo (oncoming nurse) by Alla Cochran (offgoing nurse). Report included the following information SBAR, Kardex, ED Summary, Procedure Summary, MAR, Recent Results and Cardiac Rhythm NSR. 
 
2000: Shift assessment completed per flowsheet. 0000: Reassessment completed per flowsheet. 0400: Reassessment completed per flowsheet. 0730: Bedside and Verbal shift change report given to Yamileth Mayorga (oncoming nurse) by Chaz Montalvo (offgoing nurse). Report included the following information SBAR, Kardex, ED Summary, Procedure Summary, MAR, Recent Results and Cardiac Rhythm NSR/ST.

## 2020-02-22 NOTE — PROGRESS NOTES
[facility] [facility] Adult  Hospitalist Group Date of Service:  2020 NAME:  Keke Ewing :  1945 MRN:  [id number] Admission Summary:  
Patient was admitted to the ICU on 2020 from [facility]. · Acute metabolic encephalopathy · Septic shock · Acute respiratory failure requiring intubation · HHS/DKA 80-year-old gentleman with past medical history of hypertension, diabetes, CVA, dementia 
 who was found unresponsive at at [facility]. Blood gas was checked by EMS and route and read \"high\". In the emergency room on further work-up he was found to have severe DKA/HHS, acute kidney injury, severe lactic acidosis, hypothermia and a UTI. He was then intubated for airway protection, on admission. Work-up revealed DKA/HHS, he was started on insulin gtt and managed in the ICU. Work-up revealed pan sensitive E. coli in his urine, and blood. He was started on broad-spectrum antibiotics, and then narrowed to ceftriaxone. His mental status, has improved somewhat, however he is definitely not back to his baseline. Due to his mental status he has not been able to tolerate any p.o., and he has been on NG feeds. Hospital course is also been complicated by hypernatremia, for which he is on D5 and free water flushes. Interval history / Subjective: He offers no history Assessment & Plan:  
 
Severe sepsis,septic shock, with lactic acidosis, and acute hypoxic respiratory failure-secondary to E. coli bacteremia, UTI resolving 
-required vasopressors initially, weaned off 
-Continue ceftriaxone, hope to swtich to levofloxacin on discharge. Needs a total 14 day course due to bacteremia. -Repeat culture, negative. DKA/HHSresolved (original BMP significant for glucose over 1000, CO2 less than 5), serum osmolality 415 Type 2 diabetes, A1c 10.6% 
- continue Lantus and SSI/POC checks Dysphagia-has not been able to work with speech due to his mental status 
-Currently getting  tube feeding via NGT 
-consult speech to continue to work with him - Nutrition to follow, continue tube feeding for now Hypernatremia, resolved Acute kidney injury -on admission creatinine 5.9 has now down trended nicely,resolved Acute hypoxic respiratory failure - intubated on admission 2/13, extubated 2/17 
- O2 as needed, wean as tolerated. Acute metabolic encephalopathy,baseline dementia 
-Head CT on admission negative. Thrombocytopenia chronic intermittent  
-platelet seem to be improving after jayson of 39(2/17) this admission 
-No bleeding complications Right hemiparesis noted as baseline on admission H&P  
-Head CT negative on admission  
-He was admitted early January 2020 and CTA H&N was questionable for occlusion of the distal left M3 branch Lactic acidosis POA - resolved HTN - home amlodipine HLD - home statin Elevated LFTS 
-Multifactorial:hypoperfusion/congestive+statins may be contributing with these risk factors 
-Hold  Statin 
-Monitor LFTs and work up if LFTs dont come down. Access:Left subclavian central line. Placed,2/14 NGT,placed 2/13 Palliative care consult placed, given dementia, CVA, and degree of debility. Suspect he may need a PEG, and to re discuss code status with family. Code status: full code DVT prophylaxis: scd Care Plan discussed with: Nurse Anticipated Disposition: SNF/LTC Anticipated Discharge: Greater than 48 hours Prior provider talked with his son Zachery Bruno on the [employer]([phone]) and updated him. He is already aware his dad is on tube feeding via NGT and that he may not be safe for oral intake anytime soon he may need PEG if that is his wish. Vipul indicated they would probably go with PEG if needed. He wants for his dad to remain full cose. He also does not want his dad return to [facility]. Hospital Problems  Date Reviewed: 12/4/2018 Codes Class Noted POA DKA (diabetic ketoacidoses) ([facility] ICD-10-CM: E11.10 ICD-9-CM: 250.10  2/13/2020 Unknown Encephalopathy ICD-10-CM: G93.40 ICD-9-CM: 348.30  2/13/2020 Unknown JOSH (acute kidney injury) ([facility] ICD-10-CM: N17.9 ICD-9-CM: 584.9  1/21/2020 Unknown Review of Systems:  
Review of systems not obtained due to patient factors. Vital Signs:  
 Last 24hrs VS reviewed since prior progress note. Most recent are: 
Visit Vitals /80 (BP 1 Location: Left arm, BP Patient Position: At rest) Pulse (!) 106 Temp 97.6 °F (36.4 °C) Resp 18 Ht 5' 5\" (1.651 m) Wt 61.8 kg (136 lb 3.9 oz) SpO2 100% BMI 22.67 kg/m² Intake/Output Summary (Last 24 hours) at 2/22/2020 1158 Last data filed at 2/22/2020 0900 Gross per 24 hour Intake 2540 ml Output 2115 ml Net 425 ml Physical Examination:  
 
 
     
Constitutional: Lethargic, opens eyes spontaneously ENT: NGT in place. Resp: Symmetrical air entry. On oxygen via nasal canula CV:  Regular rhythm, normal rate, no murmurs, gallops, rubs GI:  Soft, non distended, non tender. normoactive bowel sounds, no hepatosplenomegaly Musculoskeletal:  SCDs in place,+1 edema legs Neurologic: Lethargic,non verbal,rigth hemiparesis. Moves the left side Data Review:  
 Review and/or order of clinical lab test 
Review and/or order of tests in the radiology section of CPT Review and/or order of tests in the medicine section of CPT Labs:  
 
Recent Labs  
  02/21/20 
0344 02/20/20 
0507 WBC 10.3 9.4 HGB 8.7* 8.8* HCT 25.8* 26.7*  
* 77* Recent Labs  
  02/22/20 
0447 02/21/20 
0344 02/20/20 
0507  141 143  
K 4.1 4.0 3.4*  
* 111* 113* CO2 26 25 24 BUN 26* 30* 31* CREA 0.91 1.03 0.98  
* 175* 109* CA 7.2* 7.2* 7.0*  
MG 1.6 1.6 1.7 PHOS 2.4* 2.8 3.0 Recent Labs  
  02/22/20 7564 02/21/20 
6456 SGOT 77* 100* * 158* * 604* TBILI 0.4 0.4 TP 5.6* 5.6* ALB 1.8* 1.8*  
GLOB 3.8 3.8 Recent Labs  
  02/22/20 
2156 INR 1.2* PTP 12.2* No results for input(s): FE, TIBC, PSAT, FERR in the last 72 hours. No results found for: FOL, RBCF No results for input(s): PH, PCO2, PO2 in the last 72 hours. No results for input(s): CPK, CKNDX, TROIQ in the last 72 hours. No lab exists for component: CPKMB Lab Results Component Value Date/Time Cholesterol, total 190 01/02/2020 04:06 AM  
 HDL Cholesterol 64 01/02/2020 04:06 AM  
 LDL, calculated 115.4 (H) 01/02/2020 04:06 AM  
 Triglyceride 53 01/02/2020 04:06 AM  
 CHOL/HDL Ratio 3.0 01/02/2020 04:06 AM  
 
Lab Results Component Value Date/Time Glucose (POC) 306 (H) 02/22/2020 11:51 AM  
 Glucose (POC) 196 (H) 02/22/2020 06:15 AM  
 Glucose (POC) 226 (H) 02/22/2020 12:43 AM  
 Glucose (POC) 211 (H) 02/21/2020 05:40 PM  
 Glucose (POC) 243 (H) 02/21/2020 01:16 PM  
 
Lab Results Component Value Date/Time Color YELLOW/STRAW 02/13/2020 01:07 PM  
 Appearance TURBID (A) 02/13/2020 01:07 PM  
 Specific gravity 1.020 02/13/2020 01:07 PM  
 pH (UA) 5.0 02/16/2020 08:33 AM  
 Protein 100 (A) 02/13/2020 01:07 PM  
 Glucose >1,000 (A) 02/13/2020 01:07 PM  
 Ketone NEGATIVE  02/13/2020 01:07 PM  
 Bilirubin NEGATIVE  02/13/2020 01:07 PM  
 Urobilinogen 0.2 02/13/2020 01:07 PM  
 Nitrites POSITIVE (A) 02/13/2020 01:07 PM  
 Leukocyte Esterase MODERATE (A) 02/13/2020 01:07 PM  
 Epithelial cells FEW 02/13/2020 01:07 PM  
 Bacteria 4+ (A) 02/13/2020 01:07 PM  
 WBC >100 (H) 02/13/2020 01:07 PM  
 RBC 5-10 02/13/2020 01:07 PM  
 
 
 
Medications Reviewed:  
 
Current Facility-Administered Medications Medication Dose Route Frequency  insulin lispro (HUMALOG) injection   SubCUTAneous Q6H  
 glucose chewable tablet 16 g  4 Tab Oral PRN  
 glucagon (GLUCAGEN) injection 1 mg  1 mg IntraMUSCular PRN  
  dextrose 10% infusion 0-250 mL  0-250 mL IntraVENous PRN  
 insulin glargine (LANTUS) injection 12 Units  12 Units SubCUTAneous DAILY  hydrALAZINE (APRESOLINE) 20 mg/mL injection 10 mg  10 mg IntraVENous Q6H PRN  
 balsam peru-castor oil (VENELEX) ointment   Topical BID  amLODIPine (NORVASC) tablet 5 mg  5 mg Oral DAILY  [Held by provider] atorvastatin (LIPITOR) tablet 20 mg  20 mg Oral DAILY  cefTRIAXone (ROCEPHIN) 2 g in 0.9% sodium chloride (MBP/ADV) 50 mL  2 g IntraVENous Q24H  
 0.9% sodium chloride infusion 250 mL  250 mL IntraVENous PRN  
 collagenase (SANTYL) 250 unit/gram ointment   Topical DAILY  sodium chloride (NS) flush 5-40 mL  5-40 mL IntraVENous Q8H  
 sodium chloride (NS) flush 5-40 mL  5-40 mL IntraVENous PRN  
 ondansetron (ZOFRAN) injection 4 mg  4 mg IntraVENous Q6H PRN  
 acetaminophen (TYLENOL) tablet 650 mg  650 mg Per G Tube Q4H PRN  
 
______________________________________________________________________ EXPECTED LENGTH OF STAY: 12d 9h 
ACTUAL LENGTH OF STAY:          9 Jarod Goyal MD

## 2020-02-22 NOTE — PROGRESS NOTES
Bedside shift change report given to Jed Prakash RN (oncoming nurse) by Hakeem Gomez RN (offgoing nurse). Report included the following information SBAR, Kardex, ED Summary, Procedure Summary, Intake/Output, MAR, Recent Results, Med Rec Status, Cardiac Rhythm Sinus Tach, Alarm Parameters , Quality Measures and Dual Neuro Assessment. Shift Summary: Pt more alert and oriented today, able to state he is at [facility] and it is February. Pt weaned to room air with O2 94% and above. Bedside shift change report given to Dorita Middleton RN (oncoming nurse) by Jed Prakash RN (offgoing nurse). Report included the following information SBAR, Kardex, ED Summary, Procedure Summary, Intake/Output, MAR, Recent Results, Med Rec Status, Cardiac Rhythm NSR, Alarm Parameters , Quality Measures and Dual Neuro Assessment.

## 2020-02-23 NOTE — PROGRESS NOTES
Problem: Non-Violent Restraints Goal: *Removal from restraints as soon as assessed to be safe Outcome: Progressing Towards Goal 
Goal: *No harm/injury to patient while restraints in use Outcome: Progressing Towards Goal 
Goal: *Patient's dignity will be maintained Outcome: Progressing Towards Goal 
  
Problem: Pressure Injury - Risk of 
Goal: *Prevention of pressure injury Description Document Doni Scale and appropriate interventions in the flowsheet. Outcome: Progressing Towards Goal 
Note: Pressure Injury Interventions: 
Sensory Interventions: Assess changes in LOC, Avoid rigorous massage over bony prominences, Check visual cues for pain, Float heels, Keep linens dry and wrinkle-free, Minimize linen layers, Monitor skin under medical devices, Pressure redistribution bed/mattress (bed type), Turn and reposition approx. every two hours (pillows and wedges if needed) Moisture Interventions: Absorbent underpads, Assess need for specialty bed, Check for incontinence Q2 hours and as needed, Internal/External urinary devices, Minimize layers, Moisture barrier Activity Interventions: Assess need for specialty bed, Pressure redistribution bed/mattress(bed type) Mobility Interventions: Assess need for specialty bed, Float heels, HOB 30 degrees or less, Pressure redistribution bed/mattress (bed type), Turn and reposition approx. every two hours(pillow and wedges) Nutrition Interventions: Document food/fluid/supplement intake Friction and Shear Interventions: Apply protective barrier, creams and emollients, Foam dressings/transparent film/skin sealants, HOB 30 degrees or less, Lift team/patient mobility team, Minimize layers, Transferring/repositioning devices Problem: Falls - Risk of 
Goal: *Absence of Falls Description Document [place] Fall Risk and appropriate interventions in the flowsheet. Outcome: Progressing Towards Goal 
Note: Fall Risk Interventions: Mobility Interventions: Communicate number of staff needed for ambulation/transfer Mentation Interventions: Adequate sleep, hydration, pain control, Door open when patient unattended, Evaluate medications/consider consulting pharmacy, More frequent rounding, Reorient patient, Room close to nurse's station, Update white board Medication Interventions: Evaluate medications/consider consulting pharmacy Elimination Interventions: Toileting schedule/hourly rounds History of Falls Interventions: Door open when patient unattended, Evaluate medications/consider consulting pharmacy, Room close to nurse's station Problem: Impaired Skin Integrity/Pressure Injury Treatment Goal: *Improvement of Existing Pressure Injury Outcome: Progressing Towards Goal 
Goal: *Prevention of pressure injury Description Document Doni Scale and appropriate interventions in the flowsheet. Outcome: Progressing Towards Goal 
Note: Pressure Injury Interventions: 
Sensory Interventions: Assess changes in LOC, Avoid rigorous massage over bony prominences, Check visual cues for pain, Float heels, Keep linens dry and wrinkle-free, Minimize linen layers, Monitor skin under medical devices, Pressure redistribution bed/mattress (bed type), Turn and reposition approx. every two hours (pillows and wedges if needed) Moisture Interventions: Absorbent underpads, Assess need for specialty bed, Check for incontinence Q2 hours and as needed, Internal/External urinary devices, Minimize layers, Moisture barrier Activity Interventions: Assess need for specialty bed, Pressure redistribution bed/mattress(bed type) Mobility Interventions: Assess need for specialty bed, Float heels, HOB 30 degrees or less, Pressure redistribution bed/mattress (bed type), Turn and reposition approx. every two hours(pillow and wedges) Nutrition Interventions: Document food/fluid/supplement intake Friction and Shear Interventions: Apply protective barrier, creams and emollients, Foam dressings/transparent film/skin sealants, HOB 30 degrees or less, Lift team/patient mobility team, Minimize layers, Transferring/repositioning devices Problem: Nutrition Deficit Goal: *Optimize nutritional status Outcome: Progressing Towards Goal 
  
Problem: Sepsis: Day 6 Goal: *Oxygen saturation within defined limits Outcome: Progressing Towards Goal 
Goal: *Vital signs within defined limits Outcome: Progressing Towards Goal 
Goal: *Tolerating diet Outcome: Progressing Towards Goal 
Goal: Activity/Safety Outcome: Progressing Towards Goal 
Goal: Diagnostic Test/Procedures Outcome: Progressing Towards Goal 
Goal: Nutrition/Diet Outcome: Progressing Towards Goal 
Goal: Discharge Planning Outcome: Progressing Towards Goal 
Goal: Medications Outcome: Progressing Towards Goal 
Goal: Respiratory Outcome: Progressing Towards Goal 
Goal: Treatments/Interventions/Procedures Outcome: Progressing Towards Goal 
Goal: Psychosocial 
Outcome: Progressing Towards Goal

## 2020-02-23 NOTE — PROGRESS NOTES
1930: Bedside and Verbal shift change report given to Yari Wiseman (oncoming nurse) by Tatianna Danielson (offgoing nurse). Report included the following information SBAR, Kardex, ED Summary, Procedure Summary, MAR, Recent Results and Cardiac Rhythm NSR/ST. Primary Nurse Blanca Alfaro, RN and Malone Koyanagi, RN performed a dual skin assessment on this patient Impairment noted- see wound doc flow sheet Doni score is 12 
 
2000: Shift assessment completed per flowsheet. 0000: Reassessment completed per flowsheet. 0400: Reassessment completed per flowsheet. 0730: Bedside and Verbal shift change report given to Rafal Fisher (oncoming nurse) by Yari Wiseman (offgoing nurse). Report included the following information SBAR, Kardex, ED Summary, Procedure Summary, MAR, Recent Results and Cardiac Rhythm ST.

## 2020-02-23 NOTE — PROGRESS NOTES
Problem: Non-Violent Restraints Goal: *No harm/injury to patient while restraints in use Outcome: Progressing Towards Goal 
Goal: *Patient's dignity will be maintained Outcome: Progressing Towards Goal 
Goal: Non-violent Restaints:Standard Interventions Outcome: Progressing Towards Goal 
  
Problem: Pressure Injury - Risk of 
Goal: *Prevention of pressure injury Description Document Doni Scale and appropriate interventions in the flowsheet. Outcome: Progressing Towards Goal 
Note: Pressure Injury Interventions: 
Sensory Interventions: Assess changes in LOC, Avoid rigorous massage over bony prominences, Chair cushion, Check visual cues for pain, Discuss PT/OT consult with provider, Float heels, Keep linens dry and wrinkle-free, Maintain/enhance activity level, Minimize linen layers, Monitor skin under medical devices, Pressure redistribution bed/mattress (bed type), Turn and reposition approx. every two hours (pillows and wedges if needed) Moisture Interventions: Check for incontinence Q2 hours and as needed, Apply protective barrier, creams and emollients, Absorbent underpads, Internal/External urinary devices, Internal/External fecal devices, Limit adult briefs, Maintain skin hydration (lotion/cream), Minimize layers, Moisture barrier Activity Interventions: Pressure redistribution bed/mattress(bed type), Increase time out of bed Mobility Interventions: HOB 30 degrees or less, Float heels, Pressure redistribution bed/mattress (bed type), Turn and reposition approx. every two hours(pillow and wedges) Nutrition Interventions: Document food/fluid/supplement intake Friction and Shear Interventions: Feet elevated on foot rest, Apply protective barrier, creams and emollients, HOB 30 degrees or less, Lift sheet, Lift team/patient mobility team, Minimize layers Problem: Sepsis: Day 6 Goal: *Oxygen saturation within defined limits Outcome: Progressing Towards Goal 
 Goal: *Vital signs within defined limits Outcome: Progressing Towards Goal 
Goal: Nutrition/Diet Outcome: Progressing Towards Goal 
Goal: Medications Outcome: Progressing Towards Goal 
Goal: Respiratory Outcome: Progressing Towards Goal 
  
Problem: Falls - Risk of 
Goal: *Absence of Falls Description Document Jason Espino Fall Risk and appropriate interventions in the flowsheet. Outcome: Progressing Towards Goal 
Note: Fall Risk Interventions: 
Mobility Interventions: Communicate number of staff needed for ambulation/transfer Mentation Interventions: Door open when patient unattended, More frequent rounding, Reorient patient, Room close to nurse's station, Update white board, Evaluate medications/consider consulting pharmacy, Adequate sleep, hydration, pain control Medication Interventions: Evaluate medications/consider consulting pharmacy, Patient to call before getting OOB, Teach patient to arise slowly Elimination Interventions: Call light in reach History of Falls Interventions: Door open when patient unattended, Room close to nurse's station Problem: Impaired Skin Integrity/Pressure Injury Treatment Goal: *Improvement of Existing Pressure Injury Outcome: Progressing Towards Goal 
  
Problem: Nutrition Deficit Goal: *Optimize nutritional status Outcome: Progressing Towards Goal

## 2020-02-23 NOTE — PROGRESS NOTES
[facility] [facility] Adult  Hospitalist Group Date of Service:  2020 NAME:  Claudia Cho :  1945 MRN:  [id number] Admission Summary:  
Patient was admitted to the ICU on 2020 from [facility]. · Acute metabolic encephalopathy · Septic shock · Acute respiratory failure requiring intubation · HHS/DKA 79-year-old gentleman with past medical history of hypertension, diabetes, CVA, dementia 
 who was found unresponsive at at [facility]. Blood gas was checked by EMS and route and read \"high\". In the emergency room on further work-up he was found to have severe DKA/HHS, acute kidney injury, severe lactic acidosis, hypothermia and a UTI. He was then intubated for airway protection, on admission. Work-up revealed DKA/HHS, he was started on insulin gtt and managed in the ICU. Work-up revealed pan sensitive E. coli in his urine, and blood. He was started on broad-spectrum antibiotics, and then narrowed to ceftriaxone. His mental status, has improved somewhat, however he is definitely not back to his baseline. Due to his mental status he has not been able to tolerate any p.o., and he has been on NG feeds. Hospital course is also been complicated by hypernatremia, for which he is on D5 and free water flushes. Interval history / Subjective: He just moved out of ICU. He says feeling ok. Sugars are ok . Getting Tube feeds will ask Speech to evaluate in am about swallowing evaluation if not safe will need PEG tube. Remove rectal tube if diarrhea is not bad discussed with RN. Survelliance cultures from 17th are negative. Assessment & Plan:  
 
Severe sepsis,septic shock, with lactic acidosis, and acute hypoxic respiratory failure-secondary to E. coli bacteremia, UTI resolving 
-required vasopressors initially, weaned off -Continue ceftriaxone, hope to [facility] to levofloxacin on discharge. Needs a total 14 day course due to bacteremia. -Repeat culture, negative. DKA/HHSresolved (original BMP significant for glucose over 1000, CO2 less than 5), serum osmolality 415 Type 2 diabetes, A1c 10.6% 
- continue Lantus and SSI/POC checks Dysphagia-has not been able to work with speech due to his mental status 
-Currently getting  tube feeding via NGT 
-consult speech to continue to work with him - Nutrition to follow, continue tube feeding for now Hypernatremia, resolved Acute kidney injury -on admission creatinine 5.9 has now down trended nicely,resolved Acute hypoxic respiratory failure - intubated on admission 2/13, extubated 2/17 
- O2 as needed, wean as tolerated. Acute metabolic encephalopathy,baseline dementia 
-Head CT on admission negative. Thrombocytopenia chronic intermittent  
-platelet seem to be improving after jayson of 39(2/17) this admission 
-No bleeding complications Right hemiparesis noted as baseline on admission H&P  
-Head CT negative on admission  
-He was admitted early January 2020 and CTA H&N was questionable for occlusion of the distal left M3 branch Lactic acidosis POA - resolved HTN - home amlodipine HLD - home statin Elevated LFTS 
-Multifactorial:hypoperfusion/congestive+statins may be contributing with these risk factors 
-Hold  Statin 
-Monitor LFTs and work up if LFTs dont come down. Access:Left subclavian central line. Placed,2/14 NGT,placed 2/13 Palliative care consult placed, given dementia, CVA, and degree of debility. Suspect he may need a PEG, and to re discuss code status with family. Code status: full code DVT prophylaxis: scd Care Plan discussed with: Nurse Anticipated Disposition: SNF/LTC Anticipated Discharge: Greater than 48 hours Prior provider talked with his son Manny Corona on the [facility]([phone]) and updated him. He is already aware his dad is on tube feeding via NGT and that he may not be safe for oral intake anytime soon he may need PEG if that is his wish. Krzysztof Carlos indicated they would probably go with PEG if needed. He wants for his dad to remain full cose. He also does not want his dad return to [facility]. Hospital Problems  Date Reviewed: 12/4/2018 Codes Class Noted POA DKA (diabetic ketoacidoses) ([facility] ICD-10-CM: E11.10 ICD-9-CM: 250.10  2/13/2020 Unknown Encephalopathy ICD-10-CM: G93.40 ICD-9-CM: 348.30  2/13/2020 Unknown JOSH (acute kidney injury) ([facility].) ICD-10-CM: N17.9 ICD-9-CM: 584.9  1/21/2020 Unknown Review of Systems:  
Review of systems not obtained due to patient factors. Vital Signs:  
 Last 24hrs VS reviewed since prior progress note. Most recent are: 
Visit Vitals /77 Pulse (!) 109 Temp 98 °F (36.7 °C) Resp 13 Ht 5' 5\" (1.651 m) Wt 60.5 kg (133 lb 6.1 oz) SpO2 97% BMI 22.20 kg/m² Intake/Output Summary (Last 24 hours) at 2/23/2020 1207 Last data filed at 2/23/2020 0800 Gross per 24 hour Intake 2510 ml Output 1381 ml Net 1129 ml Physical Examination:  
 
 
     
Constitutional: Lethargic, opens eyes spontaneously ENT: NGT in place. Resp: Symmetrical air entry. On oxygen via nasal canula CV:  Regular rhythm, normal rate, no murmurs, gallops, rubs GI:  Soft, non distended, non tender. normoactive bowel sounds, no hepatosplenomegaly Musculoskeletal:  SCDs in place,+1 edema legs Neurologic: Lethargic,non verbal,rigth hemiparesis. Moves the left side Data Review:  
 Review and/or order of clinical lab test 
Review and/or order of tests in the radiology section of CPT Review and/or order of tests in the medicine section of CPT Labs:  
 
Recent Labs  
  02/23/20 
0440 02/21/20 
0344 WBC 12.1* 10.3 HGB 7.9* 8.7* HCT 23.5* 25.8*  
* 101* Recent Labs 02/23/20 
0494 02/22/20 
2386 02/21/20 
8517  140 141  
K 4.2 4.1 4.0  
 109* 111* CO2 26 26 25 BUN 21* 26* 30* CREA 0.87 0.91 1.03  
* 174* 175* CA 7.4* 7.2* 7.2*  
MG 1.5* 1.6 1.6 PHOS 2.4* 2.4* 2.8 Recent Labs  
  02/22/20 
0447 02/21/20 
0344 SGOT 77* 100* * 158* * 604* TBILI 0.4 0.4 TP 5.6* 5.6* ALB 1.8* 1.8*  
GLOB 3.8 3.8 Recent Labs  
  02/22/20 
6511 INR 1.2* PTP 12.2* No results for input(s): FE, TIBC, PSAT, FERR in the last 72 hours. No results found for: FOL, RBCF No results for input(s): PH, PCO2, PO2 in the last 72 hours. No results for input(s): CPK, CKNDX, TROIQ in the last 72 hours. No lab exists for component: CPKMB Lab Results Component Value Date/Time Cholesterol, total 190 01/02/2020 04:06 AM  
 HDL Cholesterol 64 01/02/2020 04:06 AM  
 LDL, calculated 115.4 (H) 01/02/2020 04:06 AM  
 Triglyceride 53 01/02/2020 04:06 AM  
 CHOL/HDL Ratio 3.0 01/02/2020 04:06 AM  
 
Lab Results Component Value Date/Time Glucose (POC) 249 (H) 02/23/2020 11:57 AM  
 Glucose (POC) 226 (H) 02/23/2020 12:11 AM  
 Glucose (POC) 180 (H) 02/22/2020 06:40 PM  
 Glucose (POC) 306 (H) 02/22/2020 11:51 AM  
 Glucose (POC) 196 (H) 02/22/2020 06:15 AM  
 
Lab Results Component Value Date/Time  Color YELLOW/STRAW 02/13/2020 01:07 PM  
 Appearance TURBID (A) 02/13/2020 01:07 PM  
 Specific gravity 1.020 02/13/2020 01:07 PM  
 pH (UA) 5.0 02/16/2020 08:33 AM  
 Protein 100 (A) 02/13/2020 01:07 PM  
 Glucose >1,000 (A) 02/13/2020 01:07 PM  
 Ketone NEGATIVE  02/13/2020 01:07 PM  
 Bilirubin NEGATIVE  02/13/2020 01:07 PM  
 Urobilinogen 0.2 02/13/2020 01:07 PM  
 Nitrites POSITIVE (A) 02/13/2020 01:07 PM  
 Leukocyte Esterase MODERATE (A) 02/13/2020 01:07 PM  
 Epithelial cells FEW 02/13/2020 01:07 PM  
 Bacteria 4+ (A) 02/13/2020 01:07 PM  
 WBC >100 (H) 02/13/2020 01:07 PM  
 RBC 5-10 02/13/2020 01:07 PM  
 
 
 
 Medications Reviewed:  
 
Current Facility-Administered Medications Medication Dose Route Frequency  insulin lispro (HUMALOG) injection   SubCUTAneous Q6H  
 glucose chewable tablet 16 g  4 Tab Oral PRN  
 glucagon (GLUCAGEN) injection 1 mg  1 mg IntraMUSCular PRN  
 dextrose 10% infusion 0-250 mL  0-250 mL IntraVENous PRN  
 insulin glargine (LANTUS) injection 12 Units  12 Units SubCUTAneous DAILY  hydrALAZINE (APRESOLINE) 20 mg/mL injection 10 mg  10 mg IntraVENous Q6H PRN  
 balsam peru-castor oil (VENELEX) ointment   Topical BID  amLODIPine (NORVASC) tablet 5 mg  5 mg Oral DAILY  [Held by provider] atorvastatin (LIPITOR) tablet 20 mg  20 mg Oral DAILY  cefTRIAXone (ROCEPHIN) 2 g in 0.9% sodium chloride (MBP/ADV) 50 mL  2 g IntraVENous Q24H  
 0.9% sodium chloride infusion 250 mL  250 mL IntraVENous PRN  
 collagenase (SANTYL) 250 unit/gram ointment   Topical DAILY  sodium chloride (NS) flush 5-40 mL  5-40 mL IntraVENous Q8H  
 sodium chloride (NS) flush 5-40 mL  5-40 mL IntraVENous PRN  
 ondansetron (ZOFRAN) injection 4 mg  4 mg IntraVENous Q6H PRN  
 acetaminophen (TYLENOL) tablet 650 mg  650 mg Per G Tube Q4H PRN  
 
______________________________________________________________________ EXPECTED LENGTH OF STAY: 12d 9h 
ACTUAL LENGTH OF STAY:          830 Nikita Rodríguez MD

## 2020-02-24 NOTE — DIABETES MGMT
[facility] Followup Progress Note Presentation Killian Vogt is a 76 y.o. male admitted with HHS and consulted by Provider for advanced specialty nursing care related to inpatient diabetes management. Hyperglycemia orders are in place. Subjective Mr Hudson Alberts was transferred to telemetry over the weekend. There is some concerns for aspiration as he has developed leukocytosis with fevers. He remains on tube feeds at this time with palliative care following to discuss PEG tube. Fasting blood glucose 140 on 12 units Lantus. -234 in the past 24 hours. Objective Physical exam 
 
General Weak, minimally responsive Vital Signs Visit Vitals /81 (BP 1 Location: Right arm, BP Patient Position: At rest) Pulse (!) 106 Temp 98.9 °F (37.2 °C) Resp 18 Ht 5' 5\" (1.651 m) Wt 64 kg (141 lb) SpO2 98% BMI 23.46 kg/m² Skin  Warm and dry Heart   Regular rate and rhythm. No murmurs, rubs or gallops Lungs  Clear to auscultation without rales or rhonchi Extremities No foot wounds Laboratory CBC W/O DIFF Collection Time: 02/24/20  5:36 AM  
Result Value Ref Range WBC 12.7 (H) 4.1 - 11.1 K/uL  
 RBC 2.96 (L) 4.10 - 5.70 M/uL HGB 8.2 (L) 12.1 - 17.0 g/dL HCT 24.8 (L) 36.6 - 50.3 % MCV 83.8 80.0 - 99.0 FL  
 MCH 27.7 26.0 - 34.0 PG  
 MCHC 33.1 30.0 - 36.5 g/dL  
 RDW 15.8 (H) 11.5 - 14.5 % PLATELET 010 (L) 337 - 400 K/uL MPV 12.0 8.9 - 12.9 FL  
 NRBC 0.2 (H) 0  WBC ABSOLUTE NRBC 0.02 (H) 0.00 - 0.01 K/uL BMP:  
Lab Results Component Value Date/Time  02/24/2020 05:36 AM  
 K 4.2 02/24/2020 05:36 AM  
  02/24/2020 05:36 AM  
 CO2 25 02/24/2020 05:36 AM  
 AGAP 5 02/24/2020 05:36 AM  
  (H) 02/24/2020 05:36 AM  
 BUN 18 02/24/2020 05:36 AM  
 CREA 0.99 02/24/2020 05:36 AM  
 GFRAA >60 02/24/2020 05:36 AM  
 GFRNA >60 02/24/2020 05:36 AM  
  
 
 
Blood glucose pattern Assessment and Plan Nursing Diagnosis Risk for unstable blood glucose pattern Nursing Intervention Domain 4452 Decision-making Support Nursing Interventions Examined current inpatient diabetes control Explored factors facilitating and impeding inpatient management Identified self-management practices impeding diabetes control Explored corrective strategies with patient and responsible inpatient provider Informed patient of rational for basal bolus insulin strategy while hospitalized Evaluation Mr Nighat Julian is a 76year old gentleman who was admitted for HHS, UTI septic shock with lactic acidosis and respiratory depression on 2/13. HHS, lactic acidosis and respiratory depression is now resolved and he is under the management of the hospitalist team.  Continues enteral feeds with limited ability to participate in speech therapy. Given his degree of debility and decreased mental status, Palliative Medicine consulted to assist in hospital management planning. 
  
Lantus and correctional insulin were resumed over the weekend. He continues to have episodes of hyperglycemia while on tube feedings despite the administration of correctional insulin. Recommendations 1. Adjust Lantus dose tomorrow based on basal needs and tube feeds: 
Basal Needs: 12 units (Low dose/BMI under 27: 0.2 units/kg) Plus Carbohydrate intake in enteral feeds: 1 unit per every 10 grams CHO On Glucerna he will get 144 grams CHO in 24 hours. Can add additional 14 units insulin to total lantus dose. -NPH 10 units x1 at bedtime tonight. He received 12 units Lantus at 10am.  This would lower blood glucose to goal overnight and cover for rise in blood glucose associated with carbohydrate intake in enteral feeds. 
 
-Then convert to: Total Lantus dose: 25 units Daily 
 
 
  
2.  Continue insulin sensative (BMI below 27) correctional insulin with Lantus to assist with hyperglycemia to lower blood glucose to goal of 100-180. 
  
 3. On discharge: Due to his change in mental status and decreased PO intake, oral antihyperglycemics should not continued on discharge as this can precipitate hypoglycemia. Basal and correctional insulin would be ideal to continue. Billing Code(s)  
 
[id number] [address], [address] [facility] 
[phone]

## 2020-02-24 NOTE — PROGRESS NOTES
Problem: Sepsis: Day 6 Goal: Off Pathway (Use only if patient is Off Pathway) Outcome: Progressing Towards Goal 
Note:  
1030- pts axillary temperature 100.9, heart rate in 100's-110's. Pt given tylenol. Dr. Fina Schroeder notified, orders received for LR at 75 ml/hr. No other orders received. 1130- pts temperature down to 100.3, heart rate in the 100's TRANSFER - OUT REPORT: 
 
Verbal report given to Wally Pham RN(name) on Pa Clarke  being transferred to (unit) for routine progression of care Report consisted of patients Situation, Background, Assessment and  
Recommendations(SBAR). Information from the following report(s) SBAR, Kardex, ED Summary, Procedure Summary, Intake/Output, MAR, Accordion, Recent Results, Med Rec Status, Cardiac Rhythm ST and Alarm Parameters  was reviewed with the receiving nurse. Lines:  
Quad Lumen 02/14/20 Anterior; Left Neck (Active) Central Line Being Utilized Yes 2/23/2020  8:15 PM  
Criteria for Appropriate Use Limited/no vessel suitable for conventional peripheral access 2/23/2020  8:15 PM  
Site Assessment Clean, dry, & intact 2/23/2020  8:15 PM  
Infiltration Assessment 0 2/23/2020  8:15 PM  
Affected Extremity/Extremities Color distal to insertion site pink (or appropriate for race); Pulses palpable 2/23/2020  8:15 PM  
Date of Last Dressing Change 02/22/20 2/23/2020  8:15 PM  
Dressing Status Clean, dry, & intact 2/23/2020  8:15 PM  
Dressing Type Disk with Chlorhexadine gluconate (CHG); Transparent 2/23/2020  8:15 PM  
Action Taken Open ports on tubing capped 2/23/2020  8:15 PM  
Proximal Hub Color/Line Status White;Flushed;Cap end changed 2/23/2020  8:15 PM  
Positive Blood Return (Medial Site) No 2/23/2020  8:15 PM  
Medial 1 Hub Color/Line Status Gray;Flushed;Cap end changed 2/23/2020  8:15 PM  
Positive Blood Return (Lateral Site) Yes 2/23/2020  8:15 PM  
Medial 2 Hub Color/Line Status Blue;Flushed;Cap end changed 2/23/2020  8:15 PM  
 Positive Blood Return (Site #3) Yes 2/23/2020  8:15 PM  
Distal Hub Color/Line Status Brown;Flushed;Cap end changed 2/23/2020  8:15 PM  
Positive Blood Return (Site #4) Yes 2/23/2020  8:15 PM  
Alcohol Cap Used Yes 2/23/2020  8:15 PM  
  
 
Opportunity for questions and clarification was provided. Patient transported with: 
 Monitor Registered Nurse Tech

## 2020-02-24 NOTE — PROGRESS NOTES
[address] [facility] Adult  Hospitalist Group Date of Service:  2020 NAME:  Mega Gutierrez :  1945 MRN:  [id number] Admission Summary:  
Patient was admitted to the ICU on 2020 from [facility]. · Acute metabolic encephalopathy · Septic shock · Acute respiratory failure requiring intubation · HHS/DKA 42-year-old gentleman with past medical history of hypertension, diabetes, CVA, dementia 
 who was found unresponsive at at [facility]. Blood gas was checked by EMS and route and read \"high\". In the emergency room on further work-up he was found to have severe DKA/HHS, acute kidney injury, severe lactic acidosis, hypothermia and a UTI. He was then intubated for airway protection, on admission. Work-up revealed DKA/HHS, he was started on insulin gtt and managed in the ICU. Work-up revealed pan sensitive E. coli in his urine, and blood. He was started on broad-spectrum antibiotics, and then narrowed to ceftriaxone. His mental status, has improved somewhat, however he is definitely not back to his baseline. Due to his mental status he has not been able to tolerate any p.o., and he has been on NG feeds. Hospital course is also been complicated by hypernatremia, for which he is on D5 and free water flushes. Interval history / Subjective:  
 
Patient seen and examined bedside. He is responding to answers in the his speech is incomprehensible. He is alert but oriented x0. Still NG feeds are running. Chart labs and imaging reviewed. No family bedside. Assessment & Plan:  
 
Severe sepsis, septic shock, with lactic acidosis, and acute hypoxic respiratory failure-secondary to E. coli bacteremia, UTI resolving 
-required vasopressors initially, weaned off 
-Continue ceftriaxone, hope to swtich to levofloxacin on discharge. Needs a total 14 day course due to bacteremia. -Repeat blood culture, negative. Wbc count worsening and mild fever  
im suspecting if he has aspirated Will change ceftriaxone to zoysn and add stat CXR 
 
 
 
DKA/HHSresolved (original BMP significant for glucose over 1000, CO2 less than 5), serum osmolality 415 Type 2 diabetes, A1c 10.6% 
- continue Lantus and SSI/POC checks Increase dose of lantus Dysphagia-has not been able to work with speech due to his mental status 
-Currently getting  tube feeding via NGT 
-consult speech to continue to work with him - Nutrition to follow, continue tube feeding for now Palliative to see if trena lou would eventually want PEG Hypernatremia,  
resolved Acute kidney injury - 
on admission creatinine 5.9 has now down trended  
,resolved Acute hypoxic respiratory failure  
- intubated on admission 2/13, extubated 2/17 
- O2 as needed, wean as tolerated. Acute metabolic encephalopathy,baseline dementia 
-Head CT on admission negative. Thrombocytopenia chronic intermittent  
-platelet seem to be improving after jayson of 39(2/17) this admission 
-No bleeding complications Right hemiparesis noted as baseline on admission H&P  
-Head CT negative on admission  
-He was admitted early January 2020 and CTA H&N was questionable for occlusion of the distal left M3 branch Lactic acidosis POA - resolved HTN - home amlodipine HLD - home statin Elevated LFTS 
-Multifactorial:hypoperfusion/congestive+statins may be contributing with these risk factors 
-Hold  Statin Improving Access:Left subclavian central line. Placed,2/14 NGT,placed 2/13 Palliative care consult placed, given dementia, CVA, and degree of debility. Suspect he may need a PEG, and to re discuss code status with family. Waiting for them to see patient Code status: full code DVT prophylaxis: scd Care Plan discussed with: Nurse Anticipated Disposition: SNF/LTC Anticipated Discharge: Greater than 48 hours son Corea Ezequiel on the [facility]([phone]) Hospital Problems  Date Reviewed: 12/4/2018 Codes Class Noted POA DKA (diabetic ketoacidoses) ([facility] ICD-10-CM: E11.10 ICD-9-CM: 250.10  2/13/2020 Unknown Encephalopathy ICD-10-CM: G93.40 ICD-9-CM: 348.30  2/13/2020 Unknown JOSH (acute kidney injury) ([facility] ICD-10-CM: N17.9 ICD-9-CM: 584.9  1/21/2020 Unknown Review of Systems:  
Review of systems not obtained due to patient factors. Vital Signs:  
 Last 24hrs VS reviewed since prior progress note. Most recent are: 
Visit Vitals /81 (BP 1 Location: Right arm, BP Patient Position: At rest) Pulse (!) 106 Temp 98.9 °F (37.2 °C) Resp 18 Ht 5' 5\" (1.651 m) Wt 64 kg (141 lb) SpO2 98% BMI 23.46 kg/m² Intake/Output Summary (Last 24 hours) at 2/24/2020 1324 Last data filed at 2/24/2020 1100 Gross per 24 hour Intake 2321.25 ml Output 25 ml Net 2296.25 ml Physical Examination:  
 
 
     
Constitutional: Lethargic, opens eyes spontaneously ENT: NGT in place. Resp: Symmetrical air entry. On oxygen via nasal canula CV:  Regular rhythm, normal rate, no murmurs, gallops, rubs GI:  Soft, non distended, non tender. normoactive bowel sounds, no hepatosplenomegaly Musculoskeletal:  SCDs in place,+1 edema legs Neurologic: Lethargic,non verbal,rigth hemiparesis. Moves the left side Data Review:  
 Review and/or order of clinical lab test 
Review and/or order of tests in the radiology section of CPT Review and/or order of tests in the medicine section of CPT Labs:  
 
Recent Labs  
  02/24/20 
0536 02/23/20 
0440 WBC 12.7* 12.1* HGB 8.2* 7.9*  
HCT 24.8* 23.5*  
* 129* Recent Labs  
  02/24/20 
0536 02/23/20 
0440 02/22/20 
0447  138 140  
K 4.2 4.2 4.1  108 109* CO2 25 26 26 BUN 18 21* 26* CREA 0.99 0.87 0.91  
* 158* 174* CA 7.7* 7.4* 7.2*  
MG  --  1.5* 1.6 PHOS  --  2.4* 2.4* Recent Labs  
  02/22/20 
8202 SGOT 77* * * TBILI 0.4 TP 5.6* ALB 1.8*  
GLOB 3.8 Recent Labs  
  02/22/20 
9672 INR 1.2* PTP 12.2* No results for input(s): FE, TIBC, PSAT, FERR in the last 72 hours. No results found for: FOL, RBCF No results for input(s): PH, PCO2, PO2 in the last 72 hours. No results for input(s): CPK, CKNDX, TROIQ in the last 72 hours. No lab exists for component: CPKMB Lab Results Component Value Date/Time Cholesterol, total 190 01/02/2020 04:06 AM  
 HDL Cholesterol 64 01/02/2020 04:06 AM  
 LDL, calculated 115.4 (H) 01/02/2020 04:06 AM  
 Triglyceride 53 01/02/2020 04:06 AM  
 CHOL/HDL Ratio 3.0 01/02/2020 04:06 AM  
 
Lab Results Component Value Date/Time Glucose (POC) 234 (H) 02/24/2020 11:53 AM  
 Glucose (POC) 140 (H) 02/24/2020 05:42 AM  
 Glucose (POC) 154 (H) 02/23/2020 11:53 PM  
 Glucose (POC) 170 (H) 02/23/2020 06:03 PM  
 Glucose (POC) 249 (H) 02/23/2020 11:57 AM  
 
Lab Results Component Value Date/Time Color YELLOW/STRAW 02/13/2020 01:07 PM  
 Appearance TURBID (A) 02/13/2020 01:07 PM  
 Specific gravity 1.020 02/13/2020 01:07 PM  
 pH (UA) 5.0 02/16/2020 08:33 AM  
 Protein 100 (A) 02/13/2020 01:07 PM  
 Glucose >1,000 (A) 02/13/2020 01:07 PM  
 Ketone NEGATIVE  02/13/2020 01:07 PM  
 Bilirubin NEGATIVE  02/13/2020 01:07 PM  
 Urobilinogen 0.2 02/13/2020 01:07 PM  
 Nitrites POSITIVE (A) 02/13/2020 01:07 PM  
 Leukocyte Esterase MODERATE (A) 02/13/2020 01:07 PM  
 Epithelial cells FEW 02/13/2020 01:07 PM  
 Bacteria 4+ (A) 02/13/2020 01:07 PM  
 WBC >100 (H) 02/13/2020 01:07 PM  
 RBC 5-10 02/13/2020 01:07 PM  
 
 
 
Medications Reviewed:  
 
Current Facility-Administered Medications Medication Dose Route Frequency  alteplase (CATHFLO) 1 mg in sterile water (preservative free) 1 mL injection  1 mg InterCATHeter PRN  
 bacitracin 500 unit/gram packet 1 Packet  1 Packet Topical PRN  
  lactated Ringers infusion  75 mL/hr IntraVENous CONTINUOUS  
 insulin lispro (HUMALOG) injection   SubCUTAneous Q6H  
 glucose chewable tablet 16 g  4 Tab Oral PRN  
 glucagon (GLUCAGEN) injection 1 mg  1 mg IntraMUSCular PRN  
 dextrose 10% infusion 0-250 mL  0-250 mL IntraVENous PRN  
 insulin glargine (LANTUS) injection 12 Units  12 Units SubCUTAneous DAILY  hydrALAZINE (APRESOLINE) 20 mg/mL injection 10 mg  10 mg IntraVENous Q6H PRN  
 balsam peru-castor oil (VENELEX) ointment   Topical BID  amLODIPine (NORVASC) tablet 5 mg  5 mg Oral DAILY  [Held by provider] atorvastatin (LIPITOR) tablet 20 mg  20 mg Oral DAILY  cefTRIAXone (ROCEPHIN) 2 g in 0.9% sodium chloride (MBP/ADV) 50 mL  2 g IntraVENous Q24H  
 0.9% sodium chloride infusion 250 mL  250 mL IntraVENous PRN  
 collagenase (SANTYL) 250 unit/gram ointment   Topical DAILY  sodium chloride (NS) flush 5-40 mL  5-40 mL IntraVENous Q8H  
 sodium chloride (NS) flush 5-40 mL  5-40 mL IntraVENous PRN  
 ondansetron (ZOFRAN) injection 4 mg  4 mg IntraVENous Q6H PRN  
 acetaminophen (TYLENOL) tablet 650 mg  650 mg Per G Tube Q4H PRN  
 
______________________________________________________________________ EXPECTED LENGTH OF STAY: 12d 9h 
ACTUAL LENGTH OF STAY:          Julio Rojas MD

## 2020-02-24 NOTE — PROGRESS NOTES
NATALII[place] PLAN: 
 
1. Patient remains on IV abx for bacteremia. 2. Family to meet with Palliative Care to discuss future care goals due to dementia, degree of debility and to discuss code status with family 3. SNF placement? . Patient is Medicaid pending. Sons have declined Emilie Mary, [address] MER[address]O RN, CRM

## 2020-02-24 NOTE — PROGRESS NOTES
2349 TRANSFER - IN REPORT: 
 
Verbal report received from Hossein(name) on Daybranden Tanner  being received from [facility]) for routine progression of care Report consisted of patients Situation, Background, Assessment and  
Recommendations(SBAR). Information from the following report(s) SBAR, ED Summary, Intake/Output, MAR, Recent Results and Cardiac Rhythm sinus was reviewed with the receiving nurse. Opportunity for questions and clarification was provided. 8644 Assessment completed upon patients arrival to unit and care assumed. 6564 spoke with attending MD regarding pt condition and continued need for restraint order ending at [phone], MD stated orders to be written to renew restraint order 
 
[id number] Bedside and Verbal shift change report given to Arlet (oncoming nurse) by Rhiannon Patel (offgoing nurse). Report included the following information SBAR, ED Summary, Intake/Output, MAR, Recent Results and Cardiac Rhythm sinus. Problem: Non-Violent Restraints Goal: *Removal from restraints as soon as assessed to be safe Outcome: Progressing Towards Goal 
Goal: *No harm/injury to patient while restraints in use Outcome: Progressing Towards Goal 
Goal: *Patient's dignity will be maintained Outcome: Progressing Towards Goal 
  
Problem: Pressure Injury - Risk of 
Goal: *Prevention of pressure injury Description Document Doni Scale and appropriate interventions in the flowsheet. Outcome: Progressing Towards Goal 
Note: Pressure Injury Interventions: 
Sensory Interventions: Assess changes in LOC, Assess need for specialty bed, Keep linens dry and wrinkle-free, Float heels, Maintain/enhance activity level, Minimize linen layers, Pressure redistribution bed/mattress (bed type), Turn and reposition approx. every two hours (pillows and wedges if needed), Suspension boots Moisture Interventions: Apply protective barrier, creams and emollients, Absorbent underpads, Check for incontinence Q2 hours and as needed, Internal/External urinary devices, Internal/External fecal devices, Limit adult briefs, Minimize layers, Maintain skin hydration (lotion/cream) Activity Interventions: Pressure redistribution bed/mattress(bed type), PT/OT evaluation Mobility Interventions: HOB 30 degrees or less, Float heels, Pressure redistribution bed/mattress (bed type), PT/OT evaluation, Suspension boots, Turn and reposition approx. every two hours(pillow and wedges) Nutrition Interventions: Document food/fluid/supplement intake Friction and Shear Interventions: HOB 30 degrees or less, Lift sheet, Lift team/patient mobility team, Minimize layers, Apply protective barrier, creams and emollients Problem: Impaired Skin Integrity/Pressure Injury Treatment Goal: *Improvement of Existing Pressure Injury Outcome: Progressing Towards Goal 
  
Problem: Nutrition Deficit Goal: *Optimize nutritional status Outcome: Progressing Towards Goal

## 2020-02-25 NOTE — PROGRESS NOTES
TRANSFER - OUT REPORT: 
 
Verbal report given to Osmin Magana RN(name) on Ty Felder  being transferred to (unit) for routine progression of care Report consisted of patients Situation, Background, Assessment and  
Recommendations(SBAR). Information from the following report(s) SBAR, Intake/Output, MAR, Recent Results and Cardiac Rhythm Sinus Tach  was reviewed with the receiving nurse. Lines:  
Quad Lumen 02/14/20 Anterior; Left Neck (Active) Central Line Being Utilized Yes 2/25/2020  4:21 PM  
Criteria for Appropriate Use Long term IV/antibiotic administration 2/25/2020  4:21 PM  
Site Assessment Clean, dry, & intact 2/25/2020  4:21 PM  
Infiltration Assessment 0 2/25/2020  4:21 PM  
Affected Extremity/Extremities Color distal to insertion site pink (or appropriate for race) 2/25/2020  4:21 PM  
Date of Last Dressing Change 02/22/20 2/25/2020  4:21 PM  
Dressing Status Clean, dry, & intact 2/25/2020  4:21 PM  
Dressing Type Disk with Chlorhexadine gluconate (CHG); Transparent 2/25/2020  4:21 PM  
Action Taken Open ports on tubing capped 2/25/2020  4:21 PM  
Proximal Hub Color/Line Status White; Infusing 2/25/2020  4:21 PM  
Positive Blood Return (Medial Site) Yes 2/25/2020  4:21 PM  
Medial 1 Hub Color/Line Status Gray;Capped;Flushed 2/25/2020  4:21 PM  
Positive Blood Return (Lateral Site) Yes 2/25/2020  4:21 PM  
Medial 2 Hub Color/Line Status Blue;Capped;Flushed 2/25/2020  4:21 PM  
Positive Blood Return (Site #3) Yes 2/25/2020  4:21 PM  
Distal Hub Color/Line Status Brown;Capped 2/25/2020  4:21 PM  
Positive Blood Return (Site #4) Yes 2/25/2020  4:21 PM  
Alcohol Cap Used Yes 2/25/2020  4:21 PM  
  
 
Opportunity for questions and clarification was provided. Patient transported with: 
 [place]

## 2020-02-25 NOTE — PROGRESS NOTES
TRANSFER - IN REPORT: 
 
Verbal report received from Selam Roberts RN(name) on Dayoung AMINA Tanner  being received from [facility](unit) for routine progression of care Report consisted of patients Situation, Background, Assessment and  
Recommendations(SBAR). Information from the following report(s) SBAR was reviewed with the receiving nurse. Opportunity for questions and clarification was provided. Assessment completed upon patients arrival to unit and care assumed.

## 2020-02-25 NOTE — PROGRESS NOTES
Palliative Medicine Consult [place]: [phone] ([phone]) Patient Name: Deborah Chacon YOB: 1945 Date of Initial Consult: 2/21/20 Reason for Consult: care decisions Requesting Provider: Dr. Alessia Alvarez Primary Care Physician: Bud Chau MD 
 
 SUMMARY:  
Deborah Chacon is a 76 y. o. with a past history of  labile DM type 2, CKD 3, hypertension, h/o alcoholism in remission, debility who was admitted on 2/13/2020 from the ED after being found unresponsive at [facility]. Hospital course has included treatment for DKA/HHS, hypernatremia, JOSH and severe sepsis 2nd to E. Coli UTI. He was intubated in the ED for airway protection, later extubated to NC and is now on RA. Recent admissions include 1/1-1/8 for episode of confusion (presented from home) and 1/21-1/30 (from SNF) for dehydration and failure to thrive after refusal to eat/drink at rehab for a couple of days. Current medical issues leading to Palliative Medicine involvement include:  Poor prognosis, recent decline and rehospitalization, continuity of care. Psychosocial- Born in [facility], , two sons Yolanda Cao (whom he lives with along with Ace's longtime girlfriend Mark) and [facility] Also has a stepdaughter Mae Tomas. Prior to 1/1 hospitalization he was more functional and independent but showing signs of decline. Since 1/1 acute episode of confusion and subsequent persistent confusion. He has transitioned to need for LTC / 24 hr supervision. PALLIATIVE DIAGNOSES:  
1. Minimally responsive state 2. Frailty 3. Debility 4. Labile DM type 2- A1c 10.2 in Jan.  
5. Abnormal CT head- Chronic microvascular ischemic changes, severe cerebral atrophy (1/21/20) 6. Alcoholism in remission PLAN:  
1. Today - Left VM for son Yolanda Cao, spoke with son Yanet munoz and Damien Sovereign. 2. Stressed importance of meeting to make a plan for Mr. Campos Dense care.   He appears to be at his new baseline and at this juncture I do not expect much neurologic recovery. In his current condition long term PEG is not recommended as it would not serve as a bridge to recovery. Plan to address this with family tomorrow. 3. Made plan for 11 am phone conference tomorrow. Ladi List to confirm time with Cira Aiken. 4. From last week: 1. Patient and family known to me from prior admission in January. 2. Two sons share decision making and legally BOTH need to be involved with consent. 3. Last admission I held extensive discussions (albeit by phone) with two sons regarding pt's frail brain and general debility and concern he was at high risk for recurrent dehydration / DKA. 4. We had talked about Hospice if he were to again decline. 5. He is now readmitted with same, with increased severity of illness, perhaps infection was inciting event. Its possible he also had another episode of refusal to eat/drink. 6. Decision had been made last admission DNR. DDNR hadn't been signed yet by son. Currently he is Full Code. 7. Long term feeding tubes are not recommended for patients with advanced dementia. I had discussed this with sons last admission. He does not carry a firm diagnosis of dementia but a frail brain with evidence of microvascular ischemic changes, cerebral atrophy. Likely due to longstanding DM, hypertension, alcohol use. 5. Thank you for the opportunity to care for Mr. Gigi Beasley. 6. Communicated plan of care with: Palliative ID[facility]unilda 192 Team 
 
 GOALS OF CARE / TREATMENT PREFERENCES:  
 
GOALS OF CARE: 
Patient/Health Care Proxy Stated Goals: Other (comment)(pending family conference) TREATMENT PREFERENCES:  
Code Status: Full Code Advance Care Planning: 
[x] [facility] Interdisciplinary Team has updated the ACP Navigator with Vivi 8 and Patient Capacity Primary Decision Maker (Active): Doug Wyman - [phone] Primary Decision MakerTyrone Argueta [phone] Advance Care Planning 2/24/2020 Patient's Healthcare Decision Maker is: - Confirm Advance Directive None Patient Would Like to Complete Advance Directive - Medical Interventions: Full interventions Artificially Administered Nutrition: (needs to be discussed) Other: As far as possible, the palliative care team has discussed with patient / health care proxy about goals of care / treatment preferences for patient. HISTORY:  
 
History obtained from:  Chart review CHIEF COMPLAINT:  Pt not able to report HPI/SUBJECTIVE: The patient is:  
[] Verbal and participatory [x] Non-participatory due to:   Lethargy / medical condition Clinical Pain Assessment (nonverbal scale for severity on nonverbal patients): Activity (Movement): Lying quietly, normal position Duration: for how long has pt been experiencing pain (e.g., 2 days, 1 month, years) Frequency: how often pain is an issue (e.g., several times per day, once every few days, constant) FUNCTIONAL ASSESSMENT:  
 
Palliative Performance Scale (PPS): PPS: 10 PSYCHOSOCIAL/SPIRITUAL SCREENING:  
 
Palliative IDT has assessed this patient for cultural preferences / practices and a referral made as appropriate to needs (Cultural Services, Patient Advocacy, Ethics, etc.) Any spiritual / Sabianism concerns: 
[] Yes /  [x] No 
 
Caregiver Burnout: 
[] Yes /  [] No /  [x] No Caregiver Present Anticipatory grief assessment:  
[x] Normal  / [] Maladaptive ESAS Anxiety: ESAS Depression:    
 
 
 REVIEW OF SYSTEMS:  
 
Positive and pertinent negative findings in ROS are noted above in HPI. The following systems were [] reviewed / [x] unable to be reviewed as noted in HPI Other findings are noted below.  
Systems: constitutional, ears/nose/mouth/throat, respiratory, gastrointestinal, genitourinary, musculoskeletal, integumentary, neurologic, psychiatric, endocrine. Positive findings noted below. Modified ESAS Completed by: provider Stool Occurrence(s): 1 PHYSICAL EXAM:  
 
From RN flowsheet: 
Wt Readings from Last 3 Encounters:  
02/25/20 63.5 kg (140 lb)  
01/22/20 53.7 kg (118 lb 7.6 oz) 01/08/20 62 kg (136 lb 11 oz) Blood pressure 141/72, pulse (!) 102, temperature 99 °F (37.2 °C), resp. rate 16, height 5' 5\" (1.651 m), weight 63.5 kg (140 lb), SpO2 95 %. Pain Scale 1: Adult Nonverbal Pain Scale Pain Intensity 1: 0 Last bowel movement, if known:  FMS in place Thin ill appearing, lethargic AA male lying in bed Respirations unlabored Abdomen scaphoid, non tender LEs w/ dependent edema Eyes open but he is not responsive to stimulii including sternal rub, no command following Not restless nor agitated. HISTORY:  
 
Active Problems: 
  JOSH (acute kidney injury) ([facility] [address] (1/21/2020) DKA (diabetic ketoacidoses) ([facility] [address] (2/13/2020) Encephalopathy (2/13/2020) Past Medical History:  
Diagnosis Date  Diabetes ([facility] [address] 2002  High cholesterol  Hypertension  Stroke ([facility] [address] M3 occlusion Jan 2020 History reviewed. No pertinent surgical history. Family History Problem Relation Age of Onset  No Known Problems Mother  No Known Problems Father  No Known Problems Sister  No Known Problems Brother  No Known Problems Brother  No Known Problems Brother  No Known Problems Brother  No Known Problems Brother  No Known Problems Brother  No Known Problems Maternal Grandmother  No Known Problems Maternal Grandfather  No Known Problems Paternal Grandmother  No Known Problems Paternal Grandfather  Diabetes Neg Hx   
 Heart Disease Neg Hx History reviewed, no pertinent family history. Social History Tobacco Use  Smoking status: Never Smoker  Smokeless tobacco: Never Used Substance Use Topics  Alcohol use: No  
  Alcohol/week: 0.0 standard drinks No Known Allergies Current Facility-Administered Medications Medication Dose Route Frequency  alteplase (CATHFLO) 1 mg in sterile water (preservative free) 1 mL injection  1 mg InterCATHeter PRN  
 bacitracin 500 unit/gram packet 1 Packet  1 Packet Topical PRN  
 insulin glargine (LANTUS) injection 14 Units  14 Units SubCUTAneous DAILY  piperacillin-tazobactam (ZOSYN) 3.375 g in 0.9% sodium chloride (MBP/ADV) 100 mL  3.375 g IntraVENous Q8H  
 lactated Ringers infusion  75 mL/hr IntraVENous CONTINUOUS  
 insulin lispro (HUMALOG) injection   SubCUTAneous Q6H  
 glucose chewable tablet 16 g  4 Tab Oral PRN  
 glucagon (GLUCAGEN) injection 1 mg  1 mg IntraMUSCular PRN  
 dextrose 10% infusion 0-250 mL  0-250 mL IntraVENous PRN  
 hydrALAZINE (APRESOLINE) 20 mg/mL injection 10 mg  10 mg IntraVENous Q6H PRN  
 balsam peru-castor oil (VENELEX) ointment   Topical BID  amLODIPine (NORVASC) tablet 5 mg  5 mg Oral DAILY  [Held by provider] atorvastatin (LIPITOR) tablet 20 mg  20 mg Oral DAILY  0.9% sodium chloride infusion 250 mL  250 mL IntraVENous PRN  
 collagenase (SANTYL) 250 unit/gram ointment   Topical DAILY  sodium chloride (NS) flush 5-40 mL  5-40 mL IntraVENous Q8H  
 sodium chloride (NS) flush 5-40 mL  5-40 mL IntraVENous PRN  
 ondansetron (ZOFRAN) injection 4 mg  4 mg IntraVENous Q6H PRN  
 acetaminophen (TYLENOL) tablet 650 mg  650 mg Per G Tube Q4H PRN  
 
 
 
 LAB AND IMAGING FINDINGS:  
 
Lab Results Component Value Date/Time WBC 12.3 (H) 02/25/2020 03:38 AM  
 HGB 7.9 (L) 02/25/2020 03:38 AM  
 PLATELET 179 75/16/9470 03:38 AM  
 
Lab Results Component Value Date/Time  Sodium 135 (L) 02/25/2020 03:38 AM  
 Potassium 4.4 02/25/2020 03:38 AM  
 Chloride 106 02/25/2020 03:38 AM  
 CO2 23 02/25/2020 03:38 AM  
 BUN 16 02/25/2020 03:38 AM  
 Creatinine 1.07 02/25/2020 03:38 AM  
 Calcium 7.5 (L) 02/25/2020 03:38 AM  
 Magnesium 1.5 (L) 02/23/2020 04:40 AM  
 Phosphorus 2.4 (L) 02/23/2020 04:40 AM  
  
Lab Results Component Value Date/Time AST (SGOT) 77 (H) 02/22/2020 04:47 AM  
 Alk. phosphatase 653 (H) 02/22/2020 04:47 AM  
 Protein, total 5.6 (L) 02/22/2020 04:47 AM  
 Albumin 1.8 (L) 02/22/2020 04:47 AM  
 Globulin 3.8 02/22/2020 04:47 AM  
 
Lab Results Component Value Date/Time INR 1.2 (H) 02/22/2020 04:47 AM  
 Prothrombin time 12.2 (H) 02/22/2020 04:47 AM  
 aPTT 23.9 02/02/2019 03:56 PM  
  
No results found for: IRON, FE, TIBC, IBCT, PSAT, FERR No results found for: PH, PCO2, PO2 No components found for: [place] Lab Results Component Value Date/Time CK 1,654 (H) 02/13/2020 06:04 PM  
 CK - MB <0.5 (L) 04/17/2016 12:46 AM  
  
 
 
   
 
Total time:  25m Counseling / coordination time, spent as noted above: 15m 
> 50% counseling / coordination?: y 
 
Prolonged service was provided for  []30 min   []75 min in face to face time in the presence of the patient, spent as noted above. Time Start:  
Time End:  
Note: this can only be billed with 99214 (initial) or 44669 (follow up). If multiple start / stop times, list each separately.

## 2020-02-25 NOTE — DIABETES MGMT
[facility] Followup Progress Note Presentation Rodolfo Looney is a 76 y.o. male admitted with HHS and consulted by Provider for advanced specialty nursing care related to inpatient diabetes management. Hyperglycemia management order set is in place. Subjective Mr Marleni Wilkerson was transferred to telemetry over the weekend. There is some concerns for aspiration as he has developed leukocytosis with fevers. He remains on tube feeds at this time with palliative care following to discuss PEG tube. Did have one episode of hypoglycemia to 53 at bedtime, treated with dextrose fluids. Tube feeds were not turned off and he had received 3 units of correctional insulin at 1600 for a BG of 206. Fasting blood glucose 144 on 12 units Lantus. Objective Physical exam 
 
General Soundly sleeping, per nurse- eyes open to voice Vital Signs Visit Vitals /72 (BP 1 Location: Right arm, BP Patient Position: At rest) Pulse (!) 102 Temp 99 °F (37.2 °C) Resp 16 Ht 5' 5\" (1.651 m) Wt 63.5 kg (140 lb) SpO2 95% BMI 23.30 kg/m² Skin  Warm and dry Heart   Regular rate and rhythm. No murmurs, rubs or gallops Lungs  Clear to auscultation without rales or rhonchi Extremities No foot wounds Laboratory CBC WITH AUTOMATED DIFF Collection Time: 02/25/20  3:38 AM  
Result Value Ref Range WBC 12.3 (H) 4.1 - 11.1 K/uL  
 RBC 2.82 (L) 4.10 - 5.70 M/uL HGB 7.9 (L) 12.1 - 17.0 g/dL HCT 24.0 (L) 36.6 - 50.3 % MCV 85.1 80.0 - 99.0 FL  
 MCH 28.0 26.0 - 34.0 PG  
 MCHC 32.9 30.0 - 36.5 g/dL  
 RDW 15.9 (H) 11.5 - 14.5 % PLATELET 324 531 - 699 K/uL MPV 11.8 8.9 - 12.9 FL  
 NRBC 0.0 0  WBC ABSOLUTE NRBC 0.00 0.00 - 0.01 K/uL NEUTROPHILS 72 32 - 75 % BAND NEUTROPHILS 2 0 - 6 % LYMPHOCYTES 20 12 - 49 % MONOCYTES 5 5 - 13 % EOSINOPHILS 1 0 - 7 % BASOPHILS 0 0 - 1 % IMMATURE GRANULOCYTES 0 % ABS. NEUTROPHILS 9.1 (H) 1.8 - 8.0 K/UL  
 ABS. LYMPHOCYTES 2.5 0.8 - 3.5 K/UL  
 ABS. MONOCYTES 0.6 0.0 - 1.0 K/UL  
 ABS. EOSINOPHILS 0.1 0.0 - 0.4 K/UL  
 ABS. BASOPHILS 0.0 0.0 - 0.1 K/UL  
 ABS. IMM. GRANS. 0.0 K/UL  
 DF MANUAL    
 RBC COMMENTS ANISOCYTOSIS 1+ 
    
 RBC COMMENTS POLYCHROMASIA 1+ BMP:  
Lab Results Component Value Date/Time  (L) 02/25/2020 03:38 AM  
 K 4.4 02/25/2020 03:38 AM  
  02/25/2020 03:38 AM  
 CO2 23 02/25/2020 03:38 AM  
 AGAP 6 02/25/2020 03:38 AM  
  (H) 02/25/2020 03:38 AM  
 BUN 16 02/25/2020 03:38 AM  
 CREA 1.07 02/25/2020 03:38 AM  
 GFRAA >60 02/25/2020 03:38 AM  
 GFRNA >60 02/25/2020 03:38 AM  
  
 
 
Blood glucose pattern Assessment and Plan Nursing Diagnosis Risk for unstable blood glucose pattern Nursing Intervention Domain 3150 Decision-making Support Nursing Interventions Examined current inpatient diabetes control Explored factors facilitating and impeding inpatient management Identified self-management practices impeding diabetes control Explored corrective strategies with patient and responsible inpatient provider Informed patient of rational for basal bolus insulin strategy while hospitalized Evaluation Mr Tiera Murcia is a 76year old gentleman who was admitted for HHS, UTI septic shock with lactic acidosis and respiratory depression on 2/13.  HHS, lactic acidosis and respiratory depression is now resolved and he is under the management of the hospitalist team.  Continues enteral feeds with limited ability to participate in speech therapy. Maren Bianchiia his degree of debility and decreased mental status, Palliative Medicine consulted to assist in hospital management planning. 
  
Lantus and correctional insulin were resumed over the weekend.   He did have one episode of hypoglycemia at bedtime- this response is surprising as his feeds were not interrupted and he received minimal correctional coverage 5 hours prior. At this time, his lantus dose is only covering for the carbohydrate intake with his feeds 1 unit for every 10 grams of carbohydrate consumed. His basal needs are not taken into consideration in lantus dose. Recommendations 1. Agree with Lantus Dose at 14 units. Carbohydrate intake in enteral feeds: 1 unit per every 10 grams CHO On Glucerna he will get 144 grams CHO in 24 hours.    
  
2. Continue insulin sensative (BMI below 27) correctional insulin with Lantus to assist with hyperglycemia to lower blood glucose to goal of 100-180. 
  
3. On discharge: Due to his change in mental status and decreased PO intake, oral antihyperglycemics should not continued on discharge as this can precipitate hypoglycemia.  Basal and correctional insulin would be ideal to continue. Billing Code(s)  
[id number], [id number] [facility] 
[phone]

## 2020-02-25 NOTE — PROGRESS NOTES
NATALIIA PLAN: 
  
1. Family to meet with Palliative Care at 11:00 am tomorrow to discuss future care goals and code status  
  
2. SNF placement? . Patient is Medicaid pending. Sons have declined [place] 3. Remains with TF and IV abx Y.  Teresa Zaragoza, [address], RN, CRM

## 2020-02-25 NOTE — PROGRESS NOTES
[facility] Adult  Hospitalist Group Date of Service:  2020 NAME:  Yamilex Robbins :  1945 MRN:  [id number] Admission Summary:  
Patient was admitted to the ICU on 2020 from [facility]. · Acute metabolic encephalopathy · Septic shock · Acute respiratory failure requiring intubation · HHS/DKA 69-year-old gentleman with past medical history of hypertension, diabetes, CVA, dementia 
 who was found unresponsive at at [facility]. Blood gas was checked by EMS and route and read \"high\". In the emergency room on further work-up he was found to have severe DKA/HHS, acute kidney injury, severe lactic acidosis, hypothermia and a UTI. He was then intubated for airway protection, on admission. Work-up revealed DKA/HHS, he was started on insulin gtt and managed in the ICU. Work-up revealed pan sensitive E. coli in his urine, and blood. He was started on broad-spectrum antibiotics, and then narrowed to ceftriaxone. His mental status, has improved somewhat, however he is definitely not back to his baseline. Due to his mental status he has not been able to tolerate any p.o., and he has been on NG feeds. Hospital course is also been complicated by hypernatremia, for which he is on D5 and free water flushes. Interval history / Subjective:  
 
Patient seen and examined bedside. He is responding to answers in the his speech is incomprehensible. He is alert but oriented x0. Still NG feeds are running. Chart labs and imaging reviewed. No family bedside. Plan for palliative care meeting tomorrow probably telephonic Assessment & Plan:  
 
Severe sepsis, septic shock, with lactic acidosis, and acute hypoxic respiratory failure-secondary to E. coli bacteremia, UTI resolving 
-required vasopressors initially, weaned off -Continue ceftriaxone, hope to [facility] to levofloxacin on discharge. Needs a total 14 day course due to bacteremia. -Repeat blood culture, negative. Wbc count worsening and mild fever  
im suspecting if he has aspirated Will change ceftriaxone to zoysn Chest x-ray did not show any evidence of new aspiration If the white count continues to trend down we will change his Zosyn to ceftriaxone DKA/HHSresolved (original BMP significant for glucose over 1000, CO2 less than 5), serum osmolality 415 Type 2 diabetes, A1c 10.6% 
- continue Lantus and SSI/POC checks Increased  dose of lantus Blood sugars better controlled today Dysphagia-has not been able to work with speech due to his mental status 
-Currently getting  tube feeding via NGT 
-consult speech to continue to work with him - Nutrition to follow, continue tube feeding for now Palliative to see if family  would eventually want PEG Hypernatremia,  
resolved Acute kidney injury - 
on admission creatinine 5.9 has now down trended  
,resolved Acute hypoxic respiratory failure  
- intubated on admission 2/13, extubated 2/17 
- O2 as needed, wean as tolerated. Acute metabolic encephalopathy,baseline dementia 
-Head CT on admission negative. Thrombocytopenia chronic intermittent  
-platelet seem to be improving after jayson of 39(2/17) this admission 
-No bleeding complications Right hemiparesis noted as baseline on admission H&P  
-Head CT negative on admission  
-He was admitted early January 2020 and CTA H&N was questionable for occlusion of the distal left M3 branch Lactic acidosis POA - resolved HTN - home amlodipine HLD - home statin Elevated LFTS 
-Multifactorial:hypoperfusion/congestive+statins may be contributing with these risk factors 
-Hold  Statin Improving Access:Left subclavian central line. Placed,2/14 NGT,placed 2/13 Palliative care consult placed, given dementia, CVA, and degree of debility. Suspect he may need a PEG, and to re discuss code status with family. Waiting for them to see patient Code status: full code DVT prophylaxis: scd Care Plan discussed with: Nurse Anticipated Disposition: SNF/LTC Anticipated Discharge: Greater than 48 hours  
irais Hussein on the phone([phone]) Hospital Problems  Date Reviewed: 12/4/2018 Codes Class Noted POA DKA (diabetic ketoacidoses) ([facility] 75.) ICD-10-CM: E11.10 ICD-9-CM: 250.10  2/13/2020 Unknown Encephalopathy ICD-10-CM: G93.40 ICD-9-CM: 348.30  2/13/2020 Unknown JOS[facility]) [facility] ICD-10-CM: N17.9 ICD-9-CM: 584.9  1/21/2020 Unknown Review of Systems:  
Review of systems not obtained due to patient factors. Vital Signs:  
 Last 24hrs VS reviewed since prior progress note. Most recent are: 
Visit Vitals /75 (BP 1 Location: Left arm, BP Patient Position: At rest) Pulse (!) 106 Temp 98.6 °F (37 °C) Resp 16 Ht 5' 5\" (1.651 m) Wt 63.5 kg (140 lb) SpO2 95% BMI 23.30 kg/m² Intake/Output Summary (Last 24 hours) at 2/25/2020 1613 Last data filed at 2/25/2020 1400 Gross per 24 hour Intake 5651 ml Output 2960 ml Net 2691 ml Physical Examination:  
 
 
     
Constitutional: Lethargic, opens eyes spontaneously ENT: NGT in place. Resp: Symmetrical air entry. On oxygen via nasal canula CV:  Regular rhythm, normal rate, no murmurs, gallops, rubs GI:  Soft, non distended, non tender. normoactive bowel sounds, no hepatosplenomegaly Musculoskeletal:  SCDs in place,+1 edema legs Neurologic: Lethargic,non verbal,rigth hemiparesis. Moves the left side Data Review:  
 Review and/or order of clinical lab test 
Review and/or order of tests in the radiology section of CPT Review and/or order of tests in the medicine section of CPT Labs:  
 
Recent Labs  
  02/25/20 
9574 02/24/20 
9535 WBC 12.3* 12.7* HGB 7.9* 8.2*  
 HCT 24.0* 24.8*  
 141* Recent Labs  
  02/25/20 
9081 02/24/20 
0536 02/23/20 
0440 * 137 138  
K 4.4 4.2 4.2  107 108 CO2 23 25 26 BUN 16 18 21* CREA 1.07 0.99 0.87 * 138* 158* CA 7.5* 7.7* 7.4* MG  --   --  1.5* PHOS  --   --  2.4* No results for input(s): SGOT, GPT, ALT, AP, TBIL, TBILI, TP, ALB, GLOB, GGT, AML, LPSE in the last 72 hours. No lab exists for component: AMYP, HLPSE No results for input(s): INR, PTP, APTT, INREXT, INREXT in the last 72 hours. No results for input(s): FE, TIBC, PSAT, FERR in the last 72 hours. No results found for: FOL, RBCF No results for input(s): PH, PCO2, PO2 in the last 72 hours. No results for input(s): CPK, CKNDX, TROIQ in the last 72 hours. No lab exists for component: CPKMB Lab Results Component Value Date/Time Cholesterol, total 190 01/02/2020 04:06 AM  
 HDL Cholesterol 64 01/02/2020 04:06 AM  
 LDL, calculated 115.4 (H) 01/02/2020 04:06 AM  
 Triglyceride 53 01/02/2020 04:06 AM  
 CHOL/HDL Ratio 3.0 01/02/2020 04:06 AM  
 
Lab Results Component Value Date/Time Glucose (POC) 150 (H) 02/25/2020 11:25 AM  
 Glucose (POC) 144 (H) 02/25/2020 06:07 AM  
 Glucose (POC) 173 (H) 02/25/2020 12:11 AM  
 Glucose (POC) 58 (L) 02/24/2020 11:36 PM  
 Glucose (POC) 206 (H) 02/24/2020 04:03 PM  
 
Lab Results Component Value Date/Time  Color YELLOW/STRAW 02/13/2020 01:07 PM  
 Appearance TURBID (A) 02/13/2020 01:07 PM  
 Specific gravity 1.020 02/13/2020 01:07 PM  
 pH (UA) 5.0 02/16/2020 08:33 AM  
 Protein 100 (A) 02/13/2020 01:07 PM  
 Glucose >1,000 (A) 02/13/2020 01:07 PM  
 Ketone NEGATIVE  02/13/2020 01:07 PM  
 Bilirubin NEGATIVE  02/13/2020 01:07 PM  
 Urobilinogen 0.2 02/13/2020 01:07 PM  
 Nitrites POSITIVE (A) 02/13/2020 01:07 PM  
 Leukocyte Esterase MODERATE (A) 02/13/2020 01:07 PM  
 Epithelial cells FEW 02/13/2020 01:07 PM  
 Bacteria 4+ (A) 02/13/2020 01:07 PM  
 WBC >100 (H) 02/13/2020 01:07 PM  
 RBC 5-10 02/13/2020 01:07 PM  
 
 
 
Medications Reviewed:  
 
Current Facility-Administered Medications Medication Dose Route Frequency  alteplase (CATHFLO) 1 mg in sterile water (preservative free) 1 mL injection  1 mg InterCATHeter PRN  
 bacitracin 500 unit/gram packet 1 Packet  1 Packet Topical PRN  
 insulin glargine (LANTUS) injection 14 Units  14 Units SubCUTAneous DAILY  piperacillin-tazobactam (ZOSYN) 3.375 g in 0.9% sodium chloride (MBP/ADV) 100 mL  3.375 g IntraVENous Q8H  
 lactated Ringers infusion  75 mL/hr IntraVENous CONTINUOUS  
 insulin lispro (HUMALOG) injection   SubCUTAneous Q6H  
 glucose chewable tablet 16 g  4 Tab Oral PRN  
 glucagon (GLUCAGEN) injection 1 mg  1 mg IntraMUSCular PRN  
 dextrose 10% infusion 0-250 mL  0-250 mL IntraVENous PRN  
 hydrALAZINE (APRESOLINE) 20 mg/mL injection 10 mg  10 mg IntraVENous Q6H PRN  
 balsam peru-castor oil (VENELEX) ointment   Topical BID  amLODIPine (NORVASC) tablet 5 mg  5 mg Oral DAILY  [Held by provider] atorvastatin (LIPITOR) tablet 20 mg  20 mg Oral DAILY  0.9% sodium chloride infusion 250 mL  250 mL IntraVENous PRN  
 collagenase (SANTYL) 250 unit/gram ointment   Topical DAILY  sodium chloride (NS) flush 5-40 mL  5-40 mL IntraVENous Q8H  
 sodium chloride (NS) flush 5-40 mL  5-40 mL IntraVENous PRN  
 ondansetron (ZOFRAN) injection 4 mg  4 mg IntraVENous Q6H PRN  
 acetaminophen (TYLENOL) tablet 650 mg  650 mg Per G Tube Q4H PRN  
 
______________________________________________________________________ EXPECTED LENGTH OF STAY: 12d 9h 
ACTUAL LENGTH OF STAY:          Mercedes Leon MD

## 2020-02-25 NOTE — PROGRESS NOTES
Speech Therapy Chart reviewed and spoke with RN. Patient easily roused today with eyes open to voice and remaining open throughout visit. Note head turned to L with significant loss of saliva. Cleaned and covered shoulder with washcloth to keep dry. Wet cough when HOB was elevated. Attempted oral care, but patient did not open mouth despite verbal and tactile cues. Same results with offer of ice chips from spoon and when held to lips. He remains inappropriate for consideration of PO diet. AMINA Perry Junior.S., CCC-SLP

## 2020-02-25 NOTE — PROGRESS NOTES
Bedside shift change report given to Cristóbal Hickman [address] [place] [address] (oncoming nurse) by Stephanie Mccloud RN (offgoing nurse). Report included the following information SBAR, Kardex, MAR, Accordion and Cardiac Rhythm NSR/ST.

## 2020-02-25 NOTE — PROGRESS NOTES
Problem: Non-Violent Restraints Goal: *Removal from restraints as soon as assessed to be safe Outcome: Progressing Towards Goal 
  
Problem: Pressure Injury - Risk of 
Goal: *Prevention of pressure injury Description Document Doni Scale and appropriate interventions in the flowsheet. Outcome: Progressing Towards Goal 
Note: Pressure Injury Interventions: 
Sensory Interventions: Assess changes in LOC, Assess need for specialty bed, Minimize linen layers, Monitor skin under medical devices, Pad between skin to skin Moisture Interventions: Internal/External fecal devices, Minimize layers Activity Interventions: Pressure redistribution bed/mattress(bed type) Mobility Interventions: Assess need for specialty bed, Turn and reposition approx. every two hours(pillow and wedges), HOB 30 degrees or less Nutrition Interventions: Document food/fluid/supplement intake Friction and Shear Interventions: Lift sheet, Minimize layers Problem: Impaired Skin Integrity/Pressure Injury Treatment Goal: *Improvement of Existing Pressure Injury Outcome: Progressing Towards Goal

## 2020-02-26 NOTE — DIABETES MGMT
[facility] Followup Progress Note Presentation Marie Suarez is a 76 y.o. male admitted with HHS and consulted by Provider for advanced specialty nursing care related to inpatient diabetes management. Hyperglycemia management order set is in place. Subjective Mr Riddhi Nuñez remains in stable condition in the past 24 hours. He remains on continuous tube feeds and received 14 units Lantus with 3 units correctional insulin in the past 24 hours. Blood glucose 117-222 in the past 24 hours. Palliative care team consulted and working with family to determine goals for medical treatment plans. Objective Physical exam 
 
General Waxes and wanes Vital Signs Visit Vitals /83 Pulse (!) 104 Temp 99.4 °F (37.4 °C) Resp 18 Ht 5' 5\" (1.651 m) Wt 63 kg (139 lb) SpO2 100% BMI 23.13 kg/m² Skin  Warm and dry Heart   Regular rate and rhythm. No murmurs, rubs or gallops Lungs  Clear to auscultation without rales or rhonchi Extremities No foot wounds Laboratory CBC WITH AUTOMATED DIFF Collection Time: 02/26/20  5:16 AM  
Result Value Ref Range WBC 11.4 (H) 4.1 - 11.1 K/uL  
 RBC 2.74 (L) 4.10 - 5.70 M/uL HGB 7.6 (L) 12.1 - 17.0 g/dL HCT 22.8 (L) 36.6 - 50.3 % MCV 83.2 80.0 - 99.0 FL  
 MCH 27.7 26.0 - 34.0 PG  
 MCHC 33.3 30.0 - 36.5 g/dL  
 RDW 15.8 (H) 11.5 - 14.5 % PLATELET 000 669 - 419 K/uL MPV 11.1 8.9 - 12.9 FL  
 NRBC 0.0 0  WBC ABSOLUTE NRBC 0.00 0.00 - 0.01 K/uL NEUTROPHILS 82 (H) 32 - 75 % BAND NEUTROPHILS 2 0 - 6 % LYMPHOCYTES 12 12 - 49 % MONOCYTES 1 (L) 5 - 13 % EOSINOPHILS 1 0 - 7 % BASOPHILS 0 0 - 1 % METAMYELOCYTES 2 (H) 0 % IMMATURE GRANULOCYTES 0 %  
 ABS. NEUTROPHILS 9.6 (H) 1.8 - 8.0 K/UL  
 ABS. LYMPHOCYTES 1.4 0.8 - 3.5 K/UL  
 ABS. MONOCYTES 0.1 0.0 - 1.0 K/UL  
 ABS. EOSINOPHILS 0.1 0.0 - 0.4 K/UL  
 ABS. BASOPHILS 0.0 0.0 - 0.1 K/UL  
 ABS. IMM. GRANS. 0.0 K/UL DF MANUAL    
 RBC COMMENTS ANISOCYTOSIS 1+ 
    
 RBC COMMENTS POLYCHROMASIA 1+ Blood glucose pattern Assessment and Plan Nursing Diagnosis Risk for unstable blood glucose pattern Nursing Intervention Domain 3831 Decision-making Support Nursing Interventions Examined current inpatient diabetes control Explored factors facilitating and impeding inpatient management Identified self-management practices impeding diabetes control Explored corrective strategies with patient and responsible inpatient provider Informed patient of rational for basal bolus insulin strategy while hospitalized Evaluation Mr Gigi Beasley is a 76year old gentleman who was admitted for HHS, UTI septic shock with lactic acidosis and respiratory depression on 2/13.  HHS, lactic acidosis and respiratory depression is now resolved and he is under the management of the hospitalist team.  Continues enteral feeds with limited ability to participate in speech therapy. Carlos Blow his degree of debility and decreased mental status, Palliative Medicine consulted to assist in hospital management planning. 
  
Lantus and correctional insulin were resumed over the weekend. [facility] did have one episode of hypoglycemia at bedtime yesterday- this response is surprising as his feeds were not interrupted and he received minimal correctional coverage 5 hours prior. At this time, his lantus dose is only covering for the carbohydrate intake with his feeds 1 unit for every 10 grams of carbohydrate consumed. His basal needs are not taken into consideration in lantus dose. Blood glucose in goal of 100-180. Recommendations 1. Agree with Lantus Dose at 14 units. Carbohydrate intake in enteral feeds: 1 unit per every 10 grams CHO On Glucerna he will get 144 grams CHO in 24 hours.    
  
2.  Continue insulin sensative (BMI below 27) correctional insulin with Lantus to assist with hyperglycemia to lower blood glucose to goal of 100-180. 
  
 3. On discharge: Will discuss outpatient management plan with family and Palliative medicine. If there is a decision to stop tube feeds, there would be little Lantus dose needed to cover metabolic needs. -If family wishes to continue a once daily injection off tube feeds, a basal dose of 6 units Lantus would be appropriate (0.1 units/kg, very low dose) to cover hyperglycemia associated with basal needs. He would be at high risk for hypoglycemia. 
 
-A second option would be a conservative sliding scale insulin approach. This would require a fingerstick for correcting hyperglycemia. Correctional Scale for Normal Sensitivity TID or per family wishes 200-249: 2 units Humalog 250-299: 4 units Humalog 300-349: 6 units Humalog 350-399: 8 units Humalog Over 400: 10 units Humalog Billing Code(s)  
[address] [address], [address] 
[phone]

## 2020-02-26 NOTE — PROGRESS NOTES
SLP Contact Note Note patient's family meeting with palliative. Family appears to be leaning more toward comfort care, however, will await their final decision. Thank you, Erika Domingo M.Ed, CCC-SLP Speech-Language Pathologist

## 2020-02-26 NOTE — PROGRESS NOTES
Palliative Medicine Consult [place]: [phone]) Patient Name: Katalina Yuan YOB: 1945 Date of Initial Consult: 2/21/20 Reason for Consult: care decisions Requesting Provider: Dr. Jessie Mathew Primary Care Physician: Rolanda Ybarar MD 
 
 SUMMARY:  
Katalina Yuan is a 76 y. o. with a past history of  labile DM type 2, CKD 3, hypertension, h/o alcoholism in remission, debility who was admitted on 2/13/2020 from the ED after being found unresponsive at [facility]. Hospital course has included treatment for DKA/HHS, hypernatremia, JOSH and severe sepsis 2nd to E. Coli UTI. He was intubated in the ED for airway protection, later extubated to NC and is now on RA. Recent admissions include 1/1-1/8 for episode of confusion (presented from home) and 1/21-1/30 (from SNF) for dehydration and failure to thrive after refusal to eat/drink at rehab for a couple of days. Current medical issues leading to Palliative Medicine involvement include:  Poor prognosis, recent decline and rehospitalization, continuity of care. Psychosocial- Born in [facility], , two sons Tosin Pearson (whom he lives with along with Ace's longtime girlfriend Mark) and Rodger Early (local). Also has a stepdaughter Adair Victor. Prior to 1/1 hospitalization he was more functional and independent but showing signs of decline. Since 1/1 acute episode of confusion and subsequent persistent confusion. He has transitioned to need for LTC / 24 hr supervision. PALLIATIVE DIAGNOSES:  
1. Goals of care counseling 2. Minimally responsive state 3. Frailty 4. Debility 5. Feeding difficulties- on dobhoff TFs 6. Labile DM type 2- A1c 10.2 in Jan.  
7. Abnormal CT head- Chronic microvascular ischemic changes, severe cerebral atrophy (1/21/20) 8. Alcoholism in remission PLAN:  
1. Family meeting:  Jeff Finch in person, son Rodger Early by phone 1. [place] concerned about recent SNF stay, recognizing his Father needed more care and attention, unable to do for himself. They feel he has not been happy. 2. Discussed current status; waxing and waning mental status, frail state, progressive decline with each admission. 3. Made sons aware of concerns regarding his ability to swallow safely, high aspiration risk. 4. Outlined consideration of shifting goals to that of comfort; allowing Mr. Leonor Harvey to eat/drink for taste and pleasure, understanding that he is unlikely to maintain his nutrition with this route. Focus of this care would be on symptom management, quality of life and allowing natural death outside the hospital.   
5. We talked about a feeding tube but I shared it is not recommended for Mr. Leonor Harvey as it is not expected to reduce his risk of aspiration or serve as a bridge to recovery. Both shared their Father would never want a feeding tube. 6. Code status reviewed in depth as well. 7. They want to honor him and make decisions as he would, which I encouraged. 8. Reviewed if goals were to shift to comfort, this type of care can begin in the hospital, and be continued either at a NH or in one's personal home with the support of a Hospice agency. 9. Shared a life expectancy of short weeks without life support / FIGUEROA. 10. No firm decisions made during meeting. They requested to have time to discuss amongst themselves. Mr. Leonor Harvey is to remain full code and full escalation of care at this time. 6. Both sons have my number and will follow up with me by phone later today or tomorrow. 12. Will alert CM of potential need for long term care bed dispo. 2. Thank you for the opportunity to care for Mr. Leonor Harvey. 3. Communicated plan of care with: Palliative IDT, [facility] Team including SLP Ihsan Haddad.    
 
 GOALS OF CARE / TREATMENT PREFERENCES:  
 
GOALS OF CARE: 
 Patient/Health Care Proxy Stated Goals: Other (comment)(family considering a shift to comfort, will follow up) TREATMENT PREFERENCES:  
Code Status: Full Code Advance Care Planning: 
[x] The [facility] Interdisciplinary Team has updated the ACP Navigator with Lina and Patient Capacity Primary Decision Maker (Active): Chante Gaines - [phone] Primary Decision MakerMadis Davidson - [phone] Advance Care Planning 2/24/2020 Patient's Healthcare Decision Maker is: - Confirm Advance Directive None Patient Would Like to Complete Advance Directive - Medical Interventions: Full interventions Artificially Administered Nutrition: (needs to be discussed) Other: As far as possible, the palliative care team has discussed with patient / health care proxy about goals of care / treatment preferences for patient. HISTORY:  
 
History obtained from:  Chart review CHIEF COMPLAINT:  Pt not able to report HPI/SUBJECTIVE: The patient is:  
[] Verbal and participatory [x] Non-participatory due to:   Limited responsiveness Clinical Pain Assessment (nonverbal scale for severity on nonverbal patients): Activity (Movement): Lying quietly, normal position Duration: for how long has pt been experiencing pain (e.g., 2 days, 1 month, years) Frequency: how often pain is an issue (e.g., several times per day, once every few days, constant) FUNCTIONAL ASSESSMENT:  
 
Palliative Performance Scale (PPS): PPS: 10 PSYCHOSOCIAL/SPIRITUAL SCREENING:  
 
Palliative IDT has assessed this patient for cultural preferences / practices and a referral made as appropriate to needs (Cultural Services, Patient Advocacy, Ethics, etc.) Any spiritual / Judaism concerns: 
[] Yes /  [x] No 
 
Caregiver Burnout: 
[] Yes /  [] No /  [x] No Caregiver Present Anticipatory grief assessment:  
[x] Normal  / [] Maladaptive ESAS Anxiety: ESAS Depression:    
 
 
 REVIEW OF SYSTEMS:  
 
Positive and pertinent negative findings in ROS are noted above in HPI. The following systems were [] reviewed / [x] unable to be reviewed as noted in HPI Other findings are noted below. Systems: constitutional, ears/nose/mouth/throat, respiratory, gastrointestinal, genitourinary, musculoskeletal, integumentary, neurologic, psychiatric, endocrine. Positive findings noted below. Modified ESAS Completed by: provider Stool Occurrence(s): 1 PHYSICAL EXAM:  
 
From RN flowsheet: 
Wt Readings from Last 3 Encounters:  
02/26/20 63 kg (139 lb)  
01/22/20 53.7 kg (118 lb 7.6 oz) 01/08/20 62 kg (136 lb 11 oz) Blood pressure 143/75, pulse 95, temperature 99.7 °F (37.6 °C), resp. rate 18, height 5' 5\" (1.651 m), weight 63 kg (139 lb), SpO2 97 %. Pain Scale 1: Adult Nonverbal Pain Scale Pain Intensity 1: 0 Last bowel movement, if known:  FMS in place Thin ill appearing, lethargic AA male lying in bed Respirations unlabored Abdomen scaphoid, non tender LEs w/ dependent edema Alert today, able to follow simple command (squeezed his left hand), not able to clearly vocalize. Not restless nor agitated. HISTORY:  
 
Active Problems: 
  JOSH (acute kidney injury) ([address] (1/21/2020) DKA (diabetic ketoacidoses) ([address] (2/13/2020) Encephalopathy (2/13/2020) Past Medical History:  
Diagnosis Date  Diabetes ([address] 2002  High cholesterol  Hypertension  Stroke ([address] M3 occlusion Jan 2020 History reviewed. No pertinent surgical history. Family History Problem Relation Age of Onset  No Known Problems Mother  No Known Problems Father  No Known Problems Sister  No Known Problems Brother  No Known Problems Brother  No Known Problems Brother  No Known Problems Brother  No Known Problems Brother  No Known Problems Brother  No Known Problems Maternal Grandmother  No Known Problems Maternal Grandfather  No Known Problems Paternal Grandmother  No Known Problems Paternal Grandfather  Diabetes Neg Hx   
 Heart Disease Neg Hx History reviewed, no pertinent family history. Social History Tobacco Use  Smoking status: Never Smoker  Smokeless tobacco: Never Used Substance Use Topics  Alcohol use: No  
  Alcohol/week: 0.0 standard drinks No Known Allergies Current Facility-Administered Medications Medication Dose Route Frequency  alteplase (CATHFLO) 1 mg in sterile water (preservative free) 1 mL injection  1 mg InterCATHeter PRN  
 bacitracin 500 unit/gram packet 1 Packet  1 Packet Topical PRN  
 insulin glargine (LANTUS) injection 14 Units  14 Units SubCUTAneous DAILY  piperacillin-tazobactam (ZOSYN) 3.375 g in 0.9% sodium chloride (MBP/ADV) 100 mL  3.375 g IntraVENous Q8H  
 lactated Ringers infusion  75 mL/hr IntraVENous CONTINUOUS  
 insulin lispro (HUMALOG) injection   SubCUTAneous Q6H  
 glucose chewable tablet 16 g  4 Tab Oral PRN  
 glucagon (GLUCAGEN) injection 1 mg  1 mg IntraMUSCular PRN  
 dextrose 10% infusion 0-250 mL  0-250 mL IntraVENous PRN  
 hydrALAZINE (APRESOLINE) 20 mg/mL injection 10 mg  10 mg IntraVENous Q6H PRN  
 balsam peru-castor oil (VENELEX) ointment   Topical BID  amLODIPine (NORVASC) tablet 5 mg  5 mg Oral DAILY  [Held by provider] atorvastatin (LIPITOR) tablet 20 mg  20 mg Oral DAILY  0.9% sodium chloride infusion 250 mL  250 mL IntraVENous PRN  
 collagenase (SANTYL) 250 unit/gram ointment   Topical DAILY  sodium chloride (NS) flush 5-40 mL  5-40 mL IntraVENous Q8H  
 sodium chloride (NS) flush 5-40 mL  5-40 mL IntraVENous PRN  
 ondansetron (ZOFRAN) injection 4 mg  4 mg IntraVENous Q6H PRN  
 acetaminophen (TYLENOL) tablet 650 mg  650 mg Per G Tube Q4H PRN  
 
 
 
 LAB AND IMAGING FINDINGS:  
 
Lab Results Component Value Date/Time WBC 11.4 (H) 02/26/2020 05:16 AM  
 HGB 7.6 (L) 02/26/2020 05:16 AM  
 PLATELET 724 87/48/4902 05:16 AM  
 
Lab Results Component Value Date/Time Sodium 135 (L) 02/25/2020 03:38 AM  
 Potassium 4.4 02/25/2020 03:38 AM  
 Chloride 106 02/25/2020 03:38 AM  
 CO2 23 02/25/2020 03:38 AM  
 BUN 16 02/25/2020 03:38 AM  
 Creatinine 1.07 02/25/2020 03:38 AM  
 Calcium 7.5 (L) 02/25/2020 03:38 AM  
 Magnesium 1.5 (L) 02/23/2020 04:40 AM  
 Phosphorus 2.4 (L) 02/23/2020 04:40 AM  
  
Lab Results Component Value Date/Time AST (SGOT) 77 (H) 02/22/2020 04:47 AM  
 Alk. phosphatase 653 (H) 02/22/2020 04:47 AM  
 Protein, total 5.6 (L) 02/22/2020 04:47 AM  
 Albumin 1.8 (L) 02/22/2020 04:47 AM  
 Globulin 3.8 02/22/2020 04:47 AM  
 
Lab Results Component Value Date/Time INR 1.2 (H) 02/22/2020 04:47 AM  
 Prothrombin time 12.2 (H) 02/22/2020 04:47 AM  
 aPTT 23.9 02/02/2019 03:56 PM  
  
No results found for: IRON, FE, TIBC, IBCT, PSAT, FERR No results found for: PH, PCO2, PO2 No components found for: [place] Lab Results Component Value Date/Time CK 1,654 (H) 02/13/2020 06:04 PM  
 CK - MB <0.5 (L) 04/17/2016 12:46 AM  
  
 
 
   
 
Total time:  50m Counseling / coordination time, spent as noted above: 40m 
> 50% counseling / coordination?: y 
 
Prolonged service was provided for  []30 min   []75 min in face to face time in the presence of the patient, spent as noted above. Time Start:  
Time End:  
Note: this can only be billed with 24398 (initial) or 45972 (follow up). If multiple start / stop times, list each separately.

## 2020-02-26 NOTE — WOUND CARE
Wound Care Note: Follow-up visit for sacrum Chart shows: 
Admitted for acute kidney injury, DKA, encephalopathy Past Medical History:  
Diagnosis Date  [facility] [address] 2002  High cholesterol  Hypertension  Stroke ([facility] M3 occlusion Jan 2020 WBC = 11.4 on 2/26/20 Admitted from [facility] Assessment:  
Patient is alert, groggy, shook head yes or no to questions, incontinent with moderate assistance needed in repositioning. Bed: [facility] Patient wearing briefs for incontinence. Diet: Tube feeding Patient reports no pain. Bilateral buttocks skin intact and without erythema. 1. POA sacral wound measures 7 .8 cm x 6 cm x 0.3 cm, wound bed is 60% pink and 40% slough, moderate serous drainage, wound edges are open, alba-wound intact. Santyl ointment and Optifoam Gentle applied. 2. POA left heel with superficial crusted area, appears to be the same, no erythema. Left open to air; offloaded. 3.  Right heel plantar surface with circular hyperpigmented area, some redness with clearing in the middle. Left open to air; offloaded. 4.  Right lateral foot with two areas of erythema that is blanchable. Removed Heel Medix boots and offloaded on pillows. Patient repositioned on left side. Heels offloaded on pillows. Recommendations:   
Continue with current wound; except no Heel Medix boots and Envision has been ordered. Sacrum- Daily cleanse with normal saline wipe wound bed clean and dry, apply nickel thickness of Santyl ointment, place 2 x 2 into base of wound, cover with Optifoam Gentle. 
  
Minimize layers under patient. No Heel Medix boots- creating redness to lateral foot Envision bed ordered for patient through [facility] [address] Please call [facility] for any issues and when patient discharged at [phone] Ensure both frame and blower box at foot of bed are plugged in - blower box will have green glowing light when air surface is on (should be on at all times). Use only flat sheet and one incontinence pad. Confirmation #[id number] Skin Care & Pressure Prevention: 
Minimize layers of linen/pads under patient to optimize support surface. Turn/reposition approximately every 2 hours and offload heels. Manage incontinence / promote continence Nourishing Skin Cream to dry skin, minimize use of briefs when able Discussed above plan with patient & Katherin Quinteros RN Transition of Care: Plan to follow as needed while admitted to hospital. 
 
MELECIO AlvaN, RN, [facility] 
office [phone] 
pager [phone] or call  to page

## 2020-02-26 NOTE — PROGRESS NOTES
[facility] [facility] Adult  Hospitalist Group Date of Service:  2020 NAME:  Jeremiah Barrios :  1945 MRN:  [id number] Admission Summary:  
Patient was admitted to the ICU on 2020 from [facility]. · Acute metabolic encephalopathy · Septic shock · Acute respiratory failure requiring intubation · HHS/DKA 68-year-old gentleman with past medical history of hypertension, diabetes, CVA, dementia 
 who was found unresponsive at at [facility]. Blood gas was checked by EMS and route and read \"high\". In the emergency room on further work-up he was found to have severe DKA/HHS, acute kidney injury, severe lactic acidosis, hypothermia and a UTI. He was then intubated for airway protection, on admission. Work-up revealed DKA/HHS, he was started on insulin gtt and managed in the ICU. Work-up revealed pan sensitive E. coli in his urine, and blood. He was started on broad-spectrum antibiotics, and then narrowed to ceftriaxone. His mental status, has improved somewhat, however he is definitely not back to his baseline. Due to his mental status he has not been able to tolerate any p.o., and he has been on NG feeds. Hospital course is also been complicated by hypernatremia, for which he is on D5 and free water flushes. Interval history / Subjective:  
 
Patient seen and examined bedside. He is responding to answers in the his speech is incomprehensible. He is alert but oriented x0. Still NG feeds are running. Chart labs and imaging reviewed. Family discussed with the palliative regarding goals of care. They will get back to us with the decision Assessment & Plan:  
 
Severe sepsis, septic shock, with lactic acidosis, and acute hypoxic respiratory failure-secondary to E. coli bacteremia, UTI resolving 
-required vasopressors initially, weaned off -Continue ceftriaxone, hope to [facility] to levofloxacin on discharge. Needs a total 14 day course due to bacteremia. -Repeat blood culture, negative. Wbc count worsening and mild fever , improving Chest x-ray did not show any evidence of new aspiration Changed zosyn back to ceftriaxone . DKA/HHSresolved (original BMP significant for glucose over 1000, CO2 less than 5), serum osmolality 415 Type 2 diabetes, A1c 10.6% 
- continue Lantus and SSI/POC checks Increased  dose of lantus Blood sugars better controlled today Dysphagia-has not been able to work with speech due to his mental status 
-Currently getting  tube feeding via NGT 
-consult speech to continue to work with him - Nutrition to follow, continue tube feeding for now Palliative to see if family  would eventually want PEG Hypernatremia,  
resolved Acute kidney injury - 
on admission creatinine 5.9 has now down trended  
,resolved Acute hypoxic respiratory failure  
- intubated on admission 2/13, extubated 2/17 
- O2 as needed, wean as tolerated. Acute metabolic encephalopathy,baseline dementia 
-Head CT on admission negative. Thrombocytopenia chronic intermittent  
-platelet seem to be improving after jayson of 39(2/17) this admission 
-No bleeding complications Right hemiparesis noted as baseline on admission H&P  
-Head CT negative on admission  
-He was admitted early January 2020 and CTA H&N was questionable for occlusion of the distal left M3 branch Lactic acidosis POA - resolved HTN - home amlodipine HLD - home statin Elevated LFTS 
-Multifactorial:hypoperfusion/congestive+statins may be contributing with these risk factors 
-Hold  Statin Improving Access:Left subclavian central line. Placed,2/14 NGT,placed 2/13 Palliative care consult placed, given dementia, CVA, and degree of debility. Suspect he may need a PEG, and to re discuss code status with family. Family will get back to us regarding the decisions until that he is full code Code status: full code DVT prophylaxis: scd Care Plan discussed with: Nurse Anticipated Disposition: SNF/LTC Anticipated Discharge: Greater than 48 hours  
irais Hussein on the phone([phone]) Hospital Problems  Date Reviewed: 12/4/2018 Codes Class Noted POA D[facility]diabetic ketoacidoses) [facility] ICD-10-CM: E11.10 ICD-9-CM: 250.10  2/13/2020 Unknown Encephalopathy ICD-10-CM: G93.40 ICD-9-CM: 348.30  2/13/2020 Unknown JOSH (acute kidney injury) ([facility] ICD-10-CM: N17.9 ICD-9-CM: 584.9  1/21/2020 Unknown Review of Systems:  
Review of systems not obtained due to patient factors. Vital Signs:  
 Last 24hrs VS reviewed since prior progress note. Most recent are: 
Visit Vitals /83 Pulse (!) 104 Temp 99.4 °F (37.4 °C) Resp 18 Ht 5' 5\" (1.651 m) Wt 63 kg (139 lb) SpO2 100% BMI 23.13 kg/m² Intake/Output Summary (Last 24 hours) at 2/26/2020 1509 Last data filed at 2/26/2020 1025 Gross per 24 hour Intake 3730 ml Output 1900 ml Net 1830 ml Physical Examination:  
 
 
     
Constitutional: Lethargic, opens eyes spontaneously ENT: NGT in place. Resp: Symmetrical air entry. On oxygen via nasal canula CV:  Regular rhythm, normal rate, no murmurs, gallops, rubs GI:  Soft, non distended, non tender. normoactive bowel sounds, no hepatosplenomegaly Musculoskeletal:  SCDs in place,+1 edema legs Neurologic: Lethargic,non verbal,rigth hemiparesis. Moves the left side Data Review:  
 Review and/or order of clinical lab test 
Review and/or order of tests in the radiology section of CPT Review and/or order of tests in the medicine section of CPT Labs:  
 
Recent Labs  
  02/26/20 
0516 02/25/20 
4081 WBC 11.4* 12.3* HGB 7.6* 7.9*  
HCT 22.8* 24.0*  
 155 Recent Labs  
  02/25/20 
1371 02/24/20 
0536 * 137  
K 4.4 4.2  107 CO2 23 25 BUN 16 18 CREA 1.07 0.99 * 138* CA 7.5* 7.7* No results for input(s): SGOT, GPT, ALT, AP, TBIL, TBILI, TP, ALB, GLOB, GGT, AML, LPSE in the last 72 hours. No lab exists for component: AMYP, HLPSE No results for input(s): INR, PTP, APTT, INREXT, INREXT in the last 72 hours. No results for input(s): FE, TIBC, PSAT, FERR in the last 72 hours. No results found for: FOL, RBCF No results for input(s): PH, PCO2, PO2 in the last 72 hours. No results for input(s): CPK, CKNDX, TROIQ in the last 72 hours. No lab exists for component: CPKMB Lab Results Component Value Date/Time Cholesterol, total 190 01/02/2020 04:06 AM  
 HDL Cholesterol 64 01/02/2020 04:06 AM  
 LDL, calculated 115.4 (H) 01/02/2020 04:06 AM  
 Triglyceride 53 01/02/2020 04:06 AM  
 CHOL/HDL Ratio 3.0 01/02/2020 04:06 AM  
 
Lab Results Component Value Date/Time Glucose (POC) 222 (H) 02/26/2020 11:01 AM  
 Glucose (POC) 150 (H) 02/26/2020 06:33 AM  
 Glucose (POC) 117 (H) 02/26/2020 12:10 AM  
 Glucose (POC) 150 (H) 02/25/2020 11:25 AM  
 Glucose (POC) 144 (H) 02/25/2020 06:07 AM  
 
Lab Results Component Value Date/Time Color YELLOW/STRAW 02/13/2020 01:07 PM  
 Appearance TURBID (A) 02/13/2020 01:07 PM  
 Specific gravity 1.020 02/13/2020 01:07 PM  
 pH (UA) 5.0 02/16/2020 08:33 AM  
 Protein 100 (A) 02/13/2020 01:07 PM  
 Glucose >1,000 (A) 02/13/2020 01:07 PM  
 Ketone NEGATIVE  02/13/2020 01:07 PM  
 Bilirubin NEGATIVE  02/13/2020 01:07 PM  
 Urobilinogen 0.2 02/13/2020 01:07 PM  
 Nitrites POSITIVE (A) 02/13/2020 01:07 PM  
 Leukocyte Esterase MODERATE (A) 02/13/2020 01:07 PM  
 Epithelial cells FEW 02/13/2020 01:07 PM  
 Bacteria 4+ (A) 02/13/2020 01:07 PM  
 WBC >100 (H) 02/13/2020 01:07 PM  
 RBC 5-10 02/13/2020 01:07 PM  
 
 
 
Medications Reviewed:  
 
Current Facility-Administered Medications Medication Dose Route Frequency  cefTRIAXone (ROCEPHIN) 2 g in 0.9% sodium chloride (MBP/ADV) 50 mL  2 g IntraVENous Q24H  
 alteplase (CATHFLO) 1 mg in sterile water (preservative free) 1 mL injection  1 mg InterCATHeter PRN  
 bacitracin 500 unit/gram packet 1 Packet  1 Packet Topical PRN  
 insulin glargine (LANTUS) injection 14 Units  14 Units SubCUTAneous DAILY  lactated Ringers infusion  75 mL/hr IntraVENous CONTINUOUS  
 insulin lispro (HUMALOG) injection   SubCUTAneous Q6H  
 glucose chewable tablet 16 g  4 Tab Oral PRN  
 glucagon (GLUCAGEN) injection 1 mg  1 mg IntraMUSCular PRN  
 dextrose 10% infusion 0-250 mL  0-250 mL IntraVENous PRN  
 hydrALAZINE (APRESOLINE) 20 mg/mL injection 10 mg  10 mg IntraVENous Q6H PRN  
 balsam peru-castor oil (VENELEX) ointment   Topical BID  amLODIPine (NORVASC) tablet 5 mg  5 mg Oral DAILY  [Held by provider] atorvastatin (LIPITOR) tablet 20 mg  20 mg Oral DAILY  0.9% sodium chloride infusion 250 mL  250 mL IntraVENous PRN  
 collagenase (SANTYL) 250 unit/gram ointment   Topical DAILY  sodium chloride (NS) flush 5-40 mL  5-40 mL IntraVENous Q8H  
 sodium chloride (NS) flush 5-40 mL  5-40 mL IntraVENous PRN  
 ondansetron (ZOFRAN) injection 4 mg  4 mg IntraVENous Q6H PRN  
 acetaminophen (TYLENOL) tablet 650 mg  650 mg Per G Tube Q4H PRN  
 
______________________________________________________________________ EXPECTED LENGTH OF STAY: 12d 9h 
ACTUAL LENGTH OF STAY:          Iraida Catherine MD

## 2020-02-26 NOTE — PROGRESS NOTES
Primary Nurse Azar Morris and Karo Mobley RN performed a dual skin assessment on this patient Impairment noted- see wound doc flow sheet Doni score is 11. Patient has a pressure ulcer on his sacrum and his bilateral heels. There are BLE skin tears

## 2020-02-26 NOTE — PROGRESS NOTES
NATALIIA: 
 
2/13/20  Patient admitted here from [facility] (this was a snf to LTC placement). Patient applied for Medicaid benefits 1 month ago. Staff at SNF found patient non-responsive 10am. Patient transferred to ED. Patient emergently intubated. PMhx: Diabetes, High Cholesterol, HTN, Stroke (M3 occlusion Jan 2020) with right-sided weakness Previous admission hx: 1/18/20 for altered mental status, 1/21/20-1/30/20 from SNF for dehydration and failure to thrive. Support System:Per chart review listed phone numbers below in chart,  
 Family: son, Sharlene Ramirez [phone], daughter; Annie Escalona [phone]/ [phone], daughter-in-law; [facility], [address] Tyrone Valentine [phone], son; Debra Hoang [phone]. The patient is . 2/17/20:  Patient extubated. Patient is not able to tolerate po intake. Feedings via NG Tube. O2 at 2 liters/min. Palliative consult placed to discuss Goals of Care. 
2/21/20:  Family does not desire to have patient return to facility [facility]). 2/24/20: Patient remains on IV abx for bacteremia. Family to conference with Palliative Team. 
 
2/26/20  Palliative meeting with apryl Rojas in person) Ambar Yang by phone. 2/26/20  CM visited at bedside with patient. He had no response to CM's presence. Discharge Plan:   
Family unable to provide care in their homes. Plan for long-term care placement. Family needs to give choice. CM aware that they do not desire to have patient return to [facility]. Tube feedings need to be addressed as NG tube is not recommended long term. CM will check on status of medicaid eligibility with [employer]. 
CM will speak with family tomorrow. Altagracia Villalba, [address], [address] [place] [address]

## 2020-02-27 NOTE — PROGRESS NOTES
Problem: Dysphagia (Adult) Goal: *Acute Goals and Plan of Care (Insert Text) Description Speech pathology goals Initiated 2/27/2020 1. Patient will participate in re-evaluation of swallow function within 7 days Outcome: Not Progressing Towards Goal 
  
SPEECH LANGUAGE PATHOLOGY BEDSIDE SWALLOW EVALUATION Patient: Yamilex Robbins (89 y.o. male) Date: 2/27/2020 Primary Diagnosis: DKA (diabetic ketoacidoses) ([facility] [E11.10] JOSH (acute kidney injury) ([facility] [N17.9] Encephalopathy [G93.40] Precautions: swallow ASSESSMENT : 
Based on the objective data described below, the patient presents with severe oral dysphagia characterized by absent bolus acceptance despite patient being awake. Patient with no command following and wet vocal quality while coughing. No verbalizations. Offered ice chip on spoon, thin liquid via straw, and puree on spoon, however patient did not accept despite max verbal/tactile cues. Patient grimaced and turned head away when applesauce was wiped off his lips. At this point, patient has been inappropriate for PO for 2 weeks secondary to poor alertness then poor mental status, therefore prognosis for recovery of swallow function is poor given no progress to date, confusion, and dementia. Note palliative following. Patient will benefit from skilled intervention to address the above impairments. Patients rehabilitation potential is considered to be Guarded PLAN : 
Recommendations and Planned Interventions: 
-Continued discussion regarding nutrition as patient with absent bolus acceptance. Agree that PEG is not recommended in patients with dementia 
-SLP following, however little to add at this time Frequency/Duration: Patient will be followed by speech-language pathology PRN to address goals. Discharge Recommendations: None SUBJECTIVE:  
Patient with no verbalizations. OBJECTIVE:  
 
Past Medical History:  
Diagnosis Date Diabetes ([facility] 2002 High cholesterol Hypertension Stroke [facility]) M3 occlusion Jan 2020 History reviewed. No pertinent surgical history. Prior Level of Function/Home Situation:  
Home Situation Home Environment: Skilled nursing facility One/Two Story Residence: One story Living Alone: No 
Support Systems: Home care staff, Family member(s) Patient Expects to be Discharged to[de-identified] Skilled nursing facility Current DME Used/Available at Home: None Diet prior to admission: regular/thin during last admission Current Diet:  NPO with NGT Cognitive and Communication Status: 
Neurologic State: Alert Orientation Level: Unable to verbalize Cognition: No command following Oral Assessment: 
Oral Assessment Labial: Other (comment)(unable to assess; no command following) P.O. Trials: 
Patient Position: upright in bed Vocal quality prior to P.O.: Wet(while coughing) Consistency Presented: Ice chips; Thin liquid;Puree How Presented: SLP-fed/presented;Spoon;Straw Bolus Acceptance: Absent Oral Phase Severity: Severe NOMS:  
The NOMS functional outcome measure was used to quantify this patient's level of swallowing impairment. Based on the NOMS, the patient was determined to be at level 1 for swallow function NOMS Swallowing Levels: 
Level 1 (CN): NPO Level 2 (CM): NPO but takes consistency in therapy Level 3 (CL): Takes less than 50% of nutrition p.o. and continues with nonoral feedings; and/or safe with mod cues; and/or max diet restriction Level 4 (CK): Safe swallow but needs mod cues; and/or mod diet restriction; and/or still requires some nonoral feeding/supplements Level 5 (CJ): Safe swallow with min diet restriction; and/or needs min cues Level 6 (CI): Independent with p.o.; rare cues; usually self cues; may need to avoid some foods or needs extra time Level 7 (CH): Independent for all p.o. JAN. (2003).  National Outcomes Measurement System (NOMS): Adult Speech-Language Pathology User's Guide. Pain: 
Pain Scale 1: Adult Nonverbal Pain Scale Pain Intensity 1: 0 After treatment:  
Patient left in no apparent distress in bed, Call bell within reach, and Nursing notified COMMUNICATION/EDUCATION:  
The patient's plan of care including recommendations, planned interventions, and recommended diet changes were discussed with: Registered Nurse. Patient is unable to participate in goal setting and plan of care. Thank you for this referral. 
Suly De La Cruz SLP Time Calculation: 10 mins

## 2020-02-27 NOTE — PROGRESS NOTES
NUTRITION brief Consult received for tube feeding management. Pt seen early today by this service for reassessment. Noted new RN note specifying pt's son had called and wanted to proceed with tube feedings. Unclear if this means PEG placement or continue with NG feeds. Will need to clarify. Regardless, tube feeds recommendations remain same as are currently orders - Glucerna 1.2 @ 50 mL/hr with 150 mL H2O q 4 hours. Will follow up tomorrow for clarification on current plan/ goals.  
 
 
 
Isabelle Smith RD

## 2020-02-27 NOTE — DIABETES MGMT
[facility] Followup Progress Note Presentation Best Riley is a 76 y.o. male admitted with HHS and consulted by Provider for advanced specialty nursing care related to inpatient diabetes management. Hyperglycemia management order set is in place. Subjective Mr Stephen Hooks remains in stable condition in the past 24 hours. He remains on continuous tube feeds and received 14 units Lantus with 3 units correctional insulin in the past 24 hours. Blood glucose  in the past 24 hours. Palliative care team consulted and working with family to determine goals for medical treatment plans. Objective Physical exam 
 
General Waxes and wanes Vital Signs Visit Vitals /79 (BP 1 Location: Right arm, BP Patient Position: At rest) Pulse (!) 109 Temp 99 °F (37.2 °C) Resp 19 Ht 5' 5\" (1.651 m) Wt 58.6 kg (129 lb 1.6 oz) SpO2 97% BMI 21.48 kg/m² Skin  Warm and dry Heart   Regular rate and rhythm. No murmurs, rubs or gallops Lungs  Clear to auscultation without rales or rhonchi Extremities No foot wounds Blood glucose pattern Assessment and Plan Nursing Diagnosis Risk for unstable blood glucose pattern Nursing Intervention Domain 5257 Decision-making Support Nursing Interventions Examined current inpatient diabetes control Explored factors facilitating and impeding inpatient management Identified self-management practices impeding diabetes control Explored corrective strategies with patient and responsible inpatient provider Informed patient of rational for basal bolus insulin strategy while hospitalized Evaluation Mr Stephen Hooks is a 76year old gentleman who was admitted for HHS, UTI septic shock with lactic acidosis and respiratory depression on 2/13.  HHS, lactic acidosis and respiratory depression is now resolved and he is under the management of the hospitalist team.  Continues enteral feeds with limited ability to participate in speech therapy. Mamadou Farrell his degree of debility and decreased mental status, Palliative Medicine consulted to assist in hospital management planning. 
  
Noticed that his blood glucose pattern over the past three days is cycling over 12 hours- highest BG at 12pm (200s) and lowest BG is at midnight 58-88. At this time, his lantus dose is only covering for the carbohydrate intake with his feeds 1 unit for every 10 grams of carbohydrate consumed.  Receiving correctional insulin for elevated blood glucose. Blood glucose in goal of 100-180. Recommendations 1. Agree with Lantus Dose at 14 units. Carbohydrate intake in enteral feeds: 1 unit per every 10 grams CHO On Glucerna he will get 144 grams CHO in 24 hours.    
**On continuous feeds and with this 12 hour cycling pattern, expect the lowest blood glucose of the day to be around midnight. If patient is below 80, adjust Lantus dose back to 12 units daily. 
  
2. Continue insulin sensative (BMI below 27) correctional insulin with Lantus to assist with hyperglycemia to lower blood glucose to goal of 100-180. 
  
3. On discharge: Will discuss outpatient management plan with family and Palliative medicine. If there is a decision to stop tube feeds, there would be little Lantus dose needed to cover metabolic needs.   
  
-If family wishes to continue a once daily injection off tube feeds, a basal dose of 6 units Lantus would be appropriate (0.1 units/kg, very low dose) to cover hyperglycemia associated with basal needs. He would be at high risk for hypoglycemia. 
  
-A second option would be a conservative sliding scale insulin approach. This would require a fingerstick for correcting hyperglycemia. Correctional Scale for Normal Sensitivity TID or per family wishes 
  
200-249: 2 units Humalog 250-299: 4 units Humalog 300-349: 6 units Humalog 350-399: 8 units Humalog Over 400: 10 units Humalog 
  
 
Billing Code(s)  
[id number] [address], [address] [facility] 
[phone]

## 2020-02-27 NOTE — PROGRESS NOTES
Patient's son called and was requesting to talk with the  regarding the father's care. He wanted to proceed with tube feedings and will have conversation with the brother.

## 2020-02-27 NOTE — PROGRESS NOTES
Palliative care-  
 
Called sons Earline Lopez and Giuseppe Ivey separately to follow up from yesterday's meeting. Both were at work and unable to talk. Plan to call Giuseppe Ivey back at 12:15. Will keep team updated as I know more. On assessment pt was alert and verbalized that he would like a \"cold drink of water. \" Addendum: I spoke with son Giuseppe Ivey. He shared the family was not able to conference and communicate together after our meeting yesterday yet.   They plan to do so this afternoon and he will get back to be with decisions regarding plan of care before 5 pm.

## 2020-02-27 NOTE — PROGRESS NOTES
[facility] Adult  Hospitalist Group Date of Service:  2020 NAME:  Keke Ewing :  1945 MRN:  [id number] Admission Summary:  
Patient was admitted to the ICU on 2020 from [facility]. · Acute metabolic encephalopathy · Septic shock · Acute respiratory failure requiring intubation · HHS/DKA 41-year-old gentleman with past medical history of hypertension, diabetes, CVA, dementia 
 who was found unresponsive at at [facility]. Blood gas was checked by EMS and route and read \"high\". In the emergency room on further work-up he was found to have severe DKA/HHS, acute kidney injury, severe lactic acidosis, hypothermia and a UTI. He was then intubated for airway protection, on admission. Work-up revealed DKA/HHS, he was started on insulin gtt and managed in the ICU. Work-up revealed pan sensitive E. coli in his urine, and blood. He was started on broad-spectrum antibiotics, and then narrowed to ceftriaxone. His mental status, has improved somewhat, however he is definitely not back to his baseline. Due to his mental status he has not been able to tolerate any p.o., and he has been on NG feeds. Hospital course is also been complicated by hypernatremia, for which he is on D5 and free water flushes. Interval history / Subjective: Follow up Severe sepsis, Ecoli bacteremia, Dysphagia, DKA/HHS Patient seen and examined by the bedside, Labs, images and notes reviewed Discussed with nursing staff, orders reviewed. Palliative following, family yet to decide goals of care. On NG TF Assessment & Plan:  
 
Severe sepsis, septic shock, with lactic acidosis, and acute hypoxic respiratory failure-secondary to E. coli bacteremia, UTI resolving 
-required vasopressors initially, weaned off 
-Continue ceftriaxone, hope to swtich to levofloxacin on discharge.  Needs a total 14 day course due to bacteremia. -Repeat blood culture, negative. Wbc count worsening and mild fever , improving Chest x-ray did not show any evidence of new aspiration Changed zosyn back to ceftriaxone . DKA/HHSresolved (original BMP significant for glucose over 1000, CO2 less than 5), serum osmolality 415 Type 2 diabetes, A1c 10.6% 
- continue Lantus and SSI/POC checks Increased  dose of lantus Blood sugars better controlled today Dysphagia-has not been able to work with speech due to his mental status 
-Currently getting  tube feeding via NGT 
-consult speech to continue to work with him - Nutrition to follow, continue tube feeding for now Palliative to see if family  would eventually want PEG Hypernatremia,  
resolved Acute kidney injury - 
on admission creatinine 5.9 has now down trended  
,resolved Acute hypoxic respiratory failure  
- intubated on admission 2/13, extubated 2/17 
- O2 as needed, wean as tolerated. Acute metabolic encephalopathy,baseline dementia 
-Head CT on admission negative. Thrombocytopenia chronic intermittent  
-platelet seem to be improving after jayson of 39(2/17) this admission 
-No bleeding complications Right hemiparesis noted as baseline on admission H&P  
-Head CT negative on admission  
-He was admitted early January 2020 and CTA H&N was questionable for occlusion of the distal left M3 branch Lactic acidosis POA - resolved HTN - home amlodipine HLD - home statin Elevated LFTS 
-Multifactorial:hypoperfusion/congestive+statins may be contributing with these risk factors 
-Hold  Statin Improving Access:Left subclavian central line. Placed,2/14 NGT,placed 2/13 Palliative care consult placed, given dementia, CVA, and degree of debility. Suspect he may need a PEG, and to re discuss code status with family. Family will get back to us regarding the decisions until that he is full code Code status: full code DVT prophylaxis: scd Care Plan discussed with: Nurse Anticipated Disposition: SNF/LTC Anticipated Discharge: Greater than 48 hours  
irais Sanz on the phone([phone]) Hospital Problems  Date Reviewed: 12/4/2018 Codes Class Noted POA DKA (diabetic ketoacidoses) ([facility] ICD-10-CM: E11.10 ICD-9-CM: 250.10  2/13/2020 Unknown Encephalopathy ICD-10-CM: G93.40 ICD-9-CM: 348.30  2/13/2020 Unknown JOSH (acute kidney injury) ([facility] ICD-10-CM: N17.9 ICD-9-CM: 584.9  1/21/2020 Unknown Review of Systems:  
Review of systems not obtained due to patient factors. Vital Signs:  
 Last 24hrs VS reviewed since prior progress note. Most recent are: 
Visit Vitals /79 (BP 1 Location: Right arm, BP Patient Position: At rest) Pulse (!) 109 Temp 99 °F (37.2 °C) Resp 19 Ht 5' 5\" (1.651 m) Wt 58.6 kg (129 lb 1.6 oz) SpO2 97% BMI 21.48 kg/m² Intake/Output Summary (Last 24 hours) at 2/27/2020 1330 Last data filed at 2/27/2020 9367 Gross per 24 hour Intake 460 ml Output  Net 460 ml Physical Examination:  
 
 
     
Constitutional: Lethargic, opens eyes spontaneously ENT: NGT in place. Resp: Symmetrical air entry. On oxygen via nasal canula CV:  Regular rhythm, normal rate, no murmurs, gallops, rubs GI:  Soft, non distended, non tender. normoactive bowel sounds, no hepatosplenomegaly Musculoskeletal:  SCDs in place,+1 edema legs Neurologic: Lethargic,non verbal,rigth hemiparesis. Moves the left side Data Review:  
 Review and/or order of clinical lab test 
Review and/or order of tests in the radiology section of CPT Review and/or order of tests in the medicine section of CPT Labs:  
 
Recent Labs  
  02/26/20 
0516 02/25/20 
7804 WBC 11.4* 12.3* HGB 7.6* 7.9*  
HCT 22.8* 24.0*  
 155 Recent Labs  
  02/25/20 
2113 * K 4.4  
 CO2 23 BUN 16  
 CREA 1.07  
* CA 7.5* No results for input(s): SGOT, GPT, ALT, AP, TBIL, TBILI, TP, ALB, GLOB, GGT, AML, LPSE in the last 72 hours. No lab exists for component: AMYP, HLPSE No results for input(s): INR, PTP, APTT, INREXT, INREXT in the last 72 hours. No results for input(s): FE, TIBC, PSAT, FERR in the last 72 hours. No results found for: FOL, RBCF No results for input(s): PH, PCO2, PO2 in the last 72 hours. No results for input(s): CPK, CKNDX, TROIQ in the last 72 hours. No lab exists for component: CPKMB Lab Results Component Value Date/Time Cholesterol, total 190 01/02/2020 04:06 AM  
 HDL Cholesterol 64 01/02/2020 04:06 AM  
 LDL, calculated 115.4 (H) 01/02/2020 04:06 AM  
 Triglyceride 53 01/02/2020 04:06 AM  
 CHOL/HDL Ratio 3.0 01/02/2020 04:06 AM  
 
Lab Results Component Value Date/Time Glucose (POC) 216 (H) 02/27/2020 11:17 AM  
 Glucose (POC) 137 (H) 02/27/2020 05:40 AM  
 Glucose (POC) 88 02/26/2020 11:36 PM  
 Glucose (POC) 130 (H) 02/26/2020 06:20 PM  
 Glucose (POC) 222 (H) 02/26/2020 11:01 AM  
 
Lab Results Component Value Date/Time Color YELLOW/STRAW 02/13/2020 01:07 PM  
 Appearance TURBID (A) 02/13/2020 01:07 PM  
 Specific gravity 1.020 02/13/2020 01:07 PM  
 pH (UA) 5.0 02/16/2020 08:33 AM  
 Protein 100 (A) 02/13/2020 01:07 PM  
 Glucose >1,000 (A) 02/13/2020 01:07 PM  
 Ketone NEGATIVE  02/13/2020 01:07 PM  
 Bilirubin NEGATIVE  02/13/2020 01:07 PM  
 Urobilinogen 0.2 02/13/2020 01:07 PM  
 Nitrites POSITIVE (A) 02/13/2020 01:07 PM  
 Leukocyte Esterase MODERATE (A) 02/13/2020 01:07 PM  
 Epithelial cells FEW 02/13/2020 01:07 PM  
 Bacteria 4+ (A) 02/13/2020 01:07 PM  
 WBC >100 (H) 02/13/2020 01:07 PM  
 RBC 5-10 02/13/2020 01:07 PM  
 
 
 
Medications Reviewed:  
 
Current Facility-Administered Medications Medication Dose Route Frequency  cefTRIAXone (ROCEPHIN) 2 g in 0.9% sodium chloride (MBP/ADV) 50 mL  2 g IntraVENous Q24H  alteplase (CATHFLO) 1 mg in sterile water (preservative free) 1 mL injection  1 mg InterCATHeter PRN  
 bacitracin 500 unit/gram packet 1 Packet  1 Packet Topical PRN  
 insulin glargine (LANTUS) injection 14 Units  14 Units SubCUTAneous DAILY  lactated Ringers infusion  75 mL/hr IntraVENous CONTINUOUS  
 insulin lispro (HUMALOG) injection   SubCUTAneous Q6H  
 glucose chewable tablet 16 g  4 Tab Oral PRN  
 glucagon (GLUCAGEN) injection 1 mg  1 mg IntraMUSCular PRN  
 dextrose 10% infusion 0-250 mL  0-250 mL IntraVENous PRN  
 hydrALAZINE (APRESOLINE) 20 mg/mL injection 10 mg  10 mg IntraVENous Q6H PRN  
 balsam peru-castor oil (VENELEX) ointment   Topical BID  amLODIPine (NORVASC) tablet 5 mg  5 mg Oral DAILY  [Held by provider] atorvastatin (LIPITOR) tablet 20 mg  20 mg Oral DAILY  0.9% sodium chloride infusion 250 mL  250 mL IntraVENous PRN  
 collagenase (SANTYL) 250 unit/gram ointment   Topical DAILY  sodium chloride (NS) flush 5-40 mL  5-40 mL IntraVENous Q8H  
 sodium chloride (NS) flush 5-40 mL  5-40 mL IntraVENous PRN  
 ondansetron (ZOFRAN) injection 4 mg  4 mg IntraVENous Q6H PRN  
 acetaminophen (TYLENOL) tablet 650 mg  650 mg Per G Tube Q4H PRN  
 
______________________________________________________________________ EXPECTED LENGTH OF STAY: 12d 9h 
ACTUAL LENGTH OF STAY:          14 
 
            
Phillip Verduzco MD

## 2020-02-27 NOTE — PROGRESS NOTES
NUTRITION COMPLETE ASSESSMENT 
 
RECOMMENDATIONS:  
 
Adjust flush prn to keep sodium WNL Interventions/Plan:  
Food/Nutrient Delivery:   TF via NG; decrease flushes to 150 mL q 4 hours while receiving concurrent IVFs Assessment:  
Reason for Assessment:  
[x]Reassessment Tube Feeding: Glucerna 1.2 @ 50 ml/hr with 200 ml water flush q 3 hr 
Diet: NPO Nutritionally Significant Medications: Rocephin, SSI, LR @ 75 mL/hr, Lantus (just decreased from 14 units to 10 units daily) Subjective: 
 
Objective: Mr Germán Jacobs was admitted with JOSH. PMHx: DM 2, HTN, CVA, HLD, Dementia. Patient found unresponsive @ NH d/t HHS/DKA, JOSH on CKD, severe lactic acidosis, UTI; acute respiratory failure/metabolic encephalopathy-intubated in ED. Noted: E Coli urosepsis with bacteremia; HHNK; volume depletion, JOSH on CKD. 2/27 - pt continues on tube feeds via NG (although, RN notes pt pulled out just recently - to be replaced). Family has yet to decided on goals of care re: PEG. Palliative following. Seen by SLP again today- \"the patient presents with severe oral dysphagia characterized by absent bolus acceptance despite patient being awake. Patient with no command following. .. at this point, patient has been inappropriate for PO for 2 weeks secondary to poor alertness then poor mental status, therefore prognosis for recovery of swallow function is poor given no progress to date, confusion, and dementia. \" Noted, Na+ continues to drop and now BNL. Flushes remain 200 mL q 3 hours for past week with almost daily drops in Na+ without adjustments. LR @ 75 mL/hr was started 2/23 d/t elevated temp and HR. Suspect pt is getting too many fluids with LR and free water flushes. Will decrease to 150 mL q 4 hours to provide 900 mL free water flushes. Adjust as Na+ level indicates and/or if IVFs stopped.  
 
 
Tube feeding as ordered provides 1200 ml, 1440 calories, 72 gm protein and 972 ml free water (2572 mL free water tube feeding + flush) per day. With new flushes as above, provides total 1872 mL free H2O. Estimated Nutrition Needs:  
Kcals/day: 1450 Kcals/day(MSJ x 1.2) Protein: 54 g(54-70 (1.0-1.3g/kg) Fluid: (1 ml/kcal) Based On: [place] 1898 Weight Used: Actual wt(54-70 (1.0-1.3g/kg) Pt expected to meet estimated nutrient needs:  [x]   Yes - with nutrition support Nutrition Diagnosis:  
1. Inadequate oral intake related to AMS as evidenced by NPO with enteral nutrition support. 2. 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 Goals:   
 Tube feeding to meet 90% estimated needs x 4-7 days. Monitoring & Evaluation: - Enteral/parenteral nutrition intake - Electrolyte and renal profile, Glucose profile, Weight/weight change Previous Nutrition Goals Met: 
Yes Previous Recommendations:     
Yes 
 
Education & Discharge Needs: 
 [x] None Identified 
 [] Identified and addressed [x] Participated in care plan, discharge planning, and/or interdisciplinary rounds Cultural, Confucianist and ethnic food preferences identified: 
 None Skin Integrity: []Intact  [x] Unstageable sacral pressure injury Edema: 1+ generalized, genital, LUE; trace bilat LE; 2+ RUE Last BM: 2/27 loose Food Allergies: [x]None []Other Anthropometrics:   
Weight Loss Metrics 2/27/2020 1/22/2020 1/8/2020 2/24/2019 2/2/2019 12/4/2018 8/27/2018 Today's Wt 129 lb 1.6 oz 118 lb 7.6 oz 136 lb 11 oz - 138 lb 14.2 oz 140 lb 9.6 oz 140 lb BMI 21.48 kg/m2 19.72 kg/m2 22.75 kg/m2 23.11 kg/m2 23.11 kg/m2 23.4 kg/m2 23.3 kg/m2 Last 3 Recorded Weights in this Encounter 02/25/20 6103 02/26/20 0522 02/27/20 8658 Weight: 63.5 kg (140 lb) 63 kg (139 lb) 58.6 kg (129 lb 1.6 oz) Weight Source: Bed Height: 5' 5\" (165.1 cm), Body mass index is 21.48 kg/m². IBW : 61.7 kg (136 lb), % IBW (Calculated): 104.88 % 
 ,   
 
Labs:   
Recent Labs  
  02/25/20 
9203 * BUN 16  
CREA 1.07  
* K 4.4  
 CO2 23 CA 7.5* No results for input(s): SGOT, GPT, ALT, AP, TBIL, TBILI, TP, ALB, GLOB, GGT, AML, LPSE in the last 72 hours. No lab exists for component: AMYP, HLPSE No results for input(s): LAC in the last 72 hours. Recent Labs  
  02/26/20 
0516 02/25/20 
3950 WBC 11.4* 12.3* HGB 7.6* 7.9*  
HCT 22.8* 24.0*  
 155 No results for input(s): PREALB in the last 72 hours. No results for input(s): TRIGL in the last 72 hours. Recent Labs  
  02/27/20 
1117 02/27/20 
0540 02/26/20 
2336 02/26/20 
1820 02/26/20 
1101 02/26/20 
6047 02/26/20 
0010 02/25/20 
1125 02/25/20 
1347 02/25/20 
0011 02/24/20 
2336 02/24/20 
1603 GLUCPOC 216* 137* 88 130* 222* 150* 117* 150* 144* 173* 58* 206* Lab Results Component Value Date/Time  Hemoglobin A1c 10.6 (H) 01/02/2020 04:06 AM  
 Hemoglobin A1c (POC) 12.8 08/20/2018 10:06 AM  
 
 
 
Anna Arana RD

## 2020-02-28 NOTE — PROGRESS NOTES
Palliative Medicine Consult [place]: [phone]) Patient Name: Marie Suarez YOB: 1945 Date of Initial Consult: 2/21/20 Reason for Consult: care decisions Requesting Provider: Dr. Conner Diego Primary Care Physician: Reji Puente MD 
 
 SUMMARY:  
Marie Suarez is a 76 y. o. with a past history of  labile DM type 2, CKD 3, hypertension, h/o alcoholism in remission, debility who was admitted on 2/13/2020 from the ED after being found unresponsive at [facility]. Hospital course has included treatment for DKA/HHS, hypernatremia, JOSH and severe sepsis 2nd to E. Coli UTI. He was intubated in the ED for airway protection, later extubated to [facility] and is now on RA. Recent admissions include 1/1-1/8 for episode of confusion (presented from home) and 1/21-1/30 (from SNF) for dehydration and failure to thrive after refusal to eat/drink at rehab for a couple of days. Current medical issues leading to Palliative Medicine involvement include:  Poor prognosis, recent decline and rehospitalization, continuity of care. Psychosocial- [place] , two sons Allison Laguna (whom he lives with along with Ace's longtime girlfriend Mark) and [facility] Also has a stepdaughter Nila Pollack. Prior to 1/1 hospitalization he was more functional and independent but showing signs of decline. Since 1/1 acute episode of confusion and subsequent persistent confusion. He has transitioned to need for LTC / 24 hr supervision. PALLIATIVE DIAGNOSES:  
1. Goals of care counseling 2. Minimally responsive state 3. Frailty 4. Debility 5. Feeding difficulties- on dobhoff TFs 6. Labile DM type 2- A1c 10.2 in Jan.  
7. Abnormal CT head- Chronic microvascular ischemic changes, severe cerebral atrophy (1/21/20) 8. Alcoholism in remission PLAN:  
1. Follow up assessment- pt alert but markedly fatigued, asking for cold water 2. Called sons Indiana Villavicencio and Leonard Blair unable to talk. Later connected with Ildefonso Woods. He states the family has not yet talked or come to a decision on plan of care. Shared their opinions are not all aligned. He planned to call me back by 5pm but follow up call did not occur. 3. Would encourage team to continue to support and follow up with sons (both sons are legal joint decision makers), recognizing that feeding tubes are not recommended in patients with advanced dementia with limited life expectancy. 4. 2/26- Family meeting:  Mary Moya in person, son Ildefonso Woods by phone 1. Indiana Villavicencio concerned about recent SNF stay, recognizing his Father needed more care and attention, unable to do for himself. They feel he has not been happy. 2. Discussed current status; waxing and waning mental status, frail state, progressive decline with each admission. 3. Made sons aware of concerns regarding his ability to swallow safely, high aspiration risk. 4. Outlined consideration of shifting goals to that of comfort; allowing Mr. Bienvenido Ye to eat/drink for taste and pleasure, understanding that he is unlikely to maintain his nutrition with this route. Focus of this care would be on symptom management, quality of life and allowing natural death outside the hospital.   
5. We talked about a feeding tube but I shared it is not recommended for Mr. Bienvenido Ye as it is not expected to reduce his risk of aspiration or serve as a bridge to recovery. Both shared their Father would never want a feeding tube. 6. Code status reviewed in depth as well. 7. They want to honor him and make decisions as he would, which I encouraged. 8. Reviewed if goals were to shift to comfort, this type of care can begin in the hospital, and be continued either at a NH or in one's personal home with the support of a Hospice agency. 9. Shared a life expectancy of short weeks without life support / FIGUEROA. 10. No firm decisions made during meeting. They requested to have time to discuss amongst themselves. Mr. Onur Baez is to remain full code and full escalation of care at this time. 6. Both sons have my number and will follow up with me by phone later today or tomorrow. 12. Will alert CM of potential need for long term care bed dispo. 5. Thank you for the opportunity to care for Mr. Onur Baez. 6. Communicated plan of care with: Palliative ID[facility] Team. 
 
 GOALS OF CARE / TREATMENT PREFERENCES:  
 
GOALS OF CARE: 
Patient/Health Care Proxy Stated Goals: Other (comment)(family considering a shift to comfort, will follow up) TREATMENT PREFERENCES:  
Code Status: Full Code Advance Care Planning: 
[x] [facility] Interdisciplinary Team has updated the ACP Navigator with Lina and Patient Capacity Primary Decision Maker (Active): Nidhishon Sun - [phone] Primary Decision MakerBema Albright [facility] - [phone] Advance Care Planning 2/24/2020 Patient's Healthcare Decision Maker is: - Confirm Advance Directive None Patient Would Like to Complete Advance Directive - Medical Interventions: Full interventions Artificially Administered Nutrition: (needs to be discussed) Other: As far as possible, the palliative care team has discussed with patient / health care proxy about goals of care / treatment preferences for patient. HISTORY:  
 
History obtained from:  Chart review CHIEF COMPLAINT:  Pt not able to report HPI/SUBJECTIVE: The patient is:  
[] Verbal and participatory [x] Non-participatory due to:   Limited responsiveness 2/27- alert today but minimally verbal.  Able to state he wanted cold water. Appears slightly uncomfortable. Clinical Pain Assessment (nonverbal scale for severity on nonverbal patients): Activity (Movement): Lying quietly, normal position Duration: for how long has pt been experiencing pain (e.g., 2 days, 1 month, years) Frequency: how often pain is an issue (e.g., several times per day, once every few days, constant) FUNCTIONAL ASSESSMENT:  
 
Palliative Performance Scale (PPS): PPS: 10 PSYCHOSOCIAL/SPIRITUAL SCREENING:  
 
Palliative IDT has assessed this patient for cultural preferences / practices and a referral made as appropriate to needs (Cultural Services, Patient Advocacy, Ethics, etc.) Any spiritual / Samaritan concerns: 
[] Yes /  [x] No 
 
Caregiver Burnout: 
[] Yes /  [] No /  [x] No Caregiver Present Anticipatory grief assessment:  
[x] Normal  / [] Maladaptive ESAS Anxiety: ESAS Depression:    
 
 
 REVIEW OF SYSTEMS:  
 
Positive and pertinent negative findings in ROS are noted above in HPI. The following systems were [] reviewed / [x] unable to be reviewed as noted in HPI Other findings are noted below. Systems: constitutional, ears/nose/mouth/throat, respiratory, gastrointestinal, genitourinary, musculoskeletal, integumentary, neurologic, psychiatric, endocrine. Positive findings noted below. Modified ESAS Completed by: provider Stool Occurrence(s): 1 PHYSICAL EXAM:  
 
From RN flowsheet: 
Wt Readings from Last 3 Encounters:  
02/27/20 58.6 kg (129 lb 1.6 oz) 01/22/20 53.7 kg (118 lb 7.6 oz) 01/08/20 62 kg (136 lb 11 oz) Blood pressure 143/83, pulse 98, temperature 98.4 °F (36.9 °C), resp. rate 18, height 5' 5\" (1.651 m), weight 58.6 kg (129 lb 1.6 oz), SpO2 98 %. Pain Scale 1: Adult Nonverbal Pain Scale Pain Intensity 1: 0 Last bowel movement, if known:  FMS in place Thin ill appearing, lethargic AA male lying in bed Respirations unlabored Abdomen scaphoid, non tender LEs w/ dependent edema Alert today, able to follow simple command (squeezed his left hand), minimally vocalizes Not restless nor agitated. HISTORY:  
 
Active Problems: 
  JOSH (acute kidney injury) ([facility] (1/21/2020) DKA (diabetic ketoacidoses) ([facility] (2/13/2020) Encephalopathy (2/13/2020) Past Medical History:  
Diagnosis Date  Diabetes (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 No pertinent surgical history. Family History Problem Relation Age of Onset  No Known Problems Mother  No Known Problems Father  No Known Problems Sister  No Known Problems Brother  No Known Problems Brother  No Known Problems Brother  No Known Problems Brother  No Known Problems Brother  No Known Problems Brother  No Known Problems Maternal Grandmother  No Known Problems Maternal Grandfather  No Known Problems Paternal Grandmother  No Known Problems Paternal Grandfather  Diabetes Neg Hx   
 Heart Disease Neg Hx History reviewed, no pertinent family history. Social History Tobacco Use  Smoking status: Never Smoker  Smokeless tobacco: Never Used Substance Use Topics  Alcohol use: No  
  Alcohol/week: 0.0 standard drinks No Known Allergies Current Facility-Administered Medications Medication Dose Route Frequency  [START ON 2/28/2020] insulin glargine (LANTUS) injection 10 Units  10 Units SubCUTAneous DAILY  cefTRIAXone (ROCEPHIN) 2 g in 0.9% sodium chloride (MBP/ADV) 50 mL  2 g IntraVENous Q24H  
 alteplase (CATHFLO) 1 mg in sterile water (preservative free) 1 mL injection  1 mg InterCATHeter PRN  
 bacitracin 500 unit/gram packet 1 Packet  1 Packet Topical PRN  
 lactated Ringers infusion  75 mL/hr IntraVENous CONTINUOUS  
 insulin lispro (HUMALOG) injection   SubCUTAneous Q6H  
 glucose chewable tablet 16 g  4 Tab Oral PRN  
 glucagon (GLUCAGEN) injection 1 mg  1 mg IntraMUSCular PRN  
 dextrose 10% infusion 0-250 mL  0-250 mL IntraVENous PRN  
  hydrALAZINE (APRESOLINE) 20 mg/mL injection 10 mg  10 mg IntraVENous Q6H PRN  
 balsam peru-castor oil (VENELEX) ointment   Topical BID  amLODIPine (NORVASC) tablet 5 mg  5 mg Oral DAILY  [Held by provider] atorvastatin (LIPITOR) tablet 20 mg  20 mg Oral DAILY  0.9% sodium chloride infusion 250 mL  250 mL IntraVENous PRN  
 collagenase (SANTYL) 250 unit/gram ointment   Topical DAILY  sodium chloride (NS) flush 5-40 mL  5-40 mL IntraVENous Q8H  
 sodium chloride (NS) flush 5-40 mL  5-40 mL IntraVENous PRN  
 ondansetron (ZOFRAN) injection 4 mg  4 mg IntraVENous Q6H PRN  
 acetaminophen (TYLENOL) tablet 650 mg  650 mg Per G Tube Q4H PRN  
 
 
 
 LAB AND IMAGING FINDINGS:  
 
Lab Results Component Value Date/Time WBC 11.4 (H) 02/26/2020 05:16 AM  
 HGB 7.6 (L) 02/26/2020 05:16 AM  
 PLATELET 950 47/51/7751 05:16 AM  
 
Lab Results Component Value Date/Time Sodium 135 (L) 02/25/2020 03:38 AM  
 Potassium 4.4 02/25/2020 03:38 AM  
 Chloride 106 02/25/2020 03:38 AM  
 CO2 23 02/25/2020 03:38 AM  
 BUN 16 02/25/2020 03:38 AM  
 Creatinine 1.07 02/25/2020 03:38 AM  
 Calcium 7.5 (L) 02/25/2020 03:38 AM  
 Magnesium 1.5 (L) 02/23/2020 04:40 AM  
 Phosphorus 2.4 (L) 02/23/2020 04:40 AM  
  
Lab Results Component Value Date/Time AST (SGOT) 77 (H) 02/22/2020 04:47 AM  
 Alk. phosphatase 653 (H) 02/22/2020 04:47 AM  
 Protein, total 5.6 (L) 02/22/2020 04:47 AM  
 Albumin 1.8 (L) 02/22/2020 04:47 AM  
 Globulin 3.8 02/22/2020 04:47 AM  
 
Lab Results Component Value Date/Time INR 1.2 (H) 02/22/2020 04:47 AM  
 Prothrombin time 12.2 (H) 02/22/2020 04:47 AM  
 aPTT 23.9 02/02/2019 03:56 PM  
  
No results found for: IRON, FE, TIBC, IBCT, PSAT, FERR No results found for: PH, PCO2, PO2 No components found for: [place] Lab Results Component Value Date/Time CK 1,654 (H) 02/13/2020 06:04 PM  
 CK - MB <0.5 (L) 04/17/2016 12:46 AM  
  
 
 
   
 
Total time:  25m Counseling / coordination time, spent as noted above: 20m 
> 50% counseling / coordination?: y 
 
Prolonged service was provided for  []30 min   []75 min in face to face time in the presence of the patient, spent as noted above. Time Start:  
Time End:  
Note: this can only be billed with 72845 (initial) or 96333 (follow up). If multiple start / stop times, list each separately.

## 2020-02-28 NOTE — PROGRESS NOTES
[address] [facility]  Hospitalist Group Date of Service:  2020 NAME:  Deborah Chacon :  1945 MRN:  [id number] Admission Summary:  
Patient was admitted to the ICU on 2020 from [facility]. · Acute metabolic encephalopathy · Septic shock · Acute respiratory failure requiring intubation · HHS/DKA 70-year-old gentleman with past medical history of hypertension, diabetes, CVA, dementia 
 who was found unresponsive at at [facility]. Blood gas was checked by EMS and route and read \"high\". In the emergency room on further work-up he was found to have severe DKA/HHS, acute kidney injury, severe lactic acidosis, hypothermia and a UTI. He was then intubated for airway protection, on admission. Work-up revealed DKA/HHS, he was started on insulin gtt and managed in the ICU. Work-up revealed pan sensitive E. coli in his urine, and blood. He was started on broad-spectrum antibiotics, and then narrowed to ceftriaxone. His mental status, has improved somewhat, however he is definitely not back to his baseline. Due to his mental status he has not been able to tolerate any p.o., and he has been on NG feeds. Hospital course is also been complicated by hypernatremia, for which he is on D5 and free water flushes. Interval history / Subjective: Follow up Severe sepsis, Ecoli bacteremia, Dysphagia, DKA/HHS Patient seen and examined by the bedside, Labs, images and notes reviewed Discussed with nursing staff, orders reviewed. Palliative following, family yet to decide goals of care. On NG TF On restraints because pulling NGT Called Son Yolanda Cao today, didn't reply and left voicemail Assessment & Plan:  
 
Severe sepsis, septic shock, with lactic acidosis, and acute hypoxic respiratory failure-secondary to E. coli bacteremia, UTI resolving -required vasopressors initially, weaned off 
-Continue ceftriaxone,  Needs a total 14 day course due to bacteremia. -Repeat blood culture, negative. Leukocytosis , improving Chest x-ray did not show any evidence of new aspiration On Rocephin DKA/HHSresolved (original BMP significant for glucose over 1000, CO2 less than 5), serum osmolality 415 Type 2 diabetes, A1c 10.6% Resolved now hypoglycemic 
- continue SSI/POC checks Hypoglycemia Episodes Stopped lantus, monitor with accuchecks Dysphagia-has not been able to work with speech due to his mental status 
-Currently getting  tube feeding via NGT 
-consult speech to continue to work with him - Nutrition to follow, continue tube feeding for now Palliative to see if family  would eventually want PEG Hypernatremia,  
resolved Acute kidney injury - 
on admission creatinine 5.9 has now down trended  
,resolved Acute hypoxic respiratory failure  
- intubated on admission 2/13, extubated 2/17 
- on room air Acute metabolic encephalopathy,baseline dementia 
-Head CT on admission negative. Thrombocytopenia chronic intermittent  
-platelet seem to be improving after jayson of 39(2/17) this admission 
-No bleeding complications Right hemiparesis noted as baseline on admission H&P  
-Head CT negative on admission  
-He was admitted early January 2020 and CTA H&N was questionable for occlusion of the distal left M3 branch Lactic acidosis POA - resolved HTN - home amlodipine HLD - home statin Elevated LFTS 
-Multifactorial:hypoperfusion/congestive+statins may be contributing with these risk factors 
-Hold  Statin Improving Access:Left subclavian central line. Placed,2/14 NGT,placed 2/13 Palliative care consult placed, given dementia, CVA, and degree of debility. Suspect he may need a PEG, and to re discuss code status with family.  
Family will get back to us regarding the decisions until that he is full code  
 
Code status: full code DVT prophylaxis: scd Care Plan discussed with: Nurse Anticipated Disposition: SNF/LTC Anticipated Discharge: Greater than 48 hours  
son Daria Wilson on the phone([phone]) Hospital Problems  Date Reviewed: 12/4/2018 Codes Class Noted POA DKA (diabetic ketoacidoses) ([facility] ICD-10-CM: E11.10 ICD-9-CM: 250.10  2/13/2020 Unknown Encephalopathy ICD-10-CM: G93.40 ICD-9-CM: 348.30  2/13/2020 Unknown JOSH (acute kidney injury) ([facility] ICD-10-CM: N17.9 ICD-9-CM: 584.9  1/21/2020 Unknown Review of Systems:  
Review of systems not obtained due to patient factors. Vital Signs:  
 Last 24hrs VS reviewed since prior progress note. Most recent are: 
Visit Vitals /68 (BP 1 Location: Right arm, BP Patient Position: At rest) Pulse (!) 107 Temp 99.1 °F (37.3 °C) Resp 18 Ht 5' 5\" (1.651 m) Wt 58.7 kg (129 lb 7.3 oz) SpO2 99% BMI 21.54 kg/m² Intake/Output Summary (Last 24 hours) at 2/28/2020 1141 Last data filed at 2/27/2020 2251 Gross per 24 hour Intake 3707 ml Output  Net 3707 ml Physical Examination:  
 
 
     
Constitutional: Lethargic, opens eyes spontaneously ENT: NGT in place. Resp: Symmetrical air entry. On oxygen via nasal canula CV:  Regular rhythm, normal rate, no murmurs, gallops, rubs GI:  Soft, non distended, non tender. normoactive bowel sounds, no hepatosplenomegaly Musculoskeletal:  SCDs in place,+1 edema legs Neurologic: Lethargic,non verbal,rigth hemiparesis. Moves the left side Data Review:  
 Review and/or order of clinical lab test 
Review and/or order of tests in the radiology section of CPT Review and/or order of tests in the medicine section of CPT Labs:  
 
Recent Labs  
  02/26/20 
9767 WBC 11.4* HGB 7.6* HCT 22.8*  
 Recent Labs  
  02/28/20 
0354 * K 4.2  CO2 25 BUN 14  
CREA 0.83 * CA 8.2* No results for input(s): SGOT, GPT, ALT, AP, TBIL, TBILI, TP, ALB, GLOB, GGT, AML, LPSE in the last 72 hours. No lab exists for component: AMYP, HLPSE No results for input(s): INR, PTP, APTT, INREXT, INREXT in the last 72 hours. No results for input(s): FE, TIBC, PSAT, FERR in the last 72 hours. No results found for: FOL, RBCF No results for input(s): PH, PCO2, PO2 in the last 72 hours. No results for input(s): CPK, CKNDX, TROIQ in the last 72 hours. No lab exists for component: CPKMB Lab Results Component Value Date/Time Cholesterol, total 190 01/02/2020 04:06 AM  
 HDL Cholesterol 64 01/02/2020 04:06 AM  
 LDL, calculated 115.4 (H) 01/02/2020 04:06 AM  
 Triglyceride 53 01/02/2020 04:06 AM  
 CHOL/HDL Ratio 3.0 01/02/2020 04:06 AM  
 
Lab Results Component Value Date/Time Glucose (POC) 151 (H) 02/28/2020 06:28 AM  
 Glucose (POC) 157 (H) 02/28/2020 12:30 AM  
 Glucose (POC) 105 (H) 02/27/2020 07:04 PM  
 Glucose (POC) 81 02/27/2020 05:28 PM  
 Glucose (POC) 42 (LL) 02/27/2020 05:01 PM  
 
Lab Results Component Value Date/Time Color YELLOW/STRAW 02/13/2020 01:07 PM  
 Appearance TURBID (A) 02/13/2020 01:07 PM  
 Specific gravity 1.020 02/13/2020 01:07 PM  
 pH (UA) 5.0 02/16/2020 08:33 AM  
 Protein 100 (A) 02/13/2020 01:07 PM  
 Glucose >1,000 (A) 02/13/2020 01:07 PM  
 Ketone NEGATIVE  02/13/2020 01:07 PM  
 Bilirubin NEGATIVE  02/13/2020 01:07 PM  
 Urobilinogen 0.2 02/13/2020 01:07 PM  
 Nitrites POSITIVE (A) 02/13/2020 01:07 PM  
 Leukocyte Esterase MODERATE (A) 02/13/2020 01:07 PM  
 Epithelial cells FEW 02/13/2020 01:07 PM  
 Bacteria 4+ (A) 02/13/2020 01:07 PM  
 WBC >100 (H) 02/13/2020 01:07 PM  
 RBC 5-10 02/13/2020 01:07 PM  
 
 
 
Medications Reviewed:  
 
Current Facility-Administered Medications Medication Dose Route Frequency  insulin glargine (LANTUS) injection 10 Units  10 Units SubCUTAneous DAILY  cefTRIAXone (ROCEPHIN) 2 g in 0.9% sodium chloride (MBP/ADV) 50 mL  2 g IntraVENous Q24H  
 alteplase (CATHFLO) 1 mg in sterile water (preservative free) 1 mL injection  1 mg InterCATHeter PRN  
 bacitracin 500 unit/gram packet 1 Packet  1 Packet Topical PRN  
 lactated Ringers infusion  75 mL/hr IntraVENous CONTINUOUS  
 insulin lispro (HUMALOG) injection   SubCUTAneous Q6H  
 glucose chewable tablet 16 g  4 Tab Oral PRN  
 glucagon (GLUCAGEN) injection 1 mg  1 mg IntraMUSCular PRN  
 dextrose 10% infusion 0-250 mL  0-250 mL IntraVENous PRN  
 hydrALAZINE (APRESOLINE) 20 mg/mL injection 10 mg  10 mg IntraVENous Q6H PRN  
 balsam peru-castor oil (VENELEX) ointment   Topical BID  amLODIPine (NORVASC) tablet 5 mg  5 mg Oral DAILY  [Held by provider] atorvastatin (LIPITOR) tablet 20 mg  20 mg Oral DAILY  0.9% sodium chloride infusion 250 mL  250 mL IntraVENous PRN  
 collagenase (SANTYL) 250 unit/gram ointment   Topical DAILY  sodium chloride (NS) flush 5-40 mL  5-40 mL IntraVENous Q8H  
 sodium chloride (NS) flush 5-40 mL  5-40 mL IntraVENous PRN  
 ondansetron (ZOFRAN) injection 4 mg  4 mg IntraVENous Q6H PRN  
 acetaminophen (TYLENOL) tablet 650 mg  650 mg Per G Tube Q4H PRN  
 
______________________________________________________________________ EXPECTED LENGTH OF STAY: 12d 9h 
ACTUAL LENGTH OF STAY:          15 
 
            
Jonatan Gustafson MD

## 2020-02-28 NOTE — DIABETES MGMT
[facility] Followup Progress Note Presentation Ike Ray is a 76 y.o. male admitted with HHS and consulted by Provider for advanced specialty nursing care related to inpatient diabetes management. Hyperglycemia management order set is in place. Subjective Mr Connor Boo remains in stable condition in the past 24 hours.  He remains on continuous tube feeds and received 14 units Lantus with 2 units correctional insulin in the past 24 hours. Had an episode of hypoglycemia at 4pm yesterday, treated with D10.  Palliative care team consulted and working with family to determine goals for medical treatment plans. Objective Physical exam 
 
General Waxes and wanes, lethargic today Vital Signs Visit Vitals /68 (BP 1 Location: Right arm, BP Patient Position: At rest) Pulse (!) 107 Temp 99.1 °F (37.3 °C) Resp 18 Ht 5' 5\" (1.651 m) Wt 58.7 kg (129 lb 7.3 oz) SpO2 99% BMI 21.54 kg/m² Skin  Warm and dry Heart   Regular rate and rhythm. No murmurs, rubs or gallops Lungs  Clear to auscultation without rales or rhonchi Extremities No foot wounds Laboratory BMP:  
Lab Results Component Value Date/Time  (L) 02/28/2020 03:54 AM  
 K 4.2 02/28/2020 03:54 AM  
  02/28/2020 03:54 AM  
 CO2 25 02/28/2020 03:54 AM  
 AGAP 4 (L) 02/28/2020 03:54 AM  
  (H) 02/28/2020 03:54 AM  
 BUN 14 02/28/2020 03:54 AM  
 CREA 0.83 02/28/2020 03:54 AM  
 GFRAA >60 02/28/2020 03:54 AM  
 GFRNA >60 02/28/2020 03:54 AM  
  
 
 
Blood glucose pattern Assessment and Plan Nursing Diagnosis Risk for unstable blood glucose pattern Nursing Intervention Domain 0322 Decision-making Support Nursing Interventions Examined current inpatient diabetes control Explored factors facilitating and impeding inpatient management Identified self-management practices impeding diabetes control Explored corrective strategies with patient and responsible inpatient provider Informed patient of rational for basal bolus insulin strategy while hospitalized Evaluation Mr Samuel Sanderson is a 76year old gentleman who was admitted for HHS, UTI septic shock with lactic acidosis and respiratory depression on 2/13.  HHS, lactic acidosis and respiratory depression is now resolved and he is under the management of the hospitalist team.  Continues enteral feeds with limited ability to participate in speech therapy. Franco Angelte his degree of debility and decreased mental status, Palliative Medicine consulted to assist in hospital management planning. 
  
He continues to have episodes of hypoglycemia on minimal Lantus dose despite continuation of tube feeds. Recommendations 1. Discontinue Lantus as he is at risk for hypoglycemia, even on low doses. 
  
2. Continue insulin sensative (BMI below 27) correctional insulin with Humalog ACHS to assist with hyperglycemia to maintain blood glucose to goal of 100-180. 
  
3. On discharge: Will discuss outpatient management plan with family and Palliative medicine.  Since he is at risk for hypoglycemia, would not recommend Lantus on discharge. The best option would be a conservative sliding scale insulin approach.  This would require a fingerstick for correcting hyperglycemia. Correctional Scale for Normal Sensitivity TID or per family wishes 
  
200-249: 2 units Humalog 250-299: 4 units Humalog 300-349: 6 units Humalog 350-399: 8 units Humalog Over 400: 10 units Humalog Billing Code(s)  
[address] Moni Jacob, [address] [address] [facility] 
[phone]

## 2020-02-28 NOTE — PROGRESS NOTES
NATALIIA: 
 
CM spoke with patient's son Giselle Chery) yesterday by phone. He gave facility choices ([facility] and [address] of [facility]). He does not want patient to return to [facility] [place]. Harpreet Gann Referrals sent via [employer] and CC link. CM continuing to follow. Neo Levin, BSW, CRM 
 
9:59a  CM received call from [address] [place] [address] (Ayanna Nicole). Facility is inquiring about Medicaid status. 12:13p  CM called the [employer] ([facility] REV line to determine if patient's medicaid had been approved. Status is still pending at this time. Barrier to discharge: 
 
Lack of a long term care payor source. Medicaid is pending. Facility require guarantee of a payor source prior to admission.

## 2020-02-28 NOTE — PROGRESS NOTES
Problem: Non-Violent Restraints Goal: *No harm/injury to patient while restraints in use Outcome: Progressing Towards Goal 
Goal: *Patient's dignity will be maintained Outcome: Progressing Towards Goal 
Goal: Non-violent Restaints:Standard Interventions Outcome: Progressing Towards Goal 
  
Problem: Pressure Injury - Risk of 
Goal: *Prevention of pressure injury Description Document Doni Scale and appropriate interventions in the flowsheet. Outcome: Progressing Towards Goal 
Note: Pressure Injury Interventions: 
Sensory Interventions: Assess changes in LOC, Assess need for specialty bed, Keep linens dry and wrinkle-free Moisture Interventions: Absorbent underpads, Maintain skin hydration (lotion/cream) Activity Interventions: Increase time out of bed, Pressure redistribution bed/mattress(bed type) Mobility Interventions: Float heels, Pressure redistribution bed/mattress (bed type), Turn and reposition approx. every two hours(pillow and wedges) Nutrition Interventions: Offer support with meals,snacks and hydration Friction and Shear Interventions: Apply protective barrier, creams and emollients, Feet elevated on foot rest 
 
  
 
 
 
  
Problem: Patient Education: Go to Patient Education Activity Goal: Patient/Family Education Outcome: Progressing Towards Goal

## 2020-02-28 NOTE — PROGRESS NOTES
NUTRITION brief Chart notes reviewed, family has still not made a decision regarding PEG placement (though they did say pt would not want to ever have a PEG placed). Pt continues on same dobhoff tube feeding regimen:  Glucerna 1.2 @ 50 mL/hr with 150 mL H2O q 4 hours. Pt's sodium has continued to drop; suspect he is still getting too much free fluid. RD will change water flushes to just 60 ml water every 6 hours while pt is on LR at 75 ml/hr. Once pt is off this volume of IV fluids, you can increase water flushes to 120 ml every 6 hours. RD continues to follow. Zahra Clark, [address] [place] [address], [address] [place] [address]

## 2020-02-28 NOTE — PROGRESS NOTES
Speech pathology note Patient received asleep in bed and unable to maintain alertness without constant stimulation. Patient with brief eye opening x2, however he then immediately closed his eyes again. Patient with copious drooling from left side of mouth which SLP wiped away. Patient remains inappropriate for PO intake secondary to poor alertness, mental status, and poor secretion management. Agree with continuing discussions with palliative regarding goals of care. Thank you. Jane Benjamin., CCC-SLP

## 2020-02-29 NOTE — PROGRESS NOTES
[facility] [facility] Adult  Hospitalist Group Date of Service:  2020 NAME:  Saul Napoles :  1945 MRN:  [id number] Admission Summary:  
Patient was admitted to the ICU on 2020 from [facility]. · Acute metabolic encephalopathy · Septic shock · Acute respiratory failure requiring intubation · HHS/DKA 17-year-old gentleman with past medical history of hypertension, diabetes, CVA, dementia 
 who was found unresponsive at at [facility] [place]. Blood gas was checked by EMS and route and read \"high\". In the emergency room on further work-up he was found to have severe DKA/HHS, acute kidney injury, severe lactic acidosis, hypothermia and a UTI. He was then intubated for airway protection, on admission. Work-up revealed DKA/HHS, he was started on insulin gtt and managed in the ICU. Work-up revealed pan sensitive E. coli in his urine, and blood. He was started on broad-spectrum antibiotics, and then narrowed to ceftriaxone. His mental status, has improved somewhat, however he is definitely not back to his baseline. Due to his mental status he has not been able to tolerate any p.o., and he has been on NG feeds. Hospital course is also been complicated by hypernatremia, for which he is on D5 and free water flushes. Interval history / Subjective: Follow up Severe sepsis, Ecoli bacteremia, Dysphagia, DKA/HHS Patient seen and examined by the bedside, Labs, images and notes reviewed Discussed with nursing staff, orders reviewed. Palliative following, family yet to decide goals of care. On NG TF On restraints because pulling NGT Will contact son again today Assessment & Plan:  
 
Severe sepsis, septic shock, with lactic acidosis, and acute hypoxic respiratory failure-secondary to E. coli bacteremia, UTI resolving 
-required vasopressors initially, weaned off -Continue ceftriaxone,  Pt is npo with NGT 
-Needs a total 14 day course due to bacteremia. -Repeat blood culture, negative. Leukocytosis likely reactive, still staying high On Rocephin Blood Cx NGTD 
 
 
 
DKA/HHSresolved (original BMP significant for glucose over 1000, CO2 less than 5), serum osmolality 415 Type 2 diabetes, A1c 10.6% Resolved, now hypoglycemic 
- continue SSI/POC checks Hypoglycemia Episodes Stopped lantus, monitor with accuchecks Dysphagia-has not been able to work with speech due to his mental status 
-Currently getting  tube feeding via NGT 
-consult speech to continue to work with him - Nutrition to follow, continue tube feeding for now Palliative to see if family  would eventually want PEG  
 called son twice, unable to reach, left voicemail Hypernatremia,  
resolved Acute kidney injury - 
on admission creatinine 5.9 has now down trended  
,resolved Acute hypoxic respiratory failure  
- intubated on admission 2/13, extubated 2/17 
- on room air Acute metabolic encephalopathy,baseline dementia 
-Head CT on admission negative. Thrombocytopenia chronic intermittent  
-stable Right hemiparesis noted as baseline on admission H&P  
-Head CT negative on admission  
-He was admitted early January 2020 and CTA H&N was questionable for occlusion of the distal left M3 branch Lactic acidosis POA - resolved HTN - home amlodipine HLD - home statin Elevated LFTS 
-Multifactorial:hypoperfusion/congestive+statins may be contributing with these risk factors 
-Hold  Statin 
-check USG Liver Access:Left subclavian central line. Placed,2/14 NGT,placed 2/13 Palliative care consult placed, given dementia, CVA, and degree of debility. Suspect he may need a PEG, and to re discuss code status with family. Family will get back to us regarding the decisions until that he is full code Code status: full code DVT prophylaxis: scd Care Plan discussed with: Nurse Anticipated Disposition: SNF/LTC Anticipated Discharge: Greater than 48 hours  
son Daria Wilson on the phone([phone]) Hospital Problems  Date Reviewed: 12/4/2018 Codes Class Noted POA DKA (diabetic ketoacidoses) ([facility] ICD-10-CM: E11.10 ICD-9-CM: 250.10  2/13/2020 Unknown Encephalopathy ICD-10-CM: G93.40 ICD-9-CM: 348.30  2/13/2020 Unknown JOSH (acute kidney injury) ([facility] ICD-10-CM: N17.9 ICD-9-CM: 584.9  1/21/2020 Unknown Review of Systems:  
Review of systems not obtained due to patient factors. Vital Signs:  
 Last 24hrs VS reviewed since prior progress note. Most recent are: 
Visit Vitals /81 (BP 1 Location: Right arm, BP Patient Position: At rest) Pulse 97 Temp 99.4 °F (37.4 °C) Resp 17 Ht 5' 5\" (1.651 m) Wt 57.7 kg (127 lb 3.3 oz) SpO2 96% BMI 21.17 kg/m² Intake/Output Summary (Last 24 hours) at 2/29/2020 1230 Last data filed at 2/28/2020 1919 Gross per 24 hour Intake 1780 ml Output  Net 1780 ml Physical Examination:  
 
 
     
Constitutional: Lethargic, opens eyes spontaneously ENT: NGT in place. Resp: Symmetrical air entry. On oxygen via nasal canula CV:  Regular rhythm, normal rate, no murmurs, gallops, rubs GI:  Soft, non distended, non tender. normoactive bowel sounds, no hepatosplenomegaly Musculoskeletal:  SCDs in place,+1 edema legs Neurologic: Lethargic,non verbal,rigth hemiparesis. Moves the left side Data Review:  
 Review and/or order of clinical lab test 
Review and/or order of tests in the radiology section of CPT Review and/or order of tests in the medicine section of CPT Labs:  
 
Recent Labs  
  02/29/20 
0155 WBC 11.8* HGB 7.3* HCT 22.3*  
 Recent Labs  
  02/29/20 0155 02/28/20 
0354  134* K 4.4 4.2  105 CO2 26 25 BUN 14 14 CREA 0.88 0.83 * 121* CA 7.8* 8.2*  
 
 Recent Labs  
  02/29/20 
0155 SGOT 83* * * TBILI 0.3 TP 6.3* ALB 1.7*  
GLOB 4.6* No results for input(s): INR, PTP, APTT, INREXT, INREXT in the last 72 hours. No results for input(s): FE, TIBC, PSAT, FERR in the last 72 hours. No results found for: FOL, RBCF No results for input(s): PH, PCO2, PO2 in the last 72 hours. No results for input(s): CPK, CKNDX, TROIQ in the last 72 hours. No lab exists for component: CPKMB Lab Results Component Value Date/Time Cholesterol, total 190 01/02/2020 04:06 AM  
 HDL Cholesterol 64 01/02/2020 04:06 AM  
 LDL, calculated 115.4 (H) 01/02/2020 04:06 AM  
 Triglyceride 53 01/02/2020 04:06 AM  
 CHOL/HDL Ratio 3.0 01/02/2020 04:06 AM  
 
Lab Results Component Value Date/Time Glucose (POC) 214 (H) 02/29/2020 11:23 AM  
 Glucose (POC) 252 (H) 02/29/2020 06:00 AM  
 Glucose (POC) 201 (H) 02/28/2020 04:25 PM  
 Glucose (POC) 217 (H) 02/28/2020 12:05 PM  
 Glucose (POC) 151 (H) 02/28/2020 06:28 AM  
 
Lab Results Component Value Date/Time Color YELLOW/STRAW 02/13/2020 01:07 PM  
 Appearance TURBID (A) 02/13/2020 01:07 PM  
 Specific gravity 1.020 02/13/2020 01:07 PM  
 pH (UA) 5.0 02/16/2020 08:33 AM  
 Protein 100 (A) 02/13/2020 01:07 PM  
 Glucose >1,000 (A) 02/13/2020 01:07 PM  
 Ketone NEGATIVE  02/13/2020 01:07 PM  
 Bilirubin NEGATIVE  02/13/2020 01:07 PM  
 Urobilinogen 0.2 02/13/2020 01:07 PM  
 Nitrites POSITIVE (A) 02/13/2020 01:07 PM  
 Leukocyte Esterase MODERATE (A) 02/13/2020 01:07 PM  
 Epithelial cells FEW 02/13/2020 01:07 PM  
 Bacteria 4+ (A) 02/13/2020 01:07 PM  
 WBC >100 (H) 02/13/2020 01:07 PM  
 RBC 5-10 02/13/2020 01:07 PM  
 
 
 
Medications Reviewed:  
 
Current Facility-Administered Medications Medication Dose Route Frequency  cefTRIAXone (ROCEPHIN) 2 g in 0.9% sodium chloride (MBP/ADV) 50 mL  2 g IntraVENous Q24H  
 alteplase (CATHFLO) 1 mg in sterile water (preservative free) 1 mL injection  1 mg InterCATHeter PRN  
 bacitracin 500 unit/gram packet 1 Packet  1 Packet Topical PRN  
 lactated Ringers infusion  75 mL/hr IntraVENous CONTINUOUS  
 insulin lispro (HUMALOG) injection   SubCUTAneous Q6H  
 glucose chewable tablet 16 g  4 Tab Oral PRN  
 glucagon (GLUCAGEN) injection 1 mg  1 mg IntraMUSCular PRN  
 dextrose 10% infusion 0-250 mL  0-250 mL IntraVENous PRN  
 hydrALAZINE (APRESOLINE) 20 mg/mL injection 10 mg  10 mg IntraVENous Q6H PRN  
 balsam peru-castor oil (VENELEX) ointment   Topical BID  amLODIPine (NORVASC) tablet 5 mg  5 mg Oral DAILY  [Held by provider] atorvastatin (LIPITOR) tablet 20 mg  20 mg Oral DAILY  0.9% sodium chloride infusion 250 mL  250 mL IntraVENous PRN  
 collagenase (SANTYL) 250 unit/gram ointment   Topical DAILY  sodium chloride (NS) flush 5-40 mL  5-40 mL IntraVENous Q8H  
 sodium chloride (NS) flush 5-40 mL  5-40 mL IntraVENous PRN  
 ondansetron (ZOFRAN) injection 4 mg  4 mg IntraVENous Q6H PRN  
 acetaminophen (TYLENOL) tablet 650 mg  650 mg Per G Tube Q4H PRN  
 
______________________________________________________________________ EXPECTED LENGTH OF STAY: 12d 9h 
ACTUAL LENGTH OF STAY:          16 Roman Barreto MD

## 2020-02-29 NOTE — PROGRESS NOTES
Problem: Non-Violent Restraints Goal: *Removal from restraints as soon as assessed to be safe Outcome: Progressing Towards Goal 
Goal: *No harm/injury to patient while restraints in use Outcome: Progressing Towards Goal 
Goal: *Patient's dignity will be maintained Outcome: Progressing Towards Goal 
Goal: Non-violent Restaints:Standard Interventions Outcome: Progressing Towards Goal 
Goal: Non-violent Restraints:Patient Interventions Outcome: Progressing Towards Goal 
Goal: Patient/Family Education Outcome: Progressing Towards Goal 
  
Problem: Pressure Injury - Risk of 
Goal: *Prevention of pressure injury Description Document Doni Scale and appropriate interventions in the flowsheet. Outcome: Progressing Towards Goal 
Note: Pressure Injury Interventions: 
Sensory Interventions: Assess changes in LOC Moisture Interventions: Absorbent underpads, Apply protective barrier, creams and emollients Activity Interventions: Increase time out of bed Mobility Interventions: Float heels Nutrition Interventions: Document food/fluid/supplement intake Friction and Shear Interventions: Apply protective barrier, creams and emollients, Feet elevated on foot rest, Lift sheet Problem: Patient Education: Go to Patient Education Activity Goal: Patient/Family Education Outcome: Progressing Towards Goal 
  
Problem: Falls - Risk of 
Goal: *Absence of Falls Description Document Carolyne Munguia Fall Risk and appropriate interventions in the flowsheet. Outcome: Progressing Towards Goal 
Note: Fall Risk Interventions: 
Mobility Interventions: Bed/chair exit alarm Mentation Interventions: Adequate sleep, hydration, pain control, Door open when patient unattended Medication Interventions: Evaluate medications/consider consulting pharmacy Elimination Interventions: Call light in reach History of Falls Interventions: Door open when patient unattended, Bed/chair exit alarm Problem: Patient Education: Go to Patient Education Activity Goal: Patient/Family Education Outcome: Progressing Towards Goal 
  
Problem: Impaired Skin Integrity/Pressure Injury Treatment Goal: *Improvement of Existing Pressure Injury Outcome: Progressing Towards Goal 
Goal: *Prevention of pressure injury Description Document Doni Scale and appropriate interventions in the flowsheet. Outcome: Progressing Towards Goal 
Note: Pressure Injury Interventions: 
Sensory Interventions: Assess changes in LOC Moisture Interventions: Absorbent underpads, Apply protective barrier, creams and emollients Activity Interventions: Increase time out of bed Mobility Interventions: Float heels Nutrition Interventions: Document food/fluid/supplement intake Friction and Shear Interventions: Apply protective barrier, creams and emollients, Feet elevated on foot rest, Lift sheet Problem: Patient Education: Go to Patient Education Activity Goal: Patient/Family Education Outcome: Progressing Towards Goal

## 2020-03-01 NOTE — PROGRESS NOTES
Problem: Non-Violent Restraints Goal: *Removal from restraints as soon as assessed to be safe Outcome: Progressing Towards Goal 
Goal: *No harm/injury to patient while restraints in use Outcome: Progressing Towards Goal 
Goal: *Patient's dignity will be maintained Outcome: Progressing Towards Goal 
Goal: *Patient Specific Goal (EDIT GOAL, INSERT TEXT) Outcome: Progressing Towards Goal 
Goal: Non-violent Restaints:Standard Interventions Outcome: Progressing Towards Goal 
Goal: Non-violent Restraints:Patient Interventions Outcome: Progressing Towards Goal 
Goal: Patient/Family Education Outcome: Progressing Towards Goal 
  
Problem: Pressure Injury - Risk of 
Goal: *Prevention of pressure injury Description Document Doni Scale and appropriate interventions in the flowsheet. Outcome: Progressing Towards Goal 
Note: Pressure Injury Interventions: 
Sensory Interventions: Assess changes in LOC Moisture Interventions: Absorbent underpads, Apply protective barrier, creams and emollients Activity Interventions: Increase time out of bed Mobility Interventions: Float heels Nutrition Interventions: Document food/fluid/supplement intake Friction and Shear Interventions: Apply protective barrier, creams and emollients Problem: Patient Education: Go to Patient Education Activity Goal: Patient/Family Education Outcome: Progressing Towards Goal 
  
Problem: Falls - Risk of 
Goal: *Absence of Falls Description Document Clide Failing Fall Risk and appropriate interventions in the flowsheet. Outcome: Progressing Towards Goal 
Note: Fall Risk Interventions: 
Mobility Interventions: Bed/chair exit alarm Mentation Interventions: Adequate sleep, hydration, pain control Medication Interventions: Evaluate medications/consider consulting pharmacy Elimination Interventions: Call light in reach History of Falls Interventions: Door open when patient unattended Problem: Patient Education: Go to Patient Education Activity Goal: Patient/Family Education Outcome: Progressing Towards Goal 
  
Problem: Impaired Skin Integrity/Pressure Injury Treatment Goal: *Improvement of Existing Pressure Injury Outcome: Progressing Towards Goal 
Goal: *Prevention of pressure injury Description Document Doni Scale and appropriate interventions in the flowsheet. Outcome: Progressing Towards Goal 
Note: Pressure Injury Interventions: 
Sensory Interventions: Assess changes in LOC Moisture Interventions: Absorbent underpads, Apply protective barrier, creams and emollients Activity Interventions: Increase time out of bed Mobility Interventions: Float heels Nutrition Interventions: Document food/fluid/supplement intake Friction and Shear Interventions: Apply protective barrier, creams and emollients Problem: Patient Education: Go to Patient Education Activity Goal: Patient/Family Education Outcome: Progressing Towards Goal

## 2020-03-01 NOTE — PROGRESS NOTES
[facility] [facility] Adult  Hospitalist Group Date of Service:  3/1/2020 NAME:  Susan Agustin :  1945 MRN:  [id number] Admission Summary:  
Patient was admitted to the ICU on 2020 from [facility]. · Acute metabolic encephalopathy · Septic shock · Acute respiratory failure requiring intubation · HHS/DKA 35-year-old gentleman with past medical history of hypertension, diabetes, CVA, dementia 
 who was found unresponsive at at [facility]. Blood gas was checked by EMS and route and read \"high\". In the emergency room on further work-up he was found to have severe DKA/HHS, acute kidney injury, severe lactic acidosis, hypothermia and a UTI. He was then intubated for airway protection, on admission. Work-up revealed DKA/HHS, he was started on insulin gtt and managed in the ICU. Work-up revealed pan sensitive E. coli in his urine, and blood. He was started on broad-spectrum antibiotics, and then narrowed to ceftriaxone. His mental status, has improved somewhat, however he is definitely not back to his baseline. Due to his mental status he has not been able to tolerate any p.o., and he has been on NG feeds. Hospital course is also been complicated by hypernatremia, for which he is on D5 and free water flushes. Interval history / Subjective: Follow up Severe sepsis, Ecoli bacteremia, Dysphagia, DKA/HHS Patient seen and examined by the bedside, Labs, images and notes reviewed Discussed with nursing staff, orders reviewed. Palliative following, family yet to decide goals of care. On NG TF On restraints because pulling NGT Could not reach both sons yesterday again, left voicemail, they did not contact nurse back Assessment & Plan:  
 
Severe sepsis, septic shock, with lactic acidosis, and acute hypoxic respiratory failure-secondary to E. coli bacteremia, UTI resolving -required vasopressors initially, weaned off 
-Continue ceftriaxone,  Pt is npo with NGT 
-Needs a total 14 day course due to bacteremia. -Repeat blood culture, negative. Leukocytosis likely reactive, still staying high On Rocephin Blood Cx NGTD 
 
 
 
DKA/HHSresolved (original BMP significant for glucose over 1000, CO2 less than 5), serum osmolality 415 Type 2 diabetes, A1c 10.6% Resolved, now hypoglycemic 
- continue SSI/POC checks Hypoglycemia Episodes Stopped lantus, monitor with accuchecks Dysphagia-has not been able to work with speech due to his mental status 
-Currently getting  tube feeding via NGT 
-consult speech to continue to work with him - Nutrition to follow, continue tube feeding for now Palliative to see if family  would eventually want PEG  
 called son twice, unable to reach, left voicemail Hypernatremia,  
resolved Acute kidney injury - 
on admission creatinine 5.9 has now down trended  
,resolved Acute hypoxic respiratory failure  
- intubated on admission 2/13, extubated 2/17 
- on room air Acute metabolic encephalopathy,baseline dementia 
-Head CT on admission negative. Thrombocytopenia chronic intermittent  
-stable Right hemiparesis noted as baseline on admission H&P  
-Head CT negative on admission  
-He was admitted early January 2020 and CTA H&N was questionable for occlusion of the distal left M3 branch Lactic acidosis POA - resolved HTN - home amlodipine HLD - home statin Elevated LFTS 
-Multifactorial:hypoperfusion/congestive+statins may be contributing with these risk factors 
-Hold  Statin 
-check USG Liver Access:Left subclavian central line. Placed,2/14 NGT,placed 2/13 Palliative care consult placed, given dementia, CVA, and degree of debility. Suspect he may need a PEG, and to re discuss code status with family. Family will get back to us regarding the decisions until that he is full code Code status: full code DVT prophylaxis: scd Care Plan discussed with: Nurse Anticipated Disposition: SNF/LTC Anticipated Discharge: Greater than 48 hours  
son Daria Wislon on the phone([phone]) Hospital Problems  Date Reviewed: 12/4/2018 Codes Class Noted POA DKA (diabetic ketoacidoses) ([facility] ICD-10-CM: E11.10 ICD-9-CM: 250.10  2/13/2020 Unknown Encephalopathy ICD-10-CM: G93.40 ICD-9-CM: 348.30  2/13/2020 Unknown JOSH (acute kidney injury) ([facility] ICD-10-CM: N17.9 ICD-9-CM: 584.9  1/21/2020 Unknown Review of Systems:  
Review of systems not obtained due to patient factors. Vital Signs:  
 Last 24hrs VS reviewed since prior progress note. Most recent are: 
Visit Vitals /79 (BP 1 Location: Right arm, BP Patient Position: At rest) Pulse 99 Temp 98.3 °F (36.8 °C) Resp 18 Ht 5' 5\" (1.651 m) Wt 56.9 kg (125 lb 7.1 oz) SpO2 99% BMI 20.87 kg/m² Intake/Output Summary (Last 24 hours) at 3/1/2020 1233 Last data filed at 3/1/2020 4753 Gross per 24 hour Intake 1180 ml Output  Net 1180 ml Physical Examination:  
 
 
     
Constitutional: Lethargic, opens eyes spontaneously ENT: NGT in place. Resp: Symmetrical air entry. On oxygen via nasal canula CV:  Regular rhythm, normal rate, no murmurs, gallops, rubs GI:  Soft, non distended, non tender. normoactive bowel sounds, no hepatosplenomegaly Musculoskeletal:  SCDs in place,+1 edema legs Neurologic: Lethargic,non verbal,rigth hemiparesis. Moves the left side Data Review:  
 Review and/or order of clinical lab test 
Review and/or order of tests in the radiology section of CPT Review and/or order of tests in the medicine section of CPT Labs:  
 
Recent Labs  
  02/29/20 
0155 WBC 11.8* HGB 7.3* HCT 22.3*  
 Recent Labs  
  02/29/20 0155 02/28/20 
0354  134* K 4.4 4.2  105 CO2 26 25 BUN 14 14 CREA 0.88 0.83 * 121* CA 7.8* 8.2* Recent Labs  
  02/29/20 
0155 SGOT 83* * * TBILI 0.3 TP 6.3* ALB 1.7*  
GLOB 4.6* No results for input(s): INR, PTP, APTT, INREXT, INREXT in the last 72 hours. No results for input(s): FE, TIBC, PSAT, FERR in the last 72 hours. No results found for: FOL, RBCF No results for input(s): PH, PCO2, PO2 in the last 72 hours. No results for input(s): CPK, CKNDX, TROIQ in the last 72 hours. No lab exists for component: CPKMB Lab Results Component Value Date/Time Cholesterol, total 190 01/02/2020 04:06 AM  
 HDL Cholesterol 64 01/02/2020 04:06 AM  
 LDL, calculated 115.4 (H) 01/02/2020 04:06 AM  
 Triglyceride 53 01/02/2020 04:06 AM  
 CHOL/HDL Ratio 3.0 01/02/2020 04:06 AM  
 
Lab Results Component Value Date/Time Glucose (POC) 241 (H) 03/01/2020 11:11 AM  
 Glucose (POC) 226 (H) 03/01/2020 06:12 AM  
 Glucose (POC) 238 (H) 03/01/2020 12:07 AM  
 Glucose (POC) 247 (H) 02/29/2020 04:23 PM  
 Glucose (POC) 214 (H) 02/29/2020 11:23 AM  
 
Lab Results Component Value Date/Time Color YELLOW/STRAW 02/13/2020 01:07 PM  
 Appearance TURBID (A) 02/13/2020 01:07 PM  
 Specific gravity 1.020 02/13/2020 01:07 PM  
 pH (UA) 5.0 02/16/2020 08:33 AM  
 Protein 100 (A) 02/13/2020 01:07 PM  
 Glucose >1,000 (A) 02/13/2020 01:07 PM  
 Ketone NEGATIVE  02/13/2020 01:07 PM  
 Bilirubin NEGATIVE  02/13/2020 01:07 PM  
 Urobilinogen 0.2 02/13/2020 01:07 PM  
 Nitrites POSITIVE (A) 02/13/2020 01:07 PM  
 Leukocyte Esterase MODERATE (A) 02/13/2020 01:07 PM  
 Epithelial cells FEW 02/13/2020 01:07 PM  
 Bacteria 4+ (A) 02/13/2020 01:07 PM  
 WBC >100 (H) 02/13/2020 01:07 PM  
 RBC 5-10 02/13/2020 01:07 PM  
 
 
 
Medications Reviewed:  
 
Current Facility-Administered Medications Medication Dose Route Frequency  cefTRIAXone (ROCEPHIN) 2 g in 0.9% sodium chloride (MBP/ADV) 50 mL  2 g IntraVENous Q24H  alteplase (CATHFLO) 1 mg in sterile water (preservative free) 1 mL injection  1 mg InterCATHeter PRN  
 bacitracin 500 unit/gram packet 1 Packet  1 Packet Topical PRN  
 lactated Ringers infusion  75 mL/hr IntraVENous CONTINUOUS  
 insulin lispro (HUMALOG) injection   SubCUTAneous Q6H  
 glucose chewable tablet 16 g  4 Tab Oral PRN  
 glucagon (GLUCAGEN) injection 1 mg  1 mg IntraMUSCular PRN  
 dextrose 10% infusion 0-250 mL  0-250 mL IntraVENous PRN  
 hydrALAZINE (APRESOLINE) 20 mg/mL injection 10 mg  10 mg IntraVENous Q6H PRN  
 balsam peru-castor oil (VENELEX) ointment   Topical BID  amLODIPine (NORVASC) tablet 5 mg  5 mg Oral DAILY  0.9% sodium chloride infusion 250 mL  250 mL IntraVENous PRN  
 collagenase (SANTYL) 250 unit/gram ointment   Topical DAILY  sodium chloride (NS) flush 5-40 mL  5-40 mL IntraVENous Q8H  
 sodium chloride (NS) flush 5-40 mL  5-40 mL IntraVENous PRN  
 ondansetron (ZOFRAN) injection 4 mg  4 mg IntraVENous Q6H PRN  
 acetaminophen (TYLENOL) tablet 650 mg  650 mg Per G Tube Q4H PRN  
 
______________________________________________________________________ EXPECTED LENGTH OF STAY: 12d 9h 
ACTUAL LENGTH OF STAY:          Radha Stover MD

## 2020-03-02 NOTE — PROGRESS NOTES
[address] [facility] Adult  Hospitalist Group Date of Service:  3/2/2020 NAME:  Katalina Yuan :  1945 MRN:  [id number] Admission Summary:  
Patient was admitted to the ICU on 2020 from [facility]. · Acute metabolic encephalopathy · Septic shock · Acute respiratory failure requiring intubation · HHS/DKA 59-year-old gentleman with past medical history of hypertension, diabetes, CVA, dementia 
 who was found unresponsive at at [facility]. Blood gas was checked by EMS and route and read \"high\". In the emergency room on further work-up he was found to have severe DKA/HHS, acute kidney injury, severe lactic acidosis, hypothermia and a UTI. He was then intubated for airway protection, on admission. Work-up revealed DKA/HHS, he was started on insulin gtt and managed in the ICU. Work-up revealed pan sensitive E. coli in his urine, and blood. He was started on broad-spectrum antibiotics, and then narrowed to ceftriaxone. His mental status, has improved somewhat, however he is definitely not back to his baseline. Due to his mental status he has not been able to tolerate any p.o., and he has been on NG feeds. Hospital course is also been complicated by hypernatremia, for which he is on D5 and free water flushes. Interval history / Subjective: Follow up Severe sepsis, Ecoli bacteremia, Dysphagia, DKA/HHS Patient seen and examined by the bedside, Labs, images and notes reviewed Discussed with nursing staff, orders reviewed. Discussed with Pt' s son Tosin Pearson who is POA, on phone, about pt current health condition, Son agrees to proceed with DNR but wants wait till tomorrow to withdraw the feeding tube, also wants to go for Hospice  
, I have consulted hospice and have told Tosin Pearson that someone will call him from hospice.  
Pt continues to be NGT and on restraints because pulling off NGT 
 
 Assessment & Plan:  
 
Severe sepsis, septic shock, with lactic acidosis, and acute hypoxic respiratory failure-secondary to E. coli bacteremia, UTI resolving 
-required vasopressors initially, weaned off 
-Continue ceftriaxone,  Pt is npo with NGT 
-Needs a total 14 day course due to bacteremia. -Repeat blood culture, negative. Leukocytosis likely reactive, still staying high On Rocephin Blood Cx NGTD 
 
 
 
DKA/HHSresolved (original BMP significant for glucose over 1000, CO2 less than 5), serum osmolality 415 Type 2 diabetes, A1c 10.6% Resolved, now hypoglycemic 
- continue SSI/POC checks Hypoglycemia Episodes Stopped lantus, monitor with accuchecks Dysphagia-has not been able to work with speech due to his mental status 
-Currently getting  tube feeding via NGT 
-consult speech to continue to work with him - Nutrition to follow, continue tube feeding for now Palliative to see if family  would eventually want PEG  
 called son twice, unable to reach, left voicemail Hypernatremia,  
resolved Acute kidney injury - 
on admission creatinine 5.9 has now down trended  
,resolved Acute hypoxic respiratory failure  
- intubated on admission 2/13, extubated 2/17 
- on room air Acute metabolic encephalopathy,baseline dementia 
-Head CT on admission negative. Thrombocytopenia chronic intermittent  
-stable Right hemiparesis noted as baseline on admission H&P  
-Head CT negative on admission  
-He was admitted early January 2020 and CTA H&N was questionable for occlusion of the distal left M3 branch Lactic acidosis POA - resolved HTN - home amlodipine HLD - home statin Elevated LFTS 
-Multifactorial:hypoperfusion/congestive+statins may be contributing with these risk factors 
-Hold  Statin 
-check USG Liver Access:Left subclavian central line. Placed,2/14 NGT,placed 2/13 . 
3/2/20 Discussed with Pt' s son Kaylan Álvarez who is POA, on phone, about pt current health condition, discussed GOC, Son agrees to proceed with DNR but wants to wait till tomorrow to withdraw the feeding tube, also wants to go for Hospice  
, I have consulted hospice and have told Yonis Griffin that someone will call him from hospice. Code status: full code DVT prophylaxis: scd Care Plan discussed with: Nurse Anticipated Disposition: soon will be comfort care, hospice consulted Anticipated Discharge: Greater than 48 hours  
irais Hartley on the phone([phone]) Hospital Problems  Date Reviewed: 12/4/2018 Codes Class Noted POA DKA (diabetic ketoacidoses) ([facility] ICD-10-CM: E11.10 ICD-9-CM: 250.10  2/13/2020 Unknown Encephalopathy ICD-10-CM: G93.40 ICD-9-CM: 348.30  2/13/2020 Unknown JOSH (acute kidney injury) ([facility] ICD-10-CM: N17.9 ICD-9-CM: 584.9  1/21/2020 Unknown Review of Systems:  
Review of systems not obtained due to patient factors. Vital Signs:  
 Last 24hrs VS reviewed since prior progress note. Most recent are: 
Visit Vitals /85 (BP 1 Location: Right arm, BP Patient Position: At rest) Pulse (!) 112 Temp 98.9 °F (37.2 °C) Resp 22 Ht 5' 5\" (1.651 m) Wt 60 kg (132 lb 3.2 oz) SpO2 95% BMI 22.00 kg/m² No intake or output data in the 24 hours ending 03/02/20 1109 Physical Examination:  
 
 
     
Constitutional: Lethargic, opens eyes spontaneously ENT: NGT in place. Resp: Symmetrical air entry. On oxygen via nasal canula CV:  Regular rhythm, normal rate, no murmurs, gallops, rubs GI:  Soft, non distended, non tender. normoactive bowel sounds, no hepatosplenomegaly Musculoskeletal:  SCDs in place,+1 edema legs Neurologic: Lethargic,non verbal,rigth hemiparesis. Moves the left side Data Review:  
 Review and/or order of clinical lab test 
Review and/or order of tests in the radiology section of CPT Review and/or order of tests in the medicine section of CPT Labs: Recent Labs  
  02/29/20 0155 WBC 11.8* HGB 7.3* HCT 22.3*  
 Recent Labs  
  02/29/20 0155   
K 4.4  
 CO2 26 BUN 14  
CREA 0.88 * CA 7.8* Recent Labs  
  02/29/20 0155 SGOT 83* * * TBILI 0.3 TP 6.3* ALB 1.7*  
GLOB 4.6* No results for input(s): INR, PTP, APTT, INREXT, INREXT in the last 72 hours. No results for input(s): FE, TIBC, PSAT, FERR in the last 72 hours. No results found for: FOL, RBCF No results for input(s): PH, PCO2, PO2 in the last 72 hours. No results for input(s): CPK, CKNDX, TROIQ in the last 72 hours. No lab exists for component: CPKMB Lab Results Component Value Date/Time Cholesterol, total 190 01/02/2020 04:06 AM  
 HDL Cholesterol 64 01/02/2020 04:06 AM  
 LDL, calculated 115.4 (H) 01/02/2020 04:06 AM  
 Triglyceride 53 01/02/2020 04:06 AM  
 CHOL/HDL Ratio 3.0 01/02/2020 04:06 AM  
 
Lab Results Component Value Date/Time Glucose (POC) 257 (H) 03/02/2020 06:09 AM  
 Glucose (POC) 291 (H) 03/02/2020 12:18 AM  
 Glucose (POC) 266 (H) 03/01/2020 04:24 PM  
 Glucose (POC) 241 (H) 03/01/2020 11:11 AM  
 Glucose (POC) 226 (H) 03/01/2020 06:12 AM  
 
Lab Results Component Value Date/Time Color YELLOW/STRAW 02/13/2020 01:07 PM  
 Appearance TURBID (A) 02/13/2020 01:07 PM  
 Specific gravity 1.020 02/13/2020 01:07 PM  
 pH (UA) 5.0 02/16/2020 08:33 AM  
 Protein 100 (A) 02/13/2020 01:07 PM  
 Glucose >1,000 (A) 02/13/2020 01:07 PM  
 Ketone NEGATIVE  02/13/2020 01:07 PM  
 Bilirubin NEGATIVE  02/13/2020 01:07 PM  
 Urobilinogen 0.2 02/13/2020 01:07 PM  
 Nitrites POSITIVE (A) 02/13/2020 01:07 PM  
 Leukocyte Esterase MODERATE (A) 02/13/2020 01:07 PM  
 Epithelial cells FEW 02/13/2020 01:07 PM  
 Bacteria 4+ (A) 02/13/2020 01:07 PM  
 WBC >100 (H) 02/13/2020 01:07 PM  
 RBC 5-10 02/13/2020 01:07 PM  
 
 
 
Medications Reviewed:  
 
Current Facility-Administered Medications Medication Dose Route Frequency  cefTRIAXone (ROCEPHIN) 2 g in 0.9% sodium chloride (MBP/ADV) 50 mL  2 g IntraVENous Q24H  
 alteplase (CATHFLO) 1 mg in sterile water (preservative free) 1 mL injection  1 mg InterCATHeter PRN  
 bacitracin 500 unit/gram packet 1 Packet  1 Packet Topical PRN  
 lactated Ringers infusion  75 mL/hr IntraVENous CONTINUOUS  
 insulin lispro (HUMALOG) injection   SubCUTAneous Q6H  
 glucose chewable tablet 16 g  4 Tab Oral PRN  
 glucagon (GLUCAGEN) injection 1 mg  1 mg IntraMUSCular PRN  
 dextrose 10% infusion 0-250 mL  0-250 mL IntraVENous PRN  
 hydrALAZINE (APRESOLINE) 20 mg/mL injection 10 mg  10 mg IntraVENous Q6H PRN  
 balsam peru-castor oil (VENELEX) ointment   Topical BID  amLODIPine (NORVASC) tablet 5 mg  5 mg Oral DAILY  0.9% sodium chloride infusion 250 mL  250 mL IntraVENous PRN  
 collagenase (SANTYL) 250 unit/gram ointment   Topical DAILY  sodium chloride (NS) flush 5-40 mL  5-40 mL IntraVENous Q8H  
 sodium chloride (NS) flush 5-40 mL  5-40 mL IntraVENous PRN  
 ondansetron (ZOFRAN) injection 4 mg  4 mg IntraVENous Q6H PRN  
 acetaminophen (TYLENOL) tablet 650 mg  650 mg Per G Tube Q4H PRN  
 
______________________________________________________________________ EXPECTED LENGTH OF STAY: 12d 9h 
ACTUAL LENGTH OF STAY:          18 
 
            
Fracisco Dumont MD

## 2020-03-02 NOTE — PROGRESS NOTES
Speech pathology note Reviewed chart and note per MD, son agrees with DNR with plan to pull feeding tube tomorrow and pursue hospice/comfort measures. SLP will sign off at this time, as patient has either been too lethargic or too confused for safe PO intake and goals of care have changed. Thank you. Pippa Santiago., CCC-SLP

## 2020-03-02 NOTE — DIABETES MGMT
[facility] Followup Progress Note Presentation Deborah Chacon is a 76 y.o. male admitted with HHS and consulted by Provider for advanced specialty nursing care related to inpatient diabetes management. Hyperglycemia management order set is in place. Subjective Mr Santa Addison remains in acute care. Palliative care working with family as care plan goals are confirmed. Family did make patient DNR. Multiple hypoglycemic episodes on low dose Lantus with continuation of tube feeds. Converted to sliding scale insulin. BG in the 200s. Objective Physical exam 
 
General Waxes and wanes. Will shake his head yes and no appropriately. Vital Signs Visit Vitals /85 (BP 1 Location: Right arm, BP Patient Position: At rest) Pulse (!) 112 Temp 98.9 °F (37.2 °C) Resp 22 Ht 5' 5\" (1.651 m) Wt 60 kg (132 lb 3.2 oz) SpO2 95% BMI 22.00 kg/m² Skin  Warm and dry Heart   Regular rate and rhythm. No murmurs, rubs or gallops Lungs  Clear to auscultation without rales or rhonchi Extremities No foot wounds Blood glucose pattern Assessment and Plan Nursing Diagnosis Risk for unstable blood glucose pattern Nursing Intervention Domain 5253 Decision-making Support Nursing Interventions Examined current inpatient diabetes control Explored factors facilitating and impeding inpatient management Identified self-management practices impeding diabetes control Explored corrective strategies with patient and responsible inpatient provider Informed patient of rational for basal bolus insulin strategy while hospitalized Evaluation Mr Santa Addison is a 76year old gentleman who was admitted for HHS, UTI septic shock with lactic acidosis and respiratory depression on 2/13.  HHS, lactic acidosis and respiratory depression is now resolved and he is under the management of the hospitalist team.  Continues enteral feeds with limited ability to participate in speech therapy. Tavo Jones his degree of debility and decreased mental status, Palliative Medicine consulted to assist in hospital management planning. Due to his high risk for hypoglycemia and long term prognosis, sliding scale insulin is appropriate at this time until long term goals are determined. Recommendations 1. Continue insulin sensative (BMI below 27) correctional insulin with Humalog ACHS to assist with hyperglycemia to maintain blood glucose to goal of 100-180. 
  
2. On discharge: Will discuss outpatient management plan with family and Palliative medicine.  Since he is at risk for hypoglycemia, would not recommend Lantus on discharge. The best option would be a conservative sliding scale insulin approach.  This would require a fingerstick for correcting hyperglycemia. Correctional Scale for Normal Sensitivity TID or per family wishes 
  
200-249: 2 units Humalog 250-299: 4 units Humalog 300-349: 6 units Humalog 350-399: 8 units Humalog Over 400: 10 units Humalog Billing Code(s)  
[id number] [address], [address] [facility] 
[phone]

## 2020-03-02 NOTE — HOSPICE
[facility] in [facility] 
[phone] Patient Name: Susan Agustin YOB: 1945 Age: 76 y.o. [facility] RN Note:  Hospice consult received, reviewing chart. Will follow up with Unit Nurse and Care Manager to discuss plan of care, patient status and discharge disposition within the hour. Left message for Doug Abdi, no return call, trying to set up appointment for tomorrow to discuss hospice care. Will continue to follow. Thank you for the opportunity to be of service to this patient. Vinh Van, Phillip 
[facility]

## 2020-03-02 NOTE — PROGRESS NOTES
Transition of Care Plan Hospice/comfort care   Referral made to [address] [place] [address]. [place] of choice letter placed in chart. Cm to follow and assist with services. Son would like patient to remain in [facility] with hospice. Cm talked with son, Leeroy Combs [phone], this am after he spoke with Dr Mp Davis regarding plan for patient. It was decided that patient will have feeding tube removed tomorrow and placed on comfort care. Tia Delgado would like to meet with [address] [place] [address] tomorrow. He said his brother, Scot Haley( [phone])  is  in [place] so it will be his sister Aneesh Spivey) , and himself at the meeting. He would like patient to remain in [facility]. Referrals were sent to [facility], [address] [place] [address] on 2/28/20. Patient would need payor source to go to facility. Medicaid application completed and status is  pending. . There would be no payor source for nursing home placement with hospice at this time [address] [place] [address] will contact son and arrange for meeting.

## 2020-03-03 NOTE — PROGRESS NOTES
Problem: Non-Violent Restraints Goal: *Removal from restraints as soon as assessed to be safe Outcome: Progressing Towards Goal 
Goal: *No harm/injury to patient while restraints in use Outcome: Progressing Towards Goal 
Goal: *Patient's dignity will be maintained Outcome: Progressing Towards Goal 
Goal: *Patient Specific Goal (EDIT GOAL, INSERT TEXT) Outcome: Progressing Towards Goal 
Goal: Non-violent Restaints:Standard Interventions Outcome: Progressing Towards Goal 
Goal: Non-violent Restraints:Patient Interventions Outcome: Progressing Towards Goal 
Goal: Patient/Family Education Outcome: Progressing Towards Goal 
  
Problem: Pressure Injury - Risk of 
Goal: *Prevention of pressure injury Description Document Doni Scale and appropriate interventions in the flowsheet. Outcome: Progressing Towards Goal 
Note: Pressure Injury Interventions: 
Sensory Interventions: Assess changes in LOC Moisture Interventions: Absorbent underpads Activity Interventions: Pressure redistribution bed/mattress(bed type) Mobility Interventions: Pressure redistribution bed/mattress (bed type) Nutrition Interventions: Document food/fluid/supplement intake Friction and Shear Interventions: Apply protective barrier, creams and emollients Problem: Patient Education: Go to Patient Education Activity Goal: Patient/Family Education Outcome: Progressing Towards Goal 
  
Problem: Sepsis: Day of Diagnosis Goal: Off Pathway (Use only if patient is Off Pathway) Outcome: Progressing Towards Goal 
Goal: *Fluid resuscitation Outcome: Progressing Towards Goal 
Goal: *Paired blood cultures prior to first dose of antibiotic Outcome: Progressing Towards Goal 
Goal: *First dose of  appropriate antibiotic within 3 hours of arrival to ED, within 1 hour of arrival to ICU Outcome: Progressing Towards Goal 
Goal: *Lactic acid with first set of blood cultures Outcome: Progressing Towards Goal 
 Goal: *Pneumococcal immunization (if eligible) Outcome: Progressing Towards Goal 
Goal: *Influenza immunization (if eligible) Outcome: Progressing Towards Goal 
Goal: Activity/Safety Outcome: Progressing Towards Goal 
Goal: Consults, if ordered Outcome: Progressing Towards Goal 
Goal: Diagnostic Test/Procedures Outcome: Progressing Towards Goal 
Goal: Nutrition/Diet Outcome: Progressing Towards Goal 
Goal: Discharge Planning Outcome: Progressing Towards Goal 
Goal: Medications Outcome: Progressing Towards Goal 
Goal: Respiratory Outcome: Progressing Towards Goal 
Goal: Treatments/Interventions/Procedures Outcome: Progressing Towards Goal 
Goal: Psychosocial 
Outcome: Progressing Towards Goal 
  
Problem: Sepsis: Day 2 Goal: Off Pathway (Use only if patient is Off Pathway) Outcome: Progressing Towards Goal 
Goal: *Central Venous Pressure maintained at 8-12 mm Hg Outcome: Progressing Towards Goal 
Goal: *Hemodynamically stable Outcome: Progressing Towards Goal 
Goal: *Tolerating diet Outcome: Progressing Towards Goal 
Goal: Activity/Safety Outcome: Progressing Towards Goal 
Goal: Consults, if ordered Outcome: Progressing Towards Goal 
Goal: Diagnostic Test/Procedures Outcome: Progressing Towards Goal 
Goal: Nutrition/Diet Outcome: Progressing Towards Goal 
Goal: Discharge Planning Outcome: Progressing Towards Goal 
Goal: Medications Outcome: Progressing Towards Goal 
Goal: Respiratory Outcome: Progressing Towards Goal 
Goal: Treatments/Interventions/Procedures Outcome: Progressing Towards Goal 
Goal: Psychosocial 
Outcome: Progressing Towards Goal

## 2020-03-03 NOTE — PROGRESS NOTES
Transition of Care Plan: 
 
* Hospice meeting tomorrow at 11 AM 
* Based on Hospice assessment will explore [facility] options with family * Medicaid is pending Mac Brooke had attempted top reach the sons to arrange Hospice meeting with no success. This writer was able to reach Chele Sanz at [phone] who stated he could be available tomorrow at 11 AM. Will notify Hospice. Medicaid is pending. With no payer source unable to transition to LTC facility. If family unable to care for patient at home and patient does not meet GIP or qualify for [facility], will need to explore [facility] transfer. Gary Johnson LCSW, ZAINM-SW Complex Care Coordinator/[place] C: [phone]

## 2020-03-03 NOTE — DIABETES MGMT
[facility] Followup Progress Note Presentation Susan Agustin is a 76 y.o. male admitted with HHS and consulted by Provider for advanced specialty nursing care related to inpatient diabetes management. Hyperglycemia management order set is in place. Subjective Mr Cira Covington remains in acute care. Palliative care working with family as care plan goals are confirmed. Family did make patient DNR with a referral to hospice care. Plans to withdrawal feeding tube today and will transition to the [facility] hospice unit. Converted to conservative sliding scale insulin. BG in the 200s. Objective Physical exam 
 
General Eyes open to voice, will raise hand, not verbalizing. Vital Signs Visit Vitals /59 (BP 1 Location: Right arm, BP Patient Position: At rest) Pulse (!) 115 Temp (!) 100.9 °F (38.3 °C) Resp 20 Ht 5' 5\" (1.651 m) Wt 47.8 kg (105 lb 4.8 oz) SpO2 95% BMI 17.52 kg/m² Skin  Warm and dry Heart   Regular rate and rhythm. No murmurs, rubs or gallops Lungs  Clear to auscultation without rales or rhonchi Extremities No foot wounds Laboratory BMP:  
Lab Results Component Value Date/Time  03/02/2020 11:33 AM  
 K 4.2 03/02/2020 11:33 AM  
  03/02/2020 11:33 AM  
 CO2 25 03/02/2020 11:33 AM  
 AGAP 6 03/02/2020 11:33 AM  
  (H) 03/02/2020 11:33 AM  
 BUN 14 03/02/2020 11:33 AM  
 CREA 0.84 03/02/2020 11:33 AM  
 GFRAA >60 03/02/2020 11:33 AM  
 GFRNA >60 03/02/2020 11:33 AM  
  
 
 
Blood glucose pattern Assessment and Plan Nursing Diagnosis Risk for unstable blood glucose pattern Nursing Intervention Domain 0917 Decision-making Support Nursing Interventions Examined current inpatient diabetes control Explored factors facilitating and impeding inpatient management Identified self-management practices impeding diabetes control Explored corrective strategies with responsible inpatient provider Informed patient of rational for basal bolus insulin strategy while hospitalized Evaluation Mr Mellisa Luna is a 76year old gentleman who was admitted for HHS, UTI septic shock with lactic acidosis and respiratory depression on 2/13.  HHS, lactic acidosis and respiratory depression is now resolved, however he is currently in an extensive debilitated state. Given his degree of debility and decreased mental status, Palliative Medicine consulted to assist in hospital management planning and plans are to transition to hospice care today. Due to his high risk for hypoglycemia and long term prognosis, a conservative sliding scale insulin is maintaining his blood glucose 193-301. I expect low to normal blood glucose with the cessation of enteral feeds as this has been providing him 144 grams of carbohydrates in 24 hours. Recommendations 1. Continue insulin sensative (BMI below 27) correctional insulin with Humalog ACHS to assist with hyperglycemia. Diabetes Management Team to sign off at this point as patient remains stable. Please re-consult us if patient needs change. Thank you for including us in his care. Thank you for allowing me to participate in Mr Tanner's care. Billing Code(s)  
 
[id number] [address], [address] [facility] 
[phone]

## 2020-03-03 NOTE — PROGRESS NOTES
[address] [facility] Adult  Hospitalist Group Date of Service:  3/3/2020 NAME:  Killian Vogt :  1945 MRN:  [id number] Admission Summary:  
Patient was admitted to the ICU on 2020 from [facility]. · Acute metabolic encephalopathy · Septic shock · Acute respiratory failure requiring intubation · HHS/DKA 77-year-old gentleman with past medical history of hypertension, diabetes, CVA, dementia 
 who was found unresponsive at at [facility]. Blood gas was checked by EMS and route and read \"high\". In the emergency room on further work-up he was found to have severe DKA/HHS, acute kidney injury, severe lactic acidosis, hypothermia and a UTI. He was then intubated for airway protection, on admission. Work-up revealed DKA/HHS, he was started on insulin gtt and managed in the ICU. Work-up revealed pan sensitive E. coli in his urine, and blood. He was started on broad-spectrum antibiotics, and then narrowed to ceftriaxone. His mental status, has improved somewhat, however he is definitely not back to his baseline. Due to his mental status he has not been able to tolerate any p.o., and he has been on NG feeds. Hospital course is also been complicated by hypernatremia, for which he is on D5 and free water flushes. Interval history / Subjective: Follow up Severe sepsis, Ecoli bacteremia, Dysphagia, DKA/HHS Patient seen and examined by the bedside, Labs, images and notes reviewed Discussed with nursing staff, orders reviewed. Hospice trying to connect with Pt's son Peng Anderson, meeting was supposed to be today Fever spike today, CXR ordered Pt continues to be NGT and on restraints because pulling off NGT Assessment & Plan:  
 
Severe sepsis, septic shock, with lactic acidosis, and acute hypoxic respiratory failure-secondary to E. coli bacteremia, UTI resolving -required vasopressors initially, weaned off 
-Continue ceftriaxone,  Pt is npo with NGT 
-Needs a total 14 day course due to bacteremia. -Repeat blood culture, negative. - is febrile today, get CXR r/o aspiration Leukocytosis likely reactive, still staying high On Rocephin Blood Cx NGTD 
 
 
 
DKA/HHSresolved (original BMP significant for glucose over 1000, CO2 less than 5), serum osmolality 415 Type 2 diabetes, A1c 10.6% Resolved, now hypoglycemic 
- continue SSI/POC checks Hypoglycemia Episodes Stopped lantus, monitor with accuchecks Dysphagia-has not been able to work with speech due to his mental status 
-Currently getting  tube feeding via NGT 
-consult speech to continue to work with him - Nutrition to follow, continue tube feeding for now Palliative to see if family  would eventually want PEG  
 called son twice, unable to reach, left voicemail Hypernatremia,  
resolved Acute kidney injury - 
on admission creatinine 5.9 has now down trended  
,resolved Acute hypoxic respiratory failure  
- intubated on admission 2/13, extubated 2/17 
- on room air Acute metabolic encephalopathy,baseline dementia 
-Head CT on admission negative. Thrombocytopenia chronic intermittent  
-stable Right hemiparesis noted as baseline on admission H&P  
-Head CT negative on admission  
-He was admitted early January 2020 and CTA H&N was questionable for occlusion of the distal left M3 branch Lactic acidosis POA - resolved HTN - home amlodipine HLD - home statin Elevated LFTS 
-Multifactorial:hypoperfusion/congestive+statins may be contributing with these risk factors 
-Hold  Statin 
-check USG Liver Access:Left subclavian central line. Placed,2/14 NGT,placed 2/13 . 
3/2/20 Discussed with Pt' s son Jos Younger who is POA, on phone, about pt current health condition, discussed [address], Son agrees to proceed with DNR but wants to wait till tomorrow to withdraw the feeding tube, also wants to go for Hospice  
, I have consulted hospice and have told Kaylan Álvarez that someone will call him from hospice. 3/3 Hospice meeting supposed to be today with Kaylan Álvarez, pt's son Code status: full code DVT prophylaxis: scd Care Plan discussed with: Nurse Anticipated Disposition: soon will be comfort care, hospice consulted, pending hospice meeting Anticipated Discharge: Greater than 48 hours  
son Zainab Queen on the phone([phone]) Hospital Problems  Date Reviewed: 12/4/2018 Codes Class Noted POA [facility] ICD-10-CM: E11.10 ICD-9-CM: 250.10  2/13/2020 Unknown Encephalopathy ICD-10-CM: G93.40 ICD-9-CM: 348.30  2/13/2020 Unknown JOSH (acute kidney injury) ([facility] ICD-10-CM: N17.9 ICD-9-CM: 584.9  1/21/2020 Unknown Review of Systems:  
Review of systems not obtained due to patient factors. Vital Signs:  
 Last 24hrs VS reviewed since prior progress note. Most recent are: 
Visit Vitals /59 (BP 1 Location: Right arm, BP Patient Position: At rest) Pulse (!) 115 Temp 99 °F (37.2 °C) Resp 20 Ht 5' 5\" (1.651 m) Wt 47.8 kg (105 lb 4.8 oz) SpO2 95% BMI 17.52 kg/m² Intake/Output Summary (Last 24 hours) at 3/3/2020 1251 Last data filed at 3/3/2020 1112 Gross per 24 hour Intake 2530 ml Output  Net 2530 ml Physical Examination:  
 
 
     
Constitutional: Lethargic, opens eyes spontaneously ENT: NGT in place. Resp: Symmetrical air entry. On oxygen via nasal canula CV:  Regular rhythm, normal rate, no murmurs, gallops, rubs GI:  Soft, non distended, non tender. normoactive bowel sounds, no hepatosplenomegaly Musculoskeletal:  SCDs in place,+1 edema legs Neurologic: Lethargic,non verbal,rigth hemiparesis. Moves the left side Data Review:  
 Review and/or order of clinical lab test 
 Review and/or order of tests in the radiology section of CPT Review and/or order of tests in the medicine section of CPT Labs:  
 
Recent Labs 03/02/20 
1133 WBC 12.3* HGB 7.6* HCT 23.4*  
 Recent Labs 03/02/20 
1133   
K 4.2  CO2 25 BUN 14  
CREA 0.84 * CA 8.0* No results for input(s): SGOT, GPT, ALT, AP, TBIL, TBILI, TP, ALB, GLOB, GGT, AML, LPSE in the last 72 hours. No lab exists for component: AMYP, HLPSE No results for input(s): INR, PTP, APTT, INREXT, INREXT in the last 72 hours. No results for input(s): FE, TIBC, PSAT, FERR in the last 72 hours. No results found for: FOL, RBCF No results for input(s): PH, PCO2, PO2 in the last 72 hours. No results for input(s): CPK, CKNDX, TROIQ in the last 72 hours. No lab exists for component: CPKMB Lab Results Component Value Date/Time Cholesterol, total 190 01/02/2020 04:06 AM  
 HDL Cholesterol 64 01/02/2020 04:06 AM  
 LDL, calculated 115.4 (H) 01/02/2020 04:06 AM  
 Triglyceride 53 01/02/2020 04:06 AM  
 CHOL/HDL Ratio 3.0 01/02/2020 04:06 AM  
 
Lab Results Component Value Date/Time Glucose (POC) 248 (H) 03/03/2020 11:44 AM  
 Glucose (POC) 289 (H) 03/03/2020 06:52 AM  
 Glucose (POC) 301 (H) 03/03/2020 12:18 AM  
 Glucose (POC) 193 (H) 03/02/2020 08:46 PM  
 Glucose (POC) 284 (H) 03/02/2020 05:10 PM  
 
Lab Results Component Value Date/Time  Color YELLOW/STRAW 02/13/2020 01:07 PM  
 Appearance TURBID (A) 02/13/2020 01:07 PM  
 Specific gravity 1.020 02/13/2020 01:07 PM  
 pH (UA) 5.0 02/16/2020 08:33 AM  
 Protein 100 (A) 02/13/2020 01:07 PM  
 Glucose >1,000 (A) 02/13/2020 01:07 PM  
 Ketone NEGATIVE  02/13/2020 01:07 PM  
 Bilirubin NEGATIVE  02/13/2020 01:07 PM  
 Urobilinogen 0.2 02/13/2020 01:07 PM  
 Nitrites POSITIVE (A) 02/13/2020 01:07 PM  
 Leukocyte Esterase MODERATE (A) 02/13/2020 01:07 PM  
 Epithelial cells FEW 02/13/2020 01:07 PM  
 Bacteria 4+ (A) 02/13/2020 01:07 PM  
 WBC >100 (H) 02/13/2020 01:07 PM  
 RBC 5-10 02/13/2020 01:07 PM  
 
 
 
Medications Reviewed:  
 
Current Facility-Administered Medications Medication Dose Route Frequency  cefTRIAXone (ROCEPHIN) 2 g in 0.9% sodium chloride (MBP/ADV) 50 mL  2 g IntraVENous Q24H  
 alteplase (CATHFLO) 1 mg in sterile water (preservative free) 1 mL injection  1 mg InterCATHeter PRN  
 bacitracin 500 unit/gram packet 1 Packet  1 Packet Topical PRN  
 lactated Ringers infusion  75 mL/hr IntraVENous CONTINUOUS  
 insulin lispro (HUMALOG) injection   SubCUTAneous Q6H  
 glucose chewable tablet 16 g  4 Tab Oral PRN  
 glucagon (GLUCAGEN) injection 1 mg  1 mg IntraMUSCular PRN  
 dextrose 10% infusion 0-250 mL  0-250 mL IntraVENous PRN  
 hydrALAZINE (APRESOLINE) 20 mg/mL injection 10 mg  10 mg IntraVENous Q6H PRN  
 balsam peru-castor oil (VENELEX) ointment   Topical BID  amLODIPine (NORVASC) tablet 5 mg  5 mg Oral DAILY  0.9% sodium chloride infusion 250 mL  250 mL IntraVENous PRN  
 collagenase (SANTYL) 250 unit/gram ointment   Topical DAILY  sodium chloride (NS) flush 5-40 mL  5-40 mL IntraVENous Q8H  
 sodium chloride (NS) flush 5-40 mL  5-40 mL IntraVENous PRN  
 ondansetron (ZOFRAN) injection 4 mg  4 mg IntraVENous Q6H PRN  
 acetaminophen (TYLENOL) tablet 650 mg  650 mg Per G Tube Q4H PRN  
 
______________________________________________________________________ EXPECTED LENGTH OF STAY: 12d 9h 
ACTUAL LENGTH OF STAY:          19 
 
            
Yessi Pinzon MD

## 2020-03-03 NOTE — HOSPICE
[facility] Liaison note: 
 
Received message from [address] that family can meet tomorrow at 11am to discuss hospice care. We will meet up in patient's room at that time. Thank you, 
 
Last Hernandez RN [facility]

## 2020-03-03 NOTE — PROGRESS NOTES
Notified provider and will give Tylenol as PRN. Dr. Randall Kessler has ordered a chest x-ray to r/o aspiration.

## 2020-03-04 NOTE — HOSPICE
Raffy Tucker [facility] 
[phone] Patient Name: Louann Kingston YOB: 1945 Age: 76 y.o. 190 Kamar Tucker RN Note:  Hospice consult noted. Chart reviewed. Plan of care discussed with patients nurse & care manager. In to meet with patient, and patients son, Leeroy Senters [phone]. [facility] CM Paul Hoffman attended this informational session with liaison. Discussed Hospice philosophy, general plan of care, levels of care, services and on call procedures. Family information packet provided and resources reviewed with Tia Delgado. Tia Sandy shares that he was not happy with the staffing ratio at [facility], and does not want his father to return. He shares that [employer] application began in Jan 2020. Discussed hospice services in the home, both at family home as well as a facility. Reviewed that pt was not meeting acute inpatient hospice criteria. Paul Hoffman discussed long term placement options, including option of transferring pt to [facility]. Hospice will continue to follow patient, and offer family support as disposition plans are formed. Plan to continue to monitor patient for symptoms that might require inpatient hospice. Will continue to communicate with Calin Peterson CM as well as nursing staff to offer best support. Please continue to contact Raffy Tucker to be of service. Thank you for the opportunity to be of service to this patient. Diego Montilla RN, [facility] Hospice Nurse Liaison [phone] [place] [phone] Office

## 2020-03-04 NOTE — WOUND CARE
Wound Care Note: Follow-up visit for sacral wound Chart shows: 
Admitted for acute kidney injury, DKA, encephalopathy Past Medical History:  
Diagnosis Date  Diabetes ([facility] 2002  High cholesterol  Hypertension  Stroke ([facility] M3 occlusion Jan 2020  
' WBC = 12.3 on 3/2/20 Admitted from [facility] Assessment:  
Patient eyes open, has good arm strength and was pushing arms away when cleaning stool, no communication, incontinent with moderate assistance needed in repositioning. Bed: [facility] Diet: No diet- tube feeds stopped Patient did not verbalize pain but tried to push hands away from sacral area. Bilateral heels and buttocks skin intact and without erythema. Bilateral heels with hyperpigmentation, no redness or purple noted. Venelex ointment applied. 1. POA sacral wound looks about the same, wound bed is 60% pink and 40% slough, small tan drainage, alba-wound intact. Opticell AG, ABD pad, tape and Tegaderm applied. Patient repositioned on right side with pillow between the knees. Heels offloaded on pillows. Recommendations:   
Sacrum- Every other day and when soiled, cleanse with normal saline, wipe wound bed clean and dry, apply a piece of Opticell AG covering the wound and filling in crevice, cover with ABD pad, secure with tape, dry distal end well, apply skin prep to distal end, cover dressing with Tegaderm. Skin Care & Pressure Prevention: 
Minimize layers of linen/pads under patient to optimize support surface. Turn/reposition approximately every 2 hours and offload heels. Manage incontinence / promote continence Nourishing Skin Cream to dry skin, minimize use of briefs when able Discussed above plan with patient & Karla Ortiz RN Transition of Care: Plan to follow as needed while admitted to hospital. 
 
KELLY Sanders, RN, [facility], [facility]. 
office [phone] pager [phone] or call  to page

## 2020-03-04 NOTE — PROGRESS NOTES
Transition of Care Plan: * Referral made to [address] [place] [address] * Medicaid Pending- Have placed consult to [employer] for F/U on Medicaid * Will Review for LTSS screen Care conference held with Sixto Hussein, Hospice nurse Helder Forbes and myself. Mr. Luzmaria Carrillo provided a history of decline of his father with goal now for comfort care. At this time Mr. Luzmaria Carrillo explained there is no means to bring patient home under hospice care for there is no identified caregiver. Medicaid was applied for by the son in January 2020. Checked the Medicaid eligibility line [phone] and Medicaid is not active to date. LTC placement is pending a payer source. Per Hospice RN patient does not meet GIP criteria. Discussed transition to [address] [place] [address] for continued comfort care until Medicaid is approved. Mr. Luzmaria Carrillo was in agreement to [address] [place] [address]. CM will also contact Dianna Romo and daughter, Hakeem Anderson to discuss the above plan. Referral has been sent to [facility] CATHERINE, RN, ACoordinator at [address] [place] [address]. Ever Amaya LCSW, AC- Complex Care Coordinator/[place] C: [phone]

## 2020-03-04 NOTE — PROGRESS NOTES
[facility] visit. Mr. Mitchell Love asleep. He was visited by the [place] today. Prayer offered for him. No family present at the time of the visit. RAJINDER Corbin, RN, ACSW, LCSW  Page:  [phone])

## 2020-03-04 NOTE — ACP (ADVANCE CARE PLANNING)
GOALS OF CARE: 
Patient/Health Care Proxy Stated Goals: Comfort 
  
TREATMENT PREFERENCES:  
Code Status: DNR 
  
Advance Care Planning: 
[x]? The [facility] Interdisciplinary Team has updated the ACP Navigator with Lina and Patient Capacity 
  
  Primary Decision Maker (Active): Lorena Keenan - [phone] Primary Decision MakerLast Agustin - [phone]  
  
Advance Care Planning 2/24/2020 Patient's Healthcare Decision Maker is: - Confirm Advance Directive None Patient Would Like to Complete Advance Directive -  
  
  
Medical Interventions: Comfort measures  
  
Artificially Administered Nutrition: No feeding tube See today's palliative care note dated 3/4. Transition to comfort focused care, remove dobhoff and stop FIGUEROA. Hospice team is following for potential admission to [facility] level of care vs routine.

## 2020-03-04 NOTE — PROGRESS NOTES
Palliative Medicine Consult [place]: [phone] ([phone]) Patient Name: Julio C Flores YOB: 1945 Date of Initial Consult: 2/21/20 Reason for Consult: care decisions Requesting Provider: Dr. Lion Platt Primary Care Physician: Kaitlyn Lagos MD 
 
 SUMMARY:  
Julio C Flores is a 76 y. o. with a past history of  labile DM type 2, CKD 3, hypertension, h/o alcoholism in remission, debility who was admitted on 2/13/2020 from the ED after being found unresponsive at [facility]. Hospital course has included treatment for DKA/HHS, hypernatremia, JOSH and severe sepsis 2nd to E. Coli UTI. He was intubated in the ED for airway protection, later extubated to [facility] and is now on RA. Recent admissions include 1/1-1/8 for episode of confusion (presented from home) and 1/21-1/30 (from SNF) for dehydration and failure to thrive after refusal to eat/drink at rehab for a couple of days. Current medical issues leading to Palliative Medicine involvement include:  Poor prognosis, recent decline and rehospitalization, continuity of care. Psychosocial- Born in [facility], , two sons [place] Hodg[place] (whom he lives with along with Ace's longtime girlfriend Mark) and [facility] Also has a stepdaughter Jeannine Aparicio. Prior to 1/1 hospitalization he was more functional and independent but showing signs of decline. Since 1/1 acute episode of confusion and subsequent persistent confusion. He has transitioned to need for LTC / 24 hr supervision. PALLIATIVE DIAGNOSES:  
1. Goals of care counseling 2. Thick oral secretions 3. Frailty 4. Debility 5. Feeding difficulties- on dobhoff TFs 6. Labile DM type 2- A1c 10.2 in Jan.  
7. Abnormal CT head- Chronic microvascular ischemic changes, severe cerebral atrophy (1/21/20) 8. Alcoholism in remission PLAN:  
1. Follow up visit with pt's son Kelsi Wilkinson following Hospice information session. 2. Asiya Payan has been discussing a transition to comfort with cessation of FIGUEROA with Dr. Edmundo Cerda. 3. I talked with Arlet to confirm and review plan. He reiterated that goals were to focus on his Father's comfort, reduce pain, suffering and not prolong his life artificially. He states his brother Maria Del Rosario Thompson (who has participated in our past conversations) is on board and hopeful for same. Reviewed plan as follows: 1. Comfort focused care 2. Stop enteral feeds today 3. Remove dobhoff 4. No further diagnostics including labs / imaging 5. Stop IV antibiotics (empiric Ceftriaxone since 2/26- had E. Coli UTI on 2/13 and completed two week course previously) 6. Start Haldol 2 mg sl/IV q4h prn in case of agitation 7. Start Morphine 2 mg IV q1h prn in case of dyspnea or non-verbal severe pain 8. Add MT/PO APAP for fever (can use Toradol if necessary) 9. Add glycopyrrolate 0.2 mg q4h prn for secretions. 10. Continue glucose checks q6h for the next 24-48 hrs with prn insulin coverage, then reassess. I talked with son that this may not be necessary to continue as I do not anticipate he will take in much by mouth if anything at all. 6. Arlet signed a DDNR, placed in chart. 4. Anticipate EOL in days to short weeks. 5. Hospice following for potential symptom management needs which would allow for GIP level of care. Otherwise CM involved in dispo planning to long term care facility. 6. Thank you for the opportunity to care for Mr. Tiera Murcia. 7. Communicated plan of care with: Palliative David AGUILAR Team including Hospice liaison Fredrick Galloway, Dr. Edmundo Cerda, bedside RN. GOALS OF CARE / TREATMENT PREFERENCES:  
 
GOALS OF CARE: 
Patient/Health Care Proxy Stated Goals: Comfort TREATMENT PREFERENCES:  
Code Status: DNR Advance Care Planning: 
[x] [facility] Interdisciplinary Team has updated the ACP Navigator with [place] and Patient Capacity Primary Decision Maker (Active): Enoch Graff - [phone] Primary Decision MakeAmy Davidson - [phone] Advance Care Planning 2/24/2020 Patient's Healthcare Decision Maker is: - Confirm Advance Directive None Patient Would Like to Complete Advance Directive - Medical Interventions: Comfort measures Artificially Administered Nutrition: No feeding tube Other: As far as possible, the palliative care team has discussed with patient / health care proxy about goals of care / treatment preferences for patient. HISTORY:  
 
History obtained from:  Chart review CHIEF COMPLAINT:  Pt not able to report HPI/SUBJECTIVE: The patient is:  
[] Verbal and participatory [x] Non-participatory due to:   Limited responsiveness 2/27- alert today but minimally verbal.  Able to state he wanted cold water. Appears slightly uncomfortable. 3/4- alert, able to vocalize softly one word answers when prompted, otherwise not engaged in conversation or able to easily communicate wants/needs. No overt distress. Clinical Pain Assessment (nonverbal scale for severity on nonverbal patients):  
Clinical Pain Assessment Severity: 0 Activity (Movement): Lying quietly, normal position Duration: for how long has pt been experiencing pain (e.g., 2 days, 1 month, years) Frequency: how often pain is an issue (e.g., several times per day, once every few days, constant) FUNCTIONAL ASSESSMENT:  
 
Palliative Performance Scale (PPS): PPS: 10 PSYCHOSOCIAL/SPIRITUAL SCREENING:  
 
Palliative IDT has assessed this patient for cultural preferences / practices and a referral made as appropriate to needs (Cultural Services, Patient Advocacy, Ethics, etc.) Any spiritual / Restorationist concerns: 
[] Yes /  [x] No 
 
Caregiver Burnout: 
[] Yes /  [] No /  [x] No Caregiver Present Anticipatory grief assessment:  
[x] Normal  / [] Maladaptive ESAS Anxiety: ESAS Depression:    
 
 
 REVIEW OF SYSTEMS:  
 
Positive and pertinent negative findings in ROS are noted above in HPI. The following systems were [] reviewed / [x] unable to be reviewed as noted in HPI Other findings are noted below. Systems: constitutional, ears/nose/mouth/throat, respiratory, gastrointestinal, genitourinary, musculoskeletal, integumentary, neurologic, psychiatric, endocrine. Positive findings noted below. Modified ESAS Completed by: provider Pain: 0 Dyspnea: 0 Stool Occurrence(s): 1 PHYSICAL EXAM:  
 
From RN flowsheet: 
Wt Readings from Last 3 Encounters:  
03/04/20 52.1 kg (114 lb 14.4 oz) 01/22/20 53.7 kg (118 lb 7.6 oz) 01/08/20 62 kg (136 lb 11 oz) Blood pressure 146/60, pulse (!) 109, temperature 100 °F (37.8 °C), resp. rate 20, height 5' 5\" (1.651 m), weight 52.1 kg (114 lb 14.4 oz), SpO2 93 %. Pain Scale 1: Visual 
Pain Intensity 1: 0 Last bowel movement, if known:  FMS in place Frail ill appearing] AA male lying in bed Respirations unlabored Thick oral secretions in mouth Abdomen scaphoid, non tender LEs w/ dependent edema Alert today, able to follow simple command (squeezed his left hand), minimally vocalizes Not restless nor agitated. HISTORY:  
 
Active Problems: 
  JOSH (acute kidney injury) ([address] (1/21/2020) DKA (diabetic ketoacidoses) ([address] (2/13/2020) Encephalopathy (2/13/2020) Past Medical History:  
Diagnosis Date  Diabetes ([address] 2002  High cholesterol  Hypertension  Stroke ([address] M3 occlusion Jan 2020 History reviewed. No pertinent surgical history. Family History Problem Relation Age of Onset  No Known Problems Mother  No Known Problems Father  No Known Problems Sister  No Known Problems Brother  No Known Problems Brother  No Known Problems Brother  No Known Problems Brother  No Known Problems Brother  No Known Problems Brother  No Known Problems Maternal Grandmother  No Known Problems Maternal Grandfather  No Known Problems Paternal Grandmother  No Known Problems Paternal Grandfather  Diabetes Neg Hx   
 Heart Disease Neg Hx History reviewed, no pertinent family history. Social History Tobacco Use  Smoking status: Never Smoker  Smokeless tobacco: Never Used Substance Use Topics  Alcohol use: No  
  Alcohol/week: 0.0 standard drinks No Known Allergies Current Facility-Administered Medications Medication Dose Route Frequency  haloperidol (HALDOL) 2 mg/mL oral solution 2 mg  2 mg SubLINGual Q4H PRN Or  
 haloperidol lactate (HALDOL) injection 2 mg  2 mg IntraVENous Q4H PRN  
 morphine injection 2 mg  2 mg IntraVENous Q1H PRN  
 glycopyrrolate (ROBINUL) injection 0.2 mg  0.2 mg IntraVENous Q4H PRN  
 acetaminophen (TYLENOL) tablet 650 mg  650 mg Oral Q4H PRN Or  
 acetaminophen (TYLENOL) solution 650 mg  650 mg Oral Q4H PRN Or  
 acetaminophen (TYLENOL) suppository 650 mg  650 mg Rectal Q4H PRN  
 alteplase (CATHFLO) 1 mg in sterile water (preservative free) 1 mL injection  1 mg InterCATHeter PRN  
 bacitracin 500 unit/gram packet 1 Packet  1 Packet Topical PRN  
 insulin lispro (HUMALOG) injection   SubCUTAneous Q6H  
 balsam peru-castor oil (VENELEX) ointment   Topical BID  
 0.9% sodium chloride infusion 250 mL  250 mL IntraVENous PRN  
 collagenase (SANTYL) 250 unit/gram ointment   Topical DAILY  sodium chloride (NS) flush 5-40 mL  5-40 mL IntraVENous Q8H  
 sodium chloride (NS) flush 5-40 mL  5-40 mL IntraVENous PRN  
 ondansetron (ZOFRAN) injection 4 mg  4 mg IntraVENous Q6H PRN  
 
 
 
 LAB AND IMAGING FINDINGS:  
 
Lab Results Component Value Date/Time WBC 12.3 (H) 03/02/2020 11:33 AM  
 HGB 7.6 (L) 03/02/2020 11:33 AM  
 PLATELET 154 81/73/0400 11:33 AM  
 
Lab Results Component Value Date/Time Sodium 136 03/02/2020 11:33 AM  
 Potassium 4.2 03/02/2020 11:33 AM  
 Chloride 105 03/02/2020 11:33 AM  
 CO2 25 03/02/2020 11:33 AM  
 BUN 14 03/02/2020 11:33 AM  
 Creatinine 0.84 03/02/2020 11:33 AM  
 Calcium 8.0 (L) 03/02/2020 11:33 AM  
 Magnesium 1.5 (L) 02/23/2020 04:40 AM  
 Phosphorus 2.4 (L) 02/23/2020 04:40 AM  
  
Lab Results Component Value Date/Time AST (SGOT) 83 (H) 02/29/2020 01:55 AM  
 Alk. phosphatase 709 (H) 02/29/2020 01:55 AM  
 Protein, total 6.3 (L) 02/29/2020 01:55 AM  
 Albumin 1.7 (L) 02/29/2020 01:55 AM  
 Globulin 4.6 (H) 02/29/2020 01:55 AM  
 
Lab Results Component Value Date/Time INR 1.2 (H) 02/22/2020 04:47 AM  
 Prothrombin time 12.2 (H) 02/22/2020 04:47 AM  
 aPTT 23.9 02/02/2019 03:56 PM  
  
No results found for: IRON, FE, TIBC, IBCT, PSAT, FERR No results found for: PH, PCO2, PO2 No components found for: [place] Lab Results Component Value Date/Time CK 1,654 (H) 02/13/2020 06:04 PM  
 CK - MB <0.5 (L) 04/17/2016 12:46 AM  
  
 
 
   
 
Total time:  40m Counseling / coordination time, spent as noted above: 20m 
> 50% counseling / coordination?: y 
 
Prolonged service was provided for  []30 min   []75 min in face to face time in the presence of the patient, spent as noted above. Time Start:  
Time End:  
Note: this can only be billed with 14330 (initial) or 38977 (follow up). If multiple start / stop times, list each separately.

## 2020-03-04 NOTE — PROGRESS NOTES
[facility] Date of Service:  3/4/2020 NAME:  Kemal Lyons :  1945 MRN:  [id number] Admission Summary:  
Patient was admitted to the ICU on 2020 from [facility]. · Acute metabolic encephalopathy · Septic shock · Acute respiratory failure requiring intubation · HHS/DKA 66-year-old gentleman with past medical history of hypertension, diabetes, CVA, dementia 
 who was found unresponsive at at [facility]. Blood gas was checked by EMS and route and read \"high\". In the emergency room on further work-up he was found to have severe DKA/HHS, acute kidney injury, severe lactic acidosis, hypothermia and a UTI. He was then intubated for airway protection, on admission. Work-up revealed DKA/HHS, he was started on insulin gtt and managed in the ICU. Work-up revealed pan sensitive E. coli in his urine, and blood. He was started on broad-spectrum antibiotics, and then narrowed to ceftriaxone. His mental status, has improved somewhat, however he is definitely not back to his baseline. Due to his mental status he has not been able to tolerate any p.o., and he has been on NG feeds. Hospital course is also been complicated by hypernatremia, for which he is on D5 and free water flushes. Interval history / Subjective: Follow up Severe sepsis, Ecoli bacteremia, Dysphagia, DKA/HHS Patient seen and examined by the bedside, Labs, images and notes reviewed Discussed with nursing staff, orders reviewed. Hospice meeting happened today And pt changed to hospice, pending [address] transfer NGT removed Assessment & Plan:  
 
 
 ##on comfort measures now, NGT removed, pending transfer to [facility] 
 
 
Severe sepsis, septic shock, with lactic acidosis, and acute hypoxic respiratory failure-secondary to E. coli bacteremia, UTI resolving 
-   
- on comfort measures now, NGT removed Leukocytosis likely reactive, still staying high DKA/HHSresolved (original BMP significant for glucose over 1000, CO2 less than 5), serum osmolality 415 Type 2 diabetes, A1c 10.6% Resolved, now hypoglycemic Hypoglycemia Episodes Dysphagia-has not been able to work with speech due to his mental status Hypernatremia,  
resolved Acute kidney injury - 
on admission creatinine 5.9 has now down trended  
,resolved Acute hypoxic respiratory failure - Acute metabolic encephalopathy,baseline dementia 
-Thrombocytopenia chronic intermittent  
-stable Right hemiparesis noted as baseline on admission H&P  
-Lactic acidosis POA - resolved HTN - home amlodipine HLD - home statin Elevated LFTS 
- Access:Left subclavian central line. Placed,2/14 NGT,placed 2/13 Eleni Edwards Code status: full code DVT prophylaxis: scd Care Plan discussed with: Nurse Anticipated Disposition: soon will be comfort care, hospice consulted, pending hospice meeting Anticipated Discharge: pending transfer to [facility]  
irais Oropeza Half on the [id number]([phone]) Hospital Problems  Date Reviewed: 12/4/2018 Codes Class Noted POA D[facility]diabetic k[facility]acidoses) [facility] ICD-10-CM: E11.10 ICD-9-CM: 250.10  2/13/2020 Unknown Encephalopathy ICD-10-CM: G93.40 ICD-9-CM: 348.30  2/13/2020 Unknown JOSH (acute kidney injury) ([facility] ICD-10-CM: N17.9 ICD-9-CM: 584.9  1/21/2020 Unknown Review of Systems:  
Review of systems not obtained due to patient factors. Vital Signs:  
 Last 24hrs VS reviewed since prior progress note. Most recent are: 
Visit Vitals /86 Pulse 100 Temp 99.8 °F (37.7 °C) Resp 20 Ht 5' 5\" (1.651 m) Wt 52.1 kg (114 lb 14.4 oz) SpO2 94% BMI 19.12 kg/m² Intake/Output Summary (Last 24 hours) at 3/4/2020 1616 Last data filed at 3/4/2020 8502 Gross per 24 hour Intake 8309.67 ml Output  Net 8309.67 ml Physical Examination:  
 
 
     
Constitutional: Lethargic, opens eyes spontaneously ENT: NGT in place. Resp: Symmetrical air entry. On oxygen via nasal canula CV:  Regular rhythm, normal rate, no murmurs, gallops, rubs GI:  Soft, non distended, non tender. normoactive bowel sounds, no hepatosplenomegaly Musculoskeletal:  SCDs in place,+1 edema legs Neurologic: Lethargic,non verbal,rigth hemiparesis. Moves the left side Data Review:  
 Review and/or order of clinical lab test 
Review and/or order of tests in the radiology section of CPT Review and/or order of tests in the medicine section of CPT Labs:  
 
Recent Labs 03/02/20 
1133 WBC 12.3* HGB 7.6* HCT 23.4*  
 Recent Labs 03/02/20 
1133   
K 4.2  CO2 25 BUN 14  
CREA 0.84 * CA 8.0* No results for input(s): SGOT, GPT, ALT, AP, TBIL, TBILI, TP, ALB, GLOB, GGT, AML, LPSE in the last 72 hours. No lab exists for component: AMYP, HLPSE No results for input(s): INR, PTP, APTT, INREXT, INREXT in the last 72 hours. No results for input(s): FE, TIBC, PSAT, FERR in the last 72 hours. No results found for: FOL, RBCF No results for input(s): PH, PCO2, PO2 in the last 72 hours. No results for input(s): CPK, CKNDX, TROIQ in the last 72 hours. No lab exists for component: CPKMB Lab Results Component Value Date/Time Cholesterol, total 190 01/02/2020 04:06 AM  
 HDL Cholesterol 64 01/02/2020 04:06 AM  
 LDL, calculated 115.4 (H) 01/02/2020 04:06 AM  
 Triglyceride 53 01/02/2020 04:06 AM  
 CHOL/HDL Ratio 3.0 01/02/2020 04:06 AM  
 
Lab Results Component Value Date/Time  Glucose (POC) 268 (H) 03/04/2020 06:07 AM  
 Glucose (POC) 259 (H) 03/03/2020 11:43 PM  
 Glucose (POC) 345 (H) 03/03/2020 06:02 PM  
 Glucose (POC) 248 (H) 03/03/2020 11:44 AM  
 Glucose (POC) 289 (H) 03/03/2020 06:52 AM  
 
Lab Results Component Value Date/Time Color YELLOW/STRAW 02/13/2020 01:07 PM  
 Appearance TURBID (A) 02/13/2020 01:07 PM  
 Specific gravity 1.020 02/13/2020 01:07 PM  
 pH (UA) 5.0 02/16/2020 08:33 AM  
 Protein 100 (A) 02/13/2020 01:07 PM  
 Glucose >1,000 (A) 02/13/2020 01:07 PM  
 Ketone NEGATIVE  02/13/2020 01:07 PM  
 Bilirubin NEGATIVE  02/13/2020 01:07 PM  
 Urobilinogen 0.2 02/13/2020 01:07 PM  
 Nitrites POSITIVE (A) 02/13/2020 01:07 PM  
 Leukocyte Esterase MODERATE (A) 02/13/2020 01:07 PM  
 Epithelial cells FEW 02/13/2020 01:07 PM  
 Bacteria 4+ (A) 02/13/2020 01:07 PM  
 WBC >100 (H) 02/13/2020 01:07 PM  
 RBC 5-10 02/13/2020 01:07 PM  
 
 
 
Medications Reviewed:  
 
Current Facility-Administered Medications Medication Dose Route Frequency  haloperidol (HALDOL) 2 mg/mL oral solution 2 mg  2 mg SubLINGual Q4H PRN Or  
 haloperidol lactate (HALDOL) injection 2 mg  2 mg IntraVENous Q4H PRN  
 morphine injection 2 mg  2 mg IntraVENous Q1H PRN  
 glycopyrrolate (ROBINUL) injection 0.2 mg  0.2 mg IntraVENous Q4H PRN  
 acetaminophen (TYLENOL) tablet 650 mg  650 mg Oral Q4H PRN Or  
 acetaminophen (TYLENOL) solution 650 mg  650 mg Oral Q4H PRN  Or  
 acetaminophen (TYLENOL) suppository 650 mg  650 mg Rectal Q4H PRN  
 alteplase (CATHFLO) 1 mg in sterile water (preservative free) 1 mL injection  1 mg InterCATHeter PRN  
 bacitracin 500 unit/gram packet 1 Packet  1 Packet Topical PRN  
 insulin lispro (HUMALOG) injection   SubCUTAneous Q6H  
 balsam peru-castor oil (VENELEX) ointment   Topical BID  
 0.9% sodium chloride infusion 250 mL  250 mL IntraVENous PRN  
 sodium chloride (NS) flush 5-40 mL  5-40 mL IntraVENous Q8H  
 sodium chloride (NS) flush 5-40 mL  5-40 mL IntraVENous PRN  
 ondansetron (ZOFRAN) injection 4 mg  4 mg IntraVENous Q6H PRN  
 
 ______________________________________________________________________ EXPECTED LENGTH OF STAY: 12d 9h 
ACTUAL LENGTH OF STAY:          20 
 
            
Travon Starr MD

## 2020-03-04 NOTE — PROGRESS NOTES
Problem: Non-Violent Restraints Goal: *Removal from restraints as soon as assessed to be safe Outcome: Progressing Towards Goal 
Goal: *No harm/injury to patient while restraints in use Outcome: Progressing Towards Goal 
Goal: *Patient's dignity will be maintained Outcome: Progressing Towards Goal 
  
Problem: Pressure Injury - Risk of 
Goal: *Prevention of pressure injury Description Document Doni Scale and appropriate interventions in the flowsheet. Outcome: Progressing Towards Goal 
Note: Pressure Injury Interventions: 
Sensory Interventions: Assess changes in LOC Moisture Interventions: Absorbent underpads Activity Interventions: Pressure redistribution bed/mattress(bed type) Mobility Interventions: Float heels Nutrition Interventions: Document food/fluid/supplement intake Friction and Shear Interventions: Apply protective barrier, creams and emollients

## 2020-03-05 NOTE — PROGRESS NOTES
[facility] [facility] Adult  Hospitalist Group Date of Service:  3/5/2020 NAME:  Louann Kingston :  1945 MRN:  [id number] Admission Summary:  
Patient was admitted to the ICU on 2020 from [facility]. · Acute metabolic encephalopathy · Septic shock · Acute respiratory failure requiring intubation · HHS/DKA 77-year-old gentleman with past medical history of hypertension, diabetes, CVA, dementia 
 who was found unresponsive at at [facility]. Blood gas was checked by EMS and route and read \"high\". In the emergency room on further work-up he was found to have severe DKA/HHS, acute kidney injury, severe lactic acidosis, hypothermia and a UTI. He was then intubated for airway protection, on admission. Work-up revealed DKA/HHS, he was started on insulin gtt and managed in the ICU. Work-up revealed pan sensitive E. coli in his urine, and blood. He was started on broad-spectrum antibiotics, and then narrowed to ceftriaxone. His mental status, has improved somewhat, however he is definitely not back to his baseline. Due to his mental status he has not been able to tolerate any p.o., and he has been on NG feeds. Hospital course is also been complicated by hypernatremia, for which he is on D5 and free water flushes. Interval history / Subjective: Follow up Severe sepsis, Ecoli bacteremia, Dysphagia, DKA/HHS Patient now comfort measures Assessment & Plan:  
 
 
 ##on comfort measures now Severe sepsis, septic shock, with lactic acidosis, and acute hypoxic respiratory failure-secondary to E. coli bacteremia, UTI resolving 
-on comfort measures now, NGT removed DKA/HHSresolved (original BMP significant for glucose over 1000, CO2 less than 5), serum osmolality 415 Type 2 diabetes, A1c 10.6% Resolved Dysphagia-has not been able to work with speech due to his mental status Hypernatremia,  
resolved Acute kidney injury - 
on admission creatinine 5.9 has now down trended  
,resolved Acute hypoxic respiratory failure -now resolved Acute metabolic encephalopathy,baseline dementia 
-Thrombocytopenia chronic intermittent  
-stable Right hemiparesis noted as baseline on admission H&P  
-Lactic acidosis POA - resolved HTN - home amlodipine HLD - home statin Elevated LFTS 
- Access:Left subclavian central line. Placed,2/14 NGT,placed 2/13 Code status: DNR 
DVT prophylaxis: scd Plan: The patient is comfort care, awaiting hospice for ?inpatient hospice Care Plan discussed with: Nurse Anticipated Disposition:as above Anticipated Discharge: as above 
irais Bryant Round on the MKDKR([phone]) Hospital Problems  Date Reviewed: 3/5/2020 Codes Class Noted POA DKA (diabetic ketoacidoses) ([facility] ICD-10-CM: E11.10 ICD-9-CM: 250.10  2/13/2020 Unknown * (Principal) Encephalopathy ICD-10-CM: G93.40 ICD-9-CM: 348.30  2/13/2020 Unknown JOSH (acute kidney injury) ([facility] ICD-10-CM: N17.9 ICD-9-CM: 584.9  1/21/2020 Unknown Review of Systems:  
Review of systems not obtained due to patient factors. Vital Signs:  
 Last 24hrs VS reviewed since prior progress note. Most recent are: 
Visit Vitals /81 (BP 1 Location: Right arm, BP Patient Position: At rest) Pulse 94 Temp 98.7 °F (37.1 °C) Resp 16 Ht 5' 5\" (1.651 m) Wt 54.3 kg (119 lb 12.8 oz) SpO2 96% BMI 19.94 kg/m² No intake or output data in the 24 hours ending 03/05/20 0851 Physical Examination:  
 
 
     
Constitutional: Lethargic, opens eyes spontaneously ENT: stable Resp: Symmetrical air entry. On oxygen via nasal canula CV:  Regular rhythm, normal rate, no murmurs, gallops, rubs GI:  Soft, non distended, non tender. normoactive bowel sounds, no hepatosplenomegaly Musculoskeletal:  SCDs in place,+1 edema legs Neurologic: Lethargic,non verbal,rigth hemiparesis. Moves the left side Data Review:  
 Review and/or order of clinical lab test 
Review and/or order of tests in the radiology section of CPT Review and/or order of tests in the medicine section of CPT Labs:  
 
Recent Labs 03/02/20 
1133 WBC 12.3* HGB 7.6* HCT 23.4*  
 Recent Labs 03/02/20 
1133   
K 4.2  CO2 25 BUN 14  
CREA 0.84 * CA 8.0* No results for input(s): SGOT, GPT, ALT, AP, TBIL, TBILI, TP, ALB, GLOB, GGT, AML, LPSE in the last 72 hours. No lab exists for component: AMYP, HLPSE No results for input(s): INR, PTP, APTT, INREXT, INREXT in the last 72 hours. No results for input(s): FE, TIBC, PSAT, FERR in the last 72 hours. No results found for: FOL, RBCF No results for input(s): PH, PCO2, PO2 in the last 72 hours. No results for input(s): CPK, CKNDX, TROIQ in the last 72 hours. No lab exists for component: CPKMB Lab Results Component Value Date/Time Cholesterol, total 190 01/02/2020 04:06 AM  
 HDL Cholesterol 64 01/02/2020 04:06 AM  
 LDL, calculated 115.4 (H) 01/02/2020 04:06 AM  
 Triglyceride 53 01/02/2020 04:06 AM  
 CHOL/HDL Ratio 3.0 01/02/2020 04:06 AM  
 
Lab Results Component Value Date/Time Glucose (POC) 268 (H) 03/04/2020 06:07 AM  
 Glucose (POC) 259 (H) 03/03/2020 11:43 PM  
 Glucose (POC) 345 (H) 03/03/2020 06:02 PM  
 Glucose (POC) 248 (H) 03/03/2020 11:44 AM  
 Glucose (POC) 289 (H) 03/03/2020 06:52 AM  
 
Lab Results Component Value Date/Time  Color YELLOW/STRAW 02/13/2020 01:07 PM  
 Appearance TURBID (A) 02/13/2020 01:07 PM  
 Specific gravity 1.020 02/13/2020 01:07 PM  
 pH (UA) 5.0 02/16/2020 08:33 AM  
 Protein 100 (A) 02/13/2020 01:07 PM  
 Glucose >1,000 (A) 02/13/2020 01:07 PM  
 Ketone NEGATIVE  02/13/2020 01:07 PM  
 Bilirubin NEGATIVE  02/13/2020 01:07 PM  
 Urobilinogen 0.2 02/13/2020 01:07 PM  
 Nitrites POSITIVE (A) 02/13/2020 01:07 PM  
 Leukocyte Esterase MODERATE (A) 02/13/2020 01:07 PM  
 Epithelial cells FEW 02/13/2020 01:07 PM  
 Bacteria 4+ (A) 02/13/2020 01:07 PM  
 WBC >100 (H) 02/13/2020 01:07 PM  
 RBC 5-10 02/13/2020 01:07 PM  
 
 
 
Medications Reviewed:  
 
Current Facility-Administered Medications Medication Dose Route Frequency  haloperidol (HALDOL) 2 mg/mL oral solution 2 mg  2 mg SubLINGual Q4H PRN Or  
 haloperidol lactate (HALDOL) injection 2 mg  2 mg IntraVENous Q4H PRN  
 morphine injection 2 mg  2 mg IntraVENous Q1H PRN  
 glycopyrrolate (ROBINUL) injection 0.2 mg  0.2 mg IntraVENous Q4H PRN  
 acetaminophen (TYLENOL) tablet 650 mg  650 mg Oral Q4H PRN Or  
 acetaminophen (TYLENOL) solution 650 mg  650 mg Oral Q4H PRN Or  
 acetaminophen (TYLENOL) suppository 650 mg  650 mg Rectal Q4H PRN  
 alteplase (CATHFLO) 1 mg in sterile water (preservative free) 1 mL injection  1 mg InterCATHeter PRN  
 bacitracin 500 unit/gram packet 1 Packet  1 Packet Topical PRN  
 balsam peru-castor oil (VENELEX) ointment   Topical BID  
 0.9% sodium chloride infusion 250 mL  250 mL IntraVENous PRN  
 sodium chloride (NS) flush 5-40 mL  5-40 mL IntraVENous Q8H  
 sodium chloride (NS) flush 5-40 mL  5-40 mL IntraVENous PRN  
 ondansetron (ZOFRAN) injection 4 mg  4 mg IntraVENous Q6H PRN  
 
______________________________________________________________________ EXPECTED LENGTH OF STAY: 12d 9h 
ACTUAL LENGTH OF STAY:          Roxann Huitron MD

## 2020-03-05 NOTE — HOSPICE
[facility] 
[phone] Hospice Liaison Note Patient Name: Marie Suarez YOB: 1945 Age: 76 y.o. Date of Visit: 03/05/20 Facility of Care:  
Patient Room: [address] [place] [address] Attending: Amanda Henao MD 
[facility] Diagnosis: DKA (diabetic ketoacidoses) ([facility] [E11.10] JOSH (acute kidney injury) ([facility] [N17.9] Encephalopathy [G93.40] CLINICAL INFORMATION Patient Vitals for the past 24 hrs: 
 Temp Pulse Resp BP SpO2  
03/05/20 1457 98.3 °F (36.8 °C) 94 16 123/63 96 % 03/05/20 0858 98.8 °F (37.1 °C) 93 16 132/85 95 % 03/05/20 0216 98.7 °F (37.1 °C) 94 16 142/81 96 % 03/05/20 0145 98.9 °F (37.2 °C)      
03/04/20 2050 100.4 °F (38 °C) (!) 109 18 132/59 94 % List number of doses of PRN medications in last 24 hours: 
 
Medication 1:  Morphine 2mg IV every 4 Hours as needed for pain or shortness of breath Number of doses: 1 dose (3/4/20 at 21:08) Medication 2: Haldol 2mg IV or SL every 4 hours as needed for agitation, nausea or vomiting Number of doses: 0 Medication 3:  Robinul 0.2 mg IV every 4 hours as needed for secretions Number of doses:  0 Medication 4: Tylenol suppository 650mg per rectum every 4 hours as needed for fever Number of doses: one dose at 21:03 on 3/4/20 for temp 100.4 axillary Currently this patient has: 
 
[] Supplemental O2 [x] IV   
[] PICC [] PORT  
[] NG Tube   
[] PEG Tube  
[] Ostomy    
[] Green draining _______ urine 
[] Other: ALLERGIES AND MEDICATIONS Allergies: No Known Allergies Current Facility-Administered Medications Medication Dose Route Frequency  haloperidol (HALDOL) 2 mg/mL oral solution 2 mg  2 mg SubLINGual Q4H PRN  Or  
 haloperidol lactate (HALDOL) injection 2 mg  2 mg IntraVENous Q4H PRN  
 morphine injection 2 mg  2 mg IntraVENous Q1H PRN  
 glycopyrrolate (ROBINUL) injection 0.2 mg  0.2 mg IntraVENous Q4H PRN  
  acetaminophen (TYLENOL) tablet 650 mg  650 mg Oral Q4H PRN Or  
 acetaminophen (TYLENOL) solution 650 mg  650 mg Oral Q4H PRN Or  
 acetaminophen (TYLENOL) suppository 650 mg  650 mg Rectal Q4H PRN  
 alteplase (CATHFLO) 1 mg in sterile water (preservative free) 1 mL injection  1 mg InterCATHeter PRN  
 bacitracin 500 unit/gram packet 1 Packet  1 Packet Topical PRN  
 balsam peru-castor oil (VENELEX) ointment   Topical BID  
 0.9% sodium chloride infusion 250 mL  250 mL IntraVENous PRN  
 sodium chloride (NS) flush 5-40 mL  5-40 mL IntraVENous Q8H  
 sodium chloride (NS) flush 5-40 mL  5-40 mL IntraVENous PRN  
 ondansetron (ZOFRAN) injection 4 mg  4 mg IntraVENous Q6H PRN Note:  Pt currently lying still in bed. Opens eyes to verbal stimuli but did not respond to questions. Vital signs currently stable, pt on comfort meds. Has received no doses of Haldol or Robinol, received one dose of morphine and one dose of tylenol supp last evening. Pt does not currently meet GIP criteria for hospice (patient does not appear to have uncontrolled symptoms requiring symptom management that cannot be provided in any other setting). This nurse called and left message for Hemant Lawson CM who saw patient yesterday. Per Ms. Brigid Mcpherson' note,  pt cannot go home with hospice care as there is not identified caregiver. Medicaid was applied for by the son in January 2020. Ms. Brigid Mcpherson checked the Medicaid eligibility line [phone] and Medicaid is not active to date. LTC placement is pending a payer source. Pt for possible transition to [address] [place] [address] for continued comfort care until Medicaid is approved. Son was in agreement to [address] [place] [address]. Ms. Brigid Mcpherson sent referral  to [facility] CATHERINE, RN, ACoordinator at [address] [place] [address]. Will re-visit pt tomorrow to re-assess for hospice GIP.   If pt were to meet GIP criteria, GIP level of care  is intended to be short term and is not intended to be nursing home or residential.  
 Once a patient's symptoms are stabilized, or pain is managed, he/she must transition to a routine level of hospice care at home or in a facility. KELLY Souza, RN-BC Hospice Nurse Liaison [address] [facility] Mobile:  [phone] Office: [phone]

## 2020-03-05 NOTE — PROGRESS NOTES
NUTRITION 
 
RD PLAN OF CARE:  
Pt due for follow up from this service. Chart reviewed, discussed with Nursing. Diet: NPO - has been unable to participate with SLP d/t lethargy and confusion. Noted palliative and hospice consults. Pt was made comfort measures only yesterday and feeding tube removed. Hospice team is following for potential admission to [facility] level of care vs routine. Aggressive nutrition intervention is not indicated at this time. Will sign off. Given established goals, would recommend allowing PO for comfort if pt expresses any desire. SLP evaluation should not be needed under current plan.  
 
Alla Zamora RD

## 2020-03-06 NOTE — PROGRESS NOTES
Palliative Medicine Consult [place]: [phone] Patient Name: Denita Callaway YOB: 1945 Date of Initial Consult: 2/21/20 Reason for Consult: care decisions Requesting Provider: Dr. Adrien Suarez Primary Care Physician: Milly Jaeger MD 
 
 SUMMARY:  
Denita Callaway is a 76 y. o. with a past history of  labile DM type 2, CKD 3, hypertension, h/o alcoholism in remission, debility who was admitted on 2/13/2020 from the ED after being found unresponsive at [facility]. Hospital course has included treatment for DKA/HHS, hypernatremia, JOSH and severe sepsis 2nd to E. Coli UTI. He was intubated in the ED for airway protection, later extubated to [facility] and is now on RA. Recent admissions include 1/1-1/8 for episode of confusion (presented from home) and 1/21-1/30 (from SNF) for dehydration and failure to thrive after refusal to eat/drink at rehab for a couple of days. Current medical issues leading to Palliative Medicine involvement include:  Poor prognosis, recent decline and rehospitalization, continuity of care. Psychosocial- Born in [facility]", , two sons Esteban Dorado (whom he lives with along with Ace's longtime girlfriend Mark) and [facility] Also has a stepdaughter Gaston Rushing. Prior to 1/1 hospitalization he was more functional and independent but showing signs of decline. Since 1/1 acute episode of confusion and subsequent persistent confusion. He has transitioned to need for LTC / 24 hr supervision. PALLIATIVE DIAGNOSES:  
1. End of life care 2. Thick oral secretions 3. Frailty 4. Debility 5. Feeding difficulties- FIGUEROA stopped 3/4 
6. Labile DM type 2- A1c 10.2 in Jan.  
7. Abnormal CT head- Chronic microvascular ischemic changes, severe cerebral atrophy (1/21/20) 8. Alcoholism in remission PLAN:  
1. F/u visit to assess symptoms. 2. Pt resting comfortably, no distress.   He responded and affirmed he had no pain or duress. 3. PRNs: 
1. Morphine 2 mg IV x 1 (21:00) 2. APAP pr x 1 (21:00) 4. Note patient does have an order for Bites and Sips for comfort (nursing miscellaneous orde)r. No specific diet order as patient not alert enough for full tray and not expressing interest.   
 
5. From 3/4:  I talked with Arlet to confirm and review plan. He reiterated that goals were to focus on his Father's comfort, reduce pain, suffering and not prolong his life artificially. He states his brother Shawn Osuna (who has participated in our past conversations) is on board and hopeful for same. Reviewed plan as follows: 1. Comfort focused care 2. Stop enteral feeds today 3. Remove dobhoff 4. No further diagnostics including labs / imaging 5. Stop IV antibiotics (empiric Ceftriaxone since 2/26- had E. Coli UTI on 2/13 and completed two week course previously) 6. Start Haldol 2 mg sl/IV q4h prn in case of agitation 7. Start Morphine 2 mg IV q1h prn in case of dyspnea or non-verbal severe pain 8. Add TX/PO APAP for fever (can use Toradol if necessary) 9. Add glycopyrrolate 0.2 mg q4h prn for secretions. 10. Continue glucose checks q6h for the next 24-48 hrs with prn insulin coverage, then reassess. I talked with son that this may not be necessary to continue as I do not anticipate he will take in much by mouth if anything at all. 6. Arlet signed a DDNR, placed in chart. 6. Anticipate EOL in days to short weeks. 7. Hospice following for potential symptom management needs which would allow for GIP level of care. Otherwise CM involved in dispo planning to long term care facility. 8. Thank you for the opportunity to care for Mr. Luzmaria Carrillo. 9. I will sign off at this time. Please call if needed. 10. Communicated plan of care with: Palliative IDTZunilda [facility] Team. 
 
 GOALS OF CARE / TREATMENT PREFERENCES:  
 
GOALS OF CARE: 
Patient/Health Care Proxy Stated Goals: Comfort TREATMENT PREFERENCES:  
Code Status: DNR Advance Care Planning: 
[x] [facility] Interdisciplinary Team has updated the ACP Navigator with [address] and Patient Capacity Primary Decision Maker (Active): Estee Guevara - [phone] Primary Decision MakeTremayne Davidson - [phone] Advance Care Planning 2/24/2020 Patient's Healthcare Decision Maker is: - Confirm Advance Directive None Patient Would Like to Complete Advance Directive - Medical Interventions: Comfort measures Artificially Administered Nutrition: No feeding tube Other: As far as possible, the palliative care team has discussed with patient / health care proxy about goals of care / treatment preferences for patient. HISTORY:  
 
History obtained from:  Chart review CHIEF COMPLAINT:  Pt not able to report HPI/SUBJECTIVE: The patient is:  
[] Verbal and participatory [x] Non-participatory due to:   Limited responsiveness 2/27- alert today but minimally verbal.  Able to state he wanted cold water. Appears slightly uncomfortable. 3/4- alert, able to vocalize softly one word answers when prompted, otherwise not engaged in conversation or able to easily communicate wants/needs. No overt distress. Clinical Pain Assessment (nonverbal scale for severity on nonverbal patients):  
Clinical Pain Assessment Severity: 0 Activity (Movement): Seeking attention through movement or slow, cautious movement Duration: for how long has pt been experiencing pain (e.g., 2 days, 1 month, years) Frequency: how often pain is an issue (e.g., several times per day, once every few days, constant) FUNCTIONAL ASSESSMENT:  
 
Palliative Performance Scale (PPS): PPS: 10  PSYCHOSOCIAL/SPIRITUAL SCREENING:  
 
Palliative IDT has assessed this patient for cultural preferences / practices and a referral made as appropriate to needs [employer], Patient Advocacy, Ethics, etc.) Any spiritual / Anabaptist concerns: 
[] Yes /  [x] No 
 
Caregiver Burnout: 
[] Yes /  [] No /  [x] No Caregiver Present Anticipatory grief assessment:  
[x] Normal  / [] Maladaptive ESAS Anxiety: ESAS Depression:    
 
 
 REVIEW OF SYSTEMS:  
 
Positive and pertinent negative findings in ROS are noted above in HPI. The following systems were [] reviewed / [x] unable to be reviewed as noted in HPI Other findings are noted below. Systems: constitutional, ears/nose/mouth/throat, respiratory, gastrointestinal, genitourinary, musculoskeletal, integumentary, neurologic, psychiatric, endocrine. Positive findings noted below. Modified ESAS Completed by: provider Pain: 0 Dyspnea: 0 Stool Occurrence(s): 1 PHYSICAL EXAM:  
 
From RN flowsheet: 
Wt Readings from Last 3 Encounters:  
03/05/20 54.3 kg (119 lb 12.8 oz) 01/22/20 53.7 kg (118 lb 7.6 oz) 01/08/20 62 kg (136 lb 11 oz) Blood pressure 123/63, pulse 94, temperature 98.3 °F (36.8 °C), resp. rate 16, height 5' 5\" (1.651 m), weight 54.3 kg (119 lb 12.8 oz), SpO2 96 %. Pain Scale 1: Visual 
Pain Intensity 1: 7 Pain Onset 1: acute Pain Location 1: Generalized Pain Intervention(s) 1: Medication (see MAR) Last bowel movement, if known:  FMS in place Frail ill appearing] AA male lying in bed Respirations unlabored Thick oral secretions in mouth Abdomen scaphoid, non tender LEs w/ dependent edema Alert today, able to follow simple command (squeezed his left hand), minimally vocalizes Not restless nor agitated. HISTORY:  
 
Principal Problem: 
  Encephalopathy (2/13/2020) Active Problems: 
  JOSH (acute kidney injury) ([facility] (1/21/2020) DKA (diabetic ketoacidoses) ([facility] (2/13/2020) Past Medical History:  
Diagnosis Date  Diabetes ([address] 2002  High cholesterol  Hypertension  Stroke ([facility] M3 occlusion Jan 2020 History reviewed. No pertinent surgical history. Family History Problem Relation Age of Onset  No Known Problems Mother  No Known Problems Father  No Known Problems Sister  No Known Problems Brother  No Known Problems Brother  No Known Problems Brother  No Known Problems Brother  No Known Problems Brother  No Known Problems Brother  No Known Problems Maternal Grandmother  No Known Problems Maternal Grandfather  No Known Problems Paternal Grandmother  No Known Problems Paternal Grandfather  Diabetes Neg Hx   
 Heart Disease Neg Hx History reviewed, no pertinent family history. Social History Tobacco Use  Smoking status: Never Smoker  Smokeless tobacco: Never Used Substance Use Topics  Alcohol use: No  
  Alcohol/week: 0.0 standard drinks No Known Allergies Current Facility-Administered Medications Medication Dose Route Frequency  haloperidol (HALDOL) 2 mg/mL oral solution 2 mg  2 mg SubLINGual Q4H PRN Or  
 haloperidol lactate (HALDOL) injection 2 mg  2 mg IntraVENous Q4H PRN  
 morphine injection 2 mg  2 mg IntraVENous Q1H PRN  
 glycopyrrolate (ROBINUL) injection 0.2 mg  0.2 mg IntraVENous Q4H PRN  
 acetaminophen (TYLENOL) tablet 650 mg  650 mg Oral Q4H PRN Or  
 acetaminophen (TYLENOL) solution 650 mg  650 mg Oral Q4H PRN  Or  
 acetaminophen (TYLENOL) suppository 650 mg  650 mg Rectal Q4H PRN  
 alteplase (CATHFLO) 1 mg in sterile water (preservative free) 1 mL injection  1 mg InterCATHeter PRN  
 bacitracin 500 unit/gram packet 1 Packet  1 Packet Topical PRN  
 balsam peru-castor oil (VENELEX) ointment   Topical BID  
 0.9% sodium chloride infusion 250 mL  250 mL IntraVENous PRN  
 sodium chloride (NS) flush 5-40 mL  5-40 mL IntraVENous Q8H  
 sodium chloride (NS) flush 5-40 mL  5-40 mL IntraVENous PRN  
 ondansetron (ZOFRAN) injection 4 mg  4 mg IntraVENous Q6H PRN  
 
 
 
 LAB AND IMAGING FINDINGS:  
 
Lab Results Component Value Date/Time WBC 12.3 (H) 03/02/2020 11:33 AM  
 HGB 7.6 (L) 03/02/2020 11:33 AM  
 PLATELET 902 25/25/1033 11:33 AM  
 
Lab Results Component Value Date/Time Sodium 136 03/02/2020 11:33 AM  
 Potassium 4.2 03/02/2020 11:33 AM  
 Chloride 105 03/02/2020 11:33 AM  
 CO2 25 03/02/2020 11:33 AM  
 BUN 14 03/02/2020 11:33 AM  
 Creatinine 0.84 03/02/2020 11:33 AM  
 Calcium 8.0 (L) 03/02/2020 11:33 AM  
 Magnesium 1.5 (L) 02/23/2020 04:40 AM  
 Phosphorus 2.4 (L) 02/23/2020 04:40 AM  
  
Lab Results Component Value Date/Time AST (SGOT) 83 (H) 02/29/2020 01:55 AM  
 Alk. phosphatase 709 (H) 02/29/2020 01:55 AM  
 Protein, total 6.3 (L) 02/29/2020 01:55 AM  
 Albumin 1.7 (L) 02/29/2020 01:55 AM  
 Globulin 4.6 (H) 02/29/2020 01:55 AM  
 
Lab Results Component Value Date/Time INR 1.2 (H) 02/22/2020 04:47 AM  
 Prothrombin time 12.2 (H) 02/22/2020 04:47 AM  
 aPTT 23.9 02/02/2019 03:56 PM  
  
No results found for: IRON, FE, TIBC, IBCT, PSAT, FERR No results found for: PH, PCO2, PO2 No components found for: [place] Lab Results Component Value Date/Time CK 1,654 (H) 02/13/2020 06:04 PM  
 CK - MB <0.5 (L) 04/17/2016 12:46 AM  
  
 
 
   
 
Total time:   
Counseling / coordination time, spent as noted above:  
> 50% counseling / coordination?:  
 
Prolonged service was provided for  []30 min   []75 min in face to face time in the presence of the patient, spent as noted above. Time Start:  
Time End:  
Note: this can only be billed with 56159 (initial) or 54454 (follow up). If multiple start / stop times, list each separately.

## 2020-03-06 NOTE — PROGRESS NOTES
Transition of Care Plan: 
  
* Referral made to [facility] * Medicaid Pending- Have placed consult to [employer]ist for F/U on Medicaid * Will Review for LTSS screen 
  
Referral has been sent to [facility] CATHERINE RN, ACoordinator at [facility]. Checked on status of referral and [facility] plans to have an answer by Monday 3/9/2020. Michael Pereira LCSW, CARRIE- Complex Care Coordinator/[place] C: [phone]

## 2020-03-06 NOTE — PROGRESS NOTES
[address] [facility] Adult  Hospitalist Group Hospitalist Progress Note Davie Higuera NP Answering service: [phone] OR [phone] from in house phone Date of Service:  3/6/2020 NAME:  Carlos Rojas :  1945 MRN:  [id number] Admission Summary:  
Patient was admitted to the ICU on 2020 from [facility]. · Acute metabolic encephalopathy · Septic shock · Acute respiratory failure requiring intubation · HHS/DKA 
  
20-year-old gentleman with past medical history of hypertension, diabetes, CVA, dementia 
 who was found unresponsive at at [facility] gas was checked by EMS and route and read \"high\".  In the emergency room on further work-up he was found to have severe DKA/HHS, acute kidney injury, severe lactic acidosis, hypothermia and a UTI.  He was then intubated for airway protection, on admission. Work-up revealed DKA/HHS, he was started on insulin gtt and managed in the ICU.  Work-up revealed pan sensitive E. coli in his urine, and blood.  He was started on broad-spectrum antibiotics, and then narrowed to ceftriaxone.  His mental status, has improved somewhat, however he is definitely not back to his baseline.  Due to his mental status he has not been able to tolerate any p.o., and he has been on NG feeds.  Hospital course is also been complicated by hypernatremia. Patient now on comfort care measures. Interval history / Subjective:  
  Patient seen and examined. Laying in bed with eyes closed. Slightly opens eyes to verbal and tactile  stimulation. He is on comfort care measures now. No signs of distress noted. Assessment & Plan:  
 
##on comfort measures now - Waiting for response from [facility] for hospice care. Sever Sepsis, septic shock with lactic acidosis Acute Hypoxic Respiratory failure secondary to E. Coli bacteremia, UTI  
DKA/HHS Dysphagia Hypernatremia JOSH Acute metabolic encephalopathy with baseline dementia Chronic thrombocytopenia Right hemiparesis HTN  
HLD  
  
  
Code status: DNR- Comfort Care DVT prophylaxis:  
 
Care Plan discussed with: Nurse Anticipated Disposition: Pending transfer to [address] [place] [address] Problems  Date Reviewed: 3/5/2020 Codes Class Noted POA DKA (diabetic ketoacidoses) ([facility] ICD-10-CM: E11.10 ICD-9-CM: 250.10  2/13/2020 Unknown * (Principal) Encephalopathy ICD-10-CM: G93.40 ICD-9-CM: 348.30  2/13/2020 Unknown JOSH (acute kidney injury) ([facility] ICD-10-CM: N17.9 ICD-9-CM: 584.9  1/21/2020 Unknown Review of Systems: A comprehensive review of systems was negative except for that written in the HPI. Vital Signs:  
 Last 24hrs VS reviewed since prior progress note. Most recent are: 
Visit Vitals /71 (BP 1 Location: Right arm, BP Patient Position: At rest) Pulse 99 Temp 98.4 °F (36.9 °C) Resp 17 Ht 5' 5\" (1.651 m) Wt 46.7 kg (103 lb) SpO2 97% BMI 17.14 kg/m² No intake or output data in the 24 hours ending 03/06/20 1308 Physical Examination:  
 
 
     
Constitutional:  No acute distress ENT:  Oral mucosa moist . Resp:  CTA bilaterally, diminished. No wheezing/rhonchi/rales. No accessory muscle use CV:  Regular rhythm and rate GI:  Soft, non distended, non tender. normoactive bowel sounds. Musculoskeletal:  No edema, warm, 2+ pulses throughout Neurologic:  Slight opening of eyes to verbal and tactile stimulation Skin:  Poor tugor Data Review:  
 Review and/or order of clinical lab test 
 
 
Labs:  
No results for input(s): WBC, HGB, HCT, PLT, HGBEXT, HCTEXT, PLTEXT in the last 72 hours. No results for input(s): NA, K, CL, CO2, BUN, CREA, GLU, CA, MG, PHOS, URICA in the last 72 hours. No results for input(s): SGOT, GPT, ALT, AP, TBIL, TBILI, TP, ALB, GLOB, GGT, AML, LPSE in the last 72 hours. No lab exists for component: AMYP, HLPSE No results for input(s): INR, PTP, APTT, INREXT in the last 72 hours. No results for input(s): FE, TIBC, PSAT, FERR in the last 72 hours. No results found for: FOL, RBCF No results for input(s): PH, PCO2, PO2 in the last 72 hours. No results for input(s): CPK, CKNDX, TROIQ in the last 72 hours. No lab exists for component: CPKMB Lab Results Component Value Date/Time Cholesterol, total 190 01/02/2020 04:06 AM  
 HDL Cholesterol 64 01/02/2020 04:06 AM  
 LDL, calculated 115.4 (H) 01/02/2020 04:06 AM  
 Triglyceride 53 01/02/2020 04:06 AM  
 CHOL/HDL Ratio 3.0 01/02/2020 04:06 AM  
 
Lab Results Component Value Date/Time Glucose (POC) 160 (H) 03/06/2020 12:27 AM  
 Glucose (POC) 268 (H) 03/04/2020 06:07 AM  
 Glucose (POC) 259 (H) 03/03/2020 11:43 PM  
 Glucose (POC) 345 (H) 03/03/2020 06:02 PM  
 Glucose (POC) 248 (H) 03/03/2020 11:44 AM  
 
Lab Results Component Value Date/Time Color YELLOW/STRAW 02/13/2020 01:07 PM  
 Appearance TURBID (A) 02/13/2020 01:07 PM  
 Specific gravity 1.020 02/13/2020 01:07 PM  
 pH (UA) 5.0 02/16/2020 08:33 AM  
 Protein 100 (A) 02/13/2020 01:07 PM  
 Glucose >1,000 (A) 02/13/2020 01:07 PM  
 Ketone NEGATIVE  02/13/2020 01:07 PM  
 Bilirubin NEGATIVE  02/13/2020 01:07 PM  
 Urobilinogen 0.2 02/13/2020 01:07 PM  
 Nitrites POSITIVE (A) 02/13/2020 01:07 PM  
 Leukocyte Esterase MODERATE (A) 02/13/2020 01:07 PM  
 Epithelial cells FEW 02/13/2020 01:07 PM  
 Bacteria 4+ (A) 02/13/2020 01:07 PM  
 WBC >100 (H) 02/13/2020 01:07 PM  
 RBC 5-10 02/13/2020 01:07 PM  
 
 
 
Medications Reviewed:  
 
Current Facility-Administered Medications Medication Dose Route Frequency  haloperidol (HALDOL) 2 mg/mL oral solution 2 mg  2 mg SubLINGual Q4H PRN  Or  
 haloperidol lactate (HALDOL) injection 2 mg  2 mg IntraVENous Q4H PRN  
 morphine injection 2 mg  2 mg IntraVENous Q1H PRN  
  glycopyrrolate (ROBINUL) injection 0.2 mg  0.2 mg IntraVENous Q4H PRN  
 acetaminophen (TYLENOL) tablet 650 mg  650 mg Oral Q4H PRN Or  
 acetaminophen (TYLENOL) solution 650 mg  650 mg Oral Q4H PRN Or  
 acetaminophen (TYLENOL) suppository 650 mg  650 mg Rectal Q4H PRN  
 alteplase (CATHFLO) 1 mg in sterile water (preservative free) 1 mL injection  1 mg InterCATHeter PRN  
 bacitracin 500 unit/gram packet 1 Packet  1 Packet Topical PRN  
 balsam peru-castor oil (VENELEX) ointment   Topical BID  
 0.9% sodium chloride infusion 250 mL  250 mL IntraVENous PRN  
 sodium chloride (NS) flush 5-40 mL  5-40 mL IntraVENous Q8H  
 sodium chloride (NS) flush 5-40 mL  5-40 mL IntraVENous PRN  
 ondansetron (ZOFRAN) injection 4 mg  4 mg IntraVENous Q6H PRN  
 
______________________________________________________________________ EXPECTED LENGTH OF STAY: 12d 9h 
ACTUAL LENGTH OF STAY:          [address] [facility],

## 2020-03-06 NOTE — HOSPICE
[facility] Good Help to Those in Need 
[phone] Hospice Liaison Note [facility] 1945 Age: 76 y.o. Date of Visit: 03/06/20 Facility of Care:  
Patient Room: [facility] 2[facility] Hospital Attending: Asif Shrestha MD 
[facility] Diagnosis: [facility]acidoses[facility] [E11.10] JOSH (acute kidney injury) ([facility] [N17.9] Encephalopathy [G93.40] CLINICAL INFORMATION Patient Vitals for the past 24 hrs: 
 Temp Pulse Resp BP SpO2  
03/06/20 1455 98.4 °F (36.9 °C) 68 17 145/78 97 % 03/06/20 0819 98.4 °F (36.9 °C) 99 17 113/71   
03/06/20 0346 98.7 °F (37.1 °C) 100 18 116/76 97 % 03/05/20 2142 99.2 °F (37.3 °C) 97 18 113/73 97 % List number of doses of PRN medications in last 24 hours: 
 
Medication 1:  Morphine 2mg IV every 4 Hours as needed for pain or shortness of breath Number of doses: 1 dose (3/5/20 at 17:43) Medication 2: Haldol 2mg IV or SL every 4 hours as needed for agitation, nausea or vomiting Number of doses: 0 Medication 3:  Robinul 0.2 mg IV every 4 hours as needed for secretions Number of doses:  0 Medication 4: Tylenol suppository 650mg per rectum every 4 hours as needed for fever Number of doses: 0 Currently this patient has: 
 
[] Supplemental O2 [x] IV   
[] PICC [] PORT  
[] NG Tube   
[] PEG Tube  
[] Ostomy    
[] Green draining _______ urine 
[] Other: ALLERGIES AND MEDICATIONS Allergies: No Known Allergies Current Facility-Administered Medications Medication Dose Route Frequency  haloperidol (HALDOL) 2 mg/mL oral solution 2 mg  2 mg SubLINGual Q4H PRN Or  
 haloperidol lactate (HALDOL) injection 2 mg  2 mg IntraVENous Q4H PRN  
 morphine injection 2 mg  2 mg IntraVENous Q1H PRN  
 glycopyrrolate (ROBINUL) injection 0.2 mg  0.2 mg IntraVENous Q4H PRN  
 acetaminophen (TYLENOL) tablet 650 mg  650 mg Oral Q4H PRN  Or  
 acetaminophen (TYLENOL) solution 650 mg  650 mg Oral Q4H PRN  
 Or  
 acetaminophen (TYLENOL) suppository 650 mg  650 mg Rectal Q4H PRN  
 alteplase (CATHFLO) 1 mg in sterile water (preservative free) 1 mL injection  1 mg InterCATHeter PRN  
 bacitracin 500 unit/gram packet 1 Packet  1 Packet Topical PRN  
 balsam peru-castor oil (VENELEX) ointment   Topical BID  
 0.9% sodium chloride infusion 250 mL  250 mL IntraVENous PRN  
 sodium chloride (NS) flush 5-40 mL  5-40 mL IntraVENous Q8H  
 sodium chloride (NS) flush 5-40 mL  5-40 mL IntraVENous PRN  
 ondansetron (ZOFRAN) injection 4 mg  4 mg IntraVENous Q6H PRN Note:  Pt currently lying still in bed. Eyes closed. Respirations unlabored. Vital signs currently stable, pt on comfort meds. Has received no doses of Haldol, Robinul, Tylenol, received one dose of morphine last evening. Pt does not currently meet GIP criteria for hospice (patient does not appear to have uncontrolled symptoms requiring symptom management that cannot be provided in any other setting). This nurse discussed with Juany Burroughs NP and Emmanuel Lazcano RN, Hospice Clinical Nurse Manager. Best option for disposition at this time appears to be [facility] for continued comfort care until Medicaid is approved. Per Eunice Gilliland, Care Manager's 3/6/20 note, referral has been sent to Jeanne ALEXANDER, DANNA, [facility] at [facility]. [facility] plans to have an answer by Monday 3/9/2020.    
 
MELECIO NevilleN, RN-BC Hospice Nurse Liaison [address] [facility] Mobile:  [phone] Office: [phone]

## 2020-03-07 NOTE — HOSPICE
[facility] Good Help to Those in Need 
[phone] Nursing Note Patient Name: Altaf Gupta YOB: 1945 Age: 76 y.o. Mu Moulton RN Note:  
 
Entered room; pt with eyes closed, appears to be sleeping. No s/s of distress. Pt is not inpatient hospice appropriate at this time. CM will working on discharge plan and possible placement at [facility] (see Milo Sauceda note from 3/6). Thank you for the opportunity to be of service to this patient and his family.

## 2020-03-07 NOTE — PROGRESS NOTES
[address] [facility] Adult  Hospitalist Group Date of Service:  3/7/2020 NAME:  Rich Messer :  1945 MRN:  [id number] Admission Summary:  
Patient was admitted to the ICU on 2020 from [facility]. · Acute metabolic encephalopathy · Septic shock · Acute respiratory failure requiring intubation · HHS/DKA 79-year-old gentleman with past medical history of hypertension, diabetes, CVA, dementia 
 who was found unresponsive at at [facility]. Blood gas was checked by EMS and route and read \"high\". In the emergency room on further work-up he was found to have severe DKA/HHS, acute kidney injury, severe lactic acidosis, hypothermia and a UTI. He was then intubated for airway protection, on admission. Work-up revealed DKA/HHS, he was started on insulin gtt and managed in the ICU. Work-up revealed pan sensitive E. coli in his urine, and blood. He was started on broad-spectrum antibiotics, and then narrowed to ceftriaxone. His mental status, has improved somewhat, however he is definitely not back to his baseline. Due to his mental status he has not been able to tolerate any p.o., and he has been on NG feeds. Hospital course is also been complicated by hypernatremia, for which he is on D5 and free water flushes. Interval history / Subjective:  
F/u severe sepsis Comfort care The patient is comfortably laying in bed No new issues Assessment & Plan:  
 
 
 ##on comfort measures now Severe sepsis, septic shock, with lactic acidosis, and acute hypoxic respiratory failure-secondary to E. coli bacteremia, UTI resolving 
-on comfort measures now, NGT removed DKA/HHSresolved (original BMP significant for glucose over 1000, CO2 less than 5), serum osmolality 415 Type 2 diabetes, A1c 10.6% Resolved Dysphagia-has not been able to work with speech due to his mental status Hypernatremia,  
resolved Acute kidney injury - 
on admission creatinine 5.9 has now down trended  
,resolved Acute hypoxic respiratory failure -now resolved Acute metabolic encephalopathy,baseline dementia 
-Thrombocytopenia chronic intermittent  
-stable Right hemiparesis noted as baseline on admission H&P  
-Lactic acidosis POA - resolved HTN - home amlodipine HLD - home statin Elevated LFTS 
- Access:Left subclavian central line. Placed,2/14 NGT,placed 2/13 Code status: DNR 
DVT prophylaxis: scd Plan: The patient is comfort care, awaiting transfer to [facility] for hospice Care Plan discussed with: Nurse Anticipated Disposition:as above Anticipated Discharge: as above 
[phone][facility] Hospital Problems  Date Reviewed: 3/5/2020 Codes Class Noted POA [facility] ICD-10-CM: E11.10 ICD-9-CM: 250.10  2/13/2020 Unknown * (Principal) Encephalopathy ICD-10-CM: G93.40 ICD-9-CM: 348.30  2/13/2020 Unknown JOSH (acute kidney injury) [facility] ICD-10-CM: N17.9 ICD-9-CM: 584.9  1/21/2020 Unknown Review of Systems:  
Review of systems not obtained due to patient factors. Vital Signs:  
 Last 24hrs VS reviewed since prior progress note. Most recent are: 
Visit Vitals BP (!) 155/91 (BP 1 Location: Right arm, BP Patient Position: At rest) Pulse 97 Temp 97.4 °F (36.3 °C) Resp 17 Ht 5' 5\" (1.651 m) Wt 46.7 kg (103 lb) SpO2 98% BMI 17.14 kg/m² No intake or output data in the 24 hours ending 03/07/20 0735 Physical Examination:  
 
 
     
Constitutional: Lethargic, opens eyes spontaneously ENT: stable Resp: Symmetrical air entry. On oxygen via nasal canula CV:  Regular rhythm, normal rate, no murmurs, gallops, rubs GI:  Soft, non distended, non tender. normoactive bowel sounds, no hepatosplenomegaly Musculoskeletal:  SCDs in place,+1 edema legs Neurologic: Lethargic,non verbal,rigth hemiparesis. Moves the left side Data Review:  
 Review and/or order of clinical lab test 
Review and/or order of tests in the radiology section of CPT Review and/or order of tests in the medicine section of CPT Labs:  
 
No results for input(s): WBC, HGB, HCT, PLT, HGBEXT, HCTEXT, PLTEXT, HGBEXT, HCTEXT, PLTEXT in the last 72 hours. No results for input(s): NA, K, CL, CO2, BUN, CREA, GLU, CA, MG, PHOS, URICA in the last 72 hours. No results for input(s): SGOT, GPT, ALT, AP, TBIL, TBILI, TP, ALB, GLOB, GGT, AML, LPSE in the last 72 hours. No lab exists for component: AMYP, HLPSE No results for input(s): INR, PTP, APTT, INREXT, INREXT in the last 72 hours. No results for input(s): FE, TIBC, PSAT, FERR in the last 72 hours. No results found for: FOL, RBCF No results for input(s): PH, PCO2, PO2 in the last 72 hours. No results for input(s): CPK, CKNDX, TROIQ in the last 72 hours. No lab exists for component: CPKMB Lab Results Component Value Date/Time Cholesterol, total 190 01/02/2020 04:06 AM  
 HDL Cholesterol 64 01/02/2020 04:06 AM  
 LDL, calculated 115.4 (H) 01/02/2020 04:06 AM  
 Triglyceride 53 01/02/2020 04:06 AM  
 CHOL/HDL Ratio 3.0 01/02/2020 04:06 AM  
 
Lab Results Component Value Date/Time Glucose (POC) 160 (H) 03/06/2020 12:27 AM  
 Glucose (POC) 268 (H) 03/04/2020 06:07 AM  
 Glucose (POC) 259 (H) 03/03/2020 11:43 PM  
 Glucose (POC) 345 (H) 03/03/2020 06:02 PM  
 Glucose (POC) 248 (H) 03/03/2020 11:44 AM  
 
Lab Results Component Value Date/Time  Color YELLOW/STRAW 02/13/2020 01:07 PM  
 Appearance TURBID (A) 02/13/2020 01:07 PM  
 Specific gravity 1.020 02/13/2020 01:07 PM  
 pH (UA) 5.0 02/16/2020 08:33 AM  
 Protein 100 (A) 02/13/2020 01:07 PM  
 Glucose >1,000 (A) 02/13/2020 01:07 PM  
 Ketone NEGATIVE  02/13/2020 01:07 PM  
 Bilirubin NEGATIVE  02/13/2020 01:07 PM  
 Urobilinogen 0.2 02/13/2020 01:07 PM  
 Nitrites POSITIVE (A) 02/13/2020 01:07 PM  
 Leukocyte Esterase MODERATE (A) 02/13/2020 01:07 PM  
 Epithelial cells FEW 02/13/2020 01:07 PM  
 Bacteria 4+ (A) 02/13/2020 01:07 PM  
 WBC >100 (H) 02/13/2020 01:07 PM  
 RBC 5-10 02/13/2020 01:07 PM  
 
 
 
Medications Reviewed:  
 
Current Facility-Administered Medications Medication Dose Route Frequency  haloperidol (HALDOL) 2 mg/mL oral solution 2 mg  2 mg SubLINGual Q4H PRN Or  
 haloperidol lactate (HALDOL) injection 2 mg  2 mg IntraVENous Q4H PRN  
 morphine injection 2 mg  2 mg IntraVENous Q1H PRN  
 glycopyrrolate (ROBINUL) injection 0.2 mg  0.2 mg IntraVENous Q4H PRN  
 acetaminophen (TYLENOL) tablet 650 mg  650 mg Oral Q4H PRN Or  
 acetaminophen (TYLENOL) solution 650 mg  650 mg Oral Q4H PRN Or  
 acetaminophen (TYLENOL) suppository 650 mg  650 mg Rectal Q4H PRN  
 alteplase (CATHFLO) 1 mg in sterile water (preservative free) 1 mL injection  1 mg InterCATHeter PRN  
 bacitracin 500 unit/gram packet 1 Packet  1 Packet Topical PRN  
 balsam peru-castor oil (VENELEX) ointment   Topical BID  
 0.9% sodium chloride infusion 250 mL  250 mL IntraVENous PRN  
 sodium chloride (NS) flush 5-40 mL  5-40 mL IntraVENous Q8H  
 sodium chloride (NS) flush 5-40 mL  5-40 mL IntraVENous PRN  
 ondansetron (ZOFRAN) injection 4 mg  4 mg IntraVENous Q6H PRN  
 
______________________________________________________________________ EXPECTED LENGTH OF STAY: 12d 9h 
ACTUAL LENGTH OF STAY:          Irene Varela MD

## 2020-03-07 NOTE — PROGRESS NOTES
Problem: Non-Violent Restraints Goal: *Removal from restraints as soon as assessed to be safe Outcome: Progressing Towards Goal 
Goal: *No harm/injury to patient while restraints in use Outcome: Progressing Towards Goal 
Goal: *Patient's dignity will be maintained Outcome: Progressing Towards Goal 
Goal: *Patient Specific Goal (EDIT GOAL, INSERT TEXT) Outcome: Progressing Towards Goal 
Goal: Non-violent Restaints:Standard Interventions Outcome: Progressing Towards Goal 
Goal: Non-violent Restraints:Patient Interventions Outcome: Progressing Towards Goal 
Goal: Patient/Family Education Outcome: Progressing Towards Goal 
  
Problem: Pressure Injury - Risk of 
Goal: *Prevention of pressure injury Description Document Doni Scale and appropriate interventions in the flowsheet. Outcome: Progressing Towards Goal 
Note: Pressure Injury Interventions: 
Sensory Interventions: Assess changes in LOC, Float heels Moisture Interventions: Absorbent underpads Activity Interventions: Pressure redistribution bed/mattress(bed type) Mobility Interventions: Float heels, Turn and reposition approx. every two hours(pillow and wedges) Nutrition Interventions: Document food/fluid/supplement intake Friction and Shear Interventions: Apply protective barrier, creams and emollients, Foam dressings/transparent film/skin sealants

## 2020-03-07 NOTE — PROGRESS NOTES
Problem: Pressure Injury - Risk of 
Goal: *Prevention of pressure injury Description Document Doni Scale and appropriate interventions in the flowsheet. Outcome: Progressing Towards Goal 
Note: Pressure Injury Interventions: 
Sensory Interventions: Assess changes in LOC, Check visual cues for pain, Float heels, Keep linens dry and wrinkle-free, Minimize linen layers, Pressure redistribution bed/mattress (bed type), Turn and reposition approx. every two hours (pillows and wedges if needed) Moisture Interventions: Absorbent underpads, Apply protective barrier, creams and emollients, Check for incontinence Q2 hours and as needed, Minimize layers, Moisture barrier Activity Interventions: Pressure redistribution bed/mattress(bed type) Mobility Interventions: Float heels, Pressure redistribution bed/mattress (bed type), Turn and reposition approx. every two hours(pillow and wedges) Nutrition Interventions: Document food/fluid/supplement intake Friction and Shear Interventions: Apply protective barrier, creams and emollients, Minimize layers, Lift sheet Problem: Falls - Risk of 
Goal: *Absence of Falls Description Document Vani Hoffmann Fall Risk and appropriate interventions in the flowsheet. Outcome: Progressing Towards Goal 
Note: Fall Risk Interventions: 
Mobility Interventions: Communicate number of staff needed for ambulation/transfer Mentation Interventions: Door open when patient unattended, Adequate sleep, hydration, pain control, Room close to nurse's station Medication Interventions: Evaluate medications/consider consulting pharmacy Elimination Interventions: Toileting schedule/hourly rounds History of Falls Interventions: Door open when patient unattended Problem: Impaired Skin Integrity/Pressure Injury Treatment Goal: *Improvement of Existing Pressure Injury Outcome: Progressing Towards Goal 
Goal: *Prevention of pressure injury Description Document Doni Scale and appropriate interventions in the flowsheet. Outcome: Progressing Towards Goal 
Note: Pressure Injury Interventions: 
Sensory Interventions: Assess changes in LOC, Check visual cues for pain, Float heels, Keep linens dry and wrinkle-free, Minimize linen layers, Pressure redistribution bed/mattress (bed type), Turn and reposition approx. every two hours (pillows and wedges if needed) Moisture Interventions: Absorbent underpads, Apply protective barrier, creams and emollients, Check for incontinence Q2 hours and as needed, Minimize layers, Moisture barrier Activity Interventions: Pressure redistribution bed/mattress(bed type) Mobility Interventions: Float heels, Pressure redistribution bed/mattress (bed type), Turn and reposition approx. every two hours(pillow and wedges) Nutrition Interventions: Document food/fluid/supplement intake Friction and Shear Interventions: Apply protective barrier, creams and emollients, Minimize layers, Lift sheet

## 2020-03-08 NOTE — PROGRESS NOTES
[address] [facility] Adult  Hospitalist Group Date of Service:  3/8/2020 NAME:  Mega Gutierrez :  1945 MRN:  [id number] Admission Summary:  
Patient was admitted to the ICU on 2020 from [facility]. · Acute metabolic encephalopathy · Septic shock · Acute respiratory failure requiring intubation · HHS/DKA 42-year-old gentleman with past medical history of hypertension, diabetes, CVA, dementia 
 who was found unresponsive at at [facility]. Blood gas was checked by EMS and route and read \"high\". In the emergency room on further work-up he was found to have severe DKA/HHS, acute kidney injury, severe lactic acidosis, hypothermia and a UTI. He was then intubated for airway protection, on admission. Work-up revealed DKA/HHS, he was started on insulin gtt and managed in the ICU. Work-up revealed pan sensitive E. coli in his urine, and blood. He was started on broad-spectrum antibiotics, and then narrowed to ceftriaxone. His mental status, has improved somewhat, however he is definitely not back to his baseline. Due to his mental status he has not been able to tolerate any p.o., and he has been on NG feeds. Hospital course is also been complicated by hypernatremia, for which he is on D5 and free water flushes. Interval history / Subjective:  
F/u severe sepsis Comfort care The patient is comfortably laying in bed Awake, alert No new issues Assessment & Plan: ##Continues on comfort measures Severe sepsis, septic shock, with lactic acidosis, and acute hypoxic respiratory failure-secondary to E. coli bacteremia, UTI resolving 
-on comfort measures now, NGT removed DKA/HHSresolved (original BMP significant for glucose over 1000, CO2 less than 5), serum osmolality 415 Type 2 diabetes, A1c 10.6% Resolved Dysphagia-has not been able to work with speech due to his mental status Hypernatremia,  
resolved Acute kidney injury - 
on admission creatinine 5.9 has now down trended  
,resolved Acute hypoxic respiratory failure -now resolved Acute metabolic encephalopathy,baseline dementia 
-Thrombocytopenia chronic intermittent  
-stable Right hemiparesis noted as baseline on admission H&P  
-Lactic acidosis POA - resolved HTN - home amlodipine HLD - home statin Elevated LFTS 
- Access:Left subclavian central line. Placed,2/14 NGT,placed 2/13 Code status: DNR 
DVT prophylaxis: scd Plan: The patient is comfort care, awaiting transfer to ? [facility] for hospice Care Plan discussed with: Nurse Anticipated Disposition:as above Anticipated Discharge: as above 
irais Askew Overall on the [facility]([phone]) Hospital Problems  Date Reviewed: 3/5/2020 Codes Class Noted POA DKA (diabetic ketoacidoses) ([facility] ICD-10-CM: E11.10 ICD-9-CM: 250.10  2/13/2020 Unknown * (Principal) Encephalopathy ICD-10-CM: G93.40 ICD-9-CM: 348.30  2/13/2020 Unknown JOSH (acute kidney injury) ([facility] ICD-10-CM: N17.9 ICD-9-CM: 584.9  1/21/2020 Unknown Review of Systems:  
Review of systems not obtained due to patient factors. Vital Signs:  
 Last 24hrs VS reviewed since prior progress note. Most recent are: 
Visit Vitals /83 (BP 1 Location: Right arm, BP Patient Position: At rest) Pulse (!) 101 Temp 98.1 °F (36.7 °C) Resp 17 Ht 5' 5\" (1.651 m) Wt 47.1 kg (103 lb 14.4 oz) SpO2 97% BMI 17.29 kg/m² No intake or output data in the 24 hours ending 03/08/20 0933 Physical Examination:  
 
 
     
Constitutional: Lethargic, opens eyes spontaneously ENT: stable Resp: Symmetrical air entry. On oxygen via nasal canula CV:  Regular rhythm, normal rate, no murmurs, gallops, rubs GI:  Soft, non distended, non tender.  normoactive bowel sounds, no hepatosplenomegaly Musculoskeletal:  SCDs in place,+1 edema legs Neurologic: Lethargic,non verbal,rigth hemiparesis. Moves the left side Data Review:  
 Review and/or order of clinical lab test 
Review and/or order of tests in the radiology section of CPT Review and/or order of tests in the medicine section of CPT Labs:  
 
No results for input(s): WBC, HGB, HCT, PLT, HGBEXT, HCTEXT, PLTEXT, HGBEXT, HCTEXT, PLTEXT in the last 72 hours. No results for input(s): NA, K, CL, CO2, BUN, CREA, GLU, CA, MG, PHOS, URICA in the last 72 hours. No results for input(s): SGOT, GPT, ALT, AP, TBIL, TBILI, TP, ALB, GLOB, GGT, AML, LPSE in the last 72 hours. No lab exists for component: AMYP, HLPSE No results for input(s): INR, PTP, APTT, INREXT, INREXT in the last 72 hours. No results for input(s): FE, TIBC, PSAT, FERR in the last 72 hours. No results found for: FOL, RBCF No results for input(s): PH, PCO2, PO2 in the last 72 hours. No results for input(s): CPK, CKNDX, TROIQ in the last 72 hours. No lab exists for component: CPKMB Lab Results Component Value Date/Time Cholesterol, total 190 01/02/2020 04:06 AM  
 HDL Cholesterol 64 01/02/2020 04:06 AM  
 LDL, calculated 115.4 (H) 01/02/2020 04:06 AM  
 Triglyceride 53 01/02/2020 04:06 AM  
 CHOL/HDL Ratio 3.0 01/02/2020 04:06 AM  
 
Lab Results Component Value Date/Time Glucose (POC) 160 (H) 03/06/2020 12:27 AM  
 Glucose (POC) 268 (H) 03/04/2020 06:07 AM  
 Glucose (POC) 259 (H) 03/03/2020 11:43 PM  
 Glucose (POC) 345 (H) 03/03/2020 06:02 PM  
 Glucose (POC) 248 (H) 03/03/2020 11:44 AM  
 
Lab Results Component Value Date/Time  Color YELLOW/STRAW 02/13/2020 01:07 PM  
 Appearance TURBID (A) 02/13/2020 01:07 PM  
 Specific gravity 1.020 02/13/2020 01:07 PM  
 pH (UA) 5.0 02/16/2020 08:33 AM  
 Protein 100 (A) 02/13/2020 01:07 PM  
 Glucose >1,000 (A) 02/13/2020 01:07 PM  
 Ketone NEGATIVE  02/13/2020 01:07 PM  
 Bilirubin NEGATIVE  02/13/2020 01:07 PM  
 Urobilinogen 0.2 02/13/2020 01:07 PM  
 Nitrites POSITIVE (A) 02/13/2020 01:07 PM  
 Leukocyte Esterase MODERATE (A) 02/13/2020 01:07 PM  
 Epithelial cells FEW 02/13/2020 01:07 PM  
 Bacteria 4+ (A) 02/13/2020 01:07 PM  
 WBC >100 (H) 02/13/2020 01:07 PM  
 RBC 5-10 02/13/2020 01:07 PM  
 
 
 
Medications Reviewed:  
 
Current Facility-Administered Medications Medication Dose Route Frequency  haloperidol (HALDOL) 2 mg/mL oral solution 2 mg  2 mg SubLINGual Q4H PRN Or  
 haloperidol lactate (HALDOL) injection 2 mg  2 mg IntraVENous Q4H PRN  
 morphine injection 2 mg  2 mg IntraVENous Q1H PRN  
 glycopyrrolate (ROBINUL) injection 0.2 mg  0.2 mg IntraVENous Q4H PRN  
 acetaminophen (TYLENOL) tablet 650 mg  650 mg Oral Q4H PRN Or  
 acetaminophen (TYLENOL) solution 650 mg  650 mg Oral Q4H PRN Or  
 acetaminophen (TYLENOL) suppository 650 mg  650 mg Rectal Q4H PRN  
 alteplase (CATHFLO) 1 mg in sterile water (preservative free) 1 mL injection  1 mg InterCATHeter PRN  
 bacitracin 500 unit/gram packet 1 Packet  1 Packet Topical PRN  
 balsam peru-castor oil (VENELEX) ointment   Topical BID  
 0.9% sodium chloride infusion 250 mL  250 mL IntraVENous PRN  
 sodium chloride (NS) flush 5-40 mL  5-40 mL IntraVENous Q8H  
 sodium chloride (NS) flush 5-40 mL  5-40 mL IntraVENous PRN  
 ondansetron (ZOFRAN) injection 4 mg  4 mg IntraVENous Q6H PRN  
 
______________________________________________________________________ EXPECTED LENGTH OF STAY: 12d 9h 
ACTUAL LENGTH OF STAY:          Etta Mosley MD

## 2020-03-09 NOTE — PROGRESS NOTES
[address] [facility] Adult  Hospitalist Group Date of Service:  3/9/2020 NAME:  Rikki Chaney :  1945 MRN:  [id number] Admission Summary:  
Patient was admitted to the ICU on 2020 from [facility]. · Acute metabolic encephalopathy · Septic shock · Acute respiratory failure requiring intubation · HHS/DKA 51-year-old gentleman with past medical history of hypertension, diabetes, CVA, dementia 
 who was found unresponsive at at [facility]. Blood gas was checked by EMS and route and read \"high\". In the emergency room on further work-up he was found to have severe DKA/HHS, acute kidney injury, severe lactic acidosis, hypothermia and a UTI. He was then intubated for airway protection, on admission. Work-up revealed DKA/HHS, he was started on insulin gtt and managed in the ICU. Work-up revealed pan sensitive E. coli in his urine, and blood. He was started on broad-spectrum antibiotics, and then narrowed to ceftriaxone. His mental status, has improved somewhat, however he is definitely not back to his baseline. Due to his mental status he has not been able to tolerate any p.o., and he has been on NG feeds. Hospital course is also been complicated by hypernatremia, for which he is on D5 and free water flushes. Interval history / Subjective:  
F/u severe sepsis Comfort care The patient is comfortably laying in bed Awake, alert\"I am doing fine\" No new issues Assessment & Plan: ##Continues on comfort measures Severe sepsis, septic shock, with lactic acidosis, and acute hypoxic respiratory failure-secondary to E. coli bacteremia, UTI resolving 
-on comfort measures now, NGT removed DKA/HHSresolved (original BMP significant for glucose over 1000, CO2 less than 5), serum osmolality 415 Type 2 diabetes, A1c 10.6% Resolved Dysphagia-has not been able to work with speech due to his mental status Hypernatremia,  
resolved Acute kidney injury - 
on admission creatinine 5.9 has now down trended  
,resolved Acute hypoxic respiratory failure -now resolved Acute metabolic encephalopathy,baseline dementia 
-Thrombocytopenia chronic intermittent  
-stable Right hemiparesis noted as baseline on admission H&P  
-Lactic acidosis POA - resolved HTN - home amlodipine HLD - home statin Elevated LFTS 
- Access:Left subclavian central line. Placed,2/14 NGT,placed 2/13 Code status: DNR 
DVT prophylaxis: scd Plan: The patient is comfort care, awaiting transfer to ? [facility] for hospice Care Plan discussed with: Nurse Anticipated Disposition:as above Anticipated Discharge: as above 
son Jojo White on the [facility]([phone]) Hospital Problems  Date Reviewed: 3/5/2020 Codes Class Noted POA DKA (diabetic ketoacidoses) ([facility] [address] ICD-10-CM: E11.10 ICD-9-CM: 250.10  2/13/2020 Unknown * (Principal) Encephalopathy ICD-10-CM: G93.40 ICD-9-CM: 348.30  2/13/2020 Unknown [facility] ICD-10-CM: N17.9 ICD-9-CM: 584.9  1/21/2020 Unknown Review of Systems:  
Review of systems not obtained due to patient factors. Vital Signs:  
 Last 24hrs VS reviewed since prior progress note. Most recent are: 
Visit Vitals /81 (BP 1 Location: Left arm, BP Patient Position: At rest) Pulse (!) 101 Temp 98 °F (36.7 °C) Resp 18 Ht 5' 5\" (1.651 m) Wt 45.6 kg (100 lb 8.5 oz) SpO2 98% BMI 16.73 kg/m² No intake or output data in the 24 hours ending 03/09/20 1154 Physical Examination:  
 
 
     
Constitutional: Lethargic, opens eyes spontaneously ENT: stable Resp: Symmetrical air entry. On oxygen via nasal canula CV:  Regular rhythm, normal rate, no murmurs, gallops, rubs GI:  Soft, non distended, non tender.  normoactive bowel sounds, no hepatosplenomegaly Musculoskeletal:  SCDs in place,+1 edema legs Neurologic: Lethargic,non verbal,rigth hemiparesis. Moves the left side Data Review:  
 Review and/or order of clinical lab test 
Review and/or order of tests in the radiology section of CPT Review and/or order of tests in the medicine section of CPT Labs:  
 
No results for input(s): WBC, HGB, HCT, PLT, HGBEXT, HCTEXT, PLTEXT, HGBEXT, HCTEXT, PLTEXT in the last 72 hours. No results for input(s): NA, K, CL, CO2, BUN, CREA, GLU, CA, MG, PHOS, URICA in the last 72 hours. No results for input(s): SGOT, GPT, ALT, AP, TBIL, TBILI, TP, ALB, GLOB, GGT, AML, LPSE in the last 72 hours. No lab exists for component: AMYP, HLPSE No results for input(s): INR, PTP, APTT, INREXT, INREXT in the last 72 hours. No results for input(s): FE, TIBC, PSAT, FERR in the last 72 hours. No results found for: FOL, RBCF No results for input(s): PH, PCO2, PO2 in the last 72 hours. No results for input(s): CPK, CKNDX, TROIQ in the last 72 hours. No lab exists for component: CPKMB Lab Results Component Value Date/Time Cholesterol, total 190 01/02/2020 04:06 AM  
 HDL Cholesterol 64 01/02/2020 04:06 AM  
 LDL, calculated 115.4 (H) 01/02/2020 04:06 AM  
 Triglyceride 53 01/02/2020 04:06 AM  
 CHOL/HDL Ratio 3.0 01/02/2020 04:06 AM  
 
Lab Results Component Value Date/Time Glucose (POC) 160 (H) 03/06/2020 12:27 AM  
 Glucose (POC) 268 (H) 03/04/2020 06:07 AM  
 Glucose (POC) 259 (H) 03/03/2020 11:43 PM  
 Glucose (POC) 345 (H) 03/03/2020 06:02 PM  
 Glucose (POC) 248 (H) 03/03/2020 11:44 AM  
 
Lab Results Component Value Date/Time  Color YELLOW/STRAW 02/13/2020 01:07 PM  
 Appearance TURBID (A) 02/13/2020 01:07 PM  
 Specific gravity 1.020 02/13/2020 01:07 PM  
 pH (UA) 5.0 02/16/2020 08:33 AM  
 Protein 100 (A) 02/13/2020 01:07 PM  
 Glucose >1,000 (A) 02/13/2020 01:07 PM  
 Ketone NEGATIVE  02/13/2020 01:07 PM  
 Bilirubin NEGATIVE  02/13/2020 01:07 PM  
 Urobilinogen 0.2 02/13/2020 01:07 PM  
 Nitrites POSITIVE (A) 02/13/2020 01:07 PM  
 Leukocyte Esterase MODERATE (A) 02/13/2020 01:07 PM  
 Epithelial cells FEW 02/13/2020 01:07 PM  
 Bacteria 4+ (A) 02/13/2020 01:07 PM  
 WBC >100 (H) 02/13/2020 01:07 PM  
 RBC 5-10 02/13/2020 01:07 PM  
 
 
 
Medications Reviewed:  
 
Current Facility-Administered Medications Medication Dose Route Frequency  haloperidol (HALDOL) 2 mg/mL oral solution 2 mg  2 mg SubLINGual Q4H PRN Or  
 haloperidol lactate (HALDOL) injection 2 mg  2 mg IntraVENous Q4H PRN  
 morphine injection 2 mg  2 mg IntraVENous Q1H PRN  
 glycopyrrolate (ROBINUL) injection 0.2 mg  0.2 mg IntraVENous Q4H PRN  
 acetaminophen (TYLENOL) tablet 650 mg  650 mg Oral Q4H PRN Or  
 acetaminophen (TYLENOL) solution 650 mg  650 mg Oral Q4H PRN Or  
 acetaminophen (TYLENOL) suppository 650 mg  650 mg Rectal Q4H PRN  
 alteplase (CATHFLO) 1 mg in sterile water (preservative free) 1 mL injection  1 mg InterCATHeter PRN  
 bacitracin 500 unit/gram packet 1 Packet  1 Packet Topical PRN  
 balsam peru-castor oil (VENELEX) ointment   Topical BID  
 0.9% sodium chloride infusion 250 mL  250 mL IntraVENous PRN  
 sodium chloride (NS) flush 5-40 mL  5-40 mL IntraVENous Q8H  
 sodium chloride (NS) flush 5-40 mL  5-40 mL IntraVENous PRN  
 ondansetron (ZOFRAN) injection 4 mg  4 mg IntraVENous Q6H PRN  
 
______________________________________________________________________ EXPECTED LENGTH OF STAY: 12d 9h 
ACTUAL LENGTH OF STAY:          Casey Delcid MD

## 2020-03-09 NOTE — PROGRESS NOTES
Transition of Care Plan: 
  
* Referral made to [facility] * Medicaid Pending- Have placed consult to [employer] for F/U on Medicaid * Will Review for LTSS screen 
  
Referral has been sent to Jeanne ALEXANDER, RN, ACoordinator at [facility]. Checked on status of referral and [facility] had numerous admits over the weekend. Should here from [facility] today on potential tranfers date and time. Masoud Sandhu, ANAIS, AC- Complex Care Coordinator/[place] C: [phone]

## 2020-03-10 NOTE — PROGRESS NOTES
Transition of Care Plan: 
  
* Referral made to [facility] * Medicaid Pending- Have placed consult to [employer] for F/U on Medicaid * Will Review for LTSS screen [facility] indicated no ability to admit today. Evaluating for potential admission tomorrow. Saurav Carter LCSW, ZAINM-SW Complex Care Coordinator/[place] C: [phone]

## 2020-03-11 NOTE — PROGRESS NOTES
[address] [facility] Adult  Hospitalist Group Date of Service:  3/10/2020 NAME:  Saul Napoles :  1945 MRN:  [id number] Admission Summary:  
Patient was admitted to the ICU on 2020 from [facility]. · Acute metabolic encephalopathy · Septic shock · Acute respiratory failure requiring intubation · HHS/DKA 51-year-old gentleman with past medical history of hypertension, diabetes, CVA, dementia 
 who was found unresponsive at at [facility]. Blood gas was checked by EMS and route and read \"high\". In the emergency room on further work-up he was found to have severe DKA/HHS, acute kidney injury, severe lactic acidosis, hypothermia and a UTI. He was then intubated for airway protection, on admission. Work-up revealed DKA/HHS, he was started on insulin gtt and managed in the ICU. Work-up revealed pan sensitive E. coli in his urine, and blood. He was started on broad-spectrum antibiotics, and then narrowed to ceftriaxone. His mental status, has improved somewhat, however he is definitely not back to his baseline. Due to his mental status he has not been able to tolerate any p.o., and he has been on NG feeds. Hospital course is also been complicated by hypernatremia, for which he is on D5 and free water flushes. Interval history / Subjective:  
F/u severe sepsis Comfort care The patient is comfortably laying in bed Patient indicating desire to eat No new issues Assessment & Plan: ##Continues on comfort measures Severe sepsis, septic shock, with lactic acidosis, and acute hypoxic respiratory failure-secondary to E. coli bacteremia, UTI resolving 
-on comfort measures now, NGT removed DKA/HHSresolved (original BMP significant for glucose over 1000, CO2 less than 5), serum osmolality 415 Type 2 diabetes, A1c 10.6% Resolved Dysphagia-has not been able to work with speech due to his mental status Hypernatremia,  
resolved Acute kidney injury - 
on admission creatinine 5.9 has now down trended  
,resolved Acute hypoxic respiratory failure -now resolved Acute metabolic encephalopathy,baseline dementia 
-Thrombocytopenia chronic intermittent  
-stable Right hemiparesis noted as baseline on admission H&P  
-Lactic acidosis POA - resolved HTN - home amlodipine HLD - home statin Elevated LFTS 
- Comfort feeding Code status: DNR 
DVT prophylaxis: scd Plan: The patient is comfort care, awaiting transfer to [facility] for hospice Care Plan discussed with: Nurse Anticipated Disposition:as above Anticipated Discharge: as above 
[facility]([phone][facility] Hospital Problems  Date Reviewed: 3/5/2020 Codes Class Noted POA [facility] ICD-10-CM: E11.10 ICD-9-CM: 250.10  2/13/2020 Unknown * (Principal) Encephalopathy ICD-10-CM: G93.40 ICD-9-CM: 348.30  2/13/2020 Unknown JOSH (acute kidney injury) [facility] ICD-10-CM: N17.9 ICD-9-CM: 584.9  1/21/2020 Unknown Review of Systems:  
Review of systems not obtained due to patient factors. Vital Signs:  
 Last 24hrs VS reviewed since prior progress note. Most recent are: 
Visit Vitals /72 (BP 1 Location: Right arm, BP Patient Position: At rest) Pulse (!) 107 Temp 97.7 °F (36.5 °C) Resp 18 Ht 5' 5\" (1.651 m) Wt 44.2 kg (97 lb 6.4 oz) SpO2 97% BMI 16.21 kg/m² No intake or output data in the 24 hours ending 03/10/20 2148 Physical Examination:  
 
 
     
Constitutional: Lethargic, opens eyes spontaneously ENT: stable Resp: Symmetrical air entry. On oxygen via nasal canula CV:  Regular rhythm, normal rate, no murmurs, gallops, rubs GI:  Soft, non distended, non tender. normoactive bowel sounds, no hepatosplenomegaly Musculoskeletal:  SCDs in place,+1 edema legs Neurologic: Lethargic,non verbal,rigth hemiparesis. Moves the left side Data Review:  
 Review and/or order of clinical lab test 
Review and/or order of tests in the radiology section of CPT Review and/or order of tests in the medicine section of CPT Labs:  
 
No results for input(s): WBC, HGB, HCT, PLT, HGBEXT, HCTEXT, PLTEXT, HGBEXT, HCTEXT, PLTEXT in the last 72 hours. No results for input(s): NA, K, CL, CO2, BUN, CREA, GLU, CA, MG, PHOS, URICA in the last 72 hours. No results for input(s): SGOT, GPT, ALT, AP, TBIL, TBILI, TP, ALB, GLOB, GGT, AML, LPSE in the last 72 hours. No lab exists for component: AMYP, HLPSE No results for input(s): INR, PTP, APTT, INREXT, INREXT in the last 72 hours. No results for input(s): FE, TIBC, PSAT, FERR in the last 72 hours. No results found for: FOL, RBCF No results for input(s): PH, PCO2, PO2 in the last 72 hours. No results for input(s): CPK, CKNDX, TROIQ in the last 72 hours. No lab exists for component: CPKMB Lab Results Component Value Date/Time Cholesterol, total 190 01/02/2020 04:06 AM  
 HDL Cholesterol 64 01/02/2020 04:06 AM  
 LDL, calculated 115.4 (H) 01/02/2020 04:06 AM  
 Triglyceride 53 01/02/2020 04:06 AM  
 CHOL/HDL Ratio 3.0 01/02/2020 04:06 AM  
 
Lab Results Component Value Date/Time Glucose (POC) 160 (H) 03/06/2020 12:27 AM  
 Glucose (POC) 268 (H) 03/04/2020 06:07 AM  
 Glucose (POC) 259 (H) 03/03/2020 11:43 PM  
 Glucose (POC) 345 (H) 03/03/2020 06:02 PM  
 Glucose (POC) 248 (H) 03/03/2020 11:44 AM  
 
Lab Results Component Value Date/Time  Color YELLOW/STRAW 02/13/2020 01:07 PM  
 Appearance TURBID (A) 02/13/2020 01:07 PM  
 Specific gravity 1.020 02/13/2020 01:07 PM  
 pH (UA) 5.0 02/16/2020 08:33 AM  
 Protein 100 (A) 02/13/2020 01:07 PM  
 Glucose >1,000 (A) 02/13/2020 01:07 PM  
 Ketone NEGATIVE  02/13/2020 01:07 PM  
 Bilirubin NEGATIVE  02/13/2020 01:07 PM  
 Urobilinogen 0.2 02/13/2020 01:07 PM  
 Nitrites POSITIVE (A) 02/13/2020 01:07 PM  
 Leukocyte Esterase MODERATE (A) 02/13/2020 01:07 PM  
 Epithelial cells FEW 02/13/2020 01:07 PM  
 Bacteria 4+ (A) 02/13/2020 01:07 PM  
 WBC >100 (H) 02/13/2020 01:07 PM  
 RBC 5-10 02/13/2020 01:07 PM  
 
 
 
Medications Reviewed:  
 
Current Facility-Administered Medications Medication Dose Route Frequency  haloperidol (HALDOL) 2 mg/mL oral solution 2 mg  2 mg SubLINGual Q4H PRN Or  
 haloperidol lactate (HALDOL) injection 2 mg  2 mg IntraVENous Q4H PRN  
 morphine injection 2 mg  2 mg IntraVENous Q1H PRN  
 glycopyrrolate (ROBINUL) injection 0.2 mg  0.2 mg IntraVENous Q4H PRN  
 acetaminophen (TYLENOL) tablet 650 mg  650 mg Oral Q4H PRN Or  
 acetaminophen (TYLENOL) solution 650 mg  650 mg Oral Q4H PRN Or  
 acetaminophen (TYLENOL) suppository 650 mg  650 mg Rectal Q4H PRN  
 alteplase (CATHFLO) 1 mg in sterile water (preservative free) 1 mL injection  1 mg InterCATHeter PRN  
 bacitracin 500 unit/gram packet 1 Packet  1 Packet Topical PRN  
 balsam peru-castor oil (VENELEX) ointment   Topical BID  
 0.9% sodium chloride infusion 250 mL  250 mL IntraVENous PRN  
 sodium chloride (NS) flush 5-40 mL  5-40 mL IntraVENous Q8H  
 sodium chloride (NS) flush 5-40 mL  5-40 mL IntraVENous PRN  
 ondansetron (ZOFRAN) injection 4 mg  4 mg IntraVENous Q6H PRN  
 
______________________________________________________________________ EXPECTED LENGTH OF STAY: 12d 9h 
ACTUAL LENGTH OF STAY:          Vicky Lynch MD

## 2020-03-11 NOTE — WOUND CARE
Wound Care Note: Follow-up visit for sacral wound Chart shows: 
Admitted for acute kidney injury, DKA, encephalopathy Past Medical History:  
Diagnosis Date  Diabetes ([facility] [facility] 2002  High cholesterol  Hypertension  Stroke ([facility] [facility] M3 occlusion Jan 2020 WBC = 12.3 on 3/2/20 Admitted from [facility] Assessment:  
Patient is alert, did not speak while this nurse in room, incontinent with moderate assistance needed in repositioning. Bed: Deniz Patient wearing briefs for incontinence and a condom catheter. Diet: Full liquid Patient grimaced when legs moved to place pillow in between legs. Bilateral heels and buttocks skin intact and without erythema. 1. POA sacral wound is approximately 7.5 cm x 3 cm, wound bed is covered with slough and the previously pink area is now black, small serous drainage, wound edges are open, alba-wound intact. Opticell AG and gauze applied. Patient replaced in left turn position with pillow between the knees. Heels offloaded on pillow. Recommendations:   
Continue with current wound care. Sacrum- Every other day and when soiled, cleanse with normal saline, wipe wound bed clean and dry, apply a piece of Opticell AG covering the wound and filling in crevice, cover with ABD pad, secure with tape, dry distal end well, apply skin prep to distal end, cover dressing with Tegaderm. Skin Care & Pressure Prevention: 
Minimize layers of linen/pads under patient to optimize support surface. Turn/reposition approximately every 2 hours and offload heels. Manage incontinence / promote continence Nourishing Skin Cream to dry skin, minimize use of briefs when able Discussed above plan with patient & Rasta Garcia RN Transition of Care: Patient is on comfort care, do not anticipate wound healing. Wound care will sign off. Shonda Muse" KELLY Queen, RN, [facility], [facility]. 
office [phone] pager [phone] or call  to page

## 2020-03-11 NOTE — PROGRESS NOTES
Transition of care Plan: 
 
* Transfer to [facility] 3/12 * EMTALA required * [facility] scheduled for 11 AM 
* Nursing to call report to [phone] Patient has been accepted to [facility]. Have notified Dr. Caitlin Rust who will call report to Dr. Jenna Villegas. Telephoned son,  Taj Velásquez [phone] who is in agreement to [facility] transfer. Earline Lopez advised his sister, Nuha Berger and brother, Giuseppe Ivey are also in agreement. [facility] requested for 11 AM transfer tomorrow. Nicolas Thompson LCSW, [facility]- Complex Care Coordinator/[place] C: [phone]

## 2020-03-11 NOTE — PROGRESS NOTES
[facility] Adult  Hospitalist Group Date of Service:  3/11/2020 NAME:  Isidro Grady :  1945 MRN:  [id number] Admission Summary:  
Patient was admitted to the ICU on 2020 from [facility]. · Acute metabolic encephalopathy · Septic shock · Acute respiratory failure requiring intubation · HHS/DKA 60-year-old gentleman with past medical history of hypertension, diabetes, CVA, dementia 
 who was found unresponsive at at [facility]. Blood gas was checked by EMS and route and read \"high\". In the emergency room on further work-up he was found to have severe DKA/HHS, acute kidney injury, severe lactic acidosis, hypothermia and a UTI. He was then intubated for airway protection, on admission. Work-up revealed DKA/HHS, he was started on insulin gtt and managed in the ICU. Work-up revealed pan sensitive E. coli in his urine, and blood. He was started on broad-spectrum antibiotics, and then narrowed to ceftriaxone. His mental status, has improved somewhat, however he is definitely not back to his baseline. Due to his mental status he has not been able to tolerate any p.o., and he has been on NG feeds. Hospital course is also been complicated by hypernatremia, for which he is on D5 and free water flushes. Interval history / Subjective:  
F/u severe sepsis Comfort care The patient is comfortably laying in bed Patient indicating desire to eat No new issues Assessment & Plan: ##Continues on comfort measures Severe sepsis, septic shock, with lactic acidosis, and acute hypoxic respiratory failure-secondary to E. coli bacteremia, UTI resolving 
-on comfort measures now, NGT removed DKA/HHSresolved (original BMP significant for glucose over 1000, CO2 less than 5), serum osmolality 415 Type 2 diabetes, A1c 10.6% Resolved Dysphagia-has not been able to work with speech due to his mental status Hypernatremia,  
resolved Acute kidney injury - 
on admission creatinine 5.9 has now down trended  
,resolved Acute hypoxic respiratory failure -now resolved Acute metabolic encephalopathy,baseline dementia 
-Thrombocytopenia chronic intermittent  
-stable Right hemiparesis noted as baseline on admission H&P  
-Lactic acidosis POA - resolved HTN - home amlodipine HLD - home statin Elevated LFTS Comfort feeding Code status: DNR 
DVT prophylaxis: scd Plan: The patient is comfort care, awaiting transfer to [facility] for hospice 3/11. Tried to reach Dr Sarahi Palm at [phone] (phone no given by CM) but was not able to talk Care Plan discussed with: Nurse Anticipated Disposition:as above Anticipated Discharge: as above 
irais Hernández on the [facility]([phone]) Hospital Problems  Date Reviewed: 3/5/2020 Codes Class Noted POA [facility]diabetic k[facility]acidoses) [facility] ICD-10-CM: E11.10 ICD-9-CM: 250.10  2/13/2020 Unknown * (Principal) Encephalopathy ICD-10-CM: G93.40 ICD-9-CM: 348.30  2/13/2020 Unknown JOSH (acute kidney injury) ([facility] ICD-10-CM: N17.9 ICD-9-CM: 584.9  1/21/2020 Unknown Review of Systems:  
Review of systems not obtained due to patient factors. Vital Signs:  
 Last 24hrs VS reviewed since prior progress note. Most recent are: 
Visit Vitals /73 Pulse 98 Temp 98.4 °F (36.9 °C) Resp 18 Ht 5' 5\" (1.651 m) Wt 40.2 kg (88 lb 9.6 oz) SpO2 98% BMI 14.74 kg/m² Intake/Output Summary (Last 24 hours) at 3/11/2020 1734 Last data filed at 3/11/2020 1058 Gross per 24 hour Intake  Output 700 ml Net -700 ml Physical Examination:  
 
 
     
Constitutional: Lethargic, opens eyes spontaneously ENT: stable Resp: Symmetrical air entry. On oxygen via nasal canula CV:  Regular rhythm, normal rate, no murmurs, gallops, rubs GI:  Soft, non distended, non tender. normoactive bowel sounds, no hepatosplenomegaly Musculoskeletal:  SCDs in place,+1 edema legs Neurologic: Lethargic,non verbal,rigth hemiparesis. Moves the left side Data Review:  
 Review and/or order of clinical lab test 
Review and/or order of tests in the radiology section of CPT Review and/or order of tests in the medicine section of CPT Labs:  
 
No results for input(s): WBC, HGB, HCT, PLT, HGBEXT, HCTEXT, PLTEXT, HGBEXT, HCTEXT, PLTEXT in the last 72 hours. No results for input(s): NA, K, CL, CO2, BUN, CREA, GLU, CA, MG, PHOS, URICA in the last 72 hours. No results for input(s): SGOT, GPT, ALT, AP, TBIL, TBILI, TP, ALB, GLOB, GGT, AML, LPSE in the last 72 hours. No lab exists for component: AMYP, HLPSE No results for input(s): INR, PTP, APTT, INREXT, INREXT in the last 72 hours. No results for input(s): FE, TIBC, PSAT, FERR in the last 72 hours. No results found for: FOL, RBCF No results for input(s): PH, PCO2, PO2 in the last 72 hours. No results for input(s): CPK, CKNDX, TROIQ in the last 72 hours. No lab exists for component: CPKMB Lab Results Component Value Date/Time Cholesterol, total 190 01/02/2020 04:06 AM  
 HDL Cholesterol 64 01/02/2020 04:06 AM  
 LDL, calculated 115.4 (H) 01/02/2020 04:06 AM  
 Triglyceride 53 01/02/2020 04:06 AM  
 CHOL/HDL Ratio 3.0 01/02/2020 04:06 AM  
 
Lab Results Component Value Date/Time Glucose (POC) 160 (H) 03/06/2020 12:27 AM  
 Glucose (POC) 268 (H) 03/04/2020 06:07 AM  
 Glucose (POC) 259 (H) 03/03/2020 11:43 PM  
 Glucose (POC) 345 (H) 03/03/2020 06:02 PM  
 Glucose (POC) 248 (H) 03/03/2020 11:44 AM  
 
Lab Results Component Value Date/Time  Color YELLOW/STRAW 02/13/2020 01:07 PM  
 Appearance TURBID (A) 02/13/2020 01:07 PM  
 Specific gravity 1.020 02/13/2020 01:07 PM  
 pH (UA) 5.0 02/16/2020 08:33 AM  
 Protein 100 (A) 02/13/2020 01:07 PM  
 Glucose >1,000 (A) 02/13/2020 01:07 PM  
 Ketone NEGATIVE  02/13/2020 01:07 PM  
 Bilirubin NEGATIVE  02/13/2020 01:07 PM  
 Urobilinogen 0.2 02/13/2020 01:07 PM  
 Nitrites POSITIVE (A) 02/13/2020 01:07 PM  
 Leukocyte Esterase MODERATE (A) 02/13/2020 01:07 PM  
 Epithelial cells FEW 02/13/2020 01:07 PM  
 Bacteria 4+ (A) 02/13/2020 01:07 PM  
 WBC >100 (H) 02/13/2020 01:07 PM  
 RBC 5-10 02/13/2020 01:07 PM  
 
 
 
Medications Reviewed:  
 
Current Facility-Administered Medications Medication Dose Route Frequency  haloperidol (HALDOL) 2 mg/mL oral solution 2 mg  2 mg SubLINGual Q4H PRN Or  
 haloperidol lactate (HALDOL) injection 2 mg  2 mg IntraVENous Q4H PRN  
 morphine injection 2 mg  2 mg IntraVENous Q1H PRN  
 glycopyrrolate (ROBINUL) injection 0.2 mg  0.2 mg IntraVENous Q4H PRN  
 acetaminophen (TYLENOL) tablet 650 mg  650 mg Oral Q4H PRN Or  
 acetaminophen (TYLENOL) solution 650 mg  650 mg Oral Q4H PRN Or  
 acetaminophen (TYLENOL) suppository 650 mg  650 mg Rectal Q4H PRN  
 alteplase (CATHFLO) 1 mg in sterile water (preservative free) 1 mL injection  1 mg InterCATHeter PRN  
 bacitracin 500 unit/gram packet 1 Packet  1 Packet Topical PRN  
 balsam peru-castor oil (VENELEX) ointment   Topical BID  
 0.9% sodium chloride infusion 250 mL  250 mL IntraVENous PRN  
 sodium chloride (NS) flush 5-40 mL  5-40 mL IntraVENous Q8H  
 sodium chloride (NS) flush 5-40 mL  5-40 mL IntraVENous PRN  
 ondansetron (ZOFRAN) injection 4 mg  4 mg IntraVENous Q6H PRN  
 
______________________________________________________________________ EXPECTED LENGTH OF STAY: 12d 9h 
ACTUAL LENGTH OF STAY:          Shayla Mayorga MD

## 2020-03-11 NOTE — PROGRESS NOTES
[facility] Coordinator for Care Management Review of the chart due to request for possible transfer. Dr. Karthikeyan Fallon has reviewed the chart - please have the hospitalist give her a call -[phone]. Post this call I will follow-up with the [facility] MSW RN  
[phone]

## 2020-03-12 PROBLEM — G93.40 ACUTE ENCEPHALOPATHY: Status: ACTIVE | Noted: 2020-01-01

## 2020-03-12 NOTE — PROGRESS NOTES
TRANSFER - IN REPORT: 
 
46) Verbal report received from Kyle BALLESTEROS Meredith Rios (name) on Kevin Apodaca  being received from [facility] (unit) for routine progression of care Report consisted of patients Situation, Background, Assessment and  
Recommendations(SBAR). Information from the following report(s) SBAR, Kardex and Intake/Output was reviewed with the receiving nurse. Opportunity for questions and clarification was provided. Assessment completed upon patients arrival to unit and care assumed. 1445) Patient  arrived to unit via stretcher. Arrived without belongings. 1905) Bedside shift change report given to DANNA Fairbanks (oncoming nurse) by Esa Krause RN (offgoing nurse). Report included the following information SBAR, Kardex, Intake/Output and MAR.

## 2020-03-12 NOTE — H&P
Hospitalist Admission Note NAME: Bernice Alejandre :  1945 MRN:  [id number] Room Number: 195/88  @ [address] [facility]  
 
Date/Time:  3/12/2020 3:25 PM 
 
Patient PCP: Shanon Le MD 
______________________________________________________________________ Given the patient's current clinical presentation, I have a high level of concern for decompensation if discharged from the emergency department. Complex decision making was performed, which includes reviewing the patient's available past medical records, laboratory results, and x-ray films. My assessment of this patient's clinical condition and my plan of care is as follows. Assessment / Plan: Active Problems: 
  Acute encephalopathy (3/12/2020) Comfort measures only Severe protein calorie malnutrition Type 2 diabetes Dysphagia Baseline Dementia HTN 
HLD Patient was transitioned to comfort care at [facility]. Does not meet criteria for General Inpatient Hospice. Awaiting Medicaid approval and subsequently placement. Continue care directed towards symptoms relief. - Scopolamine patch. - Bites and sips for comfort - Morphine PRN for pain/dyspnea - Lorazepam PRN for anxiety/sedation 
- Haldol PRN for agitation - Zofran PRN for nausea - wound care Sacral wound :  
Per wound care note from [facility]. Sue's \" Recommendations:   
Continue with current wound care. 
  
Sacrum- Every other day and when soiled, cleanse with normal saline, wipe wound bed clean and dry, apply a piece of Opticell AG covering the wound and filling in crevice, cover with ABD pad, secure with tape, dry distal end well, apply skin prep to distal end, cover dressing with Tegaderm. 
  
Skin Care & Pressure Prevention: 
Minimize layers of linen/pads under patient to optimize support surface. Turn/reposition approximately every 2 hours and offload heels. Manage incontinence / promote continence Nourishing Skin Cream to dry skin, minimize use of briefs when able\" Body mass index is 18.65 kg/m². Code Status: DNR, comfort measures only Surrogate Decision Maker:  
  Primary Decision Maker (Active): [place] Son - [phone] Primary Decision MakerBmaris Gunderson Son - [phone] DVT Prophylaxis: not indicated. GI Prophylaxis: not indicated Baseline: ambulatory. Subjective: CHIEF COMPLAINT: comfort care HISTORY OF PRESENT ILLNESS:    
Wyatt Obando is a 76year old male with PMH of HTN,HLD, T2DM who presented initially to [facility] for unresponsiveness. He was found to have DKA/HHS, septic shock secondary to UTI, acute kidney injury, severe lactic acidosis, hypernatremia. He was initially intubated for airway protection, managed in the ICU. His mental status improved however he is not back to his baseline. He has been unable to tolerate  anything orally due to dysphasia. He was transitioned to comfort measures after family discussion. There is no means to bring patient home under hospice care for there is no identified caregiver. LTC placement is pending a payer source. Per Hospice RN patient does not meet GIP criteria. Transitioned to [facility] for continued comfort care until Medicaid is approved. Patient is resting in bed, with increased work of breathing. He responds to voice, speaks very softly. Denies difficulty of breathing. We were asked to admit for work up and evaluation of the above problems. Past Medical History:  
Diagnosis Date  Diabetes ([facility] [address] 2002  High cholesterol  Hypertension  Stroke ([facility] [address] M3 occlusion Jan 2020 No past surgical history on file. Social History Tobacco Use  Smoking status: Never Smoker  Smokeless tobacco: Never Used Substance Use Topics  Alcohol use: No  
  Alcohol/week: 0.0 standard drinks Family History Problem Relation Age of Onset  No Known Problems Mother  No Known Problems Father  No Known Problems Sister  No Known Problems Brother  No Known Problems Brother  No Known Problems Brother  No Known Problems Brother  No Known Problems Brother  No Known Problems Brother  No Known Problems Maternal Grandmother  No Known Problems Maternal Grandfather  No Known Problems Paternal Grandmother  No Known Problems Paternal Grandfather  Diabetes Neg Hx   
 Heart Disease Neg Hx No Known Allergies Prior to Admission medications Medication Sig Start Date End Date Taking? Authorizing Provider  
glycopyrrolate (ROBINUL) 0.2 mg/mL injection 1 mL by IntraVENous route every four (4) hours as needed (oral secretions). 3/12/20   Valeda Opitz, MD  
morphine 2 mg/mL injection 1 mL by IntraVENous route every one (1) hour as needed for Other (For pain or shortness of breath.) for up to 3 days. Max Daily Amount: 48 mg. 3/12/20 3/15/20  Valeda Opitz, MD  
balsam peru-castor oiL (VENELEX) ointment Apply  to affected area two (2) times a day. 3/12/20   Valeda Opitz, MD  
 
 
REVIEW OF SYSTEMS:    
I am not able to complete the review of systems because: The patient is intubated and sedated The patient has altered mental status due to his acute medical problems The patient has baseline aphasia from prior stroke(s) The patient has baseline dementia and is not reliable historian The patient is in acute medical distress and unable to provide information Total of 12 systems reviewed as follows:   
                                    POSITIVE= underlined text  Negative = text not underlined General:                     fever, chills, sweats, generalized weakness, weight loss/gain,  
                                    loss of appetite Eyes:                           blurred vision, eye pain, loss of vision, double vision ENT:                            rhinorrhea, pharyngitis Respiratory:               cough, sputum production, SOB, CHAUDHARI, wheezing, pleuritic pain  
Cardiology:                chest pain, palpitations, orthopnea, PND, edema, syncope Gastrointestinal:       abdominal pain , N/V, diarrhea, dysphagia, constipation, bleeding Genitourinary:           frequency, urgency, dysuria, hematuria, incontinence Muskuloskeletal :      arthralgia, myalgia, back pain Hematology:              easy bruising, nose or gum bleeding, lymphadenopathy Dermatological:         rash, ulceration, pruritis, color change / jaundice Endocrine:                 hot flashes or polydipsia Neurological:             headache, dizziness, confusion, focal weakness, paresthesia, Speech difficulties, memory loss, gait difficulty Psychological:          Feelings of anxiety, depression, agitation Objective: VITALS:   
Visit Vitals /72 Pulse (!) 108 Temp 98.4 °F (36.9 °C) Resp 24 Ht 5' 5\" (1.651 m) Wt 50.8 kg (112 lb 1.6 oz) SpO2 100% BMI 18.65 kg/m² PHYSICAL EXAM: 
 
General:    Sleepy but arousable, with moderate respiratory distress, cachectic, appears stated age. HEENT: Atraumatic, anicteric sclerae, pink conjunctivae No oral ulcers, mucosa moist, throat clear, dentition fair Neck:  Supple, symmetrical,  thyroid: non tender Lungs:   Clear to auscultation bilaterally. No Wheezing or Rhonchi. No rales. Chest wall:  No tenderness  No Accessory muscle use. Heart:   Regular  rhythm,  No  murmur   No edema Abdomen:   Soft, scaphoid, non-tender. Not distended. Bowel sounds normal 
Extremities: No cyanosis. No clubbing,   
  Skin turgor decreased,  Radial dial pulse 2+ Skin:     Not pale. Not Jaundiced  No rashes Psych:  Not depressed. Not anxious or agitated. Neurologic: No facial asymmetry. No aphasia or slurred speech.  Symmetrical strength, Sensation grossly intact. Sleepy but arousable. 
 
______________________________________________________________________ Care Plan discussed with: Patient,  Nurse and  Expected  Disposition:  SNF/LTC 
________________________________________________________________________ TOTAL TIME:  40 Minutes Critical Care Provided     Minutes non procedure based Comments Reviewed previous records  
>50% of visit spent in counseling and coordination of care  Discussion with patient and/or family and questions answered 
  
 
________________________________________________________________________ Signed: Raymond Verde MD 
 
Procedures: see electronic medical records for all procedures/Xrays and details which were not copied into this note but were reviewed prior to creation of Plan. LAB DATA REVIEWED:   
No results found for this or any previous visit (from the past 24 hour(s)).

## 2020-03-12 NOTE — DISCHARGE SUMMARY
Discharge Summary PATIENT ID: Marie Suarez MRN: [id number] YOB: 1945 DATE OF ADMISSION: 2/13/2020 10:57 AM   
DATE OF DISCHARGE: 3/12/20 PRIMARY CARE PROVIDER: Reji Puente MD  
 
ATTENDING PHYSICIAN: Benito Alcantara MD 
 
DISCHARGING PROVIDER: Benito Alcantara MD   
To contact this individual call [phone] and ask the  to page. If unavailable ask to be transferred the Adult Hospitalist Department. CONSULTATIONS: IP CONSULT TO NEPHROLOGY 
IP CONSULT TO HOSPITALIST 
 
PROCEDURES/SURGERIES: * No surgery found * [address] COURSE:  
Patient was admitted to the ICU on 2/13/2020 from [facility]. · Acute metabolic encephalopathy · Septic shock · Acute respiratory failure requiring intubation · HHS/DKA 
  
79-year-old gentleman with past medical history of hypertension, diabetes, CVA, dementia 
 who was found unresponsive at at [facility] gas was checked by EMS and route and read \"high\".  In the emergency room on further work-up he was found to have severe DKA/HHS, acute kidney injury, severe lactic acidosis, hypothermia and a UTI.  He was then intubated for airway protection, on admission. Work-up revealed DKA/HHS, he was started on insulin gtt and managed in the ICU.  Work-up revealed pan sensitive E. coli in his urine, and blood.  He was started on broad-spectrum antibiotics, and then narrowed to ceftriaxone.  His mental status, has improved somewhat, however he is definitely not back to his baseline.  Due to his mental status he has not been able to tolerate any p.o., and he has been on NG feeds.  Hospital course is also been complicated by hypernatremia, for which he is on D5 and free water flushes.  
 
 
##Continues on comfort measures 
  
Severe sepsis, septic shock, with lactic acidosis, and acute hypoxic respiratory failure-secondary to E. coli bacteremia, UTI resolving 
-on comfort measures now, NGT removed 
  
 DKA/HHSresolved (original BMP significant for glucose over 1000, CO2 less than 5), serum osmolality 415 Type 2 diabetes, A1c 10.6% Resolved 
  Dysphagia-has not been able to work with speech due to his mental status 
  
Hypernatremia,  
resolved 
  
Acute kidney injury - 
on admission creatinine 5.9 has now down trended  
,resolved 
  
Acute hypoxic respiratory failure -now resolved 
  
Acute metabolic encephalopathy,baseline dementia 
-Thrombocytopenia chronic intermittent  
-stable 
  
Right hemiparesis noted as baseline on admission H&P  
-Lactic acidosis POA - resolved HTN  
HLD  
  
Elevated LFTS 
  
Comfort feeding 
  
  
 
DISCHARGE DIAGNOSES / PLAN:   
 
Pt is discharged to [address] [facility] for hospice with comfort care PENDING TEST RESULTS:  
At the time of discharge the following test results are still pending: none FOLLOW UP APPOINTMENTS:   
Follow-up Information Follow up With Specialties Details Why Contact Info Kaitlyn Lagos MD Internal Medicine, Gastroenterology   [address] 
[address] [place] [address] 
[phone] Leilani Billingsley  [facility] [address] [place] [address] 
[phone] ADDITIONAL CARE RECOMMENDATIONS:  
 
DIET: full liquids(confort care) ACTIVITY: Activity as tolerated WOUND CARE: apply balsam on sacral wounds EQUIPMENT needed: none DISCHARGE MEDICATIONS: 
Current Discharge Medication List  
  
START taking these medications Details  
glycopyrrolate (ROBINUL) 0.2 mg/mL injection 1 mL by IntraVENous route every four (4) hours as needed (oral secretions). Qty: 1 Vial, Refills: 0  
  
morphine 2 mg/mL injection 1 mL by IntraVENous route every one (1) hour as needed for Other (For pain or shortness of breath.) for up to 3 days. Max Daily Amount: 48 mg. Qty: 1 Syringe, Refills: 0 Associated Diagnoses: End of life care balsam peru-castor oiL (VENELEX) ointment Apply  to affected area two (2) times a day. Qty: 1 Tube, Refills: 0 STOP taking these medications  
  
 glipiZIDE (GLUCOTROL) 5 mg tablet Comments:  
Reason for Stopping:   
   
 insulin lispro (HUMALOG U-100 INSULIN) 100 unit/mL injection Comments:  
Reason for Stopping:   
   
 amLODIPine (NORVASC) 5 mg tablet Comments:  
Reason for Stopping:   
   
 atorvastatin (LIPITOR) 20 mg tablet Comments:  
Reason for Stopping:   
   
 fish oil-omega-3 fatty acids (FISH OIL) 340-1,000 mg capsule Comments:  
Reason for Stopping:   
   
 insulin glargine (LANTUS) 100 unit/mL injection Comments:  
Reason for Stopping:   
   
 midodrine (PROAMITINE) 10 mg tablet Comments:  
Reason for Stopping:   
   
 vitamin E (AQUA GEMS) 400 unit capsule Comments:  
Reason for Stopping:   
   
 vit B Cmplx 3-FA-Vit C-Biotin (NEPHRO ALEN RX) 1- mg-mg-mcg tablet Comments:  
Reason for Stopping:   
   
 aspirin (ASPIRIN) 325 mg tablet Comments:  
Reason for Stopping:   
   
  
 
 
 
NOTIFY YOUR PHYSICIAN FOR ANY OF THE FOLLOWING:  
Fever over 101 degrees for 24 hours. Chest pain, shortness of breath, fever, chills, nausea, vomiting, diarrhea, change in mentation, falling, weakness, bleeding. Severe pain or pain not relieved by medications. Or, any other signs or symptoms that you may have questions about. DISPOSITION: 
  Home With: 
 OT  PT  HH  RN  
  
 Long term SNF/Inpatient Rehab Independent/assisted living Hospice  
x Other:[facility]  
 
 
PATIENT CONDITION AT DISCHARGE:  
 
Functional status  
x Poor Deconditioned Independent Cognition Bettey Cane Forgetful   
x Dementia Catheters/lines (plus indication) x Green condom, quad lumen CVC PICC   
 PEG None Code status Full code DNR   
 
PHYSICAL EXAMINATION AT DISCHARGE: 
General:          Alert, cooperative HEENT:           Atraumatic Lungs:             equal air entry b/l Heart:              Regular  rhythm,  No  murmur Abdomen:        Soft, non-tender, Bowel sounds normal 
Extremities:     No cyanosis. Neurologic:      Alert, moves all extremities Sacral wounds CHRONIC MEDICAL DIAGNOSES: 
Problem List as of 3/12/2020 Date Reviewed: 3/5/2020 Codes Class Noted - Resolved DKA (diabetic ketoacidoses) ([facility] ICD-10-CM: E11.10 ICD-9-CM: 250.10  2/13/2020 - Present * (Principal) Encephalopathy ICD-10-CM: G93.40 ICD-9-CM: 348.30  2/13/2020 - Present Hyperkalemia ICD-10-CM: E87.5 ICD-9-CM: 276.7  1/21/2020 - Present Hypernatremia ICD-10-CM: E87.0 ICD-9-CM: 276.0  1/21/2020 - Present Altered mental status ICD-10-CM: R41.82 
ICD-9-CM: 780.97  1/21/2020 - Present Failure to thrive in adult ICD-10-CM: R62.7 ICD-9-CM: 783.7  1/21/2020 - Present JOSH (acute kidney injury) ([facility] ICD-10-CM: N17.9 ICD-9-CM: 584.9  1/21/2020 - Present CVA (cerebral vascular accident) [facility]) ICD-10-CM: I63.9 ICD-9-CM: 434.91  1/1/2020 - Present DM (diabetes mellitus) type II controlled with renal manifestation (HCC) ICD-10-CM: E11.29 ICD-9-CM: 250.40  4/20/2016 - Present Cataracts, bilateral ICD-10-CM: H26.9 ICD-9-CM: 366.9  11/15/2013 - Present Overview Signed 6/18/2014 11:37 AM by Daisy Arellano Removed 4/2014 Glaucoma, right eye ICD-10-CM: H40.9 ICD-9-CM: 365.9  11/15/2013 - Present HTN (hypertension) ICD-10-CM: I10 
ICD-9-CM: 401.9  7/2/2012 - Present RESOLVED: Type 1 diabetes mellitus with hyperglycemia (HCC) ICD-10-CM: E10.65 ICD-9-CM: 250.01  8/20/2018 - 8/24/2018 RESOLVED: Screening for colon cancer ICD-10-CM: Z12.11 ICD-9-CM: V76.51  6/18/2014 - 9/30/2019 RESOLVED: Right shoulder pain ICD-10-CM: M25.511 ICD-9-CM: 719.41  11/28/2012 - 4/20/2016  RESOLVED: HTN (hypertension) ICD-10-CM: I10 
 ICD-9-CM: 401.9  7/2/2012 - 11/28/2012 RESOLVED: Diabetes mellitus type 2 in nonobese [facility]) ICD-10-CM: E11.9 ICD-9-CM: 250.00  6/11/2012 - 4/20/2016 Overview Signed 6/11/2012 11:23 AM by Yrn Goss NP  
  2002 dx RESOLVED: Hyperglycemia without ketosis ICD-10-CM: R73.9 ICD-9-CM: 790.29  5/26/2012 - 5/29/2012 RESOLVED: Seizure ([address] ICD-10-CM: R56.9 ICD-9-CM: 780.39  5/26/2012 - 5/29/2012 Greater than 45 minutes were spent with the patient on counseling and coordination of care Signed:  
Heaven Díaz MD 
3/12/2020 
10:23 AM

## 2020-03-12 NOTE — PROGRESS NOTES
Transition of care Plan: 
  
* Transfer to [facility] 3/12 * EMTALA required * AMR scheduled for 1:30 PM 
* Nursing to call report to [phone] Dr. Bria Dawkins to call report to Dr. Shantelle Case at [facility] this AM. Son, Radha Meza advised both he and his siblings are in agreement to transfer. Plan is for LTC bed at [facility] until Medicaid is active. Then plan is for LTC placement at a local nursing home under Hospice. Care Management Interventions PCP Verified by CM: Yes(PCP at [facility]) Palliative Care Criteria Met (RRAT>21 & CHF Dx)?: No 
Transition of Care Consult (CM Consult): Other([facility]) MyChart Signup: No 
Discharge Durable Medical Equipment: No 
Health Maintenance Reviewed: Yes Physical Therapy Consult: No 
Occupational Therapy Consult: No 
Speech Therapy Consult: No 
Current Support Network: (No family at bedside, ) The Plan for Transition of Care is Related to the Following Treatment Goals : [facility] The Patient and/or Patient Representative was Provided with a Choice of Provider and Agrees with the Discharge Plan?: Yes Freedom of Choice List was Provided with Basic Dialogue that Supports the Patient's Individualized Plan of Care/Goals, Treatment Preferences and Shares the Quality Data Associated with the Providers?: Yes Discharge Location Discharge Placement: Other:([facility] LTC bed) Agusto Fonseca LCSW, BRIDGERSW Complex Care Coordinator/[place] C: [phone]

## 2020-03-12 NOTE — PROGRESS NOTES
1448) pt arrive to unit. Nonverbal communication--squeezing hand. Pt grimace and cry with turn. Stage 3 pressure injury on sacrum and possible DTI bilateral heels. 1556) Morphine given for facial grimacing signs of pain. .    
5548) consult Dr. Carlos Rahman, able to discontinue seizure precautions. Green catheter for end-of-life care.

## 2020-03-12 NOTE — DISCHARGE INSTRUCTIONS
Discharge Instructions       PATIENT ID: Preston Casarez  MRN: [id number]   YOB: 1945    DATE OF ADMISSION: 2/13/2020 10:57 AM    DATE OF DISCHARGE: 3/12/2020    PRIMARY CARE PROVIDER: Sade Josue MD     ATTENDING PHYSICIAN: Fany Hunter MD  DISCHARGING PROVIDER: Fariba Barros MD    To contact this individual call [phone] and ask the  to page. If unavailable ask to be transferred the Adult Hospitalist Department. DISCHARGE DIAGNOSES   Severe sepsis, septic shock, with lactic acidosis, and acute hypoxic respiratory failure, E. coli bacteremia, UTI, DKA/HHS      CONSULTATIONS: IP CONSULT TO NEPHROLOGY  IP CONSULT TO HOSPITALIST    PROCEDURES/SURGERIES: * No surgery found *    PENDING TEST RESULTS:   At the time of discharge the following test results are still pending: none  FOLLOW UP APPOINTMENTS:   Follow-up Information     Follow up With Specialties Details Why Contact Info    Sade Josue MD Internal Medicine, Gastroenterology   [address]  [phone]      Case Gaston NP [address]  [address] [facility]  [phone]             ADDITIONAL CARE RECOMMENDATIONS:     DIET: full liquids(confort care)      ACTIVITY: Activity as tolerated    WOUND CARE: apply balsam on sacral wounds    EQUIPMENT needed: none      DISCHARGE MEDICATIONS:   See Medication Reconciliation Form    · It is important that you take the medication exactly as they are prescribed. · Keep your medication in the bottles provided by the pharmacist and keep a list of the medication names, dosages, and times to be taken in your wallet. · Do not take other medications without consulting your doctor. NOTIFY YOUR PHYSICIAN FOR ANY OF THE FOLLOWING:   Fever over 101 degrees for 24 hours.    Chest pain, shortness of breath, fever, chills, nausea, vomiting, diarrhea, change in mentation, falling, weakness, bleeding. Severe pain or pain not relieved by medications. Or, any other signs or symptoms that you may have questions about.       DISPOSITION:    Home With:   OT  PT  HH  RN       SNF/Inpatient Rehab/LTAC    Independent/assisted living    Hospice   x Other:[facility]     CDMP Checked:   Yes x     PROBLEM LIST Updated:  Yes x       Signed:   Elmer Almanzar MD  3/12/2020  10:16 AM

## 2020-03-13 NOTE — PROGRESS NOTES
Assumed care of pt. Report recv'd from [facility]. Pt appears comfortable, breathing unlabored. Will continue to monitor.

## 2020-03-13 NOTE — PROGRESS NOTES
0700) Bedside shift change report given to [address], RN (oncoming nurse) by Venessa Lozano RN (offgoing nurse). Report included the following information SSUY, Neetu, MAR 
0840)pt resting with eyes closed--open to touch. Bath given for sweaty skin. Rectal tylenol given for temperature 100.3. Morphine given for pain--pt grimace when legs touched. Urine output from waggoner less opaque than yesterday, still having white pus at meatus. 1300) Wound care with Mairamm. DTI on bilateral heels left open to air and elevated;  Stage 3 wound on sacrum, cleaned with normal saline; dressed with silver and foam.  
1507) Bedside shift change report given to Oj Cam RN (oncoming nurse) by [address], RN (offgoing nurse). Report included the following information SBAR, Kardex, MAR and Accordion.

## 2020-03-13 NOTE — DISCHARGE SUMMARY
Hospitalist Discharge Summary Patient ID: Peggy Dupree [id number] 
69 y.o. 
1945 PCP on record: Yumi Flores MD 
 
Admit date: 3/12/2020 Discharge date and time: 3/13/2020 Admission Diagnoses: Acute encephalopathy [G93.40] Failure to thrive in adult [R62.7] Failure to thrive in adult [R62.7] Discharge Diagnoses: Active Problems: 
  Failure to thrive in adult (1/21/2020) Acute encephalopathy (3/12/2020) Comfort measures only Severe protein calorie malnutrition Type 2 diabetes Dysphagia Baseline Dementia HTN 
HLD Sacral wound Hospital Course:  
Patient was admitted on 3/12. Received symptomatic management and comfort care. He was pronounced dead at 7:08 PM 3/13/2020. CONSULTATIONS: 
None Excerpted HPI from H&P of Beth Landin MD: 
76year old male with PMH of HTN,HLD, T2DM who presented initially to [facility] for unresponsiveness. He was found to have DKA/HHS, septic shock secondary to UTI, acute kidney injury, severe lactic acidosis, hypernatremia. He was initially intubated for airway protection, managed in the ICU. His mental status improved however he is not back to his baseline. He has been unable to tolerate  anything orally due to dysphasia. He was transitioned to comfort measures after family discussion. There is no means to bring patient home under hospice care for there is no identified caregiver. LTC placement is pending a payer source. Per Hospice RN patient does not meet GIP criteria. Transitioned to [address] [place] [address] for continued comfort care until Medicaid is approved. 
  
Patient is resting in bed, with increased work of breathing. He responds to voice, speaks very softly. Denies difficulty of breathing. We were asked to admit for work up and evaluation of the above problems. ______________________________________________________________________ DISCHARGE SUMMARY/HOSPITAL COURSE:  for full details see H&P, daily progress notes, labs, consult notes. Physical examination : 
  
No lung or heart sounds. No corneal reflexes noted. 
  
Time of death : 7:08 PM 
_______________________________________________________________________ DISCHARGE MEDICATIONS:  
Current Discharge Medication List  
  
 
 
My Recommended Diet, Activity, Wound Care, and follow-up labs are listed in the patient's Discharge Insturctions which I have personally completed and reviewed. _______________________________________________________________________ DISPOSITION:    
 Condition at Discharge:   
_______________________________________________________________________ Follow up with: PCP : Darling Jensen MD 
Follow-up Information None Total time in minutes spent coordinating this discharge (includes going over instructions, follow-up, prescriptions, and preparing report for sign off to her PCP) :  20 minutes Signed: 
Holland Vazquez MD

## 2020-03-13 NOTE — PHYSICIAN ADVISORY
This patient is at high risk of adverse events and deterioration based on documented clinical data, comorbid conditions and current acute care course. Mr. Mandeep Higuera is expected to meet Inpatient Admission status criteria in accordance with CMS regulation Section 43 .3. Specifically, due to medical necessity the patient's stay is expected to exceed Two Midnights. It is our recommendation that this patient's hospitalization status should be upgraded from  OBSERVATION to INPATIENT status. The final decision of the patient's hospitalization status depends on the attending physician's judgment.

## 2020-03-13 NOTE — PROGRESS NOTES
Paged by DANNA Horton Settler stating that patient's respirations stopped while she was in the room. Physical examination : 
 
No lung or heart sounds. No corneal reflexes noted.  
 
Time of death : 7:08 PM

## 2020-03-13 NOTE — PROGRESS NOTES
Pt's respirations ceased while primary nurse in room. No pulse detected w/ palpation or auscultation. Hospitalist called. Will come to bedside.

## 2020-03-13 NOTE — PROGRESS NOTES
Hospitalist Progress Note NAME: Layla Cogan :  1945 MRN:  [id number] Room Number:  341/15  @ [facility] Summary: 76 y.o. male whom presented on 3/12/2020 with Assessment / Plan: 
  
Comfort measures only Severe protein calorie malnutrition Type 2 diabetes Dysphagia Baseline Dementia HTN 
HLD Patient was transitioned to comfort care at [address] [place] [address]. Does not meet criteria for General Inpatient Hospice. Awaiting Medicaid approval and subsequently placement. Continue care directed towards symptoms relief.  
  
- Scopolamine patch. - Bites and sips for comfort - Morphine PRN for pain/dyspnea - Lorazepam PRN for anxiety/sedation 
- Haldol PRN for agitation - Zofran PRN for nausea - wound care 
  
  
  
Sacral wound :  
Per wound care note from [address] [place] [address] \" Recommendations:   
Continue with current wound care. 
  
Sacrum- Every other day and when soiled, cleanse with normal saline, wipe wound bed clean and dry, apply a piece of Opticell AG covering the wound and filling in crevice, cover with ABD pad, secure with tape, dry distal end well, apply skin prep to distal end, cover dressing with Tegaderm. 
  
Skin Care & Pressure Prevention: 
Minimize layers of linen/pads under patient to optimize support surface.   
Turn/reposition approximately every 2 hours and offload heels. Manage incontinence / promote continence Nourishing Skin Cream to dry skin, minimize use of briefs when able\"   
  
Body mass index is 18.65 kg/m².  
  
  
  
  
Code Status: DNR, comfort measures only Surrogate Decision Maker:  
  Primary Decision Maker (Active): Maricruz Davidson - [phone] 
  Primary Decision Maker: Sanjay Rios - [place] - [phone]  
  
DVT Prophylaxis: not indicated. GI Prophylaxis: not indicated 
  
Baseline: ambulatory Subjective: Chief Complaint / Reason for Physician Visit :  
 Resting comfortably, opens eyes to voice falls back asleep. Discussed with RN events overnight. Review of Systems: 
Unable to obtain Objective: VITALS:  
Last 24hrs VS reviewed since prior progress note. Most recent are: 
Patient Vitals for the past 24 hrs: 
 Temp Pulse Resp BP SpO2  
03/13/20 0815 100.3 °F (37.9 °C) (!) 110 28 123/63 97 % 03/12/20 1445     100 % 03/12/20 1444 98.4 °F (36.9 °C) (!) 108 24 104/72 100 % Intake/Output Summary (Last 24 hours) at 3/13/2020 1203 Last data filed at 3/13/2020 8755 Gross per 24 hour Intake  Output 425 ml Net -425 ml PHYSICAL EXAM: 
General: Cachectic, Resting comfortably, opens eyes to voice falls back asleep. EENT:  EOMI. Anicteric sclerae. Dry MM Resp:  CTA bilaterally, no wheezing or rales. No accessory muscle use CV:  Regular  rhythm,  normal S1/S2, no murmurs rubs gallops, No edema GI:  Soft, Non distended, Non tender. +Bowel sounds Neurologic:  Resting comfortably, opens eyes to voice falls back asleep. Soft speech Psych:    Not anxious nor agitated Skin:  Decubits ulcer. No jaundice Reviewed most current lab test results and cultures  YES Reviewed most current radiology test results   YES Review and summation of old records today    NO Reviewed patient's current orders and MAR    YES 
PMH/ reviewed - no change compared to H&P 
________________________________________________________________________ Care Plan discussed with: 
  Comments Patient x Family RN x Care Manager x Consultant Multidiciplinary team rounds were held today with , nursing, pharmacist and clinical coordinator. Patient's plan of care was discussed; medications were reviewed and discharge planning was addressed. ________________________________________________________________________ Total NON critical care TIME: 25  Minutes Total CRITICAL CARE TIME Spent:   Minutes non procedure based Comments >50% of visit spent in counseling and coordination of care    
________________________________________________________________________ Raymond Verde MD  
 
Procedures: see electronic medical records for all procedures/Xrays and details which were not copied into this note but were reviewed prior to creation of Plan. LABS: 
I reviewed today's most current labs and imaging studies. Pertinent labs include: No results for input(s): WBC, HGB, HCT, PLT, HGBEXT, HCTEXT, PLTEXT in the last 72 hours. No results for input(s): NA, K, CL, CO2, GLU, BUN, CREA, CA, MG, PHOS, ALB, TBIL, TBILI, SGOT, ALT, INR, INREXT in the last 72 hours.  
 
Signed: Raymond Verde MD

## 2020-03-13 NOTE — PROGRESS NOTES
I spoke to Elzbieta Dunn, patient's son and POA. He wants to confer with his brother and sister and decide whether they want on autopsy. He will call the floor back. RN supervisor aware.

## 2020-03-13 NOTE — INTERDISCIPLINARY ROUNDS
IDR with Dr. Jason HACKETT), Viktoriya Rogers (pharmacist), Javier Costello (), Braeden Rnadall (nurse supervisor), Adelso/Olag/Ignacio (volunteer), and [facility] (RN) to discuss plan of care including comfort care, tylenol for comfort, specialty mattress.

## 2020-03-13 NOTE — PROGRESS NOTES
Transition of care Patient transferred from [facility] for continue care. Will work with Complex Care Manager for transition. Waiting for Medicaid to be approved. Monitor closely -to re-consult Hospice [address] CATHERINE RN  
[phone]

## 2020-03-13 NOTE — PROGRESS NOTES
1945: Bedside shift change report given to Juan Jose Benítez RN(oncoming nurse) by Edyta Valladares RN (offgoing nurse). Report included the following information SBAR.

## 2020-03-14 NOTE — PROGRESS NOTES
Post mortem care completed, lines removed, security took patient to [facility].

## 2020-03-14 NOTE — PROGRESS NOTES
Son Yunier Frey called back this RN and explained he would like his father to go to [place] [address] [address] [facility] [address]. Contact made with  home. Phone number is [phone]. Pts son denies wanting an Autopsy on the phone at this time.

## 2020-03-14 NOTE — PROGRESS NOTES
I spoke with POA/son Azeb Rich on the phone today and confirmed that he/family do not want autopsy. Emotional support offered.

## 2023-01-02 NOTE — PROGRESS NOTES
Speech Pathology Patient just finished breakfast and is sleeping. RN stated that patient tolerated upgraded diet of dysphagia 2/altered diet with thin liquids well and with good intake. SLP will defer tx for now and continue to follow. chest pain for 3 days; general abdominal pain for 3 weeks.